# Patient Record
Sex: FEMALE | Race: WHITE | NOT HISPANIC OR LATINO | Employment: OTHER | URBAN - METROPOLITAN AREA
[De-identification: names, ages, dates, MRNs, and addresses within clinical notes are randomized per-mention and may not be internally consistent; named-entity substitution may affect disease eponyms.]

---

## 2017-01-17 DIAGNOSIS — E78.5 HYPERLIPIDEMIA LDL GOAL <100: Primary | ICD-10-CM

## 2017-01-17 DIAGNOSIS — E03.9 HYPOTHYROIDISM, UNSPECIFIED TYPE: ICD-10-CM

## 2017-01-18 RX ORDER — PRAVASTATIN SODIUM 10 MG
TABLET ORAL
Qty: 30 TABLET | Refills: 0 | Status: SHIPPED | OUTPATIENT
Start: 2017-01-18 | End: 2017-09-15

## 2017-01-18 RX ORDER — LEVOTHYROXINE SODIUM 75 UG/1
TABLET ORAL
Qty: 30 TABLET | Refills: 0 | Status: SHIPPED | OUTPATIENT
Start: 2017-01-18 | End: 2017-02-15

## 2017-01-18 NOTE — TELEPHONE ENCOUNTER
Synthroid     Last Written Prescription Date: 10/24/16  Last Quantity: 30, # refills: 1  Last Office Visit with [facility], [facility] or [facility] prescribing provider: 10/28/16        TSH   Date Value Ref Range Status   10/27/2016 0.90 0.40 - 4.00 mU/L Final       Pravastatin     Last Written Prescription Date: 12/5/16  Last Fill Quantity: 30, # refills: 0  Last Office Visit with [facility], [facility] or [facility] prescribing provider: 10/28/16       CHOL      186   10/27/2016  HDL       56   10/27/2016  LDL      109   10/27/2016  LDL      115   11/9/2011  TRIG      104   10/27/2016  CHOLHDLRATIO      3.6   5/26/2015

## 2017-01-18 NOTE — TELEPHONE ENCOUNTER
Rx was sent 1/18/2017 for 1 months and 0 refills. Patient should have medication through 2/18/2017    Shruthi Rivera RN

## 2017-02-07 ENCOUNTER — TRANSFERRED RECORDS (OUTPATIENT)
Dept: HEALTH INFORMATION MANAGEMENT | Facility: CLINIC | Age: 52
End: 2017-02-07

## 2017-02-08 NOTE — PROGRESS NOTES
"  SUBJECTIVE:                                                    Maria M Marcus is a 51 year old female who presents to clinic today for the following health issues:    HPI    Back Pain      Duration: 35 years - has been more intense for the last two weeks        Specific cause: \"herniated disk in neck while giving birth to son\"     Description:   Location of pain: middle of back and upper back on both side  Character of pain: dull ache with occasional shooting pain  Pain radiation:none  New numbness or weakness in legs, not attributed to pain:  no     Intensity: Currently 9/10    History:   Pain interferes with job: Not applicable  History of back problems: herniated disk - had surgery  Any previous MRI or X-rays: Yes- at [facility].  Date 2012 and prior  Sees a specialist for back pain:  No - previously did at [facility] \"but there was nothing they can do\"  Therapies tried without relief: activity, heat and stretch    Alleviating factors:   Improved by: chiropractor      Precipitating factors:  Worsened by: Lifting and Bending    Functional and Psychosocial Screen (Smooth STarT Back):      Total Score: 5           Sub-score: 3   Accompanying Signs & Symptoms:  Risk of Fracture:  None  Risk of Cauda Equina:  None  Risk of Infection:  None  Risk of Cancer:  None  Risk of Ankylosing Spondylitis:  Onset at age <35, male, AND morning back stiffness. no          Has been having right mid back pain for the past 2 weeks ever since she lost her balance while on vacation. She has been using Tylenol with minimal benefit as but peppermint oil has been working quite well. She does have a history of chronic neck pain/cervical stenosis with previous cervical spine surgery 10+ years ago but her current pain is different and feels like a muscle strain/exacerbation. She had not tried any recent muscle relaxants. No new weakness, radiation of the pain or upper extremity numbness/tingling. She does do multiple home neck/back stretching " exercises.     Problem list and histories reviewed & adjusted, as indicated.  Additional history: none    Problem list, Medication list, Allergies, and Medical/Social/Surgical histories reviewed in EPIC and updated as appropriate.    ROS:  GENERAL: Denies fever, fatigue, weakness, weight gain, or weight loss.  RESPIRATORY: Denies cough, hemoptysis, and shortness of breath.  MUSCULOSKELETAL: +Right sided upper back pain, neck pain.   NEUROLOGIC: Denies headache, fainting, dizziness, memory loss, numbness, tingling, or seizures.    OBJECTIVE:                                                    /82 mmHg  Pulse 101  Temp(Src) 97.8  F (36.6  C) (Temporal)  Resp 12  Ht   Wt 153 lb (69.4 kg)  SpO2 99%  Body mass index is 31.68 kg/(m^2).  GENERAL: healthy, alert and no distress  MS: Mild tenderness over the lower cervical/uppper thoracic spinous processes. Tenderness below the right scapula just posterior to the right axilla. Full cervical spine range of motion.  NEURO: Normal strength and tone, mentation intact and speech normal. Cranial nerves II-XII are grossly intact. DTRs are 1+/4 throughout and symmetric. Gait is stable.        ASSESSMENT/PLAN:                                                        ICD-10-CM    1. Upper back strain, initial encounter S29.012A cyclobenzaprine (FLEXERIL) 5 MG tablet   2. Chronic neck pain M54.2     G89.29        Symptoms are consistent with an upper back muscle strain.  I recommend she continue with home exercises along with the ice/heat, Tylenol and peppermint oil.   Will prescribe flexeril to help with her current pain/muscle tightness.  If she has any worsening pain or new weakness, she should be seen again.       Marcus Salgado PA-C  [facility]

## 2017-02-09 ENCOUNTER — OFFICE VISIT (OUTPATIENT)
Dept: FAMILY MEDICINE | Facility: OTHER | Age: 52
End: 2017-02-09
Payer: COMMERCIAL

## 2017-02-09 VITALS
RESPIRATION RATE: 12 BRPM | WEIGHT: 153 LBS | BODY MASS INDEX: 31.68 KG/M2 | OXYGEN SATURATION: 99 % | TEMPERATURE: 97.8 F | HEART RATE: 101 BPM | SYSTOLIC BLOOD PRESSURE: 138 MMHG | DIASTOLIC BLOOD PRESSURE: 82 MMHG

## 2017-02-09 DIAGNOSIS — M54.2 CHRONIC NECK PAIN: ICD-10-CM

## 2017-02-09 DIAGNOSIS — G89.29 CHRONIC NECK PAIN: ICD-10-CM

## 2017-02-09 DIAGNOSIS — S29.012A UPPER BACK STRAIN, INITIAL ENCOUNTER: Primary | ICD-10-CM

## 2017-02-09 PROCEDURE — 99213 OFFICE O/P EST LOW 20 MIN: CPT | Performed by: PHYSICIAN ASSISTANT

## 2017-02-09 RX ORDER — CYCLOBENZAPRINE HCL 5 MG
5-10 TABLET ORAL 3 TIMES DAILY PRN
Qty: 30 TABLET | Refills: 0 | Status: SHIPPED | OUTPATIENT
Start: 2017-02-09 | End: 2017-07-28

## 2017-02-09 ASSESSMENT — ANXIETY QUESTIONNAIRES
7. FEELING AFRAID AS IF SOMETHING AWFUL MIGHT HAPPEN: 0 = NOT AT ALL
GAD7 TOTAL SCORE: 0
GAD7 TOTAL SCORE: 0

## 2017-02-09 ASSESSMENT — PAIN SCALES - GENERAL: PAINLEVEL: EXTREME PAIN (9)

## 2017-02-09 ASSESSMENT — PATIENT HEALTH QUESTIONNAIRE - PHQ9: SUM OF ALL RESPONSES TO PHQ QUESTIONS 1-9: 2

## 2017-02-09 NOTE — PATIENT INSTRUCTIONS
You have an upper back muscle strain.  I recommend you continue with home exercises along with the ice/heat, Tylenol and peppermint oil.   Will prescribe flexeril to help relax your muscles. Take up to three times daily as needed.  If you have any worsening pain or new weakness, you should be seen again.

## 2017-02-09 NOTE — NURSING NOTE
"Chief Complaint   Patient presents with     Musculoskeletal Problem     back spasms     Panel Management     hep c, DM foot exam, phq9 and donaldo, urine drug screen       Initial /82 mmHg  Pulse 101  Temp(Src) 97.8  F (36.6  C) (Temporal)  Resp 12  Ht   Wt 153 lb (69.4 kg)  SpO2 99% Estimated body mass index is 31.68 kg/(m^2) as calculated from the following:    Height as of 9/12/16: 4' 10.25\" (1.48 m).    Weight as of this encounter: 153 lb (69.4 kg).  Medication Reconciliation: complete     Gayle Case, ALEJANDRO      "

## 2017-02-09 NOTE — MR AVS SNAPSHOT
After Visit Summary   2/9/2017    Maria M Marcus    MRN: [id number]           Patient Information     Date Of Birth          1965        Visit Information        Provider Department      2/9/2017 9:00 AM Marcus Salgado PA-C [facility]        Today's Diagnoses     Upper back strain, initial encounter    -  1     Chronic neck pain           Care Instructions    You have an upper back muscle strain.  I recommend you continue with home exercises along with the ice/heat, Tylenol and peppermint oil.   Will prescribe flexeril to help relax your muscles. Take up to three times daily as needed.  If you have any worsening pain or new weakness, you should be seen again.         Follow-ups after your visit        Who to contact     If you have questions or need follow up information about today's clinic visit or your schedule please contact [facility] directly at [phone].  Normal or non-critical lab and imaging results will be communicated to you by Donordonuthart, letter or phone within 4 business days after the clinic has received the results. If you do not hear from us within 7 days, please contact the clinic through Donordonuthart or phone. If you have a critical or abnormal lab result, we will notify you by phone as soon as possible.  Submit refill requests through BESOS or call your pharmacy and they will forward the refill request to us. Please allow 3 business days for your refill to be completed.          Additional Information About Your Visit        MyChart Information     [employer]ES[employer] gives you secure access to your electronic health record. If you see a primary care provider, you can also send messages to your care team and make appointments. If you have questions, please call your primary care clinic.  If you do not have a primary care provider, please call [phone] and they will assist you.        Care EveryWhere ID     This is your Care EveryWhere ID. This  could be used by other organizations to access your [place] medical records  [id number]        Your Vitals Were     Pulse Temperature Respirations Pulse Oximetry          101 97.8  F (36.6  C) (Temporal) 12 99%         Blood Pressure from Last 3 Encounters:   02/09/17 138/82   11/12/16 132/62   10/28/16 128/60    Weight from Last 3 Encounters:   02/09/17 153 lb (69.4 kg)   10/28/16 146 lb (66.225 kg)   09/12/16 143 lb 8 oz (65.091 kg)              Today, you had the following     No orders found for display         Today's Medication Changes          These changes are accurate as of: 2/9/17  9:25 AM.  If you have any questions, ask your nurse or doctor.               Start taking these medicines.        Dose/Directions    cyclobenzaprine 5 MG tablet   Commonly known as:  FLEXERIL   Used for:  Upper back strain, initial encounter   Started by:  Marcus Salgado PA-C        Dose:  5-10 mg   Take 1-2 tablets (5-10 mg) by mouth 3 times daily as needed for muscle spasms   Quantity:  30 tablet   Refills:  0            Where to get your medicines      These medications were sent to [facility] [address] [place] [address] [facility]  [address] [facility] [address]     Phone:  [phone]    - cyclobenzaprine 5 MG tablet             Primary Care Provider Office Phone # Fax #    Kirill Berman [phone]       [address] [place] [address]        Thank you!     Thank you for choosing [facility]  for your care. Our goal is always to provide you with excellent care. Hearing back from our patients is one way we can continue to improve our services. Please take a few minutes to complete the written survey that you may receive in the mail after your visit with us. Thank you!             Your Updated Medication List - Protect others around you: Learn how to safely use, store and throw away your medicines at www.disposemymeds.org.           This list is accurate as of: 2/9/17  9:25 AM.  Always use your most recent med list.                   Brand Name Dispense Instructions for use    azithromycin 250 MG tablet    ZITHROMAX    6 tablet    Two tablets first day, then one tablet daily for four days.       * blood glucose monitoring lancets     4 Box    Use to test blood sugars four times daily or as directed.       * blood glucose monitoring lancets     1 Box    Use to test blood sugar 5 times daily or as directed.       * blood glucose monitoring meter device kit     1 kit    Use to test blood sugars four times daily or as directed.       * blood glucose monitoring meter device kit     1 kit    Use to test blood sugars 5 times daily or as directed.       * blood glucose monitoring meter device kit     1 kit    Use to test blood sugars 5 times daily or as directed.       * blood glucose monitoring test strip    RELION PRIME TEST    1 Box    USE 1 STRIP TO TEST 4 TIMES DAILY       * blood glucose monitoring test strip    no brand specified    150 each    Use to test blood sugars 5 times daily or as directed. Type 1 Diabetes E10.9       * ACCU-CHEK CHERELLE test strip   Generic drug:  blood glucose monitoring     150 each    Use to test blood sugars 5 times daily or as directed.       cyclobenzaprine 5 MG tablet    FLEXERIL    30 tablet    Take 1-2 tablets (5-10 mg) by mouth 3 times daily as needed for muscle spasms       insulin aspart 100 UNIT/ML injection    NovoLOG FLEXPEN    1 Month    5 units before breakfast, 3 units before lunch, 2 units before dinner       * insulin pen needle 31G X 5 MM    B-D U/F    400 each    Use four times daily - short pen needles       * RELION MINI PEN NEEDLES 31G X 6 MM   Generic drug:  insulin pen needle     400 each    USE ONE  4 TIMES DAILY       * LANTUS SOLOSTAR 100 UNIT/ML injection   Generic drug:  insulin glargine     15 mL    INJECT 8 UNITS SUBCUTANEOUSLY AT BEDTIME       * LANTUS VIAL 100 UNIT/ML injection    Generic drug:  insulin glargine     3 Month    11 units in the AM.       * levothyroxine 88 MCG tablet    SYNTHROID/LEVOTHROID    30 tablet    Take 1 tablet (88 mcg) by mouth daily       * levothyroxine 75 MCG tablet    SYNTHROID/LEVOTHROID    30 tablet    TAKE ONE TABLET BY MOUTH ONCE DAILY       lisinopril 10 MG tablet    PRINIVIL/ZESTRIL    30 tablet    TAKE ONE TABLET BY MOUTH ONCE DAILY       methocarbamol 750 MG tablet    ROBAXIN    60 tablet    Take 1 tablet (750 mg) by mouth 4 times daily as needed for muscle spasms       omeprazole 20 MG CR capsule    priLOSEC    30 capsule    TAKE ONE CAPSULE BY MOUTH ONCE DAILY       pravastatin 10 MG tablet    PRAVACHOL    30 tablet    TAKE ONE-HALF TABLET BY MOUTH ONCE DAILY       vitamin D 2000 UNITS tablet     100 tablet    Take 2,000 Units by mouth daily.       * Notice:  This list has 14 medication(s) that are the same as other medications prescribed for you. Read the directions carefully, and ask your doctor or other care provider to review them with you.

## 2017-02-10 ASSESSMENT — PATIENT HEALTH QUESTIONNAIRE - PHQ9: SUM OF ALL RESPONSES TO PHQ QUESTIONS 1-9: 2

## 2017-02-10 ASSESSMENT — ANXIETY QUESTIONNAIRES: GAD7 TOTAL SCORE: 0

## 2017-02-13 ENCOUNTER — TELEPHONE (OUTPATIENT)
Dept: FAMILY MEDICINE | Facility: CLINIC | Age: 52
End: 2017-02-13

## 2017-02-13 DIAGNOSIS — S29.012A UPPER BACK STRAIN, INITIAL ENCOUNTER: Primary | ICD-10-CM

## 2017-02-13 RX ORDER — TIZANIDINE 2 MG/1
2 TABLET ORAL 3 TIMES DAILY PRN
Qty: 30 TABLET | Refills: 1 | Status: SHIPPED | OUTPATIENT
Start: 2017-02-13 | End: 2017-07-28

## 2017-02-13 NOTE — TELEPHONE ENCOUNTER
I spoke with patient over the phone and since starting the Flexeril, her glucose has been as high as 500. Will strop the Flexeril and try low dose tizanidine instead. Common side effects discussed. She will let me know if she has any concerns.    Marcus Salgado PA-C

## 2017-02-13 NOTE — TELEPHONE ENCOUNTER
Reason for call:  Medication  Reason for Call:  Medication or medication refill:    Do you use a [facility]?  Name of the pharmacy and phone number for the current request:  [facility] - [phone]    Name of the medication requested: something other then Flexeril, she saw Fer Salgado on 2/9/17    Other request: feels the flexeril is increasing her blood sugar.  She is running in the 200's    Can we leave a detailed message on this number? YES    Phone number patient can be reached at: Home number on file [phone] (home)    Best Time: any    Call taken on 2/13/2017 at 12:07 PM by Jeannette Luna

## 2017-02-15 DIAGNOSIS — E03.9 HYPOTHYROIDISM, UNSPECIFIED TYPE: ICD-10-CM

## 2017-02-15 NOTE — TELEPHONE ENCOUNTER
levothyroxine     Last Written Prescription Date: 1/18/17  Last Quantity: 30, # refills: 0  Last Office Visit with [facility] or [facility] prescribing provider: 10/28/16        TSH   Date Value Ref Range Status   10/27/2016 0.90 0.40 - 4.00 mU/L Final

## 2017-02-16 DIAGNOSIS — E10.9 TYPE 1 DIABETES MELLITUS WITHOUT COMPLICATION (H): ICD-10-CM

## 2017-02-16 NOTE — TELEPHONE ENCOUNTER
Contour blood glucose test strips requested on fax      Last Written Prescription Date: 9/6/16  Last Fill Quantity: 150,  # refills: 3   Last Office Visit with [facility], [facility] or [facility] prescribing provider: 2/9/17

## 2017-02-17 NOTE — TELEPHONE ENCOUNTER
Prescription approved per [facility] Refill Protocol.  Asim Holloway RN

## 2017-02-18 ENCOUNTER — MYC MEDICAL ADVICE (OUTPATIENT)
Dept: INTERNAL MEDICINE | Facility: CLINIC | Age: 52
End: 2017-02-18

## 2017-02-21 RX ORDER — LEVOTHYROXINE SODIUM 75 UG/1
TABLET ORAL
Qty: 30 TABLET | Refills: 5 | Status: SHIPPED | OUTPATIENT
Start: 2017-02-21 | End: 2017-08-07

## 2017-02-24 ENCOUNTER — MYC MEDICAL ADVICE (OUTPATIENT)
Dept: INTERNAL MEDICINE | Facility: CLINIC | Age: 52
End: 2017-02-24

## 2017-02-24 ENCOUNTER — OFFICE VISIT (OUTPATIENT)
Dept: URGENT CARE | Facility: RETAIL CLINIC | Age: 52
End: 2017-02-24
Payer: COMMERCIAL

## 2017-02-24 VITALS
OXYGEN SATURATION: 99 % | TEMPERATURE: 98.1 F | SYSTOLIC BLOOD PRESSURE: 135 MMHG | HEART RATE: 68 BPM | DIASTOLIC BLOOD PRESSURE: 63 MMHG

## 2017-02-24 DIAGNOSIS — J01.90 ACUTE SINUSITIS WITH SYMPTOMS > 10 DAYS: Primary | ICD-10-CM

## 2017-02-24 DIAGNOSIS — E10.9 TYPE 1 DIABETES MELLITUS WITHOUT COMPLICATION (H): ICD-10-CM

## 2017-02-24 PROCEDURE — 99213 OFFICE O/P EST LOW 20 MIN: CPT | Performed by: PHYSICIAN ASSISTANT

## 2017-02-24 NOTE — NURSING NOTE
"Chief Complaint   Patient presents with     Sinus Problem     going on 2 weeks; pressure in forehead, cheeks, ears     Headache     sinus head pressure     Cough     over 1 week; hurts in chest when coughing     Ent Problem     ear pressure     Generalized Body Aches       Initial /63 (BP Location: Left arm)  Pulse 68  Temp 98.1  F (36.7  C) (Oral)  SpO2 99% Estimated body mass index is 31.7 kg/(m^2) as calculated from the following:    Height as of 9/12/16: 4' 10.25\" (1.48 m).    Weight as of 2/9/17: 153 lb (69.4 kg).  Medication Reconciliation: complete  "

## 2017-02-24 NOTE — PATIENT INSTRUCTIONS
Augmentin (amoxicillin-clavulanate) 875mg twice daily for 10 days as directed.  Flonase 2 sprays in each nostril daily until symptoms resolve, then continue 1 spray in each nostril for at least 5 more days.  Take Tylenol as needed for pain.  May use netti pot with bottled or distilled water and saline packets to flush sinuses.  Sudafed behind the pharmacist counter for 3-5 days helps relieve congestion  Afrin (oxymetazoline) nasal spray twice daily for 3 days. Stop after 3 days.  Mucinex (guiafenesin) thins mucus and may help it to loosen more quickly  Saline drops or nasal sprays may loosen mucus.  Sit in the bathroom with the door closed and hot shower running to loosen mucus.  Contact primary care clinic if you do not have any relief from your symptoms after 10 days.  Present to emergency room for significantly increasing pain, persistent high fever >102F, swelling/redness around your eyes, changes in your vision or ability to move your eyes, altered mental status or a severe headache.

## 2017-02-24 NOTE — MR AVS SNAPSHOT
After Visit Summary   2/24/2017    Maria M Marcus    MRN: [id number]           Patient Information     Date Of Birth          1965        Visit Information        Provider Department      2/24/2017 3:20 PM Abbie Decker PA-C [facility]        Today's Diagnoses     Acute sinusitis with symptoms > 10 days    -  1      Care Instructions    Augmentin (amoxicillin-clavulanate) 875mg twice daily for 10 days as directed.  Flonase 2 sprays in each nostril daily until symptoms resolve, then continue 1 spray in each nostril for at least 5 more days.  Take Tylenol as needed for pain.  May use netti pot with bottled or distilled water and saline packets to flush sinuses.  Sudafed behind the pharmacist counter for 3-5 days helps relieve congestion  Afrin (oxymetazoline) nasal spray twice daily for 3 days. Stop after 3 days.  Mucinex (guiafenesin) thins mucus and may help it to loosen more quickly  Saline drops or nasal sprays may loosen mucus.  Sit in the bathroom with the door closed and hot shower running to loosen mucus.  Contact primary care clinic if you do not have any relief from your symptoms after 10 days.  Present to emergency room for significantly increasing pain, persistent high fever >102F, swelling/redness around your eyes, changes in your vision or ability to move your eyes, altered mental status or a severe headache.        Follow-ups after your visit        Who to contact     You can reach your care team any time of the day by calling [phone].  Notification of test results:  If you have an abnormal lab result, we will notify you by phone as soon as possible.         Additional Information About Your Visit        MyChart Information     [facility] gives you secure access to your electronic health record. If you see a primary care provider, you can also send messages to your care team and make appointments. If you have questions, please call your primary care  clinic.  If you do not have a primary care provider, please call [phone] and they will assist you.        Care EveryWhere ID     This is your Care EveryWhere ID. This could be used by other organizations to access your [place] medical records  [id number]        Your Vitals Were     Pulse Temperature Pulse Oximetry             68 98.1  F (36.7  C) (Oral) 99%          Blood Pressure from Last 3 Encounters:   02/24/17 135/63   02/09/17 138/82   11/12/16 132/62    Weight from Last 3 Encounters:   02/09/17 153 lb (69.4 kg)   10/28/16 146 lb (66.2 kg)   09/12/16 143 lb 8 oz (65.1 kg)              Today, you had the following     No orders found for display         Today's Medication Changes          These changes are accurate as of: 2/24/17  3:32 PM.  If you have any questions, ask your nurse or doctor.               Start taking these medicines.        Dose/Directions    amoxicillin-clavulanate 875-125 MG per tablet   Commonly known as:  AUGMENTIN   Used for:  Acute sinusitis with symptoms > 10 days        Dose:  1 tablet   Take 1 tablet by mouth 2 times daily for 10 days   Quantity:  20 tablet   Refills:  0         These medicines have changed or have updated prescriptions.        Dose/Directions    blood glucose monitoring lancets   This may have changed:  Another medication with the same name was removed. Continue taking this medication, and follow the directions you see here.   Used for:  Type 1 diabetes mellitus without complication (H)        Use to test blood sugar 5 times daily or as directed.   Quantity:  1 Box   Refills:  6       blood glucose monitoring meter device kit   This may have changed:  Another medication with the same name was removed. Continue taking this medication, and follow the directions you see here.   Used for:  Type 1 diabetes mellitus without complication (H)        Use to test blood sugars 5 times daily or as directed.   Quantity:  1 kit   Refills:  0       blood glucose monitoring  test strip   Commonly known as:  no brand specified   This may have changed:  Another medication with the same name was removed. Continue taking this medication, and follow the directions you see here.   Used for:  Type 1 diabetes mellitus without complication (H)        Use to test blood sugars 5 times daily or as directed. Type 1 Diabetes E10.9   Quantity:  150 each   Refills:  3            Where to get your medicines      These medications were sent to [facility] [address] [place], [place] - [address] [facility]  [address] [facility] [address]     Phone:  [phone]     amoxicillin-clavulanate 875-125 MG per tablet                Primary Care Provider Office Phone # Fax #    Kirill Berman [phone]       [place] [id number] [place] [address]        Thank you!     Thank you for choosing [facility]  for your care. Our goal is always to provide you with excellent care. Hearing back from our patients is one way we can continue to improve our services. Please take a few minutes to complete the written survey that you may receive in the mail after your visit with us. Thank you!             Your Updated Medication List - Protect others around you: Learn how to safely use, store and throw away your medicines at www.disposemymeds.org.          This list is accurate as of: 2/24/17  3:32 PM.  Always use your most recent med list.                   Brand Name Dispense Instructions for use    amoxicillin-clavulanate 875-125 MG per tablet    AUGMENTIN    20 tablet    Take 1 tablet by mouth 2 times daily for 10 days       blood glucose monitoring lancets     1 Box    Use to test blood sugar 5 times daily or as directed.       blood glucose monitoring meter device kit     1 kit    Use to test blood sugars 5 times daily or as directed.       blood glucose monitoring test strip    no brand specified    150 each    Use to test blood sugars 5 times daily or as  directed. Type 1 Diabetes E10.9       cyclobenzaprine 5 MG tablet    FLEXERIL    30 tablet    Take 1-2 tablets (5-10 mg) by mouth 3 times daily as needed for muscle spasms       IBUPROFEN PO          insulin aspart 100 UNIT/ML injection    NovoLOG FLEXPEN    1 Month    5 units before breakfast, 3 units before lunch, 2 units before dinner       * insulin pen needle 31G X 5 MM    B-D U/F    400 each    Use four times daily - short pen needles       * RELION MINI PEN NEEDLES 31G X 6 MM   Generic drug:  insulin pen needle     400 each    USE ONE  4 TIMES DAILY       * LANTUS SOLOSTAR 100 UNIT/ML injection   Generic drug:  insulin glargine     15 mL    INJECT 8 UNITS SUBCUTANEOUSLY AT BEDTIME       * LANTUS VIAL 100 UNIT/ML injection   Generic drug:  insulin glargine     3 Month    11 units in the AM.       * levothyroxine 88 MCG tablet    SYNTHROID/LEVOTHROID    30 tablet    Take 1 tablet (88 mcg) by mouth daily       * levothyroxine 75 MCG tablet    SYNTHROID/LEVOTHROID    30 tablet    TAKE ONE TABLET BY MOUTH ONCE DAILY       lisinopril 10 MG tablet    PRINIVIL/ZESTRIL    30 tablet    TAKE ONE TABLET BY MOUTH ONCE DAILY       omeprazole 20 MG CR capsule    priLOSEC    30 capsule    TAKE ONE CAPSULE BY MOUTH ONCE DAILY       pravastatin 10 MG tablet    PRAVACHOL    30 tablet    TAKE ONE-HALF TABLET BY MOUTH ONCE DAILY       tiZANidine 2 MG tablet    ZANAFLEX    30 tablet    Take 1 tablet (2 mg) by mouth 3 times daily as needed for muscle spasms       vitamin D 2000 UNITS tablet     100 tablet    Take 2,000 Units by mouth daily.       * Notice:  This list has 6 medication(s) that are the same as other medications prescribed for you. Read the directions carefully, and ask your doctor or other care provider to review them with you.

## 2017-02-24 NOTE — PROGRESS NOTES
Chief Complaint   Patient presents with     Sinus Problem     going on 2 weeks; pressure in forehead, cheeks, ears     Headache     sinus head pressure     Cough     over 1 week; hurts in chest when coughing     Ent Problem     ear pressure     Generalized Body Aches     SUBJECTIVE:  Maria M Marcus is a 51 year old female here with concerns about sinus infection.  She states onset of symptoms was 2 weeks ago.    Course of illness is worsening.   Severity moderate  She has had maxillary pressure as well as nasal congestion, cough , sore throat and body aches.  Predisposing factors include diabetic.   Recent treatment has included: OTC meds    Past Medical History   Diagnosis Date     Cervical radiculopathy      Diabetic eye exam (H) 12/14/11     DISC DIS NEC/NOS-LUMBAR 4/7/2007     Frozen shoulder syndrome 6/16/2011     Hyperlipidemia LDL goal <100 10/31/2010     Hypertension 1/3/2011     Type 1 diabetes, HbA1c goal < 7% (H) dx 1967     Unspecified hypothyroidism      Current Outpatient Prescriptions   Medication Sig Dispense Refill     IBUPROFEN PO        amoxicillin-clavulanate (AUGMENTIN) 875-125 MG per tablet Take 1 tablet by mouth 2 times daily for 10 days 20 tablet 0     levothyroxine (SYNTHROID/LEVOTHROID) 75 MCG tablet TAKE ONE TABLET BY MOUTH ONCE DAILY 30 tablet 5     tiZANidine (ZANAFLEX) 2 MG tablet Take 1 tablet (2 mg) by mouth 3 times daily as needed for muscle spasms 30 tablet 1     cyclobenzaprine (FLEXERIL) 5 MG tablet Take 1-2 tablets (5-10 mg) by mouth 3 times daily as needed for muscle spasms 30 tablet 0     pravastatin (PRAVACHOL) 10 MG tablet TAKE ONE-HALF TABLET BY MOUTH ONCE DAILY 30 tablet 0     insulin glargine (LANTUS VIAL) 100 UNIT/ML vial 11 units in the AM. 3 Month 1     LANTUS SOLOSTAR 100 UNIT/ML soln INJECT 8 UNITS SUBCUTANEOUSLY AT BEDTIME 15 mL 1     blood glucose monitoring (TANA CONTOUR MONITOR) meter device kit Use to test blood sugars 5 times daily or as directed. 1 kit 0      blood glucose monitoring (SOFTCLIX) lancets Use to test blood sugar 5 times daily or as directed. 1 Box 6     omeprazole (PRILOSEC) 20 MG capsule TAKE ONE CAPSULE BY MOUTH ONCE DAILY 30 capsule 10     lisinopril (PRINIVIL,ZESTRIL) 10 MG tablet TAKE ONE TABLET BY MOUTH ONCE DAILY 30 tablet 10     insulin aspart (NOVOLOG FLEXPEN) 100 UNIT/ML soln 5 units before breakfast, 3 units before lunch, 2 units before dinner 1 Month 11     blood glucose monitoring (NO BRAND SPECIFIED) test strip Use to test blood sugars 5 times daily or as directed. Type 1 Diabetes E10.9 150 each 3     RELION MINI PEN NEEDLES 31G X 6 MM USE ONE  4 TIMES DAILY 400 each 8     levothyroxine (SYNTHROID, LEVOTHROID) 88 MCG tablet Take 1 tablet (88 mcg) by mouth daily 30 tablet 11     insulin pen needle (B-D U/F) 31G X 5 MM Use four times daily - short pen needles 400 each 3     Cholecalciferol (VITAMIN D) 2000 UNITS tablet Take 2,000 Units by mouth daily. 100 tablet 3     [DISCONTINUED] blood glucose monitoring (ACCU-CHEK CHERELLE PLUS) meter device kit Use to test blood sugars 5 times daily or as directed. 1 kit 0     [DISCONTINUED] blood glucose monitoring (TANA CONTOUR MONITOR) meter device kit Use to test blood sugars four times daily or as directed. 1 kit 0     Social History   Substance Use Topics     Smoking status: Never Smoker     Smokeless tobacco: Never Used      Comment: no exposure     Alcohol use Yes      Comment: winex1-2/3x per yr.     Allergies   Allergen Reactions     No Known Drug Allergies      ROS:  Review of systems negative except as stated above.    OBJECTIVE:  /63 (BP Location: Left arm)  Pulse 68  Temp 98.1  F (36.7  C) (Oral)  SpO2 99%  GENERAL APPEARANCE: healthy, alert and no distress  EYES: PERRL, conjunctiva clear  HENT: Pain with palpation over frontal and maxillary sinuses. Ear canals normal TMs with mild serous effusions bilaterally. Nasal turbinates edematous and boggy with a blue hue bilaterally. Posterior  pharynx is not erythematous.  NECK: supple, nontender, no lymphadenopathy  RESP: lungs clear to auscultation - no rales, rhonchi or wheezes  CV: regular rates and rhythm, normal S1 S2, no murmur noted    ASSESSMENT:    ICD-10-CM    1. Acute sinusitis with symptoms > 10 days J01.90 amoxicillin-clavulanate (AUGMENTIN) 875-125 MG per tablet     PLAN:   Patient Instructions   Augmentin (amoxicillin-clavulanate) 875mg twice daily for 10 days as directed.  Flonase 2 sprays in each nostril daily until symptoms resolve, then continue 1 spray in each nostril for at least 5 more days.  Take Tylenol as needed for pain.  May use netti pot with bottled or distilled water and saline packets to flush sinuses.  Sudafed behind the pharmacist counter for 3-5 days helps relieve congestion  Afrin (oxymetazoline) nasal spray twice daily for 3 days. Stop after 3 days.  Mucinex (guiafenesin) thins mucus and may help it to loosen more quickly  Saline drops or nasal sprays may loosen mucus.  Sit in the bathroom with the door closed and hot shower running to loosen mucus.  Contact primary care clinic if you do not have any relief from your symptoms after 10 days.  Present to emergency room for significantly increasing pain, persistent high fever >102F, swelling/redness around your eyes, changes in your vision or ability to move your eyes, altered mental status or a severe headache.    Follow up with primary care provider with any problems, questions or concerns or if symptoms worsen or fail to improve. Patient agreed to plan and verbalized understanding.    Dolores Decker PA-C  [facility] [place]

## 2017-02-27 NOTE — TELEPHONE ENCOUNTER
Test strips         Last Written Prescription Date: 3/17/16  Last Fill Quantity: 150, # refills: 3  Last Office Visit with [facility] [place], [facility] or [facility] prescribing provider:  10/28/16        BP Readings from Last 3 Encounters:   02/24/17 135/63   02/09/17 138/82   11/12/16 132/62     Lab Results   Component Value Date    MICROL <5 04/11/2016     No results found for: MICROALBUMIN  Creatinine   Date Value Ref Range Status   10/27/2016 1.00 0.52 - 1.04 mg/dL Final   ]  GFR Estimate   Date Value Ref Range Status   10/27/2016 58 (L) >60 mL/min/1.7m2 Final     Comment:     Non  GFR Calc   04/11/2016 63 >60 mL/min/1.7m2 Final     Comment:     Non  GFR Calc   05/26/2015 81 >60 mL/min/1.7m2 Final     Comment:     Non  GFR Calc     GFR Estimate If Black   Date Value Ref Range Status   10/27/2016 71 >60 mL/min/1.7m2 Final     Comment:      GFR Calc   04/11/2016 76 >60 mL/min/1.7m2 Final     Comment:      GFR Calc   05/26/2015 >90   GFR Calc   >60 mL/min/1.7m2 Final     Lab Results   Component Value Date    CHOL 186 10/27/2016     Lab Results   Component Value Date    HDL 56 10/27/2016     Lab Results   Component Value Date     10/27/2016     11/09/2011     Lab Results   Component Value Date    TRIG 104 10/27/2016     Lab Results   Component Value Date    CHOLHDLRATIO 3.6 05/26/2015     Lab Results   Component Value Date    AST 14 10/27/2016     Lab Results   Component Value Date    ALT 18 10/27/2016     Lab Results   Component Value Date    A1C 6.9 10/27/2016    A1C 6.1 04/11/2016    A1C 6.0 10/09/2015    A1C 6.8 05/26/2015    A1C 6.1 03/17/2014     Potassium   Date Value Ref Range Status   10/27/2016 4.5 3.4 - 5.3 mmol/L Final

## 2017-02-27 NOTE — TELEPHONE ENCOUNTER
Crystal calls and states that the test strips that were called in were the wrong ones.  Pt states she needs Contour test strips.  Pt also states that she is going into [facility] today around 3 to pickup her husbands meds and she would like to have it ready when she gets there.  Pt states she does not want to have to make 2 trips our mistake.

## 2017-02-28 NOTE — TELEPHONE ENCOUNTER
Prescription approved per [facility] [place] Refill Protocol.    Christina Garcia RN

## 2017-03-01 ENCOUNTER — MYC MEDICAL ADVICE (OUTPATIENT)
Dept: INTERNAL MEDICINE | Facility: CLINIC | Age: 52
End: 2017-03-01

## 2017-03-01 ENCOUNTER — OFFICE VISIT (OUTPATIENT)
Dept: FAMILY MEDICINE | Facility: OTHER | Age: 52
End: 2017-03-01
Payer: COMMERCIAL

## 2017-03-01 VITALS
WEIGHT: 148.6 LBS | HEART RATE: 86 BPM | HEIGHT: 59 IN | TEMPERATURE: 99.4 F | BODY MASS INDEX: 29.96 KG/M2 | SYSTOLIC BLOOD PRESSURE: 128 MMHG | OXYGEN SATURATION: 100 % | DIASTOLIC BLOOD PRESSURE: 64 MMHG | RESPIRATION RATE: 16 BRPM

## 2017-03-01 DIAGNOSIS — E10.9 TYPE 1 DIABETES MELLITUS WITHOUT COMPLICATION (H): ICD-10-CM

## 2017-03-01 DIAGNOSIS — J20.9 ACUTE BRONCHITIS WITH SYMPTOMS > 10 DAYS: Primary | ICD-10-CM

## 2017-03-01 DIAGNOSIS — E10.9 TYPE 1 DIABETES MELLITUS WITHOUT COMPLICATION (H): Chronic | ICD-10-CM

## 2017-03-01 DIAGNOSIS — E66.3 OVERWEIGHT (BMI 25.0-29.9): ICD-10-CM

## 2017-03-01 PROCEDURE — 99213 OFFICE O/P EST LOW 20 MIN: CPT | Performed by: STUDENT IN AN ORGANIZED HEALTH CARE EDUCATION/TRAINING PROGRAM

## 2017-03-01 RX ORDER — INSULIN GLARGINE 100 [IU]/ML
11 INJECTION, SOLUTION SUBCUTANEOUS DAILY
Qty: 30 ML | Refills: 1 | Status: SHIPPED | OUTPATIENT
Start: 2017-03-01 | End: 2018-07-09

## 2017-03-01 RX ORDER — AZITHROMYCIN 250 MG/1
TABLET, FILM COATED ORAL
Qty: 6 TABLET | Refills: 0 | Status: SHIPPED | OUTPATIENT
Start: 2017-03-01 | End: 2017-09-01

## 2017-03-01 ASSESSMENT — PAIN SCALES - GENERAL: PAINLEVEL: EXTREME PAIN (8)

## 2017-03-01 NOTE — PROGRESS NOTES
"  SUBJECTIVE:                                                    Maria M Marcus is a 51 year old female who presents to clinic today for the following health issues:      Acute Illness   Acute illness concerns: Sinus and cough  Onset: 3 weeks    Fever: no    Chills/Sweats: YES- also menopausal. Unsure if it's from illness or menopause    Headache (location?): YES- forehead to back of head    Sinus Pressure:YES- post-nasal drainage, facial pain and teeth pain    Conjunctivitis:  no    Ear Pain: YES- both, comes and goes    Rhinorrhea: no    Congestion: YES    Sore Throat: YES- back of throat hurts when coughing     Cough: YES-productive of clear sputum, with shortness of breath, barking, worsening over time    Wheeze: YES    Decreased Appetite: YES    Nausea: no    Vomiting: no    Diarrhea:  no    Dysuria/Freq.: no    Fatigue/Achiness: YES    Sick/Strep Exposure: YES- \"Whole house\"     Therapies Tried and outcome: Amoxicillin - does not help    Pt states she is feeling worse than she did when on medication. Cough hurts her chest. Pt states she is concerned she has pneumonia.    Problem list and histories reviewed & adjusted, as indicated.  Additional history: as documented    Labs reviewed in EPIC    Reviewed and updated as needed this visit by clinical staff       Reviewed and updated as needed this visit by Provider         ROS:  Constitutional, HEENT, cardiovascular, pulmonary, gi and gu systems are negative, except as otherwise noted.    OBJECTIVE:                                                    /64 (BP Location: Right arm, Patient Position: Chair, Cuff Size: Adult Regular)  Pulse 86  Temp 99.4  F (37.4  C) (Temporal)  Resp 16  Ht 4' 10.66\" (1.49 m)  Wt 148 lb 9.6 oz (67.4 kg)  SpO2 100%  BMI 30.36 kg/m2  Body mass index is 30.36 kg/(m^2).  GENERAL: healthy, alert and no distress  EYES: Eyes grossly normal to inspection, PERRL and conjunctivae and sclerae normal  HENT: bilateral TMs dull, ear canals " normal, nose and mouth without ulcers or lesions  NECK: no adenopathy, no asymmetry, masses, or scars and thyroid normal to palpation  RESP: lungs clear to auscultation - no rales, rhonchi or wheezes  CV: regular rate and rhythm, normal S1 S2, no S3 or S4, no murmur, click or rub  MS: no gross musculoskeletal defects noted, no edema  SKIN: no suspicious lesions or rashes  PSYCH: mentation appears normal, affect normal/bright    Diagnostic Test Results:  none      ASSESSMENT/PLAN:                                                      1. Acute bronchitis with symptoms > 10 days  Symptoms worsening on Augmentin.  Switch to azithromycin for bacterial bronchitis.  RTC if symptoms persist or fail to improve.  - azithromycin (ZITHROMAX) 250 MG tablet; Two tablets first day, then one tablet daily for four days.  Dispense: 6 tablet; Refill: 0    2. Type 1 diabetes mellitus without complication (H)  Increased risk for progression of infection due to diabetes.  Treat aggressively.    3. Overweight (BMI 25.0-29.9)  Increased risk for progression of infection due to obesity.  Will treat more aggressively.        Patient Instructions   Azithromycin - 2 tablets today and then 1 tablet daily for 4 days.    Kathryn Kennedy, CNP  [phone]    1.  Stay hydrated and rest - Drink plenty of fluid, 8-10 glasses of water per day and get lots of rest.    2. Mucinex - you can try Mucinex (immediate release) 200-400 mg every 4 hours OR Mucinex (extended release) 600-1200 mg every 12 hours as needed for congestion.  Mucinex helps thin out your mucus so that it is easier to get it out of your body.    3.  Flonase - 1 spray in each nostril daily.  This is a steroid nasal spray that can help decrease inflammation in the nose and sinuses resulting in less nasal congestion.    4. Oral decongestants - you may take a nasal decongestant for up to 3 days as needed.  Sudafed (pseudoephedrine) can be used if you do not have high blood pressure.  The dosing  for Sudafed is 60 mg every 4-6 hours as needed for immediate release.  For extended release it is 120 mg every 12 hours.  If you have high blood pressure you can try Coricidin HBP.    5. Nasal decongestant - Afrin nasal spray 2-3 sprays in each nostril twice daily for up to 3 days can help improve nasal congestion.  Do not use longer than 3 days as congestion can worsen with prolonged use.    6. Humidifier - use a humidifier in the places you spend the most time.  This will help moisturize the air you breath in and help improve cough.    7. Headache, sore throat, fever - you can try ibuprofen (maximum of 2400 mg/24 hours) or Tylenol (maximum of 3,000 mg/24 hours).    - do not use Tylenol if you have liver disease  - do not use Ibuprofen if you have kidney disease or take a blood thinner.    8. Vicks VapoRub or Mentholatum - you may try rubbing this on your chest, under your nose and over the top of your nose for congestion.  Avoid contact with eyes, mouth and inside of nose.    9. Sore throat - gargle with warm salt water as needed.  Ibuprofen can help.  Chloraseptic throat lozenges or spray can help numb the throat.       If your symptoms worsen or do not improve after lasting 10 days, please return to the clinic.    Kathryn Kennedy, CNP  [phone]                Vianney Kennedy, APRN CNP  [facility]

## 2017-03-01 NOTE — PATIENT INSTRUCTIONS
Azithromycin - 2 tablets today and then 1 tablet daily for 4 days.    Kathryn Kennedy, CNP  [phone]    1.  Stay hydrated and rest - Drink plenty of fluid, 8-10 glasses of water per day and get lots of rest.    2. Mucinex - you can try Mucinex (immediate release) 200-400 mg every 4 hours OR Mucinex (extended release) 600-1200 mg every 12 hours as needed for congestion.  Mucinex helps thin out your mucus so that it is easier to get it out of your body.    3.  Flonase - 1 spray in each nostril daily.  This is a steroid nasal spray that can help decrease inflammation in the nose and sinuses resulting in less nasal congestion.    4. Oral decongestants - you may take a nasal decongestant for up to 3 days as needed.  Sudafed (pseudoephedrine) can be used if you do not have high blood pressure.  The dosing for Sudafed is 60 mg every 4-6 hours as needed for immediate release.  For extended release it is 120 mg every 12 hours.  If you have high blood pressure you can try Coricidin HBP.    5. Nasal decongestant - Afrin nasal spray 2-3 sprays in each nostril twice daily for up to 3 days can help improve nasal congestion.  Do not use longer than 3 days as congestion can worsen with prolonged use.    6. Humidifier - use a humidifier in the places you spend the most time.  This will help moisturize the air you breath in and help improve cough.    7. Headache, sore throat, fever - you can try ibuprofen (maximum of 2400 mg/24 hours) or Tylenol (maximum of 3,000 mg/24 hours).    - do not use Tylenol if you have liver disease  - do not use Ibuprofen if you have kidney disease or take a blood thinner.    8. Vicks VapoRub or Mentholatum - you may try rubbing this on your chest, under your nose and over the top of your nose for congestion.  Avoid contact with eyes, mouth and inside of nose.    9. Sore throat - gargle with warm salt water as needed.  Ibuprofen can help.  Chloraseptic throat lozenges or spray can help numb the throat.        If your symptoms worsen or do not improve after lasting 10 days, please return to the clinic.    Kathryn Kennedy, [facility]  [phone]

## 2017-03-01 NOTE — NURSING NOTE
"Chief Complaint   Patient presents with     Sinus Problem       Initial /64 (BP Location: Right arm, Patient Position: Chair, Cuff Size: Adult Regular)  Pulse 86  Temp 99.4  F (37.4  C) (Temporal)  Resp 16  Ht 4' 10.66\" (1.49 m)  Wt 148 lb 9.6 oz (67.4 kg)  SpO2 100%  BMI 30.36 kg/m2 Estimated body mass index is 30.36 kg/(m^2) as calculated from the following:    Height as of this encounter: 4' 10.66\" (1.49 m).    Weight as of this encounter: 148 lb 9.6 oz (67.4 kg).  Medication Reconciliation: complete   Luz Maria Pierce CMA      "

## 2017-03-01 NOTE — MR AVS SNAPSHOT
After Visit Summary   3/1/2017    Maria M Marcus    MRN: [id number]           Patient Information     Date Of Birth          1965        Visit Information        Provider Department      3/1/2017 10:40 AM Vianney Kennedy APRN CNP [facility]        Today's Diagnoses     Acute bronchitis with symptoms > 10 days    -  1      Care Instructions    Azithromycin - 2 tablets today and then 1 tablet daily for 4 days.    Kathryn Kennedy CNP  [phone]    1.  Stay hydrated and rest - Drink plenty of fluid, 8-10 glasses of water per day and get lots of rest.    2. Mucinex - you can try Mucinex (immediate release) 200-400 mg every 4 hours OR Mucinex (extended release) 600-1200 mg every 12 hours as needed for congestion.  Mucinex helps thin out your mucus so that it is easier to get it out of your body.    3.  Flonase - 1 spray in each nostril daily.  This is a steroid nasal spray that can help decrease inflammation in the nose and sinuses resulting in less nasal congestion.    4. Oral decongestants - you may take a nasal decongestant for up to 3 days as needed.  Sudafed (pseudoephedrine) can be used if you do not have high blood pressure.  The dosing for Sudafed is 60 mg every 4-6 hours as needed for immediate release.  For extended release it is 120 mg every 12 hours.  If you have high blood pressure you can try Coricidin HBP.    5. Nasal decongestant - Afrin nasal spray 2-3 sprays in each nostril twice daily for up to 3 days can help improve nasal congestion.  Do not use longer than 3 days as congestion can worsen with prolonged use.    6. Humidifier - use a humidifier in the places you spend the most time.  This will help moisturize the air you breath in and help improve cough.    7. Headache, sore throat, fever - you can try ibuprofen (maximum of 2400 mg/24 hours) or Tylenol (maximum of 3,000 mg/24 hours).    - do not use Tylenol if you have liver disease  - do not use  Ibuprofen if you have kidney disease or take a blood thinner.    8. Vicks VapoRub or Mentholatum - you may try rubbing this on your chest, under your nose and over the top of your nose for congestion.  Avoid contact with eyes, mouth and inside of nose.    9. Sore throat - gargle with warm salt water as needed.  Ibuprofen can help.  Chloraseptic throat lozenges or spray can help numb the throat.       If your symptoms worsen or do not improve after lasting 10 days, please return to the clinic.    Kathryn Kennedy CNP  [phone]                  Follow-ups after your visit        Who to contact     If you have questions or need follow up information about today's clinic visit or your schedule please contact [facility] directly at [phone].  Normal or non-critical lab and imaging results will be communicated to you by MyChart, letter or phone within 4 business days after the clinic has received the results. If you do not hear from us within 7 days, please contact the clinic through MyChart or phone. If you have a critical or abnormal lab result, we will notify you by phone as soon as possible.  Submit refill requests through Xtime or call your pharmacy and they will forward the refill request to us. Please allow 3 business days for your refill to be completed.          Additional Information About Your Visit        Xtime Information     Xtime gives you secure access to your electronic health record. If you see a primary care provider, you can also send messages to your care team and make appointments. If you have questions, please call your primary care clinic.  If you do not have a primary care provider, please call [phone] and they will assist you.        Care EveryWhere ID     This is your Care EveryWhere ID. This could be used by other organizations to access your [place] medical records  [id number]        Your Vitals Were     Pulse Temperature Respirations Height Pulse Oximetry BMI  "(Body Mass Index)    86 99.4  F (37.4  C) (Temporal) 16 4' 10.66\" (1.49 m) 100% 30.36 kg/m2       Blood Pressure from Last 3 Encounters:   03/01/17 128/64   02/24/17 135/63   02/09/17 138/82    Weight from Last 3 Encounters:   03/01/17 148 lb 9.6 oz (67.4 kg)   02/09/17 153 lb (69.4 kg)   10/28/16 146 lb (66.2 kg)              Today, you had the following     No orders found for display         Today's Medication Changes          These changes are accurate as of: 3/1/17 11:17 AM.  If you have any questions, ask your nurse or doctor.               Start taking these medicines.        Dose/Directions    azithromycin 250 MG tablet   Commonly known as:  ZITHROMAX   Used for:  Acute bronchitis with symptoms > 10 days   Started by:  Vianney Kennedy APRN CNP        Two tablets first day, then one tablet daily for four days.   Quantity:  6 tablet   Refills:  0            Where to get your medicines      These medications were sent to [facility] [address] [place] [id number] [address] [address]     Phone:  [phone]     azithromycin 250 MG tablet                Primary Care Provider Office Phone # Fax #    Kirill Berman -[phone]       [address] [place] [address]        Thank you!     Thank you for choosing [facility]  for your care. Our goal is always to provide you with excellent care. Hearing back from our patients is one way we can continue to improve our services. Please take a few minutes to complete the written survey that you may receive in the mail after your visit with us. Thank you!             Your Updated Medication List - Protect others around you: Learn how to safely use, store and throw away your medicines at www.disposemymeds.org.          This list is accurate as of: 3/1/17 11:17 AM.  Always use your most recent med list.                   Brand Name Dispense Instructions for use    " amoxicillin-clavulanate 875-125 MG per tablet    AUGMENTIN    20 tablet    Take 1 tablet by mouth 2 times daily for 10 days       azithromycin 250 MG tablet    ZITHROMAX    6 tablet    Two tablets first day, then one tablet daily for four days.       blood glucose monitoring lancets     1 Box    Use to test blood sugar 5 times daily or as directed.       blood glucose monitoring meter device kit     1 kit    Use to test blood sugars 5 times daily or as directed.       blood glucose monitoring test strip    no brand specified    150 each    Use to test blood sugars 5 times daily or as directed. Type 1 Diabetes E10.9       cyclobenzaprine 5 MG tablet    FLEXERIL    30 tablet    Take 1-2 tablets (5-10 mg) by mouth 3 times daily as needed for muscle spasms       IBUPROFEN PO          insulin aspart 100 UNIT/ML injection    NovoLOG FLEXPEN    1 Month    5 units before breakfast, 3 units before lunch, 2 units before dinner       * insulin pen needle 31G X 5 MM    B-D U/F    400 each    Use four times daily - short pen needles       * RELION MINI PEN NEEDLES 31G X 6 MM   Generic drug:  insulin pen needle     400 each    USE ONE  4 TIMES DAILY       * LANTUS SOLOSTAR 100 UNIT/ML injection   Generic drug:  insulin glargine     15 mL    INJECT 8 UNITS SUBCUTANEOUSLY AT BEDTIME       * LANTUS VIAL 100 UNIT/ML injection   Generic drug:  insulin glargine     3 Month    11 units in the AM.       * levothyroxine 88 MCG tablet    SYNTHROID/LEVOTHROID    30 tablet    Take 1 tablet (88 mcg) by mouth daily       * levothyroxine 75 MCG tablet    SYNTHROID/LEVOTHROID    30 tablet    TAKE ONE TABLET BY MOUTH ONCE DAILY       lisinopril 10 MG tablet    PRINIVIL/ZESTRIL    30 tablet    TAKE ONE TABLET BY MOUTH ONCE DAILY       omeprazole 20 MG CR capsule    priLOSEC    30 capsule    TAKE ONE CAPSULE BY MOUTH ONCE DAILY       pravastatin 10 MG tablet    PRAVACHOL    30 tablet    TAKE ONE-HALF TABLET BY MOUTH ONCE DAILY       tiZANidine 2 MG  tablet    ZANAFLEX    30 tablet    Take 1 tablet (2 mg) by mouth 3 times daily as needed for muscle spasms       vitamin D 2000 UNITS tablet     100 tablet    Take 2,000 Units by mouth daily.       * Notice:  This list has 6 medication(s) that are the same as other medications prescribed for you. Read the directions carefully, and ask your doctor or other care provider to review them with you.

## 2017-04-27 ENCOUNTER — TELEPHONE (OUTPATIENT)
Dept: TRANSPLANT | Facility: CLINIC | Age: 52
End: 2017-04-27

## 2017-04-27 NOTE — TELEPHONE ENCOUNTER
Per Dr Kumar's suggestion I placed a call to Maria M to see if she has thought any further on the idea of pancreas transplant or might want to come for a simple consult with him to discuss.  Maria M was very pleasant and seemed to appreciate the call but says her  is ill right now so she wants to focus on him and her diabetes is under good control.  She declines the offer to come in for short consult at this time.  I told her she can contact me at any point in the future if she wants to discuss.  This referral closed.

## 2017-07-28 ENCOUNTER — OFFICE VISIT (OUTPATIENT)
Dept: ENDOCRINOLOGY | Facility: CLINIC | Age: 52
End: 2017-07-28
Payer: COMMERCIAL

## 2017-07-28 VITALS
WEIGHT: 154 LBS | HEIGHT: 58 IN | SYSTOLIC BLOOD PRESSURE: 141 MMHG | BODY MASS INDEX: 32.32 KG/M2 | HEART RATE: 82 BPM | DIASTOLIC BLOOD PRESSURE: 59 MMHG

## 2017-07-28 DIAGNOSIS — E10.9 TYPE 1 DIABETES MELLITUS WITHOUT COMPLICATION (H): Primary | Chronic | ICD-10-CM

## 2017-07-28 LAB
ALBUMIN SERPL-MCNC: 4.2 G/DL (ref 3.4–5)
ALT SERPL W P-5'-P-CCNC: 19 U/L (ref 0–50)
ANION GAP SERPL CALCULATED.3IONS-SCNC: 7 MMOL/L (ref 3–14)
BUN SERPL-MCNC: 9 MG/DL (ref 7–30)
CALCIUM SERPL-MCNC: 9.4 MG/DL (ref 8.5–10.1)
CHLORIDE SERPL-SCNC: 106 MMOL/L (ref 94–109)
CK SERPL-CCNC: 89 U/L (ref 30–225)
CO2 SERPL-SCNC: 29 MMOL/L (ref 20–32)
CORTIS SERPL-MCNC: 7.3 UG/DL (ref 4–22)
CREAT SERPL-MCNC: 0.96 MG/DL (ref 0.52–1.04)
CREAT UR-MCNC: 20 MG/DL
GFR SERPL CREATININE-BSD FRML MDRD: 61 ML/MIN/1.7M2
GLUCOSE SERPL-MCNC: 58 MG/DL (ref 70–99)
HBA1C MFR BLD: 6.1 % (ref 0–5.7)
HGB BLD-MCNC: 12.4 G/DL (ref 11.7–15.7)
LDLC SERPL DIRECT ASSAY-MCNC: 122 MG/DL
MICROALBUMIN UR-MCNC: <5 MG/L
MICROALBUMIN/CREAT UR: NORMAL MG/G CR (ref 0–25)
PHOSPHATE SERPL-MCNC: 3.1 MG/DL (ref 2.5–4.5)
POTASSIUM SERPL-SCNC: 3.7 MMOL/L (ref 3.4–5.3)
SODIUM SERPL-SCNC: 142 MMOL/L (ref 133–144)
TSH SERPL DL<=0.005 MIU/L-ACNC: 1.22 MU/L (ref 0.4–4)

## 2017-07-28 PROCEDURE — 80069 RENAL FUNCTION PANEL: CPT | Performed by: INTERNAL MEDICINE

## 2017-07-28 PROCEDURE — 36415 COLL VENOUS BLD VENIPUNCTURE: CPT | Performed by: INTERNAL MEDICINE

## 2017-07-28 PROCEDURE — 83036 HEMOGLOBIN GLYCOSYLATED A1C: CPT | Performed by: INTERNAL MEDICINE

## 2017-07-28 PROCEDURE — 83721 ASSAY OF BLOOD LIPOPROTEIN: CPT | Performed by: INTERNAL MEDICINE

## 2017-07-28 PROCEDURE — 82550 ASSAY OF CK (CPK): CPT | Performed by: INTERNAL MEDICINE

## 2017-07-28 PROCEDURE — 85018 HEMOGLOBIN: CPT | Performed by: INTERNAL MEDICINE

## 2017-07-28 PROCEDURE — 84460 ALANINE AMINO (ALT) (SGPT): CPT | Performed by: INTERNAL MEDICINE

## 2017-07-28 PROCEDURE — 83519 RIA NONANTIBODY: CPT | Mod: 90 | Performed by: INTERNAL MEDICINE

## 2017-07-28 PROCEDURE — 82024 ASSAY OF ACTH: CPT | Performed by: INTERNAL MEDICINE

## 2017-07-28 PROCEDURE — 83516 IMMUNOASSAY NONANTIBODY: CPT | Performed by: INTERNAL MEDICINE

## 2017-07-28 PROCEDURE — 86376 MICROSOMAL ANTIBODY EACH: CPT | Performed by: INTERNAL MEDICINE

## 2017-07-28 PROCEDURE — 99000 SPECIMEN HANDLING OFFICE-LAB: CPT | Performed by: INTERNAL MEDICINE

## 2017-07-28 PROCEDURE — 84443 ASSAY THYROID STIM HORMONE: CPT | Performed by: INTERNAL MEDICINE

## 2017-07-28 PROCEDURE — 82533 TOTAL CORTISOL: CPT | Performed by: INTERNAL MEDICINE

## 2017-07-28 PROCEDURE — 82043 UR ALBUMIN QUANTITATIVE: CPT | Performed by: INTERNAL MEDICINE

## 2017-07-28 PROCEDURE — 99204 OFFICE O/P NEW MOD 45 MIN: CPT | Performed by: INTERNAL MEDICINE

## 2017-07-28 NOTE — MR AVS SNAPSHOT
After Visit Summary   7/28/2017    Maria M Marcus    MRN: [id number]           Patient Information     Date Of Birth          1965        Visit Information        Provider Department      7/28/2017 3:30 PM Sidney Jarquin MD [facility]        Today's Diagnoses     Type 1 diabetes mellitus without complication (H)    -  1      Care Instructions    Split Lantus 6 units twice a day from tomorrow. Tonight, give 3 units and check the blood sugars at 2-3 am in the morning     Reduce Novolog to 1 unit per 20 gm carb and 1 unit for every 60 mg/dL rise over 200.     Meet Jessica for pump start.       Sending blood sugars to your provider at [facility]:  We want to help you with your diabetes management, which often requires frequent adjustments to your therapy. For your convenience, we have several ways to send your blood sugars to your doctor for review.    - Send message directly to your doctor through My Chart.  Please ask the rooming staff if you would like to sign up for My Chart.  This is a fast and confidential way to send your information and communicate directly with your provider.   - Record readings and fax to [phone].  We have a template for you to use for your convenience.  - If you have a Medtronic pump, upload to [employer] and notify your provider of your username and password.   - Stop by the clinic with your meter for download.   - My Chart or call Jessica Demarco, Diabetes Educator at [phone]  - Call the clinic and speak to one of the endocrine nurses to relay information on the telephone.  Nandini Herman, or Sabine at [phone].   -    Please call the on-call Endocrinologist at the [facility] for after       hours/weekend needs at [phone] Option #4.    Please note that you do not need to FAST if you are just having an A1C drawn. Please remember to ALWAYS bring your glucose meter with to your appointment. This data is very important for  the management of your care.    Thank you!  Your [place] Diabetes Care Team                Follow-ups after your visit        Additional Services     DIABETES EDUCATOR REFERRAL       DIABETES SELF MANAGEMENT TRAINING (DSMT)      Your provider has referred you to Diabetes Education: [facility] [place] [phone]   [email or web address]    Type of training and number of hours: Previous Diagnosis: Follow-up DSMT - 2 hours.    Medicare covers: 10 hours of initial DSMT in 12 month period from the time of first visit, plus 2 hours of follow-up DSMT annually, and additional hours as requested for insulin training.    Diabetes Type: Type 1             Diabetes Co-Morbidities: none               A1C Goal:  <7.0       A1C is: Lab Results       Component                Value               Date                       A1C                      6.1                 07/28/2017              If an urgent visit is needed or A1C is above 12, Care Team to call the Diabetes Education Team at [phone] or send an In Basket message to the Diabetes Education Pool (P DIAB ED-PATIENT CARE).    Diabetes Education Topics: Insulin Pump Therapy: Pre-Pump Start Education and Continuous Glucose Monitor: Personal CGM    Special Educational Needs Requiring Individual DSMT: None       MEDICAL NUTRITION THERAPY (MNT) for Diabetes    Medical Nutrition Therapy with a Registered Dietitian can be provided in coordination with Diabetes Self-Management Training to assist in achieving optimal diabetes management.    MNT Type and Hours: Do not initiate MNT at this time.                       Medicare will cover: 3 hours initial MNT in 12 month period after first visit, plus 2 hours of follow-up MNT annually    Please be aware that coverage of these services is subject to the terms and  limitations of your health insurance plan.  Call member services at your health plan to determine Diabetes Self-Management Training benefits and ask which blood glucose monitor brands are covered by your plan.      Please bring the following with you to your appointment:    (1)  List of current medications   (2)  List of Blood Glucose Monitor brands that are covered by your insurance plan  (3)  Blood Glucose Monitor and log book  (4)   Food records for the 3 days prior to your visit    The Certified Diabetes Educator may make diabetes medication adjustments per the CDE Protocol and Collaborative Practice Agreement.                  Follow-up notes from your care team     Return in about 3 months (around 10/28/2017).      Your next 10 appointments already scheduled     Sep 13, 2017 11:00 AM CDT   New Visit with Mg Cde Provider   [facility])    [address] [place] [id number]   [phone]            Dec 08, 2017 11:15 AM CST   Return Visit with Sidney Jarquin MD, MG ENDO NURSE   [facility])    [address] [place] [id number]   [phone]              Future tests that were ordered for you today     Open Standing Orders        Priority Remaining Interval Expires Ordered    Hemoglobin A1c POCT Routine 3/4 Q 3 MO 7/28/2018 7/28/2017          Open Future Orders        Priority Expected Expires Ordered    21 Hydroxylase Antibody Routine  7/28/2018 7/28/2017    Tissue transglutaminase kyle IgA and IgG Routine  7/28/2018 7/28/2017    Albumin Random Urine Quantitative Routine  7/28/2018 7/28/2017    ALT Routine  7/28/2018 7/28/2017    CK total Routine  7/28/2018 7/28/2017    Hemoglobin Routine  7/28/2018 7/28/2017    LDL cholesterol direct Routine  7/28/2018 7/28/2017    Renal panel Routine  7/23/2018 7/28/2017    TSH with free T4 reflex Routine  7/23/2018 7/28/2017    Thyroid  "peroxidase antibody Routine  7/23/2018 7/28/2017    Cortisol Routine  7/28/2018 7/28/2017    Adrenal corticotropin Routine  7/28/2018 7/28/2017            Who to contact     If you have questions or need follow up information about today's clinic visit or your schedule please contact [facility] directly at [phone].  Normal or non-critical lab and imaging results will be communicated to you by M[employer]ations[employer], letter or phone within 4 business days after the clinic has received the results. If you do not hear from us within 7 days, please contact the clinic through M[employer]ations[employer] or phone. If you have a critical or abnormal lab result, we will notify you by phone as soon as possible.  Submit refill requests through Regenerate or call your pharmacy and they will forward the refill request to us. Please allow 3 business days for your refill to be completed.          Additional Information About Your Visit        Regenerate Information     Regenerate gives you secure access to your electronic health record. If you see a primary care provider, you can also send messages to your care team and make appointments. If you have questions, please call your primary care clinic.  If you do not have a primary care provider, please call [phone] and they will assist you.      Regenerate is an electronic gateway that provides easy, online access to your medical records. With Regenerate, you can request a clinic appointment, read your test results, renew a prescription or communicate with your care team.     To access your existing account, please contact your [facility] or call [phone] for assistance.        Care EveryWhere ID     This is your Care EveryWhere ID. This could be used by other organizations to access your [place] medical records  [id number]        Your Vitals Were     Pulse Height BMI (Body Mass Index)             82 1.483 m (4' 10.39\") 31.76 kg/m2          Blood Pressure from Last 3 " Encounters:   07/28/17 141/59   03/01/17 128/64   02/24/17 135/63    Weight from Last 3 Encounters:   07/28/17 69.9 kg (154 lb)   03/01/17 67.4 kg (148 lb 9.6 oz)   02/09/17 69.4 kg (153 lb)              We Performed the Following     DIABETES EDUCATOR REFERRAL     Hemoglobin A1c POCT        Primary Care Provider Office Phone # Fax #    Kirill Berman [phone]       [facility] [address] [place] [address]        Equal Access to Services     [facility]: Hadii aad ku hadasho Soomaali, waaxda luqadaha, qaybta kaalmada adeegyada, traci salgado haykaterina cruz . So [facility] [phone].    ATENCIÓN: Si habla español, tiene a ngo disposición servicios gratuitos de asistencia lingüística. [facility] [phone].    We comply with applicable federal civil rights laws and [place] laws. We do not discriminate on the basis of race, color, national origin, age, disability sex, sexual orientation or gender identity.            Thank you!     Thank you for choosing [facility]  for your care. Our goal is always to provide you with excellent care. Hearing back from our patients is one way we can continue to improve our services. Please take a few minutes to complete the written survey that you may receive in the mail after your visit with us. Thank you!             Your Updated Medication List - Protect others around you: Learn how to safely use, store and throw away your medicines at www.disposemymeds.org.          This list is accurate as of: 7/28/17  4:07 PM.  Always use your most recent med list.                   Brand Name Dispense Instructions for use Diagnosis    azithromycin 250 MG tablet    ZITHROMAX    6 tablet    Two tablets first day, then one tablet daily for four days.    Acute bronchitis with symptoms > 10 days       TANA CONTOUR test strip   Generic drug:  blood glucose monitoring     150 strip    USE TO TEST BLOOD SUGARS FIVE TIMES DAILY OR  AS DIRECTED.    Type 1 diabetes mellitus without complication (H)       blood glucose monitoring lancets     100 each    USE TO TEST BLOOD SUGAR 5 TIMES DAILY OR AS DIRECTED    Type 1 diabetes mellitus without complication (H)       blood glucose monitoring meter device kit     1 kit    Use to test blood sugars 5 times daily or as directed.    Type 1 diabetes mellitus without complication (H)       IBUPROFEN PO           * LANTUS SOLOSTAR 100 UNIT/ML injection   Generic drug:  insulin glargine     15 mL    INJECT 8 UNITS SUBCUTANEOUSLY AT BEDTIME    Type 1 diabetes mellitus without complication (H)       * insulin glargine 100 UNIT/ML injection     30 mL    Inject 11 Units Subcutaneous daily    Type 1 diabetes mellitus without complication (H)       levothyroxine 75 MCG tablet    SYNTHROID/LEVOTHROID    30 tablet    TAKE ONE TABLET BY MOUTH ONCE DAILY    Hypothyroidism, unspecified type       lisinopril 10 MG tablet    PRINIVIL/ZESTRIL    30 tablet    TAKE ONE TABLET BY MOUTH ONCE DAILY    Hypertension goal BP (blood pressure) < 140/90, Type 1 diabetes mellitus without complication (H)       NovoLOG FLEXPEN 100 UNIT/ML injection   Generic drug:  insulin aspart     15 mL    INJECT 5 UNITS SUBCUTANEOUSLY BEFORE BREAKFAST,3 UNITS BEFORE LUNCH AND 2 UNITS BEFORE DINNER    Type 1 diabetes, HbA1c goal < 7% (H)       omeprazole 20 MG CR capsule    priLOSEC    30 capsule    TAKE ONE CAPSULE BY MOUTH ONCE DAILY    Esophageal reflux       * pravastatin 10 MG tablet    PRAVACHOL    30 tablet    TAKE ONE-HALF TABLET BY MOUTH ONCE DAILY    Hyperlipidemia LDL goal <100       * pravastatin 10 MG tablet    PRAVACHOL    30 tablet    TAKE ONE-HALF TABLET BY MOUTH ONCE DAILY    Hyperlipidemia LDL goal <100       * RELION MINI PEN NEEDLES 31G X 6 MM   Generic drug:  insulin pen needle     400 each    USE ONE  4 TIMES DAILY    Type 1 diabetes, HbA1c goal < 7% (H)       * B-D U/F 31G X 5 MM   Generic drug:  insulin pen needle     400  each    USE ONE PEN NEEDLE 4 TIMES DAILY OR AS DIRECTED    Type 1 diabetes, HbA1c goal < 7% (H)       vitamin D 2000 UNITS tablet     100 tablet    Take 2,000 Units by mouth daily.    Vitamin D deficiency disease       * Notice:  This list has 6 medication(s) that are the same as other medications prescribed for you. Read the directions carefully, and ask your doctor or other care provider to review them with you.

## 2017-07-28 NOTE — PATIENT INSTRUCTIONS
Split Lantus 6 units twice a day from tomorrow. Tonight, give 3 units and check the blood sugars at 2-3 am in the morning     Reduce Novolog to 1 unit per 20 gm carb and 1 unit for every 60 mg/dL rise over 200.     Meet Jessica for pump start.       Sending blood sugars to your provider at [facility]:  We want to help you with your diabetes management, which often requires frequent adjustments to your therapy. For your convenience, we have several ways to send your blood sugars to your doctor for review.    - Send message directly to your doctor through My Chart.  Please ask the rooming staff if you would like to sign up for My Chart.  This is a fast and confidential way to send your information and communicate directly with your provider.   - Record readings and fax to [phone].  We have a template for you to use for your convenience.  - If you have a Medtronic pump, upload to [employer] and notify your provider of your username and password.   - Stop by the clinic with your meter for download.   - My Chart or call Jessica Demarco, Diabetes Educator at [phone]  - Call the clinic and speak to one of the endocrine nurses to relay information on the telephone.  Nandini Herman, or Sabine at [phone].   -    Please call the on-call Endocrinologist at the [facility] for after       hours/weekend needs at [phone] Option #4.    Please note that you do not need to FAST if you are just having an A1C drawn. Please remember to ALWAYS bring your glucose meter with to your appointment. This data is very important for the management of your care.    Thank you!  Your [facility] Diabetes Care Team

## 2017-07-28 NOTE — NURSING NOTE
"Maria M Marcus's goals for this visit include:   Chief Complaint   Patient presents with     Diabetes       She requests these members of her care team be copied on today's visit information: Kirill Berman      PCP: Kirill Berman    Referring Provider:  ESTABLISHED PATIENT  No address on file    Chief Complaint   Patient presents with     Diabetes       Initial /59  Pulse 82  Ht 1.483 m (4' 10.39\")  Wt 69.9 kg (154 lb)  BMI 31.76 kg/m2 Estimated body mass index is 31.76 kg/(m^2) as calculated from the following:    Height as of this encounter: 1.483 m (4' 10.39\").    Weight as of this encounter: 69.9 kg (154 lb).  Medication Reconciliation: complete    Do you need any medication refills at today's visit? No    Sabine Miller LPN      "

## 2017-07-28 NOTE — PROGRESS NOTES
Endocrinology Clinic Visit          July 28, 2017      Addendum for labs (8/15)   21 OH ab is back and normal    Dear Maria M,     Here is a review of your labs   There is no protein in the urine, and kidney function is normal, no indication of damage to the kidney by DM    We checked adrenal function because of the low Blood glucose - normal results for ACTH and cortisol, no antibody against adrenals.     No celiac disease antibodies (gluten sensitivity)    Thyroid function normal , the underactive thyroid disease is due to Autoimmunity and is called HAshimoto's thyroiditis as indicated by positive TPO.     Liver enzyme is normal.     No changes in plan as discussed during the visit.     Sidney Jarquin MD  [id number]  Endocrinology Service            Chief Complaint: Diabetes       Subjective:         HPI: Maria M Marcus is a 52 year old year old male who presents for initial consultation.   She has a past medical history that is significant for type 1 diabetes diagnosed at age 2, hypothyroidism diagnosed in her 20s and vitiligo. She takes pravastatin for hyperlipidemia and lisinopril for hypertension.   She is transferring her care and was followed up by primary care physician for her diabetes.    History of Diabetes  She was diagnosed with type 1 diabetes mellitus since he was [age] years old.  Over the years, she had developed diabetic retinopathy for which laser treatment was required.  She does not have neuropathy or nephropathy.  In the past several years, her A1c has been in good range between 6.1 and 7.5.  No macrovascular complications.    At present, she uses Lantus 12 units daily in addition to NovoLog.  She takes one unit per carb choice and one unit for every 50 mg/dL rise over 200 for correction.     With this regimen, her morning blood sugars are usually high >200. During the day, blood sugars are usually normal to low.  She however has significant hypoglycemia.  Her blood sugars are usually low  every day or every other day  She does not feel low blood sugars until they are in  40s    Review of blood sugars show that she checks four times a day, average blood sugar is 148 with fluctuation from 42-4 93  Usually the lows are followed by a high indicating either excessive bolus for food or excessive correction.  Both patterns are noted on blood sugar review.  Blood sugars are low after bolus without any correction and blood sugars are also low after correction only.  Usually fasting blood sugars are higher.    Maria M notes that her mother had breast cancer, a nephew and an uncle had T1DM but other AI diseases do not run in family. No history of thyroid cancer.         Insulin Sig    NOVOLOG FLEXPEN 100 UNIT/ML soln INJECT 5 UNITS SUBCUTANEOUSLY BEFORE BREAKFAST,3 UNITS BEFORE LUNCH AND 2 UNITS BEFORE DINNER    insulin glargine (BASAGLAR KWIKPEN) 100 UNIT/ML injection Inject 11 Units Subcutaneous daily     Patient not taking:  Reported on 7/28/2017    LANTUS SOLOSTAR 100 UNIT/ML soln INJECT 8 UNITS SUBCUTANEOUSLY AT BEDTIME            Prevention  Lipid  LDL Cholesterol Calculated   Date Value Ref Range Status   10/27/2016 109 (H) <100 mg/dL Final     Comment:     Above desirable:  100-129 mg/dl   Borderline High:  130-159 mg/dL   High:             160-189 mg/dL   Very high:       >189 mg/dl     04/11/2016 132 (H) <100 mg/dL Final     Comment:     Above desirable:  100-129 mg/dl   Borderline High:  130-159 mg/dL   High:             160-189 mg/dL   Very high:       >189 mg/dl         Medications     HMG CoA Reductase Inhibitors    pravastatin (PRAVACHOL) 10 MG tablet    pravastatin (PRAVACHOL) 10 MG tablet          Renal  Medication (Note: This includes the hypertensive combination subclass to make sure to show all ACEI/ARB's.)     ACE Inhibitors Sig    lisinopril (PRINIVIL/ZESTRIL) 10 MG tablet TAKE ONE TABLET BY MOUTH ONCE DAILY            Weight  Wt Readings from Last 4 Encounters:   07/28/17 69.9 kg (154 lb)    03/01/17 67.4 kg (148 lb 9.6 oz)   02/09/17 69.4 kg (153 lb)   10/28/16 66.2 kg (146 lb)     .    Meter Download Summary: As above       Diet:  she usually counts her carbs in carb choices       Exercise  no scheduled exercise     Weight trend  Wt Readings from Last 4 Encounters:   07/28/17 69.9 kg (154 lb)   03/01/17 67.4 kg (148 lb 9.6 oz)   02/09/17 69.4 kg (153 lb)   10/28/16 66.2 kg (146 lb)       A1c today is  6.1 but on blood sugar review, both high numbers low numbers are noted   Lab Results   Component Value Date    A1C 6.1 07/28/2017    A1C 6.9 10/27/2016    A1C 6.1 04/11/2016    A1C 6.0 10/09/2015    A1C 6.8 05/26/2015          Allergies   Allergen Reactions     No Known Drug Allergies        Current Outpatient Prescriptions   Medication Sig Dispense Refill     blood glucose monitoring (SOFTCLIX) lancets USE TO TEST BLOOD SUGAR 5 TIMES DAILY OR AS DIRECTED 100 each 5     TANA CONTOUR test strip USE TO TEST BLOOD SUGARS FIVE TIMES DAILY OR AS DIRECTED. 150 strip 2     NOVOLOG FLEXPEN 100 UNIT/ML soln INJECT 5 UNITS SUBCUTANEOUSLY BEFORE BREAKFAST,3 UNITS BEFORE LUNCH AND 2 UNITS BEFORE DINNER 15 mL 3     B-D U/F insulin pen needle USE ONE PEN NEEDLE 4 TIMES DAILY OR AS DIRECTED 400 each 5     omeprazole (PRILOSEC) 20 MG CR capsule TAKE ONE CAPSULE BY MOUTH ONCE DAILY 30 capsule 10     lisinopril (PRINIVIL/ZESTRIL) 10 MG tablet TAKE ONE TABLET BY MOUTH ONCE DAILY 30 tablet 10     azithromycin (ZITHROMAX) 250 MG tablet Two tablets first day, then one tablet daily for four days. 6 tablet 0     levothyroxine (SYNTHROID/LEVOTHROID) 75 MCG tablet TAKE ONE TABLET BY MOUTH ONCE DAILY 30 tablet 5     pravastatin (PRAVACHOL) 10 MG tablet TAKE ONE-HALF TABLET BY MOUTH ONCE DAILY 30 tablet 0     LANTUS SOLOSTAR 100 UNIT/ML soln INJECT 8 UNITS SUBCUTANEOUSLY AT BEDTIME 15 mL 1     blood glucose monitoring (TANA CONTOUR MONITOR) meter device kit Use to test blood sugars 5 times daily or as directed. 1 kit 0      RELION MINI PEN NEEDLES 31G X 6 MM USE ONE  4 TIMES DAILY 400 each 8     Cholecalciferol (VITAMIN D) 2000 UNITS tablet Take 2,000 Units by mouth daily. 100 tablet 3     pravastatin (PRAVACHOL) 10 MG tablet TAKE ONE-HALF TABLET BY MOUTH ONCE DAILY 30 tablet 10     insulin glargine (BASAGLAR KWIKPEN) 100 UNIT/ML injection Inject 11 Units Subcutaneous daily (Patient not taking: Reported on 2017) 30 mL 1     IBUPROFEN PO        [DISCONTINUED] levothyroxine (SYNTHROID, LEVOTHROID) 88 MCG tablet Take 1 tablet (88 mcg) by mouth daily (Patient not taking: Reported on 2017) 30 tablet 11       Review of Systems     Constitutional: Negative for fever and unexpected weight change. Appetite  is normal  Head: no headache   Eye: no vision change/ loss of peripheral vision.   ENT: No throat congestion.   Respiratory: no cough   Cardiovascular: no chest pain or tachycardia  Gastrointestinal: Negative for vomiting, abdominal pain and diarrhea.  Genitourinary: No bladder problems.  Musculoskeletal: Negative for myalgias. No weakness.  Neurological: Negative for seizures and headaches.  Psychiatric/Behavioral: Negative for behavioral problem and dysphoric mood.    Past Medical History:   Diagnosis Date     Cervical radiculopathy      Diabetic eye exam (H) 11     DISC DIS NEC/NOS-LUMBAR 2007     Frozen shoulder syndrome 2011     Hyperlipidemia LDL goal <100 10/31/2010     Hypertension 1/3/2011     Type 1 diabetes, HbA1c goal < 7% (H) dx 1967     Unspecified hypothyroidism        Past Surgical History:   Procedure Laterality Date     C  DELIVERY ONLY      C-Sections x2     C NONSPECIFIC PROCEDURE      Hysterectomy - total (cervix removed but ovaries remain)     C STOMACH SURGERY PROCEDURE UNLISTED       C TOTAL ABDOM HYSTERECTOMY       COLONOSCOPY N/A 2016    Procedure: COLONOSCOPY;  Surgeon: Efren Perry MD;  Location:  GI      SACROPLASTY       LAMINECT/DISCECTOMY, CERVICAL  2007     "C7-T1 hemilaminectomy, microdiscectomy, foraminotomy.     LASER YAG CAPSULOTOMY Right 10/23/2014    Procedure: LASER YAG CAPSULOTOMY;  Surgeon: Esteban Dorsey MD;  Location: [facility]     VITRECTOMY,STRIP EPIRETINAL MEMBRANE  06/24/09       Family History   Problem Relation Age of Onset     Hypertension Maternal Grandfather      EYE* Maternal Grandmother      Blood Disease Maternal Grandmother      Eye Disorder Maternal Grandmother      maccular degeneration     OSTEOPOROSIS Maternal Grandmother      Lipids Father      GASTROINTESTINAL DISEASE Father      ulcers     HEART DISEASE Father      Cardiovascular Father      heart attack     Hypertension Paternal Grandmother      Breast Cancer Mother      dx age 73 - terminal - mother had first mammo at this age     DIABETES Paternal Uncle      Type I       Social History     Social History     Marital status:      Spouse name: Ra     Number of children: 2     Years of education: 12     Occupational History     receiving [employer]     Social History Main Topics     Smoking status: Never Smoker     Smokeless tobacco: Never Used      Comment: no exposure     Alcohol use Yes      Comment: winex1-2/3x per yr.     Drug use: No     Sexual activity: Yes     Partners: Male     Birth control/ protection: Surgical, Female Surgical      Comment: hysterectomy     Other Topics Concern     Parent/Sibling W/ Cabg, Mi Or Angioplasty Before 65f 55m? No     Social History Narrative       Objective:   /59  Pulse 82  Ht 1.483 m (4' 10.39\")  Wt 69.9 kg (154 lb)  BMI 31.76 kg/m2  Constitutional: Appears well-developed and well-nourished. Active.   EYES: anicteric, normal extra-ocular movements, no lid lag or retraction   HEENT: Mouth/Throat: Mucous membrane is moist. Oropharynx is clear. No adenopathy. Normal thyroid   Cardiovascular: RRR, S1, S2 normal. No m/g/r   Pulmonary/Chest: CTAB. No wheezing or rales   Abdominal: +BS. Nontender to palpation. No organomegaly " present.  Neurological: Alert. Normal speech  Extremities: No clubbing, cyanosis or inflammation   Skin: Vitiligo  Feet: Diminished vibratory sense bilateral and normal monofilament test.   Lymphatic: no cervical lymphadenopathy.  Psychological: appropriate mood     In House Labs:   Lab Results   Component Value Date    A1C 6.1 07/28/2017    A1C 6.9 10/27/2016    A1C 6.1 04/11/2016    A1C 6.0 10/09/2015    A1C 6.8 05/26/2015       TSH   Date Value Ref Range Status   10/27/2016 0.90 0.40 - 4.00 mU/L Final   05/26/2015 0.06 (L) 0.40 - 4.00 mU/L Final   03/17/2014 0.78 0.4 - 5.0 mU/L Final   01/18/2013 0.65 0.4 - 5.0 mU/L Final   08/20/2012 0.60 0.4 - 5.0 mU/L Final     T4 Free   Date Value Ref Range Status   11/09/2011 1.69 0.70 - 1.85 ng/dL Final       Creatinine   Date Value Ref Range Status   10/27/2016 1.00 0.52 - 1.04 mg/dL Final   ]    Recent Labs   Lab Test  10/27/16   0748  04/11/16   1013  05/26/15   0738  03/17/14   0735   CHOL  186  210*  167  165   HDL  56  63  46*  58   LDL  109*  132*  84  90   TRIG  104  77  183*  82   CHOLHDLRATIO   --    --   3.6  3.0       No results found for: [id number], [id number], [id number]      Assessment/Treatment Plan:      Maria M Marcus is a 52 year old year old female with    1. Type 1 Diabetes with  retinopathy and mild neuropathy   2.  Left side numbness in fourth and fifth finger   3.  Hypothyroidism   4.  Vitiligo   5.  Possible scoliosis in a postmenopausal female.        Type 1 Diabetes with  retinopathy and mild neuropathy   Barriers to achieving glycemic goals  1. High BG in the morning.   This could be due to lack of correction at night but also due to waning effect of lantus before next dose.   Advised to split Lantus to 6 unit BID.     2. Overcorrection and increased carb coverage.   Assuming 12 units of Lantus is her true basal, we will reduce carb coverage to 1 unit per 20 gm (2 units for every 3 carb choice) and 1 unit per 60 over 200 for correction for  now.     3. Lack of pump  She is interested in obtaining pump after coming back from [place]  Will obtain a CDE eval for pump    4. Hypoglycemia unawareness  This is concerning symptom and will hinder good control.   We will plan for a personal CGM.     5. Low A1c is likely driven by significant low values.   May need reduction of Lantus dose as well.     Left side numbness in fourth and fifth finger   Likely unrelated to DM and will follow up with PCP.     Hypothyroidism on LT4 88 mcg daily  We will continue levothyroxine, check TSH levels    Vitiligo: She has multiple autoimmune disease and therefore we will screen for celiac disease, check ACTH 21 OH levels    Possible kyphosis:   Plan on DXA in future.     Return to clinic in 3-6 months.  FU with Jessica in near future for pump eval.     Sidney Jarquin MD  [phone]  Endocrinology Service    Patient Instructions   Split Lantus 6 units twice a day from tomorrow. Tonight, give 3 units and check the blood sugars at 2-3 am in the morning     Reduce Novolog to 1 unit per 20 gm carb and 1 unit for every 60 mg/dL rise over 200.     Meet Jessica for pump start.       Sending blood sugars to your provider at [facility]:  We want to help you with your diabetes management, which often requires frequent adjustments to your therapy. For your convenience, we have several ways to send your blood sugars to your doctor for review.    - Send message directly to your doctor through My Chart.  Please ask the rooming staff if you would like to sign up for My Chart.  This is a fast and confidential way to send your information and communicate directly with your provider.   - Record readings and fax to [phone].  We have a template for you to use for your convenience.  - If you have a Medtronic pump, upload to [employer] and notify your provider of your username and password.   - Stop by the clinic with your meter for download.   - My Chart or call Jessica Demarco, Diabetes Educator at  [phone]  - Call the clinic and speak to one of the endocrine nurses to relay information on the telephone.  Nandini Herman or Sabine at [phone].   -    Please call the on-call Endocrinologist at the [facility] for after       hours/weekend needs at [phone] Option #4.    Please note that you do not need to FAST if you are just having an A1C drawn. Please remember to ALWAYS bring your glucose meter with to your appointment. This data is very important for the management of your care.    Thank you!  Your [facility] Team              Test and/or medications prescribed today:  Orders Placed This Encounter   Procedures     Hemoglobin A1c POCT     Albumin Random Urine Quantitative     ALT     CK total     Hemoglobin     LDL cholesterol direct     Renal panel     TSH with free T4 reflex     Thyroid peroxidase antibody     Cortisol     Adrenal corticotropin     21 Hydroxylase Antibody     Tissue transglutaminase kyle IgA and IgG     DIABETES EDUCATOR REFERRAL

## 2017-07-30 LAB
TTG IGA SER-ACNC: NORMAL U/ML
TTG IGG SER-ACNC: NORMAL U/ML

## 2017-07-31 LAB
ACTH PLAS-MCNC: 12 PG/ML
THYROPEROXIDASE AB SERPL-ACNC: 174 IU/ML

## 2017-08-01 ENCOUNTER — TRANSFERRED RECORDS (OUTPATIENT)
Dept: HEALTH INFORMATION MANAGEMENT | Facility: CLINIC | Age: 52
End: 2017-08-01

## 2017-08-02 LAB — 21HYDROXYLASE AB SER-ACNC: 0.7

## 2017-08-07 ENCOUNTER — TELEPHONE (OUTPATIENT)
Dept: FAMILY MEDICINE | Facility: CLINIC | Age: 52
End: 2017-08-07

## 2017-08-08 NOTE — TELEPHONE ENCOUNTER
Emil from [employer] is calling requesting a Diagnosis for the PA for her Omeprazole.  Emil states there is not a number to contact them and they do not have faxing ability. (??)

## 2017-08-15 NOTE — TELEPHONE ENCOUNTER
Patient called and is wondering the status of this prior authorization. I informed her that the form was completed and faxed in. She is wondering how long this takes. She is requesting a call from someone today if possible. She states she is going out of town in two days and would just like to know how long this is going to take so she has an idea. She can be reached at [phone]. Please advise.     Thank you  Noam Quintanilla  Patient Representative

## 2017-08-15 NOTE — TELEPHONE ENCOUNTER
Called patient to let her know that the prior authorization was sent on 08/08/2017 and we haven't heard anything back yet.  Patient than began to get upset asking why she wasn't notified via [facility] that this was sent.  Staff explained to her that it was not protocol to call and let patients know step by step process.  She stated that's what she though [facility] was for.  Staff explained to her that it was for medical questions and appointments.  She than asked What she is suppose to do when she doesn't have her medications because her insurance doesn't cover it.  Staff asked her what medication she is trying to get authorized and she stated omeprazole.  Staff stated that she could get it over the counter.  She than stated that the pharmacy wouldn't let her pay for it over the counter.  Staff stated that she didn't need to go to the pharmacy and that she just could go to the [facility]acid isle and pick some up.  She stated that she would do that.  No further action is needed at this time.     Puja Welch, CMA

## 2017-08-16 ENCOUNTER — TELEPHONE (OUTPATIENT)
Dept: INTERNAL MEDICINE | Facility: CLINIC | Age: 52
End: 2017-08-16

## 2017-08-16 DIAGNOSIS — K21.9 GASTROESOPHAGEAL REFLUX DISEASE WITHOUT ESOPHAGITIS: Primary | ICD-10-CM

## 2017-08-16 RX ORDER — PANTOPRAZOLE SODIUM 40 MG/1
40 TABLET, DELAYED RELEASE ORAL DAILY
Qty: 90 TABLET | Refills: 1 | Status: SHIPPED | OUTPATIENT
Start: 2017-08-16 | End: 2017-10-02

## 2017-08-16 NOTE — TELEPHONE ENCOUNTER
Received message from pharmacy.  Omeprazole is not covered, pharmacy is suggesting ranitidine or pantoprazole.

## 2017-08-30 ENCOUNTER — TELEPHONE (OUTPATIENT)
Dept: INTERNAL MEDICINE | Facility: CLINIC | Age: 52
End: 2017-08-30

## 2017-08-30 DIAGNOSIS — K21.9 GASTROESOPHAGEAL REFLUX DISEASE WITHOUT ESOPHAGITIS: Primary | ICD-10-CM

## 2017-08-30 NOTE — TELEPHONE ENCOUNTER
Received a message from [email or web address] in [place]. Insurance only pays for 4 months per calendar year on Pantoprazole. Please change to a different medication. Richelle Cobb CMA ([facility])

## 2017-09-01 ENCOUNTER — OFFICE VISIT (OUTPATIENT)
Dept: FAMILY MEDICINE | Facility: OTHER | Age: 52
End: 2017-09-01
Payer: COMMERCIAL

## 2017-09-01 VITALS
HEART RATE: 78 BPM | WEIGHT: 154.4 LBS | BODY MASS INDEX: 31.84 KG/M2 | DIASTOLIC BLOOD PRESSURE: 62 MMHG | RESPIRATION RATE: 18 BRPM | TEMPERATURE: 98.7 F | OXYGEN SATURATION: 98 % | SYSTOLIC BLOOD PRESSURE: 122 MMHG

## 2017-09-01 DIAGNOSIS — J20.9 ACUTE BRONCHITIS, UNSPECIFIED ORGANISM: Primary | ICD-10-CM

## 2017-09-01 DIAGNOSIS — J01.00 ACUTE MAXILLARY SINUSITIS, RECURRENCE NOT SPECIFIED: ICD-10-CM

## 2017-09-01 PROCEDURE — 99213 OFFICE O/P EST LOW 20 MIN: CPT | Performed by: PHYSICIAN ASSISTANT

## 2017-09-01 RX ORDER — BENZONATATE 100 MG/1
100 CAPSULE ORAL 3 TIMES DAILY PRN
Qty: 42 CAPSULE | Refills: 0 | Status: SHIPPED | OUTPATIENT
Start: 2017-09-01 | End: 2017-09-15

## 2017-09-01 ASSESSMENT — PAIN SCALES - GENERAL: PAINLEVEL: MILD PAIN (3)

## 2017-09-01 NOTE — PROGRESS NOTES
SUBJECTIVE:                                                    Maria M Marcus is a 52 year old female who presents to clinic today for the following health issues:      HPI    Acute Illness   Acute illness concerns: URI  Onset: 08/25/17    Fever: no    Chills/Sweats: YES    Headache (location?): YES    Sinus Pressure:YES- comes and goes    Conjunctivitis:  no    Ear Pain: YES-     Rhinorrhea: YES    Congestion: YES    Sore Throat: no     Cough: YES-productive of clear sputum    Wheeze: YES    Decreased Appetite: YES    Nausea: YES    Vomiting: no    Diarrhea:  no    Dysuria/Freq.: no    Fatigue/Achiness: YES    Sick/Strep Exposure: no     Therapies Tried and outcome: Delsyum      She developed a sore throat on her car ride back from [place] 1 week ago. She then developed a productive cough that is very severe at times, causing her to vomit. She has had some chills but denies any fevers.     Problem list and histories reviewed & adjusted, as indicated.  Additional history: none    ROS:  GENERAL: Denies fever, fatigue, weakness, weight gain, or weight loss.  HEENT: Eyes-Denies pain, redness, loss of vision, double or blurred vision.     Ears/Nose- +Sinus congestion, postnasal drip, sore throat. Denies tinnitus, loss of hearing, epistaxis, decreased sense of smell. Denies loss of sense of taste, dry mouth.  CARDIOVASCULAR: Denies chest pain, shortness of breath, irregular heartbeats,  palpitations, or edema.   RESPIRATORY: +Productive cough. Denies hemoptysis and shortness of breath.    OBJECTIVE:     /62 (Cuff Size: Adult Regular)  Pulse 78  Temp 98.7  F (37.1  C) (Temporal)  Resp 18  Wt 154 lb 6.4 oz (70 kg)  SpO2 98%  BMI 31.84 kg/m2  Body mass index is 31.84 kg/(m^2).  GENERAL: healthy, alert and no distress  EYES: Eyes grossly normal to inspection, PERRL and conjunctivae and sclerae normal  HENT: ear canals and TM's normal, nasal mucosa and pharynx are mildly erythematous. Tenderness over  maxillary sinuses.  NECK: no adenopathy, no asymmetry, masses, or scars and thyroid normal to palpation  RESP: lungs clear to auscultation - no rales, rhonchi or wheezes  CV: regular rate and rhythm, normal S1 S2, no S3 or S4, no murmur, click or rub    ASSESSMENT/PLAN:       ICD-10-CM    1. Acute bronchitis, unspecified organism J20.9 amoxicillin-clavulanate (AUGMENTIN) 875-125 MG per tablet     benzonatate (TESSALON) 100 MG capsule   2. Acute maxillary sinusitis, recurrence not specified J01.00 amoxicillin-clavulanate (AUGMENTIN) 875-125 MG per tablet     benzonatate (TESSALON) 100 MG capsule       Will prescribe Augmentin to take twice daily for 10 days. Instructed to take a daily probiotic or Activia yogurt while on this.  Will prescribe Tessalon pearls to use three times daily as needed for cough.  Drink plenty of fluids.  Can use an over the counter Nettipot or sinus rinse to help with nasal congestion. Mucinex can also be helpful for chest congestion and nasal congestion.   I also recommend Flonase to help with nasal swelling/congestion.  Follow up if symptoms are not improving    Marcus Salgado PA-C  [facility]

## 2017-09-01 NOTE — NURSING NOTE
"Chief Complaint   Patient presents with     URI     Panel Management     Urine Drug Screen, Hep C screen, Flu, Eye exam       Initial /62 (Cuff Size: Adult Regular)  Pulse 78  Temp 98.7  F (37.1  C) (Temporal)  Resp 18  Wt 154 lb 6.4 oz (70 kg)  SpO2 98%  BMI 31.84 kg/m2 Estimated body mass index is 31.84 kg/(m^2) as calculated from the following:    Height as of 7/28/17: 4' 10.39\" (1.483 m).    Weight as of this encounter: 154 lb 6.4 oz (70 kg).  Medication Reconciliation: complete   Opal Manley CMA ([facility])    "

## 2017-09-01 NOTE — PATIENT INSTRUCTIONS
Will prescribe an antibiotic called Augmentin to take twice daily for 10 days. Take a daily probiotic or Activia yogurt while you are on this.  Will prescribe Tessalon pearls to use three times daily as needed for cough.  Drink plenty of fluids.  Can use an over the counter Nettipot or sinus rinse to help with nasal congestion. Mucinex can also be helpful for chest congestion and nasal congestion.   I also recommend Flonase to help with nasal swelling/congestion.  Follow up if symptoms are not improving

## 2017-09-01 NOTE — MR AVS SNAPSHOT
After Visit Summary   9/1/2017    Maria M Marcus    MRN: [id number]           Patient Information     Date Of Birth          1965        Visit Information        Provider Department      9/1/2017 8:00 AM Marcus Salgado PA-C [facility]        Today's Diagnoses     Acute bronchitis, unspecified organism    -  1    Acute maxillary sinusitis, recurrence not specified          Care Instructions    Will prescribe an antibiotic called Augmentin to take twice daily for 10 days. Take a daily probiotic or Activia yogurt while you are on this.  Will prescribe Tessalon pearls to use three times daily as needed for cough.  Drink plenty of fluids.  Can use an over the counter Nettipot or sinus rinse to help with nasal congestion. Mucinex can also be helpful for chest congestion and nasal congestion.   I also recommend Flonase to help with nasal swelling/congestion.  Follow up if symptoms are not improving            Follow-ups after your visit        Your next 10 appointments already scheduled     Sep 13, 2017 11:00 AM CDT   New Visit with Mg Cde Provider   [facility])    [address] [place] [id number] [phone]            Dec 08, 2017 11:15 AM CST   Return Visit with Sidney Jarquin MD, MG ENDO NURSE   [facility])    [address] [place] [id number] [phone]              Who to contact     If you have questions or need follow up information about today's clinic visit or your schedule please contact [facility] directly at [phone].  Normal or non-critical lab and imaging results will be communicated to you by MyChart, letter or phone within 4 business days after the clinic has received the results. If you do not hear from us within 7 days, please contact the clinic through MyChart or phone. If you have a critical or  abnormal lab result, we will notify you by phone as soon as possible.  Submit refill requests through [employer] or call your pharmacy and they will forward the refill request to us. Please allow 3 business days for your refill to be completed.          Additional Information About Your Visit        [employer] Information     [employer] gives you secure access to your electronic health record. If you see a primary care provider, you can also send messages to your care team and make appointments. If you have questions, please call your primary care clinic.  If you do not have a primary care provider, please call [phone] and they will assist you.        Care EveryWhere ID     This is your Care EveryWhere ID. This could be used by other organizations to access your [facility] medical records  [id number]        Your Vitals Were     Pulse Temperature Respirations Pulse Oximetry BMI (Body Mass Index)       78 98.7  F (37.1  C) (Temporal) 18 98% 31.84 kg/m2        Blood Pressure from Last 3 Encounters:   09/01/17 122/62   07/28/17 141/59   03/01/17 128/64    Weight from Last 3 Encounters:   09/01/17 154 lb 6.4 oz (70 kg)   07/28/17 154 lb (69.9 kg)   03/01/17 148 lb 9.6 oz (67.4 kg)              Today, you had the following     No orders found for display         Today's Medication Changes          These changes are accurate as of: 9/1/17  8:33 AM.  If you have any questions, ask your nurse or doctor.               Start taking these medicines.        Dose/Directions    amoxicillin-clavulanate 875-125 MG per tablet   Commonly known as:  AUGMENTIN   Used for:  Acute bronchitis, unspecified organism, Acute maxillary sinusitis, recurrence not specified   Started by:  Marcus Salgado PA-C        Dose:  1 tablet   Take 1 tablet by mouth 2 times daily   Quantity:  20 tablet   Refills:  0       benzonatate 100 MG capsule   Commonly known as:  TESSALON   Used for:  Acute bronchitis, unspecified organism, Acute maxillary  sinusitis, recurrence not specified   Started by:  Marcus Salgado PA-C        Dose:  100 mg   Take 1 capsule (100 mg) by mouth 3 times daily as needed   Quantity:  42 capsule   Refills:  0            Where to get your medicines      These medications were sent to [facility] [address] [place], [place] - [address] [facility]  [address] [facility] [address]     Phone:  [phone]     amoxicillin-clavulanate 875-125 MG per tablet    benzonatate 100 MG capsule                Primary Care Provider Office Phone # Fax #    Kirill Berman [phone]       [facility] [address] [place] [address]        Equal Access to Services     [facility] LUIS : Hadii miguel jennings hadasho Sobarb, waaxda luqadaha, qaybta kaalmada adeegyada, traci cruz . So [facility] [phone].    ATENCIÓN: Si habla español, tiene a ngo disposición servicios gratuitos de asistencia lingüística. [facility] [phone].    We comply with applicable federal civil rights laws and [place] laws. We do not discriminate on the basis of race, color, national origin, age, disability sex, sexual orientation or gender identity.            Thank you!     Thank you for choosing [facility]  for your care. Our goal is always to provide you with excellent care. Hearing back from our patients is one way we can continue to improve our services. Please take a few minutes to complete the written survey that you may receive in the mail after your visit with us. Thank you!             Your Updated Medication List - Protect others around you: Learn how to safely use, store and throw away your medicines at www.disposemymeds.org.          This list is accurate as of: 9/1/17  8:33 AM.  Always use your most recent med list.                   Brand Name Dispense Instructions for use Diagnosis    amoxicillin-clavulanate 875-125 MG per tablet    AUGMENTIN    20 tablet    Take 1 tablet by mouth 2  times daily    Acute bronchitis, unspecified organism, Acute maxillary sinusitis, recurrence not specified       TANA CONTOUR test strip   Generic drug:  blood glucose monitoring     150 strip    USE TO TEST BLOOD SUGARS FIVE TIMES DAILY OR AS DIRECTED.    Type 1 diabetes mellitus without complication (H)       benzonatate 100 MG capsule    TESSALON    42 capsule    Take 1 capsule (100 mg) by mouth 3 times daily as needed    Acute bronchitis, unspecified organism, Acute maxillary sinusitis, recurrence not specified       blood glucose monitoring lancets     100 each    USE TO TEST BLOOD SUGAR 5 TIMES DAILY OR AS DIRECTED    Type 1 diabetes mellitus without complication (H)       blood glucose monitoring meter device kit     1 kit    Use to test blood sugars 5 times daily or as directed.    Type 1 diabetes mellitus without complication (H)       IBUPROFEN PO           * LANTUS SOLOSTAR 100 UNIT/ML injection   Generic drug:  insulin glargine     15 mL    INJECT 8 UNITS SUBCUTANEOUSLY AT BEDTIME    Type 1 diabetes mellitus without complication (H)       * insulin glargine 100 UNIT/ML injection     30 mL    Inject 11 Units Subcutaneous daily    Type 1 diabetes mellitus without complication (H)       levothyroxine 75 MCG tablet    SYNTHROID/LEVOTHROID    30 tablet    TAKE ONE TABLET BY MOUTH ONCE DAILY    Hypothyroidism, unspecified type       lisinopril 10 MG tablet    PRINIVIL/ZESTRIL    30 tablet    TAKE ONE TABLET BY MOUTH ONCE DAILY    Hypertension goal BP (blood pressure) < 140/90, Type 1 diabetes mellitus without complication (H)       NovoLOG FLEXPEN 100 UNIT/ML injection   Generic drug:  insulin aspart     15 mL    INJECT 5 UNITS SUBCUTANEOUSLY BEFORE BREAKFAST,3 UNITS BEFORE LUNCH AND 2 UNITS BEFORE DINNER    Type 1 diabetes, HbA1c goal < 7% (H)       pantoprazole 40 MG EC tablet    PROTONIX    90 tablet    Take 1 tablet (40 mg) by mouth daily Take 30-60 minutes before a meal.    Gastroesophageal reflux  disease without esophagitis       * pravastatin 10 MG tablet    PRAVACHOL    30 tablet    TAKE ONE-HALF TABLET BY MOUTH ONCE DAILY    Hyperlipidemia LDL goal <100       * pravastatin 10 MG tablet    PRAVACHOL    30 tablet    TAKE ONE-HALF TABLET BY MOUTH ONCE DAILY    Hyperlipidemia LDL goal <100       * ranitidine 150 MG tablet    ZANTAC    180 tablet    Take 1 tablet (150 mg) by mouth 2 times daily    Gastroesophageal reflux disease without esophagitis       * ranitidine 150 MG capsule    ZANTAC    180 capsule    Take 1 capsule (150 mg) by mouth 2 times daily    Gastroesophageal reflux disease without esophagitis       * RELION MINI PEN NEEDLES 31G X 6 MM   Generic drug:  insulin pen needle     400 each    USE ONE  4 TIMES DAILY    Type 1 diabetes, HbA1c goal < 7% (H)       * B-D U/F 31G X 5 MM   Generic drug:  insulin pen needle     400 each    USE ONE PEN NEEDLE 4 TIMES DAILY OR AS DIRECTED    Type 1 diabetes, HbA1c goal < 7% (H)       vitamin D 2000 UNITS tablet     100 tablet    Take 2,000 Units by mouth daily.    Vitamin D deficiency disease       * Notice:  This list has 8 medication(s) that are the same as other medications prescribed for you. Read the directions carefully, and ask your doctor or other care provider to review them with you.

## 2017-09-03 ENCOUNTER — MYC MEDICAL ADVICE (OUTPATIENT)
Dept: FAMILY MEDICINE | Facility: OTHER | Age: 52
End: 2017-09-03

## 2017-09-07 ENCOUNTER — MYC MEDICAL ADVICE (OUTPATIENT)
Dept: FAMILY MEDICINE | Facility: OTHER | Age: 52
End: 2017-09-07

## 2017-09-08 ENCOUNTER — OFFICE VISIT (OUTPATIENT)
Dept: FAMILY MEDICINE | Facility: OTHER | Age: 52
End: 2017-09-08
Payer: COMMERCIAL

## 2017-09-08 VITALS
HEART RATE: 72 BPM | BODY MASS INDEX: 31.56 KG/M2 | DIASTOLIC BLOOD PRESSURE: 78 MMHG | WEIGHT: 153 LBS | RESPIRATION RATE: 16 BRPM | SYSTOLIC BLOOD PRESSURE: 120 MMHG | OXYGEN SATURATION: 98 % | TEMPERATURE: 97.6 F

## 2017-09-08 DIAGNOSIS — J20.9 ACUTE BRONCHITIS WITH SYMPTOMS > 10 DAYS: ICD-10-CM

## 2017-09-08 DIAGNOSIS — K21.9 GASTROESOPHAGEAL REFLUX DISEASE, ESOPHAGITIS PRESENCE NOT SPECIFIED: ICD-10-CM

## 2017-09-08 DIAGNOSIS — J01.90 ACUTE SINUSITIS WITH SYMPTOMS > 10 DAYS: Primary | ICD-10-CM

## 2017-09-08 PROCEDURE — 99213 OFFICE O/P EST LOW 20 MIN: CPT | Performed by: PHYSICIAN ASSISTANT

## 2017-09-08 RX ORDER — CODEINE PHOSPHATE AND GUAIFENESIN 10; 100 MG/5ML; MG/5ML
1-2 SOLUTION ORAL
Qty: 120 ML | Refills: 0 | Status: SHIPPED | OUTPATIENT
Start: 2017-09-08 | End: 2017-11-30

## 2017-09-08 RX ORDER — CODEINE PHOSPHATE AND GUAIFENESIN 10; 100 MG/5ML; MG/5ML
1-2 SOLUTION ORAL
Qty: 120 ML | Refills: 0 | Status: SHIPPED | OUTPATIENT
Start: 2017-09-08 | End: 2017-09-08

## 2017-09-08 NOTE — MR AVS SNAPSHOT
After Visit Summary   9/8/2017    Maria M Marcus    MRN: [id number]           Patient Information     Date Of Birth          1965        Visit Information        Provider Department      9/8/2017 11:00 AM Marcus Salgado PA-C [facility]        Today's Diagnoses     Acute sinusitis with symptoms > 10 days    -  1    Acute bronchitis with symptoms > 10 days        Gastroesophageal reflux disease, esophagitis presence not specified          Care Instructions    Finish up the last 2 days of antibiotics.  Will prescribe Robitussin AC to use nightly as needed for your cough.  Continue with plenty of fluids and Mucinex.    Continue with the Zantac for now but if acid reflux is not improving, speak with your primary care provider.     If symptoms are worsening, let me know.              Follow-ups after your visit        Your next 10 appointments already scheduled     Sep 18, 2017 11:00 AM CDT   New Visit with Mg Cde Provider   [facility])    [address] [place] [address] [phone]            Dec 08, 2017 11:15 AM CST   Return Visit with Sidney Jarquin MD, MG ENDO NURSE   [facility])    [address] [place] [address] [phone]              Who to contact     If you have questions or need follow up information about today's clinic visit or your schedule please contact [facility] directly at [phone].  Normal or non-critical lab and imaging results will be communicated to you by MyChart, letter or phone within 4 business days after the clinic has received the results. If you do not hear from us within 7 days, please contact the clinic through [employer] or phone. If you have a critical or abnormal lab result, we will notify you by phone as soon as possible.  Submit refill requests through [employer] or call your  pharmacy and they will forward the refill request to us. Please allow 3 business days for your refill to be completed.          Additional Information About Your Visit        [employer] Information     [employer] gives you secure access to your electronic health record. If you see a primary care provider, you can also send messages to your care team and make appointments. If you have questions, please call your primary care clinic.  If you do not have a primary care provider, please call [phone] and they will assist you.        Care EveryWhere ID     This is your Care EveryWhere ID. This could be used by other organizations to access your [facility] medical records  [id number]        Your Vitals Were     Pulse Temperature Respirations Pulse Oximetry BMI (Body Mass Index)       72 97.6  F (36.4  C) (Temporal) 16 98% 31.56 kg/m2        Blood Pressure from Last 3 Encounters:   09/08/17 120/78   09/01/17 122/62   07/28/17 141/59    Weight from Last 3 Encounters:   09/08/17 153 lb (69.4 kg)   09/01/17 154 lb 6.4 oz (70 kg)   07/28/17 154 lb (69.9 kg)              Today, you had the following     No orders found for display         Today's Medication Changes          These changes are accurate as of: 9/8/17 11:31 AM.  If you have any questions, ask your nurse or doctor.               Start taking these medicines.        Dose/Directions    guaiFENesin-codeine 100-10 MG/5ML Soln solution   Commonly known as:  ROBITUSSIN AC   Used for:  Acute bronchitis with symptoms > 10 days   Started by:  Marcus Salgado PA-C        Dose:  1-2 tsp.   Take 5-10 mLs by mouth nightly as needed   Quantity:  120 mL   Refills:  0            Where to get your medicines      Some of these will need a paper prescription and others can be bought over the counter.  Ask your nurse if you have questions.     Bring a paper prescription for each of these medications     guaiFENesin-codeine 100-10 MG/5ML Soln solution                Primary Care  Provider Office Phone # Fax #    Kirill Berman [phone]       [facility] [address] [place] [address]        Equal Access to Services     ONESIMO SMITH : Hadnicanor miguel jennings dextero Sonubiaali, waaxda luqadaha, qaybta kaalmada adeegyada, traci ritchie laMegankaterina lindsey. So [facility] [phone].    ATENCIÓN: Si habla español, tiene a ngo disposición servicios gratuitos de asistencia lingüística. Llame al [phone].    We comply with applicable federal civil rights laws and [place] laws. We do not discriminate on the basis of race, color, national origin, age, disability sex, sexual orientation or gender identity.            Thank you!     Thank you for choosing [facility]  for your care. Our goal is always to provide you with excellent care. Hearing back from our patients is one way we can continue to improve our services. Please take a few minutes to complete the written survey that you may receive in the mail after your visit with us. Thank you!             Your Updated Medication List - Protect others around you: Learn how to safely use, store and throw away your medicines at www.disposemymeds.org.          This list is accurate as of: 9/8/17 11:31 AM.  Always use your most recent med list.                   Brand Name Dispense Instructions for use Diagnosis    amoxicillin-clavulanate 875-125 MG per tablet    AUGMENTIN    20 tablet    Take 1 tablet by mouth 2 times daily    Acute bronchitis, unspecified organism, Acute maxillary sinusitis, recurrence not specified       TANA CONTOUR test strip   Generic drug:  blood glucose monitoring     150 strip    USE TO TEST BLOOD SUGARS FIVE TIMES DAILY OR AS DIRECTED.    Type 1 diabetes mellitus without complication (H)       benzonatate 100 MG capsule    TESSALON    42 capsule    Take 1 capsule (100 mg) by mouth 3 times daily as needed    Acute bronchitis, unspecified organism, Acute maxillary sinusitis,  recurrence not specified       blood glucose monitoring lancets     100 each    USE TO TEST BLOOD SUGAR 5 TIMES DAILY OR AS DIRECTED    Type 1 diabetes mellitus without complication (H)       blood glucose monitoring meter device kit     1 kit    Use to test blood sugars 5 times daily or as directed.    Type 1 diabetes mellitus without complication (H)       guaiFENesin-codeine 100-10 MG/5ML Soln solution    ROBITUSSIN AC    120 mL    Take 5-10 mLs by mouth nightly as needed    Acute bronchitis with symptoms > 10 days       IBUPROFEN PO           * LANTUS SOLOSTAR 100 UNIT/ML injection   Generic drug:  insulin glargine     15 mL    INJECT 8 UNITS SUBCUTANEOUSLY AT BEDTIME    Type 1 diabetes mellitus without complication (H)       * insulin glargine 100 UNIT/ML injection     30 mL    Inject 11 Units Subcutaneous daily    Type 1 diabetes mellitus without complication (H)       levothyroxine 75 MCG tablet    SYNTHROID/LEVOTHROID    30 tablet    TAKE ONE TABLET BY MOUTH ONCE DAILY    Hypothyroidism, unspecified type       lisinopril 10 MG tablet    PRINIVIL/ZESTRIL    30 tablet    TAKE ONE TABLET BY MOUTH ONCE DAILY    Hypertension goal BP (blood pressure) < 140/90, Type 1 diabetes mellitus without complication (H)       NovoLOG FLEXPEN 100 UNIT/ML injection   Generic drug:  insulin aspart     15 mL    INJECT 5 UNITS SUBCUTANEOUSLY BEFORE BREAKFAST,3 UNITS BEFORE LUNCH AND 2 UNITS BEFORE DINNER    Type 1 diabetes, HbA1c goal < 7% (H)       pantoprazole 40 MG EC tablet    PROTONIX    90 tablet    Take 1 tablet (40 mg) by mouth daily Take 30-60 minutes before a meal.    Gastroesophageal reflux disease without esophagitis       * pravastatin 10 MG tablet    PRAVACHOL    30 tablet    TAKE ONE-HALF TABLET BY MOUTH ONCE DAILY    Hyperlipidemia LDL goal <100       * pravastatin 10 MG tablet    PRAVACHOL    30 tablet    TAKE ONE-HALF TABLET BY MOUTH ONCE DAILY    Hyperlipidemia LDL goal <100       * ranitidine 150 MG tablet     ZANTAC    180 tablet    Take 1 tablet (150 mg) by mouth 2 times daily    Gastroesophageal reflux disease without esophagitis       * ranitidine 150 MG capsule    ZANTAC    180 capsule    Take 1 capsule (150 mg) by mouth 2 times daily    Gastroesophageal reflux disease without esophagitis       * RELION MINI PEN NEEDLES 31G X 6 MM   Generic drug:  insulin pen needle     400 each    USE ONE  4 TIMES DAILY    Type 1 diabetes, HbA1c goal < 7% (H)       * B-D U/F 31G X 5 MM   Generic drug:  insulin pen needle     400 each    USE ONE PEN NEEDLE 4 TIMES DAILY OR AS DIRECTED    Type 1 diabetes, HbA1c goal < 7% (H)       vitamin D 2000 UNITS tablet     100 tablet    Take 2,000 Units by mouth daily.    Vitamin D deficiency disease       * Notice:  This list has 8 medication(s) that are the same as other medications prescribed for you. Read the directions carefully, and ask your doctor or other care provider to review them with you.

## 2017-09-08 NOTE — PATIENT INSTRUCTIONS
Finish up the last 2 days of antibiotics.  Will prescribe Robitussin AC to use nightly as needed for your cough.  Continue with plenty of fluids and Mucinex.    Continue with the Zantac for now but if acid reflux is not improving, speak with your primary care provider.     If symptoms are worsening, let me know.

## 2017-09-08 NOTE — NURSING NOTE
"Chief Complaint   Patient presents with     Abdominal Pain     Diarrhea       Initial /78 (BP Location: Left arm, Patient Position: Chair, Cuff Size: Adult Regular)  Pulse 72  Temp 97.6  F (36.4  C) (Temporal)  Resp 16  Wt 153 lb (69.4 kg)  SpO2 98%  BMI 31.56 kg/m2 Estimated body mass index is 31.56 kg/(m^2) as calculated from the following:    Height as of 7/28/17: 4' 10.39\" (1.483 m).    Weight as of this encounter: 153 lb (69.4 kg).  Medication Reconciliation: complete     Gayle Case CMA      "

## 2017-09-08 NOTE — PROGRESS NOTES
SUBJECTIVE:                                                    Maria M Marcus is a 52 year old female who presents to clinic today for the following health issues:      HPI    ABDOMINAL   PAIN     Onset: Sunday    Description:   Character: Dull ache  Location: upper/center stomach  Radiation: None    Progression of Symptoms:  worsening    Accompanying Signs & Symptoms:  Fever/Chills?: YES- chills on and off, but have improved since starting Augmentin  Gas/Bloating: no   Nausea: no   Vomitting: no   Diarrhea?: YES - better today   Constipation:no   Dysuria or Hematuria: no    History:   Trauma: no   Previous similar pain: no    Previous tests done: none    Precipitating factors:   Does the pain change with:     Food: no      BM: YES- improves with bowel movement    Urination: no     Alleviating factors:  NA    Therapies Tried and outcome: started taking Zantac 08/30/2017    LMP:  not applicable       Patient was seen on 09/01 for acute bronchitis and sinusitis. She was put on Augmentin and is currently on day 8 of a 10 day prescription. She is still having a lot of coughing and postnasal drainage but is overall feeling better than where she was previously. Her cough is still keeping her awake at night. She is experiencing worsening heartburns symptoms since changing her reflux medication from omeprazole to Zantac as her insurance stopped covering omeprazole. She is having epigastric pain and some diarrhea since being on the Augmentin.     Problem list and histories reviewed & adjusted, as indicated.  Additional history: none    ROS:  GENERAL: Denies fever, fatigue, weakness, weight gain, or weight loss.  HEENT: Eyes-Denies pain, redness, loss of vision, double or blurred vision.     Ears/Nose- +Sinus congestion. Denies tinnitus, loss of hearing, epistaxis, decreased sense of smell. Denies loss of sense of taste, dry mouth, or sore throat.   CARDIOVASCULAR: Denies chest pain, shortness of breath, irregular  heartbeats,  palpitations, or edema.  RESPIRATORY: +Persistnet cough. Denies hemoptysis and shortness of breath.  GASTROINTESTINAL: +Epigastric pain, diarrhea. Denies nausea, vomiting, change in appetite, or constipation.    OBJECTIVE:     /78 (BP Location: Left arm, Patient Position: Chair, Cuff Size: Adult Regular)  Pulse 72  Temp 97.6  F (36.4  C) (Temporal)  Resp 16  Wt 153 lb (69.4 kg)  SpO2 98%  BMI 31.56 kg/m2  Body mass index is 31.56 kg/(m^2).  GENERAL: healthy, alert and no distress  EYES: Eyes grossly normal to inspection, PERRL and conjunctivae and sclerae normal  HENT: ear canals and TM's normal, nasal mucosa is edematous  NECK: no adenopathy, no asymmetry, masses, or scars and thyroid normal to palpation  RESP: lungs clear to auscultation - no rales, rhonchi or wheezes  CV: regular rate and rhythm, normal S1 S2, no S3 or S4, no murmur, click or rub  ABDOMEN: Soft, mild epigastric tenderness, normal bowel sounds.     ASSESSMENT/PLAN:       ICD-10-CM    1. Acute sinusitis with symptoms > 10 days J01.90    2. Acute bronchitis with symptoms > 10 days J20.9 guaiFENesin-codeine (ROBITUSSIN AC) 100-10 MG/5ML SOLN solution   3. Gastroesophageal reflux disease, esophagitis presence not specified K21.9        Finish up the last 2 days of antibiotics. I do not think any new antibiotic is necessary as symptoms are improving.   Will prescribe Robitussin AC to use nightly as needed for her persistent cough.  Continue with plenty of fluids and Mucinex.    Continue with the Zantac for now but if acid reflux is not improving, she will speak with her primary care provider about trying another PPI.      If symptoms are worsening, she will let me know.      Marcus Salgado PA-C  [facility]

## 2017-09-15 ENCOUNTER — MYC MEDICAL ADVICE (OUTPATIENT)
Dept: FAMILY MEDICINE | Facility: OTHER | Age: 52
End: 2017-09-15

## 2017-09-15 ENCOUNTER — OFFICE VISIT (OUTPATIENT)
Dept: FAMILY MEDICINE | Facility: CLINIC | Age: 52
End: 2017-09-15
Payer: COMMERCIAL

## 2017-09-15 VITALS
SYSTOLIC BLOOD PRESSURE: 134 MMHG | HEIGHT: 59 IN | DIASTOLIC BLOOD PRESSURE: 70 MMHG | OXYGEN SATURATION: 98 % | WEIGHT: 151 LBS | TEMPERATURE: 98 F | BODY MASS INDEX: 30.44 KG/M2 | HEART RATE: 70 BPM

## 2017-09-15 DIAGNOSIS — E10.9 TYPE 1 DIABETES MELLITUS WITHOUT COMPLICATION (H): Chronic | ICD-10-CM

## 2017-09-15 DIAGNOSIS — J01.90 ACUTE SINUSITIS TREATED WITH ANTIBIOTICS IN THE PAST 60 DAYS: Primary | ICD-10-CM

## 2017-09-15 PROCEDURE — 99214 OFFICE O/P EST MOD 30 MIN: CPT | Performed by: PHYSICIAN ASSISTANT

## 2017-09-15 RX ORDER — DOXYCYCLINE 100 MG/1
100 CAPSULE ORAL 2 TIMES DAILY
Qty: 20 CAPSULE | Refills: 0 | Status: SHIPPED | OUTPATIENT
Start: 2017-09-15 | End: 2017-11-30

## 2017-09-15 ASSESSMENT — PAIN SCALES - GENERAL: PAINLEVEL: EXTREME PAIN (9)

## 2017-09-15 NOTE — MR AVS SNAPSHOT
After Visit Summary   9/15/2017    Maria M Marcus    MRN: [id number]           Patient Information     Date Of Birth          1965        Visit Information        Provider Department      9/15/2017 2:40 PM Joslyn Mittal PA-C [facility]        Today's Diagnoses     Acute sinusitis treated with antibiotics in the past 60 days    -  1      Care Instructions    Start doxycycline 100mg twice daily for 10 days  Take with food.  Separate from calcium supplements and containing foods    Follow up if not improving - let us know                Follow-ups after your visit        Your next 10 appointments already scheduled     Sep 18, 2017 11:00 AM CDT   New Visit with Mg Cde Provider   [facility] ([facility])    [id number] [place] [address] [phone]            Dec 08, 2017 11:15 AM CST   Return Visit with Sidney Jarquin MD, MG ENDO NURSE   [facility])    [address] [place] [address] [phone]              Who to contact     If you have questions or need follow up information about today's clinic visit or your schedule please contact [facility] directly at [phone].  Normal or non-critical lab and imaging results will be communicated to you by Guidekickhart, letter or phone within 4 business days after the clinic has received the results. If you do not hear from us within 7 days, please contact the clinic through Guidekickhart or phone. If you have a critical or abnormal lab result, we will notify you by phone as soon as possible.  Submit refill requests through [employer] or call your pharmacy and they will forward the refill request to us. Please allow 3 business days for your refill to be completed.          Additional Information About Your Visit        [employer] Information     [employer] gives you secure access to your electronic health  "record. If you see a primary care provider, you can also send messages to your care team and make appointments. If you have questions, please call your primary care clinic.  If you do not have a primary care provider, please call [phone] and they will assist you.        Care EveryWhere ID     This is your Care EveryWhere ID. This could be used by other organizations to access your [facility] medical records  [id number]        Your Vitals Were     Pulse Temperature Height Pulse Oximetry BMI (Body Mass Index)       70 98  F (36.7  C) (Temporal) 4' 11\" (1.499 m) 98% 30.5 kg/m2        Blood Pressure from Last 3 Encounters:   09/15/17 134/70   09/08/17 120/78   09/01/17 122/62    Weight from Last 3 Encounters:   09/15/17 151 lb (68.5 kg)   09/08/17 153 lb (69.4 kg)   09/01/17 154 lb 6.4 oz (70 kg)              Today, you had the following     No orders found for display         Today's Medication Changes          These changes are accurate as of: 9/15/17  3:28 PM.  If you have any questions, ask your nurse or doctor.               Start taking these medicines.        Dose/Directions    doxycycline 100 MG capsule   Commonly known as:  VIBRAMYCIN   Used for:  Acute sinusitis treated with antibiotics in the past 60 days   Started by:  Joslyn Mittal PA-C        Dose:  100 mg   Take 1 capsule (100 mg) by mouth 2 times daily   Quantity:  20 capsule   Refills:  0            Where to get your medicines      These medications were sent to [facility] [address] [place] [address] [address] [address]     Phone:  [phone]     doxycycline 100 MG capsule                Primary Care Provider Office Phone # Fax #    Kirill Berman [phone]       [address] [place] [address]        Equal Access to Services     ONESIMO SMITH AH: Princess renteriao Soomaali, waaxda luqadaha, qaybta kaalmada elias, traci ritchie " la'padman césar. So [facility] [phone].    ATENCIÓN: Si habla bk, tiene a ngo disposición servicios gratuitos de asistencia lingüística. Guille al [phone].    We comply with applicable federal civil rights laws and [place] laws. We do not discriminate on the basis of race, color, national origin, age, disability sex, sexual orientation or gender identity.            Thank you!     Thank you for choosing [facility]  for your care. Our goal is always to provide you with excellent care. Hearing back from our patients is one way we can continue to improve our services. Please take a few minutes to complete the written survey that you may receive in the mail after your visit with us. Thank you!             Your Updated Medication List - Protect others around you: Learn how to safely use, store and throw away your medicines at www.disposemymeds.org.          This list is accurate as of: 9/15/17  3:28 PM.  Always use your most recent med list.                   Brand Name Dispense Instructions for use Diagnosis    TANA CONTOUR test strip   Generic drug:  blood glucose monitoring     150 strip    USE TO TEST BLOOD SUGARS FIVE TIMES DAILY OR AS DIRECTED.    Type 1 diabetes mellitus without complication (H)       blood glucose monitoring lancets     100 each    USE TO TEST BLOOD SUGAR 5 TIMES DAILY OR AS DIRECTED    Type 1 diabetes mellitus without complication (H)       blood glucose monitoring meter device kit     1 kit    Use to test blood sugars 5 times daily or as directed.    Type 1 diabetes mellitus without complication (H)       doxycycline 100 MG capsule    VIBRAMYCIN    20 capsule    Take 1 capsule (100 mg) by mouth 2 times daily    Acute sinusitis treated with antibiotics in the past 60 days       guaiFENesin-codeine 100-10 MG/5ML Soln solution    ROBITUSSIN AC    120 mL    Take 5-10 mLs by mouth nightly as needed    Acute bronchitis with symptoms > 10 days       IBUPROFEN PO           * LANTUS  SOLOSTAR 100 UNIT/ML injection   Generic drug:  insulin glargine     15 mL    INJECT 8 UNITS SUBCUTANEOUSLY AT BEDTIME    Type 1 diabetes mellitus without complication (H)       * insulin glargine 100 UNIT/ML injection     30 mL    Inject 11 Units Subcutaneous daily    Type 1 diabetes mellitus without complication (H)       levothyroxine 75 MCG tablet    SYNTHROID/LEVOTHROID    30 tablet    TAKE ONE TABLET BY MOUTH ONCE DAILY    Hypothyroidism, unspecified type       lisinopril 10 MG tablet    PRINIVIL/ZESTRIL    30 tablet    TAKE ONE TABLET BY MOUTH ONCE DAILY    Hypertension goal BP (blood pressure) < 140/90, Type 1 diabetes mellitus without complication (H)       NovoLOG FLEXPEN 100 UNIT/ML injection   Generic drug:  insulin aspart     15 mL    INJECT 5 UNITS SUBCUTANEOUSLY BEFORE BREAKFAST,3 UNITS BEFORE LUNCH AND 2 UNITS BEFORE DINNER    Type 1 diabetes, HbA1c goal < 7% (H)       pantoprazole 40 MG EC tablet    PROTONIX    90 tablet    Take 1 tablet (40 mg) by mouth daily Take 30-60 minutes before a meal.    Gastroesophageal reflux disease without esophagitis       pravastatin 10 MG tablet    PRAVACHOL    30 tablet    TAKE ONE-HALF TABLET BY MOUTH ONCE DAILY    Hyperlipidemia LDL goal <100       ranitidine 150 MG tablet    ZANTAC    180 tablet    Take 1 tablet (150 mg) by mouth 2 times daily    Gastroesophageal reflux disease without esophagitis       * RELION MINI PEN NEEDLES 31G X 6 MM   Generic drug:  insulin pen needle     400 each    USE ONE  4 TIMES DAILY    Type 1 diabetes, HbA1c goal < 7% (H)       * B-D U/F 31G X 5 MM   Generic drug:  insulin pen needle     400 each    USE ONE PEN NEEDLE 4 TIMES DAILY OR AS DIRECTED    Type 1 diabetes, HbA1c goal < 7% (H)       vitamin D 2000 UNITS tablet     100 tablet    Take 2,000 Units by mouth daily.    Vitamin D deficiency disease       * Notice:  This list has 4 medication(s) that are the same as other medications prescribed for you. Read the directions  carefully, and ask your doctor or other care provider to review them with you.

## 2017-09-15 NOTE — NURSING NOTE
"Chief Complaint   Patient presents with     Cough       Initial /70  Pulse 70  Temp 98  F (36.7  C) (Temporal)  Ht 4' 11\" (1.499 m)  Wt 151 lb (68.5 kg)  SpO2 98%  BMI 30.5 kg/m2 Estimated body mass index is 30.5 kg/(m^2) as calculated from the following:    Height as of this encounter: 4' 11\" (1.499 m).    Weight as of this encounter: 151 lb (68.5 kg).  Medication Reconciliation: complete       Bill Noyola MA    "

## 2017-09-15 NOTE — PROGRESS NOTES
"  SUBJECTIVE:   Maria M Marcus is a 52 year old female who presents to clinic today for the following health issues:      Acute Illness   Acute illness concerns: cough   Onset: 6 weeks     Fever: no, had it     Chills/Sweats: no    Headache (location?): YES-     Sinus Pressure:YES-  \"it hurt jaw up \" Patient states that she have sinus infection for years,     Conjunctivitis:  no    Ear Pain: no    Rhinorrhea: no     Congestion: YES    Sore Throat: no, had it      Cough: YES-productive of clear sputum    Wheeze: YES- at night     Decreased Appetite: no    Nausea: no    Vomiting: no    Diarrhea:  Yes- 2x day, better since being done with augmentin.     Dysuria/Freq.: no     Fatigue/Achiness: YES- fatigue     Sick/Strep Exposure: Yes- granddaughter ear infection onset yesterday       Therapies Tried and outcome: Amoxicillin  \"antibiotic completed about 5 days ago- it help with the chest pain and braking coughing \", delysm DM- no help, robitussin- it help some,  Tessalon , ibuprofen     Saw Fer Salgado PA-C 09/01/17- started augmentin for sinusitis and bronchitis.   Saw Fer Salgado PA-C 09/08/17- for diarrhea and abdominal pain. Cough was better.     Coughing is subsiding and getting better. But since last visit, pt is concerned that the sinus sx are worse and not better. Having facial pain across cheeks, into teeth that are causing headahces. These sx are the same or worse.   Is using flonase.  Nothing coming from nose, feels obstructed- \"so stuffed can't believe it\".   No ear pain.   No fevers.     Diabetes: \"good\". Seeing endocrinologist. . a1c is 6.1%  Type 1 diabetes. Not on pump presently-  Plans to meet with someone to discuss options for pumps.   Has had diabetes since age 2  Since being sick, sugars \"a little bouncy\", not as bad as had been.   Creatinine 1.00, GFR 58      Problem list and histories reviewed & adjusted, as indicated.  Additional history: as documented    Patient Active Problem " List   Diagnosis     Hypothyroidism     Other and unspecified disc disorder of lumbar region     Hyperlipidemia LDL goal <100     Cervical radiculopathy     Advanced directives, counseling/discussion     Frozen shoulder syndrome     Vitiligo     Chronic pain     Back pain     Hypertension goal BP (blood pressure) < 140/90     Type 1 diabetes mellitus without complication (H)     Overweight (BMI 25.0-29.9)     Retinopathy due to secondary diabetes (H)     Gastroesophageal reflux disease, esophagitis presence not specified     Past Surgical History:   Procedure Laterality Date     C  DELIVERY ONLY      C-Sections x2     C NONSPECIFIC PROCEDURE      Hysterectomy - total (cervix removed but ovaries remain)     C STOMACH SURGERY PROCEDURE UNLISTED       C TOTAL ABDOM HYSTERECTOMY       COLONOSCOPY N/A 2016    Procedure: COLONOSCOPY;  Surgeon: Efren Perry MD;  Location: [facility]     HC SACROPLASTY       LAMINECT/DISCECTOMY, CERVICAL  2007    C7-T1 hemilaminectomy, microdiscectomy, foraminotomy.     LASER YAG CAPSULOTOMY Right 10/23/2014    Procedure: LASER YAG CAPSULOTOMY;  Surgeon: Esteban Dorsey MD;  Location: [facility]     VITRECTOMY,STRIP EPIRETINAL MEMBRANE  09       Social History   Substance Use Topics     Smoking status: Never Smoker     Smokeless tobacco: Never Used      Comment: no exposure     Alcohol use Yes      Comment: winex1-2/3x per yr.     Family History   Problem Relation Age of Onset     Hypertension Maternal Grandfather      EYE* Maternal Grandmother      Blood Disease Maternal Grandmother      Eye Disorder Maternal Grandmother      maccular degeneration     OSTEOPOROSIS Maternal Grandmother      Lipids Father      GASTROINTESTINAL DISEASE Father      ulcers     HEART DISEASE Father      Cardiovascular Father      heart attack     Hypertension Paternal Grandmother      Breast Cancer Mother      dx age 73 - terminal - mother had first mammo at this age     DIABETES  Paternal Uncle      Type I         Current Outpatient Prescriptions   Medication Sig Dispense Refill     guaiFENesin-codeine (ROBITUSSIN AC) 100-10 MG/5ML SOLN solution Take 5-10 mLs by mouth nightly as needed 120 mL 0     pantoprazole (PROTONIX) 40 MG EC tablet Take 1 tablet (40 mg) by mouth daily Take 30-60 minutes before a meal. 90 tablet 1     levothyroxine (SYNTHROID/LEVOTHROID) 75 MCG tablet TAKE ONE TABLET BY MOUTH ONCE DAILY 30 tablet 5     blood glucose monitoring (SOFTCLIX) lancets USE TO TEST BLOOD SUGAR 5 TIMES DAILY OR AS DIRECTED 100 each 5     TANA CONTOUR test strip USE TO TEST BLOOD SUGARS FIVE TIMES DAILY OR AS DIRECTED. 150 strip 2     NOVOLOG FLEXPEN 100 UNIT/ML soln INJECT 5 UNITS SUBCUTANEOUSLY BEFORE BREAKFAST,3 UNITS BEFORE LUNCH AND 2 UNITS BEFORE DINNER 15 mL 3     pravastatin (PRAVACHOL) 10 MG tablet TAKE ONE-HALF TABLET BY MOUTH ONCE DAILY 30 tablet 10     B-D U/F insulin pen needle USE ONE PEN NEEDLE 4 TIMES DAILY OR AS DIRECTED 400 each 5     lisinopril (PRINIVIL/ZESTRIL) 10 MG tablet TAKE ONE TABLET BY MOUTH ONCE DAILY 30 tablet 10     IBUPROFEN PO        LANTUS SOLOSTAR 100 UNIT/ML soln INJECT 8 UNITS SUBCUTANEOUSLY AT BEDTIME 15 mL 1     blood glucose monitoring (TANA CONTOUR MONITOR) meter device kit Use to test blood sugars 5 times daily or as directed. 1 kit 0     RELION MINI PEN NEEDLES 31G X 6 MM USE ONE  4 TIMES DAILY 400 each 8     Cholecalciferol (VITAMIN D) 2000 UNITS tablet Take 2,000 Units by mouth daily. 100 tablet 3     ranitidine (ZANTAC) 150 MG tablet Take 1 tablet (150 mg) by mouth 2 times daily (Patient not taking: Reported on 9/15/2017) 180 tablet 1     insulin glargine (BASAGLAR KWIKPEN) 100 UNIT/ML injection Inject 11 Units Subcutaneous daily (Patient not taking: Reported on 9/15/2017) 30 mL 1     [DISCONTINUED] pravastatin (PRAVACHOL) 10 MG tablet TAKE ONE-HALF TABLET BY MOUTH ONCE DAILY 30 tablet 0     Allergies   Allergen Reactions     No Known Drug Allergies  "     BP Readings from Last 3 Encounters:   09/15/17 134/70   09/08/17 120/78   09/01/17 122/62    Wt Readings from Last 3 Encounters:   09/15/17 151 lb (68.5 kg)   09/08/17 153 lb (69.4 kg)   09/01/17 154 lb 6.4 oz (70 kg)                  Labs reviewed in EPIC        Reviewed and updated as needed this visit by clinical staff     Reviewed and updated as needed this visit by Provider         ROS:  As above  Constitutional, HEENT, cardiovascular, pulmonary, gi and gu systems are negative, except as otherwise noted.      OBJECTIVE:   /70  Pulse 70  Temp 98  F (36.7  C) (Temporal)  Ht 4' 11\" (1.499 m)  Wt 151 lb (68.5 kg)  SpO2 98%  BMI 30.5 kg/m2  Body mass index is 30.5 kg/(m^2).  GENERAL: healthy, alert and no distress  EYES: Eyes grossly normal to inspection, PERRL and conjunctivae and sclerae normal  HENT: Normal cephalic/atraumatic. Maxillary sinuses TTP> frontal. Ear canals clear and TM's normal, no effusion. Nose mucosa +erythematous with markedly swollen turbinates- touching septum. Lips normal, no lesions. Buccal muscosa moist. Soft palate no lesions or ulcers. Tonsils normal, no significant erythema, no exudates. Uvula midline. Posterior oropharynx mild erythema, no drainage.   NECK: no adenopathy and no asymmetry, masses, or scars  RESP: lungs clear to auscultation - no rales, rhonchi or wheezes  CV: regular rate and rhythm, normal S1 S2, no S3 or S4, no murmur, click or rub, no LE edema  SKIN: no suspicious lesions or rashes    Diagnostic Test Results:  none     ASSESSMENT/PLAN:       1. Acute sinusitis treated with antibiotics in the past 60 days  Improved bronchitis sx, worsening sinus pain/pressure/headaches  Start doxycycline.   Still having 2 looser stools per day since augmentin. If worsens with new abx please contact clinic  Probiotic discussed  Discussed possible side effects, how to take. F/u if not improved after this round of abx  - doxycycline (VIBRAMYCIN) 100 MG capsule; Take 1 " capsule (100 mg) by mouth 2 times daily  Dispense: 20 capsule; Refill: 0    2. Type 1 diabetes mellitus without complication (H)  a1c at goal, stable. No hypoglycemia sx.     Follow Up: For worsening symptoms (ie new fevers, worsening pain, etc), non-improvement as expected/discussed, questions regarding your medications or treatment plan. Discussed parameters for follow up and included in After Visit Summary given to patient.      Joslyn Mittal PA-C  [facility]

## 2017-09-15 NOTE — PATIENT INSTRUCTIONS
Start doxycycline 100mg twice daily for 10 days  Take with food.  Separate from calcium supplements and containing foods    Follow up if not improving - let us know

## 2017-09-18 ENCOUNTER — OFFICE VISIT (OUTPATIENT)
Dept: NURSING | Facility: CLINIC | Age: 52
End: 2017-09-18
Attending: INTERNAL MEDICINE
Payer: COMMERCIAL

## 2017-09-18 DIAGNOSIS — E11.9 DIABETES MELLITUS WITHOUT COMPLICATION (H): Primary | ICD-10-CM

## 2017-09-18 PROCEDURE — G0108 DIAB MANAGE TRN  PER INDIV: HCPCS

## 2017-09-18 NOTE — MR AVS SNAPSHOT
After Visit Summary   9/18/2017    Maria M Marcus    MRN: [id number]           Patient Information     Date Of Birth          1965        Visit Information        Provider Department      9/18/2017 11:00 AM Provider, Mg [facility]        Today's Diagnoses     Diabetes mellitus without complication (H)    -  1       Follow-ups after your visit        Your next 10 appointments already scheduled     Dec 08, 2017 11:15 AM CST   Return Visit with Sidney Jarquin MD,  [facility] NURSE   [facility]    [address] [place] [address] [phone]              Who to contact     If you have questions or need follow up information about today's clinic visit or your schedule please contact [facility] directly at [phone].  Normal or non-critical lab and imaging results will be communicated to you by [employer], letter or phone within 4 business days after the clinic has received the results. If you do not hear from us within 7 days, please contact the clinic through [employer] or phone. If you have a critical or abnormal lab result, we will notify you by phone as soon as possible.  Submit refill requests through [employer] or call your pharmacy and they will forward the refill request to us. Please allow 3 business days for your refill to be completed.          Additional Information About Your Visit        MyChart Information     [employer] gives you secure access to your electronic health record. If you see a primary care provider, you can also send messages to your care team and make appointments. If you have questions, please call your primary care clinic.  If you do not have a primary care provider, please call [phone] and they will assist you.      [employer] is an electronic gateway that provides easy, online access to your medical records. With [employer], you can request a clinic  appointment, read your test results, renew a prescription or communicate with your care team.     To access your existing account, please contact your [facility] or call [phone] for assistance.        Care EveryWhere ID     This is your Care EveryWhere ID. This could be used by other organizations to access your [facility] medical records  [id number]         Blood Pressure from Last 3 Encounters:   09/15/17 134/70   09/08/17 120/78   09/01/17 122/62    Weight from Last 3 Encounters:   09/15/17 68.5 kg (151 lb)   09/08/17 69.4 kg (153 lb)   09/01/17 70 kg (154 lb 6.4 oz)              We Performed the Following     DIABETES EDUCATION - Individual  []        Primary Care Provider Office Phone # Fax #    Kirill Berman [phone]       [facility] [address] [place] [address]        Equal Access to Services     ONESIMO SMITH : Hadii aad ku hadasho Soomaali, waaxda luqadaha, qaybta kaalmada adeegyada, waxay francescoin haypadman manpreet cruz . So [facility] [phone].    ATENCIÓN: Si habla español, tiene a ngo disposición servicios gratuitos de asistencia lingüística. Llame al [phone].    We comply with applicable federal civil rights laws and [place] laws. We do not discriminate on the basis of race, color, national origin, age, disability sex, sexual orientation or gender identity.            Thank you!     Thank you for choosing [facility]  for your care. Our goal is always to provide you with excellent care. Hearing back from our patients is one way we can continue to improve our services. Please take a few minutes to complete the written survey that you may receive in the mail after your visit with us. Thank you!             Your Updated Medication List - Protect others around you: Learn how to safely use, store and throw away your medicines at www.disposemymeds.org.          This list is accurate as of: 9/18/17  11:59 PM.  Always use your most recent med list.                   Brand Name Dispense Instructions for use Diagnosis    TANA CONTOUR test strip   Generic drug:  blood glucose monitoring     150 strip    USE TO TEST BLOOD SUGARS FIVE TIMES DAILY OR AS DIRECTED.    Type 1 diabetes mellitus without complication (H)       blood glucose monitoring lancets     100 each    USE TO TEST BLOOD SUGAR 5 TIMES DAILY OR AS DIRECTED    Type 1 diabetes mellitus without complication (H)       blood glucose monitoring meter device kit     1 kit    Use to test blood sugars 5 times daily or as directed.    Type 1 diabetes mellitus without complication (H)       doxycycline 100 MG capsule    VIBRAMYCIN    20 capsule    Take 1 capsule (100 mg) by mouth 2 times daily    Acute sinusitis treated with antibiotics in the past 60 days       guaiFENesin-codeine 100-10 MG/5ML Soln solution    ROBITUSSIN AC    120 mL    Take 5-10 mLs by mouth nightly as needed    Acute bronchitis with symptoms > 10 days       IBUPROFEN PO           * LANTUS SOLOSTAR 100 UNIT/ML injection   Generic drug:  insulin glargine     15 mL    INJECT 8 UNITS SUBCUTANEOUSLY AT BEDTIME    Type 1 diabetes mellitus without complication (H)       * insulin glargine 100 UNIT/ML injection     30 mL    Inject 11 Units Subcutaneous daily    Type 1 diabetes mellitus without complication (H)       levothyroxine 75 MCG tablet    SYNTHROID/LEVOTHROID    30 tablet    TAKE ONE TABLET BY MOUTH ONCE DAILY    Hypothyroidism, unspecified type       lisinopril 10 MG tablet    PRINIVIL/ZESTRIL    30 tablet    TAKE ONE TABLET BY MOUTH ONCE DAILY    Hypertension goal BP (blood pressure) < 140/90, Type 1 diabetes mellitus without complication (H)       NovoLOG FLEXPEN 100 UNIT/ML injection   Generic drug:  insulin aspart     15 mL    INJECT 5 UNITS SUBCUTANEOUSLY BEFORE BREAKFAST,3 UNITS BEFORE LUNCH AND 2 UNITS BEFORE DINNER    Type 1 diabetes, HbA1c goal < 7% (H)       pantoprazole 40 MG EC  tablet    PROTONIX    90 tablet    Take 1 tablet (40 mg) by mouth daily Take 30-60 minutes before a meal.    Gastroesophageal reflux disease without esophagitis       pravastatin 10 MG tablet    PRAVACHOL    30 tablet    TAKE ONE-HALF TABLET BY MOUTH ONCE DAILY    Hyperlipidemia LDL goal <100       ranitidine 150 MG tablet    ZANTAC    180 tablet    Take 1 tablet (150 mg) by mouth 2 times daily    Gastroesophageal reflux disease without esophagitis       * RELION MINI PEN NEEDLES 31G X 6 MM   Generic drug:  insulin pen needle     400 each    USE ONE  4 TIMES DAILY    Type 1 diabetes, HbA1c goal < 7% (H)       * B-D U/F 31G X 5 MM   Generic drug:  insulin pen needle     400 each    USE ONE PEN NEEDLE 4 TIMES DAILY OR AS DIRECTED    Type 1 diabetes, HbA1c goal < 7% (H)       vitamin D 2000 UNITS tablet     100 tablet    Take 2,000 Units by mouth daily.    Vitamin D deficiency disease       * Notice:  This list has 4 medication(s) that are the same as other medications prescribed for you. Read the directions carefully, and ask your doctor or other care provider to review them with you.

## 2017-09-21 NOTE — PROGRESS NOTES
"  Diabetes Self Management Training: Individual Review Visit    Maria M Marcus presents today for education and evaluation of glucose control related to Type 1 diabetes.    She is accompanied by self    Patient's diabetes management related comments/concerns: interested in exploreing insulin pump and cgm options.     Patient's emotional response to diabetes: expresses readiness to learn    Patient would like this visit to be focused around the following diabetes-related behaviors and goals: insulin pump and cgm options.     ASSESSMENT:  Type 1 DM, diagnosed at the age of [age].     Current Diabetes Management per Patient:  Taking diabetes medications?   yes:     Diabetes Medication(s)     Insulin Sig    NOVOLOG FLEXPEN 100 UNIT/ML soln INJECT 5 UNITS SUBCUTANEOUSLY BEFORE BREAKFAST,3 UNITS BEFORE LUNCH AND 2 UNITS BEFORE DINNER    insulin glargine (BASAGLAR KWIKPEN) 100 UNIT/ML injection Inject 11 Units Subcutaneous daily     Patient not taking:  Reported on 9/15/2017    LANTUS SOLOSTAR 100 UNIT/ML soln INJECT 8 UNITS SUBCUTANEOUSLY AT BEDTIME           *Abbreviated insulin dose documentation key: Insulin trade name ([id number]) - i.e. Humalog 5-5-5-0 (Humalog 5 units at breakfast, 5 units at lunch, and 5 units at dinner).     Patient glucose self monitoring as follows: four times daily.     BG values are: In goal  Patient's most recent   Lab Results   Component Value Date    A1C 6.1 07/28/2017    is meeting goal of <7.0    Vitals:  There were no vitals taken for this visit.  Estimated body mass index is 30.5 kg/(m^2) as calculated from the following:    Height as of 9/15/17: 1.499 m (4' 11\").    Weight as of 9/15/17: 68.5 kg (151 lb).   Last 3 BP:   BP Readings from Last 3 Encounters:   09/15/17 134/70   09/08/17 120/78   09/01/17 122/62       History   Smoking Status     Never Smoker   Smokeless Tobacco     Never Used     Comment: no exposure       Labs:  Lab Results   Component Value Date "    A1C 6.1 07/28/2017     Lab Results   Component Value Date    GLC 58 07/28/2017     Lab Results   Component Value Date     07/28/2017     10/27/2016     HDL Cholesterol   Date Value Ref Range Status   10/27/2016 56 >49 mg/dL Final   ]  GFR Estimate   Date Value Ref Range Status   07/28/2017 61 >60 mL/min/1.7m2 Final     Comment:     Non  GFR Calc     GFR Estimate If Black   Date Value Ref Range Status   07/28/2017 74 >60 mL/min/1.7m2 Final     Comment:      GFR Calc     Lab Results   Component Value Date    CR 0.96 07/28/2017     No results found for: MICROALBUMIN    Socio/Economic Considerations:    Support system: spouse/significant other    Health Beliefs and Attitudes:   Patient Activation Measure Survey Score:  LAVON Score (Last Two) 1/3/2011   LAVON Raw Score 50   Activation Score 86.3   LAVON Level 4       Stage of Change: PREPARATION (Decided to change - considering how)      Diabetes knowledge and skills assessment:     Patient is knowledgeable in diabetes management concepts related to: Healthy Eating, Being Active, Monitoring, Taking Medication, Problem Solving, Reducing Risks and Healthy Coping    Patient needs further education on the following diabetes management concepts: insulin pump and cgm options    Barriers to Learning Assessment: No Barriers identified    Based on learning assessment above, most appropriate setting for further diabetes education would be: Individual setting.    INTERVENTION:   Education provided today on:  Insulin pump and cgm options. Discussed basics of how insulin pumps and cgm therapy work. Discussed options of Medtronic, Tandem/T/Slim and OmniPod insulin pumps. Discussed Medtronic and Dexcom sensors.     Opportunities for ongoing education and support in diabetes-self management were discussed.    Pt verbalized understanding of concepts discussed and recommendations provided today.       Education Materials Provided:  Tandem/Tslim,  Medtronic, OmniPod pump brochures. Dexcom, Medtronic, cgm brochures.     PLAN:  Patient and her  plan to continue their discussion about pump and gcm theapy. Advised to call all companies and request a benefits investigation. Once a decision is made pt knows to give me a call to discuss how to move forward.     Time Spent: 75  minutes  Encounter Type: Individual    Jessica Demarco RN, BSN, CDE   [facility]

## 2017-09-28 ENCOUNTER — TELEPHONE (OUTPATIENT)
Dept: ENDOCRINOLOGY | Facility: CLINIC | Age: 52
End: 2017-09-28

## 2017-09-28 NOTE — TELEPHONE ENCOUNTER
Requested cmn, chart notes and labs faxed back to [employer].    Jessica Demarco RN, BSN, CDE   [facility]

## 2017-09-28 NOTE — TELEPHONE ENCOUNTER
Returned pt's call. Advised forms have been received from [employer] and were filled out and faxed back to [employer] this morning. Pt states I sould be receiving forms from [employer] as well, hopefully by the end af the week and pt wanted me to be aware of this. Pt advised if forms not received by veronica afternoon, I  will call [employer] to check on froms. Pt verbalized understanding.     Jessica Demarco RN, BSN, CDE   [facility]

## 2017-09-28 NOTE — TELEPHONE ENCOUNTER
[facility] Call Center    Phone Message    Name of Caller: Maria M    Phone Number: Home number on file [phone] (home) or Cell number on file:    Telephone Information:   Mobile [phone]       Best time to return call: Any    May a detailed message be left on voicemail: yes    Relation to patient: Self    Reason for Call: Other: Delia is calling questions regarding the process on ordering sensor and pump. She would like follow up with Jessica Demarco. Please advise.     Action Taken: Message routed to:  Adult Clinics: Endocrinology p [phone]

## 2017-10-02 ENCOUNTER — MYC MEDICAL ADVICE (OUTPATIENT)
Dept: FAMILY MEDICINE | Facility: OTHER | Age: 52
End: 2017-10-02

## 2017-10-02 DIAGNOSIS — K21.9 GASTROESOPHAGEAL REFLUX DISEASE, ESOPHAGITIS PRESENCE NOT SPECIFIED: ICD-10-CM

## 2017-10-02 NOTE — TELEPHONE ENCOUNTER
Unsure of which dose you would like for Omeprazole, so did not pend. Please review/advise on message below.  Gayle Case, CMA

## 2017-10-06 ENCOUNTER — MEDICAL CORRESPONDENCE (OUTPATIENT)
Dept: HEALTH INFORMATION MANAGEMENT | Facility: CLINIC | Age: 52
End: 2017-10-06

## 2017-10-06 ENCOUNTER — TELEPHONE (OUTPATIENT)
Dept: ENDOCRINOLOGY | Facility: CLINIC | Age: 52
End: 2017-10-06

## 2017-10-06 NOTE — TELEPHONE ENCOUNTER
KATHI and requested documentation faxed to [employer] at [phone].    Jessica Demarco, RN, BSN, CDE   [facility]

## 2017-10-13 ENCOUNTER — MEDICAL CORRESPONDENCE (OUTPATIENT)
Dept: HEALTH INFORMATION MANAGEMENT | Facility: CLINIC | Age: 52
End: 2017-10-13

## 2017-10-13 ENCOUNTER — TELEPHONE (OUTPATIENT)
Dept: ENDOCRINOLOGY | Facility: CLINIC | Age: 52
End: 2017-10-13

## 2017-10-13 NOTE — TELEPHONE ENCOUNTER
Cmn and chart notes faxed to [facility] for insulin pump to: [phone].    Jessica Demarco, RN, BSN, CDE   [facility]

## 2017-10-31 ENCOUNTER — TELEPHONE (OUTPATIENT)
Dept: ENDOCRINOLOGY | Facility: CLINIC | Age: 52
End: 2017-10-31

## 2017-10-31 NOTE — TELEPHONE ENCOUNTER
[facility] Call Center    Phone Message    Name of Caller: Self    Phone Number: Home number on file [phone] (home)    Best time to return call: Anytime    May a detailed message be left on voicemail: yes    Relation to patient: Self    Reason for Call: Other: Patient called and stated she tried to schedule with the provider a week ago but no one returned the patients call. Needs to speak with the provider as soon as possible to talk about some instruction on her new machine.      Action Taken: Message routed to:  Adult Clinics: Endocrinology p [phone]

## 2017-10-31 NOTE — TELEPHONE ENCOUNTER
Spoke with pt. Requests an appt with me to start Dexcom system. Appt made for Nov 15th at 11am.    Jessica Demarco RN, BSN, CDE   [facility]

## 2017-11-15 ENCOUNTER — OFFICE VISIT (OUTPATIENT)
Dept: NURSING | Facility: CLINIC | Age: 52
End: 2017-11-15
Payer: COMMERCIAL

## 2017-11-15 DIAGNOSIS — E11.9 DIABETES MELLITUS WITHOUT COMPLICATION (H): Primary | ICD-10-CM

## 2017-11-15 DIAGNOSIS — E10.9 DIABETES MELLITUS TYPE 1 (H): Primary | ICD-10-CM

## 2017-11-15 PROCEDURE — G0108 DIAB MANAGE TRN  PER INDIV: HCPCS

## 2017-11-15 NOTE — MR AVS SNAPSHOT
After Visit Summary   11/15/2017    Maria M Marcus    MRN: [id number]           Patient Information     Date Of Birth          1965        Visit Information        Provider Department      11/15/2017 11:00 AM Provider, [facility]        Today's Diagnoses     Diabetes mellitus without complication (H)    -  1       Follow-ups after your visit        Your next 10 appointments already scheduled     Dec 08, 2017 11:15 AM CST   Return Visit with Sidney Jarquin MD,  ENDO NURSE   [facility])    [address] [place] [id number] [phone]            Jan 22, 2018 11:00 AM CST   Return Visit with Mg Pardo Provider   [facility])    [address] [place] [id number] [phone]              Who to contact     If you have questions or need follow up information about today's clinic visit or your schedule please contact [facility] directly at [phone].  Normal or non-critical lab and imaging results will be communicated to you by Shopseenhart, letter or phone within 4 business days after the clinic has received the results. If you do not hear from us within 7 days, please contact the clinic through Shopseenhart or phone. If you have a critical or abnormal lab result, we will notify you by phone as soon as possible.  Submit refill requests through Spotie or call your pharmacy and they will forward the refill request to us. Please allow 3 business days for your refill to be completed.          Additional Information About Your Visit        Shopseenhart Information     Spotie gives you secure access to your electronic health record. If you see a primary care provider, you can also send messages to your care team and make appointments. If you have questions, please call your primary care clinic.  If you do not have a primary care  provider, please call [phone] and they will assist you.      CompassMD is an electronic gateway that provides easy, online access to your medical records. With CompassMD, you can request a clinic appointment, read your test results, renew a prescription or communicate with your care team.     To access your existing account, please contact your [facility] or call [phone] for assistance.        Care EveryWhere ID     This is your Care EveryWhere ID. This could be used by other organizations to access your [facility] medical records  [id number]         Blood Pressure from Last 3 Encounters:   09/15/17 134/70   09/08/17 120/78   09/01/17 122/62    Weight from Last 3 Encounters:   09/15/17 68.5 kg (151 lb)   09/08/17 69.4 kg (153 lb)   09/01/17 70 kg (154 lb 6.4 oz)              We Performed the Following     DIABETES EDUCATION - Individual  []          Today's Medication Changes          These changes are accurate as of: 11/15/17 11:59 PM.  If you have any questions, ask your nurse or doctor.               These medicines have changed or have updated prescriptions.        Dose/Directions    * TANA CONTOUR test strip   This may have changed:  Another medication with the same name was added. Make sure you understand how and when to take each.   Used for:  Type 1 diabetes mellitus without complication (H)   Generic drug:  blood glucose monitoring   Changed by:  Kirill Berman MD        USE TO TEST BLOOD SUGARS FIVE TIMES DAILY OR AS DIRECTED.   Quantity:  150 strip   Refills:  2       * blood glucose monitoring test strip   Commonly known as:  no brand specified   This may have changed:  You were already taking a medication with the same name, and this prescription was added. Make sure you understand how and when to take each.   Used for:  Diabetes mellitus type 1 (H)   Changed by:  Jessica Demarco        Needs One Touch Verio strip. Use to test blood sugar 6 times daily or as  directed.   Quantity:  200 strip   Refills:  11       * blood glucose monitoring lancets   This may have changed:  Another medication with the same name was added. Make sure you understand how and when to take each.   Used for:  Type 1 diabetes mellitus without complication (H)   Changed by:  Kirill Berman MD        USE TO TEST BLOOD SUGAR 5 TIMES DAILY OR AS DIRECTED   Quantity:  100 each   Refills:  5       * blood glucose monitoring lancets   This may have changed:  You were already taking a medication with the same name, and this prescription was added. Make sure you understand how and when to take each.   Used for:  Diabetes mellitus type 1 (H)   Changed by:  Jessica Demarco        Use to test blood sugars 6 times daily or as directed.   Quantity:  200 each   Refills:  11       * Notice:  This list has 4 medication(s) that are the same as other medications prescribed for you. Read the directions carefully, and ask your doctor or other care provider to review them with you.         Where to get your medicines      These medications were sent to [facility] [address] [place] [address] [facility]  [address] [facility] [address]     Phone:  [phone]     blood glucose monitoring lancets    blood glucose monitoring test strip                Primary Care Provider Office Phone # Fax #    Kirill Berman [phone]       [address] [place] [address]        Equal Access to Services     [facility] AH: Hadii miguel jennings hadasho Soomaali, waaxda luqadaha, qaybta kaalmada adeegyada, waxay damian hayaan manpreet lindsey. So [facility] [phone].    ATENCIÓN: Si habla español, tiene a ngo disposición servicios gratuitos de asistencia lingüística. Llame al [phone].    We comply with applicable federal civil rights laws and [place] laws. We do not discriminate on the basis of race, color, national origin, age, disability, sex, sexual orientation, or gender  identity.            Thank you!     Thank you for choosing [facility]  for your care. Our goal is always to provide you with excellent care. Hearing back from our patients is one way we can continue to improve our services. Please take a few minutes to complete the written survey that you may receive in the mail after your visit with us. Thank you!             Your Updated Medication List - Protect others around you: Learn how to safely use, store and throw away your medicines at www.disposemymeds.org.          This list is accurate as of: 11/15/17 11:59 PM.  Always use your most recent med list.                   Brand Name Dispense Instructions for use Diagnosis    * TANA CONTOUR test strip   Generic drug:  blood glucose monitoring     150 strip    USE TO TEST BLOOD SUGARS FIVE TIMES DAILY OR AS DIRECTED.    Type 1 diabetes mellitus without complication (H)       * blood glucose monitoring test strip    no brand specified    200 strip    Needs One Touch Verio strip. Use to test blood sugar 6 times daily or as directed.    Diabetes mellitus type 1 (H)       * blood glucose monitoring lancets     100 each    USE TO TEST BLOOD SUGAR 5 TIMES DAILY OR AS DIRECTED    Type 1 diabetes mellitus without complication (H)       * blood glucose monitoring lancets     200 each    Use to test blood sugars 6 times daily or as directed.    Diabetes mellitus type 1 (H)       blood glucose monitoring meter device kit     1 kit    Use to test blood sugars 5 times daily or as directed.    Type 1 diabetes mellitus without complication (H)       doxycycline 100 MG capsule    VIBRAMYCIN    20 capsule    Take 1 capsule (100 mg) by mouth 2 times daily    Acute sinusitis treated with antibiotics in the past 60 days       guaiFENesin-codeine 100-10 MG/5ML Soln solution    ROBITUSSIN AC    120 mL    Take 5-10 mLs by mouth nightly as needed    Acute bronchitis with symptoms > 10 days       IBUPROFEN PO           * LANTUS  SOLOSTAR 100 UNIT/ML injection   Generic drug:  insulin glargine     15 mL    INJECT 8 UNITS SUBCUTANEOUSLY AT BEDTIME    Type 1 diabetes mellitus without complication (H)       * BASAGLAR 100 UNIT/ML injection     30 mL    Inject 11 Units Subcutaneous daily    Type 1 diabetes mellitus without complication (H)       levothyroxine 75 MCG tablet    SYNTHROID/LEVOTHROID    30 tablet    TAKE ONE TABLET BY MOUTH ONCE DAILY    Hypothyroidism, unspecified type       lisinopril 10 MG tablet    PRINIVIL/ZESTRIL    30 tablet    TAKE ONE TABLET BY MOUTH ONCE DAILY    Hypertension goal BP (blood pressure) < 140/90, Type 1 diabetes mellitus without complication (H)       NovoLOG FLEXPEN 100 UNIT/ML injection   Generic drug:  insulin aspart     15 mL    INJECT 5 UNITS SUBCUTANEOUSLY BEFORE BREAKFAST,3 UNITS BEFORE LUNCH AND 2 UNITS BEFORE DINNER    Type 1 diabetes, HbA1c goal < 7% (H)       omeprazole 20 MG CR capsule    priLOSEC    30 capsule    TAKE ONE CAPSULE BY MOUTH ONCE DAILY    Gastroesophageal reflux disease, esophagitis presence not specified       pravastatin 10 MG tablet    PRAVACHOL    30 tablet    TAKE ONE-HALF TABLET BY MOUTH ONCE DAILY    Hyperlipidemia LDL goal <100       ranitidine 150 MG tablet    ZANTAC    180 tablet    Take 1 tablet (150 mg) by mouth 2 times daily    Gastroesophageal reflux disease without esophagitis       * RELION MINI PEN NEEDLES 31G X 6 MM   Generic drug:  insulin pen needle     400 each    USE ONE  4 TIMES DAILY    Type 1 diabetes, HbA1c goal < 7% (H)       * B-D U/F 31G X 5 MM   Generic drug:  insulin pen needle     400 each    USE ONE PEN NEEDLE 4 TIMES DAILY OR AS DIRECTED    Type 1 diabetes, HbA1c goal < 7% (H)       vitamin D 2000 UNITS tablet     100 tablet    Take 2,000 Units by mouth daily.    Vitamin D deficiency disease       * Notice:  This list has 8 medication(s) that are the same as other medications prescribed for you. Read the directions carefully, and ask your doctor or  other care provider to review them with you.

## 2017-11-16 ENCOUNTER — TELEPHONE (OUTPATIENT)
Dept: ENDOCRINOLOGY | Facility: CLINIC | Age: 52
End: 2017-11-16

## 2017-11-16 NOTE — PROGRESS NOTES
"  Diabetes Self Management Training: Follow-up Visit    Maria M Marcus presents today for initiation of continuous glucose monitoring - Dexcom - related to Type 1 diabetes.    She is accompanied by spouse    Patient's diabetes management related comments/concerns: none    Patient would like this visit to be focused around the following diabetes-related behaviors and goals: n/a    ASSESSMENT:  Patient Problem List reviewed for relevant medical history and current medical status.    Current Diabetes Management per Patient:  Taking diabetes medications? Yes:  See below    Diabetes Medication(s)     Insulin Sig    NOVOLOG FLEXPEN 100 UNIT/ML soln INJECT 5 UNITS SUBCUTANEOUSLY BEFORE BREAKFAST,3 UNITS BEFORE LUNCH AND 2 UNITS BEFORE DINNER    insulin glargine (BASAGLAR KWIKPEN) 100 UNIT/ML injection Inject 11 Units Subcutaneous daily     Patient not taking:  Reported on 9/15/2017    LANTUS SOLOSTAR 100 UNIT/ML soln INJECT 8 UNITS SUBCUTANEOUSLY AT BEDTIME        Patient glucose self monitoring as follows: 4-6 times daily.   BG meter: Contour Next     BG values are: In goal  Patient's most recent   Lab Results   Component Value Date    A1C 6.1 07/28/2017    is meeting goal of <7.0    Vitals:  There were no vitals taken for this visit.  Estimated body mass index is 30.5 kg/(m^2) as calculated from the following:    Height as of 9/15/17: 1.499 m (4' 11\").    Weight as of 9/15/17: 68.5 kg (151 lb).   Last 3 BP:   BP Readings from Last 3 Encounters:   09/15/17 134/70   09/08/17 120/78   09/01/17 122/62       History   Smoking Status     Never Smoker   Smokeless Tobacco     Never Used     Comment: no exposure       Labs:  Lab Results   Component Value Date    A1C 6.1 07/28/2017     Lab Results   Component Value Date    GLC 58 07/28/2017     Lab Results   Component Value Date     07/28/2017     10/27/2016     HDL Cholesterol   Date Value Ref Range Status   10/27/2016 56 >49 mg/dL Final   ]  GFR Estimate "   Date Value Ref Range Status   07/28/2017 61 >60 mL/min/1.7m2 Final     Comment:     Non  GFR Calc     GFR Estimate If Black   Date Value Ref Range Status   07/28/2017 74 >60 mL/min/1.7m2 Final     Comment:      GFR Calc     Lab Results   Component Value Date    CR 0.96 07/28/2017     No results found for: MICROALBUMIN    Health Beliefs and Attitudes:   Patient Activation Measure Survey Score:  LAVON Score (Last Two) 1/3/2011   LAVON Raw Score 50   Activation Score 86.3   LAVON Level 4     Stage of Change: ACTION (Actively working towards change)    Diabetes knowledge and skills assessment:     Patient is knowledgeable in diabetes management concepts related to: Healthy Eating, Being Active, Monitoring, Taking Medication, Problem Solving, Reducing Risks and Healthy Coping    Patient needs further education on the following diabetes management concepts: learning how to use a glucose sensor system.     Barriers to Learning Assessment: No Barriers identified    Based on learning assessment above, most appropriate setting for further diabetes education would be: Individual setting.    INTERVENTION:    Education provided today on:  Instructed patient and her spouse on how to use the Dexcom continuous glucose monitoring system.  programmed. Pt self-injected sensor into right side of abdomen. Insertion went well. Pt instructed on how to calibrate sensor. Advised to do 2 finger sticks back to back and enter results into revieiver. Do next calibration within 12 hours but not before 6 hours. Sensor must be calibrated twice a day to ensure accurate readings. Pt instructed on the sensor and transmitter being waterproof. No need to remove for activities of daily living. Pt knows to change sensor every 7 days.     Opportunities for ongoing education and support in diabetes-self management were discussed.    Pt verbalized understanding of concepts discussed and recommendations provided today.        Education Materials Provided:  No new materials provided today    PLAN:  Pt will return to see cde in 4-6 weeks following Tandem/TSlim insulin pump start. That appt is scheduled for Dec 4th. Training will be done by [employer] clinical rep.     Call cde if any concerns arise.      Time Spent: 60 minutes  Encounter Type: Individual    Jessica Demarco RN, BSN, CDE   [facility]

## 2017-11-16 NOTE — TELEPHONE ENCOUNTER
Spoke with pt. Requests date of pump training be changed from Dec 6th to Dec 4th. The [place] room was reserved for Dec 4th, 10am-1pm. Pt so advised.     Jessica Demarco, RN, BSN, CDE   [facility]

## 2017-11-17 ENCOUNTER — TELEPHONE (OUTPATIENT)
Dept: ENDOCRINOLOGY | Facility: CLINIC | Age: 52
End: 2017-11-17

## 2017-11-17 ENCOUNTER — MEDICAL CORRESPONDENCE (OUTPATIENT)
Dept: HEALTH INFORMATION MANAGEMENT | Facility: CLINIC | Age: 52
End: 2017-11-17

## 2017-11-17 DIAGNOSIS — E10.9 TYPE 1 DIABETES MELLITUS WITHOUT COMPLICATION (H): ICD-10-CM

## 2017-11-17 NOTE — TELEPHONE ENCOUNTER
Spoke with pt. States One Touch Verio strips not covered by insurance. Requests I send in Rx for Contour test strips. Will also need Rx for Novolog changed from pens to vials as pt is about to start insulin pump therapy. Prescriptions sent to preferred pharmacy. Pt aware.     Jessica Demarco, RN, BSN, CDE   [facility]

## 2017-11-27 NOTE — PROGRESS NOTES
SUBJECTIVE:                                                    Maria M Marcus is a 52 year old female who presents to clinic today for the following health issues:    History of Present Illness     Diabetes:     Frequency of checking blood sugars::  Other    Diabetic concerns::  Blood sugar frequently over 200 and Low blood sugar, several less than 70 in the past few weeks    Hypoglycemia symptoms::  None    Paraesthesia present::  No    Eye Exam in the last year::  Yes    8-1-2017    Diet:  Diabetic and Carbohydrate counting  Frequency of exercise:  4-5 days/week  Duration of exercise:  15-30 minutes  Taking medications regularly:  Yes  Medication side effects:  None  Additional concerns today:  YES    She started her Dexcom glucose monitor a few weeks ago and will be starting an insulin pump next week along with an endocrine follow up. She has been running higher at times during the day but is having a lot of lows at night over the past few weeks so she has cut back on her evening dose of Basaglar a few units. She does not have any symptoms when she is low but her sensor notifies her when she is less than 65.     Patient would like to discuss Prilosec medication. Insurance only covers 6 months of medication so she cannot get further refills until January. She states this is the only medication that works well for her acid reflux.     Patient is also wondering what kind of medication she can take for her chronic neck and right shoulder/arm pain. She was instructed to avoid Tylenol due to her glucose sensor and pump and cannot take ibuprofen either. She states that Biofreeze, ice and heat have been helpful in the past.     Answers for HPI/ROS submitted by the patient on 11/30/2017   PHQ-2 Score: 0    Problem list and histories reviewed & adjusted, as indicated.  Additional history: none    ROS:  GENERAL: Denies fever, fatigue, weakness, weight gain, or weight loss.  CARDIOVASCULAR: Denies chest pain,  shortness of breath, irregular heartbeats,  palpitations, or edema.  RESPIRATORY: Denies cough, hemoptysis, and shortness of breath.  MUSCULOSKELETAL: +Neck pain, right arm/shoulder pain.   NEUROLOGIC: Denies headache, fainting, dizziness, memory loss, numbness, tingling, or seizures.    OBJECTIVE:     /70 (BP Location: Right arm, Patient Position: Chair, Cuff Size: Adult Regular)  Pulse 78  Temp 98.8  F (37.1  C) (Temporal)  Resp 18  Wt 154 lb (69.9 kg)  SpO2 98%  BMI 31.1 kg/m2  Body mass index is 31.1 kg/(m^2).  GENERAL: healthy, alert and no distress  NECK: no adenopathy, no asymmetry, masses, or scars and thyroid normal to palpation  RESP: lungs clear to auscultation - no rales, rhonchi or wheezes  CV: regular rate and rhythm, normal S1 S2, no S3 or S4, no murmur, click or rub, no peripheral edema  MS: no gross musculoskeletal defects noted. Tenderness over the lower cervical spinous processes and bilateral trapezius.   NEURO: Normal strength and tone, mentation intact and speech normal. Cranial nerves II-XII are grossly intact. DTRs are 2+/4 throughout and symmetric. Gait is stable.     ASSESSMENT/PLAN:       ICD-10-CM    1. Type 1 diabetes mellitus without complication (H) E10.9    2. Cervical radiculopathy M54.12 lidocaine (XYLOCAINE) 2 % topical gel   3. Cervicalgia M54.2 lidocaine (XYLOCAINE) 2 % topical gel   4. Hypertension goal BP (blood pressure) < 140/90 I10    5. Gastroesophageal reflux disease, esophagitis presence not specified K21.9    6. Hyperlipidemia LDL goal <100 E78.5        1. She follows closely with endocrinology and has an appointment next Friday. She also has an appointment next week to start her insulin pump. She will continue with Basaglar and Novolog as directed.    2-3. Chronic neck and shoulder pain but cannot take NSAIDs or Tylenol. I will prescribe lidocaine gel to use twice daily as needed. She will also use Biofreeze, ice, heat and stretching as needed. If symptoms  are not improving, I discussed trying gabapentin.    4. Stable, continue current medications. Will refill when needed.    5. I discussed possibly long-term side effects from omeprazole but she would like to continue with this medication as it is the only thing that improved her acid reflux symptoms. Will refill in January when needed.     6. Will refill statin when needed.    Follow up annually.     Marcus Salgado PA-C  [facility]

## 2017-11-30 ENCOUNTER — MYC MEDICAL ADVICE (OUTPATIENT)
Dept: FAMILY MEDICINE | Facility: OTHER | Age: 52
End: 2017-11-30

## 2017-11-30 ENCOUNTER — OFFICE VISIT (OUTPATIENT)
Dept: FAMILY MEDICINE | Facility: OTHER | Age: 52
End: 2017-11-30
Payer: COMMERCIAL

## 2017-11-30 VITALS
RESPIRATION RATE: 18 BRPM | TEMPERATURE: 98.8 F | SYSTOLIC BLOOD PRESSURE: 132 MMHG | BODY MASS INDEX: 31.1 KG/M2 | WEIGHT: 154 LBS | OXYGEN SATURATION: 98 % | HEART RATE: 78 BPM | DIASTOLIC BLOOD PRESSURE: 70 MMHG

## 2017-11-30 DIAGNOSIS — I10 HYPERTENSION GOAL BP (BLOOD PRESSURE) < 140/90: ICD-10-CM

## 2017-11-30 DIAGNOSIS — M54.2 CERVICALGIA: ICD-10-CM

## 2017-11-30 DIAGNOSIS — E78.5 HYPERLIPIDEMIA LDL GOAL <100: ICD-10-CM

## 2017-11-30 DIAGNOSIS — E10.9 TYPE 1 DIABETES MELLITUS WITHOUT COMPLICATION (H): Primary | Chronic | ICD-10-CM

## 2017-11-30 DIAGNOSIS — K21.9 GASTROESOPHAGEAL REFLUX DISEASE, ESOPHAGITIS PRESENCE NOT SPECIFIED: ICD-10-CM

## 2017-11-30 DIAGNOSIS — M54.12 CERVICAL RADICULOPATHY: ICD-10-CM

## 2017-11-30 PROCEDURE — 99214 OFFICE O/P EST MOD 30 MIN: CPT | Performed by: PHYSICIAN ASSISTANT

## 2017-11-30 ASSESSMENT — PAIN SCALES - GENERAL: PAINLEVEL: SEVERE PAIN (6)

## 2017-11-30 NOTE — NURSING NOTE
"Chief Complaint   Patient presents with     Forms     Blood Sugar Problem     Recheck Medication     Panel Management     flu, hep c, cmp, ey exam, csa, ua       Initial /70 (BP Location: Right arm, Patient Position: Chair, Cuff Size: Adult Regular)  Pulse 78  Temp 98.8  F (37.1  C) (Temporal)  Resp 18  Wt 154 lb (69.9 kg)  SpO2 98%  BMI 31.1 kg/m2 Estimated body mass index is 31.1 kg/(m^2) as calculated from the following:    Height as of 9/15/17: 4' 11\" (1.499 m).    Weight as of this encounter: 154 lb (69.9 kg).  Medication Reconciliation: complete     Gayle Case CMA      "

## 2017-11-30 NOTE — PATIENT INSTRUCTIONS
Will try Lidocaine gel to use twice daily as needed for neck pain.  You can also use Biofreeze along with ice, heat, and daily stretching.  If this is not working well, we can try an oral medication called gabapentin.    Follow up with endocrinology as scheduled.    Will refill omeprazole when needed.    Follow up annually. Will refill medications as needed.

## 2017-11-30 NOTE — MR AVS SNAPSHOT
After Visit Summary   11/30/2017    Maria M Marcus    MRN: [id number]           Patient Information     Date Of Birth          1965        Visit Information        Provider Department      11/30/2017 1:30 PM Marcus Salgado PA-C [facility]        Today's Diagnoses     Type 1 diabetes mellitus without complication (H)    -  1    Cervical radiculopathy        Cervicalgia        Hypertension goal BP (blood pressure) < 140/90        Gastroesophageal reflux disease, esophagitis presence not specified          Care Instructions    Will try Lidocaine gel to use twice daily as needed for neck pain.  You can also use Biofreeze along with ice, heat, and daily stretching.  If this is not working well, we can try an oral medication called gabapentin.    Follow up with endocrinology as scheduled.    Will refill omeprazole when needed.    Follow up annually. Will refill medications as needed.            Follow-ups after your visit        Your next 10 appointments already scheduled     Dec 08, 2017 11:15 AM CST   Return Visit with Sidney Jarquin MD, MG ENDO NURSE   [facility])    [address] [place] [id number] [phone]            Jan 22, 2018 11:00 AM CST   Return Visit with  Cde Provider   [facility])    [address] [place] [id number] [phone]              Who to contact     If you have questions or need follow up information about today's clinic visit or your schedule please contact [facility] directly at [phone].  Normal or non-critical lab and imaging results will be communicated to you by MyChart, letter or phone within 4 business days after the clinic has received the results. If you do not hear from us within 7 days, please contact the clinic through MyChart or phone. If you have a critical or abnormal  lab result, we will notify you by phone as soon as possible.  Submit refill requests through [employer] or call your pharmacy and they will forward the refill request to us. Please allow 3 business days for your refill to be completed.          Additional Information About Your Visit        [employer] Information     [employer] gives you secure access to your electronic health record. If you see a primary care provider, you can also send messages to your care team and make appointments. If you have questions, please call your primary care clinic.  If you do not have a primary care provider, please call [phone] and they will assist you.        Care EveryWhere ID     This is your Care EveryWhere ID. This could be used by other organizations to access your [facility] medical records  [id number]        Your Vitals Were     Pulse Temperature Respirations Pulse Oximetry BMI (Body Mass Index)       78 98.8  F (37.1  C) (Temporal) 18 98% 31.1 kg/m2        Blood Pressure from Last 3 Encounters:   11/30/17 132/70   09/15/17 134/70   09/08/17 120/78    Weight from Last 3 Encounters:   11/30/17 154 lb (69.9 kg)   09/15/17 151 lb (68.5 kg)   09/08/17 153 lb (69.4 kg)              Today, you had the following     No orders found for display         Today's Medication Changes          These changes are accurate as of: 11/30/17  2:08 PM.  If you have any questions, ask your nurse or doctor.               Start taking these medicines.        Dose/Directions    lidocaine 2 % topical gel   Commonly known as:  XYLOCAINE   Used for:  Cervical radiculopathy, Cervicalgia   Started by:  Marcus Salgado PA-C        Apply topically 2 times daily as needed for moderate pain   Quantity:  30 mL   Refills:  2         These medicines have changed or have updated prescriptions.        Dose/Directions    BASAGLAR 100 UNIT/ML injection   This may have changed:  Another medication with the same name was removed. Continue taking this medication, and  follow the directions you see here.   Used for:  Type 1 diabetes mellitus without complication (H)   Changed by:  Kirill Berman MD        Dose:  11 Units   Inject 11 Units Subcutaneous daily   Quantity:  30 mL   Refills:  1         Stop taking these medicines if you haven't already. Please contact your care team if you have questions.     guaiFENesin-codeine 100-10 MG/5ML Soln solution   Commonly known as:  ROBITUSSIN AC   Stopped by:  Marcus Salgado PA-C           ranitidine 150 MG tablet   Commonly known as:  ZANTAC   Stopped by:  Marcus Salgado PA-C                Where to get your medicines      These medications were sent to [facility] [facility] [address] [facility]  [address] [facility] [address]     Phone:  [phone]     lidocaine 2 % topical gel                Primary Care Provider Office Phone # Fax #    Kirill Berman [phone]       [address] [place] [address]        Equal Access to Services     [facility] AH: Hadii aad ku hadasho Soomaali, waaxda luqadaha, qaybta kaalmada adeegyada, waxay idiin hayaan manpreet kharaha cruz . So [facility] [phone].    ATENCIÓN: Si habla español, tiene a ngo disposición servicios gratuitos de asistencia lingüística. [facility] [phone].    We comply with applicable federal civil rights laws and [place] laws. We do not discriminate on the basis of race, color, national origin, age, disability, sex, sexual orientation, or gender identity.            Thank you!     Thank you for choosing [facility]  for your care. Our goal is always to provide you with excellent care. Hearing back from our patients is one way we can continue to improve our services. Please take a few minutes to complete the written survey that you may receive in the mail after your visit with us. Thank you!             Your Updated Medication List - Protect others around you: Learn how to safely  use, store and throw away your medicines at www.disposemymeds.org.          This list is accurate as of: 11/30/17  2:08 PM.  Always use your most recent med list.                   Brand Name Dispense Instructions for use Diagnosis    BASAGLAR 100 UNIT/ML injection     30 mL    Inject 11 Units Subcutaneous daily    Type 1 diabetes mellitus without complication (H)       * blood glucose monitoring lancets     100 each    USE TO TEST BLOOD SUGAR 5 TIMES DAILY OR AS DIRECTED    Type 1 diabetes mellitus without complication (H)       * blood glucose monitoring lancets     200 each    Use to test blood sugars 6 times daily or as directed.    Diabetes mellitus type 1 (H)       blood glucose monitoring meter device kit     1 kit    Use to test blood sugars 5 times daily or as directed.    Type 1 diabetes mellitus without complication (H)       * blood glucose monitoring test strip    no brand specified    200 strip    Needs One Touch Verio strip. Use to test blood sugar 6 times daily or as directed.    Diabetes mellitus type 1 (H)       * blood glucose monitoring test strip    TANA CONTOUR    200 strip    Use to test blood sugar 6 times daily or as directed.    Type 1 diabetes mellitus without complication (H)       IBUPROFEN PO           levothyroxine 75 MCG tablet    SYNTHROID/LEVOTHROID    30 tablet    TAKE ONE TABLET BY MOUTH ONCE DAILY    Hypothyroidism, unspecified type       lidocaine 2 % topical gel    XYLOCAINE    30 mL    Apply topically 2 times daily as needed for moderate pain    Cervical radiculopathy, Cervicalgia       lisinopril 10 MG tablet    PRINIVIL/ZESTRIL    30 tablet    TAKE ONE TABLET BY MOUTH ONCE DAILY    Hypertension goal BP (blood pressure) < 140/90, Type 1 diabetes mellitus without complication (H)       * NovoLOG FLEXPEN 100 UNIT/ML injection   Generic drug:  insulin aspart     15 mL    INJECT 5 UNITS SUBCUTANEOUSLY BEFORE BREAKFAST,3 UNITS BEFORE LUNCH AND 2 UNITS BEFORE DINNER    Type 1  diabetes, HbA1c goal < 7% (H)       * insulin aspart 100 UNITS/ML injection    NovoLOG    20 mL    Take as directed via insulin pump. Total daily dose 40 units.    Type 1 diabetes mellitus without complication (H)       omeprazole 20 MG CR capsule    priLOSEC    30 capsule    TAKE ONE CAPSULE BY MOUTH ONCE DAILY    Gastroesophageal reflux disease, esophagitis presence not specified       pravastatin 10 MG tablet    PRAVACHOL    30 tablet    TAKE ONE-HALF TABLET BY MOUTH ONCE DAILY    Hyperlipidemia LDL goal <100       * RELION MINI PEN NEEDLES 31G X 6 MM   Generic drug:  insulin pen needle     400 each    USE ONE  4 TIMES DAILY    Type 1 diabetes, HbA1c goal < 7% (H)       * B-D U/F 31G X 5 MM   Generic drug:  insulin pen needle     400 each    USE ONE PEN NEEDLE 4 TIMES DAILY OR AS DIRECTED    Type 1 diabetes, HbA1c goal < 7% (H)       vitamin D 2000 UNITS tablet     100 tablet    Take 2,000 Units by mouth daily.    Vitamin D deficiency disease       * Notice:  This list has 8 medication(s) that are the same as other medications prescribed for you. Read the directions carefully, and ask your doctor or other care provider to review them with you.

## 2017-12-05 ENCOUNTER — TELEPHONE (OUTPATIENT)
Dept: ENDOCRINOLOGY | Facility: CLINIC | Age: 52
End: 2017-12-05

## 2017-12-05 NOTE — TELEPHONE ENCOUNTER
[facility] Call Center    Phone Message    Name of Caller: Maria M     Phone Number: Home number on file [phone] (home)    Best time to return call: Any     May a detailed message be left on voicemail: yes    Relation to patient: Self    Reason for Call: Patient calling to speak with Jessica-has a new insulin pump. Has questions concerning low blood sugar and new pump. Requesting a call to discuss. Thank you  Action Taken: Message routed to:  Adult Clinics: Endocrinology p [phone]

## 2017-12-05 NOTE — TELEPHONE ENCOUNTER
Spoke with pt who was started on the tandem pump on Mon. Pt states she is having low blood sugars between 12am-3am. Pt advised to change basal rate currently set at 0.4u/hr from 12am-12am to 12am-3am: 0.3u/hr and 3am-12am: 0.4u/hr. Pt made this change while on the phone and confirmed settings were changed without diifficulty.     Jessica Demarco, RN, BSN, CDE   [facility]

## 2017-12-08 ENCOUNTER — OFFICE VISIT (OUTPATIENT)
Dept: ENDOCRINOLOGY | Facility: CLINIC | Age: 52
End: 2017-12-08
Payer: COMMERCIAL

## 2017-12-08 VITALS
WEIGHT: 151.38 LBS | SYSTOLIC BLOOD PRESSURE: 138 MMHG | DIASTOLIC BLOOD PRESSURE: 80 MMHG | BODY MASS INDEX: 31.77 KG/M2 | HEIGHT: 58 IN | HEART RATE: 85 BPM

## 2017-12-08 DIAGNOSIS — E06.3 HYPOTHYROIDISM DUE TO HASHIMOTO'S THYROIDITIS: Primary | ICD-10-CM

## 2017-12-08 DIAGNOSIS — E16.2 HYPOGLYCEMIA: ICD-10-CM

## 2017-12-08 DIAGNOSIS — E10.9 TYPE 1 DIABETES MELLITUS WITHOUT COMPLICATION (H): Chronic | ICD-10-CM

## 2017-12-08 LAB — HBA1C MFR BLD: 6.6 % (ref 0–5.7)

## 2017-12-08 PROCEDURE — 95251 CONT GLUC MNTR ANALYSIS I&R: CPT | Performed by: INTERNAL MEDICINE

## 2017-12-08 PROCEDURE — 99214 OFFICE O/P EST MOD 30 MIN: CPT | Performed by: INTERNAL MEDICINE

## 2017-12-08 PROCEDURE — 36415 COLL VENOUS BLD VENIPUNCTURE: CPT | Performed by: INTERNAL MEDICINE

## 2017-12-08 PROCEDURE — 83036 HEMOGLOBIN GLYCOSYLATED A1C: CPT | Performed by: INTERNAL MEDICINE

## 2017-12-08 NOTE — MR AVS SNAPSHOT
After Visit Summary   12/8/2017    Maria M Marcus    MRN: [id number]           Patient Information     Date Of Birth          1965        Visit Information        Provider Department      12/8/2017 11:15 AM Sidney Jarquin MD; MG ENDO NURSE [facility]        Today's Diagnoses     Type 1 diabetes mellitus without complication (H)          Care Instructions    Increase carb ratio to 15.     Continue current basal. Low alert 85 mg/dl, high alert 280.       Sending blood sugars to your provider at [place]:  We want to help you with your diabetes management, which often requires frequent adjustments to your therapy. For your convenience, we have several ways to send your blood sugars to your doctor for review.    - Send message directly to your doctor through My Chart.  Please ask the rooming staff if you would like to sign up for My Chart.  This is a fast and confidential way to send your information and communicate directly with your provider.   - Record readings and fax to [phone].  We have a template for you to use for your convenience.  - Stop by the clinic with your meter for download if advised by provider.   - My Chart or call Jessica Demarco, Diabetes Educator at [phone]  - Call the clinic and speak to one of the endocrine nurses to relay information on the telephone.  Nandini Herman, or Sabine at [phone].   -    Please call the on-call Endocrinologist at the [facility] for after       hours/weekend needs at [phone] Option #4.    Please note that you do not need to FAST if you are just having an A1C drawn. Please remember to ALWAYS bring your glucose meter with to your appointment. This data is very important for the management of your care.    Thank you!  Your [facility] Care Team                Follow-ups after your visit        Follow-up notes from your care team     Return in about 4 months (around 4/8/2018).      Your  next 10 appointments already scheduled     Jan 22, 2018 11:00 AM CST   [facility] with [facility] Cde Provider   [facility]    [address] [facility] [id number] [phone]            Apr 12, 2018  8:30 AM CDT   LAB with NL LAB [facility] ([facility])    [address] [facility] [id number] [phone]           Please do not eat 10-12 hours before your appointment if you are coming in fasting for labs on lipids, cholesterol, or glucose (sugar). This does not apply to pregnant women. Water, hot tea and black coffee (with nothing added) are okay. Do not drink other fluids, diet soda or chew gum.            Apr 20, 2018  1:00 PM CDT   Return Visit with Sidney Jarquin MD   [facility]    [address] [place] [id number] [phone]              Future tests that were ordered for you today     Open Future Orders        Priority Expected Expires Ordered    T4 free Routine 3/8/2018 12/8/2018 12/8/2017    TSH Routine 3/8/2018 12/8/2018 12/8/2017    Hemoglobin A1c Routine 3/8/2018 12/8/2018 12/8/2017    ALT Routine 3/8/2018 12/8/2018 12/8/2017    Albumin Random Urine Quantitative with Creat Ratio Routine 3/8/2018 12/8/2018 12/8/2017    CK total Routine 3/8/2018 12/8/2018 12/8/2017    Lipid panel reflex to direct LDL Fasting Routine 3/8/2018 12/8/2018 12/8/2017    Hematocrit Routine 3/8/2018 12/8/2018 12/8/2017    Renal panel Routine 3/8/2018 12/8/2018 12/8/2017            Who to contact     If you have questions or need follow up information about today's clinic visit or your schedule please contact [facility] directly at [phone].  Normal or non-critical lab and imaging results will be communicated to you by MyChart, letter or phone within 4 business days after the clinic has received the results. If you do not hear from  "us within 7 days, please contact the clinic through IMNEXT or phone. If you have a critical or abnormal lab result, we will notify you by phone as soon as possible.  Submit refill requests through IMNEXT or call your pharmacy and they will forward the refill request to us. Please allow 3 business days for your refill to be completed.          Additional Information About Your Visit        [employer] Information     IMNEXT gives you secure access to your electronic health record. If you see a primary care provider, you can also send messages to your care team and make appointments. If you have questions, please call your primary care clinic.  If you do not have a primary care provider, please call [phone] and they will assist you.      IMNEXT is an electronic gateway that provides easy, online access to your medical records. With IMNEXT, you can request a clinic appointment, read your test results, renew a prescription or communicate with your care team.     To access your existing account, please contact your [facility] or call [phone] for assistance.        Care EveryWhere ID     This is your Care EveryWhere ID. This could be used by other organizations to access your [facility] medical records  [id number]        Your Vitals Were     Pulse Height BMI (Body Mass Index)             85 1.47 m (4' 9.87\") 31.78 kg/m2          Blood Pressure from Last 3 Encounters:   12/08/17 138/80   11/30/17 132/70   09/15/17 134/70    Weight from Last 3 Encounters:   12/08/17 68.7 kg (151 lb 6 oz)   11/30/17 69.9 kg (154 lb)   09/15/17 68.5 kg (151 lb)              We Performed the Following     Hemoglobin A1c POCT          Today's Medication Changes          These changes are accurate as of: 12/8/17 11:56 AM.  If you have any questions, ask your nurse or doctor.               These medicines have changed or have updated prescriptions.        Dose/Directions    blood glucose monitoring test " strip   Commonly known as:  TANA CONTOUR   This may have changed:  Another medication with the same name was removed. Continue taking this medication, and follow the directions you see here.   Used for:  Type 1 diabetes mellitus without complication (H)   Changed by:  Jessica Demarco        Use to test blood sugar 6 times daily or as directed.   Quantity:  200 strip   Refills:  3         Stop taking these medicines if you haven't already. Please contact your care team if you have questions.     blood glucose monitoring lancets   Stopped by:  Sidney Jarquin MD                    Primary Care Provider Office Phone # Fax #    Marcus Salgado PA-C [phone]       [address] [address]        Equal Access to Services     [facility]: Hadii miguel renteriao Sobarb, waaxda luqadaha, qaybta kaalmada adecharoyada, traci cruz . [facility] [phone].    ATENCIÓN: Si habla español, tiene a ngo disposición servicios gratuitos de asistencia lingüística. [facility] [phone].    We comply with applicable federal civil rights laws and [place] laws. We do not discriminate on the basis of race, color, national origin, age, disability, sex, sexual orientation, or gender identity.            Thank you!     Thank you for choosing [facility]  for your care. Our goal is always to provide you with excellent care. Hearing back from our patients is one way we can continue to improve our services. Please take a few minutes to complete the written survey that you may receive in the mail after your visit with us. Thank you!             Your Updated Medication List - Protect others around you: Learn how to safely use, store and throw away your medicines at www.disposemymeds.org.          This list is accurate as of: 12/8/17 11:56 AM.  Always use your most recent med list.                   Brand Name Dispense Instructions for use Diagnosis     BASAGLAR 100 UNIT/ML injection     30 mL    Inject 11 Units Subcutaneous daily    Type 1 diabetes mellitus without complication (H)       blood glucose monitoring meter device kit     1 kit    Use to test blood sugars 5 times daily or as directed.    Type 1 diabetes mellitus without complication (H)       blood glucose monitoring test strip    TANA CONTOUR    200 strip    Use to test blood sugar 6 times daily or as directed.    Type 1 diabetes mellitus without complication (H)       IBUPROFEN PO           levothyroxine 75 MCG tablet    SYNTHROID/LEVOTHROID    30 tablet    TAKE ONE TABLET BY MOUTH ONCE DAILY    Hypothyroidism, unspecified type       lidocaine 2 % topical gel    XYLOCAINE    30 mL    Apply topically 2 times daily as needed for moderate pain    Cervical radiculopathy, Cervicalgia       lisinopril 10 MG tablet    PRINIVIL/ZESTRIL    30 tablet    TAKE ONE TABLET BY MOUTH ONCE DAILY    Hypertension goal BP (blood pressure) < 140/90, Type 1 diabetes mellitus without complication (H)       * NovoLOG FLEXPEN 100 UNIT/ML injection   Generic drug:  insulin aspart     15 mL    INJECT 5 UNITS SUBCUTANEOUSLY BEFORE BREAKFAST,3 UNITS BEFORE LUNCH AND 2 UNITS BEFORE DINNER    Type 1 diabetes, HbA1c goal < 7% (H)       * insulin aspart 100 UNITS/ML injection    NovoLOG    20 mL    Take as directed via insulin pump. Total daily dose 40 units.    Type 1 diabetes mellitus without complication (H)       omeprazole 20 MG CR capsule    priLOSEC    30 capsule    TAKE ONE CAPSULE BY MOUTH ONCE DAILY    Gastroesophageal reflux disease, esophagitis presence not specified       pravastatin 10 MG tablet    PRAVACHOL    30 tablet    TAKE ONE-HALF TABLET BY MOUTH ONCE DAILY    Hyperlipidemia LDL goal <100       * RELION MINI PEN NEEDLES 31G X 6 MM   Generic drug:  insulin pen needle     400 each    USE ONE  4 TIMES DAILY    Type 1 diabetes, HbA1c goal < 7% (H)       * B-D U/F 31G X 5 MM   Generic drug:  insulin pen needle      400 each    USE ONE PEN NEEDLE 4 TIMES DAILY OR AS DIRECTED    Type 1 diabetes, HbA1c goal < 7% (H)       vitamin D 2000 UNITS tablet     100 tablet    Take 2,000 Units by mouth daily.    Vitamin D deficiency disease       * Notice:  This list has 4 medication(s) that are the same as other medications prescribed for you. Read the directions carefully, and ask your doctor or other care provider to review them with you.

## 2017-12-08 NOTE — LETTER
12/8/2017      RE: Maria M Marcus  [address] [facility] [address]     Dear Colleague,    Thank you for referring your patient, Maria M aMrcus, to the [facility]. Please see a copy of my visit note below.    Endocrinology Clinic Visit    December 8, 2017     Chief Complaint: Diabetes       Subjective:         HPI:   Maria M is a 52 year old female who presents for follow up.   She has H/O type 1 diabetes diagnosed at age 2, hypothyroidism diagnosed in her 20s and vitiligo. She takes pravastatin for hyperlipidemia and lisinopril for hypertension.   She is transferring her care and was followed up by primary care physician for her diabetes in the past. Her main initial concern was recurrent hypoglycemia. INGRAM showed negative 21 hydroxylase Ab, positive TPO and negative TTG.     History of Diabetes  She was diagnosed with type 1 diabetes mellitus since he was [age] years old.  Over the years, she had developed diabetic retinopathy for which laser treatment was required.  She does not have neuropathy or nephropathy.  In the past several years, her A1c has been in good range between 6.1 and 7.5.  No macrovascular complications.    She was transitioned from basal bolus regimen to insulin pump that she has started about 3-4 days ago. She obtained a Dexcom CGM that has reduced the frequency of hypoglycemia to a great extent.     Low BG is now improved with sensor.  The major pattern was that was detected was hyperglycemia followed by hypoglycemia.  Usually she has blood sugars over 250 and uses correction and afterwards she develops hypoglycemia.    Pump settings  Basal 0.4  Bolus 1 per 20 g carb  Correction 1 for 60  Target 140    Maria M notes that her mother had breast cancer, a nephew and an uncle had T1DM but other AI diseases do not run in family. No history of thyroid cancer.     Meds  Aspart for T slim pump.     Prevention  Lipid  LDL Cholesterol Calculated   Date Value Ref Range  Status   10/27/2016 109 (H) <100 mg/dL Final     Comment:     Above desirable:  100-129 mg/dl   Borderline High:  130-159 mg/dL   High:             160-189 mg/dL   Very high:       >189 mg/dl     04/11/2016 132 (H) <100 mg/dL Final     Comment:     Above desirable:  100-129 mg/dl   Borderline High:  130-159 mg/dL   High:             160-189 mg/dL   Very high:       >189 mg/dl         Medications     HMG CoA Reductase Inhibitors    pravastatin (PRAVACHOL) 10 MG tablet    pravastatin (PRAVACHOL) 10 MG tablet          Renal  Medication (Note: This includes the hypertensive combination subclass to make sure to show all ACEI/ARB's.)     ACE Inhibitors Sig    lisinopril (PRINIVIL/ZESTRIL) 10 MG tablet TAKE ONE TABLET BY MOUTH ONCE DAILY            Weight  Wt Readings from Last 4 Encounters:   07/28/17 69.9 kg (154 lb)   03/01/17 67.4 kg (148 lb 9.6 oz)   02/09/17 69.4 kg (153 lb)   10/28/16 66.2 kg (146 lb)     .    Meter Download Summary:   Only few days recorded for calibration.   Diet:  she usually counts her carbs in carb choices   Exercise  no scheduled exercise     Weight trend  Wt Readings from Last 4 Encounters:   07/28/17 69.9 kg (154 lb)   03/01/17 67.4 kg (148 lb 9.6 oz)   02/09/17 69.4 kg (153 lb)   10/28/16 66.2 kg (146 lb)       A1c today is  6.6  Lab Results   Component Value Date    A1C 6.1 07/28/2017    A1C 6.9 10/27/2016    A1C 6.1 04/11/2016    A1C 6.0 10/09/2015    A1C 6.8 05/26/2015          Allergies   Allergen Reactions     No Known Drug Allergies      Current Outpatient Prescriptions   Medication     lidocaine (XYLOCAINE) 2 % topical gel     blood glucose monitoring (TANA CONTOUR) test strip     insulin aspart (NOVOLOG) 100 UNITS/ML injection     omeprazole (PRILOSEC) 20 MG CR capsule     levothyroxine (SYNTHROID/LEVOTHROID) 75 MCG tablet     pravastatin (PRAVACHOL) 10 MG tablet     lisinopril (PRINIVIL/ZESTRIL) 10 MG tablet     insulin glargine (BASAGLAR KWIKPEN) 100 UNIT/ML injection      IBUPROFEN PO     blood glucose monitoring ([employer] CONTOUR MONITOR) meter device kit     RELION MINI PEN NEEDLES 31G X 6 MM     Cholecalciferol (VITAMIN D) 2000 UNITS tablet     NOVOLOG FLEXPEN 100 UNIT/ML soln     B-D U/F insulin pen needle     No current facility-administered medications for this visit.        Review of Systems     Constitutional: Negative for fever and unexpected weight change. Appetite  is normal  Head: no headache   Eye: no vision change/ loss of peripheral vision.   ENT: No throat congestion.   Respiratory: no cough   Cardiovascular: no chest pain or tachycardia  Gastrointestinal: Negative for vomiting, abdominal pain and diarrhea.  Genitourinary: No bladder problems.  Musculoskeletal: Negative for myalgias. No weakness.  Neurological: Negative for seizures and headaches.  Psychiatric/Behavioral: Negative for behavioral problem and dysphoric mood.    PMH as above.   PSH    Past Surgical History:   Procedure Laterality Date     C  DELIVERY ONLY      C-Sections x2     C NONSPECIFIC PROCEDURE      Hysterectomy - total (cervix removed but ovaries remain)     C STOMACH SURGERY PROCEDURE UNLISTED       C TOTAL ABDOM HYSTERECTOMY       COLONOSCOPY N/A 2016    Procedure: COLONOSCOPY;  Surgeon: Efren Perry MD;  Location: [facility]     HC SACROPLASTY       LAMINECT/DISCECTOMY, CERVICAL  2007    C7-T1 hemilaminectomy, microdiscectomy, foraminotomy.     LASER YAG CAPSULOTOMY Right 10/23/2014    Procedure: LASER YAG CAPSULOTOMY;  Surgeon: Esteban Dorsey MD;  Location:  OR     VITRECTOMY,STRIP EPIRETINAL MEMBRANE  09       Family History   Problem Relation Age of Onset     Hypertension Maternal Grandfather      EYE* Maternal Grandmother      Blood Disease Maternal Grandmother      Eye Disorder Maternal Grandmother      maccular degeneration     OSTEOPOROSIS Maternal Grandmother      Lipids Father      GASTROINTESTINAL DISEASE Father      ulcers     HEART DISEASE Father   "    Cardiovascular Father      heart attack     Hypertension Paternal Grandmother      Breast Cancer Mother      dx age 73 - terminal - mother had first mammo at this age     DIABETES Paternal Uncle      Type I       Social History     Social History     Marital status:      Spouse name: Ra     Number of children: 2     Years of education: 12     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     Drug use: No     Sexual activity: Yes     Partners: Male     Birth control/ protection: Surgical, Female Surgical      Comment: hysterectomy     Other Topics Concern     Parent/Sibling W/ Cabg, Mi Or Angioplasty Before 65f 55m? No     Social History Narrative       Objective:   /80  Pulse 85  Ht 1.47 m (4' 9.87\")  Wt 68.7 kg (151 lb 6 oz)  BMI 31.78 kg/m2   Constitutional: Appears well-developed and well-nourished. Active.   EYES: anicteric, normal extra-ocular movements, no lid lag or retraction   HEENT: Mouth/Throat: Mucous membrane is moist. Oropharynx is clear. No adenopathy. Normal thyroid   Cardiovascular: RRR, S1, S2 normal. No m/g/r   Pulmonary/Chest: CTAB. No wheezing or rales   Abdominal: +BS. Nontender to palpation. No organomegaly present.  Neurological: Alert. Normal speech  Extremities: No clubbing, cyanosis or inflammation   Skin: Vitiligo  Feet: Diminished vibratory sense bilateral and normal monofilament test.   Lymphatic: no cervical lymphadenopathy.  Psychological: appropriate mood     In House Labs:   Lab Results   Component Value Date    A1C 6.1 07/28/2017    A1C 6.9 10/27/2016    A1C 6.1 04/11/2016    A1C 6.0 10/09/2015    A1C 6.8 05/26/2015       TSH   Date Value Ref Range Status   10/27/2016 0.90 0.40 - 4.00 mU/L Final   05/26/2015 0.06 (L) 0.40 - 4.00 mU/L Final   03/17/2014 0.78 0.4 - 5.0 mU/L Final "   01/18/2013 0.65 0.4 - 5.0 mU/L Final   08/20/2012 0.60 0.4 - 5.0 mU/L Final     T4 Free   Date Value Ref Range Status   11/09/2011 1.69 0.70 - 1.85 ng/dL Final       Creatinine   Date Value Ref Range Status   10/27/2016 1.00 0.52 - 1.04 mg/dL Final   ]    Recent Labs   Lab Test  10/27/16   0748  04/11/16   1013  05/26/15   0738  03/17/14   0735   CHOL  186  210*  167  165   HDL  56  63  46*  58   LDL  109*  132*  84  90   TRIG  104  77  183*  82   CHOLHDLRATIO   --    --   3.6  3.0       Work up  (8/17)   21 OH ab normal - this was done due to frequent lows.   No celiac disease antibodies (gluten sensitivity)  positive TPO.       Continuous glucose monitoring physician interpretation  Sensory use 13 out of 14 days     low alert 65  High alert 280  Fall rate 3  [address] 3  Out of range 20 minutes    Average blood sugar 145, standard deviation 55  64% within range, 29% above range and 6% below range  Low risk of hypoglycemia    Major patterns  High blood sugars following breakfast followed by low blood sugars with correction  Early morning hypoglycemia 4/ 14 days      Assessment/Treatment Plan:      Maria M Marcus is a 52 year old year old female with    1. Type 1 Diabetes with  retinopathy and mild neuropathy   2.  Left side numbness in fourth and fifth finger   3.  Hypothyroidism   4.  Vitiligo   5.  Possible scoliosis in a postmenopausal female.      Hypoglycemic episode   She was flushed, BG 58, no adrenergic symptoms  Gave Apple juice and crackers. BG stablized before leaving clinic.     Type 1 Diabetes with  retinopathy and mild neuropathy   Barriers to achieving glycemic goals  1. Overcorrection: She usually boluses before meal and if BG is higher, then she uses additional bolus for correction. This leads to lows.     2. Lack of pump: Started on T-slim pump.     5. Hypoglycemia unawareness  We have obtained personal CGM.   Increase low alert to 85 to avoid lows.     4. Low A1c is likely driven by  significant low values.   BG monitoring has improved lows.     Hypothyroidism on LT4 88 mcg daily  We will continue levothyroxine, check TSH levels    Vitiligo: She has multiple autoimmune disease negative celiac disease, ACTH 21 OH levels    Possible kyphosis:   Plan on DXA in future.     Return to clinic in 3-6 months.  FU with Jessica in near future for pump eval.     Sidney Jarquin MD  [id number]  Endocrinology Service      Again, thank you for allowing me to participate in the care of your patient.      Sincerely,    Sidney Jarquin MD

## 2017-12-08 NOTE — NURSING NOTE
"Maria M Marcus's goals for this visit include: Follow up Diabetes  She requests these members of her care team be copied on today's visit information: YES    PCP: Marcus Salgado    Referring Provider:  No referring provider defined for this encounter.    Chief Complaint   Patient presents with     Diabetes       Initial Ht 1.47 m (4' 9.87\")  Wt 68.7 kg (151 lb 6 oz)  BMI 31.78 kg/m2 Estimated body mass index is 31.78 kg/(m^2) as calculated from the following:    Height as of this encounter: 1.47 m (4' 9.87\").    Weight as of this encounter: 68.7 kg (151 lb 6 oz).  Medication Reconciliation: complete    Do you need any medication refills at today's visit? NO    "

## 2017-12-08 NOTE — PROGRESS NOTES
Endocrinology Clinic Visit    December 8, 2017     Chief Complaint: Diabetes       Subjective:         HPI:   Maria M is a 52 year old female who presents for follow up.   She has H/O type 1 diabetes diagnosed at age 2, hypothyroidism diagnosed in her 20s and vitiligo. She takes pravastatin for hyperlipidemia and lisinopril for hypertension.   She is transferring her care and was followed up by primary care physician for her diabetes in the past. Her main initial concern was recurrent hypoglycemia. INGRAM showed negative 21 hydroxylase Ab, positive TPO and negative TTG.     History of Diabetes  She was diagnosed with type 1 diabetes mellitus since he was [age] years old.  Over the years, she had developed diabetic retinopathy for which laser treatment was required.  She does not have neuropathy or nephropathy.  In the past several years, her A1c has been in good range between 6.1 and 7.5.  No macrovascular complications.    She was transitioned from basal bolus regimen to insulin pump that she has started about 3-4 days ago. She obtained a Dexcom CGM that has reduced the frequency of hypoglycemia to a great extent.     Low BG is now improved with sensor.  The major pattern was that was detected was hyperglycemia followed by hypoglycemia.  Usually she has blood sugars over 250 and uses correction and afterwards she develops hypoglycemia.    Pump settings  Basal 0.4  Bolus 1 per 20 g carb  Correction 1 for 60  Target 140    Maria M notes that her mother had breast cancer, a nephew and an uncle had T1DM but other AI diseases do not run in family. No history of thyroid cancer.     Meds  Aspart for T slim pump.     Prevention  Lipid  LDL Cholesterol Calculated   Date Value Ref Range Status   10/27/2016 109 (H) <100 mg/dL Final     Comment:     Above desirable:  100-129 mg/dl   Borderline High:  130-159 mg/dL   High:             160-189 mg/dL   Very high:       >189 mg/dl     04/11/2016 132 (H) <100 mg/dL Final     Comment:      Above desirable:  100-129 mg/dl   Borderline High:  130-159 mg/dL   High:             160-189 mg/dL   Very high:       >189 mg/dl         Medications     HMG CoA Reductase Inhibitors    pravastatin (PRAVACHOL) 10 MG tablet    pravastatin (PRAVACHOL) 10 MG tablet          Renal  Medication (Note: This includes the hypertensive combination subclass to make sure to show all ACEI/ARB's.)     ACE Inhibitors Sig    lisinopril (PRINIVIL/ZESTRIL) 10 MG tablet TAKE ONE TABLET BY MOUTH ONCE DAILY            Weight  Wt Readings from Last 4 Encounters:   07/28/17 69.9 kg (154 lb)   03/01/17 67.4 kg (148 lb 9.6 oz)   02/09/17 69.4 kg (153 lb)   10/28/16 66.2 kg (146 lb)     .    Meter Download Summary:   Only few days recorded for calibration.   Diet:  she usually counts her carbs in carb choices   Exercise  no scheduled exercise     Weight trend  Wt Readings from Last 4 Encounters:   07/28/17 69.9 kg (154 lb)   03/01/17 67.4 kg (148 lb 9.6 oz)   02/09/17 69.4 kg (153 lb)   10/28/16 66.2 kg (146 lb)       A1c today is  6.6  Lab Results   Component Value Date    A1C 6.1 07/28/2017    A1C 6.9 10/27/2016    A1C 6.1 04/11/2016    A1C 6.0 10/09/2015    A1C 6.8 05/26/2015          Allergies   Allergen Reactions     No Known Drug Allergies      Current Outpatient Prescriptions   Medication     lidocaine (XYLOCAINE) 2 % topical gel     blood glucose monitoring (TANA CONTOUR) test strip     insulin aspart (NOVOLOG) 100 UNITS/ML injection     omeprazole (PRILOSEC) 20 MG CR capsule     levothyroxine (SYNTHROID/LEVOTHROID) 75 MCG tablet     pravastatin (PRAVACHOL) 10 MG tablet     lisinopril (PRINIVIL/ZESTRIL) 10 MG tablet     insulin glargine (BASAGLAR KWIKPEN) 100 UNIT/ML injection     IBUPROFEN PO     blood glucose monitoring (TANA CONTOUR MONITOR) meter device kit     RELION MINI PEN NEEDLES 31G X 6 MM     Cholecalciferol (VITAMIN D) 2000 UNITS tablet     NOVOLOG FLEXPEN 100 UNIT/ML soln     B-D U/F insulin pen needle     No current  facility-administered medications for this visit.        Review of Systems     Constitutional: Negative for fever and unexpected weight change. Appetite  is normal  Head: no headache   Eye: no vision change/ loss of peripheral vision.   ENT: No throat congestion.   Respiratory: no cough   Cardiovascular: no chest pain or tachycardia  Gastrointestinal: Negative for vomiting, abdominal pain and diarrhea.  Genitourinary: No bladder problems.  Musculoskeletal: Negative for myalgias. No weakness.  Neurological: Negative for seizures and headaches.  Psychiatric/Behavioral: Negative for behavioral problem and dysphoric mood.    PMH as above.   PSH    Past Surgical History:   Procedure Laterality Date     C  DELIVERY ONLY      C-Sections x2     C NONSPECIFIC PROCEDURE      Hysterectomy - total (cervix removed but ovaries remain)     C STOMACH SURGERY PROCEDURE UNLISTED       C TOTAL ABDOM HYSTERECTOMY       COLONOSCOPY N/A 2016    Procedure: COLONOSCOPY;  Surgeon: Efren Perry MD;  Location: [facility]     HC SACROPLASTY       LAMINECT/DISCECTOMY, CERVICAL  2007    C7-T1 hemilaminectomy, microdiscectomy, foraminotomy.     LASER YAG CAPSULOTOMY Right 10/23/2014    Procedure: LASER YAG CAPSULOTOMY;  Surgeon: Esteban Dorsey MD;  Location: [facility]     VITRECTOMY,STRIP EPIRETINAL MEMBRANE  09       Family History   Problem Relation Age of Onset     Hypertension Maternal Grandfather      EYE* Maternal Grandmother      Blood Disease Maternal Grandmother      Eye Disorder Maternal Grandmother      maccular degeneration     OSTEOPOROSIS Maternal Grandmother      Lipids Father      GASTROINTESTINAL DISEASE Father      ulcers     HEART DISEASE Father      Cardiovascular Father      heart attack     Hypertension Paternal Grandmother      Breast Cancer Mother      dx age 73 - terminal - mother had first mammo at this age     DIABETES Paternal Uncle      Type I       Social History     Social History      "Marital status:      Spouse name: Ra     Number of children: 2     Years of education: 12     Occupational History     receiving [employer]     Social History Main Topics     Smoking status: Never Smoker     Smokeless tobacco: Never Used      Comment: no exposure     Alcohol use Yes      Comment: winex1-2/3x per yr.     Drug use: No     Sexual activity: Yes     Partners: Male     Birth control/ protection: Surgical, Female Surgical      Comment: hysterectomy     Other Topics Concern     Parent/Sibling W/ Cabg, Mi Or Angioplasty Before 65f 55m? No     Social History Narrative       Objective:   /80  Pulse 85  Ht 1.47 m (4' 9.87\")  Wt 68.7 kg (151 lb 6 oz)  BMI 31.78 kg/m2   Constitutional: Appears well-developed and well-nourished. Active.   EYES: anicteric, normal extra-ocular movements, no lid lag or retraction   HEENT: Mouth/Throat: Mucous membrane is moist. Oropharynx is clear. No adenopathy. Normal thyroid   Cardiovascular: RRR, S1, S2 normal. No m/g/r   Pulmonary/Chest: CTAB. No wheezing or rales   Abdominal: +BS. Nontender to palpation. No organomegaly present.  Neurological: Alert. Normal speech  Extremities: No clubbing, cyanosis or inflammation   Skin: Vitiligo  Feet: Diminished vibratory sense bilateral and normal monofilament test.   Lymphatic: no cervical lymphadenopathy.  Psychological: appropriate mood     In House Labs:   Lab Results   Component Value Date    A1C 6.1 07/28/2017    A1C 6.9 10/27/2016    A1C 6.1 04/11/2016    A1C 6.0 10/09/2015    A1C 6.8 05/26/2015       TSH   Date Value Ref Range Status   10/27/2016 0.90 0.40 - 4.00 mU/L Final   05/26/2015 0.06 (L) 0.40 - 4.00 mU/L Final   03/17/2014 0.78 0.4 - 5.0 mU/L Final   01/18/2013 0.65 0.4 - 5.0 mU/L Final   08/20/2012 0.60 0.4 - 5.0 mU/L Final     T4 Free   Date Value Ref Range Status   11/09/2011 1.69 0.70 - 1.85 ng/dL Final       Creatinine   Date Value Ref Range Status   10/27/2016 1.00 0.52 - 1.04 mg/dL Final "   ]    Recent Labs   Lab Test  10/27/16   0748  04/11/16   1013  05/26/15   0738  03/17/14   0735   CHOL  186  210*  167  165   HDL  56  63  46*  58   LDL  109*  132*  84  90   TRIG  104  77  183*  82   CHOLHDLRATIO   --    --   3.6  3.0       Work up  (8/17)   21 OH ab normal - this was done due to frequent lows.   No celiac disease antibodies (gluten sensitivity)  positive TPO.       Continuous glucose monitoring physician interpretation  Sensory use 13 out of 14 days     low alert 65  High alert 280  Fall rate 3  [address] 3  Out of range 20 minutes    Average blood sugar 145, standard deviation 55  64% within range, 29% above range and 6% below range  Low risk of hypoglycemia    Major patterns  High blood sugars following breakfast followed by low blood sugars with correction  Early morning hypoglycemia 4/ 14 days      Assessment/Treatment Plan:      Maria M Marcus is a 52 year old year old female with    1. Type 1 Diabetes with  retinopathy and mild neuropathy   2.  Left side numbness in fourth and fifth finger   3.  Hypothyroidism   4.  Vitiligo   5.  Possible scoliosis in a postmenopausal female.      Hypoglycemic episode   She was flushed, BG 58, no adrenergic symptoms  Gave Apple juice and crackers. BG stablized before leaving clinic.     Type 1 Diabetes with  retinopathy and mild neuropathy   Barriers to achieving glycemic goals  1. Overcorrection: She usually boluses before meal and if BG is higher, then she uses additional bolus for correction. This leads to lows.     2. Lack of pump: Started on T-slim pump.     5. Hypoglycemia unawareness  We have obtained personal CGM.   Increase low alert to 85 to avoid lows.     4. Low A1c is likely driven by significant low values.   BG monitoring has improved lows.     Hypothyroidism on LT4 88 mcg daily  We will continue levothyroxine, check TSH levels    Vitiligo: She has multiple autoimmune disease negative celiac disease, ACTH 21 OH levels    Possible  kyphosis:   Plan on DXA in future.     Return to clinic in 3-6 months.  FU with Jessica in near future for pump eval.     Sidney Jarquin MD  [id number]  Endocrinology Service

## 2017-12-21 ENCOUNTER — TRANSFERRED RECORDS (OUTPATIENT)
Dept: HEALTH INFORMATION MANAGEMENT | Facility: CLINIC | Age: 52
End: 2017-12-21

## 2017-12-28 ENCOUNTER — MYC MEDICAL ADVICE (OUTPATIENT)
Dept: FAMILY MEDICINE | Facility: OTHER | Age: 52
End: 2017-12-28

## 2017-12-28 DIAGNOSIS — G89.4 CHRONIC PAIN SYNDROME: Primary | ICD-10-CM

## 2017-12-28 RX ORDER — GABAPENTIN 300 MG/1
CAPSULE ORAL
Qty: 90 CAPSULE | Refills: 0 | Status: SHIPPED | OUTPATIENT
Start: 2017-12-28 | End: 2018-02-01

## 2017-12-28 NOTE — TELEPHONE ENCOUNTER
Med pending, but no pharmacy listed. Sent to last used pharm with [facility] in [place].   Please schedule 1 month f/u to review how it is working with TEODORA.  Aury Lee MD

## 2018-01-06 ENCOUNTER — NURSE TRIAGE (OUTPATIENT)
Dept: NURSING | Facility: CLINIC | Age: 53
End: 2018-01-06

## 2018-01-06 ENCOUNTER — MYC REFILL (OUTPATIENT)
Dept: FAMILY MEDICINE | Facility: OTHER | Age: 53
End: 2018-01-06

## 2018-01-06 DIAGNOSIS — K21.9 GASTROESOPHAGEAL REFLUX DISEASE, ESOPHAGITIS PRESENCE NOT SPECIFIED: ICD-10-CM

## 2018-01-06 NOTE — TELEPHONE ENCOUNTER
Type 1 diabetic. She has cough and cold symptoms. Has had an insulin pump for 3-4 weeks and blood sugar is 330. Can't get it to come down. She is a little nauseated with the higher blood sugar. 3:05 pm I paged the on call endocrinologist for Dr Jarquin via Smart web, who she sees at [place]. Dr Chavarrai is on call and advised to call her back at [facility] cell, [phone].     Mayela Weeks RN  [facility] RN  Lung Nodule and ED Lab Result F/u RN      Reason for Disposition    [1] Blood glucose > 300 mg/dl (16.5 mmol/l) AND [2] two or more times in a row    Additional Information    Negative: Unconscious or difficult to awaken    Negative: Acting confused (e.g., disoriented, slurred speech)    Negative: Very weak (e.g., can't stand)    Negative: Sounds like a life-threatening emergency to the triager    Negative: [1] Vomiting AND [2] signs of dehydration (e.g., very dry mouth, lightheaded, etc.)    Negative: [1] Blood glucose > 240 mg/dl (13 mmol/l) AND [2] rapid breathing    Negative: Blood glucose > 500 mg/dl (27.5 mmol/l)    Negative: [1] Blood glucose > 240 mg/dl (13 mmol/l) AND [2] urine ketones moderate-large (or more than 1+)    Negative: [1] Blood glucose > 240 mg/dl (13 mmol/l) AND [2] blood ketones > 1.5 mmol/l    Negative: [1] Blood glucose > 240 mg/dl (13 mmol/l) AND [2] vomiting AND [3] unable to check for ketones (in blood or urine)    Negative: [1] New onset Diabetes suspected (e.g., frequent urination, weak, weight loss) AND [2] vomiting or rapid breathing    Negative: Vomiting lasts > 4 hours    Negative: Patient sounds very sick or weak to the triager    Negative: Fever > 100.5 F (38.1 C)    Negative: Blood glucose > 400 mg/dl (22 mmol/l)    Protocols used: DIABETES - HIGH BLOOD SUGAR-ADULT-

## 2018-01-08 NOTE — TELEPHONE ENCOUNTER
"Requested Prescriptions   Pending Prescriptions Disp Refills     omeprazole (PRILOSEC) 20 MG CR capsule 30 capsule 2     Sig: TAKE ONE CAPSULE BY MOUTH ONCE DAILY    PPI Protocol Passed    1/8/2018  4:24 PM       Passed - Not on Clopidogrel (unless Pantoprazole ordered)       Passed - No diagnosis of osteoporosis on record       Passed - Recent or future visit with authorizing provider's specialty    Patient had office visit in the last year or has a visit in the next 30 days with authorizing provider.  See \"Patient Info\" tab in inbasket, or \"Choose Columns\" in Meds & Orders section of the refill encounter.     11/30/2017         Passed - Patient is age 18 or older       Passed - No active pregnacy on record       Passed - No positive pregnancy test in past 12 months        Prescription approved per [facility] Refill Protocol.  Asim Holloway, RN, BSN        "

## 2018-01-08 NOTE — TELEPHONE ENCOUNTER
Message from [employer]:  Original authorizing provider: RAUL Leigh ELISHA Marcus would like a refill of the following medications:  omeprazole (PRILOSEC) 20 MG CR capsule [Marcus Salgado PA-C]    Preferred pharmacy: [facility] [address] [place] [address] [facility]    Comment:  Now that's it's 2018 can we try to refill this now.

## 2018-01-09 ENCOUNTER — TELEPHONE (OUTPATIENT)
Dept: FAMILY MEDICINE | Facility: OTHER | Age: 53
End: 2018-01-09

## 2018-01-09 NOTE — TELEPHONE ENCOUNTER
Prior Authorization Retail Medication Request  Medication/Dose: omeprazole 20 mg Insurance will only cover 120 caps in 365 . Try for a PA  Diagnosis and ICD code:   New/Renewal/Insurance Change PA:   Previously Tried and Failed Therapies:     Insurance ID (if provided):   Insurance Phone (if provided):     Any additional info from fax request:     If you received a fax notification from an outside Pharmacy:  Pharmacy Name:  Pharmacy #:  Pharmacy Fax:

## 2018-01-09 NOTE — TELEPHONE ENCOUNTER
Central Prior Authorization Team   Phone: [phone]    PA Initiation    Medication: omeprazole 20 mg  Insurance Company: [employer] [place] - Phone [phone] Fax [phone]  Pharmacy Filling the Rx[facility] [address] [facility] [place] [facility] [address] [facility]  Filling Pharmacy Phone: [phone]  Filling Pharmacy Fax:    Start Date: 1/9/2018

## 2018-01-09 NOTE — LETTER
[address]  [facility] [address]  Phone: [phone]    January 12, 2018        Maria M Marcus  [address] [facility] [address]          To whom it may concern:    RE: Maria M Marcus    I am the primary care provider for Maria M Marcus. She has chronic, severe gastroesophageal reflux that has been well controlled with daily omeprazole since 2014. This is the only medication that adequately controls her symptoms as she has previously failed multiple other medications including lansoprazole, pantoprazole, ranitidine, and famotidine. She understands the potential for long-term side effects from this medication but in her situation, it is my professional medical opinion that the benefits outweigh the possible risks so she should continue taking this daily. Thank you.     Please contact me for questions or concerns.      Sincerely,        Marcus Salgado PA-C

## 2018-01-10 ENCOUNTER — TELEPHONE (OUTPATIENT)
Dept: ENDOCRINOLOGY | Facility: CLINIC | Age: 53
End: 2018-01-10

## 2018-01-10 ENCOUNTER — MYC MEDICAL ADVICE (OUTPATIENT)
Dept: FAMILY MEDICINE | Facility: OTHER | Age: 53
End: 2018-01-10

## 2018-01-10 NOTE — TELEPHONE ENCOUNTER
Pt calling to review bg readings and pump settings. Pt frustrated due to inconsistent bg readings and concerns about making appropriate pump setting changes. Pt states even though she has inconsistent readings, more often then not she runs lower in the middle of the night. Pt made recent pump setting changes:     Basal:   3am-10pm: 0.5  10pm-3am: 0.3    Bolus:   3am-10pm: 1:13  10pm-3am: 1:16    Active Ins Time: 3 hrs (all day)  Correction: 60 (all day)  Target: 150 (all day)    Suggested pt change correction and target to have same time frames as basal and bolus settings.    Correction: 60 all day - to:  3am-10pm: 60  10pm-3am: 75    Target: 150 all day - to:   3am-10pm: 150  10pm-3am: 160    Call next week if issues continue. Pt verbalized understanding.

## 2018-01-10 NOTE — TELEPHONE ENCOUNTER
PRIOR AUTHORIZATION DENIED    Medication: omeprazole 20 mg - denied     Denial Date: 1/9/2018    Denial Rational:  Max insurance will cover is 120 days.      Appeal Information:

## 2018-01-12 NOTE — TELEPHONE ENCOUNTER
Medication Appeal Initiation    We have initiated an appeal for the requested medication:  Medication: omeprazole 20 mg - denied; appeal initiated   Appeal Start Date:  1/12/2018  Insurance Company: [employer] [place] - Phone [phone] Fax [phone]  Comments:  Appeal initiated with letter of medical necessity included and faxed to [facility] [phone]

## 2018-01-15 NOTE — TELEPHONE ENCOUNTER
Called [employer] Appeals Department at [phone] to check that they received the faxed appeal and find out their turn around time. Per representative they did receive the appeal and it is still in review, but he stated there is no turn around time. Will check back later this week.

## 2018-01-17 ENCOUNTER — TELEPHONE (OUTPATIENT)
Dept: ENDOCRINOLOGY | Facility: CLINIC | Age: 53
End: 2018-01-17

## 2018-01-17 NOTE — TELEPHONE ENCOUNTER
[facility] Call Center    Phone Message    Name of Caller: Maria M    Phone Number: Home number on file [phone] (home)    Best time to return call: Any    May a detailed message be left on voicemail: yes    Relation to patient: Self    Reason for Call: Other: Patient is having an insulin pump issue that is non emergant but has a question. She would like a call to discuss. Please advise.      Action Taken: Message routed to:  Adult Clinics: Endocrinology p [phone]

## 2018-01-17 NOTE — TELEPHONE ENCOUNTER
Pt states she is beginning to have pain at infusion site. Has a been alternating between abdomen and hip. Neither site is comfortable. Pt asked if discomfort/pain developes around day 2 of wearing the infusion set. Pt confirmed this is the case. Pt advised some people need to change the infusion set every 2 days instead of 3 simply because they have sensitive skin. Pt encouraged to change current infusion set after 48 hours and inject the next one into the fatty part of her upper arm. Please call me to let me know if this helps. Pt verbalized understanding.     Jessica Demarco, RN, BSN, CDE   [facility]

## 2018-01-18 NOTE — TELEPHONE ENCOUNTER
Called [facility] Appeals Department at [phone] to check status of the appeal. Per representative this case is still under reivew, he stated that their standard turn around time is 30 days and the case ID# [id number].

## 2018-01-19 ENCOUNTER — NURSE TRIAGE (OUTPATIENT)
Dept: NURSING | Facility: CLINIC | Age: 53
End: 2018-01-19

## 2018-01-20 NOTE — TELEPHONE ENCOUNTER
Caller is having issues with new insulin pump insertion sites getting very red and painful, trying her arm tonight, but if bad, taking out and going back to normal insulin dosing until she has her follow up with Jessica her Diabetes coordinator on Monday. She just wanted to make us aware and triage nurse note that would be the most appropriate course and if she had any concerns tonight or tomorrow with the upper arm insertion site she can call us back as well.     Sophia Acosta RN, BSN  [facility]

## 2018-01-22 ENCOUNTER — MYC MEDICAL ADVICE (OUTPATIENT)
Dept: ENDOCRINOLOGY | Facility: CLINIC | Age: 53
End: 2018-01-22

## 2018-01-22 ENCOUNTER — OFFICE VISIT (OUTPATIENT)
Dept: NURSING | Facility: CLINIC | Age: 53
End: 2018-01-22
Payer: COMMERCIAL

## 2018-01-22 DIAGNOSIS — E11.9 DIABETES MELLITUS WITHOUT COMPLICATION (H): Primary | ICD-10-CM

## 2018-01-22 PROCEDURE — G0108 DIAB MANAGE TRN  PER INDIV: HCPCS

## 2018-01-22 NOTE — MR AVS SNAPSHOT
After Visit Summary   1/22/2018    Maria M Marcus    MRN: [id number]           Patient Information     Date Of Birth          1965        Visit Information        Provider Department      1/22/2018 11:00 AM Provider, Mg Pardo [facility]        Today's Diagnoses     Diabetes mellitus without complication (H)    -  1       Follow-ups after your visit        Your next 10 appointments already scheduled     Feb 01, 2018  9:00 AM CST   MyChart Long with Marcus Salgado PA-C   [facility]    [address] [address]   [phone]            Apr 12, 2018  8:30 AM CDT   LAB with NL LAB C   [facility]    [address] [facility] [address]   [phone]           Please do not eat 10-12 hours before your appointment if you are coming in fasting for labs on lipids, cholesterol, or glucose (sugar). This does not apply to pregnant women. Water, hot tea and black coffee (with nothing added) are okay. Do not drink other fluids, diet soda or chew gum.            Apr 20, 2018  1:00 PM CDT   Return Visit with Sidney Jarquin MD   [facility] ([facility]    [address] [place] [address] [phone]              Who to contact     If you have questions or need follow up information about today's clinic visit or your schedule please contact [facility] directly at [phone].  Normal or non-critical lab and imaging results will be communicated to you by MyChart, letter or phone within 4 business days after the clinic has received the results. If you do not hear from us within 7 days, please contact the clinic through MyChart or phone. If you have a critical or abnormal lab result, we will notify you by phone as soon as possible.  Submit refill requests through [employer] or call your  pharmacy and they will forward the refill request to us. Please allow 3 business days for your refill to be completed.          Additional Information About Your Visit        [employer] Information     [employer] gives you secure access to your electronic health record. If you see a primary care provider, you can also send messages to your care team and make appointments. If you have questions, please call your primary care clinic.  If you do not have a primary care provider, please call [phone] and they will assist you.      [employer] is an electronic gateway that provides easy, online access to your medical records. With [employer], you can request a clinic appointment, read your test results, renew a prescription or communicate with your care team.     To access your existing account, please contact your [facility] or call [phone] for assistance.        Care EveryWhere ID     This is your Care EveryWhere ID. This could be used by other organizations to access your [place] medical records  [id number]         Blood Pressure from Last 3 Encounters:   12/08/17 138/80   11/30/17 132/70   09/15/17 134/70    Weight from Last 3 Encounters:   12/08/17 68.7 kg (151 lb 6 oz)   11/30/17 69.9 kg (154 lb)   09/15/17 68.5 kg (151 lb)              We Performed the Following     DIABETES EDUCATION - Individual  []        Primary Care Provider Office Phone # Fax #    Marcus Salgado PA-C [phone]       [address] [place] [address]        Equal Access to Services     ONESIMO SMITH : Hadii aad ku hadasho Sonubiaali, waaxda luqadaha, qaybta kaalmada adeegyada, traci cruz . So [facility] [phone].    ATENCIÓN: Si habla español, tiene a ngo disposición servicios gratuitos de asistencia lingüística. Llame al [phone].    We comply with applicable federal civil rights laws and [place] laws. We do not discriminate on the basis of race,  color, national origin, age, disability, sex, sexual orientation, or gender identity.            Thank you!     Thank you for choosing [facility]  for your care. Our goal is always to provide you with excellent care. Hearing back from our patients is one way we can continue to improve our services. Please take a few minutes to complete the written survey that you may receive in the mail after your visit with us. Thank you!             Your Updated Medication List - Protect others around you: Learn how to safely use, store and throw away your medicines at www.disposemymeds.org.          This list is accurate as of 1/22/18 11:59 PM.  Always use your most recent med list.                   Brand Name Dispense Instructions for use Diagnosis    BASAGLAR 100 UNIT/ML injection     30 mL    Inject 11 Units Subcutaneous daily    Type 1 diabetes mellitus without complication (H)       blood glucose monitoring meter device kit     1 kit    Use to test blood sugars 5 times daily or as directed.    Type 1 diabetes mellitus without complication (H)       blood glucose monitoring test strip    TANA CONTOUR    200 strip    Use to test blood sugar 6 times daily or as directed.    Type 1 diabetes mellitus without complication (H)       gabapentin 300 MG capsule    NEURONTIN    90 capsule    Take 1 tablet (300 mg) every night for 1-3 days, then 1 tablet twice daily for 1-3 days, then 1 tablet three times daily    Chronic pain syndrome       IBUPROFEN PO           levothyroxine 75 MCG tablet    SYNTHROID/LEVOTHROID    30 tablet    TAKE ONE TABLET BY MOUTH ONCE DAILY    Hypothyroidism, unspecified type       lidocaine 2 % topical gel    XYLOCAINE    30 mL    Apply topically 2 times daily as needed for moderate pain    Cervical radiculopathy, Cervicalgia       lisinopril 10 MG tablet    PRINIVIL/ZESTRIL    30 tablet    TAKE ONE TABLET BY MOUTH ONCE DAILY    Hypertension goal BP (blood pressure) < 140/90, Type 1  diabetes mellitus without complication (H)       * NovoLOG FLEXPEN 100 UNIT/ML injection   Generic drug:  insulin aspart     15 mL    INJECT 5 UNITS SUBCUTANEOUSLY BEFORE BREAKFAST,3 UNITS BEFORE LUNCH AND 2 UNITS BEFORE DINNER    Type 1 diabetes, HbA1c goal < 7% (H)       * insulin aspart 100 UNITS/ML injection    NovoLOG    20 mL    Take as directed via insulin pump. Total daily dose 40 units.    Type 1 diabetes mellitus without complication (H)       omeprazole 20 MG CR capsule    priLOSEC    30 capsule    TAKE ONE CAPSULE BY MOUTH ONCE DAILY    Gastroesophageal reflux disease, esophagitis presence not specified       pravastatin 10 MG tablet    PRAVACHOL    30 tablet    TAKE ONE-HALF TABLET BY MOUTH ONCE DAILY    Hyperlipidemia LDL goal <100       * RELION MINI PEN NEEDLES 31G X 6 MM   Generic drug:  insulin pen needle     400 each    USE ONE  4 TIMES DAILY    Type 1 diabetes, HbA1c goal < 7% (H)       * B-D U/F 31G X 5 MM   Generic drug:  insulin pen needle     400 each    USE ONE PEN NEEDLE 4 TIMES DAILY OR AS DIRECTED    Type 1 diabetes, HbA1c goal < 7% (H)       vitamin D 2000 UNITS tablet     100 tablet    Take 2,000 Units by mouth daily.    Vitamin D deficiency disease       * Notice:  This list has 4 medication(s) that are the same as other medications prescribed for you. Read the directions carefully, and ask your doctor or other care provider to review them with you.

## 2018-01-23 NOTE — TELEPHONE ENCOUNTER
Discussed with patient.   Lantus 6 units, skip the evening dose  Check at 2 am  Novolog 1:20 correction 1:180/50  ? Having allergic reaction at pump sites.     Sidney Jarquin MD  [id number]  Endocrinology Service

## 2018-01-24 NOTE — PROGRESS NOTES
"  SUBJECTIVE:   Maria M Marcus is a 52 year old female who presents to clinic today for the following health issues:    History of Present Illness     Diet:  Regular (no restrictions)  Frequency of exercise:  4-5 days/week  Duration of exercise:  15-30 minutes  Taking medications regularly:  Yes  Medication side effects:  None  Additional concerns today:  YES (Patient has been having elevated blood pressures recently - only noticed them since back pain intensified. Has gotten 170/64 - usually around 130/50's )    Medication Followup of Gabapentin    Taking Medication as prescribed: yes    Side Effects:  None    Medication Helping Symptoms: Yes- it does help a little bit, but not well enough. \"Before I had this flare up, it used to help a lot but now it's working less.\" Used to be on Percocet in 2013, and would be \"given like 20 pills, but would only use them as needed and would make them last like 60-90 days.\"     She is having a current flare of her neck and right shoulder pain due to all the stress over the past few months with her new insulin pump and the allergic reaction she initially had to it. She has had neck pain ever since she gave birth to her child in 1984 and this worsened significantly in 2007 when she was putting on a sweater and sneezed, caused a pop and severe pain in her neck and was found to have a herniated disk so underwent a cervical laminectomy and discectomy. She has had residual neck pain since then along with permanent numbness in the fingers of her left hand. She also has bilateral shoulder pain, worse on the right side but usually symptoms are well managed with continued chiropractic care. The gabapentin has been helping somewhat but she has not been taking it every day. She has occasional shooting pain down her right arm.     Patient would also like to compare our glucose monitor here with the one she uses at home. She states her glucose has been higher recently, but thinks it might " "be an error with her machine. She said sometimes her values are \"hundereds off testing seconds apart\" when comparing her Dexcom monitor and her home glucose monitor. She was started on an insulin pump last month but was having pain and sores over the pump infusion areas no matter where she moved it. It was found that she is allergic to Novolog so she was switched to Humalog which she is tolerating well. Her glucose has been slightly higher recently, 150-200 but she states her endocrinologist would rather have her a little higher than too low.    Answers for HPI/ROS submitted by the patient on 1/30/2018   If you checked off any problems, how difficult have these problems made it for you to do your work, take care of things at home, or get along with other people?: Not difficult at all  PHQ9 TOTAL SCORE: 0    Problem list and histories reviewed & adjusted, as indicated.  Additional history: none    ROS:  GENERAL: Denies fever, fatigue, weakness, weight gain, or weight loss.  CARDIOVASCULAR: Denies chest pain, shortness of breath, irregular heartbeats,  palpitations, or edema.  RESPIRATORY: Denies cough, hemoptysis, and shortness of breath.  MUSCULOSKELETAL: +Neck pain, right shoulder pain.   NEUROLOGIC: +Numbness/tingling left hand/fingers. Denies headache, fainting, dizziness, memory loss, or seizures.     OBJECTIVE:     /84 (BP Location: Right arm, Patient Position: Chair, Cuff Size: Adult Regular)  Pulse 89  Temp 96.7  F (35.9  C) (Temporal)  Resp 18  Wt 156 lb (70.8 kg)  SpO2 99%  BMI 32.75 kg/m2  Body mass index is 32.75 kg/(m^2).  GENERAL: healthy, alert and no distress  RESP: lungs clear to auscultation - no rales, rhonchi or wheezes  CV: regular rate and rhythm, normal S1 S2, no S3 or S4, no murmur, click or rub, no peripheral edema and peripheral pulses strong  MS: no gross musculoskeletal defects noted. Mild tenderness over the lower cervical spinous processes and right cervical paraspinal " musculature. Mild anterior shoulder tenderness bilaterally.   NEURO: Normal strength and tone, mentation intact and speech normal. Cranial nerves II-XII are grossly intact. DTRs are 2+/4 throughout and symmetric. Gait is stable.     Results for orders placed or performed in visit on 02/01/18   Glucose whole blood   Result Value Ref Range    Glucose Whole Blood 223 (H) 70 - 99 mg/dL         ASSESSMENT/PLAN:       ICD-10-CM    1. Chronic pain syndrome G89.4 gabapentin (NEURONTIN) 300 MG capsule   2. Type 1 diabetes mellitus without complication (H) E10.9 Glucose whole blood     CANCELED: Glucose whole blood       1. I recommend she continue with gabapentin but advised her to take it 3 times EVERY day not just as needed as it is meant to be a daily medication. She will continue with chiropractic care as well and if symptoms are still not improving, we can consider further increasing the dose or trying something else like amitriptyline. I would like to avoid narcotics due to their addictive nature and she understands this. We could consider an updated cervical spine MRI in the future as well if symptoms are not improving.    2. Glucose ordered today so she can compare to her Dexcom and home monitor to make sure they are calibrated correctly. She continues to follow closely with diabetic ed and endocrinology.    Greater than 50% of 25 minute visit spent on counseling and plan of care regarding patient's chronic pain diabetes/glucose readings.     Marcus Salgado PA-C  [facility]

## 2018-01-26 ENCOUNTER — OFFICE VISIT (OUTPATIENT)
Dept: NURSING | Facility: CLINIC | Age: 53
End: 2018-01-26
Payer: COMMERCIAL

## 2018-01-26 DIAGNOSIS — E11.9 DIABETES MELLITUS WITHOUT COMPLICATION (H): ICD-10-CM

## 2018-01-26 DIAGNOSIS — E10.9 DIABETES MELLITUS TYPE 1 (H): Primary | ICD-10-CM

## 2018-01-26 PROCEDURE — G0108 DIAB MANAGE TRN  PER INDIV: HCPCS

## 2018-01-26 NOTE — MR AVS SNAPSHOT
After Visit Summary   1/26/2018    Maria M Marcus    MRN: [id number]           Patient Information     Date Of Birth          1965        Visit Information        Provider Department      1/26/2018 10:00 AM Provider, Mg Pardo [facility]        Today's Diagnoses     Diabetes mellitus type 1 (H)    -  1       Follow-ups after your visit        Your next 10 appointments already scheduled     Apr 12, 2018  8:30 AM CDT   LAB with NL LAB [facility] ([facility])    [address] [facility] [id number] [phone]           Please do not eat 10-12 hours before your appointment if you are coming in fasting for labs on lipids, cholesterol, or glucose (sugar). This does not apply to pregnant women. Water, hot tea and black coffee (with nothing added) are okay. Do not drink other fluids, diet soda or chew gum.            Apr 20, 2018  1:00 PM CDT   Return Visit with Sidney Jarquin MD   [facility] ([facility])    [address] [place] [address] [phone]              Who to contact     If you have questions or need follow up information about today's clinic visit or your schedule please contact [facility] directly at [phone].  Normal or non-critical lab and imaging results will be communicated to you by MyChart, letter or phone within 4 business days after the clinic has received the results. If you do not hear from us within 7 days, please contact the clinic through MyChart or phone. If you have a critical or abnormal lab result, we will notify you by phone as soon as possible.  Submit refill requests through [employer] or call your pharmacy and they will forward the refill request to us. Please allow 3 business days for your refill to be completed.          Additional Information About Your Visit        [employer] Information     [employer] gives you secure  access to your electronic health record. If you see a primary care provider, you can also send messages to your care team and make appointments. If you have questions, please call your primary care clinic.  If you do not have a primary care provider, please call [phone] and they will assist you.      [employer] is an electronic gateway that provides easy, online access to your medical records. With [employer], you can request a clinic appointment, read your test results, renew a prescription or communicate with your care team.     To access your existing account, please contact your [facility] or call [phone] for assistance.        Care EveryWhere ID     This is your Care EveryWhere ID. This could be used by other organizations to access your [place] medical records  [id number]         Blood Pressure from Last 3 Encounters:   02/01/18 136/84   12/08/17 138/80   11/30/17 132/70    Weight from Last 3 Encounters:   02/01/18 70.8 kg (156 lb)   12/08/17 68.7 kg (151 lb 6 oz)   11/30/17 69.9 kg (154 lb)              Today, you had the following     No orders found for display       Primary Care Provider Office Phone # Fax #    Marcus Salgado PA-C [phone]       [address] [place] [address]        Equal Access to Services     ONESIMO SMITH : Hadii aad ku hadasho Soomaali, waaxda luqadaha, qaybta kaalmada adeegyada, traci salgado haykaterina cruz . So [facility] [phone].    ATENCIÓN: Si habla español, tiene a ngo disposición servicios gratuitos de asistencia lingüística. Llame al [phone].    We comply with applicable federal civil rights laws and [place] laws. We do not discriminate on the basis of race, color, national origin, age, disability, sex, sexual orientation, or gender identity.            Thank you!     Thank you for choosing [facility]  for your care. Our goal is always to provide you with excellent care.  Hearing back from our patients is one way we can continue to improve our services. Please take a few minutes to complete the written survey that you may receive in the mail after your visit with us. Thank you!             Your Updated Medication List - Protect others around you: Learn how to safely use, store and throw away your medicines at www.disposemymeds.org.          This list is accurate as of 1/26/18 11:59 PM.  Always use your most recent med list.                   Brand Name Dispense Instructions for use Diagnosis    BASAGLAR 100 UNIT/ML injection     30 mL    Inject 11 Units Subcutaneous daily    Type 1 diabetes mellitus without complication (H)       blood glucose monitoring meter device kit     1 kit    Use to test blood sugars 5 times daily or as directed.    Type 1 diabetes mellitus without complication (H)       blood glucose monitoring test strip    TANA CONTOUR    200 strip    Use to test blood sugar 6 times daily or as directed.    Type 1 diabetes mellitus without complication (H)       IBUPROFEN PO           levothyroxine 75 MCG tablet    SYNTHROID/LEVOTHROID    30 tablet    TAKE ONE TABLET BY MOUTH ONCE DAILY    Hypothyroidism, unspecified type       lidocaine 2 % topical gel    XYLOCAINE    30 mL    Apply topically 2 times daily as needed for moderate pain    Cervical radiculopathy, Cervicalgia       lisinopril 10 MG tablet    PRINIVIL/ZESTRIL    30 tablet    TAKE ONE TABLET BY MOUTH ONCE DAILY    Hypertension goal BP (blood pressure) < 140/90, Type 1 diabetes mellitus without complication (H)       * NovoLOG FLEXPEN 100 UNIT/ML injection   Generic drug:  insulin aspart     15 mL    INJECT 5 UNITS SUBCUTANEOUSLY BEFORE BREAKFAST,3 UNITS BEFORE LUNCH AND 2 UNITS BEFORE DINNER    Type 1 diabetes, HbA1c goal < 7% (H)       * insulin aspart 100 UNITS/ML injection    NovoLOG    20 mL    Take as directed via insulin pump. Total daily dose 40 units.    Type 1 diabetes mellitus without complication  (H)       omeprazole 20 MG CR capsule    priLOSEC    30 capsule    TAKE ONE CAPSULE BY MOUTH ONCE DAILY    Gastroesophageal reflux disease, esophagitis presence not specified       pravastatin 10 MG tablet    PRAVACHOL    30 tablet    TAKE ONE-HALF TABLET BY MOUTH ONCE DAILY    Hyperlipidemia LDL goal <100       * RELION MINI PEN NEEDLES 31G X 6 MM   Generic drug:  insulin pen needle     400 each    USE ONE  4 TIMES DAILY    Type 1 diabetes, HbA1c goal < 7% (H)       * B-D U/F 31G X 5 MM   Generic drug:  insulin pen needle     400 each    USE ONE PEN NEEDLE 4 TIMES DAILY OR AS DIRECTED    Type 1 diabetes, HbA1c goal < 7% (H)       vitamin D 2000 UNITS tablet     100 tablet    Take 2,000 Units by mouth daily.    Vitamin D deficiency disease       * Notice:  This list has 4 medication(s) that are the same as other medications prescribed for you. Read the directions carefully, and ask your doctor or other care provider to review them with you.

## 2018-01-29 ENCOUNTER — TELEPHONE (OUTPATIENT)
Dept: ENDOCRINOLOGY | Facility: CLINIC | Age: 53
End: 2018-01-29

## 2018-01-29 ENCOUNTER — MYC MEDICAL ADVICE (OUTPATIENT)
Dept: ENDOCRINOLOGY | Facility: CLINIC | Age: 53
End: 2018-01-29

## 2018-01-29 DIAGNOSIS — E10.9 DIABETES MELLITUS TYPE 1 (H): ICD-10-CM

## 2018-01-29 NOTE — TELEPHONE ENCOUNTER
Hello,    Humalog requires a PA, please contact [phone], id # [id number] [facility].    Let us know when approved.        Thank You,  Opal Cerna  Pharmacy Technician  [facility]  [phone]

## 2018-01-29 NOTE — TELEPHONE ENCOUNTER
Notification of approval received from [facility]. Effective 1/26/18-1/26/19.    Pharmacy and patient notified.    Nandini Jenkins LPN  Adult Endocrinology  [facility]

## 2018-01-29 NOTE — TELEPHONE ENCOUNTER
PA initiated and completed urgently through [employer] due to significant reaction of swelling and itching while on Novolog.     Will await coverage determination.     Nandini Jenkins LPN  Adult Endocrinology  [facility]

## 2018-01-29 NOTE — TELEPHONE ENCOUNTER
Spoke with pt. States she is not having a reaction to Humlalog insulin. Only sees a red dot at injeciton site but denies itching, edema or redness. Advised PA went thru on Humalog insulin. Pt requests Rx be sent to [facility] in [place].  Pt would like to reconnect to insulin pump. Plans to do so tonight. Plans to use same infusion set as she has been using instead of set with steel needle.   Recommended pt call clinic if any concerns arise once she reconnects to insulin pump. Pt verbalized understanding.     Jessica Demarco, RN, BSN, CDE   [facility]

## 2018-01-30 ENCOUNTER — MYC MEDICAL ADVICE (OUTPATIENT)
Dept: FAMILY MEDICINE | Facility: OTHER | Age: 53
End: 2018-01-30

## 2018-01-30 ASSESSMENT — PATIENT HEALTH QUESTIONNAIRE - PHQ9
SUM OF ALL RESPONSES TO PHQ QUESTIONS 1-9: 0
10. IF YOU CHECKED OFF ANY PROBLEMS, HOW DIFFICULT HAVE THESE PROBLEMS MADE IT FOR YOU TO DO YOUR WORK, TAKE CARE OF THINGS AT HOME, OR GET ALONG WITH OTHER PEOPLE: NOT DIFFICULT AT ALL
SUM OF ALL RESPONSES TO PHQ QUESTIONS 1-9: 0

## 2018-01-30 NOTE — PROGRESS NOTES
"  Diabetes Self Management Training: Follow-up Visit    Maria M Marcus presents today for evaluation of infusion set site. Experiencing redness, itching and edema.     She is accompanied by spouse, Ra.    Patient's diabetes management related comments/concerns: see above.    Patient would like this visit to be focused around the following diabetes-related behaviors and goals: n/a    ASSESSMENT:  Patient presents to clinic today for evaluation of infusion set site. Over the past 3-4 weeks pt has developed redness, edema and itching. Issue has gotten progressively worse over time.     Current Diabetes Management per Patient:  Taking diabetes medications? Yes: see below. Pt wears a T Slim X2 pump and Dexcom sensor.   Diabetes Medication(s)     Insulin Sig    insulin lispro (HUMALOG) 100 UNIT/ML injection PA approved 01/29/18. Take as directed via insulin pump. Total daily dose approx. 55 units.    insulin aspart (NOVOLOG) 100 UNITS/ML injection Take as directed via insulin pump. Total daily dose 40 units.    NOVOLOG FLEXPEN 100 UNIT/ML soln INJECT 5 UNITS SUBCUTANEOUSLY BEFORE BREAKFAST,3 UNITS BEFORE LUNCH AND 2 UNITS BEFORE DINNER     Patient not taking:  Reported on 12/8/2017    insulin glargine (BASAGLAR KWIKPEN) 100 UNIT/ML injection Inject 11 Units Subcutaneous daily        BG values are: In goal  Patient's most recent   Lab Results   Component Value Date    A1C 6.6 12/08/2017    is meeting goal of <7.0    Vitals:  There were no vitals taken for this visit.  Estimated body mass index is 31.78 kg/(m^2) as calculated from the following:    Height as of 12/8/17: 1.47 m (4' 9.87\").    Weight as of 12/8/17: 68.7 kg (151 lb 6 oz).   Last 3 BP:   BP Readings from Last 3 Encounters:   12/08/17 138/80   11/30/17 132/70   09/15/17 134/70       History   Smoking Status     Never Smoker   Smokeless Tobacco     Never Used     Comment: no exposure       Labs:  Lab Results   Component Value Date    A1C 6.6 12/08/2017 "     Lab Results   Component Value Date    GLC 58 07/28/2017     Lab Results   Component Value Date     07/28/2017     10/27/2016     HDL Cholesterol   Date Value Ref Range Status   10/27/2016 56 >49 mg/dL Final   ]  GFR Estimate   Date Value Ref Range Status   07/28/2017 61 >60 mL/min/1.7m2 Final     Comment:     Non  GFR Calc     GFR Estimate If Black   Date Value Ref Range Status   07/28/2017 74 >60 mL/min/1.7m2 Final     Comment:      GFR Calc     Lab Results   Component Value Date    CR 0.96 07/28/2017     No results found for: MICROALBUMIN    Health Beliefs and Attitudes:   Patient Activation Measure Survey Score:  LAVON Score (Last Two) 1/3/2011   LAVON Raw Score 50   Activation Score 86.3   LAVON Level 4     Diabetes knowledge and skills assessment:     Patient is knowledgeable in diabetes management concepts related to: Healthy Eating, Being Active, Monitoring, Taking Medication, Problem Solving, Reducing Risks and Healthy Coping    Patient needs further education on the following diabetes management concepts: none    Barriers to Learning Assessment: No Barriers identified    Based on learning assessment above, most appropriate setting for further diabetes education would be: Individual setting.    INTERVENTION:    Education provided today on:  Insulin pump infusion set options.     Opportunities for ongoing education and support in diabetes-self management were discussed.    Pt verbalized understanding of concepts discussed and recommendations provided today.       Education Materials Provided:  No new materials provided today    PLAN:  Patient given a Sure T infusion set. This set has a steel needle inserted into skin instead of a plastic catheter. Will determine if patient has developed an allergy to catheter material. Pt to call cde if continues to have above symptoms or if notices improvement.      FOLLOW-UP:  To be determined.     Time Spent: 30  minutes  Encounter Type: [place] Marcus comes into clinic today at the request of Dr Jarquin, Ordering Provider for Pt Teaching on infusion set options.     This service provided today was under the supervising provider of the day Dr Villarreal, who was available if needed.    Jessica Demarco, RN, BSN, CDE   [facility]

## 2018-01-31 ASSESSMENT — PATIENT HEALTH QUESTIONNAIRE - PHQ9: SUM OF ALL RESPONSES TO PHQ QUESTIONS 1-9: 0

## 2018-01-31 NOTE — TELEPHONE ENCOUNTER
Called [employer] Appeals Department at [phone] to check status of the appeal. Per representative case [id number] is still open and a decision hasn't been made yet.

## 2018-02-01 ENCOUNTER — OFFICE VISIT (OUTPATIENT)
Dept: FAMILY MEDICINE | Facility: OTHER | Age: 53
End: 2018-02-01
Payer: COMMERCIAL

## 2018-02-01 VITALS
SYSTOLIC BLOOD PRESSURE: 136 MMHG | OXYGEN SATURATION: 99 % | RESPIRATION RATE: 18 BRPM | HEART RATE: 89 BPM | DIASTOLIC BLOOD PRESSURE: 84 MMHG | WEIGHT: 156 LBS | BODY MASS INDEX: 32.75 KG/M2 | TEMPERATURE: 96.7 F

## 2018-02-01 DIAGNOSIS — E10.9 TYPE 1 DIABETES MELLITUS WITHOUT COMPLICATION (H): Chronic | ICD-10-CM

## 2018-02-01 DIAGNOSIS — G89.4 CHRONIC PAIN SYNDROME: Primary | ICD-10-CM

## 2018-02-01 LAB — GLUCOSE BLD-MCNC: 223 MG/DL (ref 70–99)

## 2018-02-01 PROCEDURE — 99214 OFFICE O/P EST MOD 30 MIN: CPT | Performed by: PHYSICIAN ASSISTANT

## 2018-02-01 PROCEDURE — 82947 ASSAY GLUCOSE BLOOD QUANT: CPT | Performed by: PHYSICIAN ASSISTANT

## 2018-02-01 PROCEDURE — 36416 COLLJ CAPILLARY BLOOD SPEC: CPT | Performed by: PHYSICIAN ASSISTANT

## 2018-02-01 RX ORDER — GABAPENTIN 300 MG/1
CAPSULE ORAL
Qty: 90 CAPSULE | Refills: 5 | Status: SHIPPED | OUTPATIENT
Start: 2018-02-01 | End: 2018-02-09 | Stop reason: SINTOL

## 2018-02-01 NOTE — PROGRESS NOTES
"  Diabetes Self Management Training: Follow-up Visit    Maria M Marcus presents today for evaluation of rash at infusion site/injection site.    She is accompanied by self    Patient's diabetes management related comments/concerns: see above    Patient would like this visit to be focused around the following diabetes-related behaviors and goals: see above    ASSESSMENT:  Patient presents to clinic today due to rash at infusion site/injection site. Rash observered myself and Dr Jarquin, the pt's endo. Pt's skin is edemetous, red, and warm to touch at current and previous infusion/injection sites. Rash measures 1/2\" to 2\" in diameter depending on where most recent injections were given or infusion sets were placed. Of note, the pt was recently advised to stop insulin pump use and return to injection therapy. Pt was seen by me earlier in the week to evaluate rash. A steel needle infusion set ws inserted into the pt's abdomen to evaluate if the pt may developed an allergy to the catheter material. Pt did not have a negative response to the steel-tipped needle but continued to develop a rash with injections therapy. Ultimately it was decided the patient has more than likely developed an allergy to novolog insulin.        History   Smoking Status     Never Smoker   Smokeless Tobacco     Never Used     Comment: no exposure     Labs:  Lab Results   Component Value Date    A1C 6.6 12/08/2017     Lab Results   Component Value Date    GLC 58 07/28/2017     Lab Results   Component Value Date     07/28/2017     10/27/2016     HDL Cholesterol   Date Value Ref Range Status   10/27/2016 56 >49 mg/dL Final   ]  GFR Estimate   Date Value Ref Range Status   07/28/2017 61 >60 mL/min/1.7m2 Final     Comment:     Non  GFR Calc     GFR Estimate If Black   Date Value Ref Range Status   07/28/2017 74 >60 mL/min/1.7m2 Final     Comment:      GFR Calc     Lab Results   Component Value Date "    CR 0.96 07/28/2017     No results found for: MICROALBUMIN    Health Beliefs and Attitudes:   Patient Activation Measure Survey Score:  LAVON Score (Last Two) 1/3/2011   LAVON Raw Score 50   Activation Score 86.3   LAVON Level 4     INTERVENTION:    Education provided today on:  A prescription for humalog was sent to the [facility]. A prior authorization was initiated since humalog in not on formulary. Pt took a correction dose of humlalog and remained in clinic for 45 minutes to see if she had a reaction. Reaction was negative.     PLAN:  Pt advised to call clinic if reaction devolopes.     Ongoing plan for education and support: as needed.    Time Spent:45 minutes  Encounter Type: Individual    Maria M Marcus comes into clinic today at the request of Dr Jarqiun, Ordering Provider for Pt Teaching .    This service provided today was under the supervising provider of the day Dr Jarquin, who was available if needed.    Jessica Demraco, RN, BSN, CDE   [facility]

## 2018-02-01 NOTE — NURSING NOTE
"Chief Complaint   Patient presents with     RECHECK     Panel Management     hep c, cmp, phq9       Initial /84 (BP Location: Right arm, Patient Position: Chair, Cuff Size: Adult Regular)  Pulse 89  Temp 96.7  F (35.9  C) (Temporal)  Resp 18  Wt 156 lb (70.8 kg)  SpO2 99%  BMI 32.75 kg/m2 Estimated body mass index is 32.75 kg/(m^2) as calculated from the following:    Height as of 12/8/17: 4' 9.87\" (1.47 m).    Weight as of this encounter: 156 lb (70.8 kg).  Medication Reconciliation: complete     Gayle Case CMA      "

## 2018-02-01 NOTE — NURSING NOTE
Patient glucose monitor imported via [employer]. Printed copy of this report and given to ÁNGEL Case CMA

## 2018-02-01 NOTE — PATIENT INSTRUCTIONS
Will check glucose today.    I recommend taking the gabapentin 3 times daily as directed.  If pain is still not improving over the next month, let me know.

## 2018-02-01 NOTE — MR AVS SNAPSHOT
After Visit Summary   2/1/2018    Maria M Marcus    MRN: [id number]           Patient Information     Date Of Birth          1965        Visit Information        Provider Department      2/1/2018 9:00 AM Marcus Salgado PA-C [facility]        Today's Diagnoses     Chronic pain syndrome    -  1    Type 1 diabetes mellitus without complication (H)          Care Instructions    Will check glucose today.    I recommend taking the gabapentin 3 times daily as directed.  If pain is still not improving over the next month, let me know.              Follow-ups after your visit        Your next 10 appointments already scheduled     Apr 12, 2018  8:30 AM CDT   [facility]    [address] [facility] [address] [phone]           Please do not eat 10-12 hours before your appointment if you are coming in fasting for labs on lipids, cholesterol, or glucose (sugar). This does not apply to pregnant women. Water, hot tea and black coffee (with nothing added) are okay. Do not drink other fluids, diet soda or chew gum.            Apr 20, 2018  1:00 PM CDT   Return Visit with Sidney Jarquin MD   [facility]    [address] [place] [address] [phone]              Who to contact     If you have questions or need follow up information about today's clinic visit or your schedule please contact [facility] directly at [phone].  Normal or non-critical lab and imaging results will be communicated to you by [employer], letter or phone within 4 business days after the clinic has received the results. If you do not hear from us within 7 days, please contact the clinic through [employer] or phone. If you have a critical or abnormal lab result, we will notify you by phone as soon as possible.  Submit refill requests through [employer]  or call your pharmacy and they will forward the refill request to us. Please allow 3 business days for your refill to be completed.          Additional Information About Your Visit        MyChart Information     [employer] gives you secure access to your electronic health record. If you see a primary care provider, you can also send messages to your care team and make appointments. If you have questions, please call your primary care clinic.  If you do not have a primary care provider, please call [phone] and they will assist you.        Care EveryWhere ID     This is your Care EveryWhere ID. This could be used by other organizations to access your [facility] medical records  [id number]        Your Vitals Were     Pulse Temperature Respirations Pulse Oximetry BMI (Body Mass Index)       89 96.7  F (35.9  C) (Temporal) 18 99% 32.75 kg/m2        Blood Pressure from Last 3 Encounters:   02/01/18 136/84   12/08/17 138/80   11/30/17 132/70    Weight from Last 3 Encounters:   02/01/18 156 lb (70.8 kg)   12/08/17 151 lb 6 oz (68.7 kg)   11/30/17 154 lb (69.9 kg)              Today, you had the following     No orders found for display         Where to get your medicines      These medications were sent to [facility] [address] [place] [id number] [address] [address]     Phone:  [phone]     gabapentin 300 MG capsule          Primary Care Provider Office Phone # Fax #    Marcus Salgado PA-C [phone]       [address] [place] [address]        Equal Access to Services     [facility]: Hadii aad ku hadasho Soomaali, waaxda luqadaha, qaybta kaalmada elias, traci cruz . So [facility] [phone].    ATENCIÓN: Si habla español, tiene a ngo disposición servicios gratuitos de asistencia lingüística. Llame al [phone].    We comply with applicable federal civil rights laws and [place] laws. We do not discriminate on the  basis of race, color, national origin, age, disability, sex, sexual orientation, or gender identity.            Thank you!     Thank you for choosing [facility]  for your care. Our goal is always to provide you with excellent care. Hearing back from our patients is one way we can continue to improve our services. Please take a few minutes to complete the written survey that you may receive in the mail after your visit with us. Thank you!             Your Updated Medication List - Protect others around you: Learn how to safely use, store and throw away your medicines at www.disposemymeds.org.          This list is accurate as of 2/1/18  9:42 AM.  Always use your most recent med list.                   Brand Name Dispense Instructions for use Diagnosis    BASAGLAR 100 UNIT/ML injection     30 mL    Inject 11 Units Subcutaneous daily    Type 1 diabetes mellitus without complication (H)       blood glucose monitoring meter device kit     1 kit    Use to test blood sugars 5 times daily or as directed.    Type 1 diabetes mellitus without complication (H)       blood glucose monitoring test strip    TANA CONTOUR    200 strip    Use to test blood sugar 6 times daily or as directed.    Type 1 diabetes mellitus without complication (H)       gabapentin 300 MG capsule    NEURONTIN    90 capsule    Take 1 tablet (300 mg) every night for 1-3 days, then 1 tablet twice daily for 1-3 days, then 1 tablet three times daily    Chronic pain syndrome       IBUPROFEN PO           insulin lispro 100 UNIT/ML injection    humaLOG    20 mL    PA approved 01/29/18. Take as directed via insulin pump. Total daily dose approx. 55 units.    Diabetes mellitus type 1 (H)       insulin syringes (disposable) U-100 0.3 ML Misc     100 each    Keep on hand in case of pump failure. Use 4-5 syringes daily.    Diabetes mellitus type 1 (H)       levothyroxine 75 MCG tablet    SYNTHROID/LEVOTHROID    30 tablet    TAKE ONE TABLET BY MOUTH  ONCE DAILY    Hypothyroidism, unspecified type       lidocaine 2 % topical gel    XYLOCAINE    30 mL    Apply topically 2 times daily as needed for moderate pain    Cervical radiculopathy, Cervicalgia       lisinopril 10 MG tablet    PRINIVIL/ZESTRIL    30 tablet    TAKE ONE TABLET BY MOUTH ONCE DAILY    Hypertension goal BP (blood pressure) < 140/90, Type 1 diabetes mellitus without complication (H)       * NovoLOG FLEXPEN 100 UNIT/ML injection   Generic drug:  insulin aspart     15 mL    INJECT 5 UNITS SUBCUTANEOUSLY BEFORE BREAKFAST,3 UNITS BEFORE LUNCH AND 2 UNITS BEFORE DINNER    Type 1 diabetes, HbA1c goal < 7% (H)       * insulin aspart 100 UNITS/ML injection    NovoLOG    20 mL    Take as directed via insulin pump. Total daily dose 40 units.    Type 1 diabetes mellitus without complication (H)       omeprazole 20 MG CR capsule    priLOSEC    30 capsule    TAKE ONE CAPSULE BY MOUTH ONCE DAILY    Gastroesophageal reflux disease, esophagitis presence not specified       pravastatin 10 MG tablet    PRAVACHOL    30 tablet    TAKE ONE-HALF TABLET BY MOUTH ONCE DAILY    Hyperlipidemia LDL goal <100       * RELION MINI PEN NEEDLES 31G X 6 MM   Generic drug:  insulin pen needle     400 each    USE ONE  4 TIMES DAILY    Type 1 diabetes, HbA1c goal < 7% (H)       * B-D U/F 31G X 5 MM   Generic drug:  insulin pen needle     400 each    USE ONE PEN NEEDLE 4 TIMES DAILY OR AS DIRECTED    Type 1 diabetes, HbA1c goal < 7% (H)       vitamin D 2000 UNITS tablet     100 tablet    Take 2,000 Units by mouth daily.    Vitamin D deficiency disease       * Notice:  This list has 4 medication(s) that are the same as other medications prescribed for you. Read the directions carefully, and ask your doctor or other care provider to review them with you.

## 2018-02-02 ENCOUNTER — MYC MEDICAL ADVICE (OUTPATIENT)
Dept: INTERNAL MEDICINE | Facility: CLINIC | Age: 53
End: 2018-02-02

## 2018-02-02 ENCOUNTER — MYC MEDICAL ADVICE (OUTPATIENT)
Dept: FAMILY MEDICINE | Facility: OTHER | Age: 53
End: 2018-02-02

## 2018-02-02 DIAGNOSIS — E03.9 HYPOTHYROIDISM, UNSPECIFIED TYPE: ICD-10-CM

## 2018-02-02 RX ORDER — LEVOTHYROXINE SODIUM 75 UG/1
75 TABLET ORAL DAILY
Qty: 30 TABLET | Refills: 5 | Status: SHIPPED | OUTPATIENT
Start: 2018-02-02 | End: 2018-07-26

## 2018-02-08 ENCOUNTER — MYC MEDICAL ADVICE (OUTPATIENT)
Dept: ENDOCRINOLOGY | Facility: CLINIC | Age: 53
End: 2018-02-08

## 2018-02-08 ENCOUNTER — MYC MEDICAL ADVICE (OUTPATIENT)
Dept: FAMILY MEDICINE | Facility: OTHER | Age: 53
End: 2018-02-08

## 2018-02-08 DIAGNOSIS — T78.40XA ALLERGIC STATE, INITIAL ENCOUNTER: Primary | ICD-10-CM

## 2018-02-09 NOTE — TELEPHONE ENCOUNTER
I called and spoke with patient and she states that she is no currently using the insulin pump but she has started to have insulin injection site reactions again. She states the only difference has been the gabapentin so she stopped taking this a few days ago and the injection site reactions have been improving. I do think this is an intolerance to gabapentin so will discontinue this even though it was helping with her pain. She states she would like to get her insulin issues under control and restart the pump before considering any further medications for her pain. We can consider medications like tricyclics in the future if pain worsens.    Marcus Salgado PA-C

## 2018-02-12 NOTE — TELEPHONE ENCOUNTER
MEDICATION APPEAL APPROVED    Medication: omeprazole 20 mg - denied; appeal APPROVED  Authorization Effective Date: 1/26/2018  Authorization Expiration Date: 1/26/2019  Approved Dose/Quantity: 30/30 days  Reference #: [id number]   Insurance Company: [employer] [place] - Phone [phone] Fax [phone]  Expected CoPay: $1.00     CoPay Card Available:      Foundation Assistance Needed:    Which Pharmacy is filling the prescription (Not needed for infusion/clinic administered): [facility] [address] [place] [id number] [facility]

## 2018-02-13 NOTE — TELEPHONE ENCOUNTER
Plan:     Discussed with patient on 2/10. plan for referral to allergist soon.     Sidney Jarquin MD  [id number]  Endocrinology Service

## 2018-02-15 ENCOUNTER — MYC MEDICAL ADVICE (OUTPATIENT)
Dept: FAMILY MEDICINE | Facility: OTHER | Age: 53
End: 2018-02-15

## 2018-02-15 DIAGNOSIS — G89.4 CHRONIC PAIN SYNDROME: ICD-10-CM

## 2018-02-15 RX ORDER — GABAPENTIN 300 MG/1
CAPSULE ORAL
Qty: 90 CAPSULE | Refills: 5 | Status: SHIPPED | OUTPATIENT
Start: 2018-02-15 | End: 2018-07-09

## 2018-02-20 ENCOUNTER — MYC MEDICAL ADVICE (OUTPATIENT)
Dept: ENDOCRINOLOGY | Facility: CLINIC | Age: 53
End: 2018-02-20

## 2018-02-20 ENCOUNTER — MYC MEDICAL ADVICE (OUTPATIENT)
Dept: INTERNAL MEDICINE | Facility: CLINIC | Age: 53
End: 2018-02-20

## 2018-02-20 DIAGNOSIS — E10.9 DIABETES MELLITUS TYPE 1 (H): Primary | ICD-10-CM

## 2018-02-20 DIAGNOSIS — E10.9 TYPE 1 DIABETES MELLITUS WITHOUT COMPLICATION (H): ICD-10-CM

## 2018-02-21 RX ORDER — INSULIN GLARGINE 100 [IU]/ML
INJECTION, SOLUTION SUBCUTANEOUS
Refills: 1 | OUTPATIENT
Start: 2018-02-21

## 2018-02-22 ENCOUNTER — MYC MEDICAL ADVICE (OUTPATIENT)
Dept: ENDOCRINOLOGY | Facility: CLINIC | Age: 53
End: 2018-02-22

## 2018-02-22 DIAGNOSIS — E10.9 TYPE 1 DIABETES MELLITUS WITHOUT COMPLICATION (H): Primary | ICD-10-CM

## 2018-02-22 NOTE — TELEPHONE ENCOUNTER
Spoke with pt. States she has been off the pump since Feb 4th. Needs Rx sent to pharmacy for Lantus.     Jessica Demarco, RN, BSN, CDE   [facility]

## 2018-02-26 ENCOUNTER — TELEPHONE (OUTPATIENT)
Dept: ENDOCRINOLOGY | Facility: CLINIC | Age: 53
End: 2018-02-26

## 2018-02-28 NOTE — TELEPHONE ENCOUNTER
Central Prior Authorization Team   Phone: [phone]      PA Initiation    Medication: insulin glulisine (APIDRA SOLOSTAR) 100 UNIT/ML soln  Insurance Company: [employer] [place] - Phone [phone] Fax [phone]  Pharmacy Filling the Rx: [facility] [address] [place] [id number] [facility]  Filling Pharmacy Phone: [phone]  Filling Pharmacy Fax: [phone]  Start Date: 2/28/2018

## 2018-02-28 NOTE — TELEPHONE ENCOUNTER
Prior Authorization Approval    Authorization Effective Date: 2/23/2018  Authorization Expiration Date: 2/23/2019  Medication: insulin glulisine (APIDRA SOLOSTAR) 100 UNIT/ML soln-PA approved  Approved Dose/Quantity:    Reference #:    Insurance Company: [employer] [place] - Phone [phone] Fax [phone]  Expected CoPay:       CoPay Card Available:      Foundation Assistance Needed:    Which Pharmacy is filling the prescription (Not needed for infusion/clinic administered): [facility] [address] [place] [id number] [facility]  Pharmacy Notified: Yes  Patient Notified: Yes

## 2018-03-01 ENCOUNTER — TELEPHONE (OUTPATIENT)
Dept: ENDOCRINOLOGY | Facility: CLINIC | Age: 53
End: 2018-03-01

## 2018-03-01 NOTE — TELEPHONE ENCOUNTER
Pt calling to advise clinic Apridra Rx was approved. Plans to start using it veronica (fri). States if all goes well (has no allergic reaction), will re-start pump use again on Monday. Pt advised I thought this was a good plan. Please call clinic to let us know how things go. Pt verbalized understanding.    Jessica Demarco, RN, BSN, CDE   [facility]

## 2018-03-01 NOTE — TELEPHONE ENCOUNTER
[facility] Call Center    Phone Message    May a detailed message be left on voicemail: yes    Reason for Call: Other: Patient is calling to follow up with Jessica Demarco about new med, please call today     Action Taken: Message routed to:  Adult Clinics: Endocrinology p [phone]

## 2018-03-10 ENCOUNTER — MYC MEDICAL ADVICE (OUTPATIENT)
Dept: ENDOCRINOLOGY | Facility: CLINIC | Age: 53
End: 2018-03-10

## 2018-03-10 DIAGNOSIS — E10.9 DIABETES MELLITUS TYPE 1 (H): Primary | ICD-10-CM

## 2018-03-12 ENCOUNTER — TELEPHONE (OUTPATIENT)
Dept: ENDOCRINOLOGY | Facility: CLINIC | Age: 53
End: 2018-03-12

## 2018-03-12 NOTE — TELEPHONE ENCOUNTER
Pt left a vm on my office line. Returned call. Had to leave a vm. Pt advised I was returning her call.    Jessica Demarco RN, BSN, CDE   [facility]

## 2018-03-12 NOTE — TELEPHONE ENCOUNTER
Pt is requesting a call back, she states it's about her blood sugars. Please give pt a call back at [phone]

## 2018-03-12 NOTE — TELEPHONE ENCOUNTER
Completed by diabetes educator, Jessica Demarco.    Batsheva Gonzalez, RN  Endocrine Care Coordinator  [facility]

## 2018-03-21 ENCOUNTER — MYC MEDICAL ADVICE (OUTPATIENT)
Dept: FAMILY MEDICINE | Facility: OTHER | Age: 53
End: 2018-03-21

## 2018-03-21 DIAGNOSIS — E10.9 TYPE 1 DIABETES MELLITUS WITHOUT COMPLICATION (H): ICD-10-CM

## 2018-03-21 DIAGNOSIS — I10 HYPERTENSION GOAL BP (BLOOD PRESSURE) < 140/90: ICD-10-CM

## 2018-03-21 RX ORDER — LISINOPRIL 10 MG/1
10 TABLET ORAL DAILY
Qty: 90 TABLET | Refills: 2 | Status: SHIPPED | OUTPATIENT
Start: 2018-03-21 | End: 2018-12-21

## 2018-03-21 NOTE — TELEPHONE ENCOUNTER
lisinopril (PRINIVIL/ZESTRIL) 10 MG tablet  BP Readings from Last 3 Encounters:   02/01/18 136/84   12/08/17 138/80   11/30/17 132/70     Prescription approved per [facility] Refill Protocol.  Maci Barajas RN, BSN

## 2018-03-26 ENCOUNTER — TELEPHONE (OUTPATIENT)
Dept: ENDOCRINOLOGY | Facility: CLINIC | Age: 53
End: 2018-03-26

## 2018-03-27 ENCOUNTER — MYC MEDICAL ADVICE (OUTPATIENT)
Dept: ENDOCRINOLOGY | Facility: CLINIC | Age: 53
End: 2018-03-27

## 2018-03-27 DIAGNOSIS — E10.9 DIABETES MELLITUS TYPE 1 (H): ICD-10-CM

## 2018-03-27 NOTE — TELEPHONE ENCOUNTER
Coverage Quantity Exception Form submitted to [employer] via their web site. Copy of submitted from was printed and will be scanned into pt's chart.     Jessica Demarco RN, BSN, CDE   [facility]

## 2018-03-30 NOTE — TELEPHONE ENCOUNTER
Spoke with pt. States she continues to have low bg reading mostly, post dinner and in the middle of the night. Discussed changing senstivity/correction. Pt agrees.    Current settings:  12a-7p: 60  7p-12a: 65    Change to :  12a-7a: 85  7a-7p: 60  7p-12a:75     weight bearing

## 2018-03-30 NOTE — TELEPHONE ENCOUNTER
3.30.18  Patient requesting call back from Jessica regarding blood sugars.  Please call [phone]

## 2018-04-13 DIAGNOSIS — E10.9 TYPE 1 DIABETES MELLITUS WITHOUT COMPLICATION (H): Chronic | ICD-10-CM

## 2018-04-13 LAB
ALBUMIN SERPL-MCNC: 4 G/DL (ref 3.4–5)
ALT SERPL W P-5'-P-CCNC: 20 U/L (ref 0–50)
ANION GAP SERPL CALCULATED.3IONS-SCNC: 7 MMOL/L (ref 3–14)
BUN SERPL-MCNC: 11 MG/DL (ref 7–30)
CALCIUM SERPL-MCNC: 9.1 MG/DL (ref 8.5–10.1)
CHLORIDE SERPL-SCNC: 104 MMOL/L (ref 94–109)
CHOLEST SERPL-MCNC: 193 MG/DL
CK SERPL-CCNC: 92 U/L (ref 30–225)
CO2 SERPL-SCNC: 29 MMOL/L (ref 20–32)
CREAT SERPL-MCNC: 0.77 MG/DL (ref 0.52–1.04)
CREAT UR-MCNC: 70 MG/DL
GFR SERPL CREATININE-BSD FRML MDRD: 79 ML/MIN/1.7M2
GLUCOSE SERPL-MCNC: 192 MG/DL (ref 70–99)
HBA1C MFR BLD: 6.7 % (ref 0–6.4)
HCT VFR BLD AUTO: 37.6 % (ref 35–47)
HDLC SERPL-MCNC: 57 MG/DL
LDLC SERPL CALC-MCNC: 118 MG/DL
MICROALBUMIN UR-MCNC: <5 MG/L
MICROALBUMIN/CREAT UR: NORMAL MG/G CR (ref 0–25)
NONHDLC SERPL-MCNC: 136 MG/DL
PHOSPHATE SERPL-MCNC: 3.1 MG/DL (ref 2.5–4.5)
POTASSIUM SERPL-SCNC: 4 MMOL/L (ref 3.4–5.3)
SODIUM SERPL-SCNC: 140 MMOL/L (ref 133–144)
T4 FREE SERPL-MCNC: 1.05 NG/DL (ref 0.76–1.46)
TRIGL SERPL-MCNC: 90 MG/DL
TSH SERPL DL<=0.005 MIU/L-ACNC: 4.17 MU/L (ref 0.4–4)

## 2018-04-13 PROCEDURE — 84439 ASSAY OF FREE THYROXINE: CPT | Performed by: INTERNAL MEDICINE

## 2018-04-13 PROCEDURE — 83036 HEMOGLOBIN GLYCOSYLATED A1C: CPT | Performed by: INTERNAL MEDICINE

## 2018-04-13 PROCEDURE — 82043 UR ALBUMIN QUANTITATIVE: CPT | Performed by: INTERNAL MEDICINE

## 2018-04-13 PROCEDURE — 85014 HEMATOCRIT: CPT | Performed by: INTERNAL MEDICINE

## 2018-04-13 PROCEDURE — 82550 ASSAY OF CK (CPK): CPT | Performed by: INTERNAL MEDICINE

## 2018-04-13 PROCEDURE — 84443 ASSAY THYROID STIM HORMONE: CPT | Performed by: INTERNAL MEDICINE

## 2018-04-13 PROCEDURE — 80061 LIPID PANEL: CPT | Performed by: INTERNAL MEDICINE

## 2018-04-13 PROCEDURE — 84460 ALANINE AMINO (ALT) (SGPT): CPT | Performed by: INTERNAL MEDICINE

## 2018-04-13 PROCEDURE — 36415 COLL VENOUS BLD VENIPUNCTURE: CPT | Performed by: INTERNAL MEDICINE

## 2018-04-13 PROCEDURE — 80069 RENAL FUNCTION PANEL: CPT | Performed by: INTERNAL MEDICINE

## 2018-04-16 ENCOUNTER — OFFICE VISIT (OUTPATIENT)
Dept: ENDOCRINOLOGY | Facility: CLINIC | Age: 53
End: 2018-04-16

## 2018-04-16 VITALS
DIASTOLIC BLOOD PRESSURE: 80 MMHG | WEIGHT: 163.5 LBS | HEIGHT: 59 IN | BODY MASS INDEX: 32.96 KG/M2 | SYSTOLIC BLOOD PRESSURE: 160 MMHG | HEART RATE: 71 BPM

## 2018-04-16 DIAGNOSIS — E16.2 HYPOGLYCEMIA: ICD-10-CM

## 2018-04-16 DIAGNOSIS — E10.9 TYPE 1 DIABETES MELLITUS WITHOUT COMPLICATION (H): Primary | ICD-10-CM

## 2018-04-16 DIAGNOSIS — E06.3 HYPOTHYROIDISM DUE TO HASHIMOTO'S THYROIDITIS: ICD-10-CM

## 2018-04-16 ASSESSMENT — PAIN SCALES - GENERAL: PAINLEVEL: NO PAIN (0)

## 2018-04-16 NOTE — LETTER
4/16/2018       RE: Maria M Marcus  [address] [facility] [id number]     Dear Colleague,    Thank you for referring your patient, Maria M Marcus, to the [facility] ENDOCRINOLOGY at [facility]. Please see a copy of my visit note below.    Endocrinology Clinic Visit       Chief Complaint: Diabetes       Subjective:         HPI:   Maria M is a 52 year old female who presents for follow up.   She has H/O type 1 diabetes diagnosed at age 2, hypothyroidism diagnosed in her 20s and vitiligo. She takes pravastatin for hyperlipidemia and lisinopril for hypertension.   She is transferring her care and was followed up by primary care physician for her diabetes in the past. Her main initial concern was recurrent hypoglycemia. INGRAM showed negative 21 hydroxylase Ab, positive TPO and negative TTG.     History of Diabetes  She was diagnosed with type 1 diabetes mellitus since he was [age] years old.  Over the years, she had developed diabetic retinopathy for which laser treatment was required.  She does not have neuropathy or nephropathy.  In the past several years, her A1c has been in good range between 6.1 and 7.5.  No macrovascular complications.    She was transitioned from basal bolus regimen to insulin pump that she has started about 3-4 days ago. She obtained a Dexcom CGM that has reduced the frequency of hypoglycemia to a great extent.     Low BG is now improved with sensor.  The major pattern was that was detected was hyperglycemia followed by hypoglycemia.  Usually she has blood sugars over 250 and uses correction and afterwards she develops hypoglycemia.    Interval history:   Developed significant rash around injection site from Humalog, Novolog.   This resolved with Apidra.   No new issues at this time.   Regular insulin - patient has used this in distant past.     Pump settings  Basal   0000 0.40  0300 0.575.  0500 0.60  1200 0.60  2000 0.225    Bolus / Correction  Factor  0000 20 -- 85  0300 20 -- 75  0500 17 -- 60  1200 20 -- 65  2000 30 -- 85    Correction 1 for 60  Target 140    Family History  Maria M notes that her mother had breast cancer, a nephew and an uncle had T1DM but other AI diseases do not run in family. No history of thyroid cancer.     Meds  Aspart for T slim pump.     Prevention  Lipid  LDL Cholesterol Calculated   Date Value Ref Range Status   10/27/2016 109 (H) <100 mg/dL Final     Comment:     Above desirable:  100-129 mg/dl   Borderline High:  130-159 mg/dL   High:             160-189 mg/dL   Very high:       >189 mg/dl     04/11/2016 132 (H) <100 mg/dL Final     Comment:     Above desirable:  100-129 mg/dl   Borderline High:  130-159 mg/dL   High:             160-189 mg/dL   Very high:       >189 mg/dl         Medications     HMG CoA Reductase Inhibitors    pravastatin (PRAVACHOL) 10 MG tablet    pravastatin (PRAVACHOL) 10 MG tablet          Renal  Medication (Note: This includes the hypertensive combination subclass to make sure to show all ACEI/ARB's.)     ACE Inhibitors Sig    lisinopril (PRINIVIL/ZESTRIL) 10 MG tablet TAKE ONE TABLET BY MOUTH ONCE DAILY            Weight  Wt Readings from Last 4 Encounters:   07/28/17 69.9 kg (154 lb)   03/01/17 67.4 kg (148 lb 9.6 oz)   02/09/17 69.4 kg (153 lb)   10/28/16 66.2 kg (146 lb)     .    Meter Download Summary:   Only few days recorded for calibration.   Diet:  she usually counts her carbs in carb choices   Exercise  no scheduled exercise     Weight trend  Wt Readings from Last 4 Encounters:   07/28/17 69.9 kg (154 lb)   03/01/17 67.4 kg (148 lb 9.6 oz)   02/09/17 69.4 kg (153 lb)   10/28/16 66.2 kg (146 lb)       A1c today is  6.6  Lab Results   Component Value Date    A1C 6.1 07/28/2017    A1C 6.9 10/27/2016    A1C 6.1 04/11/2016    A1C 6.0 10/09/2015    A1C 6.8 05/26/2015          Allergies   Allergen Reactions     No Known Drug Allergies      Current Outpatient Prescriptions   Medication     insulin  glulisine (APIDRA) 100 UNIT/ML injection     lisinopril (PRINIVIL/ZESTRIL) 10 MG tablet     insulin glulisine (APIDRA SOLOSTAR) 100 UNIT/ML soln     insulin glargine (LANTUS SOLOSTAR) 100 UNIT/ML injection     gabapentin (NEURONTIN) 300 MG capsule     levothyroxine (SYNTHROID/LEVOTHROID) 75 MCG tablet     insulin syringes, disposable, U-100 0.3 ML MISC     omeprazole (PRILOSEC) 20 MG CR capsule     lidocaine (XYLOCAINE) 2 % topical gel     blood glucose monitoring (TANA CONTOUR) test strip     NOVOLOG FLEXPEN 100 UNIT/ML soln     pravastatin (PRAVACHOL) 10 MG tablet     B-D U/F insulin pen needle     insulin glargine (BASAGLAR KWIKPEN) 100 UNIT/ML injection     IBUPROFEN PO     blood glucose monitoring (TANA CONTOUR MONITOR) meter device kit     RELION MINI PEN NEEDLES 31G X 6 MM     Cholecalciferol (VITAMIN D) 2000 UNITS tablet     insulin lispro (HUMALOG) 100 UNIT/ML injection     insulin aspart (NOVOLOG) 100 UNITS/ML injection     No current facility-administered medications for this visit.        Review of Systems     Constitutional: Negative for fever and unexpected weight change. Appetite  is normal  Head: no headache   Eye: no vision change/ loss of peripheral vision.   ENT: No throat congestion.   Respiratory: no cough   Cardiovascular: no chest pain or tachycardia  Gastrointestinal: Negative for vomiting, abdominal pain and diarrhea.  Genitourinary: No bladder problems.  Musculoskeletal: Negative for myalgias. No weakness.  Neurological: Negative for seizures and headaches.  Psychiatric/Behavioral: Negative for behavioral problem and dysphoric mood.    PMH as above.   PSH    Past Surgical History:   Procedure Laterality Date     C  DELIVERY ONLY      C-Sections x2     C NONSPECIFIC PROCEDURE      Hysterectomy - total (cervix removed but ovaries remain)     C STOMACH SURGERY PROCEDURE UNLISTED       C TOTAL ABDOM HYSTERECTOMY       COLONOSCOPY N/A 2016    Procedure: COLONOSCOPY;  Surgeon:  "Efren Perry MD;  Location: [facility]     HC SACROPLASTY       LAMINECT/DISCECTOMY, CERVICAL  06/19/2007    C7-T1 hemilaminectomy, microdiscectomy, foraminotomy.     LASER YAG CAPSULOTOMY Right 10/23/2014    Procedure: LASER YAG CAPSULOTOMY;  Surgeon: Esteban Dorsey MD;  Location: [facility]     VITRECTOMY,STRIP EPIRETINAL MEMBRANE  06/24/09       Family History   Problem Relation Age of Onset     Hypertension Maternal Grandfather      EYE* Maternal Grandmother      Blood Disease Maternal Grandmother      Eye Disorder Maternal Grandmother      maccular degeneration     OSTEOPOROSIS Maternal Grandmother      Lipids Father      GASTROINTESTINAL DISEASE Father      ulcers     HEART DISEASE Father      Cardiovascular Father      heart attack     Hypertension Paternal Grandmother      Breast Cancer Mother      dx age 73 - terminal - mother had first mammo at this age     DIABETES Paternal Uncle      Type I       Social History     Social History     Marital status:      Spouse name: Ra     Number of children: 2     Years of education: 12     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     Drug use: No     Sexual activity: Yes     Partners: Male     Birth control/ protection: Surgical, Female Surgical      Comment: hysterectomy     Other Topics Concern     Parent/Sibling W/ Cabg, Mi Or Angioplasty Before 65f 55m? No     Social History Narrative       Objective:   /80  Pulse 71  Ht 1.499 m (4' 11\")  Wt 74.2 kg (163 lb 8 oz)  BMI 33.02 kg/m2   Constitutional: Appears well-developed and well-nourished. Active.   EYES: anicteric, normal extra-ocular movements, no lid lag or retraction   HEENT: Mouth/Throat: Mucous membrane is moist. Oropharynx is clear. No adenopathy. Normal thyroid   Cardiovascular: RRR, S1, S2 normal. No " m/g/r   Pulmonary/Chest: CTAB. No wheezing or rales   Abdominal: +BS. Nontender to palpation. No organomegaly present.  Neurological: Alert. Normal speech  Extremities: No clubbing, cyanosis or inflammation   Skin: Vitiligo  Feet: Diminished vibratory sense bilateral and normal monofilament test.   Lymphatic: no cervical lymphadenopathy.  Psychological: appropriate mood     In House Labs:   Lab Results   Component Value Date    A1C 6.1 07/28/2017    A1C 6.9 10/27/2016    A1C 6.1 04/11/2016    A1C 6.0 10/09/2015    A1C 6.8 05/26/2015       TSH   Date Value Ref Range Status   10/27/2016 0.90 0.40 - 4.00 mU/L Final   05/26/2015 0.06 (L) 0.40 - 4.00 mU/L Final   03/17/2014 0.78 0.4 - 5.0 mU/L Final   01/18/2013 0.65 0.4 - 5.0 mU/L Final   08/20/2012 0.60 0.4 - 5.0 mU/L Final     T4 Free   Date Value Ref Range Status   11/09/2011 1.69 0.70 - 1.85 ng/dL Final       Creatinine   Date Value Ref Range Status   10/27/2016 1.00 0.52 - 1.04 mg/dL Final   ]    Recent Labs   Lab Test  10/27/16   0748  04/11/16   1013  05/26/15   0738  03/17/14   0735   CHOL  186  210*  167  165   HDL  56  63  46*  58   LDL  109*  132*  84  90   TRIG  104  77  183*  82   CHOLHDLRATIO   --    --   3.6  3.0       Work up  (8/17)   21 OH ab normal - this was done due to frequent lows.   No celiac disease antibodies (gluten sensitivity)  positive TPO.       Continuous glucose monitoring physician interpretation  Sensory use 30/30 days     low alert 85  High alert 280  Fall rate 3  [address] 3  Out of range 20 minutes    Average blood sugar 142, standard deviation 49  70% within range, 26% above range and 3% below range  Low risk of hypoglycemia, 1%  Calibration 1.6    Major patterns  Early AM hypoglycemia.     Labs:   Normal electrolytes and renal function.   A1c was 6.7  No albuminuria.   LDL was 118  Free T4 1.05  TSH 4.17   Hct 37.6    Assessment/Treatment Plan:      Maria M Marcus is a 52 year old year old female with    1. Type 1 Diabetes  with  retinopathy and mild neuropathy   2.  Left side numbness in fourth and fifth finger   3.  Hypothyroidism   4.  Vitiligo   5.  Possible scoliosis in a postmenopausal female.      Type 1 Diabetes with  retinopathy and mild neuropathy   Barriers to achieving glycemic goals  1. Overcorrection: She usually boluses before meal and if BG is higher, then she uses additional bolus for correction. This leads to lows.     2. Frequent basal adjustments: Main issue is activity level. Discussed using Temp basal setting.     5. Hypoglycemia unawareness and frequent hypoglycemia.  Noon basal to 0.55, Carb ratio at 30 at 5 pm to reduce HS lows.   Continue Dexcom.    low alert to 85 to avoid lows.   Discussed about overcorrection / temp basal.     4. Low A1c is likely driven by significant low values.   BG monitoring has improved lows.     Hypothyroidism on LT4 88 mcg daily  We will continue levothyroxine, check TSH levels on FU  Mild TSH fluctuation could be due to med interference     Vitiligo: She has multiple autoimmune disease negative celiac disease, ACTH 21 OH levels    Possible kyphosis:   Plan on DXA in future.     Return to clinic in 3-6 months..     Sidney Jarquin MD  [id number]  Endocrinology Service

## 2018-04-16 NOTE — NURSING NOTE
Chief Complaint   Patient presents with     RECHECK     F/U DIABETES      Maria Teresa Palomares CMA

## 2018-04-16 NOTE — PROGRESS NOTES
Endocrinology Clinic Visit       Chief Complaint: Diabetes       Subjective:         HPI:   Maria M is a 52 year old female who presents for follow up.   She has H/O type 1 diabetes diagnosed at age 2, hypothyroidism diagnosed in her 20s and vitiligo. She takes pravastatin for hyperlipidemia and lisinopril for hypertension.   She is transferring her care and was followed up by primary care physician for her diabetes in the past. Her main initial concern was recurrent hypoglycemia. INGRAM showed negative 21 hydroxylase Ab, positive TPO and negative TTG.     History of Diabetes  She was diagnosed with type 1 diabetes mellitus since he was [age] years old.  Over the years, she had developed diabetic retinopathy for which laser treatment was required.  She does not have neuropathy or nephropathy.  In the past several years, her A1c has been in good range between 6.1 and 7.5.  No macrovascular complications.    She was transitioned from basal bolus regimen to insulin pump that she has started about 3-4 days ago. She obtained a Dexcom CGM that has reduced the frequency of hypoglycemia to a great extent.     Low BG is now improved with sensor.  The major pattern was that was detected was hyperglycemia followed by hypoglycemia.  Usually she has blood sugars over 250 and uses correction and afterwards she develops hypoglycemia.    Interval history:   Developed significant rash around injection site from Humalog, Novolog.   This resolved with Apidra.   No new issues at this time.   Regular insulin - patient has used this in distant past.     Pump settings  Basal   0000 0.40  0300 0.575.  0500 0.60  1200 0.60  2000 0.225    Bolus / Correction Factor  0000 20 -- 85  0300 20 -- 75  0500 17 -- 60  1200 20 -- 65  2000 30 -- 85    Correction 1 for 60  Target 140    Family History  Maria M notes that her mother had breast cancer, a nephew and an uncle had T1DM but other AI diseases do not run in family. No history of thyroid cancer.      Meds  Aspart for T slim pump.     Prevention  Lipid  LDL Cholesterol Calculated   Date Value Ref Range Status   10/27/2016 109 (H) <100 mg/dL Final     Comment:     Above desirable:  100-129 mg/dl   Borderline High:  130-159 mg/dL   High:             160-189 mg/dL   Very high:       >189 mg/dl     04/11/2016 132 (H) <100 mg/dL Final     Comment:     Above desirable:  100-129 mg/dl   Borderline High:  130-159 mg/dL   High:             160-189 mg/dL   Very high:       >189 mg/dl         Medications     HMG CoA Reductase Inhibitors    pravastatin (PRAVACHOL) 10 MG tablet    pravastatin (PRAVACHOL) 10 MG tablet          Renal  Medication (Note: This includes the hypertensive combination subclass to make sure to show all ACEI/ARB's.)     ACE Inhibitors Sig    lisinopril (PRINIVIL/ZESTRIL) 10 MG tablet TAKE ONE TABLET BY MOUTH ONCE DAILY            Weight  Wt Readings from Last 4 Encounters:   07/28/17 69.9 kg (154 lb)   03/01/17 67.4 kg (148 lb 9.6 oz)   02/09/17 69.4 kg (153 lb)   10/28/16 66.2 kg (146 lb)     .    Meter Download Summary:   Only few days recorded for calibration.   Diet:  she usually counts her carbs in carb choices   Exercise  no scheduled exercise     Weight trend  Wt Readings from Last 4 Encounters:   07/28/17 69.9 kg (154 lb)   03/01/17 67.4 kg (148 lb 9.6 oz)   02/09/17 69.4 kg (153 lb)   10/28/16 66.2 kg (146 lb)       A1c today is  6.6  Lab Results   Component Value Date    A1C 6.1 07/28/2017    A1C 6.9 10/27/2016    A1C 6.1 04/11/2016    A1C 6.0 10/09/2015    A1C 6.8 05/26/2015          Allergies   Allergen Reactions     No Known Drug Allergies      Current Outpatient Prescriptions   Medication     insulin glulisine (APIDRA) 100 UNIT/ML injection     lisinopril (PRINIVIL/ZESTRIL) 10 MG tablet     insulin glulisine (APIDRA SOLOSTAR) 100 UNIT/ML soln     insulin glargine (LANTUS SOLOSTAR) 100 UNIT/ML injection     gabapentin (NEURONTIN) 300 MG capsule     levothyroxine (SYNTHROID/LEVOTHROID)  75 MCG tablet     insulin syringes, disposable, U-100 0.3 ML MISC     omeprazole (PRILOSEC) 20 MG CR capsule     lidocaine (XYLOCAINE) 2 % topical gel     blood glucose monitoring (TANA CONTOUR) test strip     NOVOLOG FLEXPEN 100 UNIT/ML soln     pravastatin (PRAVACHOL) 10 MG tablet     B-D U/F insulin pen needle     insulin glargine (BASAGLAR KWIKPEN) 100 UNIT/ML injection     IBUPROFEN PO     blood glucose monitoring (TANA CONTOUR MONITOR) meter device kit     RELION MINI PEN NEEDLES 31G X 6 MM     Cholecalciferol (VITAMIN D) 2000 UNITS tablet     insulin lispro (HUMALOG) 100 UNIT/ML injection     insulin aspart (NOVOLOG) 100 UNITS/ML injection     No current facility-administered medications for this visit.        Review of Systems     Constitutional: Negative for fever and unexpected weight change. Appetite  is normal  Head: no headache   Eye: no vision change/ loss of peripheral vision.   ENT: No throat congestion.   Respiratory: no cough   Cardiovascular: no chest pain or tachycardia  Gastrointestinal: Negative for vomiting, abdominal pain and diarrhea.  Genitourinary: No bladder problems.  Musculoskeletal: Negative for myalgias. No weakness.  Neurological: Negative for seizures and headaches.  Psychiatric/Behavioral: Negative for behavioral problem and dysphoric mood.    PMH as above.   PSH    Past Surgical History:   Procedure Laterality Date     C  DELIVERY ONLY      C-Sections x2     C NONSPECIFIC PROCEDURE      Hysterectomy - total (cervix removed but ovaries remain)     C STOMACH SURGERY PROCEDURE UNLISTED       C TOTAL ABDOM HYSTERECTOMY       COLONOSCOPY N/A 2016    Procedure: COLONOSCOPY;  Surgeon: Efren Perry MD;  Location:  GI      SACROPLASTY       LAMINECT/DISCECTOMY, CERVICAL  2007    C7-T1 hemilaminectomy, microdiscectomy, foraminotomy.     LASER YAG CAPSULOTOMY Right 10/23/2014    Procedure: LASER YAG CAPSULOTOMY;  Surgeon: Esteban Dorsey MD;  Location:  "[facility]     VITRECTOMY,STRIP EPIRETINAL MEMBRANE  06/24/09       Family History   Problem Relation Age of Onset     Hypertension Maternal Grandfather      EYE* Maternal Grandmother      Blood Disease Maternal Grandmother      Eye Disorder Maternal Grandmother      maccular degeneration     OSTEOPOROSIS Maternal Grandmother      Lipids Father      GASTROINTESTINAL DISEASE Father      ulcers     HEART DISEASE Father      Cardiovascular Father      heart attack     Hypertension Paternal Grandmother      Breast Cancer Mother      dx age 73 - terminal - mother had first mammo at this age     DIABETES Paternal Uncle      Type I       Social History     Social History     Marital status:      Spouse name: Ra     Number of children: 2     Years of education: 12     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     Drug use: No     Sexual activity: Yes     Partners: Male     Birth control/ protection: Surgical, Female Surgical      Comment: hysterectomy     Other Topics Concern     Parent/Sibling W/ Cabg, Mi Or Angioplasty Before 65f 55m? No     Social History Narrative       Objective:   /80  Pulse 71  Ht 1.499 m (4' 11\")  Wt 74.2 kg (163 lb 8 oz)  BMI 33.02 kg/m2   Constitutional: Appears well-developed and well-nourished. Active.   EYES: anicteric, normal extra-ocular movements, no lid lag or retraction   HEENT: Mouth/Throat: Mucous membrane is moist. Oropharynx is clear. No adenopathy. Normal thyroid   Cardiovascular: RRR, S1, S2 normal. No m/g/r   Pulmonary/Chest: CTAB. No wheezing or rales   Abdominal: +BS. Nontender to palpation. No organomegaly present.  Neurological: Alert. Normal speech  Extremities: No clubbing, cyanosis or inflammation   Skin: Vitiligo  Feet: Diminished vibratory sense bilateral and normal monofilament " test.   Lymphatic: no cervical lymphadenopathy.  Psychological: appropriate mood     In House Labs:   Lab Results   Component Value Date    A1C 6.1 07/28/2017    A1C 6.9 10/27/2016    A1C 6.1 04/11/2016    A1C 6.0 10/09/2015    A1C 6.8 05/26/2015       TSH   Date Value Ref Range Status   10/27/2016 0.90 0.40 - 4.00 mU/L Final   05/26/2015 0.06 (L) 0.40 - 4.00 mU/L Final   03/17/2014 0.78 0.4 - 5.0 mU/L Final   01/18/2013 0.65 0.4 - 5.0 mU/L Final   08/20/2012 0.60 0.4 - 5.0 mU/L Final     T4 Free   Date Value Ref Range Status   11/09/2011 1.69 0.70 - 1.85 ng/dL Final       Creatinine   Date Value Ref Range Status   10/27/2016 1.00 0.52 - 1.04 mg/dL Final   ]    Recent Labs   Lab Test  10/27/16   0748  04/11/16   1013  05/26/15   0738  03/17/14   0735   CHOL  186  210*  167  165   HDL  56  63  46*  58   LDL  109*  132*  84  90   TRIG  104  77  183*  82   CHOLHDLRATIO   --    --   3.6  3.0       Work up  (8/17)   21 OH ab normal - this was done due to frequent lows.   No celiac disease antibodies (gluten sensitivity)  positive TPO.       Continuous glucose monitoring physician interpretation  Sensory use 30/30 days     low alert 85  High alert 280  Fall rate 3  [address] 3  Out of range 20 minutes    Average blood sugar 142, standard deviation 49  70% within range, 26% above range and 3% below range  Low risk of hypoglycemia, 1%  Calibration 1.6    Major patterns  Early AM hypoglycemia.     Labs:   Normal electrolytes and renal function.   A1c was 6.7  No albuminuria.   LDL was 118  Free T4 1.05  TSH 4.17   Hct 37.6    Assessment/Treatment Plan:      Maria M Marcus is a 52 year old year old female with    1. Type 1 Diabetes with  retinopathy and mild neuropathy   2.  Left side numbness in fourth and fifth finger   3.  Hypothyroidism   4.  Vitiligo   5.  Possible scoliosis in a postmenopausal female.      Type 1 Diabetes with  retinopathy and mild neuropathy   Barriers to achieving glycemic goals  1.  Overcorrection: She usually boluses before meal and if BG is higher, then she uses additional bolus for correction. This leads to lows.     2. Frequent basal adjustments: Main issue is activity level. Discussed using Temp basal setting.     5. Hypoglycemia unawareness and frequent hypoglycemia.  Noon basal to 0.55, Carb ratio at 30 at 5 pm to reduce HS lows.   Continue Dexcom.    low alert to 85 to avoid lows.   Discussed about overcorrection / temp basal.     4. Low A1c is likely driven by significant low values.   BG monitoring has improved lows.     Hypothyroidism on LT4 88 mcg daily  We will continue levothyroxine, check TSH levels on FU  Mild TSH fluctuation could be due to med interference     Vitiligo: She has multiple autoimmune disease negative celiac disease, ACTH 21 OH levels    Possible kyphosis:   Plan on DXA in future.     Return to clinic in 3-6 months..     Sidney Jarquin MD  [id number]  Endocrinology Service

## 2018-04-16 NOTE — PATIENT INSTRUCTIONS
Fu in 6 months with labs.     If there are issues please call us back.     Reduce noon basal to 0.55  Carb ratio to 30 at 5 pm    CDE appt in 3 months.

## 2018-04-16 NOTE — MR AVS SNAPSHOT
After Visit Summary   4/16/2018    Maria M Marcus    MRN: [id number]           Patient Information     Date Of Birth          1965        Visit Information        Provider Department      4/16/2018 2:30 PM Sidney Jarquin MD [facility]        Today's Diagnoses     Type 1 diabetes mellitus without complication (H)    -  1      Care Instructions    Fu in 6 months with labs.     If there are issues please call us back.     Reduce noon basal to 0.55  Carb ratio to 30 at 5 pm    CDE appt in 3 months.           Follow-ups after your visit        Additional Services     DIABETES EDUCATOR REFERRAL       DIABETES SELF MANAGEMENT TRAINING (DSMT)      Your provider has referred you to Diabetes Education: [facility] [[email or web address]]: Diabetes Education - [facility] [phone] [email or web address]    A  will call you to make your appointment. If it has been more than 3 business days since your referral was placed, please call the above phone number to schedule.    Type of training and number of hours: Previous Diagnosis: Follow-up DSMT - 2 hours.    Diabetes Type: Type 1   Medicare covers: 10 hours of initial DSMT in 12 month period from the time of first visit, plus 2 hours of follow-up DSMT annually, and additional hours as requested for insulin training.         Diabetes Co-Morbidities: dyslipidemia               A1C Goal:  <7.0       A1C is: Lab Results       Component                Value               Date                       A1C                      6.7                 04/13/2018              MEDICAL NUTRITION THERAPY (MNT) for Diabetes    Medical Nutrition Therapy with a Registered Dietitian can be provided in coordination with Diabetes Self-Management Training to assist in achieving optimal diabetes management.    MNT Type and Hours: Do not initiate MNT at this time.                        Medicare will cover: 3 hours initial MNT in 12 month period after first visit, plus 2 hours of follow-up MNT annually        Diabetes Education Topics: Insulin Pump Therapy: Current Pump User    Special Educational Needs Requiring Individual DSMT: None      Please be aware that coverage of these services is subject to the terms and limitations of your health insurance plan.  Call member services at your health plan to determine Diabetes Self-Management Training (Codes  and ) and Medical Nutrition Therapy (Codes 54635 and 18617) benefits and ask which blood glucose monitor brands are covered by your plan.  Please bring the following with you to your appointment:    (1)  List of current medications   (2)  List of Blood Glucose Monitor brands that are covered by your insurance plan  (3)  Blood Glucose Monitor and log book  (4)   Food records for the 3 days prior to your visit    The Certified Diabetes Educator may make diabetes medication adjustments per the CDE Protocol and Collaborative Practice Agreement.                  Follow-up notes from your care team     Return in about 6 months (around 10/16/2018).      Who to contact     Please call your clinic at [phone] to:    Ask questions about your health    Make or cancel appointments    Discuss your medicines    Learn about your test results    Speak to your doctor            Additional Information About Your Visit        [employer] Information     [employer] gives you secure access to your electronic health record. If you see a primary care provider, you can also send messages to your care team and make appointments. If you have questions, please call your primary care clinic.  If you do not have a primary care provider, please call [phone] and they will assist you.      [employer] is an electronic gateway that provides easy, online access to your medical records. With [employer], you can request a clinic appointment, read your test results,  "renew a prescription or communicate with your care team.     To access your existing account, please contact your [facility] or call [phone] for assistance.        Care EveryWhere ID     This is your Care EveryWhere ID. This could be used by other organizations to access your [place] medical records  [id number]        Your Vitals Were     Pulse Height BMI (Body Mass Index)             71 1.499 m (4' 11\") 33.02 kg/m2          Blood Pressure from Last 3 Encounters:   04/16/18 160/80   02/01/18 136/84   12/08/17 138/80    Weight from Last 3 Encounters:   04/16/18 74.2 kg (163 lb 8 oz)   02/01/18 70.8 kg (156 lb)   12/08/17 68.7 kg (151 lb 6 oz)              We Performed the Following     DIABETES EDUCATOR REFERRAL        Primary Care Provider Office Phone # Fax #    Marcus Salgado PA-C [phone]       [address] [place], [place] [id number]        Equal Access to Services     [facility] : Hadii miguel ku hadasho Soomaali, waaxda luqadaha, qaybta kaalmada adeegyada, traci cruz . So [facility] [phone].    ATENCIÓN: Si habla español, tiene a ngo disposición servicios gratuitos de asistencia lingüística. Llame al [phone].    We comply with applicable federal civil rights laws and [place] laws. We do not discriminate on the basis of race, color, national origin, age, disability, sex, sexual orientation, or gender identity.            Thank you!     Thank you for choosing [facility]CR[facility]  for your care. Our goal is always to provide you with excellent care. Hearing back from our patients is one way we can continue to improve our services. Please take a few minutes to complete the written survey that you may receive in the mail after your visit with us. Thank you!             Your Updated Medication List - Protect others around you: Learn how to safely use, store and throw away your medicines at www.disposemymeds.org.    "       This list is accurate as of 4/16/18 11:59 PM.  Always use your most recent med list.                   Brand Name Dispense Instructions for use Diagnosis    * BASAGLAR 100 UNIT/ML injection     30 mL    Inject 11 Units Subcutaneous daily    Type 1 diabetes mellitus without complication (H)       * insulin glargine 100 UNIT/ML injection    LANTUS SOLOSTAR    15 mL    Take 6 units in the morning and 6 units in the evening.    Diabetes mellitus type 1 (H)       blood glucose monitoring meter device kit     1 kit    Use to test blood sugars 5 times daily or as directed.    Type 1 diabetes mellitus without complication (H)       blood glucose monitoring test strip    TANA CONTOUR    200 strip    Use to test blood sugar 6 times daily or as directed.    Type 1 diabetes mellitus without complication (H)       gabapentin 300 MG capsule    NEURONTIN    90 capsule    Take 1 tablet (300 mg) every night for 1 week, then 1 tablet twice daily for 1 week, then 1 tablet three times daily thereafter    Chronic pain syndrome       IBUPROFEN PO           * insulin glulisine 100 UNIT/ML soln    APIDRA SOLOSTAR    15 mL    1 unit per 20 gm carb with correction. approx 30 units daily    Type 1 diabetes mellitus without complication (H)       * insulin glulisine 100 UNIT/ML injection    APIDRA    20 mL    Take as directed via insulin pump. Total daily dose approx 55 units daily.    Diabetes mellitus type 1 (H)       insulin lispro 100 UNIT/ML injection    humaLOG    20 mL    [place] approved 01/29/18. Take as directed via insulin pump. Total daily dose approx. 55 units.    Diabetes mellitus type 1 (H)       insulin syringes (disposable) U-100 0.3 ML Misc     100 each    Keep on hand in case of pump failure. Use 4-5 syringes daily.    Diabetes mellitus type 1 (H)       levothyroxine 75 MCG tablet    SYNTHROID/LEVOTHROID    30 tablet    Take 1 tablet (75 mcg) by mouth daily    Hypothyroidism, unspecified type       lidocaine 2 % topical  gel    XYLOCAINE    30 mL    Apply topically 2 times daily as needed for moderate pain    Cervical radiculopathy, Cervicalgia       lisinopril 10 MG tablet    PRINIVIL/ZESTRIL    90 tablet    Take 1 tablet (10 mg) by mouth daily    Hypertension goal BP (blood pressure) < 140/90, Type 1 diabetes mellitus without complication (H)       * NovoLOG FLEXPEN 100 UNIT/ML injection   Generic drug:  insulin aspart     15 mL    INJECT 5 UNITS SUBCUTANEOUSLY BEFORE BREAKFAST,3 UNITS BEFORE LUNCH AND 2 UNITS BEFORE DINNER    Type 1 diabetes, HbA1c goal < 7% (H)       * insulin aspart 100 UNITS/ML injection    NovoLOG    20 mL    Take as directed via insulin pump. Total daily dose 40 units.    Type 1 diabetes mellitus without complication (H)       omeprazole 20 MG CR capsule    priLOSEC    30 capsule    TAKE ONE CAPSULE BY MOUTH ONCE DAILY    Gastroesophageal reflux disease, esophagitis presence not specified       pravastatin 10 MG tablet    PRAVACHOL    30 tablet    TAKE ONE-HALF TABLET BY MOUTH ONCE DAILY    Hyperlipidemia LDL goal <100       * RELION MINI PEN NEEDLES 31G X 6 MM   Generic drug:  insulin pen needle     400 each    USE ONE  4 TIMES DAILY    Type 1 diabetes, HbA1c goal < 7% (H)       * B-D U/F 31G X 5 MM   Generic drug:  insulin pen needle     400 each    USE ONE PEN NEEDLE 4 TIMES DAILY OR AS DIRECTED    Type 1 diabetes, HbA1c goal < 7% (H)       vitamin D 2000 units tablet     100 tablet    Take 2,000 Units by mouth daily.    Vitamin D deficiency disease       * Notice:  This list has 8 medication(s) that are the same as other medications prescribed for you. Read the directions carefully, and ask your doctor or other care provider to review them with you.

## 2018-04-24 ENCOUNTER — MYC MEDICAL ADVICE (OUTPATIENT)
Dept: FAMILY MEDICINE | Facility: OTHER | Age: 53
End: 2018-04-24

## 2018-04-24 ENCOUNTER — MEDICAL CORRESPONDENCE (OUTPATIENT)
Dept: HEALTH INFORMATION MANAGEMENT | Facility: CLINIC | Age: 53
End: 2018-04-24

## 2018-04-24 ENCOUNTER — OFFICE VISIT (OUTPATIENT)
Dept: NURSING | Facility: CLINIC | Age: 53
End: 2018-04-24
Payer: COMMERCIAL

## 2018-04-24 DIAGNOSIS — E78.5 HYPERLIPIDEMIA LDL GOAL <100: ICD-10-CM

## 2018-04-24 DIAGNOSIS — E11.9 DIABETES MELLITUS WITHOUT COMPLICATION (H): Primary | ICD-10-CM

## 2018-04-24 DIAGNOSIS — E10.9 DIABETES MELLITUS TYPE 1 (H): Primary | ICD-10-CM

## 2018-04-24 PROCEDURE — 99211 OFF/OP EST MAY X REQ PHY/QHP: CPT

## 2018-04-24 PROCEDURE — G0108 DIAB MANAGE TRN  PER INDIV: HCPCS

## 2018-04-24 RX ORDER — PRAVASTATIN SODIUM 10 MG
10 TABLET ORAL DAILY
Qty: 90 TABLET | Refills: 3 | Status: SHIPPED | OUTPATIENT
Start: 2018-04-24 | End: 2019-04-26

## 2018-04-24 NOTE — MR AVS SNAPSHOT
After Visit Summary   4/24/2018    Maria M Marcus    MRN: [id number]           Patient Information     Date Of Birth          1965        Visit Information        Provider Department      4/24/2018 9:00 AM Provider, [facility]        Today's Diagnoses     Diabetes mellitus without complication (H)    -  1       Follow-ups after your visit        Your next 10 appointments already scheduled     Oct 12, 2018 11:30 AM CDT   Return Visit with Sidney Jarquin MD   [facility] ([facility]    [address] [place] [id number] [phone]              Who to contact     If you have questions or need follow up information about today's clinic visit or your schedule please contact [facility] directly at [phone].  Normal or non-critical lab and imaging results will be communicated to you by [employer], letter or phone within 4 business days after the clinic has received the results. If you do not hear from us within 7 days, please contact the clinic through [employer] or phone. If you have a critical or abnormal lab result, we will notify you by phone as soon as possible.  Submit refill requests through [employer] or call your pharmacy and they will forward the refill request to us. Please allow 3 business days for your refill to be completed.          Additional Information About Your Visit        MyChart Information     [employer] gives you secure access to your electronic health record. If you see a primary care provider, you can also send messages to your care team and make appointments. If you have questions, please call your primary care clinic.  If you do not have a primary care provider, please call [phone] and they will assist you.      [employer] is an electronic gateway that provides easy, online access to your medical records. With [employer], you can request a clinic appointment, read your  test results, renew a prescription or communicate with your care team.     To access your existing account, please contact your [facility] or call [phone] for assistance.        Care EveryWhere ID     This is your Care EveryWhere ID. This could be used by other organizations to access your [place] medical records  [id number]         Blood Pressure from Last 3 Encounters:   04/16/18 160/80   02/01/18 136/84   12/08/17 138/80    Weight from Last 3 Encounters:   04/16/18 74.2 kg (163 lb 8 oz)   02/01/18 70.8 kg (156 lb)   12/08/17 68.7 kg (151 lb 6 oz)              We Performed the Following     DIABETES EDUCATION - Individual  []          Today's Medication Changes          These changes are accurate as of 4/24/18 11:59 PM.  If you have any questions, ask your nurse or doctor.               Start taking these medicines.        Dose/Directions    insulin regular 100 UNIT/ML injection   Commonly known as:  NovoLIN R VIAL   Used for:  Diabetes mellitus type 1 (H)   Started by:  Jessica Demarco        Use as directed via insulin pump. Total daily dose approx 55 units.   Quantity:  10 mL   Refills:  1         These medicines have changed or have updated prescriptions.        Dose/Directions    pravastatin 10 MG tablet   Commonly known as:  PRAVACHOL   This may have changed:  See the new instructions.   Used for:  Hyperlipidemia LDL goal <100   Changed by:  Marcus Salgado PA-C        Dose:  10 mg   Take 1 tablet (10 mg) by mouth daily   Quantity:  90 tablet   Refills:  3            Where to get your medicines      These medications were sent to [facility] [address] [place] [address] [facility]  [address] [facility] [address]     Phone:  [phone]     insulin regular 100 UNIT/ML injection    pravastatin 10 MG tablet                Primary Care Provider Office Phone # Fax #    Marcus Salgado PA-C [phone]       [address] [place] [address] [address]  [place]  [facility] [address]        Equal Access to Services     [facility] : Hadii aad michaela tana Perryali, waaxda luqadaha, qaybta kaalmada elias, traci lindsey. So [facility] [phone].    ATENCIÓN: Si habla español, tiene a ngo disposición servicios gratuitos de asistencia lingüística. Llame al [phone].    We comply with applicable federal civil rights laws and [place] laws. We do not discriminate on the basis of race, color, national origin, age, disability, sex, sexual orientation, or gender identity.            Thank you!     Thank you for choosing [facility]  for your care. Our goal is always to provide you with excellent care. Hearing back from our patients is one way we can continue to improve our services. Please take a few minutes to complete the written survey that you may receive in the mail after your visit with us. Thank you!             Your Updated Medication List - Protect others around you: Learn how to safely use, store and throw away your medicines at www.disposemymeds.org.          This list is accurate as of 4/24/18 11:59 PM.  Always use your most recent med list.                   Brand Name Dispense Instructions for use Diagnosis    * BASAGLAR 100 UNIT/ML injection     30 mL    Inject 11 Units Subcutaneous daily    Type 1 diabetes mellitus without complication (H)       * insulin glargine 100 UNIT/ML injection    LANTUS SOLOSTAR    15 mL    Take 6 units in the morning and 6 units in the evening.    Diabetes mellitus type 1 (H)       blood glucose monitoring meter device kit     1 kit    Use to test blood sugars 5 times daily or as directed.    Type 1 diabetes mellitus without complication (H)       blood glucose monitoring test strip    TANA CONTOUR    200 strip    Use to test blood sugar 6 times daily or as directed.    Type 1 diabetes mellitus without complication (H)       gabapentin 300 MG capsule    NEURONTIN    90 capsule    Take 1 tablet (300  mg) every night for 1 week, then 1 tablet twice daily for 1 week, then 1 tablet three times daily thereafter    Chronic pain syndrome       IBUPROFEN PO           * insulin glulisine 100 UNIT/ML soln    APIDRA SOLOSTAR    15 mL    1 unit per 20 gm carb with correction. approx 30 units daily    Type 1 diabetes mellitus without complication (H)       * insulin glulisine 100 UNIT/ML injection    APIDRA    20 mL    Take as directed via insulin pump. Total daily dose approx 55 units daily.    Diabetes mellitus type 1 (H)       insulin lispro 100 UNIT/ML injection    humaLOG    20 mL    PA approved 01/29/18. Take as directed via insulin pump. Total daily dose approx. 55 units.    Diabetes mellitus type 1 (H)       insulin regular 100 UNIT/ML injection    NovoLIN R VIAL    10 mL    Use as directed via insulin pump. Total daily dose approx 55 units.    Diabetes mellitus type 1 (H)       insulin syringes (disposable) U-100 0.3 ML Misc     100 each    Keep on hand in case of pump failure. Use 4-5 syringes daily.    Diabetes mellitus type 1 (H)       levothyroxine 75 MCG tablet    SYNTHROID/LEVOTHROID    30 tablet    Take 1 tablet (75 mcg) by mouth daily    Hypothyroidism, unspecified type       lidocaine 2 % topical gel    XYLOCAINE    30 mL    Apply topically 2 times daily as needed for moderate pain    Cervical radiculopathy, Cervicalgia       lisinopril 10 MG tablet    PRINIVIL/ZESTRIL    90 tablet    Take 1 tablet (10 mg) by mouth daily    Hypertension goal BP (blood pressure) < 140/90, Type 1 diabetes mellitus without complication (H)       * NovoLOG FLEXPEN 100 UNIT/ML injection   Generic drug:  insulin aspart     15 mL    INJECT 5 UNITS SUBCUTANEOUSLY BEFORE BREAKFAST,3 UNITS BEFORE LUNCH AND 2 UNITS BEFORE DINNER    Type 1 diabetes, HbA1c goal < 7% (H)       * insulin aspart 100 UNITS/ML injection    NovoLOG    20 mL    Take as directed via insulin pump. Total daily dose 40 units.    Type 1 diabetes mellitus without  complication (H)       omeprazole 20 MG CR capsule    priLOSEC    30 capsule    TAKE ONE CAPSULE BY MOUTH ONCE DAILY    Gastroesophageal reflux disease, esophagitis presence not specified       pravastatin 10 MG tablet    PRAVACHOL    90 tablet    Take 1 tablet (10 mg) by mouth daily    Hyperlipidemia LDL goal <100       * RELION MINI PEN NEEDLES 31G X 6 MM   Generic drug:  insulin pen needle     400 each    USE ONE  4 TIMES DAILY    Type 1 diabetes, HbA1c goal < 7% (H)       * B-D U/F 31G X 5 MM   Generic drug:  insulin pen needle     400 each    USE ONE PEN NEEDLE 4 TIMES DAILY OR AS DIRECTED    Type 1 diabetes, HbA1c goal < 7% (H)       vitamin D 2000 units tablet     100 tablet    Take 2,000 Units by mouth daily.    Vitamin D deficiency disease       * Notice:  This list has 8 medication(s) that are the same as other medications prescribed for you. Read the directions carefully, and ask your doctor or other care provider to review them with you.

## 2018-04-24 NOTE — TELEPHONE ENCOUNTER
Looks like she just saw her endocrinologist this month and that is when labs were done so not sure if you would need her to come in and see you to do any adjusting.  Please advise.

## 2018-05-10 NOTE — PROGRESS NOTES
"  Diabetes Self Management Training: Individual Review Visit    Maria M Marcus presents today for evaluation of glucose control related to Type 1 diabetes.    She is accompanied by self    Patient's diabetes management related comments/concerns: hypoglycemia    Patient's emotional response to diabetes: expresses readiness to learn    Patient would like this visit to be focused around the following diabetes-related behaviors and goals: Taking Medication    ASSESSMENT:  Patient presents to clinic for evaluation and review of bg levels and to discuss the possibility of using \"R\" insulin in pump. Pt has developed an allergy to Novolog (aspart) and Humalog (lispro) insulin. Pt was switched to Apridra and has been able to tolerate this insulin without issue. However, Apidra insulin is known to clog infusion set tubing and site must be changed every 2 days to avoid clogging. Pt wishes to try \"R\" insulin to see if she can tolerate it and therefore can resume changing infusion site every 3 days.      Current Diabetes Management per Patient:  Taking diabetes medications? Yes: Apidra insulin via T-Slim pump. Wears Dexcom sensor.     Diabetes Medication(s)     Insulin Sig    insulin aspart (NOVOLOG) 100 UNITS/ML injection Take as directed via insulin pump. Total daily dose 40 units.     Patient not taking:  Reported on 4/16/2018    insulin glargine (BASAGLAR KWIKPEN) 100 UNIT/ML injection Inject 11 Units Subcutaneous daily    insulin glargine (LANTUS SOLOSTAR) 100 UNIT/ML injection Take 6 units in the morning and 6 units in the evening.    insulin glulisine (APIDRA SOLOSTAR) 100 UNIT/ML soln 1 unit per 20 gm carb with correction. approx 30 units daily    insulin glulisine (APIDRA) 100 UNIT/ML injection Take as directed via insulin pump. Total daily dose approx 55 units daily.    insulin lispro (HUMALOG) 100 UNIT/ML injection PA approved 01/29/18. Take as directed via insulin pump. Total daily dose approx. 55 units.     " "Patient not taking:  Reported on 4/16/2018    insulin regular (NOVOLIN R VIAL) 100 UNIT/ML injection Use as directed via insulin pump. Total daily dose approx 55 units.    NOVOLOG FLEXPEN 100 UNIT/ML soln INJECT 5 UNITS SUBCUTANEOUSLY BEFORE BREAKFAST,3 UNITS BEFORE LUNCH AND 2 UNITS BEFORE DINNER        Patient glucose self monitoring as follows: average of 8x daily   BG results: per meter download:  Ave bg for 2 week period: 160  35% readings above target, 59% within target, 6% below target. Of note: above target bg levels are mostly due to insulin pump tubing getting clogged, however, there was an increased frquency of hyperglycemia noted between 3am-5am not due to clogged tube.       BG values are: In goal  Patient's most recent   Lab Results   Component Value Date    A1C 6.7 04/13/2018    is meeting goal of <7.0    Vitals:  There were no vitals taken for this visit.  Estimated body mass index is 33.02 kg/(m^2) as calculated from the following:    Height as of 4/16/18: 1.499 m (4' 11\").    Weight as of 4/16/18: 74.2 kg (163 lb 8 oz).   Last 3 BP:   BP Readings from Last 3 Encounters:   04/16/18 160/80   02/01/18 136/84   12/08/17 138/80       History   Smoking Status     Never Smoker   Smokeless Tobacco     Never Used     Comment: no exposure       Labs:  Lab Results   Component Value Date    A1C 6.7 04/13/2018     Lab Results   Component Value Date     04/13/2018     Lab Results   Component Value Date     04/13/2018     HDL Cholesterol   Date Value Ref Range Status   04/13/2018 57 >49 mg/dL Final   ]  GFR Estimate   Date Value Ref Range Status   04/13/2018 79 >60 mL/min/1.7m2 Final     Comment:     Non  GFR Calc     GFR Estimate If Black   Date Value Ref Range Status   04/13/2018 >90 >60 mL/min/1.7m2 Final     Comment:      GFR Calc     Lab Results   Component Value Date    CR 0.77 04/13/2018     No results found for: MICROALBUMIN    Socio/Economic " Considerations:    Support system: spouse/significant other    Health Beliefs and Attitudes:   Patient Activation Measure Survey Score:  LAVON Score (Last Two) 1/3/2011   LAVON Raw Score 50   Activation Score 86.3   LAVON Level 4     Stage of Change: ACTION (Actively working towards change)    Diabetes knowledge and skills assessment:     Patient is knowledgeable in diabetes management concepts related to: Healthy Eating, Being Active, Monitoring, Taking Medication, Problem Solving, Reducing Risks and Healthy Coping    Patient needs further education on the following diabetes management concepts: None. Pt is very pro-active with her diabetes self-mgmt care.     Barriers to Learning Assessment: No Barriers identified    Based on learning assessment above, most appropriate setting for further diabetes education would be: Individual setting.    INTERVENTION:   Education provided today on:  AADE Self-Care Behaviors:  Taking Medication: Pump setting changes made. Rx for Humulin R sent to pharmacy.    Opportunities for ongoing education and support in diabetes-self management were discussed.    Pt verbalized understanding of concepts discussed and recommendations provided today.       Education Materials Provided:  No new materials provided today    PLAN:  Taking Medication:  - Change Active Ins Time from 2 to 3 hrs. Change 3am-5am basal rate from 0.500 to 0.550  - Rx sent to pharmacy Humulin R. Once R is put in new reservoir, change Active Ins Time from 3 to 5 hrs.     FOLLOW-UP:  Keep f/u appt with Endo in Oct.     Ongoing plan for education and support: as needed.     Time Spent:60 minutes  Encounter Type: Individual    Maria M Marcus comes into clinic today at the request of DR Jarquin, Ordering Provider for Pt Teaching and review of bg levels.    This service provided today was under the supervising provider of the day Dr Vela.    Jessica Demarco, RN, BSN, CDE   [facility]

## 2018-05-11 ENCOUNTER — MEDICAL CORRESPONDENCE (OUTPATIENT)
Dept: HEALTH INFORMATION MANAGEMENT | Facility: CLINIC | Age: 53
End: 2018-05-11

## 2018-05-17 ENCOUNTER — TRANSFERRED RECORDS (OUTPATIENT)
Dept: HEALTH INFORMATION MANAGEMENT | Facility: CLINIC | Age: 53
End: 2018-05-17

## 2018-05-20 ENCOUNTER — MYC MEDICAL ADVICE (OUTPATIENT)
Dept: FAMILY MEDICINE | Facility: OTHER | Age: 53
End: 2018-05-20

## 2018-05-21 ENCOUNTER — OFFICE VISIT (OUTPATIENT)
Dept: URGENT CARE | Facility: RETAIL CLINIC | Age: 53
End: 2018-05-21
Payer: COMMERCIAL

## 2018-05-21 VITALS
DIASTOLIC BLOOD PRESSURE: 79 MMHG | TEMPERATURE: 97.6 F | SYSTOLIC BLOOD PRESSURE: 133 MMHG | HEART RATE: 74 BPM | OXYGEN SATURATION: 97 %

## 2018-05-21 DIAGNOSIS — J01.90 ACUTE SINUSITIS WITH SYMPTOMS > 10 DAYS: Primary | ICD-10-CM

## 2018-05-21 PROCEDURE — 99213 OFFICE O/P EST LOW 20 MIN: CPT | Performed by: PHYSICIAN ASSISTANT

## 2018-05-21 NOTE — MR AVS SNAPSHOT
After Visit Summary   5/21/2018    Maria M Marcus    MRN: [id number]           Patient Information     Date Of Birth          1965        Visit Information        Provider Department      5/21/2018 11:10 AM Meghana Guan PA-C [facility]        Today's Diagnoses     Acute sinusitis with symptoms > 10 days    -  1      Care Instructions    Take antibiotic as directed  Apply warm facial compresses/packs for 5-10 minutes three times daily.  Drink plenty of fluids- 6 to 10 glasses of liquids each day. Rest.  Saline drops or nasal sprays as needed.   May use netti pot as needed. Do not use tap water. May use filtered or distilled water.  Steam treatments or humidifier.  Mucinex (guaifenesin) to thin out secretions.  Follow up at your primary care clinic for increasing pain, high fever, vision changes, worsening symptoms, or no relief from symptoms after 7-10 days.  Please follow up with primary care provider if not improving, worsening or new symptoms or for any adverse reactions to medications.               Follow-ups after your visit        Your next 10 appointments already scheduled     Oct 12, 2018 11:30 AM CDT   [facility] Visit with Sidney [facility]quin MD   [facility]    [address] [place] [id number] [phone]              Who to contact     You can reach your care team any time of the day by calling [phone].  Notification of test results:  If you have an abnormal lab result, we will notify you by phone as soon as possible.         Additional Information About Your Visit        MyChart Information     [facility] gives you secure access to your electronic health record. If you see a primary care provider, you can also send messages to your care team and make appointments. If you have questions, please call your primary care clinic.  If you do not have a primary care provider, please  call [phone] and they will assist you.        Care EveryWhere ID     This is your Care EveryWhere ID. This could be used by other organizations to access your [facility] medical records  [id number]        Your Vitals Were     Pulse Temperature Pulse Oximetry             74 97.6  F (36.4  C) (Tympanic) 97%          Blood Pressure from Last 3 Encounters:   05/21/18 133/79   04/16/18 160/80   02/01/18 136/84    Weight from Last 3 Encounters:   04/16/18 163 lb 8 oz (74.2 kg)   02/01/18 156 lb (70.8 kg)   12/08/17 151 lb 6 oz (68.7 kg)              Today, you had the following     No orders found for display         Today's Medication Changes          These changes are accurate as of 5/21/18 11:31 AM.  If you have any questions, ask your nurse or doctor.               Start taking these medicines.        Dose/Directions    amoxicillin-clavulanate 875-125 MG per tablet   Commonly known as:  AUGMENTIN   Used for:  Acute sinusitis with symptoms > 10 days        Dose:  1 tablet   Take 1 tablet by mouth 2 times daily for 10 days   Quantity:  20 tablet   Refills:  0            Where to get your medicines      These medications were sent to [facility] [address] [place] [id number] [address] [address]     Phone:  [phone]     amoxicillin-clavulanate 875-125 MG per tablet                Primary Care Provider Office Phone # Fax #    Marcus Salgado PA-C [phone]       [address] [place] [address]  [facility] [address]        Equal Access to Services     [facility]: Hadii miguel renteriao Sobarb, waaxda luqadaha, qaybta kaalmareji murrayLackey Memorial Hospitalin hayaan adeeg kharash la'aan . So [facility] [phone].    ATENCIÓN: Si habla español, tiene a ngo disposición servicios gratuitos de asistencia lingüística. Guille al [phone].    We comply with applicable federal civil rights laws and [place] laws. We do not discriminate on the basis of race, color, national origin, age,  disability, sex, sexual orientation, or gender identity.            Thank you!     Thank you for choosing [facility]  for your care. Our goal is always to provide you with excellent care. Hearing back from our patients is one way we can continue to improve our services. Please take a few minutes to complete the written survey that you may receive in the mail after your visit with us. Thank you!             Your Updated Medication List - Protect others around you: Learn how to safely use, store and throw away your medicines at www.disposemymeds.org.          This list is accurate as of 5/21/18 11:31 AM.  Always use your most recent med list.                   Brand Name Dispense Instructions for use Diagnosis    amoxicillin-clavulanate 875-125 MG per tablet    AUGMENTIN    20 tablet    Take 1 tablet by mouth 2 times daily for 10 days    Acute sinusitis with symptoms > 10 days       BASAGLAR 100 UNIT/ML injection     30 mL    Inject 11 Units Subcutaneous daily    Type 1 diabetes mellitus without complication (H)       blood glucose monitoring meter device kit     1 kit    Use to test blood sugars 5 times daily or as directed.    Type 1 diabetes mellitus without complication (H)       blood glucose monitoring test strip    TANA CONTOUR    200 strip    Use to test blood sugar 6 times daily or as directed.    Type 1 diabetes mellitus without complication (H)       gabapentin 300 MG capsule    NEURONTIN    90 capsule    Take 1 tablet (300 mg) every night for 1 week, then 1 tablet twice daily for 1 week, then 1 tablet three times daily thereafter    Chronic pain syndrome       * insulin glulisine 100 UNIT/ML soln    APIDRA SOLOSTAR    15 mL    1 unit per 20 gm carb with correction. approx 30 units daily    Type 1 diabetes mellitus without complication (H)       * insulin glulisine 100 UNIT/ML injection    APIDRA    20 mL    Take as directed via insulin pump. Total daily dose approx 55 units daily.     Diabetes mellitus type 1 (H)       insulin syringes (disposable) U-100 0.3 ML Misc     100 each    Keep on hand in case of pump failure. Use 4-5 syringes daily.    Diabetes mellitus type 1 (H)       levothyroxine 75 MCG tablet    SYNTHROID/LEVOTHROID    30 tablet    Take 1 tablet (75 mcg) by mouth daily    Hypothyroidism, unspecified type       lidocaine 2 % topical gel    XYLOCAINE    30 mL    Apply topically 2 times daily as needed for moderate pain    Cervical radiculopathy, Cervicalgia       lisinopril 10 MG tablet    PRINIVIL/ZESTRIL    90 tablet    Take 1 tablet (10 mg) by mouth daily    Hypertension goal BP (blood pressure) < 140/90, Type 1 diabetes mellitus without complication (H)       omeprazole 20 MG CR capsule    priLOSEC    30 capsule    TAKE ONE CAPSULE BY MOUTH ONCE DAILY    Gastroesophageal reflux disease, esophagitis presence not specified       pravastatin 10 MG tablet    PRAVACHOL    90 tablet    Take 1 tablet (10 mg) by mouth daily    Hyperlipidemia LDL goal <100       * RELION MINI PEN NEEDLES 31G X 6 MM   Generic drug:  insulin pen needle     400 each    USE ONE  4 TIMES DAILY    Type 1 diabetes, HbA1c goal < 7% (H)       * B-D U/F 31G X 5 MM   Generic drug:  insulin pen needle     400 each    USE ONE PEN NEEDLE 4 TIMES DAILY OR AS DIRECTED    Type 1 diabetes, HbA1c goal < 7% (H)       vitamin D 2000 units tablet     100 tablet    Take 2,000 Units by mouth daily.    Vitamin D deficiency disease       * Notice:  This list has 4 medication(s) that are the same as other medications prescribed for you. Read the directions carefully, and ask your doctor or other care provider to review them with you.

## 2018-05-21 NOTE — PATIENT INSTRUCTIONS
Take antibiotic as directed  Apply warm facial compresses/packs for 5-10 minutes three times daily.  Drink plenty of fluids- 6 to 10 glasses of liquids each day. Rest.  Saline drops or nasal sprays as needed.   May use netti pot as needed. Do not use tap water. May use filtered or distilled water.  Steam treatments or humidifier.  Mucinex (guaifenesin) to thin out secretions.  Follow up at your primary care clinic for increasing pain, high fever, vision changes, worsening symptoms, or no relief from symptoms after 7-10 days.  Please follow up with primary care provider if not improving, worsening or new symptoms or for any adverse reactions to medications.

## 2018-05-21 NOTE — PROGRESS NOTES
Chief Complaint   Patient presents with     Sinus Problem     sinus pain and pressure x 2 weeks, no fevers     Cough     x 1-2 days, right ear pain and right side of throat hurts x 1-2 days       SUBJECTIVE:  Maria M Marcus is a 53 year old female here with concerns about sinus infection.  She states onset of symptoms were 2 weeks ago.  She has had maxillary pressure. Course of illness is worsening. Severity moderate  Current and Associated symptoms: nasal congestion, rhinorrhea, R ear pain, facial pain/pressure, tooth pain and headache, cough x 2 days  Predisposing factors include HX of recurrent sinusitis and recent illness, type 1 DM. Recent treatment has included:steam treatment, fluids, rest    Past Medical History:   Diagnosis Date     Cervical radiculopathy      Diabetic eye exam (H) 12/14/11     DISC DIS NEC/NOS-LUMBAR 4/7/2007     Frozen shoulder syndrome 6/16/2011     Hyperlipidemia LDL goal <100 10/31/2010     Hypertension 1/3/2011     Type 1 diabetes, HbA1c goal < 7% (H) dx 1967     Unspecified hypothyroidism      Current Outpatient Prescriptions   Medication Sig Dispense Refill     B-D U/F insulin pen needle USE ONE PEN NEEDLE 4 TIMES DAILY OR AS DIRECTED 400 each 5     blood glucose monitoring (TAAN CONTOUR MONITOR) meter device kit Use to test blood sugars 5 times daily or as directed. 1 kit 0     blood glucose monitoring (TANA CONTOUR) test strip Use to test blood sugar 6 times daily or as directed. 200 strip 3     Cholecalciferol (VITAMIN D) 2000 UNITS tablet Take 2,000 Units by mouth daily. 100 tablet 3     insulin glulisine (APIDRA SOLOSTAR) 100 UNIT/ML soln 1 unit per 20 gm carb with correction. approx 30 units daily 15 mL 3     insulin glulisine (APIDRA) 100 UNIT/ML injection Take as directed via insulin pump. Total daily dose approx 55 units daily. 20 mL 11     insulin syringes, disposable, U-100 0.3 ML MISC Keep on hand in case of pump failure. Use 4-5 syringes daily. 100 each 1      levothyroxine (SYNTHROID/LEVOTHROID) 75 MCG tablet Take 1 tablet (75 mcg) by mouth daily 30 tablet 5     lisinopril (PRINIVIL/ZESTRIL) 10 MG tablet Take 1 tablet (10 mg) by mouth daily 90 tablet 2     omeprazole (PRILOSEC) 20 MG CR capsule TAKE ONE CAPSULE BY MOUTH ONCE DAILY 30 capsule 2     pravastatin (PRAVACHOL) 10 MG tablet Take 1 tablet (10 mg) by mouth daily 90 tablet 3     RELION MINI PEN NEEDLES 31G X 6 MM USE ONE  4 TIMES DAILY 400 each 8     gabapentin (NEURONTIN) 300 MG capsule Take 1 tablet (300 mg) every night for 1 week, then 1 tablet twice daily for 1 week, then 1 tablet three times daily thereafter (Patient not taking: Reported on 5/21/2018) 90 capsule 5     insulin glargine (BASAGLAR KWIKPEN) 100 UNIT/ML injection Inject 11 Units Subcutaneous daily (Patient not taking: Reported on 5/21/2018) 30 mL 1     lidocaine (XYLOCAINE) 2 % topical gel Apply topically 2 times daily as needed for moderate pain (Patient not taking: Reported on 5/21/2018) 30 mL 2     [DISCONTINUED] insulin glargine (LANTUS SOLOSTAR) 100 UNIT/ML injection Take 6 units in the morning and 6 units in the evening. (Patient not taking: Reported on 5/21/2018) 15 mL 3        Allergies   Allergen Reactions     Novolog [Insulin Aspart] Swelling     Itching, rash, contact dermatitis     Humalog [Insulin Lispro] Rash     Contact dermatitis        History   Smoking Status     Never Smoker   Smokeless Tobacco     Never Used     Comment: no exposure       ROS:  CONSTITUTIONAL:NEGATIVE for fever, chills  ENT/MOUTH: POSITIVE for ear pain right, nasal congestion, rhinorrhea-purulent, sinus pressure, sore throat and tooth pain   RESP:POSITIVE for cough-non productive and NEGATIVE for wheezing    OBJECTIVE:  /79 (BP Location: Left arm)  Pulse 74  Temp 97.6  F (36.4  C) (Tympanic)  SpO2 97%  Exam:GENERAL APPEARANCE: healthy, alert and no distress  EYES:  conjunctiva clear  HENT: R TM dull, serous effusion, L TM gray without effusion, nasal  turbinates erythematous, swollen, rhinorrhea white, oral mucous membranes moist, no erythema noted, maxillary sinus tenderness. Post nasal drainage noted in the pharynx.  NECK: supple, nontender, no lymphadenopathy  RESP: lungs clear to auscultation - no rales, rhonchi or wheezes  CV: regular rates and rhythm, normal S1 S2, no murmur noted  SKIN: no suspicious lesions or rashes    ASSESSMENT:  (J01.90) Acute sinusitis with symptoms > 10 days  (primary encounter diagnosis)    PLAN:  Plan: amoxicillin-clavulanate (AUGMENTIN) 875-125 MG per tablet  Take antibiotic as directed  Apply warm facial compresses/packs for 5-10 minutes three times daily.  Drink plenty of fluids- 6 to 10 glasses of liquids each day. Rest.  Saline drops or nasal sprays as needed.   May use netti pot as needed. Do not use tap water. May use filtered or distilled water.  Steam treatments or humidifier.  Mucinex (guaifenesin) to thin out secretions.  Follow up at your primary care clinic for increasing pain, high fever, vision changes, worsening symptoms, or no relief from symptoms after 7-10 days.  Please follow up with primary care provider if not improving, worsening or new symptoms or for any adverse reactions to medications.     Meghana Guan PA-C  [facility]

## 2018-05-21 NOTE — TELEPHONE ENCOUNTER
Left message, please triage HA, sore throat, and right ear pain. Also responded via [employer].    Sophia Celeste, RN, BSN

## 2018-05-21 NOTE — TELEPHONE ENCOUNTER
Spoke with patient.  States that [facility] seen [facility] this [facility].  No further questions at this time.  Asim Holloway, RN, BSN

## 2018-05-23 ENCOUNTER — MYC MEDICAL ADVICE (OUTPATIENT)
Dept: INTERNAL MEDICINE | Facility: CLINIC | Age: 53
End: 2018-05-23

## 2018-05-23 ENCOUNTER — MYC MEDICAL ADVICE (OUTPATIENT)
Dept: FAMILY MEDICINE | Facility: OTHER | Age: 53
End: 2018-05-23

## 2018-05-23 DIAGNOSIS — E10.9 TYPE 1 DIABETES MELLITUS WITHOUT COMPLICATION (H): Primary | ICD-10-CM

## 2018-05-23 RX ORDER — LANCETS
1 EACH MISCELLANEOUS 3 TIMES DAILY
Qty: 100 EACH | Refills: 5 | Status: SHIPPED | OUTPATIENT
Start: 2018-05-23 | End: 2018-10-12

## 2018-05-23 RX ORDER — LANCETS
EACH MISCELLANEOUS
Refills: 5 | COMMUNITY
Start: 2018-02-26 | End: 2018-05-23

## 2018-05-23 NOTE — TELEPHONE ENCOUNTER
Routing refill request to provider for review/approval because:  Medication is reported/historical    Carmen Merchant RN

## 2018-05-23 NOTE — TELEPHONE ENCOUNTER
Image attached for Softclix Lancets.    Was not on med list - but imported from outside records.  Gayle Case, CMA

## 2018-06-04 ENCOUNTER — OFFICE VISIT (OUTPATIENT)
Dept: FAMILY MEDICINE | Facility: OTHER | Age: 53
End: 2018-06-04
Payer: COMMERCIAL

## 2018-06-04 VITALS
WEIGHT: 159 LBS | DIASTOLIC BLOOD PRESSURE: 76 MMHG | RESPIRATION RATE: 16 BRPM | BODY MASS INDEX: 32.11 KG/M2 | SYSTOLIC BLOOD PRESSURE: 128 MMHG | TEMPERATURE: 98.4 F | HEART RATE: 81 BPM | OXYGEN SATURATION: 98 %

## 2018-06-04 DIAGNOSIS — J01.90 ACUTE SINUSITIS WITH SYMPTOMS > 10 DAYS: Primary | ICD-10-CM

## 2018-06-04 PROCEDURE — 99213 OFFICE O/P EST LOW 20 MIN: CPT | Performed by: PHYSICIAN ASSISTANT

## 2018-06-04 RX ORDER — DOXYCYCLINE 100 MG/1
100 CAPSULE ORAL 2 TIMES DAILY
Qty: 20 CAPSULE | Refills: 0 | Status: SHIPPED | OUTPATIENT
Start: 2018-06-04 | End: 2018-07-09

## 2018-06-04 NOTE — PROGRESS NOTES
"  SUBJECTIVE:   Maria M Marcus is a 53 year old female who presents to clinic today for the following health issues:    HPI     ED/UC Followup:    Facility:  [facility]  Date of visit: 05/21/2018  Reason for visit: sinus infection  Current Status: Patient states that she \"was a little better for a couple of days, and now sinus pressure is actually worse. Biting hurts.\"       She received a 10 day course of Augmentin with improvement of her cough but her sinus pain and pressure have not improved. She also has pain into her teeth. She denies any fevers or chills.     [facility] Note: \"Maria M Marcus is a 53 year old female here with concerns about sinus infection.  She states onset of symptoms were 2 weeks ago.  She has had maxillary pressure. Course of illness is worsening. Severity moderate  Current and Associated symptoms: nasal congestion, rhinorrhea, R ear pain, facial pain/pressure, tooth pain and headache, cough x 2 days  Predisposing factors include HX of recurrent sinusitis and recent illness, type 1 DM. Recent treatment has included:steam treatment, fluids, rest\"    Problem list and historis reviewed & adjusted, as indicated.  Additional history: none    ROS:  GENERAL: Denies fever, fatigue, weakness, weight gain, or weight loss.  HEENT: Eyes-Denies pain, redness, loss of vision, double or blurred vision.     Ears/Nose- +Sinus pain and congestion. Denies tinnitus, loss of hearing, epistaxis, decreased sense of smell. Denies loss of sense of taste, dry mouth, or sore throat.   CARDIOVASCULAR: Denies chest pain, shortness of breath, irregular heartbeats,  palpitations, or edema.  RESPIRATORY: Denies cough, hemoptysis, and shortness of breath.    OBJECTIVE:     /76 (BP Location: Right arm, Patient Position: Chair, Cuff Size: Adult Regular)  Pulse 81  Temp 98.4  F (36.9  C) (Temporal)  Resp 16  Wt 159 lb (72.1 kg)  SpO2 98%  BMI 32.11 kg/m2  Body mass index is 32.11 " kg/(m^2).  GENERAL: healthy, alert and no distress  EYES: Eyes grossly normal to inspection, PERRL and conjunctivae and sclerae normal  HENT: ear canals and TM's normal. Nasal mucosa is erythematous. Pharynx is clear. Bilateral frontal and maxillary sinus tenderness.   NECK: no adenopathy, no asymmetry, masses, or scars and thyroid normal to palpation  RESP: lungs clear to auscultation - no rales, rhonchi or wheezes  CV: regular rate and rhythm, normal S1 S2, no S3 or S4, no murmur, click or rub    ASSESSMENT/PLAN:       ICD-10-CM    1. Acute sinusitis with symptoms > 10 days J01.90 doxycycline (VIBRAMYCIN) 100 MG capsule       Will prescribe doxycyline to take twice daily for 10 days since sinusitis symptoms are still present.   I recommend a probiotic while on this.  Drink plenty of fluids.  Can use an over the counter Nettipot or sinus rinse to help with nasal congestion. Mucinex can also be helpful.   I also recommend Flonase to help with nasal swelling.  Follow up if symptoms are not improving.      Marcus Salgado PA-C  [facility] [place]

## 2018-06-04 NOTE — PATIENT INSTRUCTIONS
Will prescribe an antibiotic called doxycycline to take twice daily for 10 days. Take a daily probiotic or Activia yogurt while you are on this.  Drink plenty of fluids.  Can use an over the counter Nettipot or sinus rinse to help with nasal congestion. Mucinex can also be helpful.   Follow up if symptoms are not improving.

## 2018-06-04 NOTE — MR AVS SNAPSHOT
After Visit Summary   6/4/2018    Maria M Marcus    MRN: [id number]           Patient Information     Date Of Birth          1965        Visit Information        Provider Department      6/4/2018 10:15 AM Marcus Salgado PA-C [facility]        Today's Diagnoses     Acute sinusitis with symptoms > 10 days    -  1      Care Instructions    Will prescribe an antibiotic called doxycycline to take twice daily for 10 days. Take a daily probiotic or Activia yogurt while you are on this.  Drink plenty of fluids.  Can use an over the counter Nettipot or sinus rinse to help with nasal congestion. Mucinex can also be helpful.   Follow up if symptoms are not improving.            Follow-ups after your visit        Your next 10 appointments already scheduled     Oct 12, 2018 11:30 AM CDT   Return Visit with Sidney Jarquin MD   [facility] ([facility]    [address] [place] [address] [phone]              Who to contact     If you have questions or need follow up information about today's clinic visit or your schedule please contact [facility] directly at [phone].  Normal or non-critical lab and imaging results will be communicated to you by MyChart, letter or phone within 4 business days after the clinic has received the results. If you do not hear from us within 7 days, please contact the clinic through [email or web address] or phone. If you have a critical or abnormal lab result, we will notify you by phone as soon as possible.  Submit refill requests through Snaptiva or call your pharmacy and they will forward the refill request to us. Please allow 3 business days for your refill to be completed.          Additional Information About Your Visit        MyChart Information     Snaptiva gives you secure access to your electronic health record. If you see a primary care provider, you can also send messages  to your care team and make appointments. If you have questions, please call your primary care clinic.  If you do not have a primary care provider, please call [phone] and they will assist you.        Care EveryWhere ID     This is your Care EveryWhere ID. This could be used by other organizations to access your [facility] medical records  [id number]        Your Vitals Were     Pulse Temperature Respirations Pulse Oximetry BMI (Body Mass Index)       81 98.4  F (36.9  C) (Temporal) 16 98% 32.11 kg/m2        Blood Pressure from Last 3 Encounters:   06/04/18 128/76   05/21/18 133/79   04/16/18 160/80    Weight from Last 3 Encounters:   06/04/18 159 lb (72.1 kg)   04/16/18 163 lb 8 oz (74.2 kg)   02/01/18 156 lb (70.8 kg)              Today, you had the following     No orders found for display         Today's Medication Changes          These changes are accurate as of 6/4/18 10:42 AM.  If you have any questions, ask your nurse or doctor.               Start taking these medicines.        Dose/Directions    doxycycline 100 MG capsule   Commonly known as:  VIBRAMYCIN   Used for:  Acute sinusitis with symptoms > 10 days   Started by:  Marcus Salgado PA-C        Dose:  100 mg   Take 1 capsule (100 mg) by mouth 2 times daily   Quantity:  20 capsule   Refills:  0            Where to get your medicines      These medications were sent to [facility] [address] [place] [address] [address] [address]     Phone:  [phone]     doxycycline 100 MG capsule                Primary Care Provider Office Phone # Fax #    Marcus Salgado PA-C [phone]       [address] [place] [address]        Equal Access to Services     VIPUL SMITH AH: Princess Zuleta, alexandre marcos, nemo kaalmada elias, traci lindsey. So [facility] [phone].    ATENCIÓN: Si habla español, tiene a ngo disposición servicios gratuitos de  asistencia lingüística. Guille al [phone].    We comply with applicable federal civil rights laws and [place] laws. We do not discriminate on the basis of race, color, national origin, age, disability, sex, sexual orientation, or gender identity.            Thank you!     Thank you for choosing [facility]  for your care. Our goal is always to provide you with excellent care. Hearing back from our patients is one way we can continue to improve our services. Please take a few minutes to complete the written survey that you may receive in the mail after your visit with us. Thank you!             Your Updated Medication List - Protect others around you: Learn how to safely use, store and throw away your medicines at www.disposemymeds.org.          This list is accurate as of 6/4/18 10:42 AM.  Always use your most recent med list.                   Brand Name Dispense Instructions for use Diagnosis    BASAGLAR 100 UNIT/ML injection     30 mL    Inject 11 Units Subcutaneous daily    Type 1 diabetes mellitus without complication (H)       * blood glucose monitoring lancets     100 each    USE ONE  TO CHECK GLUCOSE FIVE TIMES DAILY OR  AS  DIRECTED    Type 1 diabetes mellitus without complication (H)       * blood glucose monitoring lancets     100 each    1 each 3 times daily    Type 1 diabetes mellitus without complication (H)       blood glucose monitoring meter device kit     1 kit    Use to test blood sugars 5 times daily or as directed.    Type 1 diabetes mellitus without complication (H)       blood glucose monitoring test strip    TANA CONTOUR    200 strip    Use to test blood sugar 6 times daily or as directed.    Type 1 diabetes mellitus without complication (H)       doxycycline 100 MG capsule    VIBRAMYCIN    20 capsule    Take 1 capsule (100 mg) by mouth 2 times daily    Acute sinusitis with symptoms > 10 days       gabapentin 300 MG capsule    NEURONTIN    90 capsule    Take 1 tablet  (300 mg) every night for 1 week, then 1 tablet twice daily for 1 week, then 1 tablet three times daily thereafter    Chronic pain syndrome       * insulin glulisine 100 UNIT/ML soln    APIDRA SOLOSTAR    15 mL    1 unit per 20 gm carb with correction. approx 30 units daily    Type 1 diabetes mellitus without complication (H)       * insulin glulisine 100 UNIT/ML injection    APIDRA    20 mL    Take as directed via insulin pump. Total daily dose approx 55 units daily.    Diabetes mellitus type 1 (H)       insulin syringes (disposable) U-100 0.3 ML Misc     100 each    Keep on hand in case of pump failure. Use 4-5 syringes daily.    Diabetes mellitus type 1 (H)       levothyroxine 75 MCG tablet    SYNTHROID/LEVOTHROID    30 tablet    Take 1 tablet (75 mcg) by mouth daily    Hypothyroidism, unspecified type       lidocaine 2 % topical gel    XYLOCAINE    30 mL    Apply topically 2 times daily as needed for moderate pain    Cervical radiculopathy, Cervicalgia       lisinopril 10 MG tablet    PRINIVIL/ZESTRIL    90 tablet    Take 1 tablet (10 mg) by mouth daily    Hypertension goal BP (blood pressure) < 140/90, Type 1 diabetes mellitus without complication (H)       omeprazole 20 MG CR capsule    priLOSEC    30 capsule    TAKE ONE CAPSULE BY MOUTH ONCE DAILY    Gastroesophageal reflux disease, esophagitis presence not specified       pravastatin 10 MG tablet    PRAVACHOL    90 tablet    Take 1 tablet (10 mg) by mouth daily    Hyperlipidemia LDL goal <100       * RELION MINI PEN NEEDLES 31G X 6 MM   Generic drug:  insulin pen needle     400 each    USE ONE  4 TIMES DAILY    Type 1 diabetes, HbA1c goal < 7% (H)       * B-D U/F 31G X 5 MM   Generic drug:  insulin pen needle     400 each    USE ONE PEN NEEDLE 4 TIMES DAILY OR AS DIRECTED    Type 1 diabetes, HbA1c goal < 7% (H)       vitamin D 2000 units tablet     100 tablet    Take 2,000 Units by mouth daily.    Vitamin D deficiency disease       * Notice:  This list has 6  medication(s) that are the same as other medications prescribed for you. Read the directions carefully, and ask your doctor or other care provider to review them with you.

## 2018-06-27 ENCOUNTER — TELEPHONE (OUTPATIENT)
Dept: FAMILY MEDICINE | Facility: OTHER | Age: 53
End: 2018-06-27

## 2018-07-09 ENCOUNTER — OFFICE VISIT (OUTPATIENT)
Dept: FAMILY MEDICINE | Facility: CLINIC | Age: 53
End: 2018-07-09
Payer: COMMERCIAL

## 2018-07-09 ENCOUNTER — MYC MEDICAL ADVICE (OUTPATIENT)
Dept: ENDOCRINOLOGY | Facility: CLINIC | Age: 53
End: 2018-07-09

## 2018-07-09 VITALS
RESPIRATION RATE: 18 BRPM | SYSTOLIC BLOOD PRESSURE: 124 MMHG | OXYGEN SATURATION: 95 % | DIASTOLIC BLOOD PRESSURE: 72 MMHG | WEIGHT: 159.2 LBS | BODY MASS INDEX: 32.15 KG/M2 | HEART RATE: 86 BPM | TEMPERATURE: 98.5 F

## 2018-07-09 DIAGNOSIS — E10.9 TYPE 1 DIABETES MELLITUS WITHOUT COMPLICATION (H): Chronic | ICD-10-CM

## 2018-07-09 DIAGNOSIS — Z11.4 SCREENING FOR HUMAN IMMUNODEFICIENCY VIRUS: ICD-10-CM

## 2018-07-09 DIAGNOSIS — E13.319 RETINOPATHY DUE TO SECONDARY DIABETES (H): ICD-10-CM

## 2018-07-09 DIAGNOSIS — E10.9 TYPE 1 DIABETES MELLITUS WITHOUT COMPLICATION (H): ICD-10-CM

## 2018-07-09 DIAGNOSIS — J20.9 ACUTE BRONCHITIS, UNSPECIFIED ORGANISM: Primary | ICD-10-CM

## 2018-07-09 DIAGNOSIS — Z11.59 NEED FOR HEPATITIS C SCREENING TEST: ICD-10-CM

## 2018-07-09 LAB
ALBUMIN SERPL-MCNC: 3.9 G/DL (ref 3.4–5)
ALP SERPL-CCNC: 75 U/L (ref 40–150)
ALT SERPL W P-5'-P-CCNC: 18 U/L (ref 0–50)
ANION GAP SERPL CALCULATED.3IONS-SCNC: 5 MMOL/L (ref 3–14)
AST SERPL W P-5'-P-CCNC: 17 U/L (ref 0–45)
BILIRUB SERPL-MCNC: 0.5 MG/DL (ref 0.2–1.3)
BUN SERPL-MCNC: 9 MG/DL (ref 7–30)
CALCIUM SERPL-MCNC: 8.9 MG/DL (ref 8.5–10.1)
CHLORIDE SERPL-SCNC: 106 MMOL/L (ref 94–109)
CO2 SERPL-SCNC: 29 MMOL/L (ref 20–32)
CREAT SERPL-MCNC: 0.81 MG/DL (ref 0.52–1.04)
GFR SERPL CREATININE-BSD FRML MDRD: 74 ML/MIN/1.7M2
GLUCOSE SERPL-MCNC: 92 MG/DL (ref 70–99)
HBA1C MFR BLD: 6.6 % (ref 0–5.6)
POTASSIUM SERPL-SCNC: 3.9 MMOL/L (ref 3.4–5.3)
PROT SERPL-MCNC: 7.3 G/DL (ref 6.8–8.8)
SODIUM SERPL-SCNC: 140 MMOL/L (ref 133–144)

## 2018-07-09 PROCEDURE — G0472 HEP C SCREEN HIGH RISK/OTHER: HCPCS | Performed by: NURSE PRACTITIONER

## 2018-07-09 PROCEDURE — 99214 OFFICE O/P EST MOD 30 MIN: CPT | Performed by: NURSE PRACTITIONER

## 2018-07-09 PROCEDURE — 36415 COLL VENOUS BLD VENIPUNCTURE: CPT | Performed by: NURSE PRACTITIONER

## 2018-07-09 PROCEDURE — 80053 COMPREHEN METABOLIC PANEL: CPT | Performed by: NURSE PRACTITIONER

## 2018-07-09 PROCEDURE — 83036 HEMOGLOBIN GLYCOSYLATED A1C: CPT | Performed by: NURSE PRACTITIONER

## 2018-07-09 PROCEDURE — 87389 HIV-1 AG W/HIV-1&-2 AB AG IA: CPT | Performed by: NURSE PRACTITIONER

## 2018-07-09 RX ORDER — ALBUTEROL SULFATE 90 UG/1
2 AEROSOL, METERED RESPIRATORY (INHALATION) EVERY 6 HOURS PRN
Qty: 1 INHALER | Refills: 0 | Status: SHIPPED | OUTPATIENT
Start: 2018-07-09 | End: 2018-10-31

## 2018-07-09 RX ORDER — INSULIN GLARGINE 100 [IU]/ML
6 INJECTION, SOLUTION SUBCUTANEOUS 2 TIMES DAILY
Qty: 30 ML | Refills: 1 | Status: SHIPPED | OUTPATIENT
Start: 2018-07-09 | End: 2019-10-22

## 2018-07-09 ASSESSMENT — ANXIETY QUESTIONNAIRES
5. BEING SO RESTLESS THAT IT IS HARD TO SIT STILL: SEVERAL DAYS
3. WORRYING TOO MUCH ABOUT DIFFERENT THINGS: NOT AT ALL
IF YOU CHECKED OFF ANY PROBLEMS ON THIS QUESTIONNAIRE, HOW DIFFICULT HAVE THESE PROBLEMS MADE IT FOR YOU TO DO YOUR WORK, TAKE CARE OF THINGS AT HOME, OR GET ALONG WITH OTHER PEOPLE: NOT DIFFICULT AT ALL
7. FEELING AFRAID AS IF SOMETHING AWFUL MIGHT HAPPEN: MORE THAN HALF THE DAYS
GAD7 TOTAL SCORE: 11
1. FEELING NERVOUS, ANXIOUS, OR ON EDGE: NEARLY EVERY DAY
2. NOT BEING ABLE TO STOP OR CONTROL WORRYING: SEVERAL DAYS
6. BECOMING EASILY ANNOYED OR IRRITABLE: MORE THAN HALF THE DAYS

## 2018-07-09 ASSESSMENT — PATIENT HEALTH QUESTIONNAIRE - PHQ9: 5. POOR APPETITE OR OVEREATING: MORE THAN HALF THE DAYS

## 2018-07-09 ASSESSMENT — PAIN SCALES - GENERAL: PAINLEVEL: NO PAIN (0)

## 2018-07-09 NOTE — MR AVS SNAPSHOT
After Visit Summary   7/9/2018    Maria M Marcus    MRN: [id number]           Patient Information     Date Of Birth          1965        Visit Information        Provider Department      7/9/2018 1:40 PM Karin Bradshaw APRN CNP [facility]        Today's Diagnoses     Acute bronchitis, unspecified organism    -  1    Retinopathy due to secondary diabetes (H)        Type 1 diabetes mellitus without complication (H)        Screening for human immunodeficiency virus        Need for hepatitis C screening test           Follow-ups after your visit        Your next 10 appointments already scheduled     Oct 12, 2018 11:30 AM CDT   Return Visit with Sidney Jarquin MD   [facility] ([facility])    [id number] [place] [address] [phone]              Who to contact     If you have questions or need follow up information about today's clinic visit or your schedule please contact [facility] directly at [phone].  Normal or non-critical lab and imaging results will be communicated to you by Dang Lehart, letter or phone within 4 business days after the clinic has received the results. If you do not hear from us within 7 days, please contact the clinic through [employer] System[employer] or phone. If you have a critical or abnormal lab result, we will notify you by phone as soon as possible.  Submit refill requests through Lumate or call your pharmacy and they will forward the refill request to us. Please allow 3 business days for your refill to be completed.          Additional Information About Your Visit        MyChart Information     Lumate gives you secure access to your electronic health record. If you see a primary care provider, you can also send messages to your care team and make appointments. If you have questions, please call your primary care clinic.  If you do not have a primary care provider, please call  [phone] and they will assist you.        Care EveryWhere ID     This is your Care EveryWhere ID. This could be used by other organizations to access your [facility] medical records  [id number]        Your Vitals Were     Pulse Temperature Respirations Pulse Oximetry BMI (Body Mass Index)       86 98.5  F (36.9  C) (Temporal) 18 95% 32.15 kg/m2        Blood Pressure from Last 3 Encounters:   07/09/18 124/72   06/04/18 128/76   05/21/18 133/79    Weight from Last 3 Encounters:   07/09/18 159 lb 3.2 oz (72.2 kg)   06/04/18 159 lb (72.1 kg)   04/16/18 163 lb 8 oz (74.2 kg)              We Performed the Following     Comprehensive metabolic panel     Hemoglobin A1c     Hepatitis C antibody     HIV Antigen Antibody Combo          Today's Medication Changes          These changes are accurate as of 7/9/18 11:59 PM.  If you have any questions, ask your nurse or doctor.               Start taking these medicines.        Dose/Directions    albuterol 108 (90 Base) MCG/ACT Inhaler   Commonly known as:  PROAIR HFA/PROVENTIL HFA/VENTOLIN HFA   Used for:  Acute bronchitis, unspecified organism   Started by:  Karin Bradshaw APRN CNP        Dose:  2 puff   Inhale 2 puffs into the lungs every 6 hours as needed for shortness of breath / dyspnea or wheezing   Quantity:  1 Inhaler   Refills:  0         These medicines have changed or have updated prescriptions.        Dose/Directions    BASAGLAR 100 UNIT/ML injection   This may have changed:    - how much to take  - when to take this   Used for:  Type 1 diabetes mellitus without complication (H)   Changed by:  Sidney Jarquin MD        Dose:  6 Units   Inject 6 Units Subcutaneous 2 times daily   Quantity:  30 mL   Refills:  1         Stop taking these medicines if you haven't already. Please contact your care team if you have questions.     doxycycline 100 MG capsule   Commonly known as:  VIBRAMYCIN   Stopped by:  Karin Bradshaw APRN CNP                Where to get  your medicines      These medications were sent to [facility] [address] [place], [place] - [address] [facility]  [address] [facility] [address]     Phone:  [phone]     albuterol 108 (90 Base) MCG/ACT Inhaler    BASAGLAR 100 UNIT/ML injection                Primary Care Provider Office Phone # Fax #    Marcus Alberto Salgado PA-C [phone]       [address] [place] [address]  [facility] [address]        Equal Access to Services     ONESIMO SMITH : Hadii aad ku hadasho Soomaali, waaxda luqadaha, qaybta kaalmada adeegyada, waxay idiin hayaan manpreet cruz . So [facility] [phone].    ATENCIÓN: Si habla español, tiene a ngo disposición servicios gratuitos de asistencia lingüística. [facility] [phone].    We comply with applicable federal civil rights laws and [place] laws. We do not discriminate on the basis of race, color, national origin, age, disability, sex, sexual orientation, or gender identity.            Thank you!     Thank you for choosing [facility]  for your care. Our goal is always to provide you with excellent care. Hearing back from our patients is one way we can continue to improve our services. Please take a few minutes to complete the written survey that you may receive in the mail after your visit with us. Thank you!             Your Updated Medication List - Protect others around you: Learn how to safely use, store and throw away your medicines at www.disposemymeds.org.          This list is accurate as of 7/9/18 11:59 PM.  Always use your most recent med list.                   Brand Name Dispense Instructions for use Diagnosis    albuterol 108 (90 Base) MCG/ACT Inhaler    PROAIR HFA/PROVENTIL HFA/VENTOLIN HFA    1 Inhaler    Inhale 2 puffs into the lungs every 6 hours as needed for shortness of breath / dyspnea or wheezing    Acute bronchitis, unspecified organism       BASAGLAR 100 UNIT/ML injection     30 mL    Inject 6 Units Subcutaneous 2 times daily    Type  1 diabetes mellitus without complication (H)       * blood glucose monitoring lancets     100 each    USE ONE  TO CHECK GLUCOSE FIVE TIMES DAILY OR  AS  DIRECTED    Type 1 diabetes mellitus without complication (H)       * blood glucose monitoring lancets     100 each    1 each 3 times daily    Type 1 diabetes mellitus without complication (H)       blood glucose monitoring meter device kit     1 kit    Use to test blood sugars 5 times daily or as directed.    Type 1 diabetes mellitus without complication (H)       blood glucose monitoring test strip    TANA CONTOUR    200 strip    Use to test blood sugar 6 times daily or as directed.    Type 1 diabetes mellitus without complication (H)       * insulin glulisine 100 UNIT/ML soln    APIDRA SOLOSTAR    15 mL    1 unit per 20 gm carb with correction. approx 30 units daily    Type 1 diabetes mellitus without complication (H)       * insulin glulisine 100 UNIT/ML injection    APIDRA    20 mL    Take as directed via insulin pump. Total daily dose approx 55 units daily.    Diabetes mellitus type 1 (H)       insulin syringes (disposable) U-100 0.3 ML Misc     100 each    Keep on hand in case of pump failure. Use 4-5 syringes daily.    Diabetes mellitus type 1 (H)       levothyroxine 75 MCG tablet    SYNTHROID/LEVOTHROID    30 tablet    Take 1 tablet (75 mcg) by mouth daily    Hypothyroidism, unspecified type       lidocaine 2 % topical gel    XYLOCAINE    30 mL    Apply topically 2 times daily as needed for moderate pain    Cervical radiculopathy, Cervicalgia       lisinopril 10 MG tablet    PRINIVIL/ZESTRIL    90 tablet    Take 1 tablet (10 mg) by mouth daily    Hypertension goal BP (blood pressure) < 140/90, Type 1 diabetes mellitus without complication (H)       omeprazole 20 MG CR capsule    priLOSEC    30 capsule    TAKE ONE CAPSULE BY MOUTH ONCE DAILY    Gastroesophageal reflux disease, esophagitis presence not specified       pravastatin 10 MG tablet    PRAVACHOL     90 tablet    Take 1 tablet (10 mg) by mouth daily    Hyperlipidemia LDL goal <100       * RELION MINI PEN NEEDLES 31G X 6 MM   Generic drug:  insulin pen needle     400 each    USE ONE  4 TIMES DAILY    Type 1 diabetes, HbA1c goal < 7% (H)       * B-D U/F 31G X 5 MM   Generic drug:  insulin pen needle     400 each    USE ONE PEN NEEDLE 4 TIMES DAILY OR AS DIRECTED    Type 1 diabetes, HbA1c goal < 7% (H)       vitamin D 2000 units tablet     100 tablet    Take 2,000 Units by mouth daily.    Vitamin D deficiency disease       * Notice:  This list has 6 medication(s) that are the same as other medications prescribed for you. Read the directions carefully, and ask your doctor or other care provider to review them with you.

## 2018-07-09 NOTE — PROGRESS NOTES
SUBJECTIVE:   Maria M Marcus is a 53 year old female who presents to clinic today for the following health issues:      HPI  Acute Illness   Acute illness concerns: cough   Onset: 6 days     Fever: no     Chills/Sweats: YES, both     Headache (location?): YES, above and below eyes     Sinus Pressure:YES    Conjunctivitis:  no    Ear Pain: YES: right , pain when coughing too hard     Rhinorrhea: no     Congestion: YES    Sore Throat: YES     Cough: YES, Sputum dark green,  barking cough     Wheeze: no , shortness of breath     Decreased Appetite: YES    Nausea: YES    Vomiting: no     Diarrhea:  no     Dysuria/Freq.: no     Fatigue/Achiness: YES, fatigue     Sick/Strep Exposure: no      Therapies Tried and outcome: Patient seen Fer Salgado on 6/4/2018 and was prescribed  Doxycyline. Symtoms  reoccurred around 4th of July.     Rx worked for a little bit, but is still having sinus pressure that hasn't completely resolved. She then developed a bronchitic cough afterward.    Diabetes Follow-up      Patient is checking blood sugars  range from 118 to 125    Diabetic concerns: None     Symptoms of hypoglycemia (low blood sugar): none     Paresthesias (numbness or burning in feet) or sores: No     Date of last diabetic eye exam: Patient is up to date. Retina is dettaching on both eyes.  [facility] , April 2018.     Diabetes Management Resources    Hyperlipidemia Follow-Up      Rate your low fat/cholesterol diet?: good    Taking statin?  Yes, no muscle aches from statin    Other lipid medications/supplements?:  none    Hypertension Follow-up      Outpatient blood pressures are not being checked.    Low Salt Diet: no added salt    BP Readings from Last 2 Encounters:   07/09/18 124/72   06/04/18 128/76     Hemoglobin A1C (%)   Date Value   07/09/2018 6.6 (H)   04/13/2018 6.7 (H)     LDL Cholesterol Calculated (mg/dL)   Date Value   04/13/2018 118 (H)   10/27/2016 109 (H)     LDL Cholesterol Direct (mg/dL)   Date  Value   2017 122 (H)     Hypothyroidism Follow-up      Since last visit, patient describes the following symptoms: Weight stable, no hair loss, no skin changes, no constipation, no loose stools    Problem list and histories reviewed & adjusted, as indicated.  Additional history: as documented    Patient Active Problem List   Diagnosis     Hypothyroidism     Other and unspecified disc disorder of lumbar region     Hyperlipidemia LDL goal <100     Cervical radiculopathy     Advanced directives, counseling/discussion     Frozen shoulder syndrome     Vitiligo     Chronic pain     Back pain     Hypertension goal BP (blood pressure) < 140/90     Type 1 diabetes mellitus without complication (H)     Overweight (BMI 25.0-29.9)     Retinopathy due to secondary diabetes (H)     Gastroesophageal reflux disease, esophagitis presence not specified     Past Surgical History:   Procedure Laterality Date     C  DELIVERY ONLY      C-Sections x2     C NONSPECIFIC PROCEDURE      Hysterectomy - total (cervix removed but ovaries remain)     C STOMACH SURGERY PROCEDURE UNLISTED       C TOTAL ABDOM HYSTERECTOMY       COLONOSCOPY N/A 2016    Procedure: COLONOSCOPY;  Surgeon: Efren Perry MD;  Location: [facility]     HC SACROPLASTY       LAMINECT/DISCECTOMY, CERVICAL  2007    C7-T1 hemilaminectomy, microdiscectomy, foraminotomy.     LASER YAG CAPSULOTOMY Right 10/23/2014    Procedure: LASER YAG CAPSULOTOMY;  Surgeon: Esteban Dorsey MD;  Location: [facility]     VITRECTOMY,STRIP EPIRETINAL MEMBRANE  09       Social History   Substance Use Topics     Smoking status: Never Smoker     Smokeless tobacco: Never Used      Comment: no exposure     Alcohol use Yes      Comment: winex1-2/3x per yr.     Family History   Problem Relation Age of Onset     Hypertension Maternal Grandfather      EYE* Maternal Grandmother      Blood Disease Maternal Grandmother      Eye Disorder Maternal Grandmother      maccular  degeneration     Osteoperosis Maternal Grandmother      Lipids Father      GASTROINTESTINAL DISEASE Father      ulcers     HEART DISEASE Father      Cardiovascular Father      heart attack     Hypertension Paternal Grandmother      Breast Cancer Mother      dx age 73 - terminal - mother had first mammo at this age     Diabetes Paternal Uncle      Type I         Current Outpatient Prescriptions   Medication Sig Dispense Refill     albuterol (PROAIR HFA/PROVENTIL HFA/VENTOLIN HFA) 108 (90 Base) MCG/ACT Inhaler Inhale 2 puffs into the lungs every 6 hours as needed for shortness of breath / dyspnea or wheezing 1 Inhaler 0     B-D U/F insulin pen needle USE ONE PEN NEEDLE 4 TIMES DAILY OR AS DIRECTED 400 each 5     BASAGLAR 100 UNIT/ML injection Inject 6 Units Subcutaneous 2 times daily 30 mL 1     blood glucose monitoring (TANA CONTOUR MONITOR) meter device kit Use to test blood sugars 5 times daily or as directed. 1 kit 0     blood glucose monitoring (TANA CONTOUR) test strip Use to test blood sugar 6 times daily or as directed. 200 strip 3     blood glucose monitoring (SOFTCLIX) lancets USE ONE  TO CHECK GLUCOSE FIVE TIMES DAILY OR  AS  DIRECTED 100 each 5     blood glucose monitoring (SOFTCLIX) lancets 1 each 3 times daily 100 each 5     Cholecalciferol (VITAMIN D) 2000 UNITS tablet Take 2,000 Units by mouth daily. 100 tablet 3     insulin glulisine (APIDRA SOLOSTAR) 100 UNIT/ML soln 1 unit per 20 gm carb with correction. approx 30 units daily 15 mL 3     insulin glulisine (APIDRA) 100 UNIT/ML injection Take as directed via insulin pump. Total daily dose approx 55 units daily. 20 mL 11     insulin syringes, disposable, U-100 0.3 ML MISC Keep on hand in case of pump failure. Use 4-5 syringes daily. 100 each 1     levothyroxine (SYNTHROID/LEVOTHROID) 75 MCG tablet Take 1 tablet (75 mcg) by mouth daily 30 tablet 5     lidocaine (XYLOCAINE) 2 % topical gel Apply topically 2 times daily as needed for moderate pain 30 mL  2     lisinopril (PRINIVIL/ZESTRIL) 10 MG tablet Take 1 tablet (10 mg) by mouth daily 90 tablet 2     omeprazole (PRILOSEC) 20 MG CR capsule TAKE ONE CAPSULE BY MOUTH ONCE DAILY 30 capsule 2     pravastatin (PRAVACHOL) 10 MG tablet Take 1 tablet (10 mg) by mouth daily 90 tablet 3     RELION MINI PEN NEEDLES 31G X 6 MM USE ONE  4 TIMES DAILY 400 each 8     Allergies   Allergen Reactions     Novolog [Insulin Aspart] Swelling     Itching, rash, contact dermatitis     Humalog [Insulin Lispro] Rash     Contact dermatitis     Recent Labs   Lab Test  07/09/18   1416  04/13/18   0750 12/08/17 07/28/17   1611   10/27/16   0748  04/11/16   1013   A1C  6.6*  6.7*  6.6   --    < >  6.9*  6.1*   LDL   --   118*   --   122*   --   109*  132*   HDL   --   57   --    --    --   56  63   TRIG   --   90   --    --    --   104  77   ALT  18  20   --   19   --   18  19   CR  0.81  0.77   --   0.96   --   1.00  0.94   GFRESTIMATED  74  79   --   61   --   58*  63   GFRESTBLACK  90  >90   --   74   --   71  76   POTASSIUM  3.9  4.0   --   3.7   --   4.5  4.8   TSH   --   4.17*   --   1.22   --   0.90   --     < > = values in this interval not displayed.      BP Readings from Last 3 Encounters:   07/09/18 124/72   06/04/18 128/76   05/21/18 133/79    Wt Readings from Last 3 Encounters:   07/09/18 159 lb 3.2 oz (72.2 kg)   06/04/18 159 lb (72.1 kg)   04/16/18 163 lb 8 oz (74.2 kg)            ROS:  Constitutional, HEENT, cardiovascular, pulmonary, GI, , musculoskeletal, neuro, skin, endocrine and psych systems are negative, except as otherwise noted.    This document serves as a record of the services and decisions personally performed and made by Karin Bradshaw CNP. It was created on her behalf by Miroslava Angel, a trained medical scribe. The creation of this document is based the provider's statements to the medical scribe.    Miroslava Angel July 9, 2018 2:02 PM    OBJECTIVE:   /72  Pulse 86  Temp 98.5  F (36.9  C) (Temporal)  Resp 18   Wt 159 lb 3.2 oz (72.2 kg)  SpO2 95%  BMI 32.15 kg/m2  Body mass index is 32.15 kg/(m^2).  GENERAL APPEARANCE: healthy, alert and no distress  EYES: Eyes grossly normal to inspection and conjunctivae and sclerae normal  HENT: ear canals and TM's normal, nose and mouth without ulcers or lesions and nasal mucosa edematous without rhinorrhea. Mild erythema of the oropharynx. No nasal congestion.  NECK: no adenopathy, no asymmetry, masses, or scars and thyroid normal to palpation  RESP: lungs clear to auscultation - no rales, rhonchi or wheezes. Spastic cough noted.   CV: regular rates and rhythm, normal S1 S2, no S3 or S4 and no murmur, click or rub      Diagnostic Test Results:  none     ASSESSMENT/PLAN:       ICD-10-CM    1. Acute bronchitis, unspecified organism J20.9 albuterol (PROAIR HFA/PROVENTIL HFA/VENTOLIN HFA) 108 (90 Base) MCG/ACT Inhaler   2. Retinopathy due to secondary diabetes (H) E13.319    3. Type 1 diabetes mellitus without complication (H) E10.9 Hemoglobin A1c     Comprehensive metabolic panel   4. Screening for human immunodeficiency virus Z11.4 HIV Antigen Antibody Combo   5. Need for hepatitis C screening test Z11.59 Hepatitis C antibody   Discussed soothing throat measures, home cares, inhaler use. Pt is aware of inhaler use.  Updating a1c labs today. She has great control of her DM but it is causing her emotional stress with her insulin allergy and pump interactions with her current insulin and clogging of the tubing.   I am wanting to avoid steroids in her, discussed home cares for throat tissue soothing and bronchospasm of airway with forceful coughing that we want to try to avoid.   Discussed monitoring glucose closely due to interaction with current insulin.  Avoid OTC cold preps due to diabetes and HTN.     Follow-up in 3 mo for diabetes check.      The information in this document, created by the medical scribe for me, accurately reflects the services I personally performed and the  decisions made by me. I have reviewed and approved this document for accuracy prior to leaving the patient care area.    [facility]

## 2018-07-09 NOTE — TELEPHONE ENCOUNTER
Contacted patient to review patient's Plynked message. Patient reports that she is going to go off pump today. She only has enough insulin to last her until her 10am site change. Patient confirms that she is taking 1 unit of Apidra per carb choice. Patient reports basal rates as follows:      Pump settings  Basal   0000 0.3  0200 0.2  0700 0.60  1400 0.5  1700 0.55  2000 0.2      Patient states that she is almost always on 50% temp basal because she is always running low, especially through the night. Patient requesting a basaglar dose from Dr. Britton LIU because she wants to get off pump. She also notes that she has a terrible cold and knows that her blood sugars will be off for a few days.    Will send to Dr. Jarquin to review ASAP.      Batsheva Gonzalez, RN  Endocrine Care Coordinator  [facility]

## 2018-07-09 NOTE — TELEPHONE ENCOUNTER
"Prescription sent to pharmacy by Dr. Jarquin's fellow, Dr. Bansal, for Basaglar 6 units BID.    Notified patient of Basaglar recommendations. Patient verbalizes understanding and confirms that she has the basaglar and she took the 6 units at 9:30am and 3 units of Apidra for coverage because her blood sugar was 275. Patient states that she is now down to 246 1 hour later. She states that she is starting to feel better but notes that she is still a bit nauseated and feels awful from her respiratory illness that has been going on a week. While talking with patient on the phone she experienced harsh \"barking\" coughs. Patient also has laryngitis. Patient denies fever but states that she overall is not feeling well from respiratory illness. After discussing with patient further she is going to go in for evaluation by her primary care provider for her respiratory symptoms.  Advised patient that Jessica Demarco is back in the office on Wednesday and writer will have Jessica check in with patient.   Otherwise patient will contact clinic sooner. Patient verbalizes understanding and agrees to plan.       Batsheva Gonzalez RN  Endocrine Care Coordinator  [facility]    "

## 2018-07-10 LAB
HCV AB SERPL QL IA: NONREACTIVE
HIV 1+2 AB+HIV1 P24 AG SERPL QL IA: NONREACTIVE

## 2018-07-10 ASSESSMENT — ANXIETY QUESTIONNAIRES: GAD7 TOTAL SCORE: 11

## 2018-07-12 ENCOUNTER — TELEPHONE (OUTPATIENT)
Dept: ENDOCRINOLOGY | Facility: CLINIC | Age: 53
End: 2018-07-12

## 2018-07-12 NOTE — TELEPHONE ENCOUNTER
Spoke with pt. Still sick with bronchitis and laryngits. Coughing while talking on phone. States continues to take 6 units Basaglar bid and using a 1:15 to 1:20 meal ratio depending on pre-meal bg level. States bg levels are under reasonably good control Does not feel need to change insulin doses. Pt encourged to call linic if concerns arise and/or feels insulin dose change necessary.    Jessica Demarco, RN, BSN, CDE   [facility]

## 2018-07-26 ENCOUNTER — MYC REFILL (OUTPATIENT)
Dept: FAMILY MEDICINE | Facility: OTHER | Age: 53
End: 2018-07-26

## 2018-07-26 ENCOUNTER — MYC REFILL (OUTPATIENT)
Dept: INTERNAL MEDICINE | Facility: CLINIC | Age: 53
End: 2018-07-26

## 2018-07-26 ENCOUNTER — MYC REFILL (OUTPATIENT)
Dept: ENDOCRINOLOGY | Facility: CLINIC | Age: 53
End: 2018-07-26

## 2018-07-26 DIAGNOSIS — E10.9 TYPE 1 DIABETES MELLITUS WITHOUT COMPLICATION (H): ICD-10-CM

## 2018-07-26 DIAGNOSIS — E03.9 HYPOTHYROIDISM, UNSPECIFIED TYPE: ICD-10-CM

## 2018-07-27 RX ORDER — LEVOTHYROXINE SODIUM 75 UG/1
75 TABLET ORAL DAILY
Qty: 30 TABLET | Refills: 0 | Status: SHIPPED | OUTPATIENT
Start: 2018-07-27 | End: 2018-08-27

## 2018-07-27 NOTE — TELEPHONE ENCOUNTER
Message from [facility]:  Original authorizing provider: Kirill Berman MD    Maria M Marcus would like a refill of the following medications:  B-D U/F insulin pen needle [Kirill Berman MD]    Preferred pharmacy: [facility] [address] [place] [id number] [facility]    Comment:      Medication renewals requested in this message routed to other providers:  blood glucose monitoring (TANA CONTOUR) test strip [Sidney Jarquin MD]  levothyroxine (SYNTHROID/LEVOTHROID) 75 MCG tablet [EASTON LeighC]

## 2018-07-27 NOTE — TELEPHONE ENCOUNTER
Message from [facility]:  Original authorizing provider: Marcus Salgado PA-C    Maria M Marcus would like a refill of the following medications:  levothyroxine (SYNTHROID/LEVOTHROID) 75 MCG tablet [Marcus Salgado PA-C]    Preferred pharmacy: [facility] [address] [place] [id number] [facility]    Comment:      Medication renewals requested in this message routed to other providers:  blood glucose monitoring (TANA CONTOUR) test strip [Sidney Jarquin MD]  B-D U/F insulin pen needle [Kirill Berman MD]

## 2018-07-27 NOTE — TELEPHONE ENCOUNTER
Message from [facility]:  Tami Salgado RN Fri Jul 27, 2018 7:42 AM        ----- Message -----   From: Maria M Marcus   Sent: 7/26/2018 7:36 PM   To: Med Specialties Endo Triage-  Subject: Medication Renewal Request     Original authorizing provider: MD Maria M Moser would like a refill of the following medications:  blood glucose monitoring (TANA CONTOUR) test strip [Sidney Jarquin MD]    Preferred pharmacy: [facility] [address] [place] [id number] [facility]    Comment:      Medication renewals requested in this message routed to other providers:  levothyroxine (SYNTHROID/LEVOTHROID) 75 MCG tablet [Marcus Salgado PA-C]  B-D U/F insulin pen needle [Kirill Berman MD]

## 2018-07-27 NOTE — TELEPHONE ENCOUNTER
Synthroid  Routing refill request to provider for review/approval because:  Labs out of range:  TSH    Next 5 appointments (look out 90 days)     Oct 12, 2018 11:30 AM CDT   [facility] with Sidney [facility]quin MD   [facility]    [address] [place] [id number]   [phone]                Joslyn Martinez RN, BSN

## 2018-07-27 NOTE — TELEPHONE ENCOUNTER
"Requested Prescriptions   Pending Prescriptions Disp Refills     insulin pen needle (B-D U/F) 31G X 5  each 5    Last Written Prescription Date:  03/18/2016  Last Fill Quantity: 400,  # refills: 3   Last office visit: 10/28/2016 with prescribing provider:  10/28/2016   Future Office Visit:   Next 5 appointments (look out 90 days)     Oct 12, 2018 11:30 AM CDT   [facility] Visit with Sidney [facility]quin MD   [facility]    [address] [place] [id number] [phone]                  Sig: Use  pen needles daily or as directed.    Diabetic Supplies Protocol Failed    7/27/2018  8:55 AM       Failed - Recent (6 mo) or future (30 days) visit within the authorizing provider's specialty    Patient had office visit in the last 6 months or has a visit in the next 30 days with authorizing provider.  See \"Patient Info\" tab in inbasket, or \"Choose Columns\" in Meds & Orders section of the refill encounter.           Passed - Patient is 18 years of age or older          "

## 2018-07-30 ENCOUNTER — MYC MEDICAL ADVICE (OUTPATIENT)
Dept: ENDOCRINOLOGY | Facility: CLINIC | Age: 53
End: 2018-07-30

## 2018-07-30 DIAGNOSIS — E10.9 DIABETES MELLITUS TYPE 1 (H): ICD-10-CM

## 2018-08-21 ENCOUNTER — MYC MEDICAL ADVICE (OUTPATIENT)
Dept: FAMILY MEDICINE | Facility: OTHER | Age: 53
End: 2018-08-21

## 2018-08-21 DIAGNOSIS — E03.9 HYPOTHYROIDISM, UNSPECIFIED TYPE: Primary | ICD-10-CM

## 2018-08-24 DIAGNOSIS — E03.9 HYPOTHYROIDISM, UNSPECIFIED TYPE: ICD-10-CM

## 2018-08-24 LAB — TSH SERPL DL<=0.005 MIU/L-ACNC: 1.21 MU/L (ref 0.4–4)

## 2018-08-24 PROCEDURE — 36415 COLL VENOUS BLD VENIPUNCTURE: CPT | Performed by: PHYSICIAN ASSISTANT

## 2018-08-24 PROCEDURE — 84443 ASSAY THYROID STIM HORMONE: CPT | Performed by: PHYSICIAN ASSISTANT

## 2018-08-27 DIAGNOSIS — K21.9 GASTROESOPHAGEAL REFLUX DISEASE, ESOPHAGITIS PRESENCE NOT SPECIFIED: ICD-10-CM

## 2018-08-27 DIAGNOSIS — E03.9 HYPOTHYROIDISM, UNSPECIFIED TYPE: ICD-10-CM

## 2018-08-28 ENCOUNTER — MYC REFILL (OUTPATIENT)
Dept: FAMILY MEDICINE | Facility: OTHER | Age: 53
End: 2018-08-28

## 2018-08-28 DIAGNOSIS — E03.9 HYPOTHYROIDISM, UNSPECIFIED TYPE: ICD-10-CM

## 2018-08-28 DIAGNOSIS — K21.9 GASTROESOPHAGEAL REFLUX DISEASE, ESOPHAGITIS PRESENCE NOT SPECIFIED: ICD-10-CM

## 2018-08-28 RX ORDER — LEVOTHYROXINE SODIUM 75 UG/1
TABLET ORAL
Qty: 90 TABLET | Refills: 1 | Status: SHIPPED | OUTPATIENT
Start: 2018-08-28 | End: 2019-02-20

## 2018-08-28 NOTE — TELEPHONE ENCOUNTER
Message from [facility]:  Original authorizing provider: RAUL Leigh would like a refill of the following medications:  omeprazole (PRILOSEC) 20 MG CR capsule [Marcus Salgado PA-C]  levothyroxine (SYNTHROID/LEVOTHROID) 75 MCG tablet [Marcus Salgado PA-C]    Preferred pharmacy: [address] [id number] [facility]    Comment:  I need refills at [facility] for these two meds

## 2018-08-28 NOTE — TELEPHONE ENCOUNTER
Omeprazole    Prescription approved per [facility] Refill Protocol.      Levothyroxine    Prescription approved per [facility] Refill Protocol.    Sophia Celeste, RN, BSN

## 2018-08-29 RX ORDER — LEVOTHYROXINE SODIUM 75 UG/1
75 TABLET ORAL DAILY
Qty: 30 TABLET | Refills: 0 | OUTPATIENT
Start: 2018-08-29

## 2018-09-27 ENCOUNTER — MYC REFILL (OUTPATIENT)
Dept: ENDOCRINOLOGY | Facility: CLINIC | Age: 53
End: 2018-09-27

## 2018-09-27 DIAGNOSIS — E10.9 DIABETES MELLITUS TYPE 1 (H): ICD-10-CM

## 2018-09-27 NOTE — PROGRESS NOTES
SUBJECTIVE:   Maria M Marcus is a 53 year old female who presents to clinic today for the following health issues:      HPI     Joint/Muskuloskeletal Pain    Onset: 35 years     Description:   Location: hand and back  Character: Sharp and Stabbing    Intensity: moderate    Progression of Symptoms: worse    Accompanying Signs & Symptoms:  Other symptoms: numbness, tingling and weakness of left hand    History:   Previous similar pain: YES      Precipitating factors:   Trauma or overuse: No    Alleviating factors:  Improved by: nothing    Therapies Tried and outcome: Gabapentin       Acute Illness   Acute illness concerns: sinus pressure  Onset: 3-4 weeks    Fever: no    Chills/Sweats: no    Headache (location?): no    Sinus Pressure:YES- tender    Conjunctivitis:  no    Ear Pain: no    Rhinorrhea: no    Congestion: no    Sore Throat: no     Cough: no    Wheeze: no    Decreased Appetite: no    Nausea: no    Vomiting: no    Diarrhea:  no    Dysuria/Freq.: no    Fatigue/Achiness: no    Sick/Strep Exposure: no     Therapies Tried and outcome: Augmentin x2    Maria M presents to the clinic for sinus pressure for 3-4 weeks. She reports pain in both cheeks, forehead, also radiating into the teeth. She reports mild post-nasal drip. She denies cough, sore throat, runny nose, ear pain, congestion. Also denies fevers and chills. She has difficulty using nasal lavages. She has not tried any medications orally or nasal sprays for this problem. She states she cannot take Tylenol or sudafed. She denies history of seasonal allergies. She reports her symptoms are mild compared to sinus infections in the past. She was treated for sinusitis with doxycycline on 06/04/2018 and Augmentin on 05/21/18 with improvement but then symptoms returned.     She also reports worsening pain on the left arm. She has chronic neck pain with a previous cervical laminectomy and discectomy in 2007 which caused chroinc shooting pain into the arms,  "with the left arm being worse than right. She states that holding an object in the left hand causes shooting pain. She denies numbness and tingling in the arms and hands. She was previously on gabapentin with better control of her pain but this was causing a \"brain fog\" so she stopped.      Problem list and histories reviewed & adjusted, as indicated.    ROS:  CONSTITUTIONAL: NEGATIVE for fever, chills, change in weight  ENT/MOUTH: NEGATIVE for ear, throat pain, +for pain in the forehead, cheeks, and teeth  RESP: NEGATIVE for significant cough or SOB  CV: NEGATIVE for chest pain, palpitations or peripheral edema  MUSCULOSKELETAL: + for pain in the left arm and neck  NEURO: NEGATIVE for weakness, dizziness or paresthesias    OBJECTIVE:     /64 (BP Location: Right arm, Patient Position: Chair, Cuff Size: Adult Large)  Pulse 72  Temp 98.5  F (36.9  C) (Temporal)  Resp 16  Wt 160 lb 9.6 oz (72.8 kg)  SpO2 100%  BMI 32.44 kg/m2  Body mass index is 32.44 kg/(m^2).     GENERAL: healthy, alert and no distress  EYES: Eyes grossly normal to inspection, PERRL and conjunctivae and sclerae normal  HENT: mild tenderness with percussion of the maxillary and frontal sinuses bilaterally, ear canals and TM's normal, nose and mouth without ulcers or lesions. No nasal discharge.   NECK: no adenopathy, no asymmetry, masses, or scars  RESP: lungs clear to auscultation - no rales, rhonchi or wheezes  CV: regular rate and rhythm, normal S1 S2, no S3 or S4, no murmur, click or rub, no peripheral edema   MS: Patient reports pressure with Tinel's test on left wrist without an paresthesias, no pain with percussion over the medial elbow but there is some tingling into the pinky finger, no tenderness with resisted extension of the wrist, strength is 5/5 bilaterally, mild tenderness with palpation of the spinous processes of the cervical and thoracic spine  NEURO: Normal strength and tone, mentation intact and speech normal. Cranial " nerves II-XII are grossly intact. DTRs are 2+/4 throughout and symmetric. Gait is stable.     Diagnostic Test Results:  CT Sinus w/o contrast: Pending    ASSESSMENT/PLAN:         ICD-10-CM    1. Acute recurrent maxillary sinusitis J01.01 CT Sinus w/o Contrast   2. Sinus pressure J34.89 CT Sinus w/o Contrast   3. Chronic pain syndrome G89.4 amitriptyline (ELAVIL) 25 MG tablet   4. Cervical radiculopathy M54.12 amitriptyline (ELAVIL) 25 MG tablet   5. Need for prophylactic vaccination and inoculation against influenza Z23 FLU VACCINE, (RIV4) RECOMBINANT HA  , IM (FluBlok, egg free) [66934]- >18 YRS ([facility] recommended  50-64 YRS)     Vaccine Administration, Initial [23667]         1-2. Will order a sinus CT to further evaluate continued sinus pressure. She has had recurrent episodes of sinusitis in which pressure has not completely resolved so CT will help evaluate if this is a true sinus infection. Depending on results, if infection is present will prescribe antibiotics. Recommend using Flonase daily for the next few weeks to help with symptom relief. She was encouraged to call the clinic in the meantime if symptoms do not improve or worsen.     3-4. Will prescribe amitriptyline to try for the chronic cervical radiculopathy and pain. She was instructed to take 1/2 tablet nightly for one week and then 1 tablet nightly thereafter. We discussed side effects such as sedation so she was recommended to take the medication at night to avoid this. She will let me know within the next 4-6 weeksif the medication is working and if not, she should call the clinic or follow-up for further evaluation. If not beneficial, will consider ordering an updated MRI and/or EMG.     5. Influenza vaccine was administered today in clinic.     Otherwise, follow-up for routine visit in clinic in 6 months    Patient was seen in conjunction with Denise Salgado PA-C  [facility]

## 2018-09-27 NOTE — TELEPHONE ENCOUNTER
Message from [facility]:  Enedina Ruby, RN Thu Sep 27, 2018 9:00 AM        ----- Message -----   From: Maria M Marcus   Sent: 9/27/2018 8:19 AM   To: Med Specialties Endo Triage-  Subject: Medication Renewal Request     Original authorizing provider: MD Maria M Moser would like a refill of the following medications:  insulin syringes, disposable, U-100 0.3 ML MISC [Sidney Jarquin MD]    Preferred pharmacy: [facility] [address] [place] [id number] [facility]    Comment:  My pharmacy sent for an updated prescription on Monday and now I have 2 days left. Can you please make this an ASAP now.

## 2018-09-28 ENCOUNTER — TELEPHONE (OUTPATIENT)
Dept: FAMILY MEDICINE | Facility: OTHER | Age: 53
End: 2018-09-28

## 2018-09-28 ENCOUNTER — OFFICE VISIT (OUTPATIENT)
Dept: FAMILY MEDICINE | Facility: OTHER | Age: 53
End: 2018-09-28
Payer: COMMERCIAL

## 2018-09-28 VITALS
OXYGEN SATURATION: 100 % | RESPIRATION RATE: 16 BRPM | TEMPERATURE: 98.5 F | BODY MASS INDEX: 32.44 KG/M2 | HEART RATE: 72 BPM | SYSTOLIC BLOOD PRESSURE: 140 MMHG | DIASTOLIC BLOOD PRESSURE: 64 MMHG | WEIGHT: 160.6 LBS

## 2018-09-28 DIAGNOSIS — M54.12 CERVICAL RADICULOPATHY: ICD-10-CM

## 2018-09-28 DIAGNOSIS — Z23 NEED FOR PROPHYLACTIC VACCINATION AND INOCULATION AGAINST INFLUENZA: ICD-10-CM

## 2018-09-28 DIAGNOSIS — G89.4 CHRONIC PAIN SYNDROME: ICD-10-CM

## 2018-09-28 DIAGNOSIS — J34.89 SINUS PRESSURE: ICD-10-CM

## 2018-09-28 DIAGNOSIS — J01.01 ACUTE RECURRENT MAXILLARY SINUSITIS: Primary | ICD-10-CM

## 2018-09-28 PROCEDURE — 90682 RIV4 VACC RECOMBINANT DNA IM: CPT | Performed by: PHYSICIAN ASSISTANT

## 2018-09-28 PROCEDURE — 99214 OFFICE O/P EST MOD 30 MIN: CPT | Mod: 25 | Performed by: PHYSICIAN ASSISTANT

## 2018-09-28 PROCEDURE — 90471 IMMUNIZATION ADMIN: CPT | Performed by: PHYSICIAN ASSISTANT

## 2018-09-28 NOTE — TELEPHONE ENCOUNTER
Elevated BP not rechecked in clinic so please have her come back sometime this next week for recheck. Thanks.    Marcus Salgado PA-C

## 2018-09-28 NOTE — PROGRESS NOTES
Injectable Influenza Immunization Documentation    1.  Is the person to be vaccinated sick today?   No    2. Does the person to be vaccinated have an allergy to a component   of the vaccine?   No  Egg Allergy Algorithm Link    3. Has the person to be vaccinated ever had a serious reaction   to influenza vaccine in the past?   No    4. Has the person to be vaccinated ever had Guillain-Barré syndrome?   No    Form completed by Britt Acharya CMA     Prior to injection verified patient identity using patient's name and date of birth.  Due to injection administration, patient instructed to remain in clinic for 15 minutes  afterwards, and to report any adverse reaction to me immediately.

## 2018-09-28 NOTE — PATIENT INSTRUCTIONS
Will order a sinus CT to further evaluate your continued sinus pressure.  I recommend trying daily Flonase for the next few weeks.   If the CT shows an infection, will treat with antibiotics.    Will try amitriptyline 1/2 tablet nightly for 1 week then 1 tablet nightly thereafter to help with your chronic pain.  If not improving or worsening, let me know.  Send me a message in 4-6 weeks to let me know how things are going.     Follow up in 6 months

## 2018-09-28 NOTE — MR AVS SNAPSHOT
After Visit Summary   9/28/2018    Maria M Marcus    MRN: [id number]           Patient Information     Date Of Birth          1965        Visit Information        Provider Department      9/28/2018 1:00 PM Marcus Salgado PA-C [facility]        Today's Diagnoses     Acute recurrent maxillary sinusitis    -  1    Sinus pressure        Chronic pain syndrome        Cervical radiculopathy        Need for prophylactic vaccination and inoculation against influenza          Care Instructions    Will order a sinus CT to further evaluate your continued sinus pressure.  I recommend trying daily Flonase for the next few weeks.   If the CT shows an infection, will treat with antibiotics.    Will try amitriptyline 1/2 tablet nightly for 1 week then 1 tablet nightly thereafter to help with your chronic pain.  If not improving or worsening, let me know.  Send me a message in 4-6 weeks to let me know how things are going.     Follow up in 6 months              Follow-ups after your visit        Follow-up notes from your care team     Return in about 6 months (around 3/28/2019) for Routine Visit.      Your next 10 appointments already scheduled     Oct 12, 2018 10:15 AM CDT   Screening Mammogram with MGMA1, MG MA TECH   [facility])    [id number] [place] [id number] [phone]           Do NOT use body powder, lotions, perfume or deodorant the day of the exam.  If your last mammogram was not done at [place], please bring your mammogram films. We will need the name of your provider to send a copy of your report.  A mammogram may be covered on an annual or biannual basis, please check with your insurance company.            Oct 12, 2018 11:30 AM CDT   Return Visit with Sidney Jarquin MD   [facility])    [address] [place] [id number]    [phone]            Oct 12, 2018 12:30 PM CDT   CT SINUS W/O CONTRAST with MGCT2   [facility]    [address] [place] [id number] [phone]           How do I prepare for my exam? (Food and drink instructions) No Food and Drink Restrictions.  How do I prepare for my exam? (Other instructions) You do not need to do anything special to prepare for this exam. For a sinus scan: Use your nose spray (nasal decongestant spray) as directed.  What should I wear: Please wear loose clothing, such as a sweat suit or jogging clothes. Avoid snaps, zippers and other metal. We may ask you to undress and put on a hospital gown.  How long does the exam take: Most scans take less than 20 minutes.  What should I bring: Please bring any scans or X-rays taken at other hospitals, if similar tests were done. Also bring a list of your medicines, including vitamins, minerals and over-the-counter drugs. It is safest to leave personal items at home.  Do I need a : No  is needed.  What do I need to tell my doctor? Be sure to tell your doctor: * If you have any allergies. * If there s any chance you are pregnant. * If you are breastfeeding.  What should I do after the exam: No restrictions, You may resume normal activities.  What is this test: A CT (computed tomography) scan is a series of pictures that allows us to look inside your body. The scanner creates images of the body in cross sections, much like slices of bread. This helps us see any problems more clearly.  Who should I call with questions: If you have any questions, please call the Imaging Department where you will have your exam. Directions, parking instructions, and other information is available on our website, [email or web address].              Future tests that were ordered for you today     Open Future Orders        Priority Expected Expires Ordered    CT Sinus w/o Contrast Routine  9/28/2019  9/28/2018            Who to contact     If you have questions or need follow up information about today's clinic visit or your schedule please contact [facility] directly at [phone].  Normal or non-critical lab and imaging results will be communicated to you by MyChart, letter or phone within 4 business days after the clinic has received the results. If you do not hear from us within 7 days, please contact the clinic through MyChart or phone. If you have a critical or abnormal lab result, we will notify you by phone as soon as possible.  Submit refill requests through [employer] or call your pharmacy and they will forward the refill request to us. Please allow 3 business days for your refill to be completed.          Additional Information About Your Visit        [email or web address] Information     [employer] gives you secure access to your electronic health record. If you see a primary care provider, you can also send messages to your care team and make appointments. If you have questions, please call your primary care clinic.  If you do not have a primary care provider, please call [phone] and they will assist you.        Care EveryWhere ID     This is your Care EveryWhere ID. This could be used by other organizations to access your [facility] medical records  [id number]        Your Vitals Were     Pulse Temperature Respirations Pulse Oximetry BMI (Body Mass Index)       72 98.5  F (36.9  C) (Temporal) 16 100% 32.44 kg/m2        Blood Pressure from Last 3 Encounters:   09/28/18 140/64   07/09/18 124/72   06/04/18 128/76    Weight from Last 3 Encounters:   09/28/18 160 lb 9.6 oz (72.8 kg)   07/09/18 159 lb 3.2 oz (72.2 kg)   06/04/18 159 lb (72.1 kg)              We Performed the Following     FLU VACCINE, (RIV4) RECOMBINANT HA  , IM (FluBlok, egg free) [03364]- >18 YRS (FMG recommended  50-64 YRS)     Vaccine Administration, Initial [05651]          Today's Medication Changes          These changes are  accurate as of 9/28/18  1:44 PM.  If you have any questions, ask your nurse or doctor.               Start taking these medicines.        Dose/Directions    amitriptyline 25 MG tablet   Commonly known as:  ELAVIL   Used for:  Chronic pain syndrome, Cervical radiculopathy   Started by:  Marcus Salgado PA-C        Take 1/2 tablet nightly for the first week and then can increase to a full tab after 1 week   Quantity:  30 tablet   Refills:  1            Where to get your medicines      These medications were sent to [facility] [address] [facility] [address] [facility]  [address] [facility] [address]     Phone:  [phone]     amitriptyline 25 MG tablet                Primary Care Provider Office Phone # Fax #    Marcus Salgado PA-C [phone]       [address] [place] [address] [address]  [facility] [address]        Equal Access to Services     : Hadii miguel jennings hadasho Soomaali, waaxda luqadaha, qaybta kaalmada adeegyada, waxay damian haykaterina cruz . So [facility] [phone].    ATENCIÓN: Si habla español, tiene a ngo disposición servicios gratuitos de asistencia lingüística. Guille al [phone].    We comply with applicable federal civil rights laws and [place] laws. We do not discriminate on the basis of race, color, national origin, age, disability, sex, sexual orientation, or gender identity.            Thank you!     Thank you for choosing [facility]  for your care. Our goal is always to provide you with excellent care. Hearing back from our patients is one way we can continue to improve our services. Please take a few minutes to complete the written survey that you may receive in the mail after your visit with us. Thank you!             Your Updated Medication List - Protect others around you: Learn how to safely use, store and throw away your medicines at www.disposemymeds.org.          This list is accurate as of 9/28/18  1:44 PM.  Always use your  most recent med list.                   Brand Name Dispense Instructions for use Diagnosis    albuterol 108 (90 Base) MCG/ACT inhaler    PROAIR HFA/PROVENTIL HFA/VENTOLIN HFA    1 Inhaler    Inhale 2 puffs into the lungs every 6 hours as needed for shortness of breath / dyspnea or wheezing    Acute bronchitis, unspecified organism       amitriptyline 25 MG tablet    ELAVIL    30 tablet    Take 1/2 tablet nightly for the first week and then can increase to a full tab after 1 week    Chronic pain syndrome, Cervical radiculopathy       BASAGLAR 100 UNIT/ML injection     30 mL    Inject 6 Units Subcutaneous 2 times daily    Type 1 diabetes mellitus without complication (H)       * blood glucose monitoring lancets     100 each    USE ONE  TO CHECK GLUCOSE FIVE TIMES DAILY OR  AS  DIRECTED    Type 1 diabetes mellitus without complication (H)       * blood glucose monitoring lancets     100 each    1 each 3 times daily    Type 1 diabetes mellitus without complication (H)       blood glucose monitoring meter device kit     1 kit    Use to test blood sugars 5 times daily or as directed.    Type 1 diabetes mellitus without complication (H)       blood glucose monitoring test strip    TANA CONTOUR    550 strip    Use to test blood sugar 6 times daily or as directed.    Type 1 diabetes mellitus without complication (H)       * insulin glulisine 100 UNIT/ML soln    APIDRA SOLOSTAR    15 mL    1 unit per 20 gm carb with correction. approx 30 units daily    Type 1 diabetes mellitus without complication (H)       * insulin glulisine 100 UNIT/ML injection    APIDRA    20 mL    Take as directed via insulin pump. Total daily dose approx 55 units daily.    Diabetes mellitus type 1 (H)       insulin syringes (disposable) U-100 0.3 ML Misc     100 each    Keep on hand in case of pump failure. Use 4-5 syringes daily.    Diabetes mellitus type 1 (H)       levothyroxine 75 MCG tablet    SYNTHROID/LEVOTHROID    90 tablet    TAKE 1 TABLET BY  MOUTH ONCE DAILY    Hypothyroidism, unspecified type       lidocaine 2 % topical gel    XYLOCAINE    30 mL    Apply topically 2 times daily as needed for moderate pain    Cervical radiculopathy, Cervicalgia       lisinopril 10 MG tablet    PRINIVIL/ZESTRIL    90 tablet    Take 1 tablet (10 mg) by mouth daily    Hypertension goal BP (blood pressure) < 140/90, Type 1 diabetes mellitus without complication (H)       omeprazole 20 MG CR capsule    priLOSEC    30 capsule    TAKE ONE CAPSULE BY MOUTH ONCE DAILY    Gastroesophageal reflux disease, esophagitis presence not specified       pravastatin 10 MG tablet    PRAVACHOL    90 tablet    Take 1 tablet (10 mg) by mouth daily    Hyperlipidemia LDL goal <100       * RELION MINI PEN NEEDLES 31G X 6 MM   Generic drug:  insulin pen needle     400 each    USE ONE  4 TIMES DAILY    Type 1 diabetes, HbA1c goal < 7% (H)       * insulin pen needle 31G X 5 MM    B-D U/F    100 each    Use 3 pen needles daily or as directed.    Type 1 diabetes mellitus without complication (H)       vitamin D 2000 units tablet     100 tablet    Take 2,000 Units by mouth daily.    Vitamin D deficiency disease       * Notice:  This list has 6 medication(s) that are the same as other medications prescribed for you. Read the directions carefully, and ask your doctor or other care provider to review them with you.

## 2018-10-03 ENCOUNTER — ALLIED HEALTH/NURSE VISIT (OUTPATIENT)
Dept: FAMILY MEDICINE | Facility: OTHER | Age: 53
End: 2018-10-03
Payer: COMMERCIAL

## 2018-10-03 VITALS — SYSTOLIC BLOOD PRESSURE: 134 MMHG | DIASTOLIC BLOOD PRESSURE: 60 MMHG | HEART RATE: 78 BPM

## 2018-10-03 DIAGNOSIS — I10 HYPERTENSION GOAL BP (BLOOD PRESSURE) < 140/90: Primary | ICD-10-CM

## 2018-10-03 PROCEDURE — 99207 ZZC NO CHARGE NURSE ONLY: CPT

## 2018-10-03 NOTE — MR AVS SNAPSHOT
After Visit Summary   10/3/2018    Maria M Marcus    MRN: [id number]           Patient Information     Date Of Birth          1965        Visit Information        Provider Department      10/3/2018 10:00 AM NL FLOAT TEAM B, [facility]        Today's Diagnoses     Hypertension goal BP (blood pressure) < 140/90    -  1       Follow-ups after your visit        Your next 10 appointments already scheduled     Oct 12, 2018 10:15 AM CDT   Screening Mammogram with MGMA1, MG MA TECH   [facility])    [id number] [place] [id number] [phone]           Do NOT use body powder, lotions, perfume or deodorant the day of the exam.  If your last mammogram was not done at [facility], please bring your mammogram films. We will need the name of your provider to send a copy of your report.  A mammogram may be covered on an annual or biannual basis, please check with your insurance company.            Oct 12, 2018 11:30 AM CDT   Return Visit with Sidney Jarquin MD   [facility])    [address] [place] [id number] [phone]            Oct 12, 2018 12:30 PM CDT   CT SINUS W/O CONTRAST with MGCT2   [facility])    [address] [place] [id number] [phone]           How do I prepare for my exam? (Food and drink instructions) No Food and Drink Restrictions.  How do I prepare for my exam? (Other instructions) You do not need to do anything special to prepare for this exam. For a sinus scan: Use your nose spray (nasal decongestant spray) as directed.  What should I wear: Please wear loose clothing, such as a sweat suit or jogging clothes. Avoid snaps, zippers and other metal. We may ask you to undress and put on a hospital gown.  How long does the exam take: Most scans take less than 20  minutes.  What should I bring: Please bring any scans or X-rays taken at other hospitals, if similar tests were done. Also bring a list of your medicines, including vitamins, minerals and over-the-counter drugs. It is safest to leave personal items at home.  Do I need a : No  is needed.  What do I need to tell my doctor? Be sure to tell your doctor: * If you have any allergies. * If there s any chance you are pregnant. * If you are breastfeeding.  What should I do after the exam: No restrictions, You may resume normal activities.  What is this test: A CT (computed tomography) scan is a series of pictures that allows us to look inside your body. The scanner creates images of the body in cross sections, much like slices of bread. This helps us see any problems more clearly.  Who should I call with questions: If you have any questions, please call the Imaging Department where you will have your exam. Directions, parking instructions, and other information is available on our website, [email or web address].              Who to contact     If you have questions or need follow up information about today's clinic visit or your schedule please contact [facility] [place] directly at [phone].  Normal or non-critical lab and imaging results will be communicated to you by MyChart, letter or phone within 4 business days after the clinic has received the results. If you do not hear from us within 7 days, please contact the clinic through MyChart or phone. If you have a critical or abnormal lab result, we will notify you by phone as soon as possible.  Submit refill requests through [email or web address] or call your pharmacy and they will forward the refill request to us. Please allow 3 business days for your refill to be completed.          Additional Information About Your Visit        [email or web address] Information     [email or web address] gives you secure access to your electronic health record. If you see a primary care provider, you can  also send messages to your care team and make appointments. If you have questions, please call your primary care clinic.  If you do not have a primary care provider, please call [phone] and they will assist you.        Care EveryWhere ID     This is your Care EveryWhere ID. This could be used by other organizations to access your [facility] medical records  [id number]        Your Vitals Were     Pulse                   78            Blood Pressure from Last 3 Encounters:   10/03/18 134/60   09/28/18 140/64   07/09/18 124/72    Weight from Last 3 Encounters:   09/28/18 160 lb 9.6 oz (72.8 kg)   07/09/18 159 lb 3.2 oz (72.2 kg)   06/04/18 159 lb (72.1 kg)              Today, you had the following     No orders found for display       Primary Care Provider Office Phone # Fax #    Marcus Salgado PA-C [phone]       [address] [facility] [address]  [facility] [address]        Equal Access to Services     VIPUL SMITH : Hadii aad ku hadasho Soomaali, waaxda luqadaha, qaybta kaalmada adeegyada, waxay francescoin haykaterina cruz . So [facility] [phone].    ATENCIÓN: Si habla español, tiene a ngo disposición servicios gratuitos de asistencia lingüística. [facility] [phone].    We comply with applicable federal civil rights laws and [place] laws. We do not discriminate on the basis of race, color, national origin, age, disability, sex, sexual orientation, or gender identity.            Thank you!     Thank you for choosing [facility]  for your care. Our goal is always to provide you with excellent care. Hearing back from our patients is one way we can continue to improve our services. Please take a few minutes to complete the written survey that you may receive in the mail after your visit with us. Thank you!             Your Updated Medication List - Protect others around you: Learn how to safely use, store and throw away your medicines at www.disposemymeds.org.          This list  is accurate as of 10/3/18 10:13 AM.  Always use your most recent med list.                   Brand Name Dispense Instructions for use Diagnosis    albuterol 108 (90 Base) MCG/ACT inhaler    PROAIR HFA/PROVENTIL HFA/VENTOLIN HFA    1 Inhaler    Inhale 2 puffs into the lungs every 6 hours as needed for shortness of breath / dyspnea or wheezing    Acute bronchitis, unspecified organism       amitriptyline 25 MG tablet    ELAVIL    30 tablet    Take 1/2 tablet nightly for the first week and then can increase to a full tab after 1 week    Chronic pain syndrome, Cervical radiculopathy       BASAGLAR 100 UNIT/ML injection     30 mL    Inject 6 Units Subcutaneous 2 times daily    Type 1 diabetes mellitus without complication (H)       * blood glucose monitoring lancets     100 each    USE ONE  TO CHECK GLUCOSE FIVE TIMES DAILY OR  AS  DIRECTED    Type 1 diabetes mellitus without complication (H)       * blood glucose monitoring lancets     100 each    1 each 3 times daily    Type 1 diabetes mellitus without complication (H)       blood glucose monitoring meter device kit     1 kit    Use to test blood sugars 5 times daily or as directed.    Type 1 diabetes mellitus without complication (H)       blood glucose monitoring test strip    TANA CONTOUR    550 strip    Use to test blood sugar 6 times daily or as directed.    Type 1 diabetes mellitus without complication (H)       * insulin glulisine 100 UNIT/ML soln    APIDRA SOLOSTAR    15 mL    1 unit per 20 gm carb with correction. approx 30 units daily    Type 1 diabetes mellitus without complication (H)       * insulin glulisine 100 UNIT/ML injection    APIDRA    20 mL    Take as directed via insulin pump. Total daily dose approx 55 units daily.    Diabetes mellitus type 1 (H)       insulin syringes (disposable) U-100 0.3 ML Misc     100 each    Keep on hand in case of pump failure. Use 4-5 syringes daily.    Diabetes mellitus type 1 (H)       levothyroxine 75 MCG tablet     SYNTHROID/LEVOTHROID    90 tablet    TAKE 1 TABLET BY MOUTH ONCE DAILY    Hypothyroidism, unspecified type       lidocaine 2 % topical gel    XYLOCAINE    30 mL    Apply topically 2 times daily as needed for moderate pain    Cervical radiculopathy, Cervicalgia       lisinopril 10 MG tablet    PRINIVIL/ZESTRIL    90 tablet    Take 1 tablet (10 mg) by mouth daily    Hypertension goal BP (blood pressure) < 140/90, Type 1 diabetes mellitus without complication (H)       omeprazole 20 MG CR capsule    priLOSEC    30 capsule    TAKE ONE CAPSULE BY MOUTH ONCE DAILY    Gastroesophageal reflux disease, esophagitis presence not specified       pravastatin 10 MG tablet    PRAVACHOL    90 tablet    Take 1 tablet (10 mg) by mouth daily    Hyperlipidemia LDL goal <100       * RELION MINI PEN NEEDLES 31G X 6 MM   Generic drug:  insulin pen needle     400 each    USE ONE  4 TIMES DAILY    Type 1 diabetes, HbA1c goal < 7% (H)       * insulin pen needle 31G X 5 MM    B-D U/F    100 each    Use 3 pen needles daily or as directed.    Type 1 diabetes mellitus without complication (H)       vitamin D 2000 units tablet     100 tablet    Take 2,000 Units by mouth daily.    Vitamin D deficiency disease       * Notice:  This list has 6 medication(s) that are the same as other medications prescribed for you. Read the directions carefully, and ask your doctor or other care provider to review them with you.

## 2018-10-03 NOTE — NURSING NOTE
Maria M Marcus is a 53 year old patient who comes in today for a Blood Pressure check.  Initial BP:  /60 (BP Location: Right arm, Patient Position: Chair, Cuff Size: Adult Regular)  Pulse 78     78  Disposition: follow-up as previously indicated by provider      Aury Loera CMA  October 3, 2018

## 2018-10-04 ENCOUNTER — TRANSFERRED RECORDS (OUTPATIENT)
Dept: HEALTH INFORMATION MANAGEMENT | Facility: CLINIC | Age: 53
End: 2018-10-04

## 2018-10-12 ENCOUNTER — MYC MEDICAL ADVICE (OUTPATIENT)
Dept: FAMILY MEDICINE | Facility: OTHER | Age: 53
End: 2018-10-12

## 2018-10-12 ENCOUNTER — RADIANT APPOINTMENT (OUTPATIENT)
Dept: CT IMAGING | Facility: CLINIC | Age: 53
End: 2018-10-12
Attending: PHYSICIAN ASSISTANT
Payer: COMMERCIAL

## 2018-10-12 ENCOUNTER — OFFICE VISIT (OUTPATIENT)
Dept: ENDOCRINOLOGY | Facility: CLINIC | Age: 53
End: 2018-10-12
Payer: COMMERCIAL

## 2018-10-12 VITALS
DIASTOLIC BLOOD PRESSURE: 80 MMHG | OXYGEN SATURATION: 96 % | BODY MASS INDEX: 33.04 KG/M2 | HEIGHT: 58 IN | WEIGHT: 157.41 LBS | SYSTOLIC BLOOD PRESSURE: 129 MMHG | HEART RATE: 95 BPM

## 2018-10-12 DIAGNOSIS — Z12.31 VISIT FOR SCREENING MAMMOGRAM: ICD-10-CM

## 2018-10-12 DIAGNOSIS — J34.89 SINUS PRESSURE: ICD-10-CM

## 2018-10-12 DIAGNOSIS — E10.9 TYPE 1 DIABETES MELLITUS WITHOUT COMPLICATION (H): Primary | Chronic | ICD-10-CM

## 2018-10-12 DIAGNOSIS — J32.0 CHRONIC MAXILLARY SINUSITIS: Primary | ICD-10-CM

## 2018-10-12 DIAGNOSIS — J01.01 ACUTE RECURRENT MAXILLARY SINUSITIS: ICD-10-CM

## 2018-10-12 LAB — HBA1C MFR BLD: 6.3 % (ref 0–5.6)

## 2018-10-12 PROCEDURE — 70486 CT MAXILLOFACIAL W/O DYE: CPT | Performed by: RADIOLOGY

## 2018-10-12 PROCEDURE — 36415 COLL VENOUS BLD VENIPUNCTURE: CPT | Performed by: INTERNAL MEDICINE

## 2018-10-12 PROCEDURE — 99214 OFFICE O/P EST MOD 30 MIN: CPT | Performed by: INTERNAL MEDICINE

## 2018-10-12 PROCEDURE — 83036 HEMOGLOBIN GLYCOSYLATED A1C: CPT | Performed by: INTERNAL MEDICINE

## 2018-10-12 NOTE — PATIENT INSTRUCTIONS
Reduce Basaglar to 4 units two times a day.     Continue Apidra with meals and corrections as before.

## 2018-10-12 NOTE — MR AVS SNAPSHOT
After Visit Summary   10/12/2018    Maria M Marcus    MRN: [id number]           Patient Information     Date Of Birth          1965        Visit Information        Provider Department      10/12/2018 11:30 AM Sidney Jarquin MD [facility]        Today's Diagnoses     Type 1 diabetes mellitus without complication (H)    -  1      Care Instructions    Reduce Basaglar to 4 units two times a day.     Continue Apidra with meals and corrections as before.               Follow-ups after your visit        Follow-up notes from your care team     Return in about 6 months (around 4/12/2019).      Your next 10 appointments already scheduled     Oct 12, 2018 12:30 PM CDT   CT SINUS W/O CONTRAST with MGCT2   [facility] ([facility])    [address] [place] [id number] [phone]           How do I prepare for my exam? (Food and drink instructions) No Food and Drink Restrictions.  How do I prepare for my exam? (Other instructions) You do not need to do anything special to prepare for this exam. For a sinus scan: Use your nose spray (nasal decongestant spray) as directed.  What should I wear: Please wear loose clothing, such as a sweat suit or jogging clothes. Avoid snaps, zippers and other metal. We may ask you to undress and put on a hospital gown.  How long does the exam take: Most scans take less than 20 minutes.  What should I bring: Please bring any scans or X-rays taken at other hospitals, if similar tests were done. Also bring a list of your medicines, including vitamins, minerals and over-the-counter drugs. It is safest to leave personal items at home.  Do I need a : No  is needed.  What do I need to tell my doctor? Be sure to tell your doctor: * If you have any allergies. * If there s any chance you are pregnant. * If you are breastfeeding.  What should I do after the exam: No restrictions, You may  resume normal activities.  What is this test: A CT (computed tomography) scan is a series of pictures that allows us to look inside your body. The scanner creates images of the body in cross sections, much like slices of bread. This helps us see any problems more clearly.  Who should I call with questions: If you have any questions, please call the Imaging Department where you will have your exam. Directions, parking instructions, and other information is available on our website, [email or web address].            Apr 12, 2019 12:45 PM CDT   Return Visit with Sidney Jarquin MD, MG ENDO NURSE   [facility] ([facility])    [address] [place] [id number] [phone]              Future tests that were ordered for you today     Open Standing Orders        Priority Remaining Interval Expires Ordered    Hemoglobin A1c POCT Routine 3/4 Q 3 MO 10/12/2019 10/12/2018          Open Future Orders        Priority Expected Expires Ordered    Albumin Random Urine Quantitative with Creat Ratio Routine 4/10/2019 10/12/2019 10/12/2018    Hemoglobin A1c Routine 4/10/2019 10/12/2019 10/12/2018    Hematocrit Routine 4/10/2019 10/12/2019 10/12/2018    TSH with free T4 reflex Routine 4/10/2019 10/12/2019 10/12/2018    Basic metabolic panel Routine 4/10/2019 10/12/2019 10/12/2018    ALT Routine 4/10/2019 10/12/2019 10/12/2018    CK total Routine 4/10/2019 10/12/2019 10/12/2018            Who to contact     If you have questions or need follow up information about today's clinic visit or your schedule please contact [facility] directly at [phone].  Normal or non-critical lab and imaging results will be communicated to you by MyChart, letter or phone within 4 business days after the clinic has received the results. If you do not hear from us within 7 days, please contact the clinic through MyChart or phone. If you have a critical or abnormal lab result,  "we will notify you by phone as soon as possible.  Submit refill requests through [employer] or call your pharmacy and they will forward the refill request to us. Please allow 3 business days for your refill to be completed.          Additional Information About Your Visit        [employer] Information     [employer] gives you secure access to your electronic health record. If you see a primary care provider, you can also send messages to your care team and make appointments. If you have questions, please call your primary care clinic.  If you do not have a primary care provider, please call [phone] and they will assist you.      [employer] is an electronic gateway that provides easy, online access to your medical records. With [employer], you can request a clinic appointment, read your test results, renew a prescription or communicate with your care team.     To access your existing account, please contact your [facility] or call [phone] for assistance.        Care EveryWhere ID     This is your Care EveryWhere ID. This could be used by other organizations to access your [place] medical records  [id number]        Your Vitals Were     Pulse Height Pulse Oximetry BMI (Body Mass Index)          95 1.481 m (4' 10.31\") 96% 32.55 kg/m2         Blood Pressure from Last 3 Encounters:   10/12/18 129/80   10/03/18 134/60   09/28/18 140/64    Weight from Last 3 Encounters:   10/12/18 71.4 kg (157 lb 6.5 oz)   09/28/18 72.8 kg (160 lb 9.6 oz)   07/09/18 72.2 kg (159 lb 3.2 oz)              We Performed the Following     Hemoglobin A1c POCT          Today's Medication Changes          These changes are accurate as of 10/12/18 12:10 PM.  If you have any questions, ask your nurse or doctor.               These medicines have changed or have updated prescriptions.        Dose/Directions    BASAGLAR 100 UNIT/ML injection   This may have changed:  how much to take   Used for:  Type 1 diabetes mellitus " without complication (H)        Dose:  6 Units   Inject 6 Units Subcutaneous 2 times daily   Quantity:  30 mL   Refills:  1                Primary Care Provider Office Phone # Fax #    Marcus Salgado PA-C [phone]       [address] [place] [address] [address]  [facility] [id number]        Equal Access to Services     ONESIMO SMITH : Hadii aad ku hadasho Soomaali, waaxda luqadaha, qaybta kaalmada adeegyada, waxok salgado marahn manpreet mcgregorallysonha cruz . So [facility] [phone].    ATENCIÓN: Si habla español, tiene a ngo disposición servicios gratuitos de asistencia lingüística. [facility] [phone].    We comply with applicable federal civil rights laws and [place] laws. We do not discriminate on the basis of race, color, national origin, age, disability, sex, sexual orientation, or gender identity.            Thank you!     Thank you for choosing [facility]  for your care. Our goal is always to provide you with excellent care. Hearing back from our patients is one way we can continue to improve our services. Please take a few minutes to complete the written survey that you may receive in the mail after your visit with us. Thank you!             Your Updated Medication List - Protect others around you: Learn how to safely use, store and throw away your medicines at www.disposemymeds.org.          This list is accurate as of 10/12/18 12:10 PM.  Always use your most recent med list.                   Brand Name Dispense Instructions for use Diagnosis    albuterol 108 (90 Base) MCG/ACT inhaler    PROAIR HFA/PROVENTIL HFA/VENTOLIN HFA    1 Inhaler    Inhale 2 puffs into the lungs every 6 hours as needed for shortness of breath / dyspnea or wheezing    Acute bronchitis, unspecified organism       amitriptyline 25 MG tablet    ELAVIL    30 tablet    Take 1/2 tablet nightly for the first week and then can increase to a full tab after 1 week    Chronic pain syndrome, Cervical radiculopathy       BASAGLAR 100  UNIT/ML injection     30 mL    Inject 6 Units Subcutaneous 2 times daily    Type 1 diabetes mellitus without complication (H)       blood glucose monitoring lancets     100 each    USE ONE  TO CHECK GLUCOSE FIVE TIMES DAILY OR  AS  DIRECTED    Type 1 diabetes mellitus without complication (H)       blood glucose monitoring meter device kit     1 kit    Use to test blood sugars 5 times daily or as directed.    Type 1 diabetes mellitus without complication (H)       blood glucose monitoring test strip    TANA CONTOUR    550 strip    Use to test blood sugar 6 times daily or as directed.    Type 1 diabetes mellitus without complication (H)       * insulin glulisine 100 UNIT/ML soln    APIDRA SOLOSTAR    15 mL    1 unit per 20 gm carb with correction. approx 30 units daily    Type 1 diabetes mellitus without complication (H)       * insulin glulisine 100 UNIT/ML injection    APIDRA    20 mL    Take as directed via insulin pump. Total daily dose approx 55 units daily.    Diabetes mellitus type 1 (H)       insulin pen needle 31G X 5 MM    B-D U/F    100 each    Use 3 pen needles daily or as directed.    Type 1 diabetes mellitus without complication (H)       insulin syringes (disposable) U-100 0.3 ML Misc     100 each    Keep on hand in case of pump failure. Use 4-5 syringes daily.    Diabetes mellitus type 1 (H)       levothyroxine 75 MCG tablet    SYNTHROID/LEVOTHROID    90 tablet    TAKE 1 TABLET BY MOUTH ONCE DAILY    Hypothyroidism, unspecified type       lisinopril 10 MG tablet    PRINIVIL/ZESTRIL    90 tablet    Take 1 tablet (10 mg) by mouth daily    Hypertension goal BP (blood pressure) < 140/90, Type 1 diabetes mellitus without complication (H)       omeprazole 20 MG CR capsule    priLOSEC    30 capsule    TAKE ONE CAPSULE BY MOUTH ONCE DAILY    Gastroesophageal reflux disease, esophagitis presence not specified       pravastatin 10 MG tablet    PRAVACHOL    90 tablet    Take 1 tablet (10 mg) by mouth daily     Hyperlipidemia LDL goal <100       vitamin D 2000 units tablet     100 tablet    Take 2,000 Units by mouth daily.    Vitamin D deficiency disease       * Notice:  This list has 2 medication(s) that are the same as other medications prescribed for you. Read the directions carefully, and ask your doctor or other care provider to review them with you.

## 2018-10-12 NOTE — LETTER
10/12/2018         RE: Maria M Marcus  [address]  [place] [facility] [place] [facility] [address]        Dear Colleague,    Thank you for referring your patient, Maria M Marcus, to the [facility]. Please see a copy of my visit note below.    Endocrinology Clinic Visit       Chief Complaint: Diabetes     Interval history:   No severe lows.   AM lows recognized by Dexcom, eats at 3 am to avoid lows, leading to am highs.   No palpitation SOB  Hot flashes are new. During this time BG gets higher.   No fevers  Wt loss 4 lbs  No change in BM  No depression.   Patient staying by herself while  is on hunting trip for the first time. Confident with dexcom.     HPI:   Maria M is a 52 year old female who presents for follow up.   She has H/O type 1 diabetes diagnosed at age 2, hypothyroidism diagnosed in her 20s and vitiligo. She takes pravastatin for hyperlipidemia and lisinopril for hypertension.  Her main initial concern was recurrent hypoglycemia. INGRAM showed negative 21 hydroxylase Ab, positive TPO and negative TTG.     History of Diabetes  She was diagnosed with type 1 diabetes mellitus since he was [age] years old.  Over the years, she had developed diabetic retinopathy for which laser treatment was required.  She does not have neuropathy or nephropathy.  In the past several years, her A1c has been in good range between 6.1 and 7.5.  No macrovascular complications.    She was transitioned from basal bolus regimen to insulin pump that she has started about 3-4 days ago. She obtained a Dexcom CGM that has reduced the frequency of hypoglycemia to a great extent.     Low BG is now improved with sensor.  The major pattern was that was detected was hyperglycemia followed by hypoglycemia.  Usually she has blood sugars over 250 and uses correction and afterwards she develops hypoglycemia.    Interval history:   Developed significant rash around injection site from Humalog, Novolog.   This resolved with Apidra.   No  new issues at this time.   Regular insulin - patient has used this in distant past.     Family History  Maria M notes that her mother had breast cancer, a nephew and an uncle had T1DM but other AI diseases do not run in family. No history of thyroid cancer.     Meds  Aspart for T slim pump.     Prevention  Lipid  LDL Cholesterol Calculated   Date Value Ref Range Status   10/27/2016 109 (H) <100 mg/dL Final     Comment:     Above desirable:  100-129 mg/dl   Borderline High:  130-159 mg/dL   High:             160-189 mg/dL   Very high:       >189 mg/dl     04/11/2016 132 (H) <100 mg/dL Final     Comment:     Above desirable:  100-129 mg/dl   Borderline High:  130-159 mg/dL   High:             160-189 mg/dL   Very high:       >189 mg/dl         Medications     HMG CoA Reductase Inhibitors    pravastatin (PRAVACHOL) 10 MG tablet    pravastatin (PRAVACHOL) 10 MG tablet          Renal  Medication (Note: This includes the hypertensive combination subclass to make sure to show all ACEI/ARB's.)     ACE Inhibitors Sig    lisinopril (PRINIVIL/ZESTRIL) 10 MG tablet TAKE ONE TABLET BY MOUTH ONCE DAILY            Weight  Wt Readings from Last 4 Encounters:   07/28/17 69.9 kg (154 lb)   03/01/17 67.4 kg (148 lb 9.6 oz)   02/09/17 69.4 kg (153 lb)   10/28/16 66.2 kg (146 lb)     .    Meter Download Summary:   Only few days recorded for calibration.   Diet:  she usually counts her carbs in carb choices   Exercise  no scheduled exercise     Weight trend  Wt Readings from Last 4 Encounters:   07/28/17 69.9 kg (154 lb)   03/01/17 67.4 kg (148 lb 9.6 oz)   02/09/17 69.4 kg (153 lb)   10/28/16 66.2 kg (146 lb)       A1c today is  6.6  Lab Results   Component Value Date    A1C 6.1 07/28/2017    A1C 6.9 10/27/2016    A1C 6.1 04/11/2016    A1C 6.0 10/09/2015    A1C 6.8 05/26/2015          Allergies   Allergen Reactions     No Known Drug Allergies      Current Outpatient Prescriptions   Medication     albuterol (PROAIR HFA/PROVENTIL  HFA/VENTOLIN HFA) 108 (90 Base) MCG/ACT Inhaler     amitriptyline (ELAVIL) 25 MG tablet     BASAGLAR 100 UNIT/ML injection     blood glucose monitoring (TANA CONTOUR MONITOR) meter device kit     blood glucose monitoring (TANA CONTOUR) test strip     blood glucose monitoring (SOFTCLIX) lancets     Cholecalciferol (VITAMIN D) 2000 UNITS tablet     insulin glulisine (APIDRA SOLOSTAR) 100 UNIT/ML soln     insulin pen needle (B-D U/F) 31G X 5 MM     insulin syringes, disposable, U-100 0.3 ML MISC     levothyroxine (SYNTHROID/LEVOTHROID) 75 MCG tablet     lisinopril (PRINIVIL/ZESTRIL) 10 MG tablet     omeprazole (PRILOSEC) 20 MG CR capsule     pravastatin (PRAVACHOL) 10 MG tablet     insulin glulisine (APIDRA) 100 UNIT/ML injection     [DISCONTINUED] blood glucose monitoring (SOFTCLIX) lancets     No current facility-administered medications for this visit.        Review of Systems     Constitutional: Negative for fever and unexpected weight change. Appetite  is normal  Head: no headache   Eye: no vision change/ loss of peripheral vision.   ENT: No throat congestion.   Respiratory: no cough   Cardiovascular: no chest pain or tachycardia  Gastrointestinal: Negative for vomiting, abdominal pain and diarrhea.  Genitourinary: No bladder problems.  Musculoskeletal: Negative for myalgias. No weakness.  Neurological: Negative for seizures and headaches.  Psychiatric/Behavioral: Negative for behavioral problem and dysphoric mood.    PMH as above.   PSH    Past Surgical History:   Procedure Laterality Date     C  DELIVERY ONLY      C-Sections x2     C NONSPECIFIC PROCEDURE      Hysterectomy - total (cervix removed but ovaries remain)     C STOMACH SURGERY PROCEDURE UNLISTED       C TOTAL ABDOM HYSTERECTOMY       COLONOSCOPY N/A 2016    Procedure: COLONOSCOPY;  Surgeon: Efren Perry MD;  Location:  GI     HC SACROPLASTY       LAMINECT/DISCECTOMY, CERVICAL  2007    C7-T1 hemilaminectomy,  "microdiscectomy, foraminotomy.     LASER YAG CAPSULOTOMY Right 10/23/2014    Procedure: LASER YAG CAPSULOTOMY;  Surgeon: Esteban Dorsey MD;  Location: [facility] OR     VITRECTOMY,STRIP EPIRETINAL MEMBRANE  06/24/09       Family History   Problem Relation Age of Onset     Hypertension Maternal Grandfather      EYE* Maternal Grandmother      Blood Disease Maternal Grandmother      Eye Disorder Maternal Grandmother      maccular degeneration     OSTEOPOROSIS Maternal Grandmother      Lipids Father      GASTROINTESTINAL DISEASE Father      ulcers     HEART DISEASE Father      Cardiovascular Father      heart attack     Hypertension Paternal Grandmother      Breast Cancer Mother      dx age 73 - terminal - mother had first mammo at this age     DIABETES Paternal Uncle      Type I       Social History     Social History     Marital status:      Spouse name: Ra     Number of children: 2     Years of education: 12     Occupational History     receiving [employer]     Social History Main Topics     Smoking status: Never Smoker     Smokeless tobacco: Never Used      Comment: no exposure     Alcohol use Yes      Comment: winex1-2/3x per yr.     Drug use: No     Sexual activity: Yes     Partners: Male     Birth control/ protection: Surgical, Female Surgical      Comment: hysterectomy     Other Topics Concern     Parent/Sibling W/ Cabg, Mi Or Angioplasty Before 65f 55m? No     Social History Narrative       Objective:   /80  Pulse 95  Ht 1.481 m (4' 10.31\")  Wt 71.4 kg (157 lb 6.5 oz)  SpO2 96%  BMI 32.55 kg/m2   Constitutional: obese.   EYES: anicteric, normal extra-ocular movements, no lid lag or retraction   HEENT: Mouth/Throat: Mucous membrane is moist. Oropharynx is clear. No adenopathy. Normal thyroid   Cardiovascular: RRR, S1, S2 normal. No m/g/r   Pulmonary/Chest: CTAB. No wheezing or rales   Abdominal: +BS. Nontender to palpation. No organomegaly present.  Neurological: Alert. Normal " speech  Extremities: No clubbing, cyanosis or inflammation   Skin: Vitiligo  Feet: Diminished vibratory sense bilateral and normal monofilament test. Due 10/2019  Lymphatic: no cervical lymphadenopathy.  Psychological: appropriate mood     In House Labs:   Lab Results   Component Value Date    A1C 6.1 07/28/2017    A1C 6.9 10/27/2016    A1C 6.1 04/11/2016    A1C 6.0 10/09/2015    A1C 6.8 05/26/2015       TSH   Date Value Ref Range Status   10/27/2016 0.90 0.40 - 4.00 mU/L Final   05/26/2015 0.06 (L) 0.40 - 4.00 mU/L Final   03/17/2014 0.78 0.4 - 5.0 mU/L Final   01/18/2013 0.65 0.4 - 5.0 mU/L Final   08/20/2012 0.60 0.4 - 5.0 mU/L Final     T4 Free   Date Value Ref Range Status   11/09/2011 1.69 0.70 - 1.85 ng/dL Final       Creatinine   Date Value Ref Range Status   10/27/2016 1.00 0.52 - 1.04 mg/dL Final   ]    Recent Labs   Lab Test  10/27/16   0748  04/11/16   1013  05/26/15   0738  03/17/14   0735   CHOL  186  210*  167  165   HDL  56  63  46*  58   LDL  109*  132*  84  90   TRIG  104  77  183*  82   CHOLHDLRATIO   --    --   3.6  3.0       Work up  (8/17)   21 OH ab normal - this was done due to frequent lows.   No celiac disease antibodies (gluten sensitivity)  positive TPO.       Continuous glucose monitoring physician interpretation  Sensory use 30/30 days     low alert 85  High alert 280  Fall rate 3  [address] 3  Out of range 20 minutes    Average blood sugar 137, standard deviation 46  72% within range, 24% above range and 1% below range  Minimal risk of hypoglycemia, 1%  Calibration 2.1    Major patterns  Early AM hypoglycemia several hours after Apidra.   Post correction hypoglycemia senait if BG is high.     Labs:   A1c was 6.3      Assessment/Treatment Plan:      Maria M Marcus is a 52 year old year old female with    1. Type 1 Diabetes with  retinopathy and mild neuropathy and hypoglycemia unawareness on CGM  2.  Left side numbness in fourth and fifth finger   3.  Hypothyroidism   4.  Vitiligo    5.  Possible scoliosis in a postmenopausal female.      Type 1 Diabetes with  retinopathy and mild neuropathy   Barriers to achieving glycemic goals  1. Overcorrection: lows are less frequent with current regimen of 1 per 20     5. Hypoglycemia unawareness and frequent hypoglycemia.  Continue Dexcom.    Reduce basal to avoid AM lows to 4 units BID.   low alert to 85 to avoid lows.     4. Low A1c is likely driven by significant low values.   Hypoglycemia has improved compared to last visit.     Hypothyroidism on LT4 88 mcg daily  We will continue levothyroxine, check TSH levels on FU  Mild TSH fluctuation could be due to med interference     Vitiligo: She has multiple autoimmune disease negative celiac disease, ACTH 21 OH levels    Possible kyphosis:   Plan on DXA in future.     Hot flashes. Observe for now. BG higher during these episodes.     Return to clinic in 6 months.      Sidney Jarquin MD  [id number]  Endocrinology Service        Again, thank you for allowing me to participate in the care of your patient.        Sincerely,        Sidney Jarquin MD

## 2018-10-12 NOTE — PROGRESS NOTES
Endocrinology Clinic Visit       Chief Complaint: Diabetes     Interval history:   No severe lows.   AM lows recognized by Dexcom, eats at 3 am to avoid lows, leading to am highs.   No palpitation SOB  Hot flashes are new. During this time BG gets higher.   No fevers  Wt loss 4 lbs  No change in BM  No depression.   Patient staying by herself while  is on hunting trip for the first time. Confident with dexcom.     HPI:   Maria M is a 52 year old female who presents for follow up.   She has H/O type 1 diabetes diagnosed at age 2, hypothyroidism diagnosed in her 20s and vitiligo. She takes pravastatin for hyperlipidemia and lisinopril for hypertension.  Her main initial concern was recurrent hypoglycemia. INGRAM showed negative 21 hydroxylase Ab, positive TPO and negative TTG.     History of Diabetes  She was diagnosed with type 1 diabetes mellitus since he was [age] years old.  Over the years, she had developed diabetic retinopathy for which laser treatment was required.  She does not have neuropathy or nephropathy.  In the past several years, her A1c has been in good range between 6.1 and 7.5.  No macrovascular complications.    She was transitioned from basal bolus regimen to insulin pump that she has started about 3-4 days ago. She obtained a Dexcom CGM that has reduced the frequency of hypoglycemia to a great extent.     Low BG is now improved with sensor.  The major pattern was that was detected was hyperglycemia followed by hypoglycemia.  Usually she has blood sugars over 250 and uses correction and afterwards she develops hypoglycemia.    Interval history:   Developed significant rash around injection site from Humalog, Novolog.   This resolved with Apidra.   No new issues at this time.   Regular insulin - patient has used this in distant past.     Family History  Maria M notes that her mother had breast cancer, a nephew and an uncle had T1DM but other AI diseases do not run in family. No history of thyroid  cancer.     Meds  Aspart for T slim pump.     Prevention  Lipid  LDL Cholesterol Calculated   Date Value Ref Range Status   10/27/2016 109 (H) <100 mg/dL Final     Comment:     Above desirable:  100-129 mg/dl   Borderline High:  130-159 mg/dL   High:             160-189 mg/dL   Very high:       >189 mg/dl     04/11/2016 132 (H) <100 mg/dL Final     Comment:     Above desirable:  100-129 mg/dl   Borderline High:  130-159 mg/dL   High:             160-189 mg/dL   Very high:       >189 mg/dl         Medications     HMG CoA Reductase Inhibitors    pravastatin (PRAVACHOL) 10 MG tablet    pravastatin (PRAVACHOL) 10 MG tablet          Renal  Medication (Note: This includes the hypertensive combination subclass to make sure to show all ACEI/ARB's.)     ACE Inhibitors Sig    lisinopril (PRINIVIL/ZESTRIL) 10 MG tablet TAKE ONE TABLET BY MOUTH ONCE DAILY            Weight  Wt Readings from Last 4 Encounters:   07/28/17 69.9 kg (154 lb)   03/01/17 67.4 kg (148 lb 9.6 oz)   02/09/17 69.4 kg (153 lb)   10/28/16 66.2 kg (146 lb)     .    Meter Download Summary:   Only few days recorded for calibration.   Diet:  she usually counts her carbs in carb choices   Exercise  no scheduled exercise     Weight trend  Wt Readings from Last 4 Encounters:   07/28/17 69.9 kg (154 lb)   03/01/17 67.4 kg (148 lb 9.6 oz)   02/09/17 69.4 kg (153 lb)   10/28/16 66.2 kg (146 lb)       A1c today is  6.6  Lab Results   Component Value Date    A1C 6.1 07/28/2017    A1C 6.9 10/27/2016    A1C 6.1 04/11/2016    A1C 6.0 10/09/2015    A1C 6.8 05/26/2015          Allergies   Allergen Reactions     No Known Drug Allergies      Current Outpatient Prescriptions   Medication     albuterol (PROAIR HFA/PROVENTIL HFA/VENTOLIN HFA) 108 (90 Base) MCG/ACT Inhaler     amitriptyline (ELAVIL) 25 MG tablet     BASAGLAR 100 UNIT/ML injection     blood glucose monitoring (Repairogen CONTOUR MONITOR) meter device kit     blood glucose monitoring (TANA CONTOUR) test strip     blood  glucose monitoring (SOFTCLIX) lancets     Cholecalciferol (VITAMIN D) 2000 UNITS tablet     insulin glulisine (APIDRA SOLOSTAR) 100 UNIT/ML soln     insulin pen needle (B-D U/F) 31G X 5 MM     insulin syringes, disposable, U-100 0.3 ML MISC     levothyroxine (SYNTHROID/LEVOTHROID) 75 MCG tablet     lisinopril (PRINIVIL/ZESTRIL) 10 MG tablet     omeprazole (PRILOSEC) 20 MG CR capsule     pravastatin (PRAVACHOL) 10 MG tablet     insulin glulisine (APIDRA) 100 UNIT/ML injection     [DISCONTINUED] blood glucose monitoring (SOFTCLIX) lancets     No current facility-administered medications for this visit.        Review of Systems     Constitutional: Negative for fever and unexpected weight change. Appetite  is normal  Head: no headache   Eye: no vision change/ loss of peripheral vision.   ENT: No throat congestion.   Respiratory: no cough   Cardiovascular: no chest pain or tachycardia  Gastrointestinal: Negative for vomiting, abdominal pain and diarrhea.  Genitourinary: No bladder problems.  Musculoskeletal: Negative for myalgias. No weakness.  Neurological: Negative for seizures and headaches.  Psychiatric/Behavioral: Negative for behavioral problem and dysphoric mood.    PMH as above.   PSH    Past Surgical History:   Procedure Laterality Date     C  DELIVERY ONLY      C-Sections x2     C NONSPECIFIC PROCEDURE      Hysterectomy - total (cervix removed but ovaries remain)     C STOMACH SURGERY PROCEDURE UNLISTED       C TOTAL ABDOM HYSTERECTOMY       COLONOSCOPY N/A 2016    Procedure: COLONOSCOPY;  Surgeon: Efren Perry MD;  Location:  GI     HC SACROPLASTY       LAMINECT/DISCECTOMY, CERVICAL  2007    C7-T1 hemilaminectomy, microdiscectomy, foraminotomy.     LASER YAG CAPSULOTOMY Right 10/23/2014    Procedure: LASER YAG CAPSULOTOMY;  Surgeon: Esteban Dorsey MD;  Location:  OR     VITRECTOMY,STRIP EPIRETINAL MEMBRANE  09       Family History   Problem Relation Age of Onset      "Hypertension Maternal Grandfather      EYE* Maternal Grandmother      Blood Disease Maternal Grandmother      Eye Disorder Maternal Grandmother      maccular degeneration     OSTEOPOROSIS Maternal Grandmother      Lipids Father      GASTROINTESTINAL DISEASE Father      ulcers     HEART DISEASE Father      Cardiovascular Father      heart attack     Hypertension Paternal Grandmother      Breast Cancer Mother      dx age 73 - terminal - mother had first mammo at this age     DIABETES Paternal Uncle      Type I       Social History     Social History     Marital status:      Spouse name: Ra     Number of children: 2     Years of education: 12     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     Drug use: No     Sexual activity: Yes     Partners: Male     Birth control/ protection: Surgical, Female Surgical      Comment: hysterectomy     Other Topics Concern     Parent/Sibling W/ Cabg, Mi Or Angioplasty Before 65f 55m? No     Social History Narrative       Objective:   /80  Pulse 95  Ht 1.481 m (4' 10.31\")  Wt 71.4 kg (157 lb 6.5 oz)  SpO2 96%  BMI 32.55 kg/m2   Constitutional: obese.   EYES: anicteric, normal extra-ocular movements, no lid lag or retraction   HEENT: Mouth/Throat: Mucous membrane is moist. Oropharynx is clear. No adenopathy. Normal thyroid   Cardiovascular: RRR, S1, S2 normal. No m/g/r   Pulmonary/Chest: CTAB. No wheezing or rales   Abdominal: +BS. Nontender to palpation. No organomegaly present.  Neurological: Alert. Normal speech  Extremities: No clubbing, cyanosis or inflammation   Skin: Vitiligo  Feet: Diminished vibratory sense bilateral and normal monofilament test. Due 10/2019  Lymphatic: no cervical lymphadenopathy.  Psychological: appropriate mood     In House Labs:   Lab Results   Component Value " Date    A1C 6.1 07/28/2017    A1C 6.9 10/27/2016    A1C 6.1 04/11/2016    A1C 6.0 10/09/2015    A1C 6.8 05/26/2015       TSH   Date Value Ref Range Status   10/27/2016 0.90 0.40 - 4.00 mU/L Final   05/26/2015 0.06 (L) 0.40 - 4.00 mU/L Final   03/17/2014 0.78 0.4 - 5.0 mU/L Final   01/18/2013 0.65 0.4 - 5.0 mU/L Final   08/20/2012 0.60 0.4 - 5.0 mU/L Final     T4 Free   Date Value Ref Range Status   11/09/2011 1.69 0.70 - 1.85 ng/dL Final       Creatinine   Date Value Ref Range Status   10/27/2016 1.00 0.52 - 1.04 mg/dL Final   ]    Recent Labs   Lab Test  10/27/16   0748  04/11/16   1013  05/26/15   0738  03/17/14   0735   CHOL  186  210*  167  165   HDL  56  63  46*  58   LDL  109*  132*  84  90   TRIG  104  77  183*  82   CHOLHDLRATIO   --    --   3.6  3.0       Work up  (8/17)   21 OH ab normal - this was done due to frequent lows.   No celiac disease antibodies (gluten sensitivity)  positive TPO.       Continuous glucose monitoring physician interpretation  Sensory use 30/30 days     low alert 85  High alert 280  Fall rate 3  [address] 3  Out of range 20 minutes    Average blood sugar 137, standard deviation 46  72% within range, 24% above range and 1% below range  Minimal risk of hypoglycemia, 1%  Calibration 2.1    Major patterns  Early AM hypoglycemia several hours after Apidra.   Post correction hypoglycemia senait if BG is high.     Labs:   A1c was 6.3      Assessment/Treatment Plan:      Maria M Marcus is a 52 year old year old female with    1. Type 1 Diabetes with  retinopathy and mild neuropathy and hypoglycemia unawareness on CGM  2.  Left side numbness in fourth and fifth finger   3.  Hypothyroidism   4.  Vitiligo   5.  Possible scoliosis in a postmenopausal female.      Type 1 Diabetes with  retinopathy and mild neuropathy   Barriers to achieving glycemic goals  1. Overcorrection: lows are less frequent with current regimen of 1 per 20     5. Hypoglycemia unawareness and frequent  hypoglycemia.  Continue Dexcom.    Reduce basal to avoid AM lows to 4 units BID.   low alert to 85 to avoid lows.     4. Low A1c is likely driven by significant low values.   Hypoglycemia has improved compared to last visit.     Hypothyroidism on LT4 88 mcg daily  We will continue levothyroxine, check TSH levels on FU  Mild TSH fluctuation could be due to med interference     Vitiligo: She has multiple autoimmune disease negative celiac disease, ACTH 21 OH levels    Possible kyphosis:   Plan on DXA in future.     Hot flashes. Observe for now. BG higher during these episodes.     Return to clinic in 6 months.      Sidney Jarquin MD  [id number]  Endocrinology Service

## 2018-10-12 NOTE — NURSING NOTE
"Maria M Marcus's goals for this visit include: Follow up Diabetes  She requests these members of her care team be copied on today's visit information: YES    PCP: Marcus Salgado    Referring Provider:  No referring provider defined for this encounter.    Ht 1.481 m (4' 10.31\")  Wt 71.4 kg (157 lb 6.5 oz)  BMI 32.55 kg/m2    Do you need any medication refills at today's visit? NO    "

## 2018-10-15 NOTE — TELEPHONE ENCOUNTER
"Routing to  to place ENT referral. Per CT Sinus results, \"Crystal,     There is some thickening of the tissue in your sinuses which can cause chronic sinus issues but no evidence of current sinus infection. If you would like to see an ear, nose and throat specialist, let me know. Thanks!     Marcus Salgado PA-C\"  Per Mychart response patient would like to move forward with ENT. Maci Barajas RN, BSN     "

## 2018-10-15 NOTE — TELEPHONE ENCOUNTER
Contacted patient, offered to help schedule but patient was on her way out the door so she was given the phone # [phone] to call and schedule at her convenience.

## 2018-10-31 ENCOUNTER — TELEPHONE (OUTPATIENT)
Dept: OTOLARYNGOLOGY | Facility: OTHER | Age: 53
End: 2018-10-31

## 2018-10-31 ENCOUNTER — OFFICE VISIT (OUTPATIENT)
Dept: OTOLARYNGOLOGY | Facility: OTHER | Age: 53
End: 2018-10-31
Payer: COMMERCIAL

## 2018-10-31 VITALS
HEART RATE: 91 BPM | BODY MASS INDEX: 33.17 KG/M2 | WEIGHT: 158 LBS | OXYGEN SATURATION: 98 % | HEIGHT: 58 IN | DIASTOLIC BLOOD PRESSURE: 68 MMHG | SYSTOLIC BLOOD PRESSURE: 138 MMHG

## 2018-10-31 DIAGNOSIS — J01.01 ACUTE RECURRENT MAXILLARY SINUSITIS: ICD-10-CM

## 2018-10-31 DIAGNOSIS — J34.89 SINUS PRESSURE: Primary | ICD-10-CM

## 2018-10-31 PROCEDURE — 99243 OFF/OP CNSLTJ NEW/EST LOW 30: CPT | Mod: 25 | Performed by: OTOLARYNGOLOGY

## 2018-10-31 PROCEDURE — 31231 NASAL ENDOSCOPY DX: CPT | Performed by: OTOLARYNGOLOGY

## 2018-10-31 NOTE — LETTER
10/31/2018         RE: Maria M Marcus  [address]  [facility] [address]        Dear Colleague,    Thank you for referring your patient, Maria M Marcus, to the [facility]. Please see a copy of my visit note below.    ENT Consultation    Maria M Marcus who is a 53 year old female seen in consultation at the request of Marcus Salgado .      History of Present Illness - Maria M Marcus is a 53 year old female sinus pressure since June 2018. Patient reports that she developed maxillary sinus pressure in early June 2018. Since then she has been treated with antibiotics and nasal steroids. The antibiotics relieve the symptoms while on them, but about 10 days after being off them her symptoms return. Nasal sprays do help some, but not significantly. Patient is a Type I diabetic. Patient tried Gabapentin without any significant relief.       CT SINUS W/O CONTRAST 10/12/2018 12:15 PM     Provided History: recurrent sinusitis over past 3 months; Acute  recurrent maxillary sinusitis; Sinus pressure  ICD-10: Acute recurrent maxillary sinusitis; Sinus pressure      Comparison: None available      Technique:  Using thin collimation multidetector helical acquisition  technique, axial, coronal, and sagittal thin section CT images were  reconstructed through the paranasal sinuses. Images were reviewed in  bone and soft tissue windows.     Findings:   Maxillary sinuses: Minimal polypoid mucosal thickening in the anterior  right maxillary sinus. Otherwise, the sinuses are clear.  Sphenoid sinus: clear.  Frontal sinus: Hypoplastic, but otherwise clear.  Ethmoid air cells: clear.     The ostiomeatal units appear patent bilaterally. The bony walls of the  paranasal sinuses are intact.     Normal retromaxillary and pterygopalatine fat.     The adenoid tonsils in the nasopharynx are unremarkable. Mild  calcifications of the bilateral carotid siphons.         Impression: Minimal polypoid mucosal  thickening in the  anterior-inferior right maxillary sinus and hypoplastic frontal  sinuses. Otherwise, the paranasal sinuses are clear.     I have personally reviewed the examination and initial interpretation  and I agree with the findings.     EM SMITH MD      Body mass index is 32.67 kg/(m^2).    Weight management plan:    Referred to PCP to discuss a weight management plan.    BP Readings from Last 1 Encounters:   10/31/18 138/68     BP noted to be well controlled today in office.     Maria M IS NOT a smoker/uses chewing tobacco.        Past Medical History -   Past Medical History:   Diagnosis Date     Cervical radiculopathy      Diabetic eye exam (H) 12/14/11     DISC DIS NEC/NOS-LUMBAR 4/7/2007     Frozen shoulder syndrome 6/16/2011     Hyperlipidemia LDL goal <100 10/31/2010     Hypertension 1/3/2011     Type 1 diabetes, HbA1c goal < 7% (H) dx 1967     Unspecified hypothyroidism        Current Medications -   Current Outpatient Prescriptions:      amitriptyline (ELAVIL) 25 MG tablet, Take 1/2 tablet nightly for the first week and then can increase to a full tab after 1 week, Disp: 30 tablet, Rfl: 1     BASAGLAR 100 UNIT/ML injection, Inject 6 Units Subcutaneous 2 times daily (Patient taking differently: Inject 5 Units Subcutaneous 2 times daily ), Disp: 30 mL, Rfl: 1     blood glucose monitoring (TANA CONTOUR MONITOR) meter device kit, Use to test blood sugars 5 times daily or as directed., Disp: 1 kit, Rfl: 0     blood glucose monitoring (TANA CONTOUR) test strip, Use to test blood sugar 6 times daily or as directed., Disp: 550 strip, Rfl: 3     blood glucose monitoring (SOFTCLIX) lancets, USE ONE  TO CHECK GLUCOSE FIVE TIMES DAILY OR  AS  DIRECTED, Disp: 100 each, Rfl: 5     Cholecalciferol (VITAMIN D) 2000 UNITS tablet, Take 2,000 Units by mouth daily., Disp: 100 tablet, Rfl: 3     insulin glulisine (APIDRA SOLOSTAR) 100 UNIT/ML soln, 1 unit per 20 gm carb with correction. approx 30 units daily,  Disp: 15 mL, Rfl: 3     insulin pen needle (B-D U/F) 31G X 5 MM, Use 3 pen needles daily or as directed., Disp: 100 each, Rfl: 5     insulin syringes, disposable, U-100 0.3 ML MISC, Keep on hand in case of pump failure. Use 4-5 syringes daily., Disp: 100 each, Rfl: 3     levothyroxine (SYNTHROID/LEVOTHROID) 75 MCG tablet, TAKE 1 TABLET BY MOUTH ONCE DAILY, Disp: 90 tablet, Rfl: 1     lisinopril (PRINIVIL/ZESTRIL) 10 MG tablet, Take 1 tablet (10 mg) by mouth daily, Disp: 90 tablet, Rfl: 2     omeprazole (PRILOSEC) 20 MG CR capsule, TAKE ONE CAPSULE BY MOUTH ONCE DAILY, Disp: 30 capsule, Rfl: 2     pravastatin (PRAVACHOL) 10 MG tablet, Take 1 tablet (10 mg) by mouth daily, Disp: 90 tablet, Rfl: 3     insulin glulisine (APIDRA) 100 UNIT/ML injection, Take as directed via insulin pump. Total daily dose approx 55 units daily. 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     Drug use: No     Sexual activity: Yes     Partners: Male     Birth control/ protection: Surgical, Female Surgical      Comment: hysterectomy     Other Topics Concern     Parent/Sibling W/ Cabg, Mi Or Angioplasty Before 65f 55m? No     Social History Narrative       Family History -   Family History   Problem Relation Age of Onset     Hypertension Maternal Grandfather      EYE* Maternal Grandmother      Blood Disease Maternal Grandmother      Eye Disorder Maternal  "Grandmother      maccular degeneration     Osteoporosis Maternal Grandmother      Lipids Father      GASTROINTESTINAL DISEASE Father      ulcers     HEART DISEASE Father      Cardiovascular Father      heart attack     Hypertension Paternal Grandmother      Breast Cancer Mother      dx age 73 - terminal - mother had first mammo at this age     Diabetes Paternal Uncle      Type I       Review of Systems - As per HPI and PMHx, otherwise review of system review of the head and neck negative.    Physical Exam  /68  Pulse 91  Ht 1.481 m (4' 10.31\")  Wt 71.7 kg (158 lb)  SpO2 98%  BMI 32.67 kg/m2  BMI: Body mass index is 32.67 kg/(m^2).    General - The patient is well nourished and well developed, and appears to have good nutritional status.  Alert and oriented to person and place, answers questions and cooperates with examination appropriately.    SKIN - No suspicious lesions or rashes.  Respiration - No respiratory distress.  Head and Face - Normocephalic and atraumatic, with no gross asymmetry noted of the contour of the facial features.  The facial nerve is intact, with strong symmetric movements.    Voice and Breathing - The patient was breathing comfortably without the use of accessory muscles. The patients voice was clear and strong, and had appropriate pitch and quality.    Ears - Bilateral pinna and EACs with normal appearing overlying skin. Tympanic membrane intact with good mobility on pneumatic otoscopy bilaterally. Bony landmarks of the ossicular chain are normal. The tympanic membranes are normal in appearance. No retraction, perforation, or masses.  No fluid or purulence was seen in the external canal or the middle ear.     Eyes - Extraocular movements intact.  Sclera were not icteric or injected, conjunctiva were pink and moist.    Mouth - Examination of the oral cavity showed pink, healthy oral mucosa. No lesions or ulcerations noted.  The tongue was mobile and midline, and the dentition were " in good condition.      Throat - The walls of the oropharynx were smooth, pink, moist, symmetric, and had no lesions or ulcerations.  The tonsillar pillars and soft palate were symmetric.  The uvula was midline on elevation.    Neck - Normal midline excursion of the laryngotracheal complex during swallowing.  Full range of motion on passive movement.  Palpation of the occipital, submental, submandibular, internal jugular chain, and supraclavicular nodes did not demonstrate any abnormal lymph nodes or masses.  The carotid pulse was palpable bilaterally.  Palpation of the thyroid was soft and smooth, with no nodules or goiter appreciated.  The trachea was mobile and midline.    Nose - External contour is symmetric, no gross deflection or scars.  Nasal mucosa is pink and moist with no abnormal mucus.  The septum was midline and non-obstructive, turbinates of normal size and position.  No polyps, masses, or purulence noted on examination.    Neuro - Nonfocal neuro exam is normal, CN 2 through 12 intact, normal gait and muscle tone.      Performed in clinic today:  To further evaluate the nasal cavity, I performed rigid nasal endoscopy.  I first sprayed the nasal cavity bilaterally with a mix of lidocaine and neosynephrine.  I then began on the left side using a 2.7mm, 30 degree rigid nasal endoscope.  The septum was straight and the nasal airway was open.  No abnormal secretions, purulence, or polyps were noted. The left middle turbinate and middle meatus were clearly visualized and normal in appearance.  Looking up, the olfactory cleft was unobstructed.  Going further back, the sphenoethmoid recess was normal in appearance, with healthy appearing mucosa on the face of the sphenoid.  The nasopharynx was unremarkable, and the eustachian tube opening on this side was unobstructed. Mild edema of lateral wall of nose.     I then turned my attention to the right side.  Once again, the septum was straight, and the airway was  open.  No abnormal secretions, purulence, polyps were noted.  The right middle turbinate and middle meatus were clearly visualized and normal in appearance.  Looking up, the olfactory cleft was unobstructed.  Going further back the right sphenoethmoid recess was normal in appearance, and eustachian tube opening was unobstructed. Mild edema of lateral wall of nose.    [place] - [id number] Castillo      A/P Salvador Marcus is a 53 year old female with chronic sinus pressure around maxillary sinus areas bilaterally.      This document serves as a record of the services and decisions personally performed and made by Dr. Woo Silva MD. It was created on his behalf by Sima King, a trained medical scribe. The creation of this document is based the provider's statements to the medical scribe.  Sima King 10/31/2018    Provider:   The information in this document, created by the medical scribe for me, accurately reflects the services I personally performed and the decisions made by me. I have reviewed and approved this document for accuracy prior to leaving the patient care area.  Dr. Woo Silva MD 10/31/2018    Woo Silva MD.      Again, thank you for allowing me to participate in the care of your patient.        Sincerely,        Woo Silva MD, MD

## 2018-10-31 NOTE — MR AVS SNAPSHOT
After Visit Summary   10/31/2018    Maria M Marcus    MRN: [id number]           Patient Information     Date Of Birth          1965        Visit Information        Provider Department      10/31/2018 1:15 PM Woo Silva MD [facility]        Today's Diagnoses     Sinus pressure    -  1    Acute recurrent maxillary sinusitis           Follow-ups after your visit        Your next 10 appointments already scheduled     Nov 26, 2018 10:00 AM CST   Pre-Op physical with Marcus Salgado PA-C   [facility]    [address]  [facility] [address]   [phone]            Dec 19, 2018  9:15 AM CST   Return Visit with Woo Silva MD   [facility]    [address]  [address]  [place] [facility] [facility] [place] [address]   [phone]            Apr 12, 2019 12:45 PM CDT   Return Visit with Sidney Jarquin MD, MG ENDO NURSE   [facility]    [address] [place] [id number] [phone]              Who to contact     If you have questions or need follow up information about today's clinic visit or your schedule please contact [facility] directly at [phone].  Normal or non-critical lab and imaging results will be communicated to you by MyChart, letter or phone within 4 business days after the clinic has received the results. If you do not hear from us within 7 days, please contact the clinic through MyChart or phone. If you have a critical or abnormal lab result, we will notify you by phone as soon as possible.  Submit refill requests through [facility] or call your pharmacy and they will forward the refill request to us. Please allow 3 business days for your refill to be completed.          Additional Information About Your Visit        [facility] Information     [facility] gives you secure access to your  "electronic health record. If you see a primary care provider, you can also send messages to your care team and make appointments. If you have questions, please call your primary care clinic.  If you do not have a primary care provider, please call [phone] and they will assist you.        Care EveryWhere ID     This is your Care EveryWhere ID. This could be used by other organizations to access your [place] medical records  [id number]        Your Vitals Were     Pulse Height Pulse Oximetry BMI (Body Mass Index)          91 1.481 m (4' 10.31\") 98% 32.67 kg/m2         Blood Pressure from Last 3 Encounters:   10/31/18 138/68   10/12/18 129/80   10/03/18 134/60    Weight from Last 3 Encounters:   10/31/18 71.7 kg (158 lb)   10/12/18 71.4 kg (157 lb 6.5 oz)   09/28/18 72.8 kg (160 lb 9.6 oz)              We Performed the Following     Nasal Endoscopy, Diag     Radha-Operative Worksheet          Today's Medication Changes          These changes are accurate as of 10/31/18  2:34 PM.  If you have any questions, ask your nurse or doctor.               These medicines have changed or have updated prescriptions.        Dose/Directions    BASAGLAR 100 UNIT/ML injection   This may have changed:  how much to take   Used for:  Type 1 diabetes mellitus without complication (H)        Dose:  6 Units   Inject 6 Units Subcutaneous 2 times daily   Quantity:  30 mL   Refills:  1                Primary Care Provider Office Phone # Fax #    Marcus Salgado PA-C [phone]       [address] [place] [id number]        Equal Access to Services     [facility] AH: Hadii miguel fajardo Sobarb, waaxda luqadaha, qaybta kaalmatraci murray. So [facility] [phone].    ATENCIÓN: Si habla español, tiene a ngo disposición servicios gratuitos de asistencia lingüística. Llame al [phone].    We comply with applicable federal civil rights laws and [place] laws. We do not " discriminate on the basis of race, color, national origin, age, disability, sex, sexual orientation, or gender identity.            Thank you!     Thank you for choosing [facility]  for your care. Our goal is always to provide you with excellent care. Hearing back from our patients is one way we can continue to improve our services. Please take a few minutes to complete the written survey that you may receive in the mail after your visit with us. Thank you!             Your Updated Medication List - Protect others around you: Learn how to safely use, store and throw away your medicines at www.disposemymeds.org.          This list is accurate as of 10/31/18  2:34 PM.  Always use your most recent med list.                   Brand Name Dispense Instructions for use Diagnosis    amitriptyline 25 MG tablet    ELAVIL    30 tablet    Take 1/2 tablet nightly for the first week and then can increase to a full tab after 1 week    Chronic pain syndrome, Cervical radiculopathy       BASAGLAR 100 UNIT/ML injection     30 mL    Inject 6 Units Subcutaneous 2 times daily    Type 1 diabetes mellitus without complication (H)       blood glucose monitoring lancets     100 each    USE ONE  TO CHECK GLUCOSE FIVE TIMES DAILY OR  AS  DIRECTED    Type 1 diabetes mellitus without complication (H)       blood glucose monitoring meter device kit     1 kit    Use to test blood sugars 5 times daily or as directed.    Type 1 diabetes mellitus without complication (H)       blood glucose monitoring test strip    TANA CONTOUR    550 strip    Use to test blood sugar 6 times daily or as directed.    Type 1 diabetes mellitus without complication (H)       * insulin glulisine 100 UNIT/ML soln    APIDRA SOLOSTAR    15 mL    1 unit per 20 gm carb with correction. approx 30 units daily    Type 1 diabetes mellitus without complication (H)       * insulin glulisine 100 UNIT/ML injection    APIDRA    20 mL    Take as directed via insulin  pump. Total daily dose approx 55 units daily.    Diabetes mellitus type 1 (H)       insulin pen needle 31G X 5 MM    B-D U/F    100 each    Use 3 pen needles daily or as directed.    Type 1 diabetes mellitus without complication (H)       insulin syringes (disposable) U-100 0.3 ML Misc     100 each    Keep on hand in case of pump failure. Use 4-5 syringes daily.    Diabetes mellitus type 1 (H)       levothyroxine 75 MCG tablet    SYNTHROID/LEVOTHROID    90 tablet    TAKE 1 TABLET BY MOUTH ONCE DAILY    Hypothyroidism, unspecified type       lisinopril 10 MG tablet    PRINIVIL/ZESTRIL    90 tablet    Take 1 tablet (10 mg) by mouth daily    Hypertension goal BP (blood pressure) < 140/90, Type 1 diabetes mellitus without complication (H)       omeprazole 20 MG CR capsule    priLOSEC    30 capsule    TAKE ONE CAPSULE BY MOUTH ONCE DAILY    Gastroesophageal reflux disease, esophagitis presence not specified       pravastatin 10 MG tablet    PRAVACHOL    90 tablet    Take 1 tablet (10 mg) by mouth daily    Hyperlipidemia LDL goal <100       vitamin D 2000 units tablet     100 tablet    Take 2,000 Units by mouth daily.    Vitamin D deficiency disease       * Notice:  This list has 2 medication(s) that are the same as other medications prescribed for you. Read the directions carefully, and ask your doctor or other care provider to review them with you.

## 2018-10-31 NOTE — PROGRESS NOTES
ENT Consultation    Maria M Marcus who is a 53 year old female seen in consultation at the request of Marcus Salgado .      History of Present Illness - Maria M Marcus is a 53 year old female sinus pressure since June 2018. Patient reports that she developed maxillary sinus pressure in early June 2018. Since then she has been treated with antibiotics and nasal steroids. The antibiotics relieve the symptoms while on them, but about 10 days after being off them her symptoms return. Nasal sprays do help some, but not significantly. Patient is a Type I diabetic. Patient tried Gabapentin without any significant relief.       CT SINUS W/O CONTRAST 10/12/2018 12:15 PM     Provided History: recurrent sinusitis over past 3 months; Acute  recurrent maxillary sinusitis; Sinus pressure  ICD-10: Acute recurrent maxillary sinusitis; Sinus pressure      Comparison: None available      Technique:  Using thin collimation multidetector helical acquisition  technique, axial, coronal, and sagittal thin section CT images were  reconstructed through the paranasal sinuses. Images were reviewed in  bone and soft tissue windows.     Findings:   Maxillary sinuses: Minimal polypoid mucosal thickening in the anterior  right maxillary sinus. Otherwise, the sinuses are clear.  Sphenoid sinus: clear.  Frontal sinus: Hypoplastic, but otherwise clear.  Ethmoid air cells: clear.     The ostiomeatal units appear patent bilaterally. The bony walls of the  paranasal sinuses are intact.     Normal retromaxillary and pterygopalatine fat.     The adenoid tonsils in the nasopharynx are unremarkable. Mild  calcifications of the bilateral carotid siphons.         Impression: Minimal polypoid mucosal thickening in the  anterior-inferior right maxillary sinus and hypoplastic frontal  sinuses. Otherwise, the paranasal sinuses are clear.     I have personally reviewed the examination and initial interpretation  and I agree with the findings.     EM  MD SARAH      Body mass index is 32.67 kg/(m^2).    Weight management plan:    Referred to PCP to discuss a weight management plan.    BP Readings from Last 1 Encounters:   10/31/18 138/68     BP noted to be well controlled today in office.     Maria M IS NOT a smoker/uses chewing tobacco.        Past Medical History -   Past Medical History:   Diagnosis Date     Cervical radiculopathy      Diabetic eye exam (H) 12/14/11     DISC DIS NEC/NOS-LUMBAR 4/7/2007     Frozen shoulder syndrome 6/16/2011     Hyperlipidemia LDL goal <100 10/31/2010     Hypertension 1/3/2011     Type 1 diabetes, HbA1c goal < 7% (H) dx 1967     Unspecified hypothyroidism        Current Medications -   Current Outpatient Prescriptions:      amitriptyline (ELAVIL) 25 MG tablet, Take 1/2 tablet nightly for the first week and then can increase to a full tab after 1 week, Disp: 30 tablet, Rfl: 1     BASAGLAR 100 UNIT/ML injection, Inject 6 Units Subcutaneous 2 times daily (Patient taking differently: Inject 5 Units Subcutaneous 2 times daily ), Disp: 30 mL, Rfl: 1     blood glucose monitoring (TANA CONTOUR MONITOR) meter device kit, Use to test blood sugars 5 times daily or as directed., Disp: 1 kit, Rfl: 0     blood glucose monitoring (TANA CONTOUR) test strip, Use to test blood sugar 6 times daily or as directed., Disp: 550 strip, Rfl: 3     blood glucose monitoring (SOFTCLIX) lancets, USE ONE  TO CHECK GLUCOSE FIVE TIMES DAILY OR  AS  DIRECTED, Disp: 100 each, Rfl: 5     Cholecalciferol (VITAMIN D) 2000 UNITS tablet, Take 2,000 Units by mouth daily., Disp: 100 tablet, Rfl: 3     insulin glulisine (APIDRA SOLOSTAR) 100 UNIT/ML soln, 1 unit per 20 gm carb with correction. approx 30 units daily, Disp: 15 mL, Rfl: 3     insulin pen needle (B-D U/F) 31G X 5 MM, Use 3 pen needles daily or as directed., Disp: 100 each, Rfl: 5     insulin syringes, disposable, U-100 0.3 ML MISC, Keep on hand in case of pump failure. Use 4-5 syringes daily., Disp: 100  each, Rfl: 3     levothyroxine (SYNTHROID/LEVOTHROID) 75 MCG tablet, TAKE 1 TABLET BY MOUTH ONCE DAILY, Disp: 90 tablet, Rfl: 1     lisinopril (PRINIVIL/ZESTRIL) 10 MG tablet, Take 1 tablet (10 mg) by mouth daily, Disp: 90 tablet, Rfl: 2     omeprazole (PRILOSEC) 20 MG CR capsule, TAKE ONE CAPSULE BY MOUTH ONCE DAILY, Disp: 30 capsule, Rfl: 2     pravastatin (PRAVACHOL) 10 MG tablet, Take 1 tablet (10 mg) by mouth daily, Disp: 90 tablet, Rfl: 3     insulin glulisine (APIDRA) 100 UNIT/ML injection, Take as directed via insulin pump. Total daily dose approx 55 units daily. (Patient not taking: Reported on 10/12/2018), Disp: 20 mL, Rfl: 11    Allergies -   Allergies   Allergen Reactions     Novolog [Insulin Aspart] Swelling     Itching, rash, contact dermatitis     Humalog [Insulin Lispro] Rash     Contact dermatitis       Social History -   Social History     Social History     Marital status:      Spouse name: Ra     Number of children: 2     Years of education: 12     Occupational History     receiving [employer]     Social History Main Topics     Smoking status: Never Smoker     Smokeless tobacco: Never Used      Comment: no exposure     Alcohol use Yes      Comment: winex1-2/3x per yr.     Drug use: No     Sexual activity: Yes     Partners: Male     Birth control/ protection: Surgical, Female Surgical      Comment: hysterectomy     Other Topics Concern     Parent/Sibling W/ Cabg, Mi Or Angioplasty Before 65f 55m? No     Social History Narrative       Family History -   Family History   Problem Relation Age of Onset     Hypertension Maternal Grandfather      EYE* Maternal Grandmother      Blood Disease Maternal Grandmother      Eye Disorder Maternal Grandmother      maccular degeneration     Osteoporosis Maternal Grandmother      Lipids Father      GASTROINTESTINAL DISEASE Father      ulcers     HEART DISEASE Father      Cardiovascular Father      heart attack     Hypertension Paternal Grandmother       "Breast Cancer Mother      dx age 73 - terminal - mother had first mammo at this age     Diabetes Paternal Uncle      Type I       Review of Systems - As per HPI and PMHx, otherwise review of system review of the head and neck negative.    Physical Exam  /68  Pulse 91  Ht 1.481 m (4' 10.31\")  Wt 71.7 kg (158 lb)  SpO2 98%  BMI 32.67 kg/m2  BMI: Body mass index is 32.67 kg/(m^2).    General - The patient is well nourished and well developed, and appears to have good nutritional status.  Alert and oriented to person and place, answers questions and cooperates with examination appropriately.    SKIN - No suspicious lesions or rashes.  Respiration - No respiratory distress.  Head and Face - Normocephalic and atraumatic, with no gross asymmetry noted of the contour of the facial features.  The facial nerve is intact, with strong symmetric movements.    Voice and Breathing - The patient was breathing comfortably without the use of accessory muscles. The patients voice was clear and strong, and had appropriate pitch and quality.    Ears - Bilateral pinna and EACs with normal appearing overlying skin. Tympanic membrane intact with good mobility on pneumatic otoscopy bilaterally. Bony landmarks of the ossicular chain are normal. The tympanic membranes are normal in appearance. No retraction, perforation, or masses.  No fluid or purulence was seen in the external canal or the middle ear.     Eyes - Extraocular movements intact.  Sclera were not icteric or injected, conjunctiva were pink and moist.    Mouth - Examination of the oral cavity showed pink, healthy oral mucosa. No lesions or ulcerations noted.  The tongue was mobile and midline, and the dentition were in good condition.      Throat - The walls of the oropharynx were smooth, pink, moist, symmetric, and had no lesions or ulcerations.  The tonsillar pillars and soft palate were symmetric.  The uvula was midline on elevation.    Neck - Normal midline " excursion of the laryngotracheal complex during swallowing.  Full range of motion on passive movement.  Palpation of the occipital, submental, submandibular, internal jugular chain, and supraclavicular nodes did not demonstrate any abnormal lymph nodes or masses.  The carotid pulse was palpable bilaterally.  Palpation of the thyroid was soft and smooth, with no nodules or goiter appreciated.  The trachea was mobile and midline.    Nose - External contour is symmetric, no gross deflection or scars.  Nasal mucosa is pink and moist with no abnormal mucus.  The septum was midline and non-obstructive, turbinates of normal size and position.  No polyps, masses, or purulence noted on examination.    Neuro - Nonfocal neuro exam is normal, CN 2 through 12 intact, normal gait and muscle tone.      Performed in clinic today:  To further evaluate the nasal cavity, I performed rigid nasal endoscopy.  I first sprayed the nasal cavity bilaterally with a mix of lidocaine and neosynephrine.  I then began on the left side using a 2.7mm, 30 degree rigid nasal endoscope.  The septum was straight and the nasal airway was open.  No abnormal secretions, purulence, or polyps were noted. The left middle turbinate and middle meatus were clearly visualized and normal in appearance.  Looking up, the olfactory cleft was unobstructed.  Going further back, the sphenoethmoid recess was normal in appearance, with healthy appearing mucosa on the face of the sphenoid.  The nasopharynx was unremarkable, and the eustachian tube opening on this side was unobstructed. Mild edema of lateral wall of nose.     I then turned my attention to the right side.  Once again, the septum was straight, and the airway was open.  No abnormal secretions, purulence, polyps were noted.  The right middle turbinate and middle meatus were clearly visualized and normal in appearance.  Looking up, the olfactory cleft was unobstructed.  Going further back the right  sphenoethmoid recess was normal in appearance, and eustachian tube opening was unobstructed. Mild edema of lateral wall of nose.    [place] - [id number]      A/P Salvador Marcus is a 53 year old female with chronic sinus pressure around maxillary sinus areas bilaterally. Considering failure to improve with maximal medical therapy for over 3 months duration of the symptoms, we discuss possible conservative surgical option of maxillary FESS bilaterally. We discuss risks of functional endoscopic sinus surgery including infection, scarring, double vision, other vision complications, CSF rhinorrhea, loss of sense of smell. With this knowledge the patient wishes to seriously consider this surgical procedure.    This document serves as a record of the services and decisions personally performed and made by Dr. Woo Silva MD. It was created on his behalf by Sima King, a trained medical scribe. The creation of this document is based the provider's statements to the medical scribe.  Sima King 10/31/2018    Provider:   The information in this document, created by the medical scribe for me, accurately reflects the services I personally performed and the decisions made by me. I have reviewed and approved this document for accuracy prior to leaving the patient care area.  Dr. Woo Silva MD 10/31/2018    Woo Silva MD.

## 2018-10-31 NOTE — TELEPHONE ENCOUNTER
Type of surgery: bilateral functional endoscopic maxillary antrostomies  Location of surgery: [facility] OR  Date and time of surgery: 12/4  Surgeon: Shira  Pre-Op Appt Date: 11/26  Post-Op Appt Date: 12/19   Packet sent out: Yes  Pre-cert/Authorization completed:  Not Applicable  Date: NA

## 2018-11-20 NOTE — PATIENT INSTRUCTIONS
Before Your Surgery      Call your surgeon if there is any change in your health. This includes signs of a cold or flu (such as a sore throat, runny nose, cough, rash or fever).    Do not smoke, drink alcohol or take over the counter medicine (unless your surgeon or primary care doctor tells you to) for the 24 hours before and after surgery.    If you take prescribed drugs: Follow your doctor s orders about which medicines to take and which to stop until after surgery. Take 3 units of Basaglar the night before surgery but hold all of your insulin the day of surgery until after surgery. Hold lisinopril the day of surgery.     Eating and drinking prior to surgery: follow the instructions from your surgeon    Take a shower or bath the night before surgery. Use the soap your surgeon gave you to gently clean your skin. If you do not have soap from your surgeon, use your regular soap. Do not shave or scrub the surgery site.  Wear clean pajamas and have clean sheets on your bed.

## 2018-11-20 NOTE — PROGRESS NOTES
[address]  [facility] [address]  [phone]  Dept: [phone]    PRE-OP EVALUATION:  Today's date: 2018    Maria M Marcus (: 1965) presents for pre-operative evaluation assessment as requested by Dr. Silva.  She requires evaluation and anesthesia risk assessment prior to undergoing surgery/procedure for treatment of chronic sinus congestion.    Proposed Surgery/ Procedure: Bilateral functional endoscopic maxillary antrostomies  Date of Surgery/ Procedure: 2018  Time of Surgery/ Procedure: 1:10 PM  [facility]  Fax number for surgical facility: available electronically   Primary Physician: Marcus Salgado  Type of Anesthesia Anticipated: General    Patient has a Health Care Directive or Living Will:  YES     1. NO - Do you have a history of heart attack, stroke, stent, bypass or surgery on an artery in the head, neck, heart or legs?  2. NO - Do you ever have any pain or discomfort in your chest?  3. NO - Do you have a history of  Heart Failure?  4. NO - Are you troubled by shortness of breath when: walking on the level, up a slight hill or at night?  5. NO - Do you currently have a cold, bronchitis or other respiratory infection?  6. NO - Do you have a cough, shortness of breath or wheezing?  7. NO - Do you sometimes get pains in the calves of your legs when you walk?  8. NO - Do you or anyone in your family have previous history of blood clots?  9. NO - Do you or does anyone in your family have a serious bleeding problem such as prolonged bleeding following surgeries or cuts?  10. NO - Have you ever had problems with anemia or been told to take iron pills?  11. NO - Have you had any abnormal blood loss such as black, tarry or bloody stools, or abnormal vaginal bleeding?  12. NO - Have you ever had a blood transfusion?  13. NO - Have you or any of your relatives ever had problems with  anesthesia?  14. NO - Do you have sleep apnea, excessive snoring or daytime drowsiness?  15. NO - Do you have any prosthetic heart valves?  16. NO - Do you have prosthetic joints?  17. NO - Is there any chance that you may be pregnant?      HPI:     HPI related to upcoming procedure: She has chronic sinus congestion with recent CT findings of sinus mucosal thickening so she will be undergoing the above procedure with Dr. Silva on 12/4/18.     DIABETES - Patient has a longstanding history of DiabetesType Type I . Patient is being treated with insulin injections and denies significant side effects. Control has been good. Complicating factors include but are not limited to: hypertension and hyperlipidemia.                                                                                                                              HYPERLIPIDEMIA - Patient has a long history of significant Hyperlipidemia requiring medication for treatment with recent good control. Patient reports no problems or side effects with the medication.                                                                                                                                                         HYPERTENSION - Patient has longstanding history of HTN , currently denies any symptoms referable to elevated blood pressure. Specifically denies chest pain, palpitations, dyspnea, orthopnea, PND or peripheral edema. Blood pressure readings have been in normal range. Current medication regimen is as listed below. Patient denies any side effects of medication.                                                                                                                                                                                            HYPOTHYROIDISM - Patient has a longstanding history of chronic Hypothyroidism. Patient has been doing well, noting no tremor, insomnia, hair loss or changes in skin texture. Continues to take medications as  directed, without adverse reactions or side effects. Last TSH   Lab Results   Component Value Date    TSH 1.21 08/24/2018   .                                                                                                                                                                                                                           MEDICAL HISTORY:     Patient Active Problem List    Diagnosis Date Noted     Gastroesophageal reflux disease, esophagitis presence not specified 09/08/2017     Priority: Medium     Retinopathy due to secondary diabetes (H) 10/29/2016     Priority: Medium     Type 1 diabetes mellitus without complication (H) 11/01/2015     Priority: Medium     Overweight (BMI 25.0-29.9) 11/01/2015     Priority: Medium     Hypertension goal BP (blood pressure) < 140/90 09/29/2014     Priority: Medium     Back pain 03/23/2013     Priority: Medium     Chronic pain 11/09/2011     Priority: Medium     Related to frozen shoulder per ortho will be two years of intermittent pain, agreed to #45 percocet for 90 day supply, only get narcotics from me.         Vitiligo 07/15/2011     Priority: Medium     Frozen shoulder syndrome 06/16/2011     Priority: Medium     Advanced directives, counseling/discussion 06/13/2011     Priority: Medium     Advance Directive Problem List Overview:   Name Relationship Phone    Primary Health Care Agent            Alternative Health Care Agent        9/14/11  Patient presents for [employer] Informational meeting. Patient given [employer] folder. Patient will complete Advance Care Directive and bring to clinic...Mary Lee RN              Cervical radiculopathy      Priority: Medium     Hyperlipidemia LDL goal <100 10/31/2010     Priority: Medium     [place] 10-year CHD Risk Score: 20% (Diabetic)   Values used to calculate score:     Age: 46 years -- Points: 3     Total Cholesterol: 174 mg/dL -- Points: 3     HDL Cholesterol: 50 mg/dL -- Points: 0     Systolic  BP (treated): 128 mmHg -- Points: 3    The patient is not a smoker. -- Points: 0     The patient has a diagnosis of diabetes. -- Points: 20% Risk    The patient does not have a family history of CHD. -- Points:0     [place]      LDL goal  >= 20%  <100         Other and unspecified disc disorder of lumbar region 2007     Priority: Medium     Hypothyroidism 10/05/2004     Priority: Medium     Problem list name updated by automated process. Provider to review        Past Medical History:   Diagnosis Date     Cervical radiculopathy      Diabetic eye exam (H) 11     DISC DIS NEC/NOS-LUMBAR 2007     Frozen shoulder syndrome 2011     Hyperlipidemia LDL goal <100 10/31/2010     Hypertension 1/3/2011     Type 1 diabetes, HbA1c goal < 7% (H) dx 1967     Unspecified hypothyroidism      Past Surgical History:   Procedure Laterality Date     C  DELIVERY ONLY      C-Sections x2     C NONSPECIFIC PROCEDURE      Hysterectomy - total (cervix removed but ovaries remain)     C STOMACH SURGERY PROCEDURE UNLISTED       C TOTAL ABDOM HYSTERECTOMY       COLONOSCOPY N/A 2016    Procedure: COLONOSCOPY;  Surgeon: Efren Perry MD;  Location:  GI     HC SACROPLASTY       LAMINECT/DISCECTOMY, CERVICAL  2007    C7-T1 hemilaminectomy, microdiscectomy, foraminotomy.     LASER YAG CAPSULOTOMY Right 10/23/2014    Procedure: LASER YAG CAPSULOTOMY;  Surgeon: Esteban Dorsey MD;  Location:  OR     VITRECTOMY,STRIP EPIRETINAL MEMBRANE  09     Current Outpatient Prescriptions   Medication Sig Dispense Refill     amitriptyline (ELAVIL) 25 MG tablet Take 1 tablet (25 mg) by mouth At Bedtime 90 tablet 1     BASAGLAR 100 UNIT/ML injection Inject 6 Units Subcutaneous 2 times daily (Patient taking differently: Inject 5 Units Subcutaneous 2 times daily ) 30 mL 1     blood glucose monitoring (TANA CONTOUR MONITOR) meter device kit Use to test blood sugars 5 times daily or as directed. 1 kit 0      blood glucose monitoring (TANA CONTOUR) test strip Use to test blood sugar 6 times daily or as directed. 550 strip 3     blood glucose monitoring (SOFTCLIX) lancets USE ONE  TO CHECK GLUCOSE FIVE TIMES DAILY OR  AS  DIRECTED 100 each 5     Cholecalciferol (VITAMIN D) 2000 UNITS tablet Take 2,000 Units by mouth daily. 100 tablet 3     insulin glulisine (APIDRA SOLOSTAR) 100 UNIT/ML soln 1 unit per 20 gm carb with correction. approx 30 units daily 15 mL 3     insulin pen needle (B-D U/F) 31G X 5 MM Use 3 pen needles daily or as directed. 100 each 5     insulin syringes, disposable, U-100 0.3 ML MISC Keep on hand in case of pump failure. Use 4-5 syringes daily. 100 each 3     levothyroxine (SYNTHROID/LEVOTHROID) 75 MCG tablet TAKE 1 TABLET BY MOUTH ONCE DAILY 90 tablet 1     lisinopril (PRINIVIL/ZESTRIL) 10 MG tablet Take 1 tablet (10 mg) by mouth daily 90 tablet 2     omeprazole (PRILOSEC) 20 MG CR capsule TAKE ONE CAPSULE BY MOUTH ONCE DAILY 30 capsule 2     pravastatin (PRAVACHOL) 10 MG tablet Take 1 tablet (10 mg) by mouth daily 90 tablet 3     insulin glulisine (APIDRA) 100 UNIT/ML injection Take as directed via insulin pump. Total daily dose approx 55 units daily. (Patient not taking: Reported on 10/12/2018) 20 mL 11     [DISCONTINUED] amitriptyline (ELAVIL) 25 MG tablet Take 1/2 tablet nightly for the first week and then can increase to a full tab after 1 week 30 tablet 1     OTC products: no use of herbal medications or other supplements    Allergies   Allergen Reactions     Novolog [Insulin Aspart] Swelling     Itching, rash, contact dermatitis     Humalog [Insulin Lispro] Rash     Contact dermatitis      Latex Allergy: NO    Social History   Substance Use Topics     Smoking status: Never Smoker     Smokeless tobacco: Never Used      Comment: no exposure     Alcohol use Yes      Comment: winex1-2/3x per yr.     History   Drug Use No       REVIEW OF SYSTEMS:   CONSTITUTIONAL: NEGATIVE for fever, chills,  change in weight  INTEGUMENTARY/SKIN: NEGATIVE for worrisome rashes, moles or lesions  EYES: NEGATIVE for vision changes or irritation  ENT/MOUTH: +Chronic sinus congestion. NEGATIVE for ear, mouth and throat problems  RESP: NEGATIVE for significant cough or SOB  CV: NEGATIVE for chest pain, palpitations or peripheral edema  GI: NEGATIVE for nausea, abdominal pain, heartburn, or change in bowel habits  : NEGATIVE for frequency, dysuria, or hematuria  MUSCULOSKELETAL: NEGATIVE for significant arthralgias or myalgia  NEURO: NEGATIVE for weakness, dizziness or paresthesias  ENDOCRINE: NEGATIVE for temperature intolerance, skin/hair changes  HEME: NEGATIVE for bleeding problems  PSYCHIATRIC: NEGATIVE for changes in mood or affect    EXAM:   /80  Pulse 92  Temp 97.4  F (36.3  C) (Temporal)  Resp 16  Wt 154 lb (69.9 kg)  SpO2 99%  BMI 31.84 kg/m2    GENERAL APPEARANCE: healthy, alert and no distress     EYES: EOMI, PERRL     HENT: ear canals and TM's normal and nose and mouth without ulcers or lesions     NECK: no adenopathy, no asymmetry, masses, or scars and thyroid normal to palpation     RESP: lungs clear to auscultation - no rales, rhonchi or wheezes     CV: regular rate and rhythm, normal S1 S2, no S3 or S4 and no murmur, click or rub     ABDOMEN:  soft, nontender, no HSM or masses and bowel sounds normal     MS: extremities normal- no gross deformities noted, no evidence of inflammation in joints, FROM in all extremities.     SKIN: no suspicious lesions or rashes     NEURO: Normal strength and tone, mentation intact and speech normal. Cranial nerves II-XII are grossly intact. DTRs are 2+/4 throughout and symmetric. Gait is stable.      PSYCH: mentation appears normal. and affect normal/bright     LYMPHATICS: No cervical adenopathy    DIAGNOSTICS:   EKG: appears normal, NSR, normal axis, normal intervals, short IN interval (116), no acute ST/T changes c/w ischemia, no LVH by voltage criteria    Recent  Labs   Lab Test 10/12/18  07/09/18   1416  04/13/18   0750   07/28/17   1611   HGB   --    --    --    --   12.4   NA   --   140  140   --   142   POTASSIUM   --   3.9  4.0   --   3.7   CR   --   0.81  0.77   --   0.96   A1C  6.3*  6.6*  6.7*   < >   --     < > = values in this interval not displayed.      IMPRESSION:   Reason for surgery/procedure: chronic sinus congestion  Diagnosis/reason for consult: pre-operative clearance    The proposed surgical procedure is considered INTERMEDIATE risk.    REVISED CARDIAC RISK INDEX  The patient has the following serious cardiovascular risks for perioperative complications such as (MI, PE, VFib and 3  AV Block):  Diabetes Mellitus (on Insulin)  INTERPRETATION: 1 risks: Class II (low risk - 0.9% complication rate)    The patient has the following additional risks for perioperative complications:  No identified additional risks      ICD-10-CM    1. Preop general physical exam Z01.818 EKG 12-lead complete w/read - Clinics   2. Chronic congestion of paranasal sinus J32.9    3. Type 1 diabetes mellitus without complication (H) E10.9 Basic metabolic panel  (Ca, Cl, CO2, Creat, Gluc, K, Na, BUN)   4. Hypertension goal BP (blood pressure) < 140/90 I10 Basic metabolic panel  (Ca, Cl, CO2, Creat, Gluc, K, Na, BUN)   5. Hyperlipidemia LDL goal <100 E78.5    6. Hypothyroidism, unspecified type E03.9    7. Chronic pain syndrome G89.4 amitriptyline (ELAVIL) 25 MG tablet   8. Cervical radiculopathy M54.12 amitriptyline (ELAVIL) 25 MG tablet       Cleared for surgery.    RECOMMENDATIONS:       --Patient is to take all scheduled medications on the day of surgery EXCEPT for modifications listed below.    Diabetes Medication Use  -----Take 80% of long acting insulin (e.g. Lantus, NPH) while NPO (fasting)  -----Hold short acting insulin (e.g. Novolog, Humalog) while NPO (fasting)      ACE Inhibitor or Angiotensin Receptor Blocker (ARB) Use  Ace inhibitor or Angiotensin Receptor Blocker (ARB)  and should HOLD this medication for the 24 hours prior to surgery.      APPROVAL GIVEN to proceed with proposed procedure, without further diagnostic evaluation       Signed Electronically by: Marcus Salgado PA-C    Copy of this evaluation report is provided to requesting physician.    [facility] Preop Guidelines    Revised Cardiac Risk Index

## 2018-11-26 ENCOUNTER — OFFICE VISIT (OUTPATIENT)
Dept: FAMILY MEDICINE | Facility: OTHER | Age: 53
End: 2018-11-26
Payer: COMMERCIAL

## 2018-11-26 VITALS
WEIGHT: 154 LBS | SYSTOLIC BLOOD PRESSURE: 124 MMHG | OXYGEN SATURATION: 99 % | DIASTOLIC BLOOD PRESSURE: 80 MMHG | BODY MASS INDEX: 31.84 KG/M2 | HEART RATE: 92 BPM | TEMPERATURE: 97.4 F | RESPIRATION RATE: 16 BRPM

## 2018-11-26 DIAGNOSIS — M54.12 CERVICAL RADICULOPATHY: ICD-10-CM

## 2018-11-26 DIAGNOSIS — J32.9 CHRONIC CONGESTION OF PARANASAL SINUS: ICD-10-CM

## 2018-11-26 DIAGNOSIS — I10 HYPERTENSION GOAL BP (BLOOD PRESSURE) < 140/90: ICD-10-CM

## 2018-11-26 DIAGNOSIS — E78.5 HYPERLIPIDEMIA LDL GOAL <100: ICD-10-CM

## 2018-11-26 DIAGNOSIS — Z01.818 PREOP GENERAL PHYSICAL EXAM: Primary | ICD-10-CM

## 2018-11-26 DIAGNOSIS — E10.9 TYPE 1 DIABETES MELLITUS WITHOUT COMPLICATION (H): Chronic | ICD-10-CM

## 2018-11-26 DIAGNOSIS — G89.4 CHRONIC PAIN SYNDROME: ICD-10-CM

## 2018-11-26 DIAGNOSIS — E03.9 HYPOTHYROIDISM, UNSPECIFIED TYPE: ICD-10-CM

## 2018-11-26 LAB
ANION GAP SERPL CALCULATED.3IONS-SCNC: 7 MMOL/L (ref 3–14)
BUN SERPL-MCNC: 13 MG/DL (ref 7–30)
CALCIUM SERPL-MCNC: 8.6 MG/DL (ref 8.5–10.1)
CHLORIDE SERPL-SCNC: 103 MMOL/L (ref 94–109)
CO2 SERPL-SCNC: 30 MMOL/L (ref 20–32)
CREAT SERPL-MCNC: 0.97 MG/DL (ref 0.52–1.04)
GFR SERPL CREATININE-BSD FRML MDRD: 60 ML/MIN/1.7M2
GLUCOSE SERPL-MCNC: 102 MG/DL (ref 70–99)
POTASSIUM SERPL-SCNC: 4 MMOL/L (ref 3.4–5.3)
SODIUM SERPL-SCNC: 140 MMOL/L (ref 133–144)

## 2018-11-26 PROCEDURE — 80048 BASIC METABOLIC PNL TOTAL CA: CPT | Performed by: PHYSICIAN ASSISTANT

## 2018-11-26 PROCEDURE — 93000 ELECTROCARDIOGRAM COMPLETE: CPT | Performed by: PHYSICIAN ASSISTANT

## 2018-11-26 PROCEDURE — 36415 COLL VENOUS BLD VENIPUNCTURE: CPT | Performed by: PHYSICIAN ASSISTANT

## 2018-11-26 PROCEDURE — 99215 OFFICE O/P EST HI 40 MIN: CPT | Performed by: PHYSICIAN ASSISTANT

## 2018-11-26 NOTE — MR AVS SNAPSHOT
After Visit Summary   11/26/2018    Maria M Marcus    MRN: [id number]           Patient Information     Date Of Birth          1965        Visit Information        Provider Department      11/26/2018 10:00 AM Marcus Salgado PA-C [facility]        Today's Diagnoses     Preop general physical exam    -  1    Type 1 diabetes mellitus without complication (H)        Hypertension goal BP (blood pressure) < 140/90        Hyperlipidemia LDL goal <100        Hypothyroidism, unspecified type        Chronic pain syndrome        Cervical radiculopathy          Care Instructions      Before Your Surgery      Call your surgeon if there is any change in your health. This includes signs of a cold or flu (such as a sore throat, runny nose, cough, rash or fever).    Do not smoke, drink alcohol or take over the counter medicine (unless your surgeon or primary care doctor tells you to) for the 24 hours before and after surgery.    If you take prescribed drugs: Follow your doctor s orders about which medicines to take and which to stop until after surgery. Take 3 units of Basaglar the night before surgery but hold all of your insulin the day of surgery until after surgery. Hold lisinopril the day of surgery.     Eating and drinking prior to surgery: follow the instructions from your surgeon    Take a shower or bath the night before surgery. Use the soap your surgeon gave you to gently clean your skin. If you do not have soap from your surgeon, use your regular soap. Do not shave or scrub the surgery site.  Wear clean pajamas and have clean sheets on your bed.           Follow-ups after your visit        Your next 10 appointments already scheduled     Dec 04, 2018   Procedure with Woo Silva MD   [facility] ([facility])    [address] [place] [address]   [phone]           From [address]: Exit at [address] on south side of  [place]. Turn right on [address]. Turn left at stoplight on [address]. [facility] will be in view two blocks ahead            Dec 12, 2018  9:45 AM CST   Return Visit with Woo Silva MD   [facility]    [address]  [facility] [address] [phone]            Apr 12, 2019 12:45 PM CDT   Return Visit with Sidney Jarquin MD, MG ENDO NURSE   [facility] ([facility])    [address] [address] [place] [address] [phone]              Who to contact     If you have questions or need follow up information about today's clinic visit or your schedule please contact [facility] directly at [phone].  Normal or non-critical lab and imaging results will be communicated to you by MyChart, letter or phone within 4 business days after the clinic has received the results. If you do not hear from us within 7 days, please contact the clinic through [email or web address] or phone. If you have a critical or abnormal lab result, we will notify you by phone as soon as possible.  Submit refill requests through [employer] or call your pharmacy and they will forward the refill request to us. Please allow 3 business days for your refill to be completed.          Additional Information About Your Visit        MyChart Information     [employer] gives you secure access to your electronic health record. If you see a primary care provider, you can also send messages to your care team and make appointments. If you have questions, please call your primary care clinic.  If you do not have a primary care provider, please call [phone] and they will assist you.        Care EveryWhere ID     This is your Care EveryWhere ID. This could be used by other organizations to access your [facility] medical records  [id number]        Your Vitals Were     Pulse Temperature Respirations Pulse Oximetry BMI  (Body Mass Index)       92 97.4  F (36.3  C) (Temporal) 16 99% 31.84 kg/m2        Blood Pressure from Last 3 Encounters:   11/26/18 124/80   10/31/18 138/68   10/12/18 129/80    Weight from Last 3 Encounters:   11/26/18 154 lb (69.9 kg)   10/31/18 158 lb (71.7 kg)   10/12/18 157 lb 6.5 oz (71.4 kg)              We Performed the Following     EKG 12-lead complete w/read - Clinics          Today's Medication Changes          These changes are accurate as of 11/26/18 10:23 AM.  If you have any questions, ask your nurse or doctor.               These medicines have changed or have updated prescriptions.        Dose/Directions    amitriptyline 25 MG tablet   Commonly known as:  ELAVIL   This may have changed:    - how much to take  - how to take this  - when to take this  - additional instructions   Used for:  Chronic pain syndrome, Cervical radiculopathy   Changed by:  Marcus Salgado PA-C        Dose:  25 mg   Take 1 tablet (25 mg) by mouth At Bedtime   Quantity:  90 tablet   Refills:  1       insulin glargine 100 UNIT/ML pen   This may have changed:  how much to take   Used for:  Type 1 diabetes mellitus without complication (H)        Dose:  6 Units   Inject 6 Units Subcutaneous 2 times daily   Quantity:  30 mL   Refills:  1            Where to get your medicines      These medications were sent to [facility] [address] [place] [address] [address] [address]     Phone:  [phone]     amitriptyline 25 MG tablet                Primary Care Provider Office Phone # Fax #    Marcus Salgado PA-C [phone]       [address] [place] [address]        Equal Access to Services     VIPUL SMITH AH: Princess Zuleta, alexandre marcos, nemo griffin, traci lindsey. So [facility] [phone].    ATENCIÓN: Si habla español, tiene a ngo disposición servicios gratuitos de asistencia lingüística. Llame al  [phone].    We comply with applicable federal civil rights laws and [place] laws. We do not discriminate on the basis of race, color, national origin, age, disability, sex, sexual orientation, or gender identity.            Thank you!     Thank you for choosing [facility]  for your care. Our goal is always to provide you with excellent care. Hearing back from our patients is one way we can continue to improve our services. Please take a few minutes to complete the written survey that you may receive in the mail after your visit with us. Thank you!             Your Updated Medication List - Protect others around you: Learn how to safely use, store and throw away your medicines at www.disposemymeds.org.          This list is accurate as of 11/26/18 10:23 AM.  Always use your most recent med list.                   Brand Name Dispense Instructions for use Diagnosis    amitriptyline 25 MG tablet    ELAVIL    90 tablet    Take 1 tablet (25 mg) by mouth At Bedtime    Chronic pain syndrome, Cervical radiculopathy       blood glucose monitoring lancets     100 each    USE ONE  TO CHECK GLUCOSE FIVE TIMES DAILY OR  AS  DIRECTED    Type 1 diabetes mellitus without complication (H)       blood glucose monitoring meter device kit     1 kit    Use to test blood sugars 5 times daily or as directed.    Type 1 diabetes mellitus without complication (H)       blood glucose monitoring test strip    TANA CONTOUR    550 strip    Use to test blood sugar 6 times daily or as directed.    Type 1 diabetes mellitus without complication (H)       insulin glargine 100 UNIT/ML pen     30 mL    Inject 6 Units Subcutaneous 2 times daily    Type 1 diabetes mellitus without complication (H)       * insulin glulisine 100 UNIT/ML soln    APIDRA SOLOSTAR    15 mL    1 unit per 20 gm carb with correction. approx 30 units daily    Type 1 diabetes mellitus without complication (H)       * insulin glulisine 100 UNIT/ML injection     APIDRA    20 mL    Take as directed via insulin pump. Total daily dose approx 55 units daily.    Diabetes mellitus type 1 (H)       insulin pen needle 31G X 5 MM miscellaneous    B-D U/F    100 each    Use 3 pen needles daily or as directed.    Type 1 diabetes mellitus without complication (H)       insulin syringes (disposable) U-100 0.3 ML Misc     100 each    Keep on hand in case of pump failure. Use 4-5 syringes daily.    Diabetes mellitus type 1 (H)       levothyroxine 75 MCG tablet    SYNTHROID/LEVOTHROID    90 tablet    TAKE 1 TABLET BY MOUTH ONCE DAILY    Hypothyroidism, unspecified type       lisinopril 10 MG tablet    PRINIVIL/ZESTRIL    90 tablet    Take 1 tablet (10 mg) by mouth daily    Hypertension goal BP (blood pressure) < 140/90, Type 1 diabetes mellitus without complication (H)       omeprazole 20 MG CR capsule    priLOSEC    30 capsule    TAKE ONE CAPSULE BY MOUTH ONCE DAILY    Gastroesophageal reflux disease, esophagitis presence not specified       pravastatin 10 MG tablet    PRAVACHOL    90 tablet    Take 1 tablet (10 mg) by mouth daily    Hyperlipidemia LDL goal <100       vitamin D3 2000 units tablet    CHOLECALCIFEROL    100 tablet    Take 2,000 Units by mouth daily.    Vitamin D deficiency disease       * Notice:  This list has 2 medication(s) that are the same as other medications prescribed for you. Read the directions carefully, and ask your doctor or other care provider to review them with you.

## 2018-11-26 NOTE — NURSING NOTE
"Chief Complaint   Patient presents with     Pre-Op Exam     Panel Management     urine drug screen       Initial /80  Pulse 92  Temp 97.4  F (36.3  C) (Temporal)  Resp 16  Wt 154 lb (69.9 kg)  SpO2 99%  BMI 31.84 kg/m2 Estimated body mass index is 31.84 kg/(m^2) as calculated from the following:    Height as of 10/31/18: 4' 10.31\" (1.481 m).    Weight as of this encounter: 154 lb (69.9 kg).  Medication Reconciliation: complete    Gayle Case, ALEJANDRO      "

## 2018-12-03 NOTE — H&P (VIEW-ONLY)
[address]  [facility] [address]  [phone]  Dept: [phone]    PRE-OP EVALUATION:  Today's date: 2018    Maria M Marcus (: 1965) presents for pre-operative evaluation assessment as requested by Dr. Silva.  She requires evaluation and anesthesia risk assessment prior to undergoing surgery/procedure for treatment of chronic sinus congestion.    Proposed Surgery/ Procedure: Bilateral functional endoscopic maxillary antrostomies  Date of Surgery/ Procedure: 2018  Time of Surgery/ Procedure: 1:10 PM  Hospital/Surgical Facility: [facility]  Fax number for surgical facility: available electronically   Primary Physician: Marcus Salgado  Type of Anesthesia Anticipated: General    Patient has a Health Care Directive or Living Will:  YES     1. NO - Do you have a history of heart attack, stroke, stent, bypass or surgery on an artery in the head, neck, heart or legs?  2. NO - Do you ever have any pain or discomfort in your chest?  3. NO - Do you have a history of  Heart Failure?  4. NO - Are you troubled by shortness of breath when: walking on the level, up a slight hill or at night?  5. NO - Do you currently have a cold, bronchitis or other respiratory infection?  6. NO - Do you have a cough, shortness of breath or wheezing?  7. NO - Do you sometimes get pains in the calves of your legs when you walk?  8. NO - Do you or anyone in your family have previous history of blood clots?  9. NO - Do you or does anyone in your family have a serious bleeding problem such as prolonged bleeding following surgeries or cuts?  10. NO - Have you ever had problems with anemia or been told to take iron pills?  11. NO - Have you had any abnormal blood loss such as black, tarry or bloody stools, or abnormal vaginal bleeding?  12. NO - Have you ever had a blood transfusion?  13. NO - Have you or any of your relatives ever had problems with  anesthesia?  14. NO - Do you have sleep apnea, excessive snoring or daytime drowsiness?  15. NO - Do you have any prosthetic heart valves?  16. NO - Do you have prosthetic joints?  17. NO - Is there any chance that you may be pregnant?      HPI:     HPI related to upcoming procedure: She has chronic sinus congestion with recent CT findings of sinus mucosal thickening so she will be undergoing the above procedure with Dr. Silva on 12/4/18.     DIABETES - Patient has a longstanding history of DiabetesType Type I . Patient is being treated with insulin injections and denies significant side effects. Control has been good. Complicating factors include but are not limited to: hypertension and hyperlipidemia.                                                                                                                              HYPERLIPIDEMIA - Patient has a long history of significant Hyperlipidemia requiring medication for treatment with recent good control. Patient reports no problems or side effects with the medication.                                                                                                                                                         HYPERTENSION - Patient has longstanding history of HTN , currently denies any symptoms referable to elevated blood pressure. Specifically denies chest pain, palpitations, dyspnea, orthopnea, PND or peripheral edema. Blood pressure readings have been in normal range. Current medication regimen is as listed below. Patient denies any side effects of medication.                                                                                                                                                                                            HYPOTHYROIDISM - Patient has a longstanding history of chronic Hypothyroidism. Patient has been doing well, noting no tremor, insomnia, hair loss or changes in skin texture. Continues to take medications as  directed, without adverse reactions or side effects. Last TSH   Lab Results   Component Value Date    TSH 1.21 08/24/2018   .                                                                                                                                                                                                                           MEDICAL HISTORY:     Patient Active Problem List    Diagnosis Date Noted     Gastroesophageal reflux disease, esophagitis presence not specified 09/08/2017     Priority: Medium     Retinopathy due to secondary diabetes (H) 10/29/2016     Priority: Medium     Type 1 diabetes mellitus without complication (H) 11/01/2015     Priority: Medium     Overweight (BMI 25.0-29.9) 11/01/2015     Priority: Medium     Hypertension goal BP (blood pressure) < 140/90 09/29/2014     Priority: Medium     Back pain 03/23/2013     Priority: Medium     Chronic pain 11/09/2011     Priority: Medium     Related to frozen shoulder per ortho will be two years of intermittent pain, agreed to #45 percocet for 90 day supply, only get narcotics from me.         Vitiligo 07/15/2011     Priority: Medium     Frozen shoulder syndrome 06/16/2011     Priority: Medium     Advanced directives, counseling/discussion 06/13/2011     Priority: Medium     Advance Directive Problem List Overview:   Name Relationship Phone    Primary Health Care Agent            Alternative Health Care Agent        9/14/11  Patient presents for [employer] Informational meeting. Patient given [employer] folder. Patient will complete Advance Care Directive and bring to clinic...Mary Lee RN              Cervical radiculopathy      Priority: Medium     Hyperlipidemia LDL goal <100 10/31/2010     Priority: Medium     [place] 10-year CHD Risk Score: 20% (Diabetic)   Values used to calculate score:     Age: 46 years -- Points: 3     Total Cholesterol: 174 mg/dL -- Points: 3     HDL Cholesterol: 50 mg/dL -- Points: 0     Systolic  BP (treated): 128 mmHg -- Points: 3    The patient is not a smoker. -- Points: 0     The patient has a diagnosis of diabetes. -- Points: 20% Risk    The patient does not have a family history of CHD. -- Points:0     [place]      LDL goal  >= 20%  <100         Other and unspecified disc disorder of lumbar region 2007     Priority: Medium     Hypothyroidism 10/05/2004     Priority: Medium     Problem list name updated by automated process. Provider to review        Past Medical History:   Diagnosis Date     Cervical radiculopathy      Diabetic eye exam (H) 11     DISC DIS NEC/NOS-LUMBAR 2007     Frozen shoulder syndrome 2011     Hyperlipidemia LDL goal <100 10/31/2010     Hypertension 1/3/2011     Type 1 diabetes, HbA1c goal < 7% (H) dx 1967     Unspecified hypothyroidism      Past Surgical History:   Procedure Laterality Date     C  DELIVERY ONLY      C-Sections x2     C NONSPECIFIC PROCEDURE      Hysterectomy - total (cervix removed but ovaries remain)     C STOMACH SURGERY PROCEDURE UNLISTED       C TOTAL ABDOM HYSTERECTOMY       COLONOSCOPY N/A 2016    Procedure: COLONOSCOPY;  Surgeon: Efren Perry MD;  Location:  GI     HC SACROPLASTY       LAMINECT/DISCECTOMY, CERVICAL  2007    C7-T1 hemilaminectomy, microdiscectomy, foraminotomy.     LASER YAG CAPSULOTOMY Right 10/23/2014    Procedure: LASER YAG CAPSULOTOMY;  Surgeon: Esteban Dorsye MD;  Location:  OR     VITRECTOMY,STRIP EPIRETINAL MEMBRANE  09     Current Outpatient Prescriptions   Medication Sig Dispense Refill     amitriptyline (ELAVIL) 25 MG tablet Take 1 tablet (25 mg) by mouth At Bedtime 90 tablet 1     BASAGLAR 100 UNIT/ML injection Inject 6 Units Subcutaneous 2 times daily (Patient taking differently: Inject 5 Units Subcutaneous 2 times daily ) 30 mL 1     blood glucose monitoring (TANA CONTOUR MONITOR) meter device kit Use to test blood sugars 5 times daily or as directed. 1 kit 0      blood glucose monitoring (TANA CONTOUR) test strip Use to test blood sugar 6 times daily or as directed. 550 strip 3     blood glucose monitoring (SOFTCLIX) lancets USE ONE  TO CHECK GLUCOSE FIVE TIMES DAILY OR  AS  DIRECTED 100 each 5     Cholecalciferol (VITAMIN D) 2000 UNITS tablet Take 2,000 Units by mouth daily. 100 tablet 3     insulin glulisine (APIDRA SOLOSTAR) 100 UNIT/ML soln 1 unit per 20 gm carb with correction. approx 30 units daily 15 mL 3     insulin pen needle (B-D U/F) 31G X 5 MM Use 3 pen needles daily or as directed. 100 each 5     insulin syringes, disposable, U-100 0.3 ML MISC Keep on hand in case of pump failure. Use 4-5 syringes daily. 100 each 3     levothyroxine (SYNTHROID/LEVOTHROID) 75 MCG tablet TAKE 1 TABLET BY MOUTH ONCE DAILY 90 tablet 1     lisinopril (PRINIVIL/ZESTRIL) 10 MG tablet Take 1 tablet (10 mg) by mouth daily 90 tablet 2     omeprazole (PRILOSEC) 20 MG CR capsule TAKE ONE CAPSULE BY MOUTH ONCE DAILY 30 capsule 2     pravastatin (PRAVACHOL) 10 MG tablet Take 1 tablet (10 mg) by mouth daily 90 tablet 3     insulin glulisine (APIDRA) 100 UNIT/ML injection Take as directed via insulin pump. Total daily dose approx 55 units daily. (Patient not taking: Reported on 10/12/2018) 20 mL 11     [DISCONTINUED] amitriptyline (ELAVIL) 25 MG tablet Take 1/2 tablet nightly for the first week and then can increase to a full tab after 1 week 30 tablet 1     OTC products: no use of herbal medications or other supplements    Allergies   Allergen Reactions     Novolog [Insulin Aspart] Swelling     Itching, rash, contact dermatitis     Humalog [Insulin Lispro] Rash     Contact dermatitis      Latex Allergy: NO    Social History   Substance Use Topics     Smoking status: Never Smoker     Smokeless tobacco: Never Used      Comment: no exposure     Alcohol use Yes      Comment: winex1-2/3x per yr.     History   Drug Use No       REVIEW OF SYSTEMS:   CONSTITUTIONAL: NEGATIVE for fever, chills,  change in weight  INTEGUMENTARY/SKIN: NEGATIVE for worrisome rashes, moles or lesions  EYES: NEGATIVE for vision changes or irritation  ENT/MOUTH: +Chronic sinus congestion. NEGATIVE for ear, mouth and throat problems  RESP: NEGATIVE for significant cough or SOB  CV: NEGATIVE for chest pain, palpitations or peripheral edema  GI: NEGATIVE for nausea, abdominal pain, heartburn, or change in bowel habits  : NEGATIVE for frequency, dysuria, or hematuria  MUSCULOSKELETAL: NEGATIVE for significant arthralgias or myalgia  NEURO: NEGATIVE for weakness, dizziness or paresthesias  ENDOCRINE: NEGATIVE for temperature intolerance, skin/hair changes  HEME: NEGATIVE for bleeding problems  PSYCHIATRIC: NEGATIVE for changes in mood or affect    EXAM:   /80  Pulse 92  Temp 97.4  F (36.3  C) (Temporal)  Resp 16  Wt 154 lb (69.9 kg)  SpO2 99%  BMI 31.84 kg/m2    GENERAL APPEARANCE: healthy, alert and no distress     EYES: EOMI, PERRL     HENT: ear canals and TM's normal and nose and mouth without ulcers or lesions     NECK: no adenopathy, no asymmetry, masses, or scars and thyroid normal to palpation     RESP: lungs clear to auscultation - no rales, rhonchi or wheezes     CV: regular rate and rhythm, normal S1 S2, no S3 or S4 and no murmur, click or rub     ABDOMEN:  soft, nontender, no HSM or masses and bowel sounds normal     MS: extremities normal- no gross deformities noted, no evidence of inflammation in joints, FROM in all extremities.     SKIN: no suspicious lesions or rashes     NEURO: Normal strength and tone, mentation intact and speech normal. Cranial nerves II-XII are grossly intact. DTRs are 2+/4 throughout and symmetric. Gait is stable.      PSYCH: mentation appears normal. and affect normal/bright     LYMPHATICS: No cervical adenopathy    DIAGNOSTICS:   EKG: appears normal, NSR, normal axis, normal intervals, short IA interval (116), no acute ST/T changes c/w ischemia, no LVH by voltage criteria    Recent  Labs   Lab Test 10/12/18  07/09/18   1416  04/13/18   0750   07/28/17   1611   HGB   --    --    --    --   12.4   NA   --   140  140   --   142   POTASSIUM   --   3.9  4.0   --   3.7   CR   --   0.81  0.77   --   0.96   A1C  6.3*  6.6*  6.7*   < >   --     < > = values in this interval not displayed.      IMPRESSION:   Reason for surgery/procedure: chronic sinus congestion  Diagnosis/reason for consult: pre-operative clearance    The proposed surgical procedure is considered INTERMEDIATE risk.    REVISED CARDIAC RISK INDEX  The patient has the following serious cardiovascular risks for perioperative complications such as (MI, PE, VFib and 3  AV Block):  Diabetes Mellitus (on Insulin)  INTERPRETATION: 1 risks: Class II (low risk - 0.9% complication rate)    The patient has the following additional risks for perioperative complications:  No identified additional risks      ICD-10-CM    1. Preop general physical exam Z01.818 EKG 12-lead complete w/read - Clinics   2. Chronic congestion of paranasal sinus J32.9    3. Type 1 diabetes mellitus without complication (H) E10.9 Basic metabolic panel  (Ca, Cl, CO2, Creat, Gluc, K, Na, BUN)   4. Hypertension goal BP (blood pressure) < 140/90 I10 Basic metabolic panel  (Ca, Cl, CO2, Creat, Gluc, K, Na, BUN)   5. Hyperlipidemia LDL goal <100 E78.5    6. Hypothyroidism, unspecified type E03.9    7. Chronic pain syndrome G89.4 amitriptyline (ELAVIL) 25 MG tablet   8. Cervical radiculopathy M54.12 amitriptyline (ELAVIL) 25 MG tablet       Cleared for surgery.    RECOMMENDATIONS:       --Patient is to take all scheduled medications on the day of surgery EXCEPT for modifications listed below.    Diabetes Medication Use  -----Take 80% of long acting insulin (e.g. Lantus, NPH) while NPO (fasting)  -----Hold short acting insulin (e.g. Novolog, Humalog) while NPO (fasting)      ACE Inhibitor or Angiotensin Receptor Blocker (ARB) Use  Ace inhibitor or Angiotensin Receptor Blocker (ARB)  and should HOLD this medication for the 24 hours prior to surgery.      APPROVAL GIVEN to proceed with proposed procedure, without further diagnostic evaluation       Signed Electronically by: Marcus Salgado PA-C    Copy of this evaluation report is provided to requesting physician.    [place] Preop Guidelines    Revised Cardiac Risk Index

## 2018-12-04 ENCOUNTER — ANESTHESIA EVENT (OUTPATIENT)
Dept: SURGERY | Facility: CLINIC | Age: 53
End: 2018-12-04
Payer: COMMERCIAL

## 2018-12-04 ENCOUNTER — ANESTHESIA (OUTPATIENT)
Dept: SURGERY | Facility: CLINIC | Age: 53
End: 2018-12-04
Payer: COMMERCIAL

## 2018-12-04 ENCOUNTER — HOSPITAL ENCOUNTER (OUTPATIENT)
Facility: CLINIC | Age: 53
Discharge: HOME | End: 2018-12-04
Attending: OTOLARYNGOLOGY | Admitting: OTOLARYNGOLOGY
Payer: COMMERCIAL

## 2018-12-04 VITALS
RESPIRATION RATE: 20 BRPM | DIASTOLIC BLOOD PRESSURE: 71 MMHG | TEMPERATURE: 98 F | OXYGEN SATURATION: 98 % | SYSTOLIC BLOOD PRESSURE: 157 MMHG

## 2018-12-04 DIAGNOSIS — G89.18 POST-OP PAIN: Primary | ICD-10-CM

## 2018-12-04 DIAGNOSIS — Z98.890 POST-OPERATIVE NAUSEA AND VOMITING: ICD-10-CM

## 2018-12-04 DIAGNOSIS — R11.2 POST-OPERATIVE NAUSEA AND VOMITING: ICD-10-CM

## 2018-12-04 PROCEDURE — 31267 ENDOSCOPY MAXILLARY SINUS: CPT | Mod: 50 | Performed by: OTOLARYNGOLOGY

## 2018-12-04 PROCEDURE — 25000125 ZZHC RX 250: Performed by: OTOLARYNGOLOGY

## 2018-12-04 PROCEDURE — 37000008 ZZH ANESTHESIA TECHNICAL FEE, 1ST 30 MIN: Performed by: OTOLARYNGOLOGY

## 2018-12-04 PROCEDURE — 40000306 ZZH STATISTIC PRE PROC ASSESS II: Performed by: OTOLARYNGOLOGY

## 2018-12-04 PROCEDURE — 71000027 ZZH RECOVERY PHASE 2 EACH 15 MINS: Performed by: OTOLARYNGOLOGY

## 2018-12-04 PROCEDURE — 25000128 H RX IP 250 OP 636: Performed by: NURSE ANESTHETIST, CERTIFIED REGISTERED

## 2018-12-04 PROCEDURE — 36000058 ZZH SURGERY LEVEL 3 EA 15 ADDTL MIN: Performed by: OTOLARYNGOLOGY

## 2018-12-04 PROCEDURE — 37000009 ZZH ANESTHESIA TECHNICAL FEE, EACH ADDTL 15 MIN: Performed by: OTOLARYNGOLOGY

## 2018-12-04 PROCEDURE — 25000128 H RX IP 250 OP 636: Performed by: OTOLARYNGOLOGY

## 2018-12-04 PROCEDURE — 25000125 ZZHC RX 250: Performed by: NURSE ANESTHETIST, CERTIFIED REGISTERED

## 2018-12-04 PROCEDURE — 25000132 ZZH RX MED GY IP 250 OP 250 PS 637: Performed by: OTOLARYNGOLOGY

## 2018-12-04 PROCEDURE — 71000014 ZZH RECOVERY PHASE 1 LEVEL 2 FIRST HR: Performed by: OTOLARYNGOLOGY

## 2018-12-04 PROCEDURE — 25000566 ZZH SEVOFLURANE, EA 15 MIN: Performed by: OTOLARYNGOLOGY

## 2018-12-04 PROCEDURE — 36000056 ZZH SURGERY LEVEL 3 1ST 30 MIN: Performed by: OTOLARYNGOLOGY

## 2018-12-04 PROCEDURE — 27210794 ZZH OR GENERAL SUPPLY STERILE: Performed by: OTOLARYNGOLOGY

## 2018-12-04 RX ORDER — LIDOCAINE HYDROCHLORIDE 20 MG/ML
INJECTION, SOLUTION INFILTRATION; PERINEURAL PRN
Status: DISCONTINUED | OUTPATIENT
Start: 2018-12-04 | End: 2018-12-04

## 2018-12-04 RX ORDER — DIMENHYDRINATE 50 MG/ML
INJECTION, SOLUTION INTRAMUSCULAR; INTRAVENOUS PRN
Status: DISCONTINUED | OUTPATIENT
Start: 2018-12-04 | End: 2018-12-04

## 2018-12-04 RX ORDER — ONDANSETRON 4 MG/1
4 TABLET, ORALLY DISINTEGRATING ORAL EVERY 30 MIN PRN
Status: DISCONTINUED | OUTPATIENT
Start: 2018-12-04 | End: 2018-12-04 | Stop reason: HOSPADM

## 2018-12-04 RX ORDER — KETOROLAC TROMETHAMINE 10 MG/1
10 TABLET, FILM COATED ORAL EVERY 6 HOURS PRN
Status: DISCONTINUED | OUTPATIENT
Start: 2018-12-04 | End: 2018-12-04

## 2018-12-04 RX ORDER — LIDOCAINE 40 MG/G
CREAM TOPICAL
Status: DISCONTINUED | OUTPATIENT
Start: 2018-12-04 | End: 2018-12-04 | Stop reason: HOSPADM

## 2018-12-04 RX ORDER — IBUPROFEN 600 MG/1
600 TABLET, FILM COATED ORAL EVERY 6 HOURS PRN
Status: DISCONTINUED | OUTPATIENT
Start: 2018-12-04 | End: 2018-12-04 | Stop reason: HOSPADM

## 2018-12-04 RX ORDER — ALBUTEROL SULFATE 0.83 MG/ML
2.5 SOLUTION RESPIRATORY (INHALATION) EVERY 4 HOURS PRN
Status: DISCONTINUED | OUTPATIENT
Start: 2018-12-04 | End: 2018-12-04 | Stop reason: HOSPADM

## 2018-12-04 RX ORDER — SODIUM CHLORIDE, SODIUM LACTATE, POTASSIUM CHLORIDE, CALCIUM CHLORIDE 600; 310; 30; 20 MG/100ML; MG/100ML; MG/100ML; MG/100ML
INJECTION, SOLUTION INTRAVENOUS CONTINUOUS
Status: DISCONTINUED | OUTPATIENT
Start: 2018-12-04 | End: 2018-12-04 | Stop reason: HOSPADM

## 2018-12-04 RX ORDER — KETOROLAC TROMETHAMINE 10 MG/1
10 TABLET, FILM COATED ORAL EVERY 6 HOURS PRN
Status: DISCONTINUED | OUTPATIENT
Start: 2018-12-04 | End: 2018-12-04 | Stop reason: CLARIF

## 2018-12-04 RX ORDER — DEXAMETHASONE SODIUM PHOSPHATE 10 MG/ML
10 INJECTION INTRAMUSCULAR; INTRAVENOUS ONCE
Status: COMPLETED | OUTPATIENT
Start: 2018-12-04 | End: 2018-12-04

## 2018-12-04 RX ORDER — DEXAMETHASONE SODIUM PHOSPHATE 10 MG/ML
10 INJECTION, SOLUTION INTRAMUSCULAR; INTRAVENOUS ONCE
Status: DISCONTINUED | OUTPATIENT
Start: 2018-12-04 | End: 2018-12-04 | Stop reason: CLARIF

## 2018-12-04 RX ORDER — ONDANSETRON 2 MG/ML
INJECTION INTRAMUSCULAR; INTRAVENOUS PRN
Status: DISCONTINUED | OUTPATIENT
Start: 2018-12-04 | End: 2018-12-04

## 2018-12-04 RX ORDER — HYDROCODONE BITARTRATE AND ACETAMINOPHEN 5; 325 MG/1; MG/1
1-2 TABLET ORAL EVERY 4 HOURS PRN
Qty: 25 TABLET | Refills: 0 | Status: SHIPPED | OUTPATIENT
Start: 2018-12-04 | End: 2019-01-23

## 2018-12-04 RX ORDER — METOPROLOL TARTRATE 1 MG/ML
1-2 INJECTION, SOLUTION INTRAVENOUS EVERY 5 MIN PRN
Status: DISCONTINUED | OUTPATIENT
Start: 2018-12-04 | End: 2018-12-04 | Stop reason: HOSPADM

## 2018-12-04 RX ORDER — MEPERIDINE HYDROCHLORIDE 50 MG/ML
12.5 INJECTION INTRAMUSCULAR; INTRAVENOUS; SUBCUTANEOUS
Status: DISCONTINUED | OUTPATIENT
Start: 2018-12-04 | End: 2018-12-04 | Stop reason: HOSPADM

## 2018-12-04 RX ORDER — LIDOCAINE HYDROCHLORIDE AND EPINEPHRINE 10; 10 MG/ML; UG/ML
INJECTION, SOLUTION INFILTRATION; PERINEURAL PRN
Status: DISCONTINUED | OUTPATIENT
Start: 2018-12-04 | End: 2018-12-04 | Stop reason: HOSPADM

## 2018-12-04 RX ORDER — KETOROLAC TROMETHAMINE 10 MG/1
10 TABLET, FILM COATED ORAL EVERY 6 HOURS PRN
Qty: 20 TABLET | Refills: 0 | Status: SHIPPED | OUTPATIENT
Start: 2018-12-04 | End: 2018-12-12

## 2018-12-04 RX ORDER — ONDANSETRON 2 MG/ML
4 INJECTION INTRAMUSCULAR; INTRAVENOUS EVERY 30 MIN PRN
Status: DISCONTINUED | OUTPATIENT
Start: 2018-12-04 | End: 2018-12-04 | Stop reason: HOSPADM

## 2018-12-04 RX ORDER — OXYMETAZOLINE HYDROCHLORIDE 0.05 G/100ML
SPRAY NASAL PRN
Status: DISCONTINUED | OUTPATIENT
Start: 2018-12-04 | End: 2018-12-04 | Stop reason: HOSPADM

## 2018-12-04 RX ORDER — ONDANSETRON 4 MG/1
4-8 TABLET, ORALLY DISINTEGRATING ORAL EVERY 8 HOURS PRN
Qty: 12 TABLET | Refills: 0 | Status: SHIPPED | OUTPATIENT
Start: 2018-12-04 | End: 2019-04-12

## 2018-12-04 RX ORDER — FENTANYL CITRATE 50 UG/ML
INJECTION, SOLUTION INTRAMUSCULAR; INTRAVENOUS PRN
Status: DISCONTINUED | OUTPATIENT
Start: 2018-12-04 | End: 2018-12-04

## 2018-12-04 RX ORDER — PROPOFOL 10 MG/ML
INJECTION, EMULSION INTRAVENOUS PRN
Status: DISCONTINUED | OUTPATIENT
Start: 2018-12-04 | End: 2018-12-04

## 2018-12-04 RX ORDER — OXYMETAZOLINE HYDROCHLORIDE 0.05 G/100ML
2 SPRAY NASAL 2 TIMES DAILY
Status: DISCONTINUED | OUTPATIENT
Start: 2018-12-04 | End: 2018-12-04 | Stop reason: HOSPADM

## 2018-12-04 RX ORDER — FENTANYL CITRATE 50 UG/ML
25-50 INJECTION, SOLUTION INTRAMUSCULAR; INTRAVENOUS
Status: DISCONTINUED | OUTPATIENT
Start: 2018-12-04 | End: 2018-12-04 | Stop reason: HOSPADM

## 2018-12-04 RX ORDER — NALOXONE HYDROCHLORIDE 0.4 MG/ML
.1-.4 INJECTION, SOLUTION INTRAMUSCULAR; INTRAVENOUS; SUBCUTANEOUS
Status: DISCONTINUED | OUTPATIENT
Start: 2018-12-04 | End: 2018-12-04 | Stop reason: HOSPADM

## 2018-12-04 RX ADMIN — PROPOFOL 50 MG: 10 INJECTION, EMULSION INTRAVENOUS at 14:42

## 2018-12-04 RX ADMIN — DEXAMETHASONE SODIUM PHOSPHATE 5 MG: 10 INJECTION INTRAMUSCULAR; INTRAVENOUS at 14:43

## 2018-12-04 RX ADMIN — DIMENHYDRINATE 25 MG: 50 INJECTION, SOLUTION INTRAMUSCULAR; INTRAVENOUS at 14:43

## 2018-12-04 RX ADMIN — HYDROMORPHONE HYDROCHLORIDE 0.5 MG: 1 INJECTION, SOLUTION INTRAMUSCULAR; INTRAVENOUS; SUBCUTANEOUS at 14:43

## 2018-12-04 RX ADMIN — SODIUM CHLORIDE, POTASSIUM CHLORIDE, SODIUM LACTATE AND CALCIUM CHLORIDE: 600; 310; 30; 20 INJECTION, SOLUTION INTRAVENOUS at 12:44

## 2018-12-04 RX ADMIN — OXYMETAZOLINE HYDROCHLORIDE 2 SPRAY: 5 SPRAY NASAL at 12:31

## 2018-12-04 RX ADMIN — PROPOFOL 150 MG: 10 INJECTION, EMULSION INTRAVENOUS at 14:29

## 2018-12-04 RX ADMIN — FENTANYL CITRATE 100 MCG: 50 INJECTION, SOLUTION INTRAMUSCULAR; INTRAVENOUS at 14:23

## 2018-12-04 RX ADMIN — ONDANSETRON 4 MG: 2 INJECTION INTRAMUSCULAR; INTRAVENOUS at 14:43

## 2018-12-04 RX ADMIN — IBUPROFEN 600 MG: 600 TABLET ORAL at 17:06

## 2018-12-04 RX ADMIN — Medication 80 MG: at 14:29

## 2018-12-04 RX ADMIN — ROCURONIUM BROMIDE 10 MG: 10 INJECTION INTRAVENOUS at 14:29

## 2018-12-04 RX ADMIN — LIDOCAINE HYDROCHLORIDE 60 MG: 20 INJECTION, SOLUTION INFILTRATION; PERINEURAL at 14:29

## 2018-12-04 RX ADMIN — LIDOCAINE HYDROCHLORIDE 5 ML: 10 INJECTION, SOLUTION EPIDURAL; INFILTRATION; INTRACAUDAL at 12:44

## 2018-12-04 RX ADMIN — ROCURONIUM BROMIDE 30 MG: 10 INJECTION INTRAVENOUS at 14:43

## 2018-12-04 RX ADMIN — SODIUM CHLORIDE, POTASSIUM CHLORIDE, SODIUM LACTATE AND CALCIUM CHLORIDE: 600; 310; 30; 20 INJECTION, SOLUTION INTRAVENOUS at 14:20

## 2018-12-04 RX ADMIN — SUGAMMADEX 200 MG: 100 INJECTION, SOLUTION INTRAVENOUS at 15:12

## 2018-12-04 RX ADMIN — FENTANYL CITRATE 25 MCG: 50 INJECTION INTRAMUSCULAR; INTRAVENOUS at 15:50

## 2018-12-04 RX ADMIN — MIDAZOLAM 2 MG: 1 INJECTION INTRAMUSCULAR; INTRAVENOUS at 14:23

## 2018-12-04 NOTE — ANESTHESIA CARE TRANSFER NOTE
Patient: Maria M Marcus    Procedure(s):  Bilateral functional endoscopic maxillary antrostomies    Diagnosis: Recurrent maxillary sinusitis  Diagnosis Additional Information: No value filed.    Anesthesia Type:   General, ETT     Note:  Airway :Face Mask  Patient transferred to:PACU  Handoff Report: Identifed the Patient, Identified the Reponsible Provider, Reviewed the pertinent medical history, Discussed the surgical course, Reviewed Intra-OP anesthesia mangement and issues during anesthesia, Set expectations for post-procedure period and Allowed opportunity for questions and acknowledgement of understanding      Vitals: (Last set prior to Anesthesia Care Transfer)    CRNA VITALS  12/4/2018 1458 - 12/4/2018 1538      12/4/2018             Pulse: 88    SpO2: 100 %    Resp Rate (observed): 13                Electronically Signed By: YU Wayne CRNA  December 4, 2018  3:38 PM

## 2018-12-04 NOTE — IP AVS SNAPSHOT
[facility]    [address]     [place] [address]    Phone:  [phone]                                       After Visit Summary   12/4/2018    Maria M Marcus    MRN: [id number]           After Visit Summary Signature Page     I have received my discharge instructions, and my questions have been answered. I have discussed any challenges I see with this plan with the nurse or doctor.    ..........................................................................................................................................  Patient/Patient Representative Signature      ..........................................................................................................................................  Patient Representative Print Name and Relationship to Patient    ..................................................               ................................................  Date                                   Time    ..........................................................................................................................................  Reviewed by Signature/Title    ...................................................              ..............................................  Date                                               Time          22EPIC Rev 08/18

## 2018-12-04 NOTE — OP NOTE
OTOLARYNGOLOGY OPERATIVE NOTE    SURGEON: Woo Silva MD     ASSISTANT: none     PREOPERATIVE DIAGNOSES:   1.  Chronic and recurrent maxillary sinusitis bilateral  POSTOPERATIVE DIAGNOSES:   Same    PROCEDURE:   1.  Bilateral functional endoscopic maxillary antrostomies    INDICATIONS: As above.     SURGICAL FINDINGS:   Minimal thickening of the mucosa in the maxillary antra narrow ostia of the maxillary sinuses    BRIEF HISTORY: Patient has a history of recurrent maxillary sinusitis in spite of aggressive medical therapy.. The patient understands the risks and benefits of surgery, limitations, complications including but not limited to bleeding, infection,  recurrence of symptoms, need for additional procedures, need for repeat procedures, chronic epistaxis, risks related to anesthesia amongst others. Wishes surgery and now fully agrees to it.     DESCRIPTION OF PROCEDURE: The patient is taken to the operating room, placed under general endotracheal anesthetic, appropriately positioned, prepped and draped.  I used 0 degrees sinus scope to examine each nasal vestibule.  Under the guidance of the scope I injected body and with the middle turbinate first on the right than the left side as well as the lateral wall of the nose with 1% lidocaine 1-100,000 epinephrine.  I started my procedure in the right side.  I medialized the middle turbinate with a [place] elevator.  Identified the uncinate process.  With the [place] ball seeker probe I involves the uncinate anteriorly.  With the backbiter remove the inferior aspect of the uncinate process.  I exposed the natural ostium of the maxillary sinus is quite narrow.  I removed some the anterior fontanelle with the backbiter and posterior fontanelle with Gume-Cut instruments.  Mucosa within the maxillary antrum was examined and appears to be slightly thickened.  I placed Afrin-soaked pledget in the middle meatus.  On the left side also medialized the turbinate.  Again a  avulses the uncinate anteriorly with a [place] probe.  With the backbiter I remove the inferior aspect of the uncinate brace.  I exposed the natural ostium and maxillary sinus removing mostly anterior fontanelle with the backbiter and then posterior fontanelle some of it is removed with Gume-Cut instruments.  Mucosa within the antrum was slightly thickened.  I then placed Gabriel powder in each middle meatus.  The patient tolerated the procedure well with about 10 mL blood loss. Extubated in the OR, taken to recovery in stable condition.  No specimens have been sent.  SIRI SHER MD

## 2018-12-04 NOTE — ANESTHESIA POSTPROCEDURE EVALUATION
Patient: Maria M Marcus    Procedure(s):  Bilateral functional endoscopic maxillary antrostomies    Diagnosis:Recurrent maxillary sinusitis  Diagnosis Additional Information: No value filed.    Anesthesia Type:  General, ETT    Note:  Anesthesia Post Evaluation    Patient location during evaluation: Phase 2  Patient participation: Able to fully participate in evaluation  Level of consciousness: awake  Pain management: adequate  Airway patency: patent  Cardiovascular status: acceptable and hemodynamically stable  Respiratory status: acceptable, room air and nonlabored ventilation  Hydration status: stable  PONV: none     Anesthetic complications: None    Comments: Patient was happy with the anesthesia care received and no anesthesia related complications were noted.  I will follow up with the patient again if it is needed.        Last vitals:  Vitals:    12/04/18 1555 12/04/18 1600 12/04/18 1605   BP: 151/82 (!) 147/92 (!) 134/95   Resp: 15 13 10   Temp:      SpO2: 100% 100% 100%         Electronically Signed By: YU Wayne CRNA  December 4, 2018  4:12 PM

## 2018-12-04 NOTE — ANESTHESIA PREPROCEDURE EVALUATION
Anesthesia Evaluation     .             ROS/MED HX    ENT/Pulmonary:     (+)SIMONE risk factors snores loudly, hypertension, , . .    Neurologic:  - neg neurologic ROS     Cardiovascular:     (+) Dyslipidemia, hypertension----. : . . . :. .       METS/Exercise Tolerance:     Hematologic:  - neg hematologic  ROS       Musculoskeletal:  - neg musculoskeletal ROS       GI/Hepatic:     (+) GERD Asymptomatic on medication,       Renal/Genitourinary:  - ROS Renal section negative       Endo:     (+) type I DM, thyroid problem hypothyroidism, .      Psychiatric:  - neg psychiatric ROS       Infectious Disease:  - neg infectious disease ROS       Malignancy:      - no malignancy   Other:    (+) H/O Chronic Pain,                   Physical Exam  Normal systems: cardiovascular, pulmonary and dental    Airway   Mallampati: II  TM distance: >3 FB  Neck ROM: full    Dental     Cardiovascular   Rhythm and rate: regular and normal      Pulmonary    breath sounds clear to auscultation                    Anesthesia Plan      History & Physical Review  History and physical reviewed and following examination; no interval change.    ASA Status:  2 .    NPO Status:  > 6 hours    Plan for General and ETT with Propofol induction. Maintenance will be Balanced.    PONV prophylaxis:  Ondansetron (or other 5HT-3) and Dexamethasone or Solumedrol       Postoperative Care  Postoperative pain management:  IV analgesics.      Consents  Anesthetic plan, risks, benefits and alternatives discussed with:  Patient.  Use of blood products discussed: No .   .                          .

## 2018-12-04 NOTE — DISCHARGE INSTRUCTIONS
Home care Instructions after endoscopic sinus surgery          Dr. Silva    1. DO NOT blow your nose until you see your doctor for your first post-op visit. You may have a full feeling due to swelling.  2. Avoid sneezing. If sneezing cannot be avoided, sneeze with your mouth open.  4. Keep your head elevated about 30 degrees for the first week after surgery. This will help reduce swelling and pain.  5. Arrange to have extra humidity in your home. This will help prevent a sore throat and promote comfort. Keep humidity at 30% if possible.   6. No lifting above 30 pounds for the first week after surgery. Then, you may increase the amount gradually. After 14 days, restrictions are lifted.   7. Pain medications can cause constipation. It is recommended to take a stool softener or fiber laxative while on prescription pain medications. Do not strain to have a bowel movement.   8. Do not bend over or hang your head down for the first 2-3 weeks.   9. Unless instructed otherwise, the day following your surgery, begin saline irrigations to both sides of your nose with warm salt water 3 times a day. Use a new baby bulb syringe which can be purchased at a pharmacy.  When irrigating, sit in a chair in front of a sink.  You may make your own solution or purchase an over-the-counter saline solution such as Ayr brand or Neilmed solution.  Making your own saline solution: Purchase 1 gallon of distilled water.    Add 5 1/2 tsp salt.    Shake to dissolve salt.    Fill clean cup with mixture, heat in microwave or place under hot tap water to warm mixture to lukewarm.    Fill syringe and irrigate each nostril, using 1-2 full syringes.    Direct the saline solution toward your forehead and let the solution run out of your nostrils with your head leaning forward.    Irrigations are especially important during the first month after surgery, but may be continued longer.    It is normal to feel very tired during the first 1-2 weeks  after surgery. It is important to rest and drink lots of fluids.  Patients who have had endoscopic sinus surgery should take a minimum of 1 week off from work.    For the first 2 months after sinus surgery, clinic visits are usually scheduled every 2-3 weeks for endoscopic sinus cleaning. This is done to ensure proper healing.     If bleeding occurs:  1. Sit upright, leaning slightly forward with your head tilted downward.  2. Pinch your nostrils together tightly for 15 minutes.  3. Then release your  and check for bleeding.   4. If bleeding continues, repeat steps 1 & 2.  5. If bleeding still continues, go to the nearest emergency room. Continue to squeeze nose tightly and keep your head tilted downward.  6. You may use Afrin nasal spray, an over-the-counter medication. Spray into the nostril that is bleeding. Do this every 2 hours for 24 hours.     Call your doctor if:  1. The pain increases rather than decreases after the first 4 days.   2. You have a temperature over 101 degrees that will not go down with the use of Tylenol.  3. You have had uncontrolled bleeding.     If you have any questions or problems, please call us at [phone]. You can reach a doctor at this number 24 hours a day or call [facility] Nurse Advice line at [phone].   [facility] Same-Day Surgery   Adult Discharge Orders & Instructions     For 24 hours after surgery    1. Get plenty of rest.  A responsible adult must stay with you for at least 24 hours after you leave the hospital.   2. Do not drive or use heavy equipment.  If you have weakness or tingling, don't drive or use heavy equipment until this feeling goes away.  3. Do not drink alcohol.  4. Avoid strenuous or risky activities.  Ask for help when climbing stairs.   5. You may feel lightheaded.  If so, sit for a few minutes before standing.  Have someone help you get up.   6. You may have a slight fever. Call the doctor if your fever is over  100 F (37.7 C) (taken under the tongue) or lasts longer than 24 hours.  7. You may have a dry mouth, a sore throat, muscle aches or trouble sleeping.  These should go away after 24 hours.  8. Do not make important or legal decisions.  Based on the surgery/procedure that you had today, we do not expect that you will have any problems.  However, we want you to know what to do if you have pain, nausea, bleeding,or infection:  To control pain:  Take medicines your physician has prescribed or or over-the counter medicine he or she advises.  Ice packs and periods of rest are often helpful.  For surgery on an arm or leg, raise it on a pillow to ease swelling.  If your pain is not managed with the above methods, contact your physician.  To control nausea:  Take anti-nausea medicine approved by your physician.  Drink clear liquids such as apple juice, ginger ale, broth or 7-Up. Be sure to drink enough fluids.  Move to a regular diet as you feel able.  Rest may also help.  Bleeding:  You may see a little blood on your dressing, about the size of a quarter in the first 24 hours.  If you see this, there is no reason to be alarmed.  However, if this continues to increase in size, apply pressure if able, and notify your physician.  Infection: Please contact your physician if you have any of the following signs:  redness, swelling, heat, increasing pain or foul-smelling drainage at your surgery site, fever or chills.    Call your doctor for any of the followin.  It has been over 8 to 10 hours since surgery and you are still not able to urinate (pass water).    2.  Headache for over 24 hours.       [facility] Nurse advice line: [phone] (24 hour available no.)

## 2018-12-04 NOTE — IP AVS SNAPSHOT
MRN:[id number]                      After Visit Summary   12/4/2018    Maria M Marcus    MRN: [id number]           Thank you!     Thank you for choosing [facility] for your care. Our goal is always to provide you with excellent care. Hearing back from our patients is one way we can continue to improve our services. Please take a few minutes to complete the written survey that you may receive in the mail after you visit with us. Thank you!        Patient Information     Date Of Birth          1965        About your hospital stay     You were admitted on:  December 4, 2018 You last received care in the:  [facility] Phase II    You were discharged on:  December 4, 2018       Who to Call     For medical emergencies, please call 911.  For non-urgent questions about your medical care, please call your primary care provider or clinic, [phone]  For questions related to your surgery, please call your surgery clinic        Attending Provider     Provider Specialty    Woo Silva MD Otolaryngology       Primary Care Provider Office Phone # Fax #    Marcus Salgado PA-C [phone]      After Care Instructions     Check with Provider when to start anticoagulants           Diet Instructions       Resume pre procedure diet            Discharge Instructions       Patient to follow up with surgeon in 13 days            Discharge Instructions - Lifting restrictions       Lifting Restrictions 30 pounds until seen at Post-op follow up appointment            No Alcohol       For 24 hours following procedure            No Aspirin, Ibuprofen or Naproxen products       for 7 - 10 days following surgery            No blowing nose           No driving or operating machinery       until the day after procedure            Notify Physician        If bleeding or dressing becomes loose or dislodged            Wound care       Keep dressing clean and dry and intact                   Your next 10 appointments already scheduled     Dec 12, 2018  9:45 AM CST   [facility] Visit with Woo Silva MD   [facility]    [address]  [address]  [facility] [id number] [phone]            Apr 12, 2019 12:45 PM CDT   Return Visit with iSdney Jarquin MD, MG ENDO NURSE   [facility] ([facility])    [address] [place] [id number] [phone]              Further instructions from your care team          Home care Instructions after endoscopic sinus surgery          Dr. Silva    1. DO NOT blow your nose until you see your doctor for your first post-op visit. You may have a full feeling due to swelling.  2. Avoid sneezing. If sneezing cannot be avoided, sneeze with your mouth open.  4. Keep your head elevated about 30 degrees for the first week after surgery. This will help reduce swelling and pain.  5. Arrange to have extra humidity in your home. This will help prevent a sore throat and promote comfort. Keep humidity at 30% if possible.   6. No lifting above 30 pounds for the first week after surgery. Then, you may increase the amount gradually. After 14 days, restrictions are lifted.   7. Pain medications can cause constipation. It is recommended to take a stool softener or fiber laxative while on prescription pain medications. Do not strain to have a bowel movement.   8. Do not bend over or hang your head down for the first 2-3 weeks.   9. Unless instructed otherwise, the day following your surgery, begin saline irrigations to both sides of your nose with warm salt water 3 times a day. Use a new baby bulb syringe which can be purchased at a pharmacy.  When irrigating, sit in a chair in front of a sink.  You may make your own solution or purchase an over-the-counter saline solution such as Ayr brand or Neilmed solution.  Making your own saline solution: Purchase 1 gallon of  distilled water.    Add 5 1/2 tsp salt.    Shake to dissolve salt.    Fill clean cup with mixture, heat in microwave or place under hot tap water to warm mixture to lukewarm.    Fill syringe and irrigate each nostril, using 1-2 full syringes.    Direct the saline solution toward your forehead and let the solution run out of your nostrils with your head leaning forward.    Irrigations are especially important during the first month after surgery, but may be continued longer.    It is normal to feel very tired during the first 1-2 weeks after surgery. It is important to rest and drink lots of fluids.  Patients who have had endoscopic sinus surgery should take a minimum of 1 week off from work.    For the first 2 months after sinus surgery, clinic visits are usually scheduled every 2-3 weeks for endoscopic sinus cleaning. This is done to ensure proper healing.     If bleeding occurs:  1. Sit upright, leaning slightly forward with your head tilted downward.  2. Pinch your nostrils together tightly for 15 minutes.  3. Then release your  and check for bleeding.   4. If bleeding continues, repeat steps 1 & 2.  5. If bleeding still continues, go to the nearest emergency room. Continue to squeeze nose tightly and keep your head tilted downward.  6. You may use Afrin nasal spray, an over-the-counter medication. Spray into the nostril that is bleeding. Do this every 2 hours for 24 hours.     Call your doctor if:  1. The pain increases rather than decreases after the first 4 days.   2. You have a temperature over 101 degrees that will not go down with the use of Tylenol.  3. You have had uncontrolled bleeding.     If you have any questions or problems, please call us at [phone]. You can reach a doctor at this number 24 hours a day or call [facility] Nurse Advice line at [phone].   [facility] Same-Day Surgery   Adult Discharge Orders & Instructions     For 24 hours after  surgery    1. Get plenty of rest.  A responsible adult must stay with you for at least 24 hours after you leave the hospital.   2. Do not drive or use heavy equipment.  If you have weakness or tingling, don't drive or use heavy equipment until this feeling goes away.  3. Do not drink alcohol.  4. Avoid strenuous or risky activities.  Ask for help when climbing stairs.   5. You may feel lightheaded.  If so, sit for a few minutes before standing.  Have someone help you get up.   6. You may have a slight fever. Call the doctor if your fever is over 100 F (37.7 C) (taken under the tongue) or lasts longer than 24 hours.  7. You may have a dry mouth, a sore throat, muscle aches or trouble sleeping.  These should go away after 24 hours.  8. Do not make important or legal decisions.  Based on the surgery/procedure that you had today, we do not expect that you will have any problems.  However, we want you to know what to do if you have pain, nausea, bleeding,or infection:  To control pain:  Take medicines your physician has prescribed or or over-the counter medicine he or she advises.  Ice packs and periods of rest are often helpful.  For surgery on an arm or leg, raise it on a pillow to ease swelling.  If your pain is not managed with the above methods, contact your physician.  To control nausea:  Take anti-nausea medicine approved by your physician.  Drink clear liquids such as apple juice, ginger ale, broth or 7-Up. Be sure to drink enough fluids.  Move to a regular diet as you feel able.  Rest may also help.  Bleeding:  You may see a little blood on your dressing, about the size of a quarter in the first 24 hours.  If you see this, there is no reason to be alarmed.  However, if this continues to increase in size, apply pressure if able, and notify your physician.  Infection: Please contact your physician if you have any of the following signs:  redness, swelling, heat, increasing pain or foul-smelling drainage at your  surgery site, fever or chills.    Call your doctor for any of the followin.  It has been over 8 to 10 hours since surgery and you are still not able to urinate (pass water).    2.  Headache for over 24 hours.       [facility] Nurse advice line: [phone] (24 hour available no.)      Additional Information     If you use hormonal birth control (such as the pill, patch, ring or implants): You'll need a second form of birth control for 7 days (condoms, a diaphragm or contraceptive foam). While in the hospital, you received a medicine called Bridion. Your normal birth control will not work as well for a week after taking this medicine.          Pending Results     No orders found from 2018 to 2018.            Admission Information     Date & Time Provider Department Dept. Phone    2018 Woo Silva MD [facility] Phase [phone]      Your Vitals Were     Blood Pressure Temperature Respirations Pulse Oximetry          134/95 98  F (36.7  C) (Oral) 10 100%        MyChart Information     [facility] gives you secure access to your electronic health record. If you see a primary care provider, you can also send messages to your care team and make appointments. If you have questions, please call your primary care clinic.  If you do not have a primary care provider, please call [phone] and they will assist you.        Care EveryWhere ID     This is your Care EveryWhere ID. This could be used by other organizations to access your [place] medical records  [id number]        Equal Access to Services     ONESIMO SMITH : Hadii miguel renteriao Sobarb, waaxda luqadaha, qaybta kaalmada traci griffin . So [facility] [phone].    ATENCIÓN: Si habla español, tiene a ngo disposición servicios gratuitos de asistencia lingüística. Llame al [phone].    We comply with applicable federal civil rights laws and [place] laws. We do not discriminate on  the basis of race, color, national origin, age, disability, sex, sexual orientation, or gender identity.               Review of your medicines      START taking        Dose / Directions    HYDROcodone-acetaminophen 5-325 MG tablet   Commonly known as:  NORCO   Used for:  Post-op pain        Dose:  1-2 tablet   Take 1-2 tablets by mouth every 4 hours as needed for moderate to severe pain   Quantity:  25 tablet   Refills:  0       ketorolac 10 MG tablet   Commonly known as:  TORADOL   Used for:  Post-op pain        Dose:  10 mg   Take 1 tablet (10 mg) by mouth every 6 hours as needed for moderate pain   Quantity:  20 tablet   Refills:  0       ondansetron 4 MG ODT tab   Commonly known as:  ZOFRAN ODT   Used for:  Post-operative nausea and vomiting        Dose:  4-8 mg   Take 1-2 tablets (4-8 mg) by mouth every 8 hours as needed for nausea   Quantity:  12 tablet   Refills:  0         CONTINUE these medicines which may have CHANGED, or have new prescriptions. If we are uncertain of the size of tablets/capsules you have at home, strength may be listed as something that might have changed.        Dose / Directions    insulin glargine 100 UNIT/ML pen   This may have changed:  how much to take   Used for:  Type 1 diabetes mellitus without complication (H)        Dose:  6 Units   Inject 6 Units Subcutaneous 2 times daily   Quantity:  30 mL   Refills:  1         CONTINUE these medicines which have NOT CHANGED        Dose / Directions    amitriptyline 25 MG tablet   Commonly known as:  ELAVIL   Used for:  Chronic pain syndrome, Cervical radiculopathy        Dose:  25 mg   Take 1 tablet (25 mg) by mouth At Bedtime   Quantity:  90 tablet   Refills:  1       blood glucose monitoring lancets   Used for:  Type 1 diabetes mellitus without complication (H)        USE ONE  TO CHECK GLUCOSE FIVE TIMES DAILY OR  AS  DIRECTED   Quantity:  100 each   Refills:  5       blood glucose monitoring meter device kit   Used for:  Type 1 diabetes  mellitus without complication (H)        Use to test blood sugars 5 times daily or as directed.   Quantity:  1 kit   Refills:  0       blood glucose monitoring test strip   Commonly known as:  TANA CONTOUR   Used for:  Type 1 diabetes mellitus without complication (H)        Use to test blood sugar 6 times daily or as directed.   Quantity:  550 strip   Refills:  3       insulin glulisine 100 UNIT/ML soln   Commonly known as:  APIDRA SOLOSTAR   Used for:  Type 1 diabetes mellitus without complication (H)        1 unit per 20 gm carb with correction. approx 30 units daily   Quantity:  15 mL   Refills:  3       insulin pen needle 31G X 5 MM miscellaneous   Commonly known as:  B-D U/F   Used for:  Type 1 diabetes mellitus without complication (H)        Use 3 pen needles daily or as directed.   Quantity:  100 each   Refills:  5       insulin syringes (disposable) U-100 0.3 ML Misc   Used for:  Diabetes mellitus type 1 (H)        Keep on hand in case of pump failure. Use 4-5 syringes daily.   Quantity:  100 each   Refills:  3       levothyroxine 75 MCG tablet   Commonly known as:  SYNTHROID/LEVOTHROID   Used for:  Hypothyroidism, unspecified type        TAKE 1 TABLET BY MOUTH ONCE DAILY   Quantity:  90 tablet   Refills:  1       lisinopril 10 MG tablet   Commonly known as:  PRINIVIL/ZESTRIL   Used for:  Hypertension goal BP (blood pressure) < 140/90, Type 1 diabetes mellitus without complication (H)        Dose:  10 mg   Take 1 tablet (10 mg) by mouth daily   Quantity:  90 tablet   Refills:  2       omeprazole 20 MG DR capsule   Commonly known as:  priLOSEC   Used for:  Gastroesophageal reflux disease, esophagitis presence not specified        TAKE ONE CAPSULE BY MOUTH ONCE DAILY   Quantity:  30 capsule   Refills:  2       pravastatin 10 MG tablet   Commonly known as:  PRAVACHOL   Used for:  Hyperlipidemia LDL goal <100        Dose:  10 mg   Take 1 tablet (10 mg) by mouth daily   Quantity:  90 tablet   Refills:  3        vitamin D3 2000 units tablet   Commonly known as:  CHOLECALCIFEROL   Used for:  Vitamin D deficiency disease        Dose:  2000 Units   Take 2,000 Units by mouth daily.   Quantity:  100 tablet   Refills:  3            Where to get your medicines      Some of these will need a paper prescription and others can be bought over the counter. Ask your nurse if you have questions.     Bring a paper prescription for each of these medications     HYDROcodone-acetaminophen 5-325 MG tablet    ketorolac 10 MG tablet    ondansetron 4 MG ODT tab                Protect others around you: Learn how to safely use, store and throw away your medicines at www.disposemymeds.org.        Information about OPIOIDS     PRESCRIPTION OPIOIDS: WHAT YOU NEED TO KNOW   We gave you an opioid (narcotic) pain medicine. It is important to manage your pain, but opioids are not always the best choice. You should first try all the other options your care team gave you. Take this medicine for as short a time (and as few doses) as possible.    Some activities can increase your pain, such as bandage changes or therapy sessions. It may help to take your pain medicine 30 to 60 minutes before these activities. Reduce your stress by getting enough sleep, working on hobbies you enjoy and practicing relaxation or meditation. Talk to your care team about ways to manage your pain beyond prescription opioids.    These medicines have risks:    DO NOT drive when on new or higher doses of pain medicine. These medicines can affect your alertness and reaction times, and you could be arrested for driving under the influence (DUI). If you need to use opioids long-term, talk to your care team about driving.    DO NOT operate heavy machinery    DO NOT do any other dangerous activities while taking these medicines.    DO NOT drink any alcohol while taking these medicines.     If the opioid prescribed includes acetaminophen, DO NOT take with any other medicines that contain  acetaminophen. Read all labels carefully. Look for the word  acetaminophen  or  Tylenol.  Ask your pharmacist if you have questions or are unsure.    You can get addicted to pain medicines, especially if you have a history of addiction (chemical, alcohol or substance dependence). Talk to your care team about ways to reduce this risk.    All opioids tend to cause constipation. Drink plenty of water and eat foods that have a lot of fiber, such as fruits, vegetables, prune juice, apple juice and high-fiber cereal. Take a laxative (Miralax, milk of magnesia, Colace, Senna) if you don t move your bowels at least every other day. Other side effects include upset stomach, sleepiness, dizziness, throwing up, tolerance (needing more of the medicine to have the same effect), physical dependence and slowed breathing.    Store your pills in a secure place, locked if possible. We will not replace any lost or stolen medicine. If you don t finish your medicine, please throw away (dispose) as directed by your pharmacist. The [place] Pollution Control Agency has more information about safe disposal: https://www.pca.[facility].[place].us/living-green/managing-unwanted-medications             Medication List: This is a list of all your medications and when to take them. Check marks below indicate your daily home schedule. Keep this list as a reference.      Medications           Morning Afternoon Evening Bedtime As Needed    amitriptyline 25 MG tablet   Commonly known as:  ELAVIL   Take 1 tablet (25 mg) by mouth At Bedtime                                blood glucose monitoring lancets   USE ONE  TO CHECK GLUCOSE FIVE TIMES DAILY OR  AS  DIRECTED                                blood glucose monitoring meter device kit   Use to test blood sugars 5 times daily or as directed.                                blood glucose monitoring test strip   Commonly known as:  TANA CONTOUR   Use to test blood sugar 6 times daily or as directed.                                 HYDROcodone-acetaminophen 5-325 MG tablet   Commonly known as:  NORCO   Take 1-2 tablets by mouth every 4 hours as needed for moderate to severe pain                                insulin glargine 100 UNIT/ML pen   Inject 6 Units Subcutaneous 2 times daily                                insulin glulisine 100 UNIT/ML soln   Commonly known as:  APIDRA SOLOSTAR   1 unit per 20 gm carb with correction. approx 30 units daily                                insulin pen needle 31G X 5 MM miscellaneous   Commonly known as:  B-D U/F   Use 3 pen needles daily or as directed.                                insulin syringes (disposable) U-100 0.3 ML Misc   Keep on hand in case of pump failure. Use 4-5 syringes daily.                                ketorolac 10 MG tablet   Commonly known as:  TORADOL   Take 1 tablet (10 mg) by mouth every 6 hours as needed for moderate pain                                levothyroxine 75 MCG tablet   Commonly known as:  SYNTHROID/LEVOTHROID   TAKE 1 TABLET BY MOUTH ONCE DAILY                                lisinopril 10 MG tablet   Commonly known as:  PRINIVIL/ZESTRIL   Take 1 tablet (10 mg) by mouth daily                                omeprazole 20 MG DR capsule   Commonly known as:  priLOSEC   TAKE ONE CAPSULE BY MOUTH ONCE DAILY                                ondansetron 4 MG ODT tab   Commonly known as:  ZOFRAN ODT   Take 1-2 tablets (4-8 mg) by mouth every 8 hours as needed for nausea                                pravastatin 10 MG tablet   Commonly known as:  PRAVACHOL   Take 1 tablet (10 mg) by mouth daily                                vitamin D3 2000 units tablet   Commonly known as:  CHOLECALCIFEROL   Take 2,000 Units by mouth daily.

## 2018-12-05 NOTE — OR NURSING
[facility] Same Day Surgery  Discharge Call Back  Maria M Marcus  1965  MRN: [id number]  Home: [phone] (home)   PCP: Marcus Salgado    We are calling to see how you're doing since your surgery/procedure with us?   Comments: I'm doing okay, just having high blood sugar issues.  Clinical Questions  1. Have you had time to look at your discharge instructions? Do you have any questions in regards to the instructions?   Comment: none  2. Do you feel your pain is being controlled with the regimen the surgeon sent you home on? (ie: prescription medications, over the counter pain medications, ice packs)   Comments: yes  3. Have you noticed any drainage on your dressing? Do you know what to do if you have bleeding as a result of your procedure?   Comments: last night when I moved around, but I feel really good right now  4. Have you had any nausea/vomiting? Do you know how to treat this?   Comment: none  5. Have you had any signs/symptoms of infection? (ie: fever, swelling, heat, drainage or redness) Do you know what to do if you have?   Comment: no  6. Do you have a follow up appointment made with your surgeon? Do you have a number to contact them at if you need it?   Comment: yes, I put the number right into my phone, but I don't think I'll need it.   Recognition  Is there anyone from your care team that you would like to recognize?   Comments: it all went well  Improvement  Our goal is to be the best. Do you have any suggestions for things that we could improve on?   Comments: no  You may be randomly selected to fill out a [facility] survey. We would appreciate you taking the time to fill this out. It is important to us if you would answer all of the questions on the survey.

## 2018-12-12 ENCOUNTER — OFFICE VISIT (OUTPATIENT)
Dept: OTOLARYNGOLOGY | Facility: OTHER | Age: 53
End: 2018-12-12
Payer: COMMERCIAL

## 2018-12-12 VITALS
DIASTOLIC BLOOD PRESSURE: 78 MMHG | HEART RATE: 88 BPM | WEIGHT: 155 LBS | OXYGEN SATURATION: 97 % | SYSTOLIC BLOOD PRESSURE: 162 MMHG | BODY MASS INDEX: 32.05 KG/M2

## 2018-12-12 DIAGNOSIS — Z98.890 S/P FESS (FUNCTIONAL ENDOSCOPIC SINUS SURGERY): Primary | ICD-10-CM

## 2018-12-12 PROCEDURE — 31237 NSL/SINS NDSC SURG BX POLYPC: CPT | Mod: 50 | Performed by: OTOLARYNGOLOGY

## 2018-12-12 PROCEDURE — 99024 POSTOP FOLLOW-UP VISIT: CPT | Mod: 25 | Performed by: OTOLARYNGOLOGY

## 2018-12-12 NOTE — PROGRESS NOTES
HPI - Maria M Marcus is a 53 year old female who is status post endoscopic maxillary antrostomies surgery on 12/04/2018.  They report the expected amount of congestion, facial pressure, and mild bloody drainage.  No changes in vision, no fevers or chills.  Nasal saline rinses and oral antibiotics have been tolerated.Patient does note that she has an URI at this time, but has been doing well otherwise.      Physical Exam:  Vitals - /78   Pulse 88   Wt 70.3 kg (155 lb)   SpO2 97%   BMI 32.05 kg/m      General - The patient is well nourished and well developed, and appears to have good nutritional status.  Alert and oriented to person and place, answers questions and cooperates with examination appropriately.     Head and Face - Normocephalic and atraumatic, with no gross asymmetry noted of the contour of the facial features.  The facial nerve is intact, with strong symmetric movements.    Eyes - Extraocular movements intact, and the pupils were reactive to light.  Sclera were not icteric or injected, conjunctiva were pink and moist.    Mouth - Examination of the oral cavity shows pink, healthy, moist mucosa.  No lesions or ulceration noted.  The dentition are in good repair.  The tongue is mobile and midline.    Nose - Nasal exam performed with a headlight and nasal speculum.  I suctioned out a small amount of residual nasopore and dark mucous.  The middle meatus was visible and open bilaterally, no purulence or signs of early synechiae formation.      Preformed in Clinic  To further evaluate the nasal cavity, I performed rigid nasal endoscopy.  I first sprayed the nasal cavity bilaterally with a mix of lidocaine and neosynephrine.  I then began on the left side using a 2.7mm, 30 degree rigid nasal endoscope.  The septum was straight and the nasal airway was obstructed with debris that was suctioned out.  No abnormal secretions, purulence, or polyps were noted. The left middle turbinate and middle  meatus were clearly visualized and normal in appearance.  Looking up, the olfactory cleft was unobstructed.  Going further back, the sphenoethmoid recess was normal in appearance, with healthy appearing mucosa on the face of the sphenoid.  The nasopharynx was unremarkable, and the eustachian tube opening on this side was unobstructed.    I then turned my attention to the right side.  Once again, the septum was straight, and the airway was obstructed with debris that was suctioned out.  No abnormal secretions, purulence, polyps were noted.  The right middle turbinate and middle meatus were clearly visualized and normal in appearance.  Looking up, the olfactory cleft was unobstructed.  Going further back the right sphenoethmoid recess was normal in appearance, and eustachian tube opening was unobstructed.   [facility] [id number] [facility]      A/P Salvador Marcus is a 53 year old female is doing well from sinus surgery.  There are no signs of complications or infection.  Patient instructed to continue saline rinses. She should return in 4 weeks for another follow up.      This document serves as a record of the services and decisions personally performed and made by Dr. Woo Silva MD. It was created on his behalf by Sima King, a trained medical scribe. The creation of this document is based the provider's statements to the medical scribe.  Sima King 12/12/2018    Provider:   The information in this document, created by the medical scribe for me, accurately reflects the services I personally performed and the decisions made by me. I have reviewed and approved this document for accuracy prior to leaving the patient care area.  Dr. Woo Silva MD 12/12/2018    Woo Silva MD.

## 2018-12-12 NOTE — LETTER
12/12/2018         RE: Maria M Marcus  [address] [place] [address]        Dear Colleague,    Thank you for referring your patient, Maria M Marcus, to the [facility]. Please see a copy of my visit note below.    HPI - Maria M Marcus is a 53 year old female who is status post endoscopic maxillary antrostomies surgery on 12/04/2018.  They report the expected amount of congestion, facial pressure, and mild bloody drainage.  No changes in vision, no fevers or chills.  Nasal saline rinses and oral antibiotics have been tolerated.Patient does note that she has an URI at this time, but has been doing well otherwise.      Physical Exam:  Vitals - /78   Pulse 88   Wt 70.3 kg (155 lb)   SpO2 97%   BMI 32.05 kg/m       General - The patient is well nourished and well developed, and appears to have good nutritional status.  Alert and oriented to person and place, answers questions and cooperates with examination appropriately.     Head and Face - Normocephalic and atraumatic, with no gross asymmetry noted of the contour of the facial features.  The facial nerve is intact, with strong symmetric movements.    Eyes - Extraocular movements intact, and the pupils were reactive to light.  Sclera were not icteric or injected, conjunctiva were pink and moist.    Mouth - Examination of the oral cavity shows pink, healthy, moist mucosa.  No lesions or ulceration noted.  The dentition are in good repair.  The tongue is mobile and midline.    Nose - Nasal exam performed with a headlight and nasal speculum.  I suctioned out a small amount of residual nasopore and dark mucous.  The middle meatus was visible and open bilaterally, no purulence or signs of early synechiae formation.      Preformed in Clinic  To further evaluate the nasal cavity, I performed rigid nasal endoscopy.  I first sprayed the nasal cavity bilaterally with a mix of lidocaine and neosynephrine.  I then began on the left  side using a 2.7mm, 30 degree rigid nasal endoscope.  The septum was straight and the nasal airway was obstructed with debris that was suctioned out.  No abnormal secretions, purulence, or polyps were noted. The left middle turbinate and middle meatus were clearly visualized and normal in appearance.  Looking up, the olfactory cleft was unobstructed.  Going further back, the sphenoethmoid recess was normal in appearance, with healthy appearing mucosa on the face of the sphenoid.  The nasopharynx was unremarkable, and the eustachian tube opening on this side was unobstructed.    I then turned my attention to the right side.  Once again, the septum was straight, and the airway was obstructed with debris that was suctioned out.  No abnormal secretions, purulence, polyps were noted.  The right middle turbinate and middle meatus were clearly visualized and normal in appearance.  Looking up, the olfactory cleft was unobstructed.  Going further back the right sphenoethmoid recess was normal in appearance, and eustachian tube opening was unobstructed.   Blue/White - [id number] [facility]      A/P - Maria M Marcus is a 53 year old female is doing well from sinus surgery.  There are no signs of complications or infection.  Patient instructed to continue saline rinses. She should return in 4 weeks for another follow up.      This document serves as a record of the services and decisions personally performed and made by Dr. Woo Silva MD. It was created on his behalf by Sima King, a trained medical scribe. The creation of this document is based the provider's statements to the medical scribe.  Sima King 12/12/2018    Provider:   The information in this document, created by the medical scribe for me, accurately reflects the services I personally performed and the decisions made by me. I have reviewed and approved this document for accuracy prior to leaving the patient care area.  Dr. Woo Silva MD  12/12/2018    Woo Silva MD.          Again, thank you for allowing me to participate in the care of your patient.        Sincerely,        Woo Silva MD, MD

## 2018-12-14 ENCOUNTER — TELEPHONE (OUTPATIENT)
Dept: OTOLARYNGOLOGY | Facility: OTHER | Age: 53
End: 2018-12-14

## 2018-12-14 NOTE — TELEPHONE ENCOUNTER
Maria M Marcus is a 53 year old female who calls with elevated blood sugars.    NURSING ASSESSMENT:  Description:  I spoke with the pt who states she had sinus surgery last week, last 24 hours her blood sugar higher then 250 for 20 of the 24 hours. Started Toradol at home but was also taking it during surgery. For every 50 over 150, she takes 1 unit of insulin. The highest it has gotten is 370, this made her feel nauseous and not well. Feels lightheaded. BS is starting to come down 182 now, took insulin 20 minutes ago  Onset/duration:  yesterday  Precip. factors:  Surgery, new medications, possible infection?    Allergies:   Allergies   Allergen Reactions     Novolog [Insulin Aspart] Swelling     Itching, rash, contact dermatitis     Humalog [Insulin Lispro] Rash     Contact dermatitis     RECOMMENDED DISPOSITION:  To ED/UC for evaluation  Will comply with recommendation: Yes  If further questions/concerns or if symptoms do not improve, worsen or new symptoms develop, call your PCP or [facility] as soon as possible.      Guideline used:  Telephone Triage Protocols for Nurses, Fifth Edition, Yojana Celeste RN

## 2018-12-14 NOTE — TELEPHONE ENCOUNTER
Reason for call:  Patient reporting a symptom    Symptom or request: high blood sugar     Duration (how long have symptoms been present): 9pm last night     Have you been treated for this before? No    Additional comments: pt  States not sure if he blood sugar is being effected by the mediation she started last night ketorolac (TORADOL) 10 MG tablet. Pt states took first dose at 6pm and at 9pm her blood sugar was up to 250 and has been at 250. Right now blood sugar is at 187. Pt states does have a cold and possible ear infection so could be that too?     Phone Number patient can be reached at:  Cell number on file:    Telephone Information:   Mobile [phone]       Best Time:  ANY    Can we leave a detailed message on this number:  YES    Call taken on 12/14/2018 at 11:22 AM by Gisel Romero

## 2018-12-21 DIAGNOSIS — E10.9 TYPE 1 DIABETES MELLITUS WITHOUT COMPLICATION (H): ICD-10-CM

## 2018-12-21 DIAGNOSIS — I10 HYPERTENSION GOAL BP (BLOOD PRESSURE) < 140/90: ICD-10-CM

## 2018-12-26 RX ORDER — LISINOPRIL 10 MG/1
TABLET ORAL
Qty: 90 TABLET | Refills: 3 | Status: SHIPPED | OUTPATIENT
Start: 2018-12-26 | End: 2019-12-31

## 2018-12-26 NOTE — TELEPHONE ENCOUNTER
Lisinopril:  Prescription approved per [facility] Refill Protocol.    Joslyn Martinez, RN, BSN

## 2018-12-27 ENCOUNTER — MYC MEDICAL ADVICE (OUTPATIENT)
Dept: ENDOCRINOLOGY | Facility: CLINIC | Age: 53
End: 2018-12-27

## 2018-12-27 DIAGNOSIS — E10.9 TYPE 1 DIABETES MELLITUS WITHOUT COMPLICATION (H): ICD-10-CM

## 2019-01-08 ENCOUNTER — MYC MEDICAL ADVICE (OUTPATIENT)
Dept: ENDOCRINOLOGY | Facility: CLINIC | Age: 54
End: 2019-01-08

## 2019-01-08 NOTE — TELEPHONE ENCOUNTER
Spoke with pt. States Dexcom G5  has . Would like to know if she can transition to the Dexcom G6 system. Pt advised she can do this. Suggested she call Dex[employer] to let them know the G5  has  and she'd like to upgrade to the G6 system. Pt verbalized understanding.    Jessica Demarco RN, BSN, CDE   [facility]

## 2019-01-08 NOTE — TELEPHONE ENCOUNTER
Writer phoned patient to review.  is not working and out of warranty.Patient would like to upgrade to G6.    Advised patient to contact Dexcom to initiate upgrade.    Patient agreeable with plan.    Nandini Jenkins LPN  Clinic Coordinator   [facility]  [facility]

## 2019-01-23 ENCOUNTER — OFFICE VISIT (OUTPATIENT)
Dept: OTOLARYNGOLOGY | Facility: OTHER | Age: 54
End: 2019-01-23
Payer: COMMERCIAL

## 2019-01-23 VITALS
BODY MASS INDEX: 32.05 KG/M2 | DIASTOLIC BLOOD PRESSURE: 86 MMHG | OXYGEN SATURATION: 97 % | SYSTOLIC BLOOD PRESSURE: 147 MMHG | HEART RATE: 78 BPM | WEIGHT: 155 LBS

## 2019-01-23 DIAGNOSIS — J32.0 CHRONIC MAXILLARY SINUSITIS: Primary | ICD-10-CM

## 2019-01-23 PROCEDURE — 31231 NASAL ENDOSCOPY DX: CPT | Performed by: OTOLARYNGOLOGY

## 2019-01-23 PROCEDURE — 99024 POSTOP FOLLOW-UP VISIT: CPT | Mod: 25 | Performed by: OTOLARYNGOLOGY

## 2019-01-23 NOTE — LETTER
1/23/2019         RE: Maria M Mracus  [address] [place] [address]        Dear Colleague,    Thank you for referring your patient, Maria M Marcus, to the [facility]. Please see a copy of my visit note below.    History of Present Illness - Maria M Marcus is a 53 year old female presenting in clinic today for a recheck on Patient presents with:  RECHECK: status post endoscopic maxillary antrostomies surgery on 12/04/2018    The patient overall is feeling remarkably better after sinus surgery.  Much less pressure breathing better even with upper respiratory infection starting that she is experiencing she does not experience much pressure in the maxillary areas.    Present Symptoms include: nasal obstructions and they are   unpredictable .  Maria M denies post nasal drainage and facial pain/pressure.      Body mass index is 32.05 kg/m .    Weight management plan: Patient was referred to their PCP to discuss a diet and exercise plan.    BP Readings from Last 1 Encounters:   12/12/18 162/78       BP noted to be well controlled today in office.     Maria M IS NOT a smoker/uses chewing tobacco.        Past Medical History -   Past Medical History:   Diagnosis Date     Cervical radiculopathy      Diabetic eye exam (H) 12/14/11     DISC DIS NEC/NOS-LUMBAR 4/7/2007     Frozen shoulder syndrome 6/16/2011     Hyperlipidemia LDL goal <100 10/31/2010     Hypertension 1/3/2011     Type 1 diabetes, HbA1c goal < 7% (H) dx 1967     Unspecified hypothyroidism        Current Medications -   Current Outpatient Medications:      amitriptyline (ELAVIL) 25 MG tablet, Take 1 tablet (25 mg) by mouth At Bedtime, Disp: 90 tablet, Rfl: 1     BASAGLAR 100 UNIT/ML injection, Inject 6 Units Subcutaneous 2 times daily (Patient taking differently: Inject 5 Units Subcutaneous 2 times daily ), Disp: 30 mL, Rfl: 1     blood glucose monitoring (TANA CONTOUR MONITOR) meter device kit, Use to test blood sugars 5  times daily or as directed., Disp: 1 kit, Rfl: 0     blood glucose monitoring (TANA CONTOUR) test strip, Use to test blood sugar 6 times daily or as directed., Disp: 550 strip, Rfl: 3     blood glucose monitoring (SOFTCLIX) lancets, USE ONE  TO CHECK GLUCOSE FIVE TIMES DAILY OR  AS  DIRECTED, Disp: 100 each, Rfl: 5     Cholecalciferol (VITAMIN D) 2000 UNITS tablet, Take 2,000 Units by mouth daily., Disp: 100 tablet, Rfl: 3     HYDROcodone-acetaminophen (NORCO) 5-325 MG tablet, Take 1-2 tablets by mouth every 4 hours as needed for moderate to severe pain, Disp: 25 tablet, Rfl: 0     insulin glulisine (APIDRA SOLOSTAR) 100 UNIT/ML soln, 1 unit per 20 gm carb with correction. approx 30 units daily, Disp: 15 mL, Rfl: 3     insulin pen needle (B-D U/F) 31G X 5 MM miscellaneous, Use 5 pen needles daily or as directed., Disp: 450 each, Rfl: 1     insulin syringes, disposable, U-100 0.3 ML MISC, Keep on hand in case of pump failure. Use 4-5 syringes daily., Disp: 100 each, Rfl: 3     ketorolac (TORADOL) 10 MG tablet, Take 1 tablet (10 mg) by mouth every 6 hours as needed for moderate pain, Disp: 20 tablet, Rfl: 0     levothyroxine (SYNTHROID/LEVOTHROID) 75 MCG tablet, TAKE 1 TABLET BY MOUTH ONCE DAILY, Disp: 90 tablet, Rfl: 1     lisinopril (PRINIVIL/ZESTRIL) 10 MG tablet, TAKE 1 TABLET BY MOUTH ONCE DAILY, Disp: 90 tablet, Rfl: 3     omeprazole (PRILOSEC) 20 MG CR capsule, TAKE ONE CAPSULE BY MOUTH ONCE DAILY, Disp: 30 capsule, Rfl: 2     ondansetron (ZOFRAN ODT) 4 MG ODT tab, Take 1-2 tablets (4-8 mg) by mouth every 8 hours as needed for nausea, Disp: 12 tablet, Rfl: 0     pravastatin (PRAVACHOL) 10 MG tablet, Take 1 tablet (10 mg) by mouth daily, Disp: 90 tablet, Rfl: 3    Allergies -   Allergies   Allergen Reactions     Novolog [Insulin Aspart] Swelling     Itching, rash, contact dermatitis     Humalog [Insulin Lispro] Rash     Contact dermatitis       Social History -   Social History     Socioeconomic History      Marital status:      Spouse name: Ra     Number of children: 2     Years of education: 12     Highest education level: Not on file   Social Needs     Financial resource strain: Not on file     Food insecurity - worry: Not on file     Food insecurity - inability: Not on file     Transportation needs - medical: Not on file     Transportation needs - non-medical: Not on file   Occupational History     Occupation: receiving     Employer: [employer]   Tobacco Use     Smoking status: Never Smoker     Smokeless tobacco: Never Used     Tobacco comment: no exposure   Substance and Sexual Activity     Alcohol use: Yes     Comment: winex1-2/3x per yr.     Drug use: No     Sexual activity: Yes     Partners: Male     Birth control/protection: Surgical, Female Surgical     Comment: hysterectomy   Other Topics Concern     Parent/sibling w/ CABG, MI or angioplasty before 65F 55M? No   Social History Narrative     Not on file       Family History -   Family History   Problem Relation Age of Onset     Hypertension Maternal Grandfather      EYE* Maternal Grandmother      Blood Disease Maternal Grandmother      Eye Disorder Maternal Grandmother         maccular degeneration     Osteoporosis Maternal Grandmother      Lipids Father      Gastrointestinal Disease Father         ulcers     Heart Disease Father      Cardiovascular Father         heart attack     Hypertension Paternal Grandmother      Breast Cancer Mother         dx age 73 - terminal - mother had first mammo at this age     Diabetes Paternal Uncle         Type I       Review of Systems - As per HPI and PMHx, otherwise review of system review of the head and neck negative. Otherwise 10+ review of system is negative    Physical Exam  There were no vitals taken for this visit.  BMI: There is no height or weight on file to calculate BMI.    General - The patient is well nourished and well developed, and appears to have good nutritional status.  Alert and oriented to person  and place, answers questions and cooperates with examination appropriately.    SKIN - No suspicious lesions or rashes.  Respiration - No respiratory distress.  Head and Face - Normocephalic and atraumatic, with no gross asymmetry noted of the contour of the facial features.  The facial nerve is intact, with strong symmetric movements.    Voice and Breathing - The patient was breathing comfortably without the use of accessory muscles. The patients voice was clear and strong, and had appropriate pitch and quality.    Eyes - Extraocular movements intact.  Sclera were not icteric or injected, conjunctiva were pink and moist.    Mouth - Examination of the oral cavity showed pink, healthy oral mucosa. No lesions or ulcerations noted.  The tongue was mobile and midline, and the dentition were in good condition.      Throat - The walls of the oropharynx were smooth, pink, moist, symmetric, and had no lesions or ulcerations.  The tonsillar pillars and soft palate were symmetric. Tonsils are symmetric. The uvula was midline on elevation.    Neck - Normal midline excursion of the laryngotracheal complex during swallowing.  Full range of motion on passive movement.  Palpation of the occipital, submental, submandibular, internal jugular chain, and supraclavicular nodes did not demonstrate any abnormal lymph nodes or masses.  The carotid pulse was palpable bilaterally.  Palpation of the thyroid was soft and smooth, with no nodules or goiter appreciated.  The trachea was mobile and midline.    Nose - External contour is symmetric, no gross deflection or scars.  Nasal mucosa is pink and moist with no abnormal mucus.  The septum was midline and non-obstructive, turbinates of normal size and position.  No polyps, masses, or purulence noted on examination.    Neuro - Nonfocal neuro exam is normal, CN 2 through 12 intact, normal gait and muscle tone.      Performed in clinic today:  To further evaluate the nasal cavity, I performed  rigid nasal endoscopy.  I first sprayed the nasal cavity bilaterally with a mix of lidocaine and neosynephrine.  I then began on the left side using a 2.7mm, 30 degree rigid nasal endoscope.  The septum was straight and the nasal airway was open.  No abnormal secretions, purulence, or polyps were noted. The left middle turbinate and middle meatus were clearly visualized and normal in appearance.  Looking up, the olfactory cleft was unobstructed.  Going further back, the sphenoethmoid recess was normal in appearance, with healthy appearing mucosa on the face of the sphenoid.  The nasopharynx was unremarkable, and the eustachian tube opening on this side was unobstructed.  Maxillary sinus ostium is open antrum examined the mucosa is clear.    I then turned my attention to the right side.  Once again, the septum was straight, and the airway was open.  No abnormal secretions, purulence, polyps were noted.  The right middle turbinate and middle meatus were clearly visualized and normal in appearance.  Looking up, the olfactory cleft was unobstructed.  Going further back the right sphenoethmoid recess was normal in appearance, and eustachian tube opening was unobstructed.Maxillary sinus ostium is open antrum examined the mucosa is clear.     Purple - [id number] [facility]      A/P - Maria M Marcus is a 53 year old female Patient presents with:  RECHECK: status post endoscopic maxillary antrostomies surgery on 12/04/2018    Patient is doing remarkably well after sinus surgery will see us back as needed.          Woo Silva MD        Again, thank you for allowing me to participate in the care of your patient.        Sincerely,        Woo Silva MD, MD

## 2019-01-23 NOTE — PROGRESS NOTES
History of Present Illness - Maria M Marcus is a 53 year old female presenting in clinic today for a recheck on Patient presents with:  RECHECK: status post endoscopic maxillary antrostomies surgery on 12/04/2018    The patient overall is feeling remarkably better after sinus surgery.  Much less pressure breathing better even with upper respiratory infection starting that she is experiencing she does not experience much pressure in the maxillary areas.    Present Symptoms include: nasal obstructions and they are   unpredictable .  Maria M denies post nasal drainage and facial pain/pressure.      Body mass index is 32.05 kg/m .    Weight management plan: Patient was referred to their PCP to discuss a diet and exercise plan.    BP Readings from Last 1 Encounters:   12/12/18 162/78       BP noted to be well controlled today in office.     Maria M IS NOT a smoker/uses chewing tobacco.        Past Medical History -   Past Medical History:   Diagnosis Date     Cervical radiculopathy      Diabetic eye exam (H) 12/14/11     DISC DIS NEC/NOS-LUMBAR 4/7/2007     Frozen shoulder syndrome 6/16/2011     Hyperlipidemia LDL goal <100 10/31/2010     Hypertension 1/3/2011     Type 1 diabetes, HbA1c goal < 7% (H) dx 1967     Unspecified hypothyroidism        Current Medications -   Current Outpatient Medications:      amitriptyline (ELAVIL) 25 MG tablet, Take 1 tablet (25 mg) by mouth At Bedtime, Disp: 90 tablet, Rfl: 1     BASAGLAR 100 UNIT/ML injection, Inject 6 Units Subcutaneous 2 times daily (Patient taking differently: Inject 5 Units Subcutaneous 2 times daily ), Disp: 30 mL, Rfl: 1     blood glucose monitoring (TANA CONTOUR MONITOR) meter device kit, Use to test blood sugars 5 times daily or as directed., Disp: 1 kit, Rfl: 0     blood glucose monitoring (TANA CONTOUR) test strip, Use to test blood sugar 6 times daily or as directed., Disp: 550 strip, Rfl: 3     blood glucose monitoring (SOFTCLIX) lancets, USE ONE  TO  CHECK GLUCOSE FIVE TIMES DAILY OR  AS  DIRECTED, Disp: 100 each, Rfl: 5     Cholecalciferol (VITAMIN D) 2000 UNITS tablet, Take 2,000 Units by mouth daily., Disp: 100 tablet, Rfl: 3     HYDROcodone-acetaminophen (NORCO) 5-325 MG tablet, Take 1-2 tablets by mouth every 4 hours as needed for moderate to severe pain, Disp: 25 tablet, Rfl: 0     insulin glulisine (APIDRA SOLOSTAR) 100 UNIT/ML soln, 1 unit per 20 gm carb with correction. approx 30 units daily, Disp: 15 mL, Rfl: 3     insulin pen needle (B-D U/F) 31G X 5 MM miscellaneous, Use 5 pen needles daily or as directed., Disp: 450 each, Rfl: 1     insulin syringes, disposable, U-100 0.3 ML MISC, Keep on hand in case of pump failure. Use 4-5 syringes daily., Disp: 100 each, Rfl: 3     ketorolac (TORADOL) 10 MG tablet, Take 1 tablet (10 mg) by mouth every 6 hours as needed for moderate pain, Disp: 20 tablet, Rfl: 0     levothyroxine (SYNTHROID/LEVOTHROID) 75 MCG tablet, TAKE 1 TABLET BY MOUTH ONCE DAILY, Disp: 90 tablet, Rfl: 1     lisinopril (PRINIVIL/ZESTRIL) 10 MG tablet, TAKE 1 TABLET BY MOUTH ONCE DAILY, Disp: 90 tablet, Rfl: 3     omeprazole (PRILOSEC) 20 MG CR capsule, TAKE ONE CAPSULE BY MOUTH ONCE DAILY, Disp: 30 capsule, Rfl: 2     ondansetron (ZOFRAN ODT) 4 MG ODT tab, Take 1-2 tablets (4-8 mg) by mouth every 8 hours as needed for nausea, Disp: 12 tablet, Rfl: 0     pravastatin (PRAVACHOL) 10 MG tablet, Take 1 tablet (10 mg) by mouth daily, Disp: 90 tablet, Rfl: 3    Allergies -   Allergies   Allergen Reactions     Novolog [Insulin Aspart] Swelling     Itching, rash, contact dermatitis     Humalog [Insulin Lispro] Rash     Contact dermatitis       Social History -   Social History     Socioeconomic History     Marital status:      Spouse name: Ra     Number of children: 2     Years of education: 12     Highest education level: Not on file   Social Needs     Financial resource strain: Not on file     Food insecurity - worry: Not on file     Food  insecurity - inability: Not on file     Transportation needs - medical: Not on file     Transportation needs - non-medical: Not on file   Occupational History     Occupation: receiving     Employer: [employer]   Tobacco Use     Smoking status: Never Smoker     Smokeless tobacco: Never Used     Tobacco comment: no exposure   Substance and Sexual Activity     Alcohol use: Yes     Comment: winex1-2/3x per yr.     Drug use: No     Sexual activity: Yes     Partners: Male     Birth control/protection: Surgical, Female Surgical     Comment: hysterectomy   Other Topics Concern     Parent/sibling w/ CABG, MI or angioplasty before 65F 55M? No   Social History Narrative     Not on file       Family History -   Family History   Problem Relation Age of Onset     Hypertension Maternal Grandfather      EYE* Maternal Grandmother      Blood Disease Maternal Grandmother      Eye Disorder Maternal Grandmother         maccular degeneration     Osteoporosis Maternal Grandmother      Lipids Father      Gastrointestinal Disease Father         ulcers     Heart Disease Father      Cardiovascular Father         heart attack     Hypertension Paternal Grandmother      Breast Cancer Mother         dx age 73 - terminal - mother had first mammo at this age     Diabetes Paternal Uncle         Type I       Review of Systems - As per HPI and PMHx, otherwise review of system review of the head and neck negative. Otherwise 10+ review of system is negative    Physical Exam  There were no vitals taken for this visit.  BMI: There is no height or weight on file to calculate BMI.    General - The patient is well nourished and well developed, and appears to have good nutritional status.  Alert and oriented to person and place, answers questions and cooperates with examination appropriately.    SKIN - No suspicious lesions or rashes.  Respiration - No respiratory distress.  Head and Face - Normocephalic and atraumatic, with no gross asymmetry noted of the  contour of the facial features.  The facial nerve is intact, with strong symmetric movements.    Voice and Breathing - The patient was breathing comfortably without the use of accessory muscles. The patients voice was clear and strong, and had appropriate pitch and quality.    Eyes - Extraocular movements intact.  Sclera were not icteric or injected, conjunctiva were pink and moist.    Mouth - Examination of the oral cavity showed pink, healthy oral mucosa. No lesions or ulcerations noted.  The tongue was mobile and midline, and the dentition were in good condition.      Throat - The walls of the oropharynx were smooth, pink, moist, symmetric, and had no lesions or ulcerations.  The tonsillar pillars and soft palate were symmetric. Tonsils are symmetric. The uvula was midline on elevation.    Neck - Normal midline excursion of the laryngotracheal complex during swallowing.  Full range of motion on passive movement.  Palpation of the occipital, submental, submandibular, internal jugular chain, and supraclavicular nodes did not demonstrate any abnormal lymph nodes or masses.  The carotid pulse was palpable bilaterally.  Palpation of the thyroid was soft and smooth, with no nodules or goiter appreciated.  The trachea was mobile and midline.    Nose - External contour is symmetric, no gross deflection or scars.  Nasal mucosa is pink and moist with no abnormal mucus.  The septum was midline and non-obstructive, turbinates of normal size and position.  No polyps, masses, or purulence noted on examination.    Neuro - Nonfocal neuro exam is normal, CN 2 through 12 intact, normal gait and muscle tone.      Performed in clinic today:  To further evaluate the nasal cavity, I performed rigid nasal endoscopy.  I first sprayed the nasal cavity bilaterally with a mix of lidocaine and neosynephrine.  I then began on the left side using a 2.7mm, 30 degree rigid nasal endoscope.  The septum was straight and the nasal airway was open.   No abnormal secretions, purulence, or polyps were noted. The left middle turbinate and middle meatus were clearly visualized and normal in appearance.  Looking up, the olfactory cleft was unobstructed.  Going further back, the sphenoethmoid recess was normal in appearance, with healthy appearing mucosa on the face of the sphenoid.  The nasopharynx was unremarkable, and the eustachian tube opening on this side was unobstructed.  Maxillary sinus ostium is open antrum examined the mucosa is clear.    I then turned my attention to the right side.  Once again, the septum was straight, and the airway was open.  No abnormal secretions, purulence, polyps were noted.  The right middle turbinate and middle meatus were clearly visualized and normal in appearance.  Looking up, the olfactory cleft was unobstructed.  Going further back the right sphenoethmoid recess was normal in appearance, and eustachian tube opening was unobstructed.Maxillary sinus ostium is open antrum examined the mucosa is clear.     Purple - [id number] [facility]      A/P - Maria M JUSTYNA Marcus is a 53 year old female Patient presents with:  RECHECK: status post endoscopic maxillary antrostomies surgery on 12/04/2018    Patient is doing remarkably well after sinus surgery will see us back as needed.          Woo Silva MD

## 2019-02-13 ENCOUNTER — TELEPHONE (OUTPATIENT)
Dept: ENDOCRINOLOGY | Facility: CLINIC | Age: 54
End: 2019-02-13

## 2019-02-20 DIAGNOSIS — E03.9 HYPOTHYROIDISM, UNSPECIFIED TYPE: ICD-10-CM

## 2019-02-20 DIAGNOSIS — K21.9 GASTROESOPHAGEAL REFLUX DISEASE, ESOPHAGITIS PRESENCE NOT SPECIFIED: ICD-10-CM

## 2019-02-20 RX ORDER — LEVOTHYROXINE SODIUM 75 UG/1
TABLET ORAL
Qty: 90 TABLET | Refills: 1 | Status: SHIPPED | OUTPATIENT
Start: 2019-02-20 | End: 2019-08-26

## 2019-02-20 NOTE — TELEPHONE ENCOUNTER
"Requested Prescriptions   Pending Prescriptions Disp Refills     omeprazole (PRILOSEC) 20 MG DR capsule [Pharmacy Med Name: OMEPRAZOLE 20MG CAP] 90 capsule 1     Sig: TAKE 1 CAPSULE BY MOUTH ONCE DAILY    PPI Protocol Passed - 2/20/2019 11:30 AM       Passed - Not on Clopidogrel (unless Pantoprazole ordered)       Passed - No diagnosis of osteoporosis on record       Passed - Recent (12 mo) or future (30 days) visit within the authorizing provider's specialty    Patient had office visit in the last 12 months or has a visit in the next 30 days with authorizing provider or within the authorizing provider's specialty.  See \"Patient Info\" tab in inbasket, or \"Choose Columns\" in Meds & Orders section of the refill encounter.         Passed - Medication is active on med list       Passed - Patient is age 18 or older       Passed - No active pregnacy on record       Passed - No positive pregnancy test in past 12 months        levothyroxine (SYNTHROID/LEVOTHROID) 75 MCG tablet [Pharmacy Med Name: LEVOTHYROXIN 75MCG  TAB] 90 tablet 1     Sig: TAKE 1 TABLET BY MOUTH ONCE DAILY    Thyroid Protocol Passed - 2/20/2019 11:30 AM       Passed - Patient is 12 years or older       Passed - Recent (12 mo) or future (30 days) visit within the authorizing provider's specialty    Patient had office visit in the last 12 months or has a visit in the next 30 days with authorizing provider or within the authorizing provider's specialty.  See \"Patient Info\" tab in inbasket, or \"Choose Columns\" in Meds & Orders section of the refill encounter.         Passed - Medication is active on med list       Passed - Normal TSH on file in past 12 months    Recent Labs   Lab Test 08/24/18  0745   TSH 1.21          Passed - No active pregnancy on record    If patient is pregnant or has had a positive pregnancy test, please check TSH       Passed - No positive pregnancy test in past 12 months    If patient is pregnant or has had a positive pregnancy test, " please check TSH        Last seen 11/26/2018    Prescription approved per [facility] Refill Protocol.

## 2019-02-21 ENCOUNTER — TELEPHONE (OUTPATIENT)
Dept: FAMILY MEDICINE | Facility: OTHER | Age: 54
End: 2019-02-21

## 2019-02-21 NOTE — TELEPHONE ENCOUNTER
Prior Authorization Retail Medication Request    Medication/Dose: omeprazole (PRILOSEC) 20 MG DR capsule  ICD code (if different than what is on RX):      Previously Tried and Failed:    Rationale:      Insurance Name:    Insurance ID:          Pharmacy Information (if different than what is on RX)  Name:  [facility]  Phone:

## 2019-02-27 ENCOUNTER — MYC MEDICAL ADVICE (OUTPATIENT)
Dept: FAMILY MEDICINE | Facility: OTHER | Age: 54
End: 2019-02-27

## 2019-02-28 NOTE — TELEPHONE ENCOUNTER
Prior Authorization Approval    Authorization Effective Date: 2/27/2019  Authorization Expiration Date: 2/27/2020  Medication: omeprazole (PRILOSEC) 20 MG DR capsule- APPROVED  Approved Dose/Quantity: 90 PER 90 DAYS  Reference #: [id number]   Insurance Company: [facility] - Phone [phone] Fax [phone]  Expected CoPay:       CoPay Card Available:      Foundation Assistance Needed:    Which Pharmacy is filling the prescription (Not needed for infusion/clinic administered): [facility] [address] [place] [id number] [facility]  Pharmacy Notified: Yes  Patient Notified: Yes

## 2019-02-28 NOTE — TELEPHONE ENCOUNTER
PA Initiation    Medication: omeprazole (PRILOSEC) 20 MG  capsule- INITIATED  Insurance Company: [employer] [place] - Phone [phone] Fax [phone]  Pharmacy Filling the Rx: [facility] [address] [place] [id number] [facility]  Filling Pharmacy Phone: [phone]  Filling Pharmacy Fax:    Start Date: 2/28/2019

## 2019-03-15 ENCOUNTER — TELEPHONE (OUTPATIENT)
Dept: ENDOCRINOLOGY | Facility: CLINIC | Age: 54
End: 2019-03-15

## 2019-03-15 NOTE — TELEPHONE ENCOUNTER
Returned pt's call per her request (see [employer] message). Pt states her biiggest concern is lack of bg consistency in the middle of the night. Bg can range form . Pt is known to have good bg control and is pro-active with her diabetes self-mgmt care.  She is wondering if these fluctuations may be due to menopause.      Current insulin doses - has been off insulin pump for approx one year -  Basaglar: 5units at 7am, 4 units at 10pm.   Apidra: Br: 1:20, L :1:20, D: 1:22.     Pt is wondering if Basaglar doses may be overlapping therefore causing issues in the middle of the night. Pt asking if she can change Basaglar dosing times to 9am and 9pm. Pt advised this is a good place to start. If this does not help, pt asked to call Cde in one week. May need to look at one time a day dosing for Basaglar or looking into seeing if Tresiba might be covered.     Jessica Demarco, RN, BSN, CDE   [facility]

## 2019-03-17 ENCOUNTER — HOSPITAL ENCOUNTER (EMERGENCY)
Facility: CLINIC | Age: 54
Discharge: HOME | End: 2019-03-17
Attending: FAMILY MEDICINE | Admitting: FAMILY MEDICINE
Payer: COMMERCIAL

## 2019-03-17 ENCOUNTER — NURSE TRIAGE (OUTPATIENT)
Dept: NURSING | Facility: CLINIC | Age: 54
End: 2019-03-17

## 2019-03-17 VITALS
HEART RATE: 72 BPM | DIASTOLIC BLOOD PRESSURE: 88 MMHG | BODY MASS INDEX: 33.29 KG/M2 | TEMPERATURE: 97.9 F | RESPIRATION RATE: 16 BRPM | OXYGEN SATURATION: 99 % | SYSTOLIC BLOOD PRESSURE: 133 MMHG | WEIGHT: 161 LBS

## 2019-03-17 DIAGNOSIS — R73.9 HYPERGLYCEMIA: ICD-10-CM

## 2019-03-17 LAB
ALBUMIN SERPL-MCNC: 3.7 G/DL (ref 3.4–5)
ALBUMIN UR-MCNC: NEGATIVE MG/DL
ALP SERPL-CCNC: 109 U/L (ref 40–150)
ALT SERPL W P-5'-P-CCNC: 25 U/L (ref 0–50)
ANION GAP SERPL CALCULATED.3IONS-SCNC: 12 MMOL/L (ref 3–14)
APPEARANCE UR: CLEAR
AST SERPL W P-5'-P-CCNC: 18 U/L (ref 0–45)
BASOPHILS # BLD AUTO: 0.1 10E9/L (ref 0–0.2)
BASOPHILS NFR BLD AUTO: 0.7 %
BILIRUB SERPL-MCNC: 0.6 MG/DL (ref 0.2–1.3)
BILIRUB UR QL STRIP: NEGATIVE
BUN SERPL-MCNC: 14 MG/DL (ref 7–30)
CALCIUM SERPL-MCNC: 8.5 MG/DL (ref 8.5–10.1)
CHLORIDE SERPL-SCNC: 100 MMOL/L (ref 94–109)
CO2 SERPL-SCNC: 24 MMOL/L (ref 20–32)
COLOR UR AUTO: COLORLESS
CREAT SERPL-MCNC: 0.82 MG/DL (ref 0.52–1.04)
DIFFERENTIAL METHOD BLD: NORMAL
EOSINOPHIL NFR BLD AUTO: 2.9 %
ERYTHROCYTE [DISTWIDTH] IN BLOOD BY AUTOMATED COUNT: 13 % (ref 10–15)
GFR SERPL CREATININE-BSD FRML MDRD: 81 ML/MIN/{1.73_M2}
GLUCOSE SERPL-MCNC: 336 MG/DL (ref 70–99)
GLUCOSE UR STRIP-MCNC: >499 MG/DL
HCT VFR BLD AUTO: 37.2 % (ref 35–47)
HGB BLD-MCNC: 12.1 G/DL (ref 11.7–15.7)
HGB UR QL STRIP: ABNORMAL
IMM GRANULOCYTES # BLD: 0 10E9/L (ref 0–0.4)
IMM GRANULOCYTES NFR BLD: 0.3 %
KETONES UR STRIP-MCNC: 5 MG/DL
LEUKOCYTE ESTERASE UR QL STRIP: ABNORMAL
LYMPHOCYTES # BLD AUTO: 2 10E9/L (ref 0.8–5.3)
LYMPHOCYTES NFR BLD AUTO: 25.7 %
MCH RBC QN AUTO: 29.3 PG (ref 26.5–33)
MCHC RBC AUTO-ENTMCNC: 32.5 G/DL (ref 31.5–36.5)
MCV RBC AUTO: 90 FL (ref 78–100)
MONOCYTES # BLD AUTO: 0.5 10E9/L (ref 0–1.3)
MONOCYTES NFR BLD AUTO: 6.9 %
NEUTROPHILS # BLD AUTO: 4.9 10E9/L (ref 1.6–8.3)
NEUTROPHILS NFR BLD AUTO: 63.5 %
NITRATE UR QL: NEGATIVE
NRBC # BLD AUTO: 0 10*3/UL
NRBC BLD AUTO-RTO: 0 /100
PH UR STRIP: 6 PH (ref 5–7)
PLATELET # BLD AUTO: 207 10E9/L (ref 150–450)
POTASSIUM SERPL-SCNC: 3.9 MMOL/L (ref 3.4–5.3)
PROT SERPL-MCNC: 6.8 G/DL (ref 6.8–8.8)
RBC # BLD AUTO: 4.13 10E12/L (ref 3.8–5.2)
RBC #/AREA URNS AUTO: 1 /HPF (ref 0–2)
SODIUM SERPL-SCNC: 136 MMOL/L (ref 133–144)
SOURCE: ABNORMAL
SP GR UR STRIP: 1 (ref 1–1.03)
TROPONIN I SERPL-MCNC: <0.015 UG/L (ref 0–0.04)
UROBILINOGEN UR STRIP-MCNC: 0 MG/DL (ref 0–2)
WBC # BLD AUTO: 7.7 10E9/L (ref 4–11)
WBC #/AREA URNS AUTO: 5 /HPF (ref 0–5)

## 2019-03-17 PROCEDURE — 84484 ASSAY OF TROPONIN QUANT: CPT | Performed by: FAMILY MEDICINE

## 2019-03-17 PROCEDURE — 93005 ELECTROCARDIOGRAM TRACING: CPT | Performed by: FAMILY MEDICINE

## 2019-03-17 PROCEDURE — 99284 EMERGENCY DEPT VISIT MOD MDM: CPT | Mod: 25 | Performed by: FAMILY MEDICINE

## 2019-03-17 PROCEDURE — 93010 ELECTROCARDIOGRAM REPORT: CPT | Mod: Z6 | Performed by: FAMILY MEDICINE

## 2019-03-17 PROCEDURE — 99285 EMERGENCY DEPT VISIT HI MDM: CPT | Mod: 25 | Performed by: FAMILY MEDICINE

## 2019-03-17 PROCEDURE — 85025 COMPLETE CBC W/AUTO DIFF WBC: CPT | Performed by: FAMILY MEDICINE

## 2019-03-17 PROCEDURE — 81001 URINALYSIS AUTO W/SCOPE: CPT | Performed by: FAMILY MEDICINE

## 2019-03-17 PROCEDURE — 80053 COMPREHEN METABOLIC PANEL: CPT | Performed by: FAMILY MEDICINE

## 2019-03-17 NOTE — DISCHARGE INSTRUCTIONS
Your blood sugar is high but there does not appear to be an obvious underlying cause.  Continue to administer sliding scale insulin.  Contact your diabetes coordinator on Monday.  Monitor for new or worsening symptoms and return to the ED at any time if this occurs.

## 2019-03-17 NOTE — ED AVS SNAPSHOT
[facility]  [address] [place] [address]  Phone:  [phone]  Fax:  [phone]                                    Maria M Marcus   MRN: [id number]    Department:  [facility] Emergency Department   Date of Visit:  3/17/2019           After Visit Summary Signature Page    I have received my discharge instructions, and my questions have been answered. I have discussed any challenges I see with this plan with the nurse or doctor.    ..........................................................................................................................................  Patient/Patient Representative Signature      ..........................................................................................................................................  Patient Representative Print Name and Relationship to Patient    ..................................................               ................................................  Date                                   Time    ..........................................................................................................................................  Reviewed by Signature/Title    ...................................................              ..............................................  Date                                               Time          22EPIC Rev 08/18

## 2019-03-17 NOTE — ED TRIAGE NOTES
Pt presents with blood sugars of over 350. Pt states she is never really over 120. Pt notes some nausea and just not feeling right.

## 2019-03-17 NOTE — TELEPHONE ENCOUNTER
Caller states she is an type 1 diabetic and blood glucose levels have been running high. Caller states her blood glucose level is 361 now and she feels nauseated. No other symptoms of hypo/hyper glycemia noted. Caller states she took an extra dose of insulin and blood glucose levels remain high. Caller thinks her insulin may be faulty and wants triager to advised her on taking another dose from another batch of insulin. Triage guidelines recommend to go to ED. Caller verbalized and understands directives.    Reason for Disposition    [1] Blood glucose > 240 mg/dl (13 mmol/l) AND [2] urine ketones moderate-large (or more than 1+)    Additional Information    Negative: Unconscious or difficult to awaken    Negative: Acting confused (e.g., disoriented, slurred speech)    Negative: Very weak (e.g., can't stand)    Negative: Sounds like a life-threatening emergency to the triager    Negative: [1] Vomiting AND [2] signs of dehydration (e.g., very dry mouth, lightheaded, etc.)    Negative: [1] Blood glucose > 240 mg/dl (13 mmol/l) AND [2] rapid breathing    Negative: Blood glucose > 500 mg/dl (27.5 mmol/l)    Protocols used: DIABETES - HIGH BLOOD SUGAR-ADULT-AH

## 2019-03-17 NOTE — ED PROVIDER NOTES
[facility] ED Provider Note   Patient: Maria M Marcus  MRN #:  [id number]  Date of Visit: March 17, 2019    CC:     Chief Complaint   Patient presents with     Blood Sugar Problem     Blood sugar 369 pt normally runs 120. Nauseated.      HPI:  Maria M Marcus is a 53 year old female who presented to the emergency department with high blood sugar this evening up to 370.  Patient's blood sugars usually run between 90 and 130.  She will sometimes have low blood sugars below 70.  She has a glucose monitor that continually monitors her blood sugar and will alarm if it drops below 85.  Over the last 2 weeks there has been some adjustments to her insulin and she has been working with her coordinator to get her blood sugar under control.  Over the last 2 weeks her blood sugars have been running in the mid 200s, but it has never been up to 300.  When this level reached 370 this evening, she wanted to come in.  She has had a recent mild respiratory illness but that has been improving.  She denies any chest pain, fever, chills, urinary symptoms.  She feels otherwise well except for mild nausea.    Problem List:  Patient Active Problem List    Diagnosis Date Noted     Gastroesophageal reflux disease, esophagitis presence not specified 09/08/2017     Priority: Medium     Retinopathy due to secondary diabetes (H) 10/29/2016     Priority: Medium     Type 1 diabetes mellitus without complication (H) 11/01/2015     Priority: Medium     Overweight (BMI 25.0-29.9) 11/01/2015     Priority: Medium     Hypertension goal BP (blood pressure) < 140/90 09/29/2014     Priority: Medium     Back pain 03/23/2013     Priority: Medium     Chronic pain 11/09/2011     Priority: Medium     Related to frozen shoulder per ortho will be two years of intermittent pain, agreed to #45 percocet for 90 day supply, only get narcotics from me.         Vitiligo 07/15/2011     Priority:  Medium     Frozen shoulder syndrome 06/16/2011     Priority: Medium     Advanced directives, counseling/discussion 06/13/2011     Priority: Medium     Advance Directive Problem List Overview:   Name Relationship Phone    Primary Health Care Agent            Alternative Health Care Agent        9/14/11  Patient presents for [employer] Informational meeting. Patient given [employer] folder. Patient will complete Advance Care Directive and bring to clinic...Mary Lee RN              Cervical radiculopathy      Priority: Medium     Hyperlipidemia LDL goal <100 10/31/2010     Priority: Medium     [place] 10-year CHD Risk Score: 20% (Diabetic)   Values used to calculate score:     Age: 46 years -- Points: 3     Total Cholesterol: 174 mg/dL -- Points: 3     HDL Cholesterol: 50 mg/dL -- Points: 0     Systolic BP (treated): 128 mmHg -- Points: 3    The patient is not a smoker. -- Points: 0     The patient has a diagnosis of diabetes. -- Points: 20% Risk    The patient does not have a family history of CHD. -- Points:0     [place]      LDL goal  >= 20%  <100         Other and unspecified disc disorder of lumbar region 04/07/2007     Priority: Medium     Hypothyroidism 10/05/2004     Priority: Medium     Problem list name updated by automated process. Provider to review         Past Medical History:   Diagnosis Date     Cervical radiculopathy      Diabetic eye exam (H) 12/14/11     DISC DIS NEC/NOS-LUMBAR 4/7/2007     Frozen shoulder syndrome 6/16/2011     Hyperlipidemia LDL goal <100 10/31/2010     Hypertension 1/3/2011     Type 1 diabetes, HbA1c goal < 7% (H) dx 1967     Unspecified hypothyroidism        MEDS:     BASAGLAR 100 UNIT/ML injection   insulin glulisine (APIDRA SOLOSTAR) 100 UNIT/ML soln   amitriptyline (ELAVIL) 25 MG tablet   blood glucose monitoring (TANA CONTOUR MONITOR) meter device kit   blood glucose monitoring (TANA CONTOUR) test strip   blood glucose monitoring (SOFTCLIX) lancets    Cholecalciferol (VITAMIN D) 2000 UNITS tablet   insulin pen needle (B-D U/F) 31G X 5 MM miscellaneous   insulin syringes, disposable, U-100 0.3 ML MISC   ketorolac (TORADOL) 10 MG tablet   levothyroxine (SYNTHROID/LEVOTHROID) 75 MCG tablet   lisinopril (PRINIVIL/ZESTRIL) 10 MG tablet   omeprazole (PRILOSEC) 20 MG CR capsule   omeprazole (PRILOSEC) 20 MG DR capsule   ondansetron (ZOFRAN ODT) 4 MG ODT tab   pravastatin (PRAVACHOL) 10 MG tablet       ALLERGIES:    Allergies   Allergen Reactions     Novolog [Insulin Aspart] Swelling     Itching, rash, contact dermatitis     Humalog [Insulin Lispro] Rash     Contact dermatitis       Past Surgical History:   Procedure Laterality Date     C  DELIVERY ONLY      C-Sections x2     C NONSPECIFIC PROCEDURE      Hysterectomy - total (cervix removed but ovaries remain)     C STOMACH SURGERY PROCEDURE UNLISTED       C TOTAL ABDOM HYSTERECTOMY       COLONOSCOPY N/A 2016    Procedure: COLONOSCOPY;  Surgeon: Efren Perry MD;  Location:  GI     ENDOSCOPIC SINUS SURGERY Bilateral 2018    Procedure: Bilateral functional endoscopic maxillary antrostomies;  Surgeon: Woo Silva MD;  Location: [facility] OR     HC SACROPLASTY       LAMINECT/DISCECTOMY, CERVICAL  2007    C7-T1 hemilaminectomy, microdiscectomy, foraminotomy.     LASER YAG CAPSULOTOMY Right 10/23/2014    Procedure: LASER YAG CAPSULOTOMY;  Surgeon: Esteban Dorsey MD;  Location: [facility] OR     VITRECTOMY,STRIP EPIRETINAL MEMBRANE  09       Social History     Tobacco Use     Smoking status: Never Smoker     Smokeless tobacco: Never Used     Tobacco comment: no exposure   Substance Use Topics     Alcohol use: Yes     Comment: winex1-2/3x per yr.     Drug use: No         Review of Systems   Except as noted in HPI, all other systems were reviewed and are negative    Physical Exam     Vitals were reviewed  Patient Vitals for the past 8 hrs:   BP Temp Pulse Resp SpO2 Weight   19 0450  133/58 -- 78 -- -- --   03/17/19 0301 183/70 97.9  F (36.6  C) 90 16 99 % 73 kg (161 lb)     GENERAL APPEARANCE: Alert and oriented, no apparent distress  FACE: normal facies  EYES: Pupils are equal  HENT: normal external exam  NECK: no adenopathy or asymmetry  RESP: normal respiratory effort; clear breath sounds bilaterally  CV: regular rate and rhythm; no significant murmurs, gallops or rubs  ABD: soft, no tenderness; no rebound or guarding; bowel sounds are normal  MS: no gross deformities noted; normal muscle tone.  EXT: No calf tenderness or pitting edema  SKIN: no worrisome rash  NEURO: no facial droop; no focal deficits, speech is normal        Available Lab/Imaging Results     Results for orders placed or performed during the hospital encounter of 03/17/19 (from the past 24 hour(s))   Routine UA with microscopic   Result Value Ref Range    Color Urine Colorless     Appearance Urine Clear     Glucose Urine >499 (A) NEG^Negative mg/dL    Bilirubin Urine Negative NEG^Negative    Ketones Urine 5 (A) NEG^Negative mg/dL    Specific Gravity Urine 1.001 (L) 1.003 - 1.035    Blood Urine Moderate (A) NEG^Negative    pH Urine 6.0 5.0 - 7.0 pH    Protein Albumin Urine Negative NEG^Negative mg/dL    Urobilinogen mg/dL 0.0 0.0 - 2.0 mg/dL    Nitrite Urine Negative NEG^Negative    Leukocyte Esterase Urine Trace (A) NEG^Negative    Source Midstream Urine     WBC Urine 5 0 - 5 /HPF    RBC Urine 1 0 - 2 /HPF   CBC with platelets differential   Result Value Ref Range    WBC 7.7 4.0 - 11.0 10e9/L    RBC Count 4.13 3.8 - 5.2 10e12/L    Hemoglobin 12.1 11.7 - 15.7 g/dL    Hematocrit 37.2 35.0 - 47.0 %    MCV 90 78 - 100 fl    MCH 29.3 26.5 - 33.0 pg    MCHC 32.5 31.5 - 36.5 g/dL    RDW 13.0 10.0 - 15.0 %    Platelet Count 207 150 - 450 10e9/L    Diff Method Automated Method     % Neutrophils 63.5 %    % Lymphocytes 25.7 %    % Monocytes 6.9 %    % Eosinophils 2.9 %    % Basophils 0.7 %    % Immature Granulocytes 0.3 %     Nucleated RBCs 0 0 /100    Absolute Neutrophil 4.9 1.6 - 8.3 10e9/L    Absolute Lymphocytes 2.0 0.8 - 5.3 10e9/L    Absolute Monocytes 0.5 0.0 - 1.3 10e9/L    Absolute Basophils 0.1 0.0 - 0.2 10e9/L    Abs Immature Granulocytes 0.0 0 - 0.4 10e9/L    Absolute Nucleated RBC 0.0    Comprehensive metabolic panel   Result Value Ref Range    Sodium 136 133 - 144 mmol/L    Potassium 3.9 3.4 - 5.3 mmol/L    Chloride 100 94 - 109 mmol/L    Carbon Dioxide 24 20 - 32 mmol/L    Anion Gap 12 3 - 14 mmol/L    Glucose 336 (H) 70 - 99 mg/dL    Urea Nitrogen 14 7 - 30 mg/dL    Creatinine 0.82 0.52 - 1.04 mg/dL    GFR Estimate 81 >60 mL/min/[1.73_m2]    GFR Estimate If Black >90 >60 mL/min/[1.73_m2]    Calcium 8.5 8.5 - 10.1 mg/dL    Bilirubin Total 0.6 0.2 - 1.3 mg/dL    Albumin 3.7 3.4 - 5.0 g/dL    Protein Total 6.8 6.8 - 8.8 g/dL    Alkaline Phosphatase 109 40 - 150 U/L    ALT 25 0 - 50 U/L    AST 18 0 - 45 U/L   Troponin I   Result Value Ref Range    Troponin I ES <0.015 0.000 - 0.045 ug/L       EKG reviewed by me: Normal sinus rhythm with heart rate of 75.  Normal VA, QT and QRS intervals.  Normal axis.  Slightly short VA interval.  No acute ST-T wave changes.         Impression     Final diagnoses:   Hyperglycemia         ED Course & Medical Decision Making   Maria M Marcus is a 53 year old female who presented to the emergency department with acute hyperglycemia tonight with blood sugar readings of 370.  She never had levels this high before.  It had been running in the 250 range over the past 1-2 weeks.  She has been working with the coordinator to have her insulin readjusted.  She developed an allergy to Humulin and NovoLog, and is on another form of insulin.  She had one malfunctioning insulin pen that was causing irregular readings of her blood sugar.  Patient denies any symptoms except for some nausea and slight headache.  She has had a recent respiratory illness.  The patient's workup today reveals a normal CBC,  and troponin level.  Comprehensive metabolic panel is normal except for a glucose of 336.  Urinalysis reveals glucosuria but no signs of infection.  Patient received 4 units of insulin and her blood sugar is trending down towards 300 now.  There does not appear to be an obvious underlying etiology for her hyperglycemia.  I would like her to continue sliding scale insulin.  She is not on an insulin pump due to skin dermatitis and sensitivity to the pump tape.  Patient is stable for discharge home.  Follow-up with her diabetes coordinator on Monday.  Return to the ED at any time if new or worsening symptoms develop.             Written after-visit summary and instructions were given at the time of discharge.    Follow up Plan:   Your diabetes coordinator    Call in 2 days        Discharge Medications: No new meds       This note was dictated using electronic voice recognition software and although reviewed, may contain grammatical and spelling errors.        Caro Velasco MD  03/17/19 [id number]

## 2019-03-18 ENCOUNTER — TELEPHONE (OUTPATIENT)
Dept: ENDOCRINOLOGY | Facility: CLINIC | Age: 54
End: 2019-03-18

## 2019-03-18 DIAGNOSIS — E10.9 TYPE 1 DIABETES MELLITUS WITHOUT COMPLICATION (H): Primary | Chronic | ICD-10-CM

## 2019-03-19 NOTE — TELEPHONE ENCOUNTER
Central Prior Authorization Team   Phone: [phone]    ADDENDUM: PA request is for Apidra VIALS. Pt has had two Apridra pens fail.   Jessica Demarco RN, BSN, CDE  03/19/19 - 5:19pm.    PA Initiation     Medication: insulin glulisine (APIDRA SOLOSTAR) 100 UNIT/ML soln-PA initiated  Insurance Company: [employer] [facility] - Phone [phone] Fax [phone]  Pharmacy Filling the Rx: [facility] [address] [place] [id number] [facility]  Filling Pharmacy Phone: [phone]  Filling Pharmacy Fax:    Start Date: 3/19/2019

## 2019-03-20 NOTE — TELEPHONE ENCOUNTER
Prior Authorization Approval    Authorization Effective Date: 3/16/2019  Authorization Expiration Date: 3/16/2020  Medication: Apidra insulin VIALS - Patient has had 2 Apridra insulin pens fail, each from different lots and differnt boxes. Insulin has not come out of needle when attempting to inject insulin dose resulting hyperlgycemia.   Approved Dose/Quantity:   Reference #: [id number]   Insurance Company: [employer] - Phone [phone] Fax [phone]  Expected CoPay:       CoPay Card Available:      Foundation Assistance Needed:    Which Pharmacy is filling the prescription (Not needed for infusion/clinic administered): [facility] [address] [facility] [id number] [facility]  Pharmacy Notified: Yes  Patient Notified: No-Pharmacy will contact

## 2019-03-25 ENCOUNTER — MYC MEDICAL ADVICE (OUTPATIENT)
Dept: ENDOCRINOLOGY | Facility: CLINIC | Age: 54
End: 2019-03-25

## 2019-03-25 DIAGNOSIS — E10.9 TYPE 1 DIABETES MELLITUS WITHOUT COMPLICATION (H): Primary | Chronic | ICD-10-CM

## 2019-03-26 ENCOUNTER — MYC MEDICAL ADVICE (OUTPATIENT)
Dept: ENDOCRINOLOGY | Facility: CLINIC | Age: 54
End: 2019-03-26

## 2019-03-26 RX ORDER — CALCIUM CARB/VITAMIN D3/VIT K1 500-100-40
TABLET,CHEWABLE ORAL
Qty: 50 EACH | Refills: 3 | Status: SHIPPED | OUTPATIENT
Start: 2019-03-26 | End: 2019-06-25

## 2019-03-26 RX ORDER — INSULIN GLARGINE 100 [IU]/ML
INJECTION, SOLUTION SUBCUTANEOUS
Qty: 10 ML | Refills: 3 | Status: SHIPPED | OUTPATIENT
Start: 2019-03-26 | End: 2019-07-24

## 2019-03-26 NOTE — TELEPHONE ENCOUNTER
PA approval received from [employer], Case #[id number] cverage for Lantus begins 3/26/19 through 3/26/20, up to 45ml per month.    Marjan Almazan, [facility]  Adult Endocrinology  [facility]

## 2019-03-26 NOTE — TELEPHONE ENCOUNTER
PA completed through Covermym[employer].    KEY HVAXVA    Will await determination.    Nandini Jenkins LPN  Diabetes Clinic Coordinator   Adult Endocrinology and Pediatric Specialty Clinics  [facility]

## 2019-04-11 NOTE — PROGRESS NOTES
SUBJECTIVE:   Maria M Marcus is a 53 year old female who presents to clinic today for the following health issues:    Answers for HPI/ROS submitted by the patient on 4/12/2019   If you checked off any problems, how difficult have these problems made it for you to do your work, take care of things at home, or get along with other people?: Not difficult at all  PHQ9 TOTAL SCORE: 0  WILLIE 7 TOTAL SCORE: 0    HPI     Vaginal Symptoms  Onset: 3 days     Description:   Vaginal Discharge: clear   Itching (Pruritis): YES  Burning sensation:  YES  Odor: no     Accompanying Signs & Symptoms:  Pain with Urination: minimal  Abdominal Pain: no   Fever: no     History:   Sexually active: YES  New Partner: no   Possibility of Pregnancy:  No    Precipitating factors:   Recent Antibiotic Use: no     Therapies Tried and outcome: none    She has had yeast infections before and this feels similar. She has had vaginal itching and clear discharge for the past few days. She denies urinary frequency, dysuria, or hematuria. No recent antibiotics.     Additional history: none    Reviewed and updated as needed this visit by clinical staff      Reviewed and updated as needed this visit by Provider       ROS:  GENERAL: Denies fever, fatigue, weakness, weight gain, or weight loss.  GASTROINTESTINAL: Denies nausea, vomiting, change in appetite, abdominal pain, diarrhea, or constipation.  GENITOURINARY: +Vaginal discharge/itching. Denies increased frequency, urgency, dysuria, hematuria, or incontinence.       OBJECTIVE:     /78   Pulse 106   Temp 98.3  F (36.8  C) (Temporal)   Resp 16   Wt 70.6 kg (155 lb 9.6 oz)   SpO2 97%   BMI 32.18 kg/m    Body mass index is 32.18 kg/m .  GENERAL: healthy, alert and no distress  RESP: lungs clear to auscultation - no rales, rhonchi or wheezes  CV: regular rate and rhythm, normal S1 S2, no S3 or S4, no murmur, click or rub  ABDOMEN: soft, nontender, no hepatosplenomegaly, no masses and bowel  sounds normal. No CVA tenderness.    Results for orders placed or performed in visit on 04/12/19   UA reflex to Microscopic and Culture   Result Value Ref Range    Color Urine Yellow     Appearance Urine Clear     Glucose Urine Negative NEG^Negative mg/dL    Bilirubin Urine Negative NEG^Negative    Ketones Urine Negative NEG^Negative mg/dL    Specific Gravity Urine 1.010 1.003 - 1.035    Blood Urine Trace (A) NEG^Negative    pH Urine 5.0 5.0 - 7.0 pH    Protein Albumin Urine Negative NEG^Negative mg/dL    Urobilinogen Urine 0.2 0.2 - 1.0 EU/dL    Nitrite Urine Negative NEG^Negative    Leukocyte Esterase Urine Moderate (A) NEG^Negative    Source Unspecified Urine    Urine Microscopic   Result Value Ref Range    WBC Urine 25-50 (A) OTO5^0 - 5 /HPF    RBC Urine O - 2 OTO2^O - 2 /HPF    Squamous Epithelial /LPF Urine Few FEW^Few /LPF    Bacteria Urine Few (A) NEG^Negative /HPF   Wet prep   Result Value Ref Range    Specimen Description Vagina     Wet Prep No Trichomonas seen     Wet Prep No clue cells seen     Wet Prep Yeast seen  WBC'S seen   (A)        ASSESSMENT/PLAN:       ICD-10-CM    1. Vaginal yeast infection B37.3 UA reflex to Microscopic and Culture     Wet prep     Urine Microscopic     fluconazole (DIFLUCAN) 150 MG tablet   2. Nonspecific finding on examination of urine R82.90 Urine Culture Aerobic Bacterial       Yeast seen on self collected wet prep. UA with moderate leuks and few bacteria so will await culture as she does not have UTI symptoms. Will treat vaginal yeast infection with Diflucan for 1 dose but can repeat again in 3 days if symptoms persist.    She has follow up with endocrinology today for her diabetes.       Marcus Salgado PA-C  [facility]

## 2019-04-12 ENCOUNTER — OFFICE VISIT (OUTPATIENT)
Dept: FAMILY MEDICINE | Facility: OTHER | Age: 54
End: 2019-04-12
Payer: COMMERCIAL

## 2019-04-12 ENCOUNTER — OFFICE VISIT (OUTPATIENT)
Dept: ENDOCRINOLOGY | Facility: CLINIC | Age: 54
End: 2019-04-12
Payer: COMMERCIAL

## 2019-04-12 VITALS
WEIGHT: 155.6 LBS | TEMPERATURE: 98.3 F | BODY MASS INDEX: 32.18 KG/M2 | OXYGEN SATURATION: 97 % | HEART RATE: 106 BPM | DIASTOLIC BLOOD PRESSURE: 78 MMHG | SYSTOLIC BLOOD PRESSURE: 128 MMHG | RESPIRATION RATE: 16 BRPM

## 2019-04-12 VITALS
OXYGEN SATURATION: 97 % | HEART RATE: 100 BPM | WEIGHT: 159.5 LBS | HEIGHT: 58 IN | DIASTOLIC BLOOD PRESSURE: 78 MMHG | SYSTOLIC BLOOD PRESSURE: 170 MMHG | BODY MASS INDEX: 33.48 KG/M2

## 2019-04-12 DIAGNOSIS — R82.90 NONSPECIFIC FINDING ON EXAMINATION OF URINE: ICD-10-CM

## 2019-04-12 DIAGNOSIS — E78.5 HYPERLIPIDEMIA LDL GOAL <100: ICD-10-CM

## 2019-04-12 DIAGNOSIS — B37.31 VAGINAL YEAST INFECTION: Primary | ICD-10-CM

## 2019-04-12 DIAGNOSIS — E06.3 HYPOTHYROIDISM DUE TO HASHIMOTO'S THYROIDITIS: ICD-10-CM

## 2019-04-12 DIAGNOSIS — E10.9 TYPE 1 DIABETES MELLITUS WITHOUT COMPLICATION (H): Chronic | ICD-10-CM

## 2019-04-12 LAB
ALBUMIN UR-MCNC: NEGATIVE MG/DL
APPEARANCE UR: CLEAR
BACTERIA #/AREA URNS HPF: ABNORMAL /HPF
BILIRUB UR QL STRIP: NEGATIVE
COLOR UR AUTO: YELLOW
GLUCOSE UR STRIP-MCNC: NEGATIVE MG/DL
HBA1C MFR BLD: 6.2 % (ref 0–5.6)
HGB UR QL STRIP: ABNORMAL
KETONES UR STRIP-MCNC: NEGATIVE MG/DL
LEUKOCYTE ESTERASE UR QL STRIP: ABNORMAL
NITRATE UR QL: NEGATIVE
NON-SQ EPI CELLS #/AREA URNS LPF: ABNORMAL /LPF
PH UR STRIP: 5 PH (ref 5–7)
RBC #/AREA URNS AUTO: ABNORMAL /HPF
SOURCE: ABNORMAL
SP GR UR STRIP: 1.01 (ref 1–1.03)
SPECIMEN SOURCE: ABNORMAL
UROBILINOGEN UR STRIP-ACNC: 0.2 EU/DL (ref 0.2–1)
WBC #/AREA URNS AUTO: ABNORMAL /HPF
WET PREP SPEC: ABNORMAL

## 2019-04-12 PROCEDURE — 87086 URINE CULTURE/COLONY COUNT: CPT | Performed by: PHYSICIAN ASSISTANT

## 2019-04-12 PROCEDURE — 87210 SMEAR WET MOUNT SALINE/INK: CPT | Performed by: PHYSICIAN ASSISTANT

## 2019-04-12 PROCEDURE — 99213 OFFICE O/P EST LOW 20 MIN: CPT | Performed by: PHYSICIAN ASSISTANT

## 2019-04-12 PROCEDURE — 81001 URINALYSIS AUTO W/SCOPE: CPT | Performed by: PHYSICIAN ASSISTANT

## 2019-04-12 PROCEDURE — 36415 COLL VENOUS BLD VENIPUNCTURE: CPT | Performed by: INTERNAL MEDICINE

## 2019-04-12 PROCEDURE — 83036 HEMOGLOBIN GLYCOSYLATED A1C: CPT | Performed by: INTERNAL MEDICINE

## 2019-04-12 PROCEDURE — 99214 OFFICE O/P EST MOD 30 MIN: CPT | Performed by: INTERNAL MEDICINE

## 2019-04-12 PROCEDURE — 87088 URINE BACTERIA CULTURE: CPT | Performed by: PHYSICIAN ASSISTANT

## 2019-04-12 RX ORDER — FLUCONAZOLE 150 MG/1
150 TABLET ORAL ONCE
Qty: 2 TABLET | Refills: 0 | Status: SHIPPED | OUTPATIENT
Start: 2019-04-12 | End: 2019-04-15

## 2019-04-12 ASSESSMENT — ANXIETY QUESTIONNAIRES
2. NOT BEING ABLE TO STOP OR CONTROL WORRYING: NOT AT ALL
6. BECOMING EASILY ANNOYED OR IRRITABLE: NOT AT ALL
1. FEELING NERVOUS, ANXIOUS, OR ON EDGE: NOT AT ALL
7. FEELING AFRAID AS IF SOMETHING AWFUL MIGHT HAPPEN: NOT AT ALL
7. FEELING AFRAID AS IF SOMETHING AWFUL MIGHT HAPPEN: NOT AT ALL
GAD7 TOTAL SCORE: 0
4. TROUBLE RELAXING: NOT AT ALL
GAD7 TOTAL SCORE: 0
GAD7 TOTAL SCORE: 0
5. BEING SO RESTLESS THAT IT IS HARD TO SIT STILL: NOT AT ALL
3. WORRYING TOO MUCH ABOUT DIFFERENT THINGS: NOT AT ALL

## 2019-04-12 ASSESSMENT — MIFFLIN-ST. JEOR: SCORE: 1222.49

## 2019-04-12 ASSESSMENT — PATIENT HEALTH QUESTIONNAIRE - PHQ9
SUM OF ALL RESPONSES TO PHQ QUESTIONS 1-9: 0
SUM OF ALL RESPONSES TO PHQ QUESTIONS 1-9: 0
10. IF YOU CHECKED OFF ANY PROBLEMS, HOW DIFFICULT HAVE THESE PROBLEMS MADE IT FOR YOU TO DO YOUR WORK, TAKE CARE OF THINGS AT HOME, OR GET ALONG WITH OTHER PEOPLE: NOT DIFFICULT AT ALL

## 2019-04-12 NOTE — PROGRESS NOTES
Endocrinology Clinic Visit       Chief Complaint: Diabetes     Interval history:   No severe lows.   AM lows recognized by Dexcom, eats at 3 am to avoid lows.   No palpitation SOB  Hot flashes are new. During this time BG gets higher.   No fevers  No change in BM  No depression.     HPI:   Maria M is a 52 year old female who presents for follow up.   She has H/O type 1 diabetes diagnosed at age 2, hypothyroidism diagnosed in her 20s and vitiligo. She takes pravastatin for hyperlipidemia and lisinopril for hypertension.  Her main initial concern was recurrent hypoglycemia. INGRAM showed negative 21 hydroxylase Ab, positive TPO and negative TTG.     History of Diabetes  She was diagnosed with type 1 diabetes mellitus since he was [age] years old.  Over the years, she had developed diabetic retinopathy for which laser treatment was required.  She does not have neuropathy or nephropathy.  In the past several years, her A1c has been in good range between 6.1 and 7.5.  No macrovascular complications.    She was transitioned from basal bolus regimen to insulin pump that she has started about 3-4 days ago. She obtained a Dexcom CGM that has reduced the frequency of hypoglycemia to a great extent.     Low BG is now improved with sensor.  The major pattern was that was detected was hyperglycemia followed by hypoglycemia.  Usually she has blood sugars over 250 and uses correction and afterwards she develops hypoglycemia.    Developed significant rash around injection site from Humalog, Novolog.   This resolved with Apidra.   No new issues at this time.   Regular insulin - patient has used this in distant past.     Family History  Maria M notes that her mother had breast cancer, a nephew and an uncle had T1DM but other AI diseases do not run in family. No history of thyroid cancer.     Meds  Aspart for T slim pump.     Prevention  Lipid  LDL Cholesterol Calculated   Date Value Ref Range Status   10/27/2016 109 (H) <100 mg/dL Final      Comment:     Above desirable:  100-129 mg/dl   Borderline High:  130-159 mg/dL   High:             160-189 mg/dL   Very high:       >189 mg/dl     04/11/2016 132 (H) <100 mg/dL Final     Comment:     Above desirable:  100-129 mg/dl   Borderline High:  130-159 mg/dL   High:             160-189 mg/dL   Very high:       >189 mg/dl         Medications     HMG CoA Reductase Inhibitors    pravastatin (PRAVACHOL) 10 MG tablet    pravastatin (PRAVACHOL) 10 MG tablet          Renal  Medication (Note: This includes the hypertensive combination subclass to make sure to show all ACEI/ARB's.)     ACE Inhibitors Sig    lisinopril (PRINIVIL/ZESTRIL) 10 MG tablet TAKE ONE TABLET BY MOUTH ONCE DAILY            Weight  Wt Readings from Last 4 Encounters:   07/28/17 69.9 kg (154 lb)   03/01/17 67.4 kg (148 lb 9.6 oz)   02/09/17 69.4 kg (153 lb)   10/28/16 66.2 kg (146 lb)     .    Meter Download Summary:   Only few days recorded for calibration.   Diet:  she usually counts her carbs in carb choices   Exercise  no scheduled exercise     Weight trend  Wt Readings from Last 4 Encounters:   07/28/17 69.9 kg (154 lb)   03/01/17 67.4 kg (148 lb 9.6 oz)   02/09/17 69.4 kg (153 lb)   10/28/16 66.2 kg (146 lb)       A1c today is  6.6  Lab Results   Component Value Date    A1C 6.1 07/28/2017    A1C 6.9 10/27/2016    A1C 6.1 04/11/2016    A1C 6.0 10/09/2015    A1C 6.8 05/26/2015          Allergies   Allergen Reactions     No Known Drug Allergies      Current Outpatient Medications   Medication     amitriptyline (ELAVIL) 25 MG tablet     BASAGLAR 100 UNIT/ML injection     blood glucose monitoring (TANA CONTOUR MONITOR) meter device kit     blood glucose monitoring (TANA CONTOUR) test strip     blood glucose monitoring (SOFTCLIX) lancets     Cholecalciferol (VITAMIN D) 2000 UNITS tablet     fluconazole (DIFLUCAN) 150 MG tablet     insulin glulisine (APIDRA) 100 UNIT/ML injection     insulin pen needle (B-D U/F) 31G X 5 MM miscellaneous      "insulin syringe 31G X 16\" 0.3 ML MISC     insulin syringes, disposable, U-100 0.3 ML MISC     levothyroxine (SYNTHROID/LEVOTHROID) 75 MCG tablet     lisinopril (PRINIVIL/ZESTRIL) 10 MG tablet     omeprazole (PRILOSEC) 20 MG CR capsule     pravastatin (PRAVACHOL) 10 MG tablet     insulin glargine (LANTUS VIAL) 100 UNIT/ML vial     ketorolac (TORADOL) 10 MG tablet     omeprazole (PRILOSEC) 20 MG DR capsule     No current facility-administered medications for this visit.        Review of Systems     Constitutional: wt gain  Head: no headache   Eye: no vision change/ loss of peripheral vision.   ENT: No throat congestion.   Respiratory: no cough   Cardiovascular: no chest pain or tachycardia  Gastrointestinal: Negative for vomiting, abdominal pain and diarrhea.  Genitourinary: No bladder problems.  Musculoskeletal: Negative for myalgias. No weakness.  Neurological: Negative for seizures and headaches.  Psychiatric/Behavioral: Negative for behavioral problem and dysphoric mood.    PMH as above.   PSH    Past Surgical History:   Procedure Laterality Date     C  DELIVERY ONLY      C-Sections x2     C NONSPECIFIC PROCEDURE      Hysterectomy - total (cervix removed but ovaries remain)     C STOMACH SURGERY PROCEDURE UNLISTED       C TOTAL ABDOM HYSTERECTOMY       COLONOSCOPY N/A 2016    Procedure: COLONOSCOPY;  Surgeon: Efren Perry MD;  Location:  GI     HC SACROPLASTY       LAMINECT/DISCECTOMY, CERVICAL  2007    C7-T1 hemilaminectomy, microdiscectomy, foraminotomy.     LASER YAG CAPSULOTOMY Right 10/23/2014    Procedure: LASER YAG CAPSULOTOMY;  Surgeon: Esteban Dorsey MD;  Location:  OR     VITRECTOMY,STRIP EPIRETINAL MEMBRANE  09       Family History   Problem Relation Age of Onset     Hypertension Maternal Grandfather      EYE* Maternal Grandmother      Blood Disease Maternal Grandmother      Eye Disorder Maternal Grandmother      maccular degeneration     OSTEOPOROSIS " "Maternal Grandmother      Lipids Father      GASTROINTESTINAL DISEASE Father      ulcers     HEART DISEASE Father      Cardiovascular Father      heart attack     Hypertension Paternal Grandmother      Breast Cancer Mother      dx age 73 - terminal - mother had first mammo at this age     DIABETES Paternal Uncle      Type I       Social History     Social History     Marital status:      Spouse name: Ra     Number of children: 2     Years of education: 12     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     Drug use: No     Sexual activity: Yes     Partners: Male     Birth control/ protection: Surgical, Female Surgical      Comment: hysterectomy     Other Topics Concern     Parent/Sibling W/ Cabg, Mi Or Angioplasty Before 65f 55m? No     Social History Narrative       Objective:   /78 (BP Location: Left arm, Patient Position: Chair, Cuff Size: Adult Regular)   Pulse 100   Ht 1.48 m (4' 10.27\")   Wt 72.3 kg (159 lb 8 oz)   SpO2 97%   BMI 33.03 kg/m     Constitutional: obese.   EYES: anicteric, normal extra-ocular movements, no lid lag or retraction   HEENT: Mouth/Throat: Mucous membrane is moist. Oropharynx is clear. No adenopathy. Normal thyroid   Cardiovascular: RRR, S1, S2 normal. No m/g/r   Pulmonary/Chest: CTAB. No wheezing or rales   Abdominal: +BS. Nontender to palpation. No organomegaly present.  Neurological: Alert. Normal speech  Extremities: No clubbing, cyanosis or inflammation   Skin: Vitiligo  Feet: Diminished vibratory sense bilateral and normal monofilament test. Due 4/2020  Lymphatic: no cervical lymphadenopathy.  Psychological: appropriate mood     In House Labs:   Lab Results   Component Value Date    A1C 6.1 07/28/2017    A1C 6.9 10/27/2016    A1C 6.1 04/11/2016    A1C 6.0 10/09/2015    A1C 6.8 " 05/26/2015       TSH   Date Value Ref Range Status   10/27/2016 0.90 0.40 - 4.00 mU/L Final   05/26/2015 0.06 (L) 0.40 - 4.00 mU/L Final   03/17/2014 0.78 0.4 - 5.0 mU/L Final   01/18/2013 0.65 0.4 - 5.0 mU/L Final   08/20/2012 0.60 0.4 - 5.0 mU/L Final     T4 Free   Date Value Ref Range Status   11/09/2011 1.69 0.70 - 1.85 ng/dL Final       Creatinine   Date Value Ref Range Status   10/27/2016 1.00 0.52 - 1.04 mg/dL Final   ]    Recent Labs   Lab Test  10/27/16   0748  04/11/16   1013  05/26/15   0738  03/17/14   0735   CHOL  186  210*  167  165   HDL  56  63  46*  58   LDL  109*  132*  84  90   TRIG  104  77  183*  82   CHOLHDLRATIO   --    --   3.6  3.0       Work up  (8/17)   21 OH ab normal - this was done due to frequent lows.   No celiac disease antibodies (gluten sensitivity)  positive TPO.       Continuous glucose monitoring physician interpretation  Sensory use 30/30 days     low alert 85  High alert 280    Average blood sugar 141, standard deviation 49  72% within range, 25% above range and 3% below range  Minimal risk of hypoglycemia, 1%  Calibration 0    Major patterns  Early AM hypoglycemia after snacking at HS and using Apidra.   Post correction hypoglycemia at night, with rise early in am -- could be dagmar phenomena but also eats to correct lows which could be some of these rise in bg.      Labs:   A1c was 6.2      Assessment/Treatment Plan:      Maria M Marcus is a 52 year old year old female with    1. Type 1 Diabetes with  retinopathy and mild neuropathy and hypoglycemia unawareness on CGM  2.  Left side numbness in fourth and fifth finger   3.  Hypothyroidism   4.  Vitiligo   5.  Possible scoliosis in a postmenopausal female.      Type 1 Diabetes with  retinopathy and mild neuropathy   Barriers to achieving glycemic goals  Overcorrection: lows are less frequent with current regimen of 1 per 20     Hypoglycemia unawareness and frequent hypoglycemia.  Continue Dexcom.    Basal to avoid AM lows  to 4 units BID.   low alert to 85 to avoid lows.     Low A1c is likely driven by significant low values.   Hypoglycemia has improved compared to last visit.     Karin phenomena and early AM lows.   Less correction at hs to avoid overnight lows.     Hypothyroidism on LT4      Vitiligo: She has multiple autoimmune disease negative celiac disease, ACTH 21 OH levels    Possible kyphosis:   Plan on DXA in future.     Hot flashes. Observe for now. BG higher during these episodes.     Return to clinic in 6 months.      Sidney Jarquin MD  [id number]  Endocrinology Service

## 2019-04-12 NOTE — NURSING NOTE
"Maria M Marcus's goals for this visit include:   Chief Complaint   Patient presents with     Diabetes       She requests these members of her care team be copied on today's visit information: Yes    PCP: Marcus Salgado    Referring Provider:  No referring provider defined for this encounter.    /78 (BP Location: Left arm, Patient Position: Chair, Cuff Size: Adult Regular)   Pulse 100   Ht 1.48 m (4' 10.27\")   Wt 72.3 kg (159 lb 8 oz)   SpO2 97%   BMI 33.03 kg/m      Do you need any medication refills at today's visit? No    "

## 2019-04-12 NOTE — Clinical Note
Please call patient and let her know that some labs are due. She can do them at any time this month.

## 2019-04-12 NOTE — PATIENT INSTRUCTIONS
You have a yeast infection.  Will prescribe Diflucan to take once and can repeat in 3 days.  If your urine culture grows any bacteria, I will let you know.

## 2019-04-12 NOTE — LETTER
4/12/2019         RE: Maria M Marcus  [address] [place] [address]        Dear Colleague,    Thank you for referring your patient, Maria M Marcus, to the [facility]. Please see a copy of my visit note below.    Endocrinology Clinic Visit       Chief Complaint: Diabetes     Interval history:   No severe lows.   AM lows recognized by Dexcom, eats at 3 am to avoid lows.   No palpitation SOB  Hot flashes are new. During this time BG gets higher.   No fevers  No change in BM  No depression.     HPI:   Maria M is a 52 year old female who presents for follow up.   She has H/O type 1 diabetes diagnosed at age 2, hypothyroidism diagnosed in her 20s and vitiligo. She takes pravastatin for hyperlipidemia and lisinopril for hypertension.  Her main initial concern was recurrent hypoglycemia. INGRAM showed negative 21 hydroxylase Ab, positive TPO and negative TTG.     History of Diabetes  She was diagnosed with type 1 diabetes mellitus since he was [age] years old.  Over the years, she had developed diabetic retinopathy for which laser treatment was required.  She does not have neuropathy or nephropathy.  In the past several years, her A1c has been in good range between 6.1 and 7.5.  No macrovascular complications.    She was transitioned from basal bolus regimen to insulin pump that she has started about 3-4 days ago. She obtained a Dexcom CGM that has reduced the frequency of hypoglycemia to a great extent.     Low BG is now improved with sensor.  The major pattern was that was detected was hyperglycemia followed by hypoglycemia.  Usually she has blood sugars over 250 and uses correction and afterwards she develops hypoglycemia.    Developed significant rash around injection site from Humalog, Novolog.   This resolved with Apidra.   No new issues at this time.   Regular insulin - patient has used this in distant past.     Family History  Maria M notes that her mother had breast cancer, a nephew  and an uncle had T1DM but other AI diseases do not run in family. No history of thyroid cancer.     Meds  Aspart for T slim pump.     Prevention  Lipid  LDL Cholesterol Calculated   Date Value Ref Range Status   10/27/2016 109 (H) <100 mg/dL Final     Comment:     Above desirable:  100-129 mg/dl   Borderline High:  130-159 mg/dL   High:             160-189 mg/dL   Very high:       >189 mg/dl     04/11/2016 132 (H) <100 mg/dL Final     Comment:     Above desirable:  100-129 mg/dl   Borderline High:  130-159 mg/dL   High:             160-189 mg/dL   Very high:       >189 mg/dl         Medications     HMG CoA Reductase Inhibitors    pravastatin (PRAVACHOL) 10 MG tablet    pravastatin (PRAVACHOL) 10 MG tablet          Renal  Medication (Note: This includes the hypertensive combination subclass to make sure to show all ACEI/ARB's.)     ACE Inhibitors Sig    lisinopril (PRINIVIL/ZESTRIL) 10 MG tablet TAKE ONE TABLET BY MOUTH ONCE DAILY            Weight  Wt Readings from Last 4 Encounters:   07/28/17 69.9 kg (154 lb)   03/01/17 67.4 kg (148 lb 9.6 oz)   02/09/17 69.4 kg (153 lb)   10/28/16 66.2 kg (146 lb)     .    Meter Download Summary:   Only few days recorded for calibration.   Diet:  she usually counts her carbs in carb choices   Exercise  no scheduled exercise     Weight trend  Wt Readings from Last 4 Encounters:   07/28/17 69.9 kg (154 lb)   03/01/17 67.4 kg (148 lb 9.6 oz)   02/09/17 69.4 kg (153 lb)   10/28/16 66.2 kg (146 lb)       A1c today is  6.6  Lab Results   Component Value Date    A1C 6.1 07/28/2017    A1C 6.9 10/27/2016    A1C 6.1 04/11/2016    A1C 6.0 10/09/2015    A1C 6.8 05/26/2015          Allergies   Allergen Reactions     No Known Drug Allergies      Current Outpatient Medications   Medication     amitriptyline (ELAVIL) 25 MG tablet     BASAGLAR 100 UNIT/ML injection     blood glucose monitoring (AdVolume CONTOUR MONITOR) meter device kit     blood glucose monitoring (TANA CONTOUR) test strip      "blood glucose monitoring (SOFTCLIX) lancets     Cholecalciferol (VITAMIN D) 2000 UNITS tablet     fluconazole (DIFLUCAN) 150 MG tablet     insulin glulisine (APIDRA) 100 UNIT/ML injection     insulin pen needle (B-D U/F) 31G X 5 MM miscellaneous     insulin syringe 31G X 5/16\" 0.3 ML MISC     insulin syringes, disposable, U-100 0.3 ML MISC     levothyroxine (SYNTHROID/LEVOTHROID) 75 MCG tablet     lisinopril (PRINIVIL/ZESTRIL) 10 MG tablet     omeprazole (PRILOSEC) 20 MG CR capsule     pravastatin (PRAVACHOL) 10 MG tablet     insulin glargine (LANTUS VIAL) 100 UNIT/ML vial     ketorolac (TORADOL) 10 MG tablet     omeprazole (PRILOSEC) 20 MG DR capsule     No current facility-administered medications for this visit.        Review of Systems     Constitutional: wt gain  Head: no headache   Eye: no vision change/ loss of peripheral vision.   ENT: No throat congestion.   Respiratory: no cough   Cardiovascular: no chest pain or tachycardia  Gastrointestinal: Negative for vomiting, abdominal pain and diarrhea.  Genitourinary: No bladder problems.  Musculoskeletal: Negative for myalgias. No weakness.  Neurological: Negative for seizures and headaches.  Psychiatric/Behavioral: Negative for behavioral problem and dysphoric mood.    PMH as above.   PSH    Past Surgical History:   Procedure Laterality Date     C  DELIVERY ONLY      C-Sections x2     C NONSPECIFIC PROCEDURE      Hysterectomy - total (cervix removed but ovaries remain)     C STOMACH SURGERY PROCEDURE UNLISTED       C TOTAL ABDOM HYSTERECTOMY       COLONOSCOPY N/A 2016    Procedure: COLONOSCOPY;  Surgeon: Efren Perry MD;  Location:  GI     HC SACROPLASTY       LAMINECT/DISCECTOMY, CERVICAL  2007    C7-T1 hemilaminectomy, microdiscectomy, foraminotomy.     LASER YAG CAPSULOTOMY Right 10/23/2014    Procedure: LASER YAG CAPSULOTOMY;  Surgeon: Esteban Dorsey MD;  Location:  OR     VITRECTOMY,STRIP EPIRETINAL MEMBRANE  09 " "      Family History   Problem Relation Age of Onset     Hypertension Maternal Grandfather      EYE* Maternal Grandmother      Blood Disease Maternal Grandmother      Eye Disorder Maternal Grandmother      maccular degeneration     OSTEOPOROSIS Maternal Grandmother      Lipids Father      GASTROINTESTINAL DISEASE Father      ulcers     HEART DISEASE Father      Cardiovascular Father      heart attack     Hypertension Paternal Grandmother      Breast Cancer Mother      dx age 73 - terminal - mother had first mammo at this age     DIABETES Paternal Uncle      Type I       Social History     Social History     Marital status:      Spouse name: Ra     Number of children: 2     Years of education: 12     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     Drug use: No     Sexual activity: Yes     Partners: Male     Birth control/ protection: Surgical, Female Surgical      Comment: hysterectomy     Other Topics Concern     Parent/Sibling W/ Cabg, Mi Or Angioplasty Before 65f 55m? No     Social History Narrative       Objective:   /78 (BP Location: Left arm, Patient Position: Chair, Cuff Size: Adult Regular)   Pulse 100   Ht 1.48 m (4' 10.27\")   Wt 72.3 kg (159 lb 8 oz)   SpO2 97%   BMI 33.03 kg/m      Constitutional: obese.   EYES: anicteric, normal extra-ocular movements, no lid lag or retraction   HEENT: Mouth/Throat: Mucous membrane is moist. Oropharynx is clear. No adenopathy. Normal thyroid   Cardiovascular: RRR, S1, S2 normal. No m/g/r   Pulmonary/Chest: CTAB. No wheezing or rales   Abdominal: +BS. Nontender to palpation. No organomegaly present.  Neurological: Alert. Normal speech  Extremities: No clubbing, cyanosis or inflammation   Skin: Vitiligo  Feet: Diminished vibratory sense bilateral and normal monofilament test. Due " 4/2020  Lymphatic: no cervical lymphadenopathy.  Psychological: appropriate mood     In House Labs:   Lab Results   Component Value Date    A1C 6.1 07/28/2017    A1C 6.9 10/27/2016    A1C 6.1 04/11/2016    A1C 6.0 10/09/2015    A1C 6.8 05/26/2015       TSH   Date Value Ref Range Status   10/27/2016 0.90 0.40 - 4.00 mU/L Final   05/26/2015 0.06 (L) 0.40 - 4.00 mU/L Final   03/17/2014 0.78 0.4 - 5.0 mU/L Final   01/18/2013 0.65 0.4 - 5.0 mU/L Final   08/20/2012 0.60 0.4 - 5.0 mU/L Final     T4 Free   Date Value Ref Range Status   11/09/2011 1.69 0.70 - 1.85 ng/dL Final       Creatinine   Date Value Ref Range Status   10/27/2016 1.00 0.52 - 1.04 mg/dL Final   ]    Recent Labs   Lab Test  10/27/16   0748  04/11/16   1013  05/26/15   0738  03/17/14   0735   CHOL  186  210*  167  165   HDL  56  63  46*  58   LDL  109*  132*  84  90   TRIG  104  77  183*  82   CHOLHDLRATIO   --    --   3.6  3.0       Work up  (8/17)   21 OH ab normal - this was done due to frequent lows.   No celiac disease antibodies (gluten sensitivity)  positive TPO.       Continuous glucose monitoring physician interpretation  Sensory use 30/30 days     low alert 85  High alert 280    Average blood sugar 141, standard deviation 49  72% within range, 25% above range and 3% below range  Minimal risk of hypoglycemia, 1%  Calibration 0    Major patterns  Early AM hypoglycemia after snacking at HS and using Apidra.   Post correction hypoglycemia at night, with rise early in am -- could be dagmar phenomena but also eats to correct lows which could be some of these rise in bg.      Labs:   A1c was 6.2      Assessment/Treatment Plan:      Maria M Marcus is a 52 year old year old female with    1. Type 1 Diabetes with  retinopathy and mild neuropathy and hypoglycemia unawareness on CGM  2.  Left side numbness in fourth and fifth finger   3.  Hypothyroidism   4.  Vitiligo   5.  Possible scoliosis in a postmenopausal female.      Type 1 Diabetes with   retinopathy and mild neuropathy   Barriers to achieving glycemic goals  Overcorrection: lows are less frequent with current regimen of 1 per 20     Hypoglycemia unawareness and frequent hypoglycemia.  Continue Dexcom.    Basal to avoid AM lows to 4 units BID.   low alert to 85 to avoid lows.     Low A1c is likely driven by significant low values.   Hypoglycemia has improved compared to last visit.     Karin phenomena and early AM lows.   Less correction at hs to avoid overnight lows.     Hypothyroidism on LT4      Vitiligo: She has multiple autoimmune disease negative celiac disease, ACTH 21 OH levels    Possible kyphosis:   Plan on DXA in future.     Hot flashes. Observe for now. BG higher during these episodes.     Return to clinic in 6 months.      Sidney Jarquin MD  [id number]  Endocrinology Service        Again, thank you for allowing me to participate in the care of your patient.        Sincerely,        Sidney Jarquin MD

## 2019-04-12 NOTE — ASSESSMENT & PLAN NOTE
LT4 88 mcg daily  We will continue levothyroxine, check TSH levels on FU  Mild TSH fluctuation over time.

## 2019-04-12 NOTE — NURSING NOTE
DPS form completed and faxed to [phone]. Copy given to patient.    Marjan Almazan CMA  Adult Endocrinology  [facility]

## 2019-04-13 LAB
BACTERIA SPEC CULT: ABNORMAL
Lab: ABNORMAL
SPECIMEN SOURCE: ABNORMAL

## 2019-04-13 ASSESSMENT — ANXIETY QUESTIONNAIRES: GAD7 TOTAL SCORE: 0

## 2019-04-13 ASSESSMENT — PATIENT HEALTH QUESTIONNAIRE - PHQ9: SUM OF ALL RESPONSES TO PHQ QUESTIONS 1-9: 0

## 2019-04-15 ENCOUNTER — MYC MEDICAL ADVICE (OUTPATIENT)
Dept: FAMILY MEDICINE | Facility: OTHER | Age: 54
End: 2019-04-15

## 2019-04-15 ENCOUNTER — TELEPHONE (OUTPATIENT)
Dept: FAMILY MEDICINE | Facility: OTHER | Age: 54
End: 2019-04-15

## 2019-04-15 DIAGNOSIS — A49.1 INFECTION DUE TO STREPTOCOCCUS PYOGENES: Primary | ICD-10-CM

## 2019-04-15 DIAGNOSIS — B37.31 VAGINAL YEAST INFECTION: ICD-10-CM

## 2019-04-15 DIAGNOSIS — N30.00 ACUTE CYSTITIS WITHOUT HEMATURIA: ICD-10-CM

## 2019-04-15 RX ORDER — FLUCONAZOLE 150 MG/1
150 TABLET ORAL ONCE
Qty: 2 TABLET | Refills: 0 | Status: SHIPPED | OUTPATIENT
Start: 2019-04-15 | End: 2019-06-23

## 2019-04-15 RX ORDER — AMOXICILLIN 875 MG
875 TABLET ORAL 2 TIMES DAILY
Qty: 20 TABLET | Refills: 0 | Status: SHIPPED | OUTPATIENT
Start: 2019-04-15 | End: 2019-06-23

## 2019-04-15 NOTE — TELEPHONE ENCOUNTER
Please call and notify patient that her urine actually grew out a bacteria called strep pyogenes. Will treat with a 10 day course of amoxicillin and I recommend she take a probiotic while on this. Will also refill Diflucan to take if yeast infection returns which is more common after antibiotics. Thanks.    Marcus Salgado PA-C

## 2019-04-15 NOTE — TELEPHONE ENCOUNTER
Called and spoke with patient regarding lab results.   Patient verbalized understanding.  Jennifer Prince CMA ([facility])

## 2019-04-16 ENCOUNTER — MYC MEDICAL ADVICE (OUTPATIENT)
Dept: ENDOCRINOLOGY | Facility: CLINIC | Age: 54
End: 2019-04-16

## 2019-04-23 NOTE — TELEPHONE ENCOUNTER
Chart notes and Dexcom reports faxed to [employer] [phone].    Nandini Jenkins LPN  Diabetes Clinic Coordinator   Adult Endocrinology and Pediatric Specialty Clinics  [facility]

## 2019-04-25 DIAGNOSIS — E10.9 TYPE 1 DIABETES MELLITUS WITHOUT COMPLICATION (H): Chronic | ICD-10-CM

## 2019-04-25 DIAGNOSIS — E06.3 HYPOTHYROIDISM DUE TO HASHIMOTO'S THYROIDITIS: ICD-10-CM

## 2019-04-25 LAB
ALBUMIN SERPL-MCNC: 3.7 G/DL (ref 3.4–5)
ALT SERPL W P-5'-P-CCNC: 18 U/L (ref 0–50)
ANION GAP SERPL CALCULATED.3IONS-SCNC: 8 MMOL/L (ref 3–14)
BUN SERPL-MCNC: 8 MG/DL (ref 7–30)
CALCIUM SERPL-MCNC: 8.4 MG/DL (ref 8.5–10.1)
CHLORIDE SERPL-SCNC: 102 MMOL/L (ref 94–109)
CHOLEST SERPL-MCNC: 181 MG/DL
CK SERPL-CCNC: 61 U/L (ref 30–225)
CO2 SERPL-SCNC: 27 MMOL/L (ref 20–32)
CREAT SERPL-MCNC: 0.93 MG/DL (ref 0.52–1.04)
CREAT UR-MCNC: 41 MG/DL
GFR SERPL CREATININE-BSD FRML MDRD: 70 ML/MIN/{1.73_M2}
GLUCOSE SERPL-MCNC: 290 MG/DL (ref 70–99)
HBA1C MFR BLD: 6.3 % (ref 0–5.6)
HCT VFR BLD AUTO: 39 % (ref 35–47)
HDLC SERPL-MCNC: 47 MG/DL
LDLC SERPL CALC-MCNC: 105 MG/DL
MICROALBUMIN UR-MCNC: <5 MG/L
MICROALBUMIN/CREAT UR: NORMAL MG/G CR (ref 0–25)
NONHDLC SERPL-MCNC: 134 MG/DL
PHOSPHATE SERPL-MCNC: 3.3 MG/DL (ref 2.5–4.5)
POTASSIUM SERPL-SCNC: 4.7 MMOL/L (ref 3.4–5.3)
SODIUM SERPL-SCNC: 137 MMOL/L (ref 133–144)
TRIGL SERPL-MCNC: 143 MG/DL
TSH SERPL DL<=0.005 MIU/L-ACNC: 3.36 MU/L (ref 0.4–4)

## 2019-04-25 PROCEDURE — 80069 RENAL FUNCTION PANEL: CPT | Performed by: INTERNAL MEDICINE

## 2019-04-25 PROCEDURE — 84443 ASSAY THYROID STIM HORMONE: CPT | Performed by: INTERNAL MEDICINE

## 2019-04-25 PROCEDURE — 36415 COLL VENOUS BLD VENIPUNCTURE: CPT | Performed by: FAMILY MEDICINE

## 2019-04-25 PROCEDURE — 80061 LIPID PANEL: CPT | Performed by: INTERNAL MEDICINE

## 2019-04-25 PROCEDURE — 84460 ALANINE AMINO (ALT) (SGPT): CPT | Performed by: INTERNAL MEDICINE

## 2019-04-25 PROCEDURE — 82043 UR ALBUMIN QUANTITATIVE: CPT | Performed by: INTERNAL MEDICINE

## 2019-04-25 PROCEDURE — 82550 ASSAY OF CK (CPK): CPT | Performed by: INTERNAL MEDICINE

## 2019-04-25 PROCEDURE — 83036 HEMOGLOBIN GLYCOSYLATED A1C: CPT | Performed by: INTERNAL MEDICINE

## 2019-04-25 PROCEDURE — 85014 HEMATOCRIT: CPT | Performed by: INTERNAL MEDICINE

## 2019-04-26 DIAGNOSIS — E10.9 TYPE 1 DIABETES MELLITUS WITHOUT COMPLICATION (H): ICD-10-CM

## 2019-04-26 DIAGNOSIS — E78.5 HYPERLIPIDEMIA LDL GOAL <100: ICD-10-CM

## 2019-04-26 RX ORDER — PRAVASTATIN SODIUM 10 MG
TABLET ORAL
Qty: 90 TABLET | Refills: 3 | Status: SHIPPED | OUTPATIENT
Start: 2019-04-26 | End: 2020-05-12

## 2019-04-26 RX ORDER — LANCETS
EACH MISCELLANEOUS
Qty: 100 EACH | Refills: 5 | Status: SHIPPED | OUTPATIENT
Start: 2019-04-26 | End: 2020-07-21

## 2019-04-26 NOTE — TELEPHONE ENCOUNTER
Prescription approved per [facility] Refill Protocol.  Asim Holloway, RN, BSN

## 2019-04-26 NOTE — TELEPHONE ENCOUNTER
"Requested Prescriptions   Pending Prescriptions Disp Refills     pravastatin (PRAVACHOL) 10 MG tablet [Pharmacy Med Name: PRAVASTATIN 10MG    TAB] 30 tablet 11     Sig: TAKE 1 TABLET BY MOUTH ONCE DAILY       Statins Protocol Passed - 4/26/2019  3:09 PM        Passed - LDL on file in past 12 months     Recent Labs   Lab Test 04/25/19  0748   *             Passed - No abnormal creatine kinase in past 12 months     Recent Labs   Lab Test 04/25/19  0748   CKT 61                Passed - Recent (12 mo) or future (30 days) visit within the authorizing provider's specialty     Patient had office visit in the last 12 months or has a visit in the next 30 days with authorizing provider or within the authorizing provider's specialty.  See \"Patient Info\" tab in inbasket, or \"Choose Columns\" in Meds & Orders section of the refill encounter.              Passed - Medication is active on med list        Passed - Patient is age 18 or older        Passed - No active pregnancy on record        Passed - No positive pregnancy test in past 12 months        Prescription approved per [facility] Refill Protocol.  Asim Holloway, RN, BSN        "

## 2019-04-26 NOTE — RESULT ENCOUNTER NOTE
Dear Maria M,     Here are your test results. Normal kidney number with no proteins in the urine.   Normal electrolytes and calcium   Hematocrit was normal A1c 6.3 indicating excellent control.   Normal liver enzymes.     Best regards,   Sidney Jarquin MD  [id number]  Endocrinology [facility]

## 2019-05-09 ENCOUNTER — TRANSFERRED RECORDS (OUTPATIENT)
Dept: HEALTH INFORMATION MANAGEMENT | Facility: CLINIC | Age: 54
End: 2019-05-09

## 2019-05-20 DIAGNOSIS — M54.12 CERVICAL RADICULOPATHY: ICD-10-CM

## 2019-05-20 DIAGNOSIS — G89.4 CHRONIC PAIN SYNDROME: ICD-10-CM

## 2019-05-21 NOTE — TELEPHONE ENCOUNTER
Amitriptyline    BP Readings from Last 3 Encounters:   04/12/19 170/78   04/12/19 128/78   03/17/19 133/88     Routing refill request to provider for review/approval because:  Labs out of range:  B/P    Sophia Celeste, RN, BSN

## 2019-06-23 ENCOUNTER — OFFICE VISIT (OUTPATIENT)
Dept: URGENT CARE | Facility: RETAIL CLINIC | Age: 54
End: 2019-06-23
Payer: COMMERCIAL

## 2019-06-23 VITALS
TEMPERATURE: 97.9 F | OXYGEN SATURATION: 99 % | HEART RATE: 84 BPM | SYSTOLIC BLOOD PRESSURE: 137 MMHG | DIASTOLIC BLOOD PRESSURE: 78 MMHG

## 2019-06-23 DIAGNOSIS — J06.9 VIRAL URI WITH COUGH: Primary | ICD-10-CM

## 2019-06-23 PROCEDURE — 99213 OFFICE O/P EST LOW 20 MIN: CPT | Performed by: PHYSICIAN ASSISTANT

## 2019-06-23 RX ORDER — FLUCONAZOLE 150 MG/1
TABLET ORAL
Refills: 0 | COMMUNITY
Start: 2019-04-15 | End: 2019-06-23

## 2019-06-23 RX ORDER — HYDROCODONE BITARTRATE AND ACETAMINOPHEN 5; 325 MG/1; MG/1
TABLET ORAL
Refills: 0 | COMMUNITY
Start: 2018-12-04 | End: 2019-06-23

## 2019-06-23 RX ORDER — ONDANSETRON 4 MG/1
TABLET, ORALLY DISINTEGRATING ORAL
Refills: 0 | COMMUNITY
Start: 2018-12-04 | End: 2019-06-23

## 2019-06-23 RX ORDER — INSULIN GLULISINE 100 [IU]/ML
INJECTION, SOLUTION SUBCUTANEOUS
Refills: 4 | COMMUNITY
Start: 2019-02-20 | End: 2019-06-23

## 2019-06-23 RX ORDER — BENZONATATE 100 MG/1
CAPSULE ORAL
Qty: 30 CAPSULE | Refills: 0 | Status: SHIPPED | OUTPATIENT
Start: 2019-06-23 | End: 2019-09-13

## 2019-06-23 NOTE — PATIENT INSTRUCTIONS
Tessalon Perles- Take 1-2 capsules by mouth 3 times daily as needed for cough.  Rest! Your body needs more rest to heal.  Drink plenty of fluids (warm fluids like tea or soup are soothing and reduce cough)  Sit in the bathroom with a hot shower running and breathe in the steam.  Honey may soothe your sore throat and help manage your cough- may take straight or in warm water with lemon juice.  Avoid smoke (cigarettes, bonfires, fireplace, wood burning stoves).  Mucinex or Robitussin (guiafenesin) thin mucus and may help it to loosen more quickly  Good handwashing is the best way to prevent spread of germs  Present to emergency room if you develop trouble breathing, swallowing or cough-up blood.  Follow up with your primary care provider if symptoms worsen or fail to improve as expected.

## 2019-06-23 NOTE — PROGRESS NOTES
"Chief Complaint   Patient presents with     Cough     3 days; productive; discomfort in chest with cough     Nasal Congestion     SUBJECTIVE:  Maria M Marcus is a 54 year old female who presents to the clinic today with a chief complaint of cough  for 3 days.  Her cough is described as productive and discomfort in her chest when she coughs.  The patient's symptoms are moderate and stable.  Associated symptoms include nasal congestion. Denies chills, sweats.  The patient's symptoms are exacerbated by no particular triggers.  Patient has been using tea and peppermint oil to improve symptoms.  Predisposing factors include: Type 1 Diabetic. 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 and now he's on amoxicillin.\"    Past Medical History:   Diagnosis Date     Cervical radiculopathy      Diabetic eye exam (H) 12/14/11     DISC DIS NEC/NOS-LUMBAR 4/7/2007     Frozen shoulder syndrome 6/16/2011     Hyperlipidemia LDL goal <100 10/31/2010     Hypertension 1/3/2011     Type 1 diabetes, HbA1c goal < 7% (H) dx 1967     Unspecified hypothyroidism      Current Outpatient Medications   Medication Sig Dispense Refill     amitriptyline (ELAVIL) 25 MG tablet TAKE 1 TABLET BY MOUTH AT BEDTIME 90 tablet 1     benzonatate (TESSALON) 100 MG capsule Take 1-2 capsules by mouth up to 3 times daily as needed for cough. 30 capsule 0     insulin glargine (LANTUS VIAL) 100 UNIT/ML vial Inject 5 units in AM and 4 units in PM 10 mL 3     insulin glulisine (APIDRA) 100 UNIT/ML injection Inject 1 unit per 20 gram carb with meals. Total daily dose 30 units 30 mL 3     levothyroxine (SYNTHROID/LEVOTHROID) 75 MCG tablet TAKE 1 TABLET BY MOUTH ONCE DAILY 90 tablet 1     lisinopril (PRINIVIL/ZESTRIL) 10 MG tablet TAKE 1 TABLET BY MOUTH ONCE DAILY 90 tablet 3     omeprazole (PRILOSEC) 20 MG CR capsule TAKE ONE CAPSULE BY MOUTH ONCE DAILY 30 capsule 2     pravastatin (PRAVACHOL) 10 MG tablet TAKE 1 TABLET BY MOUTH ONCE DAILY 90 tablet 3 " "    BASAGLAR 100 UNIT/ML injection Inject 6 Units Subcutaneous 2 times daily (Patient not taking: Reported on 6/23/2019) 30 mL 1     blood glucose monitoring (TANA CONTOUR MONITOR) meter device kit Use to test blood sugars 5 times daily or as directed. 1 kit 0     blood glucose monitoring (TANA CONTOUR) test strip Use to test blood sugar 6 times daily or as directed. 550 strip 3     blood glucose monitoring (SOFTCLIX) lancets USE 1  TO CHECK GLUCOSE THREE TIMES DAILY 100 each 5     insulin pen needle (B-D U/F) 31G X 5 MM miscellaneous Use 5 pen needles daily or as directed. 450 each 1     insulin syringe 31G X 5/16\" 0.3 ML MISC Use twice daily 50 each 3     Social History     Tobacco Use     Smoking status: Never Smoker     Smokeless tobacco: Never Used     Tobacco comment: no exposure   Substance Use Topics     Alcohol use: Yes     Comment: winex1-2/3x per yr.     Allergies   Allergen Reactions     Novolog [Insulin Aspart] Swelling     Itching, rash, contact dermatitis     Humalog [Insulin Lispro] Rash     Contact dermatitis     REVIEW OF SYSTEMS  General: POSITIVE for fatigue. NEGATIVE for fever, chills, headaches.  ENT: POSITIVE for nasal congestion. NEGATIVE for ear pain, sore throat, sinus pressure.  Resp: POSITIVE for cough. NEGATIVE for wheezing and SOB.    OBJECTIVE:  /78 (BP Location: Left arm)   Pulse 84   Temp 97.9  F (36.6  C) (Oral)   SpO2 99%   GENERAL APPEARANCE: healthy, alert and in no distress  HEENT: PERRL, conjunctiva clear. Bilateral ear canals and TM's normal. Nose without erythematous or edematous turbinates. Posterior pharynx nonerythematous and without tonsillar hypertrophy or exudate.  NECK: supple, nontender, no lymphadenopathy  RESP: lungs clear to auscultation - no rales, rhonchi or wheezes. Breathing is comfortable, not labored and without use of accessory muscles.  CV: regular rates and rhythm, normal S1 S2, no murmur noted    ASSESSMENT:    ICD-10-CM    1. Viral URI with " cough J06.9 benzonatate (TESSALON) 100 MG capsule    B97.89      PLAN:   Patient Instructions   Tessalon Perles- Take 1-2 capsules by mouth 3 times daily as needed for cough.  Rest! Your body needs more rest to heal.  Drink plenty of fluids (warm fluids like tea or soup are soothing and reduce cough)  Sit in the bathroom with a hot shower running and breathe in the steam.  Honey may soothe your sore throat and help manage your cough- may take straight or in warm water with lemon juice.  Avoid smoke (cigarettes, bonfires, fireplace, wood burning stoves).  Mucinex or Robitussin (guiafenesin) thin mucus and may help it to loosen more quickly  Good handwashing is the best way to prevent spread of germs  Present to emergency room if you develop trouble breathing, swallowing or cough-up blood.  Follow up with your primary care provider if symptoms worsen or fail to improve as expected.    Follow up with primary care provider with any problems, questions or concerns or if symptoms worsen or fail to improve. Patient agreed to plan and verbalized understanding.    Dolores Decker PA-C  [facility]

## 2019-06-25 NOTE — TELEPHONE ENCOUNTER
Notification received from [facility] that insulin syringe 5/16 has been discontinued.    Writer contacted [facility] to review. They believe request was sent in error. Change of RX faxed back to pharmacy per pharmacist request.    Nandini Jenkins LPN  Diabetes Clinic Coordinator   Adult Endocrinology and Pediatric Specialty Clinics  [facility]

## 2019-07-03 ENCOUNTER — OFFICE VISIT (OUTPATIENT)
Dept: FAMILY MEDICINE | Facility: OTHER | Age: 54
End: 2019-07-03
Payer: COMMERCIAL

## 2019-07-03 VITALS
TEMPERATURE: 97.5 F | OXYGEN SATURATION: 95 % | WEIGHT: 162 LBS | HEART RATE: 114 BPM | BODY MASS INDEX: 33.55 KG/M2 | SYSTOLIC BLOOD PRESSURE: 120 MMHG | RESPIRATION RATE: 16 BRPM | DIASTOLIC BLOOD PRESSURE: 64 MMHG

## 2019-07-03 DIAGNOSIS — T78.40XA RASH DUE TO ALLERGY: Primary | ICD-10-CM

## 2019-07-03 DIAGNOSIS — R21 RASH DUE TO ALLERGY: Primary | ICD-10-CM

## 2019-07-03 PROCEDURE — 99213 OFFICE O/P EST LOW 20 MIN: CPT | Performed by: PHYSICIAN ASSISTANT

## 2019-07-03 RX ORDER — PREDNISONE 20 MG/1
20 TABLET ORAL DAILY
Qty: 5 TABLET | Refills: 0 | Status: SHIPPED | OUTPATIENT
Start: 2019-07-03 | End: 2019-09-13

## 2019-07-03 NOTE — PATIENT INSTRUCTIONS
This appears to be an allergic rash, possibly to Zyrtec.   Continue with Benadryl every 4-6 hours as needed.  If rash continued to spread, I recommend a 5 day course of prednisone.  Try hydrocortisone cream to the itching as well.  If not improving, let me know. If you notice any mouth or tongue swelling or shortness of breath, you should be seen in the ED.

## 2019-07-03 NOTE — PROGRESS NOTES
"Subjective     Maria M Marcus is a 54 year old female who presents to clinic today for the following health issues:    HPI     Rash  Onset: Monday Afternoon    Description:   Location: \"all over body\"  Character: blotchy, red  Itching (Pruritis): YES    Progression of Symptoms:  worsening    Accompanying Signs & Symptoms:  Fever: no   Body aches or joint pain: no   Sore throat symptoms: no   Recent cold symptoms: YES- \"i've had a terrible, terrible cold for the last two weeks\"    History:   Previous similar rash: no     Precipitating factors:   Exposure to similar rash: no   New exposures: None   Recent travel: no     Alleviating factors:  Benadryl     Therapies Tried and outcome: Benadryl helped with the itching    She started noticing an itching rash Monday evening in the chest area. It has spread over the past few days to the arms, legs and back. She took Benadryl yesterday and this morning which has helped the itching. The only thing different she can think of over the past week is that she went to a [employer] and [place] on Saturday and used Zyrtec for a head cold on Sunday and Monday morning. She also used [place] white strips recently but has been using these for years without any side effects. She denies any new soaps, lotions or detergents. No mouth or throat swelling an no difficulty breathing. She denies any exposure to lake/pool water or any plants recently.     Reviewed and updated as needed this visit by Provider  Tobacco  Allergies  Meds  Problems  Med Hx  Surg Hx  Fam Hx      Review of Systems   GENERAL: Denies fever, fatigue, weakness, weight gain, or weight loss.  CARDIOVASCULAR: Denies chest pain, shortness of breath, irregular heartbeats,  palpitations, or edema.  RESPIRATORY: Denies cough, hemoptysis, and shortness of breath.  SKIN: +Itching rash on neck, chest, back, arms and legs.         Objective    /64   Pulse 114   Temp 97.5  F (36.4  C) (Temporal)   Resp 16   Wt 73.5 kg " (162 lb)   SpO2 95%   BMI 33.55 kg/m    Body mass index is 33.55 kg/m .  Physical Exam   GENERAL: healthy, alert and no distress  MOUTH: No tongue or throat swelling.   RESP: lungs clear to auscultation - no rales, rhonchi or wheezes  CV: regular rate and rhythm, normal S1 S2, no S3 or S4, no murmur, click or rub  SKIN: Small, erythematous papules with areas of confluence over neck, chest, back, and proximal upper and lower extremities. A few scattered erythematous papule of left forearm.         Assessment & Plan       ICD-10-CM    1. Rash due to allergy R21 predniSONE (DELTASONE) 20 MG tablet        No hives but the nature of the rash is consistent with an allergy rash, possibly due to Zyrtec as this is the only new medication she has taken recently and it continues to spread so is consistent with a delayed drug reaction rash.  Continue with Benadryl every 4-6 hours as needed for itching.  If rash continues to spread, I recommend a 5 day course of prednisone so this was prescribed to fill if needed. She will adjust her insulin accordingly if she takes this.  I recommend she try hydrocortisone cream to the itching as well. She should avoid hot showers.  If not improving, she should be seen again. If she notices any mouth or tongue swelling or shortness of breath, she was advised to be seen in the ED.      Marcus Salgado PA-C  [facility]

## 2019-07-24 DIAGNOSIS — E10.9 TYPE 1 DIABETES MELLITUS WITHOUT COMPLICATION (H): Chronic | ICD-10-CM

## 2019-07-24 RX ORDER — INSULIN GLARGINE 100 [IU]/ML
INJECTION, SOLUTION SUBCUTANEOUS
Qty: 10 ML | Refills: 3 | Status: SHIPPED | OUTPATIENT
Start: 2019-07-24 | End: 2019-11-25

## 2019-07-24 NOTE — TELEPHONE ENCOUNTER
Faxed refill request received from [facility] [address] [place] [id number] [facility].     insulin glargine (LANTUS VIAL) 100 UNIT/ML vial 10 mL 3 3/26/2019  --   Sig: Inject 5 units in AM and 4 units in PM       Last Office Visit: 04/12/19  Next Appointment Scheduled for: 10/18/19    Marjan Almazan CMA  Adult Endocrinology  [facility]

## 2019-07-27 ENCOUNTER — NURSE TRIAGE (OUTPATIENT)
Dept: NURSING | Facility: CLINIC | Age: 54
End: 2019-07-27

## 2019-07-27 NOTE — TELEPHONE ENCOUNTER
S:R /O pink eye.  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  Was careful to wash hands. Is diabetic and starting to having vision loss.  This morning woke up with eye matted and blurred vision.  A: Per care guideline advises to see provider today.  R:Patient will go to Urgent care.    Toyin Klein RN, [facility]       Reason for Disposition    Blurred vision    Additional Information    Negative: Eye exposure to chemical or fumes    Negative: Redness of white of eye (sclera), but no pus or only a small amount of brief pus    Negative: [1] Eyelids are very swollen (shut or almost) AND [2] fever    Negative: [1] Eyelid (outer) is very red (or tender to touch) AND [2] fever    Negative: Patient sounds very sick or weak to the triager    Negative: MODERATE eye pain (e.g., interferes with normal activities)    Negative: Cloudy spot or sore seen on the cornea (clear part of the eye)    Negative: Fever > 104 F (40 C)    Protocols used: EYE - PUS OR [id number]

## 2019-08-19 NOTE — TELEPHONE ENCOUNTER
"JM not in clinic today. Will route to covering providers to review/advise, if appropriate.  Gayle Case, ALEJANDRO    Per last OV 11/30: \"Chronic neck and shoulder pain but cannot take NSAIDs or Tylenol. I will prescribe lidocaine gel to use twice daily as needed. She will also use Biofreeze, ice, heat and stretching as needed. If symptoms are not improving, I discussed trying gabapentin.\"  " ----- Message from Reji De Los Santos sent at 8/19/2019  8:26 AM EDT -----  Regarding: Dr. Toney Scale: [phone]  Pt is experiencing blood in her urine and pain. Tried backline, no answer. Advised pt to seek appropriate medical care.

## 2019-08-26 DIAGNOSIS — K21.9 GASTROESOPHAGEAL REFLUX DISEASE, ESOPHAGITIS PRESENCE NOT SPECIFIED: ICD-10-CM

## 2019-08-26 DIAGNOSIS — E03.9 HYPOTHYROIDISM, UNSPECIFIED TYPE: ICD-10-CM

## 2019-08-27 NOTE — TELEPHONE ENCOUNTER
Pending Prescriptions:                       Disp   Refills    omeprazole (PRILOSEC) 20 MG DR capsule [P*90 cap*1            Sig: TAKE 1 CAPSULE BY MOUTH ONCE DAILY    Last Written Prescription Date:  1/8/18  Last Fill Quantity: 30,  # refills: 2     Routing refill request to provider for review/approval because:  A break in medication        EUTHYROX 75 MCG tablet [Pharmacy Med Name*90 tab*1            Sig: TAKE 1 TABLET BY MOUTH ONCE DAILY    Routing refill request to provider for review/approval because:  Brand change    Angela Kirby, RN, BSN

## 2019-08-28 RX ORDER — LEVOTHYROXINE SODIUM 75 UG/1
TABLET ORAL
Qty: 90 TABLET | Refills: 1 | Status: SHIPPED | OUTPATIENT
Start: 2019-08-28 | End: 2020-03-16

## 2019-09-12 ENCOUNTER — MYC MEDICAL ADVICE (OUTPATIENT)
Dept: FAMILY MEDICINE | Facility: OTHER | Age: 54
End: 2019-09-12

## 2019-09-13 ENCOUNTER — OFFICE VISIT (OUTPATIENT)
Dept: FAMILY MEDICINE | Facility: OTHER | Age: 54
End: 2019-09-13
Payer: COMMERCIAL

## 2019-09-13 VITALS
SYSTOLIC BLOOD PRESSURE: 120 MMHG | HEART RATE: 81 BPM | HEIGHT: 58 IN | TEMPERATURE: 98.2 F | WEIGHT: 161 LBS | DIASTOLIC BLOOD PRESSURE: 78 MMHG | RESPIRATION RATE: 16 BRPM | OXYGEN SATURATION: 98 % | BODY MASS INDEX: 33.8 KG/M2

## 2019-09-13 DIAGNOSIS — M77.42 METATARSALGIA OF BOTH FEET: Primary | ICD-10-CM

## 2019-09-13 DIAGNOSIS — M77.41 METATARSALGIA OF BOTH FEET: Primary | ICD-10-CM

## 2019-09-13 PROCEDURE — 99213 OFFICE O/P EST LOW 20 MIN: CPT | Performed by: PHYSICIAN ASSISTANT

## 2019-09-13 ASSESSMENT — PAIN SCALES - GENERAL: PAINLEVEL: EXTREME PAIN (8)

## 2019-09-13 ASSESSMENT — MIFFLIN-ST. JEOR: SCORE: 1224.01

## 2019-09-13 NOTE — PATIENT INSTRUCTIONS
"Will have you see Dr. Talley to further evaluate your foot pain.  Continue with your inserts and you may need to be fitted for new ones.  Wear wide shoes.  Try Bengay or Aspercreme.  Continue with ice and warm water soaks.       Patient Education     Treating Metatarsalgia    Metatarsalgia is pain in the \"ball of your foot,\" the area between your arch and your toes. The pain usually starts in one or more of the five bones in this area under the toes. These bones are called the metatarsals. Often, a callus builds up in this area. In most cases, wearing low-heeled, well-cushioned shoes and filing down the callus will ease the pain. If these steps don t work, you may need to do exercises or have surgery to remove part of the bone. To take pressure off the ball of your foot and ease the pain, your healthcare provider may suggest that you do one or more of the following.  Wear shoes with padded soles  Wear shoes with thick padding in the soles. Keep heels low. Make sure the shoe is wide across the ball of your foot and your toes.  Using a metatarsal pad  Put a metatarsal pad in your shoe. This lifts and takes pressure off the painful area. To insert the pad:    Put a small lipstick berna on the callus.    Step into the shoe to leave a berna on the insole.    Peel the backing off the pad. Then put the pad in the shoe just behind the lipstick berna.  You may also be able to find inserts with built-in metatarsal pads.  Filing the callus  1. Soak your foot in warm water for a few minutes to soften the skin.  2. Dry the foot.  3. Gently rub the callus with a pumice stone or nail file to remove the hard skin. Stop if bleeding or pain occurs.  If you have diabetes, see your diabetes healthcare provider for foot care.  Date Last Reviewed: 1/1/2018 2000-2018 [employer]. [address] [place] [address], [place] [address]. All rights reserved. This information is not intended as a substitute for professional medical care. " Always follow your healthcare professional's instructions.

## 2019-09-13 NOTE — PROGRESS NOTES
"Subjective     Maria M Marcus is a 54 year old female who presents to clinic today for the following health issues:    HPI     Foot Pain    Onset: 3-4 months intermittently     Description:   Location: both feet, but mostly left foot - hurts putting pressure on it  Character: Dull ache, but if stepping out of a vehicle or doing a bigger step putting weight on it, it's a shooting pain     Intensity: 8-9/10    Progression of Symptoms: intermittent    Accompanying Signs & Symptoms:  Other symptoms: none    History:   Previous similar pain: no - \"on the balls of my feet, there are like sharp bones and I wear insoles but this is different.\"      Precipitating factors:   Trauma or overuse: no - thought it was due to wearing flips flops but isn't wearing those anymore.     Alleviating factors:  Improved by: rest/inactivity and ice    Therapies Tried and outcome: ice does tend to help. Can't use any sort of pain medications due to her Dexcom monitor.     She has had pain in the past but this feels different. It is mostly over the bottoms of both feet, below her toes, worse on the left side. She has orthotics that she started wearing again but they do not seem to be helping as they are 2-3 years old. She has tried Peppermint oil and ice with short term relief. She denies any numbness or tingling.     Reviewed and updated as needed this visit by Provider  Allergies  Meds  Problems     Review of Systems   GENERAL: Denies fever, fatigue, weakness, weight gain, or weight loss.  MUSCULOSKELETAL: +bilateral foot pain.   NEUROLOGIC: Denies headache, fainting, dizziness, memory loss, numbness, tingling, or seizures..       Objective    /78   Pulse 81   Temp 98.2  F (36.8  C) (Temporal)   Resp 16   Ht 1.48 m (4' 10.25\")   Wt 73 kg (161 lb)   SpO2 98%   BMI 33.36 kg/m    Body mass index is 33.36 kg/m .  Physical Exam   GENERAL: healthy, alert and no distress  MS: no gross musculoskeletal defects noted. Tenderness " over the 1st-4th plantar metatarsal heads bilaterally. Full foot range of motion. No obvious pes planus.   NEURO: Normal strength and tone, mentation intact and speech normal. Gait is stable.         Assessment & Plan       ICD-10-CM    1. Metatarsalgia of both feet M77.41 ORTHO  REFERRAL    M77.42         Symptoms are consistent with metatarsalgia as she was diagnosed with this and hallux rigidus by podiatry in 2012. She has an old pair of orthotics that have not been beneficial.  I recommend she see Dr. Talley for further evaluation and to discuss new orthotics.  In the meantime, I recommend she wear wide shoes and try to avoid walking barefoot.  I recommend warm water soaks and continued ice as needed.  I also recommend she consider creams like Bengay or Aspercreme.       Marcus Salgado PA-C  [facility]

## 2019-09-23 DIAGNOSIS — E10.9 DIABETES MELLITUS TYPE 1 (H): ICD-10-CM

## 2019-09-23 RX ORDER — BLOOD SUGAR DIAGNOSTIC
STRIP MISCELLANEOUS
Qty: 100 EACH | Refills: 3 | Status: SHIPPED | OUTPATIENT
Start: 2019-09-23 | End: 2020-01-27

## 2019-09-24 ENCOUNTER — OFFICE VISIT (OUTPATIENT)
Dept: PODIATRY | Facility: OTHER | Age: 54
End: 2019-09-24
Payer: COMMERCIAL

## 2019-09-24 ENCOUNTER — ANCILLARY PROCEDURE (OUTPATIENT)
Dept: GENERAL RADIOLOGY | Facility: OTHER | Age: 54
End: 2019-09-24
Attending: PODIATRIST
Payer: COMMERCIAL

## 2019-09-24 VITALS
DIASTOLIC BLOOD PRESSURE: 64 MMHG | HEIGHT: 58 IN | SYSTOLIC BLOOD PRESSURE: 122 MMHG | BODY MASS INDEX: 33.8 KG/M2 | WEIGHT: 161 LBS

## 2019-09-24 DIAGNOSIS — E10.9 TYPE 1 DIABETES MELLITUS WITHOUT COMPLICATION (H): Chronic | ICD-10-CM

## 2019-09-24 DIAGNOSIS — M79.672 BILATERAL FOOT PAIN: Primary | ICD-10-CM

## 2019-09-24 DIAGNOSIS — M77.41 METATARSALGIA OF BOTH FEET: ICD-10-CM

## 2019-09-24 DIAGNOSIS — M72.2 PLANTAR FASCIITIS OF LEFT FOOT: ICD-10-CM

## 2019-09-24 DIAGNOSIS — M79.671 BILATERAL FOOT PAIN: Primary | ICD-10-CM

## 2019-09-24 DIAGNOSIS — M77.42 METATARSALGIA OF BOTH FEET: ICD-10-CM

## 2019-09-24 PROCEDURE — 99243 OFF/OP CNSLTJ NEW/EST LOW 30: CPT | Performed by: PODIATRIST

## 2019-09-24 PROCEDURE — 73630 X-RAY EXAM OF FOOT: CPT | Mod: LT

## 2019-09-24 ASSESSMENT — PAIN SCALES - GENERAL: PAINLEVEL: EXTREME PAIN (9)

## 2019-09-24 ASSESSMENT — MIFFLIN-ST. JEOR: SCORE: 1224.01

## 2019-09-24 NOTE — PATIENT INSTRUCTIONS
Reliable shoe stores: To maximize your experience and provide the best possible fit.  Be sure to show them your foot concerns and tell them Dr. Talley sent you.      Stores listed in bold have only athletic shoes, and stores that are not bold are mostly casual or variety of shoes    [employer]  [address] [place] [address]  [phone]    [facility] [email or web address] [facility] [place]  [address] [place] [address]  [phone]     [employer] [facility] [place]  [address] [place] [address]  [phone]    [employer]  [address] [facility]  [facility] [address]  [phone]    [employer]  [address] [place], [place] [address] [phone]    [employer]  [address] [place] [address]  [phone]                         [employer] Locations:     [address] [place] [address]   [phone]     [address] [place] [address] [address] [place] [address]   [phone]     [address] [place] [address]   [phone]     [address] [facility] [place] [address]   [phone]     [address] [place] [address]   [phone]     [address] [place] [address]   [phone]     [address] [facility] [address]   [place] [address]   [phone]     [address] [facility]. [address]   [place] [address]   [phone]             How to find reasonable shoes          The correct width    Correct Fitting    Correct Length      Foot Distortion    Posture Distortion                          Torsional Rigidity      Grasp behind the heel and underneath the foot and twist      Bad    Excessive torsion/twist in midfoot     Less torsion/twist in midfoot is better                   Heel Counter Rigidity      Grasp just above   midsole and squeeze      Bad    Soft heel counter      Good    Rigid Heel Counter      Flexion Rigidity      Grasp shoe and bend from forefoot to rearfoot                  DIABETES AND YOUR FEET    What effect does  "diabetes have on the feet?  Diabetes can result in several problems in the feet including contractures of the tendons leading to deformities and reduced function of the bones, skin ulcers or open sores on pressure points or prominent deformities, reduced sensation, reduced blood flow and thus reduced oxygen and immune cells to the tiny vessels in our feet. This all leads to higher risk of hospitalization, infections, and amputations.     What is neuropathy?  Neuropathy is a term used to describe a loss of nerve function.  Patients with diabetes are at risk of developing neuropathy if their sugars continue to run high and are above the normal value of 140.  The elevated blood sugar in the body enters the nerves causing it to swell and impair nerve function.  The higher the blood sugar and the longer it is elevated, the more damage is done to nerves.  This damage is permanent and irreversible.  These damaged sensory nerves can then cause reduced feeling or cause pain.  Damaged motor nerves can reduce blood flow and white blood cells into into your foot, skin and bones reducing your ability to heal a small problem. And neuropathy can cause tendons to become unbalanced and contribute to the formation of deformity and contractures in our feet. Often times, neuropathy can be prevented by controlling your blood sugar.  Your risk of developing neuropathy goes up dramatically as your hemoglobin A1C raises above 7.5.      How do I know if I have neuropathy?  When a person develops neuropathy, they usually begin to feel numbness or tingling in their feet and sometimes in their legs.  Other symptoms may include painful burning or hot feet, tingling, electrical sensations or feeling like insects or ants are crawling on your feet or legs.  If blood sugar remains above 140  for long periods of time, neuropathy can also occur in the hands.  When a person loses their \"protective threshold\" or ability to detect a 5.07 [place] " Girish monofilament is when they have elevated risk for developing foot deformity, contractures, foot infections, amputations, Charcot arthropathy, or other complications. Keep your hemoglobin A1C below 7.5 to reduce this risk.    What is vascular disease?  Peripheral vascular disease is a term used to describe a loss or decrease in circulation (blood flow).  There is a problem in getting blood, immune cells, and oxygen to areas that need it.  Similar to neuropathy, sugars can build up in the walls of the arteries (blood vessels) and cause them to become swollen, thickened and hardened.  This decreases the amount of blood that can go to an area that needs it.  Though this is common in the legs of diabetic patients, it can also affect other arteries (blood vessels) in the body such as in the heart, kidney, eyes, and the blood flow into bones.  It is often seen first in the small vessels of her body notably our feet and toes.    How do I know if I have vascular disease?  In the legs, vascular disease usually results in cramping.  Patients who develop leg cramps after walking the same distance every time (i.e. One block, half a mile, ect.) need to let their doctors know so that their circulation may be checked.  Cramps causing severe pain in the feet and/or legs while sleeping and the cramps go away when you stand or hang your legs off the side of the bed, may also be a sign of poor blood circulation.  Occasional cramping in cold weather or on rare occasions with activity may not be due to poor circulation, but you should inform your doctor.    How can these problems be prevented?  The key to prevention is good blood sugar control all day every day.  Inadequate blood sugar control is the most common way patients experience these problems. Reducing, controlling and measuring your daily consumption of sugar or carbohydrates is essential to understanding and managing diabetes.  Physical activity (exercise) is a very  good way to help decrease your blood sugars.  Exercise can lower your blood sugar, blood pressure, and cholesterol.  It also reduces your risk for heart disease and stroke, relieves stress, and strengthens your heart, muscles and bones. Physical activity also increases your balance and reduces development of contractures and foot deformities over time. In addition, regular activity helps insulin work better, improves your blood circulation, and keeps your joints flexible.  If you're trying to lose weight, a combination of exercise and wise food choices can help you reach your target weight and maintain it.  Activity and exercise alone can not make up for poor diet choices, eating too much, or eating too many sugars or carbohydrates.  Ask your doctor for help when you are not meeting your blood sugar goals. Changes or increases in medication are powerful tools in reducing your blood sugar.    Know your blood sugar and hemoglobin A1C trend.  Upon first diagnosis or during acute illness, checking your blood sugar 4 times a day can help you understand how your diet, activity, and lifestyle affect your blood sugar.  Monitoring your hemoglobin A1C can help you understand how well you are managing blood sugar over the long run.  Your hemoglobin A1C tells you what your blood sugar averages all day, every day, over the past 90 days.       To experience the lowest risk of complications associated with diabetes such as neuropathy, loss of blood flow, bone or joint infection, charcot arthropathy, or amputation, the American Diabetes Association recommends a target hemoglobin A1C of less than 7.0%, while the American Association of Clinical Endocrinologists' recommendation is 6.5% or less.  Both organizations advise that the goals be individualized based on patient factors such as other health conditions, history of hypoglycemia, education, and life expectancy.  A patients risk of experiencing complications associated with  diabetes is only slightly elevated with a hemoglobin A1C above 6.0.  However, this risk goes up exponentially when the hemoglobin A1C is above 7.5.  The longer the hemoglobin A1C is elevated, the more risk that patient will experience in their lifetime. The damage that occurs to nerves, blood vessels, tendons, bones and body organs, while their hemoglobin A1C is elevated is mostly irreversible and worsens with each additional time period of elevated hemoglobin A1C.     You must understand and manage your disease.  Your health insurance or medical team cannot manage this disease for you.  When you take responsibility for understanding and managing your disease, you can expect to experience fewer problems associated with diabetes in your lifetime.  You will  Also experience a higher quality of life and health and reduced cost of health care.              Diabetic Foot Care Recommendations  The following are recommendations for avoiding serious foot problems or injury    DO'S  1. Be aware of your hemoglobin A1C and continue to follow up with your medical team for adjustments in your lifestyle and medication until your reach your A1C goal.  Keep this below 7.5 to reduce your risk of developing complications associated with diabetes.    2.  Wash your feet with lukewarm water and a mild soap and then dry them thoroughly, especially between the toes.  Gently floss your towel or washcloth between each toe at every bath.  Soaking your feet in water cannot clean dead skin, debris, and bacteria from your feet and is not necessary.   3. Examine your feet daily looking for cuts, corns, blisters, cracks, ect..., especially after wearing new shoes or increased or changed activities.  Make sure to look between your toes.  If you cannot see the bottom of your feet, set a mirror on the floor and hold your foot over it, or ask a family member to examine your feet for you daily.  Contact your doctor immediately if new problems are  noted or if sores are not healing.  4.  Immediately apply moisturizer cream such as Cetaphil to the tops and bottoms of your feet, avoiding areas between the toes.  Apply sunscreen or cover your feet if they will be exposed to extended sunlight.  5.Use clean comfortable shoes.  Socks should not have thick seams or cut off the circulation around the leg.  Break in new shoes slowly and rotate with older shoes until broken in.  Check the inside of your shoes with your hand to look for areas of irritation or objects that may have fallen into your shoes.    6. Keep slippers by the side of your bed for use during the night.  7. Shoes should be fitted by a professional and should not cause areas of irritation.  Check your feet regularly when wearing a new pair of shoes and replace them as needed.  8.  Talk to your doctor about proper exercise.  Exercise and stretching stimulate blood flow to your feet and maintain proper glucose levels.  Use it or lose it!  9.  Monitor your blood glucose level and your hemoglobin A1C.  Notify your doctor immediately if your blood sugar is abnormally high or low.  10.  Cut your nails straight across, but then gently round any sharp edges with a nail file.  If you have neuropathy, peripheral vascular disease or cannot see that well to trim your own toenails, see a medical professional for care.    DONT'S  1.  Do not soak your feet if you have an open sore or your provider has informed you that you have neuropathy or loss of protective threshold.  Use only lukewarm water and always check the temperature with your hand as hot water can easily burn your feet.    2.  Never use a hot water bottle or heating pad on your feet.  Also do not apply hot or cold compresses to your feet.  With decreased sensation, you could burn or freeze your feet.  Do not rest your feet near a heat source such as a heater or heat register.    3.  Do not apply any of these to your feet:    - over the counter medicine  for corns or warts    -  Harsh chemicals like boric acid    -  Do not self-treat corns, cuts, blisters or infections.  Always consult your doctor.   4.  Do not wear sandals, slippers or walk barefoot, especially on harsh surfaces.  5.  If you smoke, stop!!!

## 2019-09-24 NOTE — LETTER
"    9/24/2019         RE: Maria M Marcus  [address] [place] [address]        Dear Colleague,    Thank you for referring your patient, Maria M Marcus, to the [facility]. Please see a copy of my visit note below.    HPI:  Maria M Marcus is a 54 year old female who is seen in consultation at the request of Marcus Salgado PA-C.    Pt presents for eval of:   (Onset, Location, L/R, Character, Treatments, Injury if yes)    XR Left and Right foot today 9/24/2019    DM Type 1     Ongoing, plantar arch and ball of Left and Right foot pain > Left that radiates to dorsal foot at times. gymnist in high school, Right great toe fracture 1994. Presents today with supportive athletic shoes and orthotics from 2012.  Constant, sharp, stabbing, throbbing, Intermittent, burning, tingling, pain 9  started after wearing \"terrible shoes\" is diabetic    Not working due to her vision loss.      Weight management plan: Patient was referred to their PCP to discuss a diet and exercise plan.     ROS: 10 point ROS neg other than the symptoms noted above in the HPI.    Patient Active Problem List   Diagnosis     Hypothyroidism due to Hashimoto's thyroiditis     Other and unspecified disc disorder of lumbar region     Hyperlipidemia LDL goal <100     Cervical radiculopathy     Advanced directives, counseling/discussion     Frozen shoulder syndrome     Vitiligo     Chronic pain     Back pain     Hypertension goal BP (blood pressure) < 140/90     Type 1 diabetes mellitus without complication (H)     Overweight (BMI 25.0-29.9)     Retinopathy due to secondary diabetes (H)     Gastroesophageal reflux disease, esophagitis presence not specified       PAST MEDICAL HISTORY:   Past Medical History:   Diagnosis Date     Cervical radiculopathy      Diabetic eye exam (H) 12/14/11     DISC DIS NEC/NOS-LUMBAR 4/7/2007     Frozen shoulder syndrome 6/16/2011     Hyperlipidemia LDL goal <100 10/31/2010     Hypertension " 1/3/2011     Hypothyroidism due to Hashimoto's thyroiditis      Type 1 diabetes, HbA1c goal < 7% (H) dx 1967     Unspecified hypothyroidism      PAST SURGICAL HISTORY:   Past Surgical History:   Procedure Laterality Date     C  DELIVERY ONLY      C-Sections x2     C NONSPECIFIC PROCEDURE      Hysterectomy - total (cervix removed but ovaries remain)     C STOMACH SURGERY PROCEDURE UNLISTED       C TOTAL ABDOM HYSTERECTOMY       COLONOSCOPY N/A 2016    Procedure: COLONOSCOPY;  Surgeon: Efren Perry MD;  Location: [facility]     ENDOSCOPIC SINUS SURGERY Bilateral 2018    Procedure: Bilateral functional endoscopic maxillary antrostomies;  Surgeon: Woo Silva MD;  Location: [facility]     HC SACROPLASTY       LAMINECT/DISCECTOMY, CERVICAL  2007    C7-T1 hemilaminectomy, microdiscectomy, foraminotomy.     LASER YAG CAPSULOTOMY Right 10/23/2014    Procedure: LASER YAG CAPSULOTOMY;  Surgeon: Esteban Dorsey MD;  Location: [facility]     VITRECTOMY,STRIP EPIRETINAL MEMBRANE  09     MEDICATIONS:   Current Outpatient Medications:      amitriptyline (ELAVIL) 25 MG tablet, TAKE 1 TABLET BY MOUTH AT BEDTIME, Disp: 90 tablet, Rfl: 1     BASAGLAR 100 UNIT/ML injection, Inject 6 Units Subcutaneous 2 times daily, Disp: 30 mL, Rfl: 1     blood glucose monitoring (TANA CONTOUR MONITOR) meter device kit, Use to test blood sugars 5 times daily or as directed., Disp: 1 kit, Rfl: 0     blood glucose monitoring (TANA CONTOUR) test strip, Use to test blood sugar 6 times daily or as directed., Disp: 550 strip, Rfl: 3     blood glucose monitoring (SOFTCLIX) lancets, USE 1  TO CHECK GLUCOSE THREE TIMES DAILY, Disp: 100 each, Rfl: 5     EUTHYROX 75 MCG tablet, TAKE 1 TABLET BY MOUTH ONCE DAILY, Disp: 90 tablet, Rfl: 1     insulin glargine (LANTUS VIAL) 100 UNIT/ML vial, Inject 5 units in AM and 4 units in PM, Disp: 10 mL, Rfl: 3     insulin glulisine (APIDRA) 100 UNIT/ML injection, Inject 1 unit per 20 gram  "carb with meals. Total daily dose 30 units, Disp: 30 mL, Rfl: 3     insulin pen needle (B-D U/F) 31G X 5 MM miscellaneous, Use 5 pen needles daily or as directed., Disp: 450 each, Rfl: 1     insulin syringe-needle U-100 (BD INSULIN SYRINGE ULTRAFINE) 31G X 5/16\" 0.3 ML miscellaneous, KEEP ON HAND IN CASE OF PUMP FAILURE. USE 4-5 SYRINGES DAILY, Disp: 100 each, Rfl: 3     lisinopril (PRINIVIL/ZESTRIL) 10 MG tablet, TAKE 1 TABLET BY MOUTH ONCE DAILY, Disp: 90 tablet, Rfl: 3     omeprazole (PRILOSEC) 20 MG CR capsule, TAKE ONE CAPSULE BY MOUTH ONCE DAILY, Disp: 30 capsule, Rfl: 2     omeprazole (PRILOSEC) 20 MG DR capsule, TAKE 1 CAPSULE BY MOUTH ONCE DAILY, Disp: 90 capsule, Rfl: 1     pravastatin (PRAVACHOL) 10 MG tablet, TAKE 1 TABLET BY MOUTH ONCE DAILY, Disp: 90 tablet, Rfl: 3  ALLERGIES:    Allergies   Allergen Reactions     Novolog [Insulin Aspart] Swelling     Itching, rash, contact dermatitis     Humalog [Insulin Lispro] Rash     Contact dermatitis     Zyrtec [Cetirizine] Rash     SOCIAL HISTORY:   Social History     Socioeconomic History     Marital status:      Spouse name: Ra     Number of children: 2     Years of education: 12     Highest education level: Not on file   Occupational History     Occupation: receiving     Employer: [employer]   Social Needs     Financial resource strain: Not on file     Food insecurity:     Worry: Not on file     Inability: Not on file     Transportation needs:     Medical: Not on file     Non-medical: Not on file   Tobacco Use     Smoking status: Never Smoker     Smokeless tobacco: Never Used     Tobacco comment: no exposure   Substance and Sexual Activity     Alcohol use: Yes     Comment: winex1-2/3x per yr.     Drug use: No     Sexual activity: Yes     Partners: Male     Birth control/protection: Surgical, Female Surgical     Comment: hysterectomy   Lifestyle     Physical activity:     Days per week: Not on file     Minutes per session: Not on file     Stress: Not " "on file   Relationships     Social connections:     Talks on phone: Not on file     Gets together: Not on file     Attends [employer] service: Not on file     Active member of club or organization: Not on file     Attends meetings of clubs or organizations: Not on file     Relationship status: Not on file     Intimate partner violence:     Fear of current or ex partner: Not on file     Emotionally abused: Not on file     Physically abused: Not on file     Forced sexual activity: Not on file   Other Topics Concern     Parent/sibling w/ CABG, MI or angioplasty before 65F 55M? No   Social History Narrative     Not on file     FAMILY HISTORY:   Family History   Problem Relation Age of Onset     Hypertension Maternal Grandfather      EYE* Maternal Grandmother      Blood Disease Maternal Grandmother      Eye Disorder Maternal Grandmother         maccular degeneration     Osteoporosis Maternal Grandmother      Lipids Father      Gastrointestinal Disease Father         ulcers     Heart Disease Father      Cardiovascular Father         heart attack     Hypertension Paternal Grandmother      Breast Cancer Mother         dx age 73 - terminal - mother had first mammo at this age     Diabetes Paternal Uncle         Type I       EXAM:Vitals: /64 (BP Location: Right arm, Cuff Size: Adult Large)   Ht 1.48 m (4' 10.25\")   Wt 73 kg (161 lb)   BMI 33.36 kg/m     BMI= Body mass index is 33.36 kg/m .    General appearance: Patient is alert and fully cooperative with history & exam.  No sign of distress is noted during the visit.     Psychiatric: Affect is pleasant & appropriate.  Patient appears motivated to improve health.     Respiratory: Breathing is regular & unlabored while sitting.     HEENT: Hearing is intact to spoken word.  Speech is clear.  No gross evidence of visual impairment that would impact ambulation.     Vascular: DP & PT 2/4 & regular bilaterally.  No significant edema, rubor or varicosities noted.  CFT and " skin temperature is normal to both lower extremities.       Neurologic: Lower extremity sensation is intact to light touch.  No evidence of weakness in the lower extremities.  Protective threshold intact bilateral.     Dermatologic: Skin is intact to both lower extremities without significant lesions, rash or abrasion.  Normal texture turgor and tone. No paronychia or evidence of soft tissue infection is noted.    Musculoskeletal: Patient is ambulatory without assistive device or brace. Pain is noted with firm palpation along the medial band of the plantar fascia bilateral foot most notably at the origination upon the calcaneus not through the arch.  No pain with compression of the calcaneus medial to lateral or with palpation of the achilles, peroneal or posterior tibial tendons.  Slightly more than 0  of ankle joint dorsiflexion without crepitus or pain throughout the ankle, subtalar or midtarsal joints.  No pain or limitations throughout manual muscle strength testing plus 5/5 to all four quadrants bilateral.  No palpable edema noted.      Some discomfort across the ball the foot generally.    Radiographs:  Diminished calcaneal inclination angle consistent with pes valgus.    Hemoglobin A1C (%)   Date Value   04/25/2019 6.3 (H)   04/12/2019 6.2 (A)   10/12/2018 6.3 (A)   07/09/2018 6.6 (H)   04/13/2018 6.7 (H)   12/08/2017 6.6   07/28/2017 6.1   10/27/2016 6.9 (H)     Creatinine (mg/dL)   Date Value   04/25/2019 0.93   03/17/2019 0.82   11/26/2018 0.97   07/09/2018 0.81   04/13/2018 0.77   07/28/2017 0.96          ASSESSMENT:       ICD-10-CM    1. Bilateral foot pain M79.671 XR Foot Bilateral G/E 3 Views    M79.672    2. Type 1 diabetes mellitus without complication (H) E10.9 XR Foot Bilateral G/E 3 Views       PLAN:  Reviewed patient[facility] in [facility] and discussed etiology and treatment options.      Treatments:  9/24/2019  Obtained and interpreted radiographs.   Discontinue barefoot walking or unsupported  walking in shoes without shank.  Dispensed written instructions to obtain appropriate shoe gear    Prescription for custom molded orthotics 9/24/2019  Follow up in 4-5 weeks       Ruben Talley DPM      Again, thank you for allowing me to participate in the care of your patient.        Sincerely,        Ruben Talley DPM

## 2019-09-24 NOTE — PROGRESS NOTES
"HPI:  Maria M Marcus is a 54 year old female who is seen in consultation at the request of Marcus Salgado PA-C.    Pt presents for eval of:   (Onset, Location, L/R, Character, Treatments, Injury if yes)    XR Left and Right foot today 2019    DM Type 1     Ongoing, plantar arch and ball of Left and Right foot pain > Left that radiates to dorsal foot at times. gymnist in high school, Right great toe fracture . Presents today with supportive athletic shoes and orthotics from .  Constant, sharp, stabbing, throbbing, Intermittent, burning, tingling, pain 9  started after wearing \"terrible shoes\" is diabetic    Not working due to her vision loss.      Weight management plan: Patient was referred to their PCP to discuss a diet and exercise plan.     ROS: 10 point ROS neg other than the symptoms noted above in the HPI.    Patient Active Problem List   Diagnosis     Hypothyroidism due to Hashimoto's thyroiditis     Other and unspecified disc disorder of lumbar region     Hyperlipidemia LDL goal <100     Cervical radiculopathy     Advanced directives, counseling/discussion     Frozen shoulder syndrome     Vitiligo     Chronic pain     Back pain     Hypertension goal BP (blood pressure) < 140/90     Type 1 diabetes mellitus without complication (H)     Overweight (BMI 25.0-29.9)     Retinopathy due to secondary diabetes (H)     Gastroesophageal reflux disease, esophagitis presence not specified       PAST MEDICAL HISTORY:   Past Medical History:   Diagnosis Date     Cervical radiculopathy      Diabetic eye exam (H) 11     DISC DIS NEC/NOS-LUMBAR 2007     Frozen shoulder syndrome 2011     Hyperlipidemia LDL goal <100 10/31/2010     Hypertension 1/3/2011     Hypothyroidism due to Hashimoto's thyroiditis      Type 1 diabetes, HbA1c goal < 7% (H) dx 1967     Unspecified hypothyroidism      PAST SURGICAL HISTORY:   Past Surgical History:   Procedure Laterality Date     C  DELIVERY ONLY   "    C-Sections x2     C NONSPECIFIC PROCEDURE      Hysterectomy - total (cervix removed but ovaries remain)     C STOMACH SURGERY PROCEDURE UNLISTED       C TOTAL ABDOM HYSTERECTOMY       COLONOSCOPY N/A 9/21/2016    Procedure: COLONOSCOPY;  Surgeon: Efren Perry MD;  Location: [facility]     ENDOSCOPIC SINUS SURGERY Bilateral 12/4/2018    Procedure: Bilateral functional endoscopic maxillary antrostomies;  Surgeon: Woo Silva MD;  Location: [facility]     HC SACROPLASTY       LAMINECT/DISCECTOMY, CERVICAL  06/19/2007    C7-T1 hemilaminectomy, microdiscectomy, foraminotomy.     LASER YAG CAPSULOTOMY Right 10/23/2014    Procedure: LASER YAG CAPSULOTOMY;  Surgeon: Esteban Dorsey MD;  Location: [facility]     VITRECTOMY,STRIP EPIRETINAL MEMBRANE  06/24/09     MEDICATIONS:   Current Outpatient Medications:      amitriptyline (ELAVIL) 25 MG tablet, TAKE 1 TABLET BY MOUTH AT BEDTIME, Disp: 90 tablet, Rfl: 1     BASAGLAR 100 UNIT/ML injection, Inject 6 Units Subcutaneous 2 times daily, Disp: 30 mL, Rfl: 1     blood glucose monitoring (TANA CONTOUR MONITOR) meter device kit, Use to test blood sugars 5 times daily or as directed., Disp: 1 kit, Rfl: 0     blood glucose monitoring (TANA CONTOUR) test strip, Use to test blood sugar 6 times daily or as directed., Disp: 550 strip, Rfl: 3     blood glucose monitoring (SOFTCLIX) lancets, USE 1  TO CHECK GLUCOSE THREE TIMES DAILY, Disp: 100 each, Rfl: 5     EUTHYROX 75 MCG tablet, TAKE 1 TABLET BY MOUTH ONCE DAILY, Disp: 90 tablet, Rfl: 1     insulin glargine (LANTUS VIAL) 100 UNIT/ML vial, Inject 5 units in AM and 4 units in PM, Disp: 10 mL, Rfl: 3     insulin glulisine (APIDRA) 100 UNIT/ML injection, Inject 1 unit per 20 gram carb with meals. Total daily dose 30 units, Disp: 30 mL, Rfl: 3     insulin pen needle (B-D U/F) 31G X 5 MM miscellaneous, Use 5 pen needles daily or as directed., Disp: 450 each, Rfl: 1     insulin syringe-needle U-100 (BD INSULIN SYRINGE ULTRAFINE)  "31G X 5/16\" 0.3 ML miscellaneous, KEEP ON HAND IN CASE OF PUMP FAILURE. USE 4-5 SYRINGES DAILY, Disp: 100 each, Rfl: 3     lisinopril (PRINIVIL/ZESTRIL) 10 MG tablet, TAKE 1 TABLET BY MOUTH ONCE DAILY, Disp: 90 tablet, Rfl: 3     omeprazole (PRILOSEC) 20 MG CR capsule, TAKE ONE CAPSULE BY MOUTH ONCE DAILY, Disp: 30 capsule, Rfl: 2     omeprazole (PRILOSEC) 20 MG DR capsule, TAKE 1 CAPSULE BY MOUTH ONCE DAILY, Disp: 90 capsule, Rfl: 1     pravastatin (PRAVACHOL) 10 MG tablet, TAKE 1 TABLET BY MOUTH ONCE DAILY, Disp: 90 tablet, Rfl: 3  ALLERGIES:    Allergies   Allergen Reactions     Novolog [Insulin Aspart] Swelling     Itching, rash, contact dermatitis     Humalog [Insulin Lispro] Rash     Contact dermatitis     Zyrtec [Cetirizine] Rash     SOCIAL HISTORY:   Social History     Socioeconomic History     Marital status:      Spouse name: Ra     Number of children: 2     Years of education: 12     Highest education level: Not on file   Occupational History     Occupation: receiving     Employer: [employer]   Social Needs     Financial resource strain: Not on file     Food insecurity:     Worry: Not on file     Inability: Not on file     Transportation needs:     Medical: Not on file     Non-medical: Not on file   Tobacco Use     Smoking status: Never Smoker     Smokeless tobacco: Never Used     Tobacco comment: no exposure   Substance and Sexual Activity     Alcohol use: Yes     Comment: winex1-2/3x per yr.     Drug use: No     Sexual activity: Yes     Partners: Male     Birth control/protection: Surgical, Female Surgical     Comment: hysterectomy   Lifestyle     Physical activity:     Days per week: Not on file     Minutes per session: Not on file     Stress: Not on file   Relationships     Social connections:     Talks on phone: Not on file     Gets together: Not on file     Attends Rastafari service: Not on file     Active member of club or organization: Not on file     Attends meetings of clubs or " "organizations: Not on file     Relationship status: Not on file     Intimate partner violence:     Fear of current or ex partner: Not on file     Emotionally abused: Not on file     Physically abused: Not on file     Forced sexual activity: Not on file   Other Topics Concern     Parent/sibling w/ CABG, MI or angioplasty before 65F 55M? No   Social History Narrative     Not on file     FAMILY HISTORY:   Family History   Problem Relation Age of Onset     Hypertension Maternal Grandfather      EYE* Maternal Grandmother      Blood Disease Maternal Grandmother      Eye Disorder Maternal Grandmother         maccular degeneration     Osteoporosis Maternal Grandmother      Lipids Father      Gastrointestinal Disease Father         ulcers     Heart Disease Father      Cardiovascular Father         heart attack     Hypertension Paternal Grandmother      Breast Cancer Mother         dx age 73 - terminal - mother had first mammo at this age     Diabetes Paternal Uncle         Type I       EXAM:Vitals: /64 (BP Location: Right arm, Cuff Size: Adult Large)   Ht 1.48 m (4' 10.25\")   Wt 73 kg (161 lb)   BMI 33.36 kg/m    BMI= Body mass index is 33.36 kg/m .    General appearance: Patient is alert and fully cooperative with history & exam.  No sign of distress is noted during the visit.     Psychiatric: Affect is pleasant & appropriate.  Patient appears motivated to improve health.     Respiratory: Breathing is regular & unlabored while sitting.     HEENT: Hearing is intact to spoken word.  Speech is clear.  No gross evidence of visual impairment that would impact ambulation.     Vascular: DP & PT 2/4 & regular bilaterally.  No significant edema, rubor or varicosities noted.  CFT and skin temperature is normal to both lower extremities.       Neurologic: Lower extremity sensation is intact to light touch.  No evidence of weakness in the lower extremities.  Protective threshold intact bilateral.     Dermatologic: Skin is " intact to both lower extremities without significant lesions, rash or abrasion.  Normal texture turgor and tone. No paronychia or evidence of soft tissue infection is noted.    Musculoskeletal: Patient is ambulatory without assistive device or brace. Pain is noted with firm palpation along the medial band of the plantar fascia bilateral foot most notably at the origination upon the calcaneus not through the arch.  No pain with compression of the calcaneus medial to lateral or with palpation of the achilles, peroneal or posterior tibial tendons.  Slightly more than 0  of ankle joint dorsiflexion without crepitus or pain throughout the ankle, subtalar or midtarsal joints.  No pain or limitations throughout manual muscle strength testing plus 5/5 to all four quadrants bilateral.  No palpable edema noted.      Some discomfort across the ball the foot generally.    Radiographs:  Diminished calcaneal inclination angle consistent with pes valgus.    Hemoglobin A1C (%)   Date Value   04/25/2019 6.3 (H)   04/12/2019 6.2 (A)   10/12/2018 6.3 (A)   07/09/2018 6.6 (H)   04/13/2018 6.7 (H)   12/08/2017 6.6   07/28/2017 6.1   10/27/2016 6.9 (H)     Creatinine (mg/dL)   Date Value   04/25/2019 0.93   03/17/2019 0.82   11/26/2018 0.97   07/09/2018 0.81   04/13/2018 0.77   07/28/2017 0.96          ASSESSMENT:       ICD-10-CM    1. Bilateral foot pain M79.671 XR Foot Bilateral G/E 3 Views    M79.672    2. Type 1 diabetes mellitus without complication (H) E10.9 XR Foot Bilateral G/E 3 Views       PLAN:  Reviewed patient's chart in [facility] and discussed etiology and treatment options.      Treatments:  9/24/2019  Obtained and interpreted radiographs.   Discontinue barefoot walking or unsupported walking in shoes without shank.  Dispensed written instructions to obtain appropriate shoe gear    Prescription for custom molded orthotics 9/24/2019  Follow up in 4-5 weeks       Ruben Talley DPM

## 2019-09-28 ENCOUNTER — HEALTH MAINTENANCE LETTER (OUTPATIENT)
Age: 54
End: 2019-09-28

## 2019-10-15 ENCOUNTER — NURSE TRIAGE (OUTPATIENT)
Dept: FAMILY MEDICINE | Facility: OTHER | Age: 54
End: 2019-10-15

## 2019-10-15 NOTE — TELEPHONE ENCOUNTER
Returned patient's call, sore throat and hoarse voice, dry cough since being exposed to a child diagnosed with strep on Saturday.  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, who has since been dx with strep  - she has severe throat pain, since yesterday  - hoarse voice  - everything burns her throat  - no runny nose or cough  - no swallowing difficulty or SOB/difficulty breathing  - throat looks red to her, not swollen    Disposition: see today in office  - no OV available today in [facility], recommended [facility] or [facility]  - she declines other clinic, agrees to go to [facility] urgent care in [place] today      Additional Information    Negative: Severe difficulty breathing (e.g., struggling for each breath, speaks in single words)    Negative: Sounds like a life-threatening emergency to the triager    Negative: Throat culture results, call about    Negative: Productive cough is the main symptom    Negative: Runny nose is the main symptom    Negative: Drooling or spitting out saliva (because can't swallow)    Negative: Unable to open mouth completely    Negative: Drinking very little and has signs of dehydration (e.g., no urine > 12 hours, very dry mouth, very lightheaded)    Negative: Patient sounds very sick or weak to the triager    Negative: Difficulty breathing (per caller) but not severe    Negative: Fever > 103 F (39.4 C)    Negative: Refuses to drink anything for > 12 hours    SEVERE sore throat pain    Protocols used: SORE THROAT-A-OH    Angela Kirby, RN, BSN

## 2019-10-15 NOTE — TELEPHONE ENCOUNTER
Reason for call:  Patient reporting a symptom    Symptom or request: Strep concerns, sore throat    Duration (how long have symptoms been present): Saturday night    Have you been treated for this before? Yes    Additional comments: Patient does not have a fever. She is wondering if she has to have a fever to have strep. She was exposed to it over the weekend. She is wondering if she should be seen?    Phone Number patient can be reached at:  Home number on file [phone] (home) or Cell number on file:    Telephone Information:   Mobile [phone]       Best Time:  any    Can we leave a detailed message on this number:  YES    Call taken on 10/15/2019 at 11:38 AM by Namita Tom

## 2019-10-16 ENCOUNTER — NURSE TRIAGE (OUTPATIENT)
Dept: NURSING | Facility: CLINIC | Age: 54
End: 2019-10-16

## 2019-10-16 ENCOUNTER — OFFICE VISIT (OUTPATIENT)
Dept: URGENT CARE | Facility: RETAIL CLINIC | Age: 54
End: 2019-10-16
Payer: COMMERCIAL

## 2019-10-16 VITALS
DIASTOLIC BLOOD PRESSURE: 61 MMHG | HEART RATE: 103 BPM | OXYGEN SATURATION: 98 % | SYSTOLIC BLOOD PRESSURE: 144 MMHG | TEMPERATURE: 98.4 F

## 2019-10-16 DIAGNOSIS — J02.9 ACUTE PHARYNGITIS, UNSPECIFIED ETIOLOGY: Primary | ICD-10-CM

## 2019-10-16 DIAGNOSIS — J20.9 ACUTE BRONCHITIS WITH SYMPTOMS GREATER THAN 10 DAYS: ICD-10-CM

## 2019-10-16 LAB — S PYO AG THROAT QL IA.RAPID: NEGATIVE

## 2019-10-16 PROCEDURE — 99213 OFFICE O/P EST LOW 20 MIN: CPT | Performed by: INTERNAL MEDICINE

## 2019-10-16 PROCEDURE — 87880 STREP A ASSAY W/OPTIC: CPT | Mod: QW | Performed by: INTERNAL MEDICINE

## 2019-10-16 PROCEDURE — 87081 CULTURE SCREEN ONLY: CPT | Performed by: INTERNAL MEDICINE

## 2019-10-16 NOTE — PROGRESS NOTES
[facility] Care Progress Note        Kenneth Herrmann MD, MPH  10/16/2019        History:      Maria M Marcus is a pleasant 54 year old year old female with a chief complaint of sore throat,cough ( slightly productive w whitish sputum) since 5 days ago. She reports nasal congestion almost a week earlier.She reports exposure to contacts w strep pharyngitis.  No dyspnea or wheezing.   No smoking history.   No headache or neck pain.  No GI or  symptoms.   No MSK symptoms.  Hx of Type I DIABETES MELLITUS.         Assessment and Plan:        1. Acute pharyngitis, unspecified etiology    - RAPID STREP SCREEN: negative.  - BETA STREP GROUP A R/O CULTURE  Discussed supportive care with the patient  Patient was advised to use throat lozenges and gargle with salt water for symptomatic relief.    2. Acute bronchitis with symptoms greater than 10 days    - amoxicillin-clavulanate (AUGMENTIN) 875-125 MG tablet; Take 1 tablet by mouth 2 times daily for 10 days  Dispense: 20 tablet; Refill: 0  Advised to increase fluid intake and rest.  Tylenol 650 MG PO for pain q 6 hours as needed.  F/u w PCP in 4-5 days, earlier if symptoms worsen.                   Physical Exam:      BP (!) 144/61 (BP Location: Left arm)   Pulse 103   Temp 98.4  F (36.9  C) (Tympanic)   SpO2 98%      Constitutional: Patient is in no distress The patient is pleasant and cooperative.   HEENT: Head:  Head is atraumatic, normocephalic.    Eyes: Pupils are equal, round and reactive to light and accomodation.  Sclera is non-icteric. No conjunctival injection, or exudate noted. Extraocular motion is intact. Visual acuity is intact bilaterally.  Ears:  External acoustic canals are patent and clear.  There is no erythema and bulging( exudate)  of the ( R/L ) tympanic membrane(s ).   Nose:  Nasal congestion w/o drainage or mucosal ulceration is noted.  Throat:  Oral mucosa is moist.  No oral lesions are noted. Posterior pharyngeal hyperemia  w/o exudate noted.     Neck Supple.  There is cervical lymphadenopathy.  No nuchal rigidity noted.  There is no meningismus.     Cardiovascular: Heart is regular to rate and rhythm.  No murmur is noted.     Lungs: Clear in the anterior and posterior pulmonary fields.   Abdomen: Soft and non-tender.    Back No flank tenderness is noted.   Extremeties No edema, no calf tenderness.   Neuro: No focal deficit.   Skin No petechiae or purpura is noted.  There is no rash.   Mood Normal              Data:      All new lab and imaging data was reviewed.   Results for orders placed or performed in visit on 09/23/19   XR Foot Bilateral G/E 3 Views    Narrative    FOOT BILATERAL THREE OR MORE VIEWS     9/24/2019 8:28 AM     HISTORY: Bilateral foot pain. Type 1 diabetes mellitus without  complication (H).    COMPARISON: None.     FINDINGS: There are bilateral arterial calcifications which could be  associated with diabetes mellitus. Recommend clinical correlation.  There is minimal joint space loss of the bilateral first  metatarsophalangeal joints which could represent mild degenerative  change. Remaining joint spaces are grossly well-maintained  bilaterally. Mild enthesopathic changes are seen at the plantar  aponeurosis origin at the left calcaneus. No acute fracture or  malalignment is identified.    There are arterial calcifications in the bilateral feet in a pattern  corresponding with patient's known history of diabetes mellitus.      Impression    IMPRESSION:  1. Mild degenerative changes of the bilateral first  metatarsophalangeal joints.  2. Mild enthesopathic changes of the plantar aponeurosis origin at the  left calcaneus.  3. No acute fracture or malalignment. No other significant  abnormalities are identified.   4. Vascular calcifications in a pattern corresponding with patient's  known history of diabetes mellitus.  5. No cortical irregularity to suggest osteomyelitis.    SMITA ROBERTS MD

## 2019-10-17 ENCOUNTER — TELEPHONE (OUTPATIENT)
Dept: ENDOCRINOLOGY | Facility: CLINIC | Age: 54
End: 2019-10-17

## 2019-10-17 NOTE — TELEPHONE ENCOUNTER
Writer attempted to contact patient.    Line was busy.    Will attempt at later time.    Nandini Jenkins LPN  Diabetes Clinic Coordinator   Adult Endocrinology and Pediatric Specialty Clinics  [facility]

## 2019-10-17 NOTE — TELEPHONE ENCOUNTER
[facility] Call Center    Phone Message    May a detailed message be left on voicemail: yes    Reason for Call: Other: Pt is requesting a call back. She states it's not urgent, but has a question. Please advise.     Action Taken: Other: Jessica Demarco

## 2019-10-17 NOTE — TELEPHONE ENCOUNTER
"Pt calls in with complaint of EAR PAIN    Was seen in [facility] today > Dx of pharyngitis and bronchitis > ( see note)    Prescribed Augmentin     Ear pain began approx 1 hour ago 7:30 pm   No drainage     States \" it feels like they are burning and Hot \"  Left ear greater than Right  Rates pain senait in the Left ear 8 out of 10    Pt states she is a Type 1 diabetic  And cant take Motrin - nor tylenol because of her \" continuous glucose monitoring system\"    Has applied heating pad - minimal relief    Per protocol pt advised to be seen by a Provider with 4 hours     Pt states she will \"think\" about going to ED   But would like to see if we can get her in clinic tomorrow First     Pt transferred to Scheduling     appointment made for tomorrow Thursday 10/17 at 8 am in [facility]    With back -up plan ED if worsens     Protocol and care advice reviewed  Caller states understanding of the recommended disposition  Advised to call back if further questions or concerns    Juan F Martinez RN / [place]                          Reason for Disposition    Diabetes mellitus or weak immune system (e.g., HIV positive, cancer chemo, splenectomy, organ transplant, chronic steroids)    Additional Information    Negative: [1] Stiff neck (unable to touch chin to chest) AND [2] fever    Negative: [1] Bony area of skull behind the ear is pink or swollen AND [2] fever    Negative: Fever > 104 F (40 C)    Negative: Patient sounds very sick or weak to the triager    Negative: [1] SEVERE pain AND [2] not improved 2 hours after taking analgesic medication (e.g., ibuprofen or acetaminophen)    Negative: Walking is very unsteady    Negative: Sudden onset of ear pain after long - thin object was inserted into the ear canal (e.g., pencil, Q-tip)    Protocols used: EARACHE-A-AH      "

## 2019-10-18 LAB
BACTERIA SPEC CULT: NORMAL
SPECIMEN SOURCE: NORMAL

## 2019-10-18 NOTE — TELEPHONE ENCOUNTER
Attempted to contact patient. Home number busy and temp number did not have voicemail set up.    Will send patient My Chart Message.    Nandini Jenkins LPN  Diabetes Clinic Coordinator   Adult Endocrinology and Pediatric Specialty Clinics  [facility]

## 2019-10-21 ENCOUNTER — TELEPHONE (OUTPATIENT)
Dept: EDUCATION SERVICES | Facility: CLINIC | Age: 54
End: 2019-10-21

## 2019-10-21 NOTE — TELEPHONE ENCOUNTER
[facility] Call Center    Phone Message    May a detailed message be left on voicemail: yes    Reason for Call: Patient request a call back to discuss getting a new provider for Dexacom and to discuss a referall for Endocrinology. Please call back to discuss.      Action Taken: Message routed to:  Adult Clinics: Endocrinology p [phone]

## 2019-10-21 NOTE — TELEPHONE ENCOUNTER
Number provided was busy.    Nandini Jenkins LPN  Diabetes Clinic Coordinator   Adult Endocrinology and Pediatric Specialty Clinics  [facility]

## 2019-10-21 NOTE — TELEPHONE ENCOUNTER
Attempted to reach patient. Mobile line busy. Temporary number does not have voicemail set up.    Will send message via My Chart.    Nandini Jenkins LPN  Diabetes Clinic Coordinator   Adult Endocrinology and Pediatric Specialty Clinics  [facility]

## 2019-10-21 NOTE — TELEPHONE ENCOUNTER
Patient left voicemail requesting call back at [phone].      Batsheva Gonzalez RN  Endocrine Care Coordinator  [facility]

## 2019-10-22 NOTE — TELEPHONE ENCOUNTER
Patient udpate:  1. Diagnosed with pneumonia at [facility] 10/16. Treated with Amoxicillin for 10 days. BG have been manageable.  2. Insurance contract changed so no longer covering Dexcom (next shipment from [employer] was set for 10/31). Due to history of hypoglycemia, writer will attempt prior authorization to continue G6.  3. Patient had insulin pen fail so is using Apidra and Lantus vials.  4. Patient scheduled to transition to Dr. Bradford on 11/18.    Nandini Jenkins LPN  Diabetes Clinic Coordinator   Adult Endocrinology and Pediatric Specialty Clinics  [facility]

## 2019-10-23 ENCOUNTER — TELEPHONE (OUTPATIENT)
Dept: ENDOCRINOLOGY | Facility: CLINIC | Age: 54
End: 2019-10-23

## 2019-10-23 NOTE — TELEPHONE ENCOUNTER
Patients contract with insurance changed and they are no longer covering Dexcom G6. Preferred [employer].    Due to significant history of hypoglycemia unawareness PA completed via covermymeds for G6 transmitter and sensors.    Key:  [id number]-sensor  [id number]-transmitter.    Will await determination.    Nandini Jenkins LPN  Diabetes Clinic Coordinator   Adult Endocrinology and Pediatric Specialty Clinics  [facility]

## 2019-10-24 ENCOUNTER — MYC MEDICAL ADVICE (OUTPATIENT)
Dept: ENDOCRINOLOGY | Facility: CLINIC | Age: 54
End: 2019-10-24

## 2019-10-24 NOTE — TELEPHONE ENCOUNTER
Notification of Prior Authorization Denial received from [facility].    Reason for Denial: Not covered under pharmacy benefit.    Appeal letter completed and faxed to [facility] [phone] for review.    Patient updated via My Chart.    Nandini Jenkins LPN  Diabetes Clinic Coordinator   Adult Endocrinology and Pediatric Specialty Clinics  [facility]

## 2019-10-25 NOTE — TELEPHONE ENCOUNTER
I spoke with Maria M and was able to reschedule her appointment from November 18 with Dr Bradford to November 8 with Dr Jarquin arriving at 140pm.       Rambo Gill  Procedure , [place]  Peds Specialty and Adult Endocrinology

## 2019-10-26 ENCOUNTER — HEALTH MAINTENANCE LETTER (OUTPATIENT)
Age: 54
End: 2019-10-26

## 2019-11-01 ENCOUNTER — MYC MEDICAL ADVICE (OUTPATIENT)
Dept: ENDOCRINOLOGY | Facility: CLINIC | Age: 54
End: 2019-11-01

## 2019-11-01 ENCOUNTER — NURSE TRIAGE (OUTPATIENT)
Dept: FAMILY MEDICINE | Facility: OTHER | Age: 54
End: 2019-11-01

## 2019-11-01 NOTE — TELEPHONE ENCOUNTER
Spoke to patient.     States she was recently treated for bronchitis, put on antibiotics. She usually gets a yeast infection after antibiotic treatment and thought that what this is, but she started having bloody discharge and abdominal pain, which is new for her.    She states she having bloody discharge that started last night.   The discharge small and bloody but is on underwear and on toilet paper when she wipes.    Got vagisil at [facility] and used it twice, but isn't helping.     Abdominal pain started last night and is constant. Her pain in increasing, currently at a 5/10.  Very low, like menstrual cramping but covers whole lower abdomen.      RN advised ER/UCC. Patient states she would prefer an appointment. RN advised no appointments available and could have PCP review, but would likely recommend ER/UCC anyway. Patient states understanding and will seek care at [facility].     Lori Rainey, RN BSN      Additional Information    Negative: Passed out (i.e., fainted, collapsed and was not responding)    Negative: Shock suspected (e.g., cold/pale/clammy skin, too weak to stand, low BP, rapid pulse)    Negative: Sounds like a life-threatening emergency to the triager    Negative: Chest pain    Negative: Pain is mainly in upper abdomen (if needed ask: 'is it mainly above the belly button?')    Negative: Abdominal pain and pregnant > 20 weeks    Negative: Abdominal pain and pregnant < 20 weeks    Negative: SEVERE abdominal pain (e.g., excruciating)    Negative: Vomiting red blood or black (coffee ground) material    Negative: Bloody, black, or tarry bowel movements    Constant abdominal pain lasting > 2 hours    Protocols used: ABDOMINAL PAIN - FEMALE-A-OH

## 2019-11-01 NOTE — TELEPHONE ENCOUNTER
Line busy.    Will send My Chart Message.    Nandini Jenkins LPN  Diabetes Clinic Coordinator   Adult Endocrinology and Pediatric Specialty Clinics  [facility]

## 2019-11-04 NOTE — TELEPHONE ENCOUNTER
Supply and Progress Note form completed and faxed to  [employer] [phone] for Dexcom CGM supplies.    Nandini Jenkins LPN  Diabetes Clinic Coordinator   Adult Endocrinology and Pediatric Specialty Clinics  [facility]

## 2019-11-06 NOTE — PATIENT INSTRUCTIONS
Reduce lantus 4 units in the morning and 3 units in the evening.     [facility]-Department of Endocrinology  Jessica Demarco RN, Diabetes Educator: [phone]  Clinic Nurses AYAKA Herman; CMA's: Babs Phillip [phone]  SAM DelatorreN : [phone]  Clinic Fax: [phone]  On-Call Endocrine at the [place] (after hours/weekends): [phone] option 4  Scheduling Line: [phone]    Appointment Reminders:  * Please bring meter with for staff to download  * If you are due ONLY for an A1C, it is scheduled with the nurse and will be done in clinic. You do not need to schedule a lab appointment. Fasting is not required for an A1C.  * Refill request should be submitted to your pharmacy. They will contact clinic for approval.

## 2019-11-07 ENCOUNTER — NURSE TRIAGE (OUTPATIENT)
Dept: FAMILY MEDICINE | Facility: OTHER | Age: 54
End: 2019-11-07

## 2019-11-07 NOTE — TELEPHONE ENCOUNTER
Reason for call:  Patient reporting a symptom    Symptom or request: fever 100.7    Duration (how long have symptoms been present): today    Have you been treated for this before? no    Additional comments: patient stopped her uti med's yesterday and has a fever today. Would like call to advise. declined appt    Phone Number patient can be reached at:  Home number on file [phone] (home)    Best Time:  any    Can we leave a detailed message on this number:  YES    Call taken on 11/7/2019 at 4:26 PM by Abbie Islas

## 2019-11-07 NOTE — TELEPHONE ENCOUNTER
Spoke with patient.     Was seen at [facility] for UTI and was given antibiotic.  Doesn't remember which.  Was lifting some boxes yesterday.      Noticed low grade fever today and some soreness all over.  No other symptoms.  UTI fells like it is gone.    Will monitor symptoms for now.  Increase fluids and rest.  OCT fever reducer if needed.  Return call with further questions.    Home Care Advised:   See Care Advice section in Epic    Asim Holloway, RN, BSN          Additional Information    Negative: Difficult to awaken or acting confused (e.g., disoriented, slurred speech)    Negative: Pale cold skin and very weak (can't stand)    Negative: Difficulty breathing and bluish (or gray) lips or face    Negative: New onset rash with purple (or blood-colored) spots or dots    Negative: Sounds like a life-threatening emergency to the triager    Negative: Fever onset within 24 hours of receiving vaccine    Negative: Fever within 21 days of Ebola EXPOSURE    Negative: Headache and stiff neck (can't touch chin to chest)    Negative: Difficulty breathing    Negative: IV drug abuse    Negative: Fever > 103 F (39.4 C)    Negative: Fever > 101 F (38.3 C) and over 60 years of age    Negative: Fever > 100.0 F (37.8 C) and diabetes mellitus or a weak immune system (e.g., HIV positive, chemotherapy, splenectomy)    Negative: Fever > 100.0 F (37.8 C) and bedridden (e.g., nursing home patient, stroke, chronic illness, recovering from surgery)    Negative: Fever > 100.0 F (37.8 C) and indwelling urinary catheter (e.g., Posada, coude)    Negative: Fever > 100.5 F (38.1 C) and has port (portacath), central line, or PICC line    Negative: Drinking very little and has signs of dehydration (e.g., no urine > 12 hours, very dry mouth, very lightheaded)    Negative: Patient sounds very sick or weak to the triager    Negative: Fever > 100.5 F (38.1 C) and surgery in the past month    Negative: Transplant patient (e.g., liver, heart, lung,  kidney)    Negative: Widespread rash and cause unknown    Negative: Patient wants to be seen    Negative: Intermittent fever > 100.5 F persists > 3 weeks    Negative: Fever present > 3 days (72 hours)    Negative: Fever > 100.0 F (37.8 C) and foreign travel to a developing country in the past month    Fever with no signs of serious infection or localizing symptoms    Protocols used: FEVER-A-OH

## 2019-11-08 ENCOUNTER — OFFICE VISIT (OUTPATIENT)
Dept: ENDOCRINOLOGY | Facility: CLINIC | Age: 54
End: 2019-11-08
Payer: COMMERCIAL

## 2019-11-08 VITALS
OXYGEN SATURATION: 97 % | HEART RATE: 83 BPM | BODY MASS INDEX: 33.13 KG/M2 | DIASTOLIC BLOOD PRESSURE: 78 MMHG | SYSTOLIC BLOOD PRESSURE: 143 MMHG | WEIGHT: 159.9 LBS

## 2019-11-08 DIAGNOSIS — E06.3 HYPOTHYROIDISM DUE TO HASHIMOTO'S THYROIDITIS: ICD-10-CM

## 2019-11-08 DIAGNOSIS — E10.9 TYPE 1 DIABETES MELLITUS WITHOUT COMPLICATION (H): Chronic | ICD-10-CM

## 2019-11-08 LAB
ALBUMIN SERPL-MCNC: 4.1 G/DL (ref 3.4–5)
ALT SERPL W P-5'-P-CCNC: 19 U/L (ref 0–50)
ANION GAP SERPL CALCULATED.3IONS-SCNC: 3 MMOL/L (ref 3–14)
BUN SERPL-MCNC: 11 MG/DL (ref 7–30)
CALCIUM SERPL-MCNC: 9.4 MG/DL (ref 8.5–10.1)
CHLORIDE SERPL-SCNC: 105 MMOL/L (ref 94–109)
CK SERPL-CCNC: 58 U/L (ref 30–225)
CO2 SERPL-SCNC: 31 MMOL/L (ref 20–32)
CREAT SERPL-MCNC: 0.81 MG/DL (ref 0.52–1.04)
CREAT UR-MCNC: 22 MG/DL
GFR SERPL CREATININE-BSD FRML MDRD: 82 ML/MIN/{1.73_M2}
GLUCOSE SERPL-MCNC: 85 MG/DL (ref 70–99)
HBA1C MFR BLD: 5.8 % (ref 0–5.6)
HCT VFR BLD AUTO: 39.4 % (ref 35–47)
MICROALBUMIN UR-MCNC: <5 MG/L
MICROALBUMIN/CREAT UR: NORMAL MG/G CR (ref 0–25)
PHOSPHATE SERPL-MCNC: 3.3 MG/DL (ref 2.5–4.5)
POTASSIUM SERPL-SCNC: 3.7 MMOL/L (ref 3.4–5.3)
SODIUM SERPL-SCNC: 139 MMOL/L (ref 133–144)
T4 FREE SERPL-MCNC: 1.28 NG/DL (ref 0.76–1.46)
TSH SERPL DL<=0.005 MIU/L-ACNC: 0.32 MU/L (ref 0.4–4)

## 2019-11-08 PROCEDURE — 99000 SPECIMEN HANDLING OFFICE-LAB: CPT | Performed by: INTERNAL MEDICINE

## 2019-11-08 PROCEDURE — 84443 ASSAY THYROID STIM HORMONE: CPT | Performed by: INTERNAL MEDICINE

## 2019-11-08 PROCEDURE — 84439 ASSAY OF FREE THYROXINE: CPT | Performed by: INTERNAL MEDICINE

## 2019-11-08 PROCEDURE — 82550 ASSAY OF CK (CPK): CPT | Performed by: INTERNAL MEDICINE

## 2019-11-08 PROCEDURE — 86337 INSULIN ANTIBODIES: CPT | Mod: 90 | Performed by: INTERNAL MEDICINE

## 2019-11-08 PROCEDURE — 99214 OFFICE O/P EST MOD 30 MIN: CPT | Performed by: INTERNAL MEDICINE

## 2019-11-08 PROCEDURE — 80069 RENAL FUNCTION PANEL: CPT | Performed by: INTERNAL MEDICINE

## 2019-11-08 PROCEDURE — 36415 COLL VENOUS BLD VENIPUNCTURE: CPT | Performed by: INTERNAL MEDICINE

## 2019-11-08 PROCEDURE — 83036 HEMOGLOBIN GLYCOSYLATED A1C: CPT | Performed by: INTERNAL MEDICINE

## 2019-11-08 PROCEDURE — 85014 HEMATOCRIT: CPT | Performed by: INTERNAL MEDICINE

## 2019-11-08 PROCEDURE — 84681 ASSAY OF C-PEPTIDE: CPT | Performed by: INTERNAL MEDICINE

## 2019-11-08 PROCEDURE — 84460 ALANINE AMINO (ALT) (SGPT): CPT | Performed by: INTERNAL MEDICINE

## 2019-11-08 PROCEDURE — 86341 ISLET CELL ANTIBODY: CPT | Mod: 90 | Performed by: INTERNAL MEDICINE

## 2019-11-08 PROCEDURE — 82043 UR ALBUMIN QUANTITATIVE: CPT | Performed by: INTERNAL MEDICINE

## 2019-11-08 NOTE — NURSING NOTE
Maria M Marcus's goals for this visit include:   Chief Complaint   Patient presents with     Diabetes       She requests these members of her care team be copied on today's visit information: Yes    PCP: Marcus Salgado    Referring Provider:  No referring provider defined for this encounter.    BP (!) 143/78 (BP Location: Left arm, Patient Position: Chair, Cuff Size: Adult Regular)   Pulse 83   Wt 72.5 kg (159 lb 14.4 oz)   SpO2 97%   BMI 33.13 kg/m      Do you need any medication refills at today's visit? No

## 2019-11-08 NOTE — PROGRESS NOTES
Endocrinology Clinic Visit       Chief Complaint: Diabetes     Interval history:   No severe lows.   AM lows recognized by Dexcom, eats at HS to prevent lows .   No palpitation SOB  Hot flashes are new. During this time BG gets higher, persistent.   No fevers  No change in BM  No depression.     HPI:   Maria M is a v pleasant 54 year old female who presents for follow up.   She has H/O type 1 diabetes diagnosed at age 2, hypothyroidism diagnosed in her 20s and vitiligo. She takes pravastatin for hyperlipidemia and lisinopril for hypertension.  Her main initial concern was recurrent hypoglycemia. INGRAM showed negative 21 hydroxylase Ab, positive TPO and negative TTG.     History of Diabetes  She was diagnosed with type 1 diabetes mellitus since he was [age] years old.  Over the years, she had developed diabetic retinopathy for which laser treatment was required.  She does not have neuropathy or nephropathy.  In the past several years, her A1c has been in good range between 6.1 and 7.5.  No macrovascular complications.    She was transitioned from basal bolus regimen to insulin pump that she has started about 3-4 days ago. She obtained a Dexcom CGM that has reduced the frequency of hypoglycemia to a great extent.     Low BG is now improved with sensor.  The major pattern was that was detected was hyperglycemia followed by hypoglycemia.  Usually she has blood sugars over 250 and uses correction and afterwards she develops hypoglycemia.    Developed significant rash around injection site from Humalog, Novolog.   This resolved with Apidra.   No new issues at this time.   Regular insulin - patient has used this in distant past.     Family History  Maria M notes that her mother had breast cancer, a nephew and an uncle had T1DM but other AI diseases do not run in family. No history of thyroid cancer.     Meds  Aspart for T slim pump.     Prevention  Lipid  LDL Cholesterol Calculated   Date Value Ref Range Status   10/27/2016 109  "(H) <100 mg/dL Final     Comment:     Above desirable:  100-129 mg/dl   Borderline High:  130-159 mg/dL   High:             160-189 mg/dL   Very high:       >189 mg/dl     04/11/2016 132 (H) <100 mg/dL Final     Comment:     Above desirable:  100-129 mg/dl   Borderline High:  130-159 mg/dL   High:             160-189 mg/dL   Very high:       >189 mg/dl         Medications     HMG CoA Reductase Inhibitors    pravastatin (PRAVACHOL) 10 MG tablet    pravastatin (PRAVACHOL) 10 MG tablet          Renal  Medication (Note: This includes the hypertensive combination subclass to make sure to show all ACEI/ARB's.)     ACE Inhibitors Sig    lisinopril (PRINIVIL/ZESTRIL) 10 MG tablet TAKE ONE TABLET BY MOUTH ONCE DAILY            Weight  Wt Readings from Last 4 Encounters:   07/28/17 69.9 kg (154 lb)   03/01/17 67.4 kg (148 lb 9.6 oz)   02/09/17 69.4 kg (153 lb)   10/28/16 66.2 kg (146 lb)     .    Meter Download Summary:   Only few days recorded for calibration.   Diet:  she usually counts her carbs in carb choices   Exercise  no scheduled exercise     Weight trend  Wt Readings from Last 4 Encounters:   07/28/17 69.9 kg (154 lb)   03/01/17 67.4 kg (148 lb 9.6 oz)   02/09/17 69.4 kg (153 lb)   10/28/16 66.2 kg (146 lb)       A1c today is  6.6  Lab Results   Component Value Date    A1C 6.1 07/28/2017    A1C 6.9 10/27/2016    A1C 6.1 04/11/2016    A1C 6.0 10/09/2015    A1C 6.8 05/26/2015          Allergies   Allergen Reactions     No Known Drug Allergies      Current Outpatient Medications   Medication     amitriptyline (ELAVIL) 25 MG tablet     blood glucose monitoring (TANA CONTOUR MONITOR) meter device kit     blood glucose monitoring (TANA CONTOUR) test strip     blood glucose monitoring (SOFTCLIX) lancets     EUTHYROX 75 MCG tablet     insulin glargine (LANTUS VIAL) 100 UNIT/ML vial     insulin glulisine (APIDRA) 100 UNIT/ML injection     insulin syringe-needle U-100 (BD INSULIN SYRINGE ULTRAFINE) 31G X 5/16\" 0.3 ML " miscellaneous     lisinopril (PRINIVIL/ZESTRIL) 10 MG tablet     pravastatin (PRAVACHOL) 10 MG tablet     omeprazole (PRILOSEC) 20 MG CR capsule     omeprazole (PRILOSEC) 20 MG DR capsule     No current facility-administered medications for this visit.        Review of Systems     Constitutional: wt gain  Head: no headache   Eye: no vision change/ loss of peripheral vision.   ENT: No throat congestion.   Respiratory: no cough   Cardiovascular: no chest pain or tachycardia  Gastrointestinal: Negative for vomiting, abdominal pain and diarrhea.  Genitourinary: No bladder problems.  Musculoskeletal: Negative for myalgias. No weakness.  Neurological: Negative for seizures and headaches.  Psychiatric/Behavioral: Negative for behavioral problem and dysphoric mood.    PMH as above.   PSH    Past Surgical History:   Procedure Laterality Date     C  DELIVERY ONLY      C-Sections x2     C NONSPECIFIC PROCEDURE      Hysterectomy - total (cervix removed but ovaries remain)     C STOMACH SURGERY PROCEDURE UNLISTED       C TOTAL ABDOM HYSTERECTOMY       COLONOSCOPY N/A 2016    Procedure: COLONOSCOPY;  Surgeon: Efren Perry MD;  Location: [facility]     HC SACROPLASTY       LAMINECT/DISCECTOMY, CERVICAL  2007    C7-T1 hemilaminectomy, microdiscectomy, foraminotomy.     LASER YAG CAPSULOTOMY Right 10/23/2014    Procedure: LASER YAG CAPSULOTOMY;  Surgeon: Esteban Dorsey MD;  Location: [facility]     VITRECTOMY,STRIP EPIRETINAL MEMBRANE  09       Family History   Problem Relation Age of Onset     Hypertension Maternal Grandfather      EYE* Maternal Grandmother      Blood Disease Maternal Grandmother      Eye Disorder Maternal Grandmother      maccular degeneration     OSTEOPOROSIS Maternal Grandmother      Lipids Father      GASTROINTESTINAL DISEASE Father      ulcers     HEART DISEASE Father      Cardiovascular Father      heart attack     Hypertension Paternal Grandmother      Breast Cancer Mother       dx age 73 - terminal - mother had first mammo at this age     DIABETES Paternal Uncle      Type I       Social History     Social History     Marital status:      Spouse name: Ra     Number of children: 2     Years of education: 12     Occupational History     receiving [employer]     Social History Main Topics     Smoking status: Never Smoker     Smokeless tobacco: Never Used      Comment: no exposure     Alcohol use Yes      Comment: winex1-2/3x per yr.     Drug use: No     Sexual activity: Yes     Partners: Male     Birth control/ protection: Surgical, Female Surgical      Comment: hysterectomy     Other Topics Concern     Parent/Sibling W/ Cabg, Mi Or Angioplasty Before 65f 55m? No     Social History Narrative       Objective:   BP (!) 143/78 (BP Location: Left arm, Patient Position: Chair, Cuff Size: Adult Regular)   Pulse 83   Wt 72.5 kg (159 lb 14.4 oz)   SpO2 97%   BMI 33.13 kg/m     Constitutional: obese.   EYES: anicteric, normal extra-ocular movements, no lid lag or retraction   HEENT: Mouth/Throat: Mucous membrane is moist. Oropharynx is clear. No adenopathy. Normal thyroid   Cardiovascular: RRR, S1, S2 normal. No m/g/r   Pulmonary/Chest: CTAB. No wheezing or rales   Abdominal: +BS. Nontender to palpation. No organomegaly present.  Neurological: Alert. Normal speech  Extremities: No clubbing, cyanosis or inflammation   Skin: Vitiligo  Feet: Diminished vibratory sense bilateral and normal monofilament test. Due 11/2020  Lymphatic: no cervical lymphadenopathy.  Psychological: appropriate mood     In House Labs:   Lab Results   Component Value Date    A1C 6.1 07/28/2017    A1C 6.9 10/27/2016    A1C 6.1 04/11/2016    A1C 6.0 10/09/2015    A1C 6.8 05/26/2015       TSH   Date Value Ref Range Status   10/27/2016 0.90 0.40 - 4.00 mU/L Final   05/26/2015 0.06 (L) 0.40 - 4.00 mU/L Final   03/17/2014 0.78 0.4 - 5.0 mU/L Final   01/18/2013 0.65 0.4 - 5.0 mU/L Final   08/20/2012 0.60 0.4 - 5.0 mU/L Final      T4 Free   Date Value Ref Range Status   11/09/2011 1.69 0.70 - 1.85 ng/dL Final       Creatinine   Date Value Ref Range Status   10/27/2016 1.00 0.52 - 1.04 mg/dL Final   ]    Recent Labs   Lab Test  10/27/16   0748  04/11/16   1013  05/26/15   0738  03/17/14   0735   CHOL  186  210*  167  165   HDL  56  63  46*  58   LDL  109*  132*  84  90   TRIG  104  77  183*  82   CHOLHDLRATIO   --    --   3.6  3.0       Work up  (8/17)   21 OH ab normal - this was done due to frequent lows.   No celiac disease antibodies (gluten sensitivity)  positive TPO.       Continuous glucose monitoring physician interpretation  Sensory use 30/30 days     low alert 85  High alert 280    Average blood sugar 147, standard deviation 48  64% within range, 32% above range and 3% below range  Minimal risk of hypoglycemia  Calibration 0    Major patterns  Early AM hypoglycemia after snacking at HS and using Apidra.   Post correction hypoglycemia at night, with rise early in am -- could be dgamar phenomena but also eats to correct lows which could be some of these rise in bg.      Labs:   A1c was 6.2      Assessment/Treatment Plan:      Maria M Marcus is a 52 year old year old female with    1. Type 1 Diabetes with  retinopathy and mild neuropathy and hypoglycemia unawareness on CGM  2.  Left side numbness in fourth and fifth finger   3.  Hypothyroidism   4.  Vitiligo   5.  Possible scoliosis in a postmenopausal female.      1. Type 1 Diabetes with  retinopathy and mild neuropathy and hypoglycemia unawareness on CGM  Barriers to achieving glycemic goals  Overcorrection: lows are less frequent with current regimen of 1 per 20   Hypoglycemia unawareness and frequent hypoglycemia.  Continue Dexcom.    Basal to avoid AM lows to 4 units in am and 3 units in pm. Avoid evening snack. Low alert to 85 to avoid lows.     Low A1c is likely driven by significant low values.   Hypoglycemia has improved compared to last visit but still problematic,  happens unless she eats to improve BG.   Check insulin antibodies.     Karin phenomena some preceded by early AM lows.   Less correction at hs to avoid overnight lows.     Hypothyroidism on LT4      Vitiligo: She has multiple autoimmune disease negative celiac disease, ACTH 21 OH levels    Possible kyphosis:   Plan on DXA in future.     Hot flashes. Observe for now. BG higher during these episodes.     Return to clinic in 6 months.      Sidney Jarquin MD  [id number]  Endocrinology Service

## 2019-11-08 NOTE — LETTER
11/8/2019         RE: Maria M Marcus  [address] [place] [address]        Dear Colleague,    Thank you for referring your patient, Maria M Marcus, to the [facility]. Please see a copy of my visit note below.    Endocrinology Clinic Visit       Chief Complaint: Diabetes     Interval history:   No severe lows.   AM lows recognized by Dexcom, eats at HS to prevent lows .   No palpitation SOB  Hot flashes are new. During this time BG gets higher, persistent.   No fevers  No change in BM  No depression.     HPI:   Maria M is a v pleasant 54 year old female who presents for follow up.   She has H/O type 1 diabetes diagnosed at age 2, hypothyroidism diagnosed in her 20s and vitiligo. She takes pravastatin for hyperlipidemia and lisinopril for hypertension.  Her main initial concern was recurrent hypoglycemia. INGRAM showed negative 21 hydroxylase Ab, positive TPO and negative TTG.     History of Diabetes  She was diagnosed with type 1 diabetes mellitus since he was [age] years old.  Over the years, she had developed diabetic retinopathy for which laser treatment was required.  She does not have neuropathy or nephropathy.  In the past several years, her A1c has been in good range between 6.1 and 7.5.  No macrovascular complications.    She was transitioned from basal bolus regimen to insulin pump that she has started about 3-4 days ago. She obtained a Dexcom CGM that has reduced the frequency of hypoglycemia to a great extent.     Low BG is now improved with sensor.  The major pattern was that was detected was hyperglycemia followed by hypoglycemia.  Usually she has blood sugars over 250 and uses correction and afterwards she develops hypoglycemia.    Developed significant rash around injection site from Humalog, Novolog.   This resolved with Apidra.   No new issues at this time.   Regular insulin - patient has used this in distant past.     Family History  Maria M notes that her mother had  breast cancer, a nephew and an uncle had T1DM but other AI diseases do not run in family. No history of thyroid cancer.     Meds  Aspart for T slim pump.     Prevention  Lipid  LDL Cholesterol Calculated   Date Value Ref Range Status   10/27/2016 109 (H) <100 mg/dL Final     Comment:     Above desirable:  100-129 mg/dl   Borderline High:  130-159 mg/dL   High:             160-189 mg/dL   Very high:       >189 mg/dl     04/11/2016 132 (H) <100 mg/dL Final     Comment:     Above desirable:  100-129 mg/dl   Borderline High:  130-159 mg/dL   High:             160-189 mg/dL   Very high:       >189 mg/dl         Medications     HMG CoA Reductase Inhibitors    pravastatin (PRAVACHOL) 10 MG tablet    pravastatin (PRAVACHOL) 10 MG tablet          Renal  Medication (Note: This includes the hypertensive combination subclass to make sure to show all ACEI/ARB's.)     ACE Inhibitors Sig    lisinopril (PRINIVIL/ZESTRIL) 10 MG tablet TAKE ONE TABLET BY MOUTH ONCE DAILY            Weight  Wt Readings from Last 4 Encounters:   07/28/17 69.9 kg (154 lb)   03/01/17 67.4 kg (148 lb 9.6 oz)   02/09/17 69.4 kg (153 lb)   10/28/16 66.2 kg (146 lb)     .    Meter Download Summary:   Only few days recorded for calibration.   Diet:  she usually counts her carbs in carb choices   Exercise  no scheduled exercise     Weight trend  Wt Readings from Last 4 Encounters:   07/28/17 69.9 kg (154 lb)   03/01/17 67.4 kg (148 lb 9.6 oz)   02/09/17 69.4 kg (153 lb)   10/28/16 66.2 kg (146 lb)       A1c today is  6.6  Lab Results   Component Value Date    A1C 6.1 07/28/2017    A1C 6.9 10/27/2016    A1C 6.1 04/11/2016    A1C 6.0 10/09/2015    A1C 6.8 05/26/2015          Allergies   Allergen Reactions     No Known Drug Allergies      Current Outpatient Medications   Medication     amitriptyline (ELAVIL) 25 MG tablet     blood glucose monitoring (VeraLight CONTOUR MONITOR) meter device kit     blood glucose monitoring (TANA CONTOUR) test strip     blood glucose  "monitoring (SOFTCLIX) lancets     EUTHYROX 75 MCG tablet     insulin glargine (LANTUS VIAL) 100 UNIT/ML vial     insulin glulisine (APIDRA) 100 UNIT/ML injection     insulin syringe-needle U-100 (BD INSULIN SYRINGE ULTRAFINE) 31G X 5/16\" 0.3 ML miscellaneous     lisinopril (PRINIVIL/ZESTRIL) 10 MG tablet     pravastatin (PRAVACHOL) 10 MG tablet     omeprazole (PRILOSEC) 20 MG CR capsule     omeprazole (PRILOSEC) 20 MG DR capsule     No current facility-administered medications for this visit.        Review of Systems     Constitutional: wt gain  Head: no headache   Eye: no vision change/ loss of peripheral vision.   ENT: No throat congestion.   Respiratory: no cough   Cardiovascular: no chest pain or tachycardia  Gastrointestinal: Negative for vomiting, abdominal pain and diarrhea.  Genitourinary: No bladder problems.  Musculoskeletal: Negative for myalgias. No weakness.  Neurological: Negative for seizures and headaches.  Psychiatric/Behavioral: Negative for behavioral problem and dysphoric mood.    PMH as above.   PSH    Past Surgical History:   Procedure Laterality Date     C  DELIVERY ONLY      C-Sections x2     C NONSPECIFIC PROCEDURE      Hysterectomy - total (cervix removed but ovaries remain)     C STOMACH SURGERY PROCEDURE UNLISTED       C TOTAL ABDOM HYSTERECTOMY       COLONOSCOPY N/A 2016    Procedure: COLONOSCOPY;  Surgeon: Efren Perry MD;  Location:  GI     HC SACROPLASTY       LAMINECT/DISCECTOMY, CERVICAL  2007    C7-T1 hemilaminectomy, microdiscectomy, foraminotomy.     LASER YAG CAPSULOTOMY Right 10/23/2014    Procedure: LASER YAG CAPSULOTOMY;  Surgeon: Esteban Dorsey MD;  Location:  OR     VITRECTOMY,STRIP EPIRETINAL MEMBRANE  09       Family History   Problem Relation Age of Onset     Hypertension Maternal Grandfather      EYE* Maternal Grandmother      Blood Disease Maternal Grandmother      Eye Disorder Maternal Grandmother      maccular degeneration "     OSTEOPOROSIS Maternal Grandmother      Lipids Father      GASTROINTESTINAL DISEASE Father      ulcers     HEART DISEASE Father      Cardiovascular Father      heart attack     Hypertension Paternal Grandmother      Breast Cancer Mother      dx age 73 - terminal - mother had first mammo at this age     DIABETES Paternal Uncle      Type I       Social History     Social History     Marital status:      Spouse name: Ra     Number of children: 2     Years of education: 12     Occupational History     receiving [employer]     Social History Main Topics     Smoking status: Never Smoker     Smokeless tobacco: Never Used      Comment: no exposure     Alcohol use Yes      Comment: winex1-2/3x per yr.     Drug use: No     Sexual activity: Yes     Partners: Male     Birth control/ protection: Surgical, Female Surgical      Comment: hysterectomy     Other Topics Concern     Parent/Sibling W/ Cabg, Mi Or Angioplasty Before 65f 55m? No     Social History Narrative       Objective:   BP (!) 143/78 (BP Location: Left arm, Patient Position: Chair, Cuff Size: Adult Regular)   Pulse 83   Wt 72.5 kg (159 lb 14.4 oz)   SpO2 97%   BMI 33.13 kg/m      Constitutional: obese.   EYES: anicteric, normal extra-ocular movements, no lid lag or retraction   HEENT: Mouth/Throat: Mucous membrane is moist. Oropharynx is clear. No adenopathy. Normal thyroid   Cardiovascular: RRR, S1, S2 normal. No m/g/r   Pulmonary/Chest: CTAB. No wheezing or rales   Abdominal: +BS. Nontender to palpation. No organomegaly present.  Neurological: Alert. Normal speech  Extremities: No clubbing, cyanosis or inflammation   Skin: Vitiligo  Feet: Diminished vibratory sense bilateral and normal monofilament test. Due 11/2020  Lymphatic: no cervical lymphadenopathy.  Psychological: appropriate mood     In House Labs:   Lab Results   Component Value Date    A1C 6.1 07/28/2017    A1C 6.9 10/27/2016    A1C 6.1 04/11/2016    A1C 6.0 10/09/2015    A1C 6.8  05/26/2015       TSH   Date Value Ref Range Status   10/27/2016 0.90 0.40 - 4.00 mU/L Final   05/26/2015 0.06 (L) 0.40 - 4.00 mU/L Final   03/17/2014 0.78 0.4 - 5.0 mU/L Final   01/18/2013 0.65 0.4 - 5.0 mU/L Final   08/20/2012 0.60 0.4 - 5.0 mU/L Final     T4 Free   Date Value Ref Range Status   11/09/2011 1.69 0.70 - 1.85 ng/dL Final       Creatinine   Date Value Ref Range Status   10/27/2016 1.00 0.52 - 1.04 mg/dL Final   ]    Recent Labs   Lab Test  10/27/16   0748  04/11/16   1013  05/26/15   0738  03/17/14   0735   CHOL  186  210*  167  165   HDL  56  63  46*  58   LDL  109*  132*  84  90   TRIG  104  77  183*  82   CHOLHDLRATIO   --    --   3.6  3.0       Work up  (8/17)   21 OH ab normal - this was done due to frequent lows.   No celiac disease antibodies (gluten sensitivity)  positive TPO.       Continuous glucose monitoring physician interpretation  Sensory use 30/30 days     low alert 85  High alert 280    Average blood sugar 147, standard deviation 48  64% within range, 32% above range and 3% below range  Minimal risk of hypoglycemia  Calibration 0    Major patterns  Early AM hypoglycemia after snacking at HS and using Apidra.   Post correction hypoglycemia at night, with rise early in am -- could be dagmar phenomena but also eats to correct lows which could be some of these rise in bg.      Labs:   A1c was 6.2      Assessment/Treatment Plan:      Maria M Marcus is a 52 year old year old female with    1. Type 1 Diabetes with  retinopathy and mild neuropathy and hypoglycemia unawareness on CGM  2.  Left side numbness in fourth and fifth finger   3.  Hypothyroidism   4.  Vitiligo   5.  Possible scoliosis in a postmenopausal female.      1. Type 1 Diabetes with  retinopathy and mild neuropathy and hypoglycemia unawareness on CGM  Barriers to achieving glycemic goals  Overcorrection: lows are less frequent with current regimen of 1 per 20   Hypoglycemia unawareness and frequent hypoglycemia.  Continue  Dexcom.    Basal to avoid AM lows to 4 units in am and 3 units in pm. Avoid evening snack. Low alert to 85 to avoid lows.     Low A1c is likely driven by significant low values.   Hypoglycemia has improved compared to last visit but still problematic, happens unless she eats to improve BG.   Check insulin antibodies.     Karin phenomena some preceded by early AM lows.   Less correction at hs to avoid overnight lows.     Hypothyroidism on LT4      Vitiligo: She has multiple autoimmune disease negative celiac disease, ACTH 21 OH levels    Possible kyphosis:   Plan on DXA in future.     Hot flashes. Observe for now. BG higher during these episodes.     Return to clinic in 6 months.      Sidney Jarquin MD  [id number]  Endocrinology Service        Again, thank you for allowing me to participate in the care of your patient.        Sincerely,        Sidney Jarquin MD

## 2019-11-11 LAB
GAD65 AB SER IA-ACNC: <5 IU/ML (ref 0–5)
INSULIN HUMAN AB SER-ACNC: >50 U/ML (ref 0–0.4)

## 2019-11-12 LAB — C PEPTIDE SERPL-MCNC: <0.1 NG/ML (ref 0.9–6.9)

## 2019-11-13 ENCOUNTER — MYC MEDICAL ADVICE (OUTPATIENT)
Dept: ENDOCRINOLOGY | Facility: CLINIC | Age: 54
End: 2019-11-13

## 2019-11-13 NOTE — RESULT ENCOUNTER NOTE
Hi Maria M,   The labs were done to measure insulin antibodies -- which was high, but not uncommon in long standing Type 1 diabetes. We did the test to see if insulin clumps up and eventually gets released leading to low. There is a risk for that, now that we know you have these antibodies. There is no treatment available against antibodies.     The main way to prevent low sugars is to first try and reduce the Lantus further to 3 units BID if the lows are still occurring and then to 5 units once daily.     Please drop off your dexcom in about 2 - 3 weeks.     Other labs: Thyroid labs showed slightly low TSH but I recommend no change in medications for now. We will repeat this in future.     Electrolytes and renal functions remain stable.     Best regards,   Sidney Jarquin MD  Endocrinology Service

## 2019-11-14 ENCOUNTER — NURSE TRIAGE (OUTPATIENT)
Dept: NURSING | Facility: CLINIC | Age: 54
End: 2019-11-14

## 2019-11-14 ENCOUNTER — APPOINTMENT (OUTPATIENT)
Dept: GENERAL RADIOLOGY | Facility: CLINIC | Age: 54
End: 2019-11-14
Attending: EMERGENCY MEDICINE
Payer: COMMERCIAL

## 2019-11-14 ENCOUNTER — HOSPITAL ENCOUNTER (EMERGENCY)
Facility: CLINIC | Age: 54
Discharge: HOME | End: 2019-11-14
Attending: EMERGENCY MEDICINE | Admitting: EMERGENCY MEDICINE
Payer: COMMERCIAL

## 2019-11-14 ENCOUNTER — TELEPHONE (OUTPATIENT)
Dept: FAMILY MEDICINE | Facility: OTHER | Age: 54
End: 2019-11-14

## 2019-11-14 VITALS
TEMPERATURE: 98.8 F | OXYGEN SATURATION: 98 % | WEIGHT: 158 LBS | RESPIRATION RATE: 18 BRPM | HEART RATE: 96 BPM | DIASTOLIC BLOOD PRESSURE: 74 MMHG | BODY MASS INDEX: 32.74 KG/M2 | SYSTOLIC BLOOD PRESSURE: 148 MMHG

## 2019-11-14 DIAGNOSIS — L50.9 URTICARIA: ICD-10-CM

## 2019-11-14 LAB
ANION GAP SERPL CALCULATED.3IONS-SCNC: 7 MMOL/L (ref 3–14)
BASOPHILS # BLD AUTO: 0 10E9/L (ref 0–0.2)
BASOPHILS NFR BLD AUTO: 0.4 %
BUN SERPL-MCNC: 12 MG/DL (ref 7–30)
CALCIUM SERPL-MCNC: 8.5 MG/DL (ref 8.5–10.1)
CHLORIDE SERPL-SCNC: 107 MMOL/L (ref 94–109)
CO2 SERPL-SCNC: 27 MMOL/L (ref 20–32)
CREAT SERPL-MCNC: 1.08 MG/DL (ref 0.52–1.04)
DIFFERENTIAL METHOD BLD: NORMAL
EOSINOPHIL NFR BLD AUTO: 2.1 %
ERYTHROCYTE [DISTWIDTH] IN BLOOD BY AUTOMATED COUNT: 13.2 % (ref 10–15)
GFR SERPL CREATININE-BSD FRML MDRD: 58 ML/MIN/{1.73_M2}
GLUCOSE SERPL-MCNC: 149 MG/DL (ref 70–99)
HCT VFR BLD AUTO: 36 % (ref 35–47)
HGB BLD-MCNC: 11.7 G/DL (ref 11.7–15.7)
IMM GRANULOCYTES # BLD: 0 10E9/L (ref 0–0.4)
IMM GRANULOCYTES NFR BLD: 0.4 %
LYMPHOCYTES # BLD AUTO: 2 10E9/L (ref 0.8–5.3)
LYMPHOCYTES NFR BLD AUTO: 23.1 %
MCH RBC QN AUTO: 29.4 PG (ref 26.5–33)
MCHC RBC AUTO-ENTMCNC: 32.5 G/DL (ref 31.5–36.5)
MCV RBC AUTO: 91 FL (ref 78–100)
MONOCYTES # BLD AUTO: 0.4 10E9/L (ref 0–1.3)
MONOCYTES NFR BLD AUTO: 4.5 %
NEUTROPHILS # BLD AUTO: 5.9 10E9/L (ref 1.6–8.3)
NEUTROPHILS NFR BLD AUTO: 69.5 %
NRBC # BLD AUTO: 0 10*3/UL
NRBC BLD AUTO-RTO: 0 /100
PLATELET # BLD AUTO: 256 10E9/L (ref 150–450)
POTASSIUM SERPL-SCNC: 3.8 MMOL/L (ref 3.4–5.3)
RBC # BLD AUTO: 3.98 10E12/L (ref 3.8–5.2)
SODIUM SERPL-SCNC: 141 MMOL/L (ref 133–144)
WBC # BLD AUTO: 8.5 10E9/L (ref 4–11)

## 2019-11-14 PROCEDURE — 71046 X-RAY EXAM CHEST 2 VIEWS: CPT | Mod: TC

## 2019-11-14 PROCEDURE — 99285 EMERGENCY DEPT VISIT HI MDM: CPT | Mod: Z6 | Performed by: EMERGENCY MEDICINE

## 2019-11-14 PROCEDURE — 85025 COMPLETE CBC W/AUTO DIFF WBC: CPT | Performed by: EMERGENCY MEDICINE

## 2019-11-14 PROCEDURE — 96374 THER/PROPH/DIAG INJ IV PUSH: CPT | Performed by: EMERGENCY MEDICINE

## 2019-11-14 PROCEDURE — 80048 BASIC METABOLIC PNL TOTAL CA: CPT | Performed by: EMERGENCY MEDICINE

## 2019-11-14 PROCEDURE — 99284 EMERGENCY DEPT VISIT MOD MDM: CPT | Mod: 25 | Performed by: EMERGENCY MEDICINE

## 2019-11-14 PROCEDURE — 99285 EMERGENCY DEPT VISIT HI MDM: CPT | Mod: 25 | Performed by: EMERGENCY MEDICINE

## 2019-11-14 PROCEDURE — 96375 TX/PRO/DX INJ NEW DRUG ADDON: CPT | Performed by: EMERGENCY MEDICINE

## 2019-11-14 PROCEDURE — 70360 X-RAY EXAM OF NECK: CPT | Mod: TC

## 2019-11-14 PROCEDURE — 25000128 H RX IP 250 OP 636: Performed by: EMERGENCY MEDICINE

## 2019-11-14 PROCEDURE — 25000125 ZZHC RX 250: Performed by: EMERGENCY MEDICINE

## 2019-11-14 RX ORDER — METHYLPREDNISOLONE 4 MG
TABLET, DOSE PACK ORAL
Qty: 21 TABLET | Refills: 0 | Status: SHIPPED | OUTPATIENT
Start: 2019-11-14 | End: 2019-11-15

## 2019-11-14 RX ORDER — FAMOTIDINE 20 MG/1
20 TABLET, FILM COATED ORAL 2 TIMES DAILY
Qty: 14 TABLET | Refills: 0 | Status: SHIPPED | OUTPATIENT
Start: 2019-11-14 | End: 2020-01-30

## 2019-11-14 RX ORDER — DEXAMETHASONE SODIUM PHOSPHATE 10 MG/ML
10 INJECTION, SOLUTION INTRAMUSCULAR; INTRAVENOUS ONCE
Status: COMPLETED | OUTPATIENT
Start: 2019-11-14 | End: 2019-11-14

## 2019-11-14 RX ADMIN — DEXAMETHASONE SODIUM PHOSPHATE 10 MG: 10 INJECTION, SOLUTION INTRAMUSCULAR; INTRAVENOUS at 20:45

## 2019-11-14 RX ADMIN — FAMOTIDINE 20 MG: 10 INJECTION, SOLUTION INTRAVENOUS at 20:50

## 2019-11-14 ASSESSMENT — ENCOUNTER SYMPTOMS
CONSTITUTIONAL NEGATIVE: 1
GASTROINTESTINAL NEGATIVE: 1
CARDIOVASCULAR NEGATIVE: 1
RESPIRATORY NEGATIVE: 1

## 2019-11-14 NOTE — PROGRESS NOTES
"Subjective     Maria M Marcus is a 54 year old female who presents to clinic today for the following health issues:    HPI     ED/UC Followup:    Facility:  [facility]  Date of visit: 11/14/2019  Reason for visit: Hives   Current Status: Patient was given a steroid in the hospital - which has caused her blood pressures to spike. She states that the lowest blood sugar since last night has been 197. She was given Prednisone in the hospital and she picked it up, but she hasn't taken any yet because of how high her blood sugars have been. Her meter is \"so high right now it's not even giving me a number because it's so high.\"      ER Note: \"Maria M Marcus is a 54 year old female who presents for allergic reaction and hives.  She states that she noticed the hives around 2 AM on Wednesday morning.  She started taking Benadryl, 25 mg p.o. every 4 hours for the itching, but has not had any relief.  She says that due to her needing to be home alone, and her being a diabetic, she did not want to take more than 25 mg of Benadryl since it makes her so drowsy.  She feels that the hives are getting worse, and seem to be spreading.  She feels like her hands are swelling.  She noticed within the last hour it feels like her throat is swelling.  She has not come in to contact with any known allergens.  Does have a history of allergies to NovoLog and Humalog, her reaction was a contact dermatitis, and she was switched to a different injectable insulin 2 years ago and has had no problems since that time.  She notes eating some pistachios, though she has eaten these in the past without any difficulty, but recently started eating them in greater quantities.  She also recently ate a prepackaged salad kit on Tuesday evening, and is unsure if may be the soy ingredients were related to her current reaction.  She denies any swelling of her lips or tongue, no nausea or vomiting, no shortness of breath.  When she " called the nurse line of her primary care office, they encouraged her to come to the ER for further evaluation.  Her last dose of Benadryl was at 1800.  Maria M is a 54-year-old female with type 1 diabetes who presents for worsening hives.  She says she noticed approximately 1 and half days ago that she woke up with hives on her arms and in her groin and hips.  She was unsure of any particular trigger or cause, but does note that she has started eating pistachios a lot more, and the night before had eaten a new premixed salad kit, and the ingredient listed soy which she is concerned she may have a problem with.  No new soaps, detergents, no animals that are primarily outdoors, has not spent recent time outdoors.  She has been taking Benadryl, 25 mg p.o. q 4 hours since she does not want to be too sleepy, but is still having a lot of itching.  She called the nurse helpline twice, has a primary care appointment tomorrow afternoon, but was instructed to come to the ER for further evaluation since things were getting worse.  She took 25 mg Benadryl at 1800 today.  Patient is well-appearing, no acute distress.  No angioedema, no mucosal involvement.  She does have hives scattered on her upper extremities as well as her lower extremities from hips to knees, and a few scattered on her abdomen.  The remainder of her exam is unremarkable.  Labs obtained, as above.  Her blood glucose is within expected limits for a type I diabetic.  X-ray of chest and neck soft tissue reviewed by me, shows no acute process.   Maria M states she is feeling better after administration of Decadron and Pepcid.  Discussed work-up, and states that hives may take some time to fully resolve.  Also difficult to say exactly what caused this reaction, but would recommend avoiding pistachios and the premixed salad kit until she can be further evaluated.  Discussed close monitoring of her blood sugars while on steroids, she has a continuous glucose  "monitor and is very compliant with her medication regimen.  She is aware that she may need to adjust her sliding scale insulin while on steroids.  I advised her to keep her follow-up appointment with her primary provider tomorrow at 3 PM to evaluate for improvement of her condition.  Did give a ED precautions to return, including but not limited to difficulty breathing, angioedema, worsening symptoms, or any other concern.  She understands and agrees.  She is discharged from the ED in stable condition, no acute distress.\"    Patient reports she made this appointment yesterday morning due to waking up from hives in the middle of the night. She tried benedryl to alleviate the itching. She tried a salad kit that had a soy based dressing and some pistachios.    She reports she had some SOB, called nurse triage. She shows picture of edema in left hand as well as erythematous patches on her palms. She was given IV steroids in the ED yesterday. She states the swelling was so bad that she was unable to give herself insulin injections.     She did not eat breakfast this morning because of self reported blood sugars in the 250s. She had lunch and had 10 units of insulin because sugars were in the 200s, her current readings are 400s.     Patient saw endocrinology that her cells might be \"pocketing insulin\" which then causes her blood sugars to drop below 40.     She is having nausea, some light headedness. Blurred vision \"comes and goes\".     She reports that the ER noticed petechiae on her forearms, but thinks it could be related to the urticaria and edema. She reports she had a hysterectomy at the age of 24 due to \"hemorrhaging\". She denies bleeding gums when she brushes and flosses her teeth and does not have bloody noses. Her swelling and rash have improved significant since yesterday. She was given a Medrol dose pack but has not started this yet due to her high blood sugars.     Reviewed and updated as needed this visit " "by Provider  Allergies  Meds  Problems  Med Hx     Review of Systems   ROS COMP: Constitutional, HEENT, cardiovascular, pulmonary, gi and gu systems are negative, except as otherwise noted.      Objective    /76   Pulse 109   Temp 99.1  F (37.3  C) (Temporal)   Resp 18   Ht 1.48 m (4' 10.25\")   Wt 73 kg (161 lb)   SpO2 98%   BMI 33.36 kg/m    Body mass index is 33.36 kg/m .     Physical Exam   GENERAL: healthy, alert and no distress  EYES: Eyes grossly normal to inspection, PERRL and conjunctivae and sclerae normal. No facial swelling.   NECK: no adenopathy, no asymmetry, masses, or scars and thyroid normal to palpation  RESP: lungs clear to auscultation - no rales, rhonchi or wheezes  CV: regular rate and rhythm, normal S1 S2, no S3 or S4, no murmur, click or rub, no peripheral edema and peripheral pulses strong  Derm:  She has bruising evident from her IV sites on the dorsal aspect of her hands. Significantly improving rash/urticaria over bilateral forearms.     Assessment & Plan     1. Urticaria  Patient was instructed to not take the prescribed steroid pack from the ER since symptoms have improved and due to her elevated glucose, currently above 400. She was advised to continue Benedryl as needed for the hives. She was advised that if symptoms return or she experiences difficulty breathing to seek emergency medical care. Patient is referred to see an allergy specialist to determine if she has a food allergy or hypersensitivity. She was advised to stay away from soy and pistachios in the meantime.   - ALLERGY/ASTHMA ADULT REFERRAL    2. Type 1 diabetes mellitus without complication (H)  Patient is advised to continue hydration with water and to walk for exercise to lower her glucose. She is to continue monitoring her blood sugars and if they remain within the 400s to consider 1-2 more units of short acting insulin after 2 hours. She was advised to continue using her manual blood glucose monitor " with her automatic Dexacom. If blood sugars increase above 500 or continue to stay high until tomorrow to seek emergency medical care. She continues to follow up with endocrinology regarding her diabetes management.     3. Easy bruising  Patient reports increased bruising, particularly at injection sites. However, since she denies blood in her stool, or bleeding gums while brushing or flossing she is to monitor her bleeding. Normal platelets yesterday. If worsening bruising, petechiae, or blood in stool she is to follow up.     Patient will follow up in 6 months for well-exams.     Patient seen in conjunction with EASTON ArdonS      EASTON LeighC  [facility]

## 2019-11-14 NOTE — ED AVS SNAPSHOT
[facility]  [address] [place] [address]  Phone:  [phone]  Fax:  [phone]                                    Maria M Marcus   MRN: [id number]    Department:  [facility]   Date of Visit:  11/14/2019           After Visit Summary Signature Page    I have received my discharge instructions, and my questions have been answered. I have discussed any challenges I see with this plan with the nurse or doctor.    ..........................................................................................................................................  Patient/Patient Representative Signature      ..........................................................................................................................................  Patient Representative Print Name and Relationship to Patient    ..................................................               ................................................  Date                                   Time    ..........................................................................................................................................  Reviewed by Signature/Title    ...................................................              ..............................................  Date                                               Time          22EPIC Rev 08/18

## 2019-11-14 NOTE — TELEPHONE ENCOUNTER
Reason for Call:  Other call back    Detailed comments: Pt called and would like to speak with a nurse. She broke out in hives in the middle of the night and she said as long as she is taking benadryl they dont itch but rash is getting worse. Please Advise thank you    Phone Number Patient can be reached at: Home number on file [phone] (home)    Best Time: anytime    Can we leave a detailed message on this number? YES    Call taken on 11/14/2019 at 2:42 PM by Oneida Sparks

## 2019-11-14 NOTE — TELEPHONE ENCOUNTER
Maria M Marcus is a 54 year old female who calls with hives.    NURSING ASSESSMENT:  Description:  I spoke with the pt who states last night she got up in the middle night and felt itchy. Got up and put some lotion on. Had hives on arms, elbows up into armpit. A couple of spots in inside elbows. Hips and buttock. Now it is on her hands and few on belly and legs. No new medications, no new soaps or lotions. Did just eat a new salad mix that comes all together. She is wondering if something she ate? No nausea. A little heartburn last night. No swelling or hives in lips or face. Throat is fine.   Onset/duration:  Last night  Precip. factors:  Has hives before.   Associated symptoms:  Had bronchitis and sinus infection a month ago. Bladder infection a couple of weeks ago, the day she finished her meds she had a fever for a day. This was a week ago. All these symptoms have cleared.   Improves/worsens symptoms:  Took some benadryl and that helped.   Pain scale (0-10)   0/10    Allergies:   Allergies   Allergen Reactions     Novolog [Insulin Aspart] Swelling     Itching, rash, contact dermatitis     Humalog [Insulin Lispro] Rash     Contact dermatitis     Zyrtec [Cetirizine] Rash     NURSING PLAN: Nursing advice to patient Benadryl, pt has allergy to Zyrtec    RECOMMENDED DISPOSITION:  OV tomorrow if hives not improving.   Will comply with recommendation: Yes  If further questions/concerns or if symptoms do not improve, worsen or new symptoms develop, call your PCP or [facility] as soon as possible.    Next 5 appointments (look out 90 days)    Nov 15, 2019  3:30 PM CST  SHORT with Marcus Salgado [facility]-C  [facility] [address] [place] [address] [id number] [phone]        Guideline used: rash  Telephone Triage Protocols for Nurses, Fifth Edition, Yojana Celeste, RN, RN

## 2019-11-15 ENCOUNTER — OFFICE VISIT (OUTPATIENT)
Dept: FAMILY MEDICINE | Facility: OTHER | Age: 54
End: 2019-11-15
Payer: COMMERCIAL

## 2019-11-15 ENCOUNTER — NURSE TRIAGE (OUTPATIENT)
Dept: NURSING | Facility: CLINIC | Age: 54
End: 2019-11-15

## 2019-11-15 VITALS
SYSTOLIC BLOOD PRESSURE: 138 MMHG | HEART RATE: 109 BPM | HEIGHT: 58 IN | DIASTOLIC BLOOD PRESSURE: 76 MMHG | OXYGEN SATURATION: 98 % | BODY MASS INDEX: 33.8 KG/M2 | TEMPERATURE: 99.1 F | RESPIRATION RATE: 18 BRPM | WEIGHT: 161 LBS

## 2019-11-15 DIAGNOSIS — R23.3 EASY BRUISING: ICD-10-CM

## 2019-11-15 DIAGNOSIS — E10.9 TYPE 1 DIABETES MELLITUS WITHOUT COMPLICATION (H): ICD-10-CM

## 2019-11-15 DIAGNOSIS — L50.9 URTICARIA: Primary | ICD-10-CM

## 2019-11-15 PROCEDURE — 99214 OFFICE O/P EST MOD 30 MIN: CPT | Performed by: PHYSICIAN ASSISTANT

## 2019-11-15 ASSESSMENT — MIFFLIN-ST. JEOR: SCORE: 1224.01

## 2019-11-15 NOTE — DISCHARGE INSTRUCTIONS
Start steroid prescription tomorrow.  You were given a dose of steroids here in the ER.  Steroids can elevate your blood sugar.  Continue to monitor closely and adjust insulin dose as needed  May take Benadryl as needed per bottle instructions for itching.  Take Pepcid daily.  Keep your follow-up appointment with your primary provider tomorrow at 3 PM.  Avoid possible triggers, including pistachios and premixed salad kit  If symptoms worsen, if you have difficulty breathing, difficulty swallowing, or any new or concerning symptoms, return to the ER immediately.

## 2019-11-15 NOTE — PATIENT INSTRUCTIONS
Continue with Benadryl as needed.  Do not take the steroids.  Take 2 units of insulin if glucose is not decreasing within the next hour.  Can repeat 2 units as needed but keep food/candy on you.  If not dropping overnight or continues to rise, you should be seen.    Will get you set up with an allergist to further evaluate for food allergies.

## 2019-11-15 NOTE — TELEPHONE ENCOUNTER
Patient calling as the hives she spoke with the clinic about today are getting worse.  They have now moved onto her hands and she is noticing swelling in her left hand.  It is harder to open and close her hands.  While on the phone with the patient, she is reporting that she is starting to now feel her skin pulling.   She has been taking benadryl q4 hours for the last 18 hours since 0200.    Per protocol, advised patient to seek care at the closest ER, which is 25-30 minutes away in [place].  Patient states that her  needs to take a shower first, and I did advise her that she needs to go very quickly and what to watch for that would require a 911 call.   Patient verbalized understanding     Namita Leon RN on 11/14/2019 at 7:15 PM    Reason for Disposition    Patient sounds very sick or weak to the triager    Additional Information    Negative: [1] Life-threatening reaction (anaphylaxis) in the past to similar substance (e.g., food, insect bite/sting, chemical, etc.) AND [2] < 2 hours since exposure    Negative: Difficulty breathing or wheezing now    Negative: [1] Swollen tongue AND [2] rapid onset    Negative: [1] Hoarseness or cough now AND [2] rapid onset    Negative: Shock suspected (e.g., cold/pale/clammy skin, too weak to stand, low BP, rapid pulse)    Negative: Difficult to awaken or acting confused (e.g., disoriented, slurred speech)    Negative: Sounds like a life-threatening emergency to the triager    Negative: Swollen tongue    Negative: [1] Widespread hives AND [2] onset < 2 hours of exposure to high-risk allergen (e.g., peanuts, tree nuts, fish or shellfish)    Protocols used: HIVES-A-

## 2019-11-15 NOTE — TELEPHONE ENCOUNTER
Maria M was into clinic today and is type I diabetic.  Maria M was put on steroids due to hospitalization with hives.  Maria M was given steroids iv for hives.  In the mean time her glucose is high around 400.  Maria M was seen by MD Salgado today and was instructed on how to adjust insulin for increasing sugars.   Maria M has not eaten anything today.  Maria M is staying hydrated.  Maria M is calling and states that hives are coming back again.  Denies swallowing troubles or shortness of breath.  Maria M states that she will try benadryl for hives and continue to monitor her glucose.  Maria M states that if her blood sugar gets to high it is coming down now she will go directly to the ED.  FNA advised to phone back with any questions.      Additional Information    Negative: [1] Life-threatening reaction (anaphylaxis) in the past to similar substance (e.g., food, insect bite/sting, chemical, etc.) AND [2] < 2 hours since exposure    Negative: Difficulty breathing or wheezing now    Negative: [1] Swollen tongue AND [2] rapid onset    Negative: [1] Hoarseness or cough now AND [2] rapid onset    Negative: Shock suspected (e.g., cold/pale/clammy skin, too weak to stand, low BP, rapid pulse)    Negative: Difficult to awaken or acting confused (e.g., disoriented, slurred speech)    Negative: Sounds like a life-threatening emergency to the triager    Negative: Swollen tongue    Negative: [1] Widespread hives AND [2] onset < 2 hours of exposure to high-risk allergen (e.g., peanuts, tree nuts, fish or shellfish)    Negative: Patient sounds very sick or weak to the triager    Negative: [1] MODERATE-SEVERE hives persist (i.e., hives interfere with normal activities or work) AND [2] taking antihistamine (e.g., Benadryl, Claritin) > 24 hours    Negative: [1] Hives have become worse AND [2] taking oral steroids (e.g., prednisone) > 24 hours    Negative: Joint swelling    Negative: [1] Abdominal pain AND [2] pain present > 12  hours    Negative: Fever    Negative: [1] Widespread hives, itching or facial swelling AND [2] onset > 2 hours after exposure to high-risk allergen (e.g., sting, nuts, 1st dose of antibiotic)    Negative: [1] Hives from food reaction AND [2] diagnosis never confirmed by a HCP    Negative: Hives persist > 1 week    Negative: [1] Hives has occurred 3 or more times in the last year AND [2] the cause was not found    Negative: [1] Hives from food reaction AND [2] diagnosis already confirmed    Localized hives    Protocols used: HIVES-A-

## 2019-11-15 NOTE — ED PROVIDER NOTES
History     Chief Complaint   Patient presents with     Hives     HPI  Maria M Marcus is a 54 year old female who presents for allergic reaction and hives.  She states that she noticed the hives around 2 AM on Wednesday morning.  She started taking Benadryl, 25 mg p.o. every 4 hours for the itching, but has not had any relief.  She says that due to her needing to be home alone, and her being a diabetic, she did not want to take more than 25 mg of Benadryl since it makes her so drowsy.  She feels that the hives are getting worse, and seem to be spreading.  She feels like her hands are swelling.  She noticed within the last hour it feels like her throat is swelling.  She has not come in to contact with any known allergens.  Does have a history of allergies to NovoLog and Humalog, her reaction was a contact dermatitis, and she was switched to a different injectable insulin 2 years ago and has had no problems since that time.  She notes eating some pistachios, though she has eaten these in the past without any difficulty, but recently started eating them in greater quantities.  She also recently ate a prepackaged salad kit on Tuesday evening, and is unsure if may be the soy ingredients were related to her current reaction.  She denies any swelling of her lips or tongue, no nausea or vomiting, no shortness of breath.  When she called the nurse line of her primary care office, they encouraged her to come to the ER for further evaluation.  Her last dose of Benadryl was at 1800.    Allergies:  Allergies   Allergen Reactions     Novolog [Insulin Aspart] Swelling     Itching, rash, contact dermatitis     Humalog [Insulin Lispro] Rash     Contact dermatitis     Zyrtec [Cetirizine] Rash       Problem List:    Patient Active Problem List    Diagnosis Date Noted     Gastroesophageal reflux disease, esophagitis presence not specified 09/08/2017     Priority: Medium     Retinopathy due to secondary diabetes (H) 10/29/2016      Priority: Medium     Type 1 diabetes mellitus without complication (H) 11/01/2015     Priority: Medium     Overweight (BMI 25.0-29.9) 11/01/2015     Priority: Medium     Hypertension goal BP (blood pressure) < 140/90 09/29/2014     Priority: Medium     Back pain 03/23/2013     Priority: Medium     Chronic pain 11/09/2011     Priority: Medium     Related to frozen shoulder per ortho will be two years of intermittent pain, agreed to #45 percocet for 90 day supply, only get narcotics from me.         Vitiligo 07/15/2011     Priority: Medium     Frozen shoulder syndrome 06/16/2011     Priority: Medium     Advanced directives, counseling/discussion 06/13/2011     Priority: Medium     Advance Directive Problem List Overview:   Name Relationship Phone    Primary Health Care Agent            Alternative Health Care Agent        9/14/11  Patient presents for [employer] Informational meeting. Patient given [employer] folder. Patient will complete Advance Care Directive and bring to clinic...Mary Lee RN              Cervical radiculopathy      Priority: Medium     Hyperlipidemia LDL goal <100 10/31/2010     Priority: Medium     [place] 10-year CHD Risk Score: 20% (Diabetic)   Values used to calculate score:     Age: 46 years -- Points: 3     Total Cholesterol: 174 mg/dL -- Points: 3     HDL Cholesterol: 50 mg/dL -- Points: 0     Systolic BP (treated): 128 mmHg -- Points: 3    The patient is not a smoker. -- Points: 0     The patient has a diagnosis of diabetes. -- Points: 20% Risk    The patient does not have a family history of CHD. -- Points:0     [place]      LDL goal  >= 20%  <100         Other and unspecified disc disorder of lumbar region 04/07/2007     Priority: Medium     Hypothyroidism due to Hashimoto's thyroiditis 10/05/2004     Priority: Medium     Problem list name updated by automated process. Provider to review          Past Medical History:    Past Medical History:   Diagnosis Date      Cervical radiculopathy      Diabetic eye exam (H) 11     DISC DIS NEC/NOS-LUMBAR 2007     Frozen shoulder syndrome 2011     Hyperlipidemia LDL goal <100 10/31/2010     Hypertension 1/3/2011     Hypothyroidism due to Hashimoto's thyroiditis      Type 1 diabetes, HbA1c goal < 7% (H) dx 1967     Unspecified hypothyroidism        Past Surgical History:    Past Surgical History:   Procedure Laterality Date     C  DELIVERY ONLY      C-Sections x2     C NONSPECIFIC PROCEDURE      Hysterectomy - total (cervix removed but ovaries remain)     C STOMACH SURGERY PROCEDURE UNLISTED       C TOTAL ABDOM HYSTERECTOMY       COLONOSCOPY N/A 2016    Procedure: COLONOSCOPY;  Surgeon: Efren Perry MD;  Location: [facility]     ENDOSCOPIC SINUS SURGERY Bilateral 2018    Procedure: Bilateral functional endoscopic maxillary antrostomies;  Surgeon: Woo Silva MD;  Location: [facility]     HC SACROPLASTY       LAMINECT/DISCECTOMY, CERVICAL  2007    C7-T1 hemilaminectomy, microdiscectomy, foraminotomy.     LASER YAG CAPSULOTOMY Right 10/23/2014    Procedure: LASER YAG CAPSULOTOMY;  Surgeon: Esteban Dorsey MD;  Location: [facility]     VITRECTOMY,STRIP EPIRETINAL MEMBRANE  09       Family History:    Family History   Problem Relation Age of Onset     Hypertension Maternal Grandfather      EYE* Maternal Grandmother      Blood Disease Maternal Grandmother      Eye Disorder Maternal Grandmother         maccular degeneration     Osteoporosis Maternal Grandmother      Lipids Father      Gastrointestinal Disease Father         ulcers     Heart Disease Father      Cardiovascular Father         heart attack     Hypertension Paternal Grandmother      Breast Cancer Mother         dx age 73 - terminal - mother had first mammo at this age     Diabetes Paternal Uncle         Type I       Social History:  Marital Status:   [2]  Social History     Tobacco Use     Smoking status: Never Smoker      "Smokeless tobacco: Never Used     Tobacco comment: no exposure   Substance Use Topics     Alcohol use: Yes     Comment: winex1-2/3x per yr.     Drug use: No        Medications:    amitriptyline (ELAVIL) 25 MG tablet  blood glucose monitoring (TANA CONTOUR MONITOR) meter device kit  blood glucose monitoring (TANA CONTOUR) test strip  blood glucose monitoring (SOFTCLIX) lancets  EUTHYROX 75 MCG tablet  insulin glargine (LANTUS VIAL) 100 UNIT/ML vial  insulin glulisine (APIDRA) 100 UNIT/ML injection  insulin syringe-needle U-100 (BD INSULIN SYRINGE ULTRAFINE) 31G X 5/16\" 0.3 ML miscellaneous  lisinopril (PRINIVIL/ZESTRIL) 10 MG tablet  omeprazole (PRILOSEC) 20 MG CR capsule  omeprazole (PRILOSEC) 20 MG DR capsule  pravastatin (PRAVACHOL) 10 MG tablet          Review of Systems   Constitutional: Negative.    HENT: Negative.    Respiratory: Negative.    Cardiovascular: Negative.    Gastrointestinal: Negative.    Skin: Positive for rash.   All other systems reviewed and are negative.      Physical Exam   BP: (!) 178/79  Pulse: 96  Heart Rate: 96  Temp: 98.8  F (37.1  C)  Resp: 18  Weight: 71.7 kg (158 lb)  SpO2: 100 %      Physical Exam  Nursing note reviewed. Exam conducted with a chaperone present.   Constitutional:       General: She is not in acute distress.  HENT:      Right Ear: Tympanic membrane and ear canal normal.      Left Ear: Tympanic membrane and ear canal normal.      Nose: Nose normal.      Mouth/Throat:      Mouth: Mucous membranes are moist.      Pharynx: Oropharynx is clear. No oropharyngeal exudate.   Neck:      Musculoskeletal: Normal range of motion and neck supple.   Cardiovascular:      Rate and Rhythm: Normal rate and regular rhythm.      Pulses: Normal pulses.   Pulmonary:      Effort: Pulmonary effort is normal.      Breath sounds: Normal breath sounds.   Lymphadenopathy:      Cervical: No cervical adenopathy.   Skin:     General: Skin is warm and dry.      Capillary Refill: Capillary refill " takes less than 2 seconds.      Findings: Rash present. Rash is urticarial.      Comments: Urticarial rash over bilateral arms, bilateral lower extremities, and right hip and groin   Neurological:      Mental Status: She is alert.         ED Course        Procedures               Critical Care time:  none               Results for orders placed or performed during the hospital encounter of 11/14/19 (from the past 24 hour(s))   CBC with platelets differential   Result Value Ref Range    WBC 8.5 4.0 - 11.0 10e9/L    RBC Count 3.98 3.8 - 5.2 10e12/L    Hemoglobin 11.7 11.7 - 15.7 g/dL    Hematocrit 36.0 35.0 - 47.0 %    MCV 91 78 - 100 fl    MCH 29.4 26.5 - 33.0 pg    MCHC 32.5 31.5 - 36.5 g/dL    RDW 13.2 10.0 - 15.0 %    Platelet Count 256 150 - 450 10e9/L    Diff Method Automated Method     % Neutrophils 69.5 %    % Lymphocytes 23.1 %    % Monocytes 4.5 %    % Eosinophils 2.1 %    % Basophils 0.4 %    % Immature Granulocytes 0.4 %    Nucleated RBCs 0 0 /100    Absolute Neutrophil 5.9 1.6 - 8.3 10e9/L    Absolute Lymphocytes 2.0 0.8 - 5.3 10e9/L    Absolute Monocytes 0.4 0.0 - 1.3 10e9/L    Absolute Basophils 0.0 0.0 - 0.2 10e9/L    Abs Immature Granulocytes 0.0 0 - 0.4 10e9/L    Absolute Nucleated RBC 0.0    Basic metabolic panel   Result Value Ref Range    Sodium 141 133 - 144 mmol/L    Potassium 3.8 3.4 - 5.3 mmol/L    Chloride 107 94 - 109 mmol/L    Carbon Dioxide 27 20 - 32 mmol/L    Anion Gap 7 3 - 14 mmol/L    Glucose 149 (H) 70 - 99 mg/dL    Urea Nitrogen 12 7 - 30 mg/dL    Creatinine 1.08 (H) 0.52 - 1.04 mg/dL    GFR Estimate 58 (L) >60 mL/min/[1.73_m2]    GFR Estimate If Black 67 >60 mL/min/[1.73_m2]    Calcium 8.5 8.5 - 10.1 mg/dL   XR Chest 2 Views    Narrative    CHEST TWO VIEWS    11/14/2019 9:28 PM     HISTORY: Allergic reaction.    COMPARISON: Chest x-ray on 1/28/2014.      Impression    IMPRESSION: No acute airspace disease.   Neck soft tissue XR    Narrative    Examination:  XR NECK SOFT  TISSUE    Date:  11/14/2019 9:28 PM     Clinical Information: Allergic reaction, difficulty swallowing     Additional Information: none    Comparison: none      Impression    Impression: No prevertebral soft tissue swelling. Normal epiglottis.  The laryngotracheal air stripe is well-visualized. No radiopaque  foreign body present. Degenerative changes in the spine.    ALAN ANAYA MD       Medications   dexamethasone PF (DECADRON) injection 10 mg (10 mg Intravenous Given 11/14/19 2045)   famotidine (PEPCID) injection 20 mg (20 mg Intravenous Given 11/14/19 2050)       Assessments & Plan (with Medical Decision Making)  Maria M is a 54-year-old female with type 1 diabetes who presents for worsening hives.  She says she noticed approximately 1 and half days ago that she woke up with hives on her arms and in her groin and hips.  She was unsure of any particular trigger or cause, but does note that she has started eating pistachios a lot more, and the night before had eaten a new premixed salad kit, and the ingredient listed soy which she is concerned she may have a problem with.  No new soaps, detergents, no animals that are primarily outdoors, has not spent recent time outdoors.  She has been taking Benadryl, 25 mg p.o. q 4 hours since she does not want to be too sleepy, but is still having a lot of itching.  She called the nurse helpline twice, has a primary care appointment tomorrow afternoon, but was instructed to come to the ER for further evaluation since things were getting worse.  She took 25 mg Benadryl at 1800 today.  Patient is well-appearing, no acute distress.  No angioedema, no mucosal involvement.  She does have hives scattered on her upper extremities as well as her lower extremities from hips to knees, and a few scattered on her abdomen.  The remainder of her exam is unremarkable.  Labs obtained, as above.  Her blood glucose is within expected limits for a type I diabetic.  X-ray of chest and  neck soft tissue reviewed by me, shows no acute process.   Maria M states she is feeling better after administration of Decadron and Pepcid.  Discussed work-up, and states that hives may take some time to fully resolve.  Also difficult to say exactly what caused this reaction, but would recommend avoiding pistachios and the premixed salad kit until she can be further evaluated.  Discussed close monitoring of her blood sugars while on steroids, she has a continuous glucose monitor and is very compliant with her medication regimen.  She is aware that she may need to adjust her sliding scale insulin while on steroids.  I advised her to keep her follow-up appointment with her primary provider tomorrow at 3 PM to evaluate for improvement of her condition.  Did give a ED precautions to return, including but not limited to difficulty breathing, angioedema, worsening symptoms, or any other concern.  She understands and agrees.  She is discharged from the ED in stable condition, no acute distress.     I have reviewed the nursing notes.    I have reviewed the findings, diagnosis, plan and need for follow up with the patient.       New Prescriptions    FAMOTIDINE (PEPCID) 20 MG TABLET    Take 1 tablet (20 mg) by mouth 2 times daily    METHYLPREDNISOLONE (MEDROL DOSEPAK) 4 MG TABLET THERAPY PACK    Follow Package Directions       Final diagnoses:   Urticaria       11/14/2019   [facility] EMERGENCY DEPARTMENT     Melodie Mittal DO  11/14/19 8307

## 2019-11-16 ENCOUNTER — HOSPITAL ENCOUNTER (EMERGENCY)
Facility: CLINIC | Age: 54
Discharge: HOME | End: 2019-11-16
Attending: FAMILY MEDICINE | Admitting: FAMILY MEDICINE
Payer: COMMERCIAL

## 2019-11-16 ENCOUNTER — TELEPHONE (OUTPATIENT)
Dept: NURSING | Facility: CLINIC | Age: 54
End: 2019-11-16

## 2019-11-16 VITALS
HEIGHT: 58 IN | DIASTOLIC BLOOD PRESSURE: 86 MMHG | WEIGHT: 159 LBS | TEMPERATURE: 98.4 F | HEART RATE: 115 BPM | SYSTOLIC BLOOD PRESSURE: 183 MMHG | BODY MASS INDEX: 33.37 KG/M2 | OXYGEN SATURATION: 99 % | RESPIRATION RATE: 19 BRPM

## 2019-11-16 DIAGNOSIS — L50.9 HIVES: ICD-10-CM

## 2019-11-16 PROCEDURE — 99282 EMERGENCY DEPT VISIT SF MDM: CPT | Performed by: FAMILY MEDICINE

## 2019-11-16 PROCEDURE — 99284 EMERGENCY DEPT VISIT MOD MDM: CPT | Mod: Z6 | Performed by: FAMILY MEDICINE

## 2019-11-16 ASSESSMENT — MIFFLIN-ST. JEOR: SCORE: 1210.97

## 2019-11-16 NOTE — ED AVS SNAPSHOT
[facility] Emergency Department  [address] [place] [address]  Phone:  [phone]  Fax:  [phone]                                    Maria M Marcus   MRN: [id number]    Department:  [facility] Emergency Department   Date of Visit:  11/16/2019           After Visit Summary Signature Page    I have received my discharge instructions, and my questions have been answered. I have discussed any challenges I see with this plan with the nurse or doctor.    ..........................................................................................................................................  Patient/Patient Representative Signature      ..........................................................................................................................................  Patient Representative Print Name and Relationship to Patient    ..................................................               ................................................  Date                                   Time    ..........................................................................................................................................  Reviewed by Signature/Title    ...................................................              ..............................................  Date                                               Time          22EPIC Rev 08/18

## 2019-11-16 NOTE — TELEPHONE ENCOUNTER
55 y/o female, Type I Diabetic with implanted calls Nurse Triage line about recent onset of hives, Benadryl was not helping, so she was seen in ER per nurse advisor recommendation, and was given steroids.  This increased her blood sugars over 400, she was symptomatic, but hives went away.  She was seen by PCP yesterday, who had her stop the steroids butnow tonight the hives are back and worse, all over, spreading - per patient.  She denies any on her face, lips or swelling of tongue or airway, but then declines further triage from nurse or nurse recommendaitons, she request On-call MD be paged and she wants to speak with him/her directly.    RN paged on [facility] physician, Dr. Mittal for the [facility] at 1:09AM , advised in text that patient declines RN triage and wants to speak with MD. RN advised patient that if she has not heard from the physician in 20 minutes, to call me back and I will re-page.    RN received call back from Dr. Mittal who requested we conference call the patient, RN reached patient, MD discussed the risk of taking the steroids for the hives, she is home alone with grandchildren, same reason she does not want to come to the ER.  Provider discussed risks, patient wanting to try steroids, MD then discussed counter measures with insulin if she chooses this option, increasing long acting insulin at the same time taking the steroids (she has an indwelling pump with very little short term insulin and sliding scale is similar low dosing - would not have an effect on the steroid to combat hyperglycemia) patient verbalized this plan, verbalized risks of continuing to drive up her blood sugar to really high numbers, can't keep doing this while waiting to get into allergist. She agreed to come to  ER if within 1 -1.5 hours she did not have relief of hives despite steroid treatment.     Joslyn Walters RN - [place] Nurse Advisor  11/16/2019   12:57

## 2019-11-17 NOTE — DISCHARGE INSTRUCTIONS
Take Benadryl 25-50 mg every 6 hours as needed for your hives.  Take Pepcid 20 mg twice a day, and complete your Medrol Dosepak.  You could add Claritin or Allegra once a day for additional antihistamine effect.  Try the cool compresses to the face to try to reduce the swelling.  Follow-up with your primary care provider in 3 days if not improving.  Keep your appointment with the allergist as planned.  Return to the emergency department if your symptoms worsen.

## 2019-11-17 NOTE — ED PROVIDER NOTES
[facility] ED Provider Note   Patient: Maria M Marcus  MRN #:  [id number]  Date of Visit: November 16, 2019    CC:     Chief Complaint   Patient presents with     Hives     HPI:  Maria M Marcus is a 54 year old female who presented to the emergency department with persistent hives since Wednesday, 3 days ago.  Patient was on an antibiotic for a URI/sinusitis 2 weeks ago.  She then developed a urinary tract infection, and since the beginning of her illness she has been on a 10-day course of antibiotics for the sinusitis, and a 5-day course of antibiotics for her urinary tract infection.  Her antibiotics were completed approximately 10 days ago.  A week after she left took her last antibiotic, she developed hives that been diffuse.  The hives are fairly large plaques and involve the face, neck, trunk, upper extremities, and proximal lower legs.  She has not had any angioedema of the lips or tongue, difficulty breathing or any GI symptoms.  She is a type I diabetic, and her blood sugar was over 400 for 6 hours after she had a dose of steroid 2 days ago.  It did help with her hives however.  She has been taking Benadryl 1 tablet during the day and 2 tablets at night since it is making her groggy.  She just started taking Pepcid this morning.  She spoke with her endocrinologist, who recommended that she begin a Medrol Dosepak since it has helped with her hives and he thought that treatment of the hives had higher priority than the high blood sugars that she would have briefly.  He increased her insulin during this time and she took her first dose of prednisone this morning.  She presents to the ED with concerns for some mild swelling around the corner of the lip and left eye.  Patient did have a mixed solid the evening before she developed the onset of the hives.  She did take a picture of the ingredients that were in the mixed salad.  She has  an appointment to see an allergist during the first week of December.  She currently does not have any fever, or URI symptoms.  She has a history of insulin antibodies, reflecting an autoimmune process.  She has had one previous episode of hives following sinus surgery in which she took Zyrtec.  The hives cleared when she discontinued the Zyrtec.    Problem List:  Patient Active Problem List    Diagnosis Date Noted     Gastroesophageal reflux disease, esophagitis presence not specified 09/08/2017     Priority: Medium     Retinopathy due to secondary diabetes (H) 10/29/2016     Priority: Medium     Type 1 diabetes mellitus without complication (H) 11/01/2015     Priority: Medium     Overweight (BMI 25.0-29.9) 11/01/2015     Priority: Medium     Hypertension goal BP (blood pressure) < 140/90 09/29/2014     Priority: Medium     Back pain 03/23/2013     Priority: Medium     Chronic pain 11/09/2011     Priority: Medium     Related to frozen shoulder per ortho will be two years of intermittent pain, agreed to #45 percocet for 90 day supply, only get narcotics from me.         Vitiligo 07/15/2011     Priority: Medium     Frozen shoulder syndrome 06/16/2011     Priority: Medium     Advanced directives, counseling/discussion 06/13/2011     Priority: Medium     Advance Directive Problem List Overview:   Name Relationship Phone    Primary Health Care Agent            Alternative Health Care Agent        9/14/11  Patient presents for Honoring Choices Informational meeting. Patient given Honoring Choices folder. Patient will complete Advance Care Directive and bring to clinic...Mary Lee RN              Cervical radiculopathy      Priority: Medium     Hyperlipidemia LDL goal <100 10/31/2010     Priority: Medium     [place] 10-year CHD Risk Score: 20% (Diabetic)   Values used to calculate score:     Age: 46 years -- Points: 3     Total Cholesterol: 174 mg/dL -- Points: 3     HDL Cholesterol: 50 mg/dL -- Points: 0      "Systolic BP (treated): 128 mmHg -- Points: 3    The patient is not a smoker. -- Points: 0     The patient has a diagnosis of diabetes. -- Points: 20% Risk    The patient does not have a family history of CHD. -- Points:0     [place]      LDL goal  >= 20%  <100         Other and unspecified disc disorder of lumbar region 2007     Priority: Medium     Hypothyroidism due to Hashimoto's thyroiditis 10/05/2004     Priority: Medium     Problem list name updated by automated process. Provider to review         Past Medical History:   Diagnosis Date     Cervical radiculopathy      Diabetic eye exam (H) 11     DISC DIS NEC/NOS-LUMBAR 2007     Frozen shoulder syndrome 2011     Hyperlipidemia LDL goal <100 10/31/2010     Hypertension 1/3/2011     Hypothyroidism due to Hashimoto's thyroiditis      Type 1 diabetes, HbA1c goal < 7% (H) dx 1967     Unspecified hypothyroidism        MEDS: amitriptyline (ELAVIL) 25 MG tablet  blood glucose monitoring (Zwittle CONTOUR MONITOR) meter device kit  blood glucose monitoring (TANA CONTOUR) test strip  blood glucose monitoring (SOFTCLIX) lancets  EUTHYROX 75 MCG tablet  famotidine (PEPCID) 20 MG tablet  insulin glargine (LANTUS VIAL) 100 UNIT/ML vial  insulin glulisine (APIDRA) 100 UNIT/ML injection  insulin syringe-needle U-100 (BD INSULIN SYRINGE ULTRAFINE) 31G X 5/16\" 0.3 ML miscellaneous  lisinopril (PRINIVIL/ZESTRIL) 10 MG tablet  omeprazole (PRILOSEC) 20 MG DR capsule  pravastatin (PRAVACHOL) 10 MG tablet        ALLERGIES:    Allergies   Allergen Reactions     Novolog [Insulin Aspart] Swelling     Itching, rash, contact dermatitis     Humalog [Insulin Lispro] Rash     Contact dermatitis     Zyrtec [Cetirizine] Rash       Past Surgical History:   Procedure Laterality Date     C  DELIVERY ONLY      C-Sections x2     C NONSPECIFIC PROCEDURE      Hysterectomy - total (cervix removed but ovaries remain)     C STOMACH SURGERY PROCEDURE UNLISTED       C TOTAL " "ABDOM HYSTERECTOMY       COLONOSCOPY N/A 9/21/2016    Procedure: COLONOSCOPY;  Surgeon: Efren Perry MD;  [facility]     ENDOSCOPIC SINUS SURGERY Bilateral 12/4/2018    Procedure: Bilateral functional endoscopic maxillary antrostomies;  Surgeon: Woo Silva MD;  Location: [facility]     HC SACROPLASTY       LAMINECT/DISCECTOMY, CERVICAL  06/19/2007    C7-T1 hemilaminectomy, microdiscectomy, foraminotomy.     LASER YAG CAPSULOTOMY Right 10/23/2014    Procedure: LASER YAG CAPSULOTOMY;  Surgeon: Esteban Dorsey MD;  Location: [facility]     VITRECTOMY,STRIP EPIRETINAL MEMBRANE  06/24/09       Social History     Tobacco Use     Smoking status: Never Smoker     Smokeless tobacco: Never Used     Tobacco comment: no exposure   Substance Use Topics     Alcohol use: Yes     Comment: winex1-2/3x per yr.     Drug use: No         Review of Systems   Except as noted in HPI, all other systems were reviewed and are negative    Physical Exam     Vitals were reviewed  Patient Vitals for the past 8 hrs:   BP Temp Temp src Pulse Resp SpO2 Height Weight   11/16/19 1915 (!) 183/86 98.4  F (36.9  C) Oral 115 19 99 % 1.473 m (4' 10\") 72.1 kg (159 lb)     GENERAL APPEARANCE: Alert and oriented x3, no apparent distress, no respiratory difficulty  FACE: Slight facial redness and swelling; no angioedema of the lips or tongue  EYES: Pupils are equal hent: Conjunctiva noninjected  HENT: normal external exam; mucous membranes are clear; no oral lesions  NECK: no adenopathy or asymmetry  RESP: normal respiratory effort; clear breath sounds bilaterally  CV: regular rate and rhythm; no significant murmurs, gallops or rubs  ABD: soft, no tenderness; no rebound or guarding; bowel sounds are normal  MS: no gross deformities noted; normal muscle tone.  EXT: No calf tenderness or pitting edema  SKIN: Diffuse urticarial plaques over the upper extremities, right abdominal area, and proximal thighs: No involvement of the palms  NEURO: no " facial droop; no focal deficits, speech is normal  PSYCH: normal mood and affect      Available Lab/Imaging Results   No results found for this or any previous visit (from the past 24 hour(s)).             Impression     Final diagnoses:   Hives         ED Course & Medical Decision Making   Maria M Marcus is a 54 year old female with type 1 diabetes mellitus who presented to the emergency department with diffuse urticaria that started 3 days ago, possibly after eating a bag of mixed salad that she bought at the store.  She had a preceding URI and bladder infection in which she completed a 10-day, and additional 5-day course of antibiotics.  Her hives started about a week after she completed her last dose of antibiotics.  She does not have any signs of angioedema or anaphylaxis.  Patient feels well other than for the itching from the hives.  I recommended that we maximize her treatment for her hives including increasing her Benadryl to 25-50 mg every 6 hours scheduled.  Add Allegra or Claritin for additional antihistamine effect.  Continue Pepcid 20 mill grams twice a day.  Complete her Medrol Dosepak since it seemed to help but also closely monitor her blood sugars.  She has an implanted device and her endocrinologist has increased her insulin dosage during the time that she is on steroids.  I asked her to follow-up with her primary care provider in 3 days, and with the allergist in the beginning of December which is the earliest appointment she can get.  Return to the ED at any time with new or worsening symptoms.           Written after-visit summary and instructions were given at the time of discharge.    Follow up Plan:   Marcus Salgado PA-C  [address] [place] [address] [address]  [facility] [address]  [phone]    In 3 days  if not improving           Disclaimer: This note consists of words and symbols derived from keyboarding and dictation using voice recognition software.  As a result, there may be errors that  have gone undetected.  Please consider this when interpreting information found in this note.       Caro Velasco MD  11/16/19 1943

## 2019-11-17 NOTE — ED TRIAGE NOTES
Presents with hives that have not gotten much better from last visit 2 days ago.  Is diabetic and her primary stopped her steroids she was prescribed due to sugars being very high.

## 2019-11-25 DIAGNOSIS — E10.9 TYPE 1 DIABETES MELLITUS WITHOUT COMPLICATION (H): Chronic | ICD-10-CM

## 2019-11-26 RX ORDER — INSULIN GLARGINE 100 [IU]/ML
INJECTION, SOLUTION SUBCUTANEOUS
Qty: 10 ML | Refills: 1 | Status: SHIPPED | OUTPATIENT
Start: 2019-11-26 | End: 2020-02-10

## 2019-12-03 ENCOUNTER — OFFICE VISIT (OUTPATIENT)
Dept: ALLERGY | Facility: OTHER | Age: 54
End: 2019-12-03
Attending: PHYSICIAN ASSISTANT
Payer: COMMERCIAL

## 2019-12-03 VITALS
OXYGEN SATURATION: 98 % | WEIGHT: 159 LBS | DIASTOLIC BLOOD PRESSURE: 86 MMHG | TEMPERATURE: 98.1 F | HEART RATE: 86 BPM | BODY MASS INDEX: 33.37 KG/M2 | HEIGHT: 58 IN | SYSTOLIC BLOOD PRESSURE: 140 MMHG

## 2019-12-03 DIAGNOSIS — I10 HYPERTENSION GOAL BP (BLOOD PRESSURE) < 140/90: ICD-10-CM

## 2019-12-03 DIAGNOSIS — L50.9 HIVES: ICD-10-CM

## 2019-12-03 DIAGNOSIS — Z23 NEED FOR PROPHYLACTIC VACCINATION AND INOCULATION AGAINST INFLUENZA: Primary | ICD-10-CM

## 2019-12-03 DIAGNOSIS — E06.3 HYPOTHYROIDISM DUE TO HASHIMOTO'S THYROIDITIS: ICD-10-CM

## 2019-12-03 LAB — TSH SERPL DL<=0.005 MIU/L-ACNC: 0.51 MU/L (ref 0.4–4)

## 2019-12-03 PROCEDURE — 84443 ASSAY THYROID STIM HORMONE: CPT | Performed by: ALLERGY & IMMUNOLOGY

## 2019-12-03 PROCEDURE — 86003 ALLG SPEC IGE CRUDE XTRC EA: CPT | Performed by: ALLERGY & IMMUNOLOGY

## 2019-12-03 PROCEDURE — 36415 COLL VENOUS BLD VENIPUNCTURE: CPT | Performed by: ALLERGY & IMMUNOLOGY

## 2019-12-03 PROCEDURE — 90682 RIV4 VACC RECOMBINANT DNA IM: CPT | Performed by: ALLERGY & IMMUNOLOGY

## 2019-12-03 PROCEDURE — 99244 OFF/OP CNSLTJ NEW/EST MOD 40: CPT | Mod: 25 | Performed by: ALLERGY & IMMUNOLOGY

## 2019-12-03 PROCEDURE — 90471 IMMUNIZATION ADMIN: CPT | Performed by: ALLERGY & IMMUNOLOGY

## 2019-12-03 ASSESSMENT — MIFFLIN-ST. JEOR: SCORE: 1210.97

## 2019-12-03 NOTE — PROGRESS NOTES
Maria M Marcus is a 54 year old White female with previous medical history significant for hypothyroidism, DM type 1, hypertension and hyperlipidemia. Maria M Marcus is being seen today for evaluation of hives. The patient is being seen in consultation at the request of Marcus Salgado PA-C.     The patient reports that in the middle of November around 2:00 in the morning she woke up and had itching near sites of pressure including her underwear line and bra line.  She noted erythematous welts at the sites.  She took 2 tablets of diphenhydramine.  The following morning when she woke up at 8 AM she had hives all over her arms and bilateral hand swelling.  Diffusely pruritic.  Progressed to involve her legs and trunk.  Went to the ER was given prednisone.  This caused significant elevation of her blood sugars.  However, it did help with her hives.  But she only took 1 dose because of the blood sugar elevation.  Hives subsequently worsened.  Worked with endocrinology on insulin dosing and was able to go back on prednisone.  Hives lasted less than 24 hours individually.  No scarring or discoloration.  Total duration of hives was 5 to 6 days.  The evening prior to having the hives she went to bed at 11 PM.  In between 5 and 6 she had a salad for dinner which contained a ranch dressing that contain soy, skinner, bread.  Around 8 PM she had pistachios.  She was asymptomatic when she went to bed.  She denied any use of NSAIDs, over-the-counter supplements, other medications that were new, new soaps, detergents, fabric softeners.  She had been sick prior to the hives developing.  She has subsequently avoided tree nuts.   TSH recently low. I reviewed this lab testing. I reviewed ER note from 11/16/2019.    ENVIRONMENTAL HISTORY: [place] The home is heated with a forced air. They do have central air conditioning. The patient's bedroom is furnished with hard lm in  bedroom.  Pets inside the house include 1 cat(s). There is no history of cockroach or mice infestation. There is/are 0 smokers in the house.  The house does not have a damp basement.       Past Medical History:   Diagnosis Date     Cervical radiculopathy      Diabetic eye exam (H) 11     DISC DIS NEC/NOS-LUMBAR 2007     Frozen shoulder syndrome 2011     Hyperlipidemia LDL goal <100 10/31/2010     Hypertension 1/3/2011     Hypothyroidism due to Hashimoto's thyroiditis      Type 1 diabetes, HbA1c goal < 7% (H) dx 1967     Unspecified hypothyroidism      Family History   Problem Relation Age of Onset     Hypertension Maternal Grandfather      EYE* Maternal Grandmother      Blood Disease Maternal Grandmother      Eye Disorder Maternal Grandmother         maccular degeneration     Osteoporosis Maternal Grandmother      Lipids Father      Gastrointestinal Disease Father         ulcers     Heart Disease Father      Cardiovascular Father         heart attack     Hypertension Paternal Grandmother      Breast Cancer Mother         dx age 73 - terminal - mother had first mammo at this age     Diabetes Paternal Uncle         Type I     Past Surgical History:   Procedure Laterality Date     C  DELIVERY ONLY      C-Sections x2     C NONSPECIFIC PROCEDURE      Hysterectomy - total (cervix removed but ovaries remain)     C STOMACH SURGERY PROCEDURE UNLISTED       C TOTAL ABDOM HYSTERECTOMY       COLONOSCOPY N/A 2016    Procedure: COLONOSCOPY;  Surgeon: Efren Perry MD;  Location:  GI     ENDOSCOPIC SINUS SURGERY Bilateral 2018    Procedure: Bilateral functional endoscopic maxillary antrostomies;  Surgeon: Woo Silva MD;  Location:  OR      SACROPLASTY       LAMINECT/DISCECTOMY, CERVICAL  2007    C7-T1 hemilaminectomy, microdiscectomy, foraminotomy.     LASER YAG CAPSULOTOMY Right 10/23/2014    Procedure: LASER YAG CAPSULOTOMY;  Surgeon: Esteban Dorsey MD;  [facility]     VITRECTOMY,STRIP EPIRETINAL MEMBRANE  06/24/09       REVIEW OF SYSTEMS:  General: negative for weight gain. negative for weight loss. negative for changes in sleep.   Ears: negative for fullness. negative for hearing loss. negative for dizziness.   Nose: negative for snoring.negative for changes in smell. negative for drainage.   Eyes: negative for eye watering. negative for eye itching. negative for vision changes. negative for eye redness.  Throat: negative for hoarseness. negative for sore throat. negative for trouble swallowing.   Lungs: negative for shortness of breath.negative for wheezing. negative for sputum production.   Cardiovascular: negative for chest pain. negative for swelling of ankles. negative for fast or irregular heartbeat.   Gastrointestinal: negative for nausea. negative for heartburn. negative for acid reflux.   Musculoskeletal: negative for joint pain. negative for joint stiffness. negative for joint swelling.   Neurologic: negative for seizures. negative for fainting. negative for weakness.   Psychiatric: negative for changes in mood. negative for anxiety.   Endocrine: negative for cold intolerance. negative for heat intolerance. negative for tremors.   Lymphatic: negative for lower extremity swelling. negative for lymph node swelling.   Hematologic: negative for easy bruising. negative for easy bleeding.  Integumentary: positive  for rash. negative for scaling. negative for nail changes.       Current Outpatient Medications:      amitriptyline (ELAVIL) 25 MG tablet, TAKE 1 TABLET BY MOUTH AT BEDTIME, Disp: 90 tablet, Rfl: 1     blood glucose monitoring (TANA CONTOUR MONITOR) meter device kit, Use to test blood sugars 5 times daily or as directed., Disp: 1 kit, Rfl: 0     blood glucose monitoring (TANA CONTOUR) test strip, Use to test blood sugar 6 times daily or as directed., Disp: 550 strip, Rfl: 3     blood glucose monitoring (SOFTCLIX) lancets, USE 1  TO CHECK GLUCOSE THREE  "TIMES DAILY, Disp: 100 each, Rfl: 5     EUTHYROX 75 MCG tablet, TAKE 1 TABLET BY MOUTH ONCE DAILY, Disp: 90 tablet, Rfl: 1     famotidine (PEPCID) 20 MG tablet, Take 1 tablet (20 mg) by mouth 2 times daily, Disp: 14 tablet, Rfl: 0     insulin glargine (LANTUS VIAL) 100 UNIT/ML vial, Inject up to 10 units daily + 2 units for priming of pen, Disp: 10 mL, Rfl: 1     insulin glulisine (APIDRA) 100 UNIT/ML injection, Inject 1 unit per 20 gram carb with meals. Total daily dose 30 units, Disp: 30 mL, Rfl: 3     insulin syringe-needle U-100 (BD INSULIN SYRINGE ULTRAFINE) 31G X 5/16\" 0.3 ML miscellaneous, KEEP ON HAND IN CASE OF PUMP FAILURE. USE 4-5 SYRINGES DAILY, Disp: 100 each, Rfl: 3     lisinopril (PRINIVIL/ZESTRIL) 10 MG tablet, TAKE 1 TABLET BY MOUTH ONCE DAILY, Disp: 90 tablet, Rfl: 3     omeprazole (PRILOSEC) 20 MG DR capsule, TAKE 1 CAPSULE BY MOUTH ONCE DAILY, Disp: 90 capsule, Rfl: 1     pravastatin (PRAVACHOL) 10 MG tablet, TAKE 1 TABLET BY MOUTH ONCE DAILY, Disp: 90 tablet, Rfl: 3  Immunization History   Administered Date(s) Administered     HepB 10/14/1997, 12/04/1997     Influenza (IIV3) PF 10/27/1995, 10/14/1997, 11/03/2003, 10/25/2016     Influenza (intradermal) 11/17/2017     Influenza Quad, Recombinant, p-free (RIV4) 09/28/2018, 12/03/2019     Influenza Vaccine IM > 6 months Valent IIV4 10/13/2014, 10/09/2015, 11/17/2017     Pneumococcal 23 valent 10/14/1997     TDAP Vaccine (Adacel) 06/16/2011     Allergies   Allergen Reactions     Novolog [Insulin Aspart] Swelling     Itching, rash, contact dermatitis     Humalog [Insulin Lispro] Rash     Contact dermatitis     Zyrtec [Cetirizine] Rash         EXAM:   Constitutional:  Appears well-developed and well-nourished. No distress.   HEENT:   Head: Normocephalic.   Right Ear: External ear normal. TM normal  Left Ear: External ear normal. TM normal  Mouth/Throat: No oropharyngeal exudate present.   No cobblestoning of posterior oropharynx.   Nasal tissue pink " and normal appearing.  No rhinorrhea noted.    Eyes: Conjunctivae are non-erythematous   Cardiovascular: Normal rate, regular rhythm and normal heart sounds. Exam reveals no gallop and no friction rub.   No murmur heard.  Respiratory: Effort normal and breath sounds normal. No respiratory distress. No wheezes. No rales.   Musculoskeletal: Normal range of motion.   Lymphadenopathy:   No cervical adenopathy.    Neuro: Oriented to person, place, and time.  Skin: Skin is warm and dry. No rash noted.   Psychiatric: Normal mood and affect.     Nursing note and vitals reviewed.      ASSESSMENT/PLAN:  Problem List Items Addressed This Visit        Endocrine    Hypothyroidism due to Hashimoto's thyroiditis       Circulatory    Hypertension goal BP (blood pressure) < 140/90     Crystal to follow up with Primary Care provider regarding elevated blood pressure.              Musculoskeletal and Integumentary    Hives     Hives that persisted 5 to 6 days.  Angioedema of hands.  She had been battling an infection prior to hives developing.  She ate pistachio at 8 PM.  When she went to bed at 11 PM she was asymptomatic.  Woke up with hives at 2 AM.  No scarring or discoloration.  No association with NSAIDs.  No other clear etiology.  History of hypothyroidism.  Autoimmune thyroid disease.  On thyroid hormone replacement.  Most recent TSH was low.    - Timing would be on the long side to be blamed on tree nuts as 3 hours after consuming when she went to bed she was asymptomatic.  Regardless this was the last thing she ate and will evaluate for tree nut allergy as this can be a significant allergen.  Blood testing today given recent antihistamine suppressing medications.  Continue to avoid tree nuts.  We will additionally obtain TSH.  If hives return take Allegra 2 tablets by mouth twice daily. Hives could be secondary to recent infection as well. If hives return keep hive diary.          Relevant Orders    TSH with free T4 reflex  (Completed)    Allergen cashew IgE (Completed)    Allergen pistachio nut IgE (Completed)    Allergen almonds IgE (Completed)    Allergen pecan nut IgE (Completed)    Allergen walnuts IgE (Completed)      Other Visit Diagnoses     Need for prophylactic vaccination and inoculation against influenza    -  Primary    Relevant Orders    INFLUENZA QUAD, RECOMBINANT, P-FREE (RIV4) (FLUBLOCK) [54463] (Completed)    Vaccine Administration, Initial [75443] (Completed)          Chart documentation with Dragon Voice recognition Software. Although reviewed after completion, some words and grammatical errors may remain.    David Limon DO FAAAAI  Allergy/Immunology  [place], [place]

## 2019-12-03 NOTE — PATIENT INSTRUCTIONS
Allergy Staff Appt Hours Shot Hours Locations    Physician     David Limon DO       Support Staff     AYAKA Hogue, ALEJANDRO  Tuesday:        [place] 7-4:20     Wednesday:        [place]: 7-5 Thursday:                    [place] 7-6:40     Friday:  [place]  7-2:40   [place]        Thursday: 1-5:50        Friday: 7-10:50     [place]        Tuesday: 7- 3:20        Wednesday: 7-4:20     [place] Monday: 7-4:20        Tuesday: 1-6:20         [facility]  [address] [place] [address]  Appt Line: [phone]  Allergy RN:  [phone]    [facility]  [address] [place] [address]  Appt Line: [phone]  Allergy RN:  [phone]       Important Scheduling Information  Aspirin Desensitization: Appt will last 2 clinic days. Please call the Allergy RN line for your clinic to schedule. Discontinue antihistamines 7 days prior to the appointment.     Food Challenges: Appt will last 3-4 hours. Please call the Allergy RN line for your clinic to schedule. Discontinue antihistamines 7 days prior to the appointment.     Penicillin Testing: Appt will last 2-3 hours. Please call the Allergy RN line for your clinic to schedule. Discontinue antihistamines 7 days prior to the appointment.     Skin Testing: Appt will about 40 minutes. Call the appointment line for your clinic to schedule. Discontinue antihistamines 7 days prior to the appointment.     Venom Testing: Appt will last 2-3 hours. Please call the Allergy RN line for your clinic to schedule. Discontinue antihistamines 7 days prior to the appointment.     Thank you for trusting us with your Allergy, Asthma, and Immunology care. Please feel free to contact us with any questions or concerns you may have.      - Blood testing today.   - Continue to avoid tree nuts.   - If hives return I want you to keep detailed diary of potential causes.   - Also if hives return start Allegra (fexofenadine) 2 tablets up to twice daily (so  4 total) as needed and contact our office.

## 2019-12-03 NOTE — LETTER
12/3/2019         RE: Maria M Marcus  [address] [place] [address]        Dear Colleague,    Thank you for referring your patient, Maria M Marcus, to the [facility]. Please see a copy of my visit note below.    Maria M Marcus is a 54 year old White female with previous medical history significant for hypothyroidism, DM type 1, hypertension and hyperlipidemia. Maria M Marcus is being seen today for evaluation of hives. The patient is being seen in consultation at the request of Marcus Salgado PA-C.     The patient reports that in the middle of November around 2:00 in the morning she woke up and had itching near sites of pressure including her underwear line and bra line.  She noted erythematous welts at the sites.  She took 2 tablets of diphenhydramine.  The following morning when she woke up at 8 AM she had hives all over her arms and bilateral hand swelling.  Diffusely pruritic.  Progressed to involve her legs and trunk.  Went to the ER was given prednisone.  This caused significant elevation of her blood sugars.  However, it did help with her hives.  But she only took 1 dose because of the blood sugar elevation.  Hives subsequently worsened.  Worked with endocrinology on insulin dosing and was able to go back on prednisone.  Hives lasted less than 24 hours individually.  No scarring or discoloration.  Total duration of hives was 5 to 6 days.  The evening prior to having the hives she went to bed at 11 PM.  In between 5 and 6 she had a salad for dinner which contained a ranch dressing that contain soy, skinner, bread.  Around 8 PM she had pistachios.  She was asymptomatic when she went to bed.  She denied any use of NSAIDs, over-the-counter supplements, other medications that were new, new soaps, detergents, fabric softeners.  She had been sick prior to the hives developing.  She has subsequently avoided tree nuts.   TSH recently low. I reviewed this lab testing. I  reviewed ER note from 2019.    ENVIRONMENTAL HISTORY: [place] The home is heated with a forced air. They do have central air conditioning. The patient's bedroom is furnished with hard lm in bedroom.  Pets inside the house include 1 cat(s). There is no history of cockroach or mice infestation. There is/are 0 smokers in the house.  The house does not have a damp basement.       Past Medical History:   Diagnosis Date     Cervical radiculopathy      Diabetic eye exam (H) 11     DISC DIS NEC/NOS-LUMBAR 2007     Frozen shoulder syndrome 2011     Hyperlipidemia LDL goal <100 10/31/2010     Hypertension 1/3/2011     Hypothyroidism due to Hashimoto's thyroiditis      Type 1 diabetes, HbA1c goal < 7% (H) dx 1967     Unspecified hypothyroidism      Family History   Problem Relation Age of Onset     Hypertension Maternal Grandfather      EYE* Maternal Grandmother      Blood Disease Maternal Grandmother      Eye Disorder Maternal Grandmother         maccular degeneration     Osteoporosis Maternal Grandmother      Lipids Father      Gastrointestinal Disease Father         ulcers     Heart Disease Father      Cardiovascular Father         heart attack     Hypertension Paternal Grandmother      Breast Cancer Mother         dx age 73 - terminal - mother had first mammo at this age     Diabetes Paternal Uncle         Type I     Past Surgical History:   Procedure Laterality Date     C  DELIVERY ONLY      C-Sections x2     C NONSPECIFIC PROCEDURE      Hysterectomy - total (cervix removed but ovaries remain)     C STOMACH SURGERY PROCEDURE UNLISTED       C TOTAL ABDOM HYSTERECTOMY       COLONOSCOPY N/A 2016    Procedure: COLONOSCOPY;  Surgeon: Efren Perry MD;  Location:  GI     ENDOSCOPIC SINUS SURGERY Bilateral 2018    Procedure: Bilateral functional endoscopic maxillary antrostomies;  Surgeon: Woo Silva MD;  Location:  OR      HC SACROPLASTY       LAMINECT/DISCECTOMY, CERVICAL  06/19/2007    C7-T1 hemilaminectomy, microdiscectomy, foraminotomy.     LASER YAG CAPSULOTOMY Right 10/23/2014    Procedure: LASER YAG CAPSULOTOMY;  Surgeon: Esteban Dorsey MD;  Location: [facility] OR     VITRECTOMY,STRIP EPIRETINAL MEMBRANE  06/24/09       REVIEW OF SYSTEMS:  General: negative for weight gain. negative for weight loss. negative for changes in sleep.   Ears: negative for fullness. negative for hearing loss. negative for dizziness.   Nose: negative for snoring.negative for changes in smell. negative for drainage.   Eyes: negative for eye watering. negative for eye itching. negative for vision changes. negative for eye redness.  Throat: negative for hoarseness. negative for sore throat. negative for trouble swallowing.   Lungs: negative for shortness of breath.negative for wheezing. negative for sputum production.   Cardiovascular: negative for chest pain. negative for swelling of ankles. negative for fast or irregular heartbeat.   Gastrointestinal: negative for nausea. negative for heartburn. negative for acid reflux.   Musculoskeletal: negative for joint pain. negative for joint stiffness. negative for joint swelling.   Neurologic: negative for seizures. negative for fainting. negative for weakness.   Psychiatric: negative for changes in mood. negative for anxiety.   Endocrine: negative for cold intolerance. negative for heat intolerance. negative for tremors.   Lymphatic: negative for lower extremity swelling. negative for lymph node swelling.   Hematologic: negative for easy bruising. negative for easy bleeding.  Integumentary: positive  for rash. negative for scaling. negative for nail changes.       Current Outpatient Medications:      amitriptyline (ELAVIL) 25 MG tablet, TAKE 1 TABLET BY MOUTH AT BEDTIME, Disp: 90 tablet, Rfl: 1     blood glucose monitoring (TANA CONTOUR MONITOR) meter device kit, Use to test blood sugars 5 times daily  "or as directed., Disp: 1 kit, Rfl: 0     blood glucose monitoring (TANA CONTOUR) test strip, Use to test blood sugar 6 times daily or as directed., Disp: 550 strip, Rfl: 3     blood glucose monitoring (SOFTCLIX) lancets, USE 1  TO CHECK GLUCOSE THREE TIMES DAILY, Disp: 100 each, Rfl: 5     EUTHYROX 75 MCG tablet, TAKE 1 TABLET BY MOUTH ONCE DAILY, Disp: 90 tablet, Rfl: 1     famotidine (PEPCID) 20 MG tablet, Take 1 tablet (20 mg) by mouth 2 times daily, Disp: 14 tablet, Rfl: 0     insulin glargine (LANTUS VIAL) 100 UNIT/ML vial, Inject up to 10 units daily + 2 units for priming of pen, Disp: 10 mL, Rfl: 1     insulin glulisine (APIDRA) 100 UNIT/ML injection, Inject 1 unit per 20 gram carb with meals. Total daily dose 30 units, Disp: 30 mL, Rfl: 3     insulin syringe-needle U-100 (BD INSULIN SYRINGE ULTRAFINE) 31G X 5/16\" 0.3 ML miscellaneous, KEEP ON HAND IN CASE OF PUMP FAILURE. USE 4-5 SYRINGES DAILY, Disp: 100 each, Rfl: 3     lisinopril (PRINIVIL/ZESTRIL) 10 MG tablet, TAKE 1 TABLET BY MOUTH ONCE DAILY, Disp: 90 tablet, Rfl: 3     omeprazole (PRILOSEC) 20 MG DR capsule, TAKE 1 CAPSULE BY MOUTH ONCE DAILY, Disp: 90 capsule, Rfl: 1     pravastatin (PRAVACHOL) 10 MG tablet, TAKE 1 TABLET BY MOUTH ONCE DAILY, Disp: 90 tablet, Rfl: 3  Immunization History   Administered Date(s) Administered     HepB 10/14/1997, 12/04/1997     Influenza (IIV3) PF 10/27/1995, 10/14/1997, 11/03/2003, 10/25/2016     Influenza (intradermal) 11/17/2017     Influenza Quad, Recombinant, p-free (RIV4) 09/28/2018, 12/03/2019     Influenza Vaccine IM > 6 months Valent IIV4 10/13/2014, 10/09/2015, 11/17/2017     Pneumococcal 23 valent 10/14/1997     TDAP Vaccine (Adacel) 06/16/2011     Allergies   Allergen Reactions     Novolog [Insulin Aspart] Swelling     Itching, rash, contact dermatitis     Humalog [Insulin Lispro] Rash     Contact dermatitis     Zyrtec [Cetirizine] Rash         EXAM:   Constitutional:  Appears well-developed and " well-nourished. No distress.   HEENT:   Head: Normocephalic.   Right Ear: External ear normal. TM normal  Left Ear: External ear normal. TM normal  Mouth/Throat: No oropharyngeal exudate present.   No cobblestoning of posterior oropharynx.   Nasal tissue pink and normal appearing.  No rhinorrhea noted.    Eyes: Conjunctivae are non-erythematous   Cardiovascular: Normal rate, regular rhythm and normal heart sounds. Exam reveals no gallop and no friction rub.   No murmur heard.  Respiratory: Effort normal and breath sounds normal. No respiratory distress. No wheezes. No rales.   Musculoskeletal: Normal range of motion.   Lymphadenopathy:   No cervical adenopathy.    Neuro: Oriented to person, place, and time.  Skin: Skin is warm and dry. No rash noted.   Psychiatric: Normal mood and affect.     Nursing note and vitals reviewed.      ASSESSMENT/PLAN:  Problem List Items Addressed This Visit        Endocrine    Hypothyroidism due to Hashimoto's thyroiditis       Circulatory    Hypertension goal BP (blood pressure) < 140/90     Crystal to follow up with Primary Care provider regarding elevated blood pressure.              Musculoskeletal and Integumentary    Hives     Hives that persisted 5 to 6 days.  Angioedema of hands.  She had been battling an infection prior to hives developing.  She ate pistachio at 8 PM.  When she went to bed at 11 PM she was asymptomatic.  Woke up with hives at 2 AM.  No scarring or discoloration.  No association with NSAIDs.  No other clear etiology.  History of hypothyroidism.  Autoimmune thyroid disease.  On thyroid hormone replacement.  Most recent TSH was low.    - Timing would be on the long side to be blamed on tree nuts as 3 hours after consuming when she went to bed she was asymptomatic.  Regardless this was the last thing she ate and will evaluate for tree nut allergy as this can be a significant allergen.  Blood testing today given recent antihistamine suppressing medications.   Continue to avoid tree nuts.  We will additionally obtain TSH.  If hives return take Allegra 2 tablets by mouth twice daily. Hives could be secondary to recent infection as well. If hives return keep hive diary.          Relevant Orders    TSH with free T4 reflex (Completed)    Allergen cashew IgE (Completed)    Allergen pistachio nut IgE (Completed)    Allergen almonds IgE (Completed)    Allergen pecan nut IgE (Completed)    Allergen walnuts IgE (Completed)      Other Visit Diagnoses     Need for prophylactic vaccination and inoculation against influenza    -  Primary    Relevant Orders    INFLUENZA QUAD, RECOMBINANT, P-FREE (RIV4) (FLUBLOCK) [37796] (Completed)    Vaccine Administration, Initial [10924] (Completed)          Chart documentation with Dragon Voice recognition Software. Although reviewed after completion, some words and grammatical errors may remain.    David Limon DO FAAAAI  Allergy/Immunology  [place], [place]      Again, thank you for allowing me to participate in the care of your patient.        Sincerely,        David Limon DO

## 2019-12-04 LAB
ALMOND IGE QN: <0.1 KU(A)/L
CASHEW NUT IGE QN: <0.1 KU(A)/L
PECAN/HICK NUT IGE QN: <0.1 KU(A)/L
PISTACHIO IGE QN: <0.1 KU(A)/L
WALNUT IGE QN: <0.1 KU(A)/L

## 2019-12-04 NOTE — ASSESSMENT & PLAN NOTE
Hives that persisted 5 to 6 days.  Angioedema of hands.  She had been battling an infection prior to hives developing.  She ate pistachio at 8 PM.  When she went to bed at 11 PM she was asymptomatic.  Woke up with hives at 2 AM.  No scarring or discoloration.  No association with NSAIDs.  No other clear etiology.  History of hypothyroidism.  Autoimmune thyroid disease.  On thyroid hormone replacement.  Most recent TSH was low.    - Timing would be on the long side to be blamed on tree nuts as 3 hours after consuming when she went to bed she was asymptomatic.  Regardless this was the last thing she ate and will evaluate for tree nut allergy as this can be a significant allergen.  Blood testing today given recent antihistamine suppressing medications.  Continue to avoid tree nuts.  We will additionally obtain TSH.  If hives return take Allegra 2 tablets by mouth twice daily. Hives could be secondary to recent infection as well. If hives return keep hive diary.

## 2019-12-04 NOTE — RESULT ENCOUNTER NOTE
All testing for tree nuts was negative.  I do not think tree nuts likely cause of hives given prolonged time after eating pistachios and development of symptoms. Continue the plan as we discussed. Thanks.     Dr. Limon

## 2020-01-09 ENCOUNTER — TRANSFERRED RECORDS (OUTPATIENT)
Dept: HEALTH INFORMATION MANAGEMENT | Facility: CLINIC | Age: 55
End: 2020-01-09

## 2020-01-25 DIAGNOSIS — E10.9 DIABETES MELLITUS TYPE 1 (H): ICD-10-CM

## 2020-01-27 RX ORDER — SYRING-NEEDL,DISP,INSUL,0.3 ML 31GX15/64"
SYRINGE, EMPTY DISPOSABLE MISCELLANEOUS
Qty: 100 EACH | Refills: 8 | Status: SHIPPED | OUTPATIENT
Start: 2020-01-27 | End: 2020-09-01

## 2020-01-30 ENCOUNTER — TELEPHONE (OUTPATIENT)
Dept: ENDOCRINOLOGY | Facility: CLINIC | Age: 55
End: 2020-01-30

## 2020-01-30 ENCOUNTER — HOSPITAL ENCOUNTER (OUTPATIENT)
Facility: CLINIC | Age: 55
Setting detail: OBSERVATION
Discharge: HOME | End: 2020-02-01
Attending: EMERGENCY MEDICINE | Admitting: INTERNAL MEDICINE
Payer: COMMERCIAL

## 2020-01-30 ENCOUNTER — APPOINTMENT (OUTPATIENT)
Dept: CT IMAGING | Facility: CLINIC | Age: 55
End: 2020-01-30
Attending: EMERGENCY MEDICINE
Payer: COMMERCIAL

## 2020-01-30 ENCOUNTER — NURSE TRIAGE (OUTPATIENT)
Dept: NURSING | Facility: CLINIC | Age: 55
End: 2020-01-30

## 2020-01-30 DIAGNOSIS — R19.7 DIARRHEA, UNSPECIFIED TYPE: ICD-10-CM

## 2020-01-30 DIAGNOSIS — R11.2 NAUSEA VOMITING AND DIARRHEA: ICD-10-CM

## 2020-01-30 DIAGNOSIS — E10.65 TYPE 1 DIABETES MELLITUS WITH HYPERGLYCEMIA (H): ICD-10-CM

## 2020-01-30 DIAGNOSIS — K52.9 COLITIS: Primary | ICD-10-CM

## 2020-01-30 DIAGNOSIS — R11.2 NAUSEA AND VOMITING, INTRACTABILITY OF VOMITING NOT SPECIFIED, UNSPECIFIED VOMITING TYPE: ICD-10-CM

## 2020-01-30 DIAGNOSIS — R19.7 NAUSEA VOMITING AND DIARRHEA: ICD-10-CM

## 2020-01-30 LAB
ALBUMIN SERPL-MCNC: 3.8 G/DL (ref 3.4–5)
ALBUMIN UR-MCNC: NEGATIVE MG/DL
ALP SERPL-CCNC: 92 U/L (ref 40–150)
ALT SERPL W P-5'-P-CCNC: 17 U/L (ref 0–50)
ANION GAP SERPL CALCULATED.3IONS-SCNC: 8 MMOL/L (ref 3–14)
APPEARANCE UR: CLEAR
AST SERPL W P-5'-P-CCNC: 33 U/L (ref 0–45)
BASE DEFICIT BLDV-SCNC: 0.4 MMOL/L
BASOPHILS # BLD AUTO: 0 10E9/L (ref 0–0.2)
BASOPHILS NFR BLD AUTO: 0.2 %
BILIRUB SERPL-MCNC: 0.7 MG/DL (ref 0.2–1.3)
BILIRUB UR QL STRIP: NEGATIVE
BUN SERPL-MCNC: 12 MG/DL (ref 7–30)
CALCIUM SERPL-MCNC: 9.2 MG/DL (ref 8.5–10.1)
CHLORIDE SERPL-SCNC: 107 MMOL/L (ref 94–109)
CO2 SERPL-SCNC: 23 MMOL/L (ref 20–32)
COLOR UR AUTO: YELLOW
CREAT SERPL-MCNC: 0.77 MG/DL (ref 0.52–1.04)
DIFFERENTIAL METHOD BLD: ABNORMAL
EOSINOPHIL # BLD AUTO: 0 10E9/L (ref 0–0.7)
EOSINOPHIL NFR BLD AUTO: 0.1 %
ERYTHROCYTE [DISTWIDTH] IN BLOOD BY AUTOMATED COUNT: 13 % (ref 10–15)
GFR SERPL CREATININE-BSD FRML MDRD: 87 ML/MIN/{1.73_M2}
GLUCOSE BLDC GLUCOMTR-MCNC: 163 MG/DL (ref 70–99)
GLUCOSE BLDC GLUCOMTR-MCNC: 168 MG/DL (ref 70–99)
GLUCOSE BLDC GLUCOMTR-MCNC: 218 MG/DL (ref 70–99)
GLUCOSE SERPL-MCNC: 225 MG/DL (ref 70–99)
GLUCOSE UR STRIP-MCNC: 70 MG/DL
HCO3 BLDV-SCNC: 25 MMOL/L (ref 21–28)
HCT VFR BLD AUTO: 39.7 % (ref 35–47)
HGB BLD-MCNC: 13.1 G/DL (ref 11.7–15.7)
HGB UR QL STRIP: NEGATIVE
IMM GRANULOCYTES # BLD: 0 10E9/L (ref 0–0.4)
IMM GRANULOCYTES NFR BLD: 0.3 %
KETONES BLD-SCNC: 0.4 MMOL/L (ref 0–0.6)
KETONES UR STRIP-MCNC: 40 MG/DL
LACTATE BLD-SCNC: 1.5 MMOL/L (ref 0.7–2)
LEUKOCYTE ESTERASE UR QL STRIP: NEGATIVE
LYMPHOCYTES # BLD AUTO: 0.8 10E9/L (ref 0.8–5.3)
LYMPHOCYTES NFR BLD AUTO: 7.4 %
MCH RBC QN AUTO: 29.5 PG (ref 26.5–33)
MCHC RBC AUTO-ENTMCNC: 33 G/DL (ref 31.5–36.5)
MCV RBC AUTO: 89 FL (ref 78–100)
MONOCYTES # BLD AUTO: 0.3 10E9/L (ref 0–1.3)
MONOCYTES NFR BLD AUTO: 2.8 %
MUCOUS THREADS #/AREA URNS LPF: PRESENT /LPF
NEUTROPHILS # BLD AUTO: 9.6 10E9/L (ref 1.6–8.3)
NEUTROPHILS NFR BLD AUTO: 89.2 %
NITRATE UR QL: NEGATIVE
NRBC # BLD AUTO: 0 10*3/UL
NRBC BLD AUTO-RTO: 0 /100
O2/TOTAL GAS SETTING VFR VENT: 1 %
PCO2 BLDV: 41 MM HG (ref 40–50)
PH BLDV: 7.39 PH (ref 7.32–7.43)
PH UR STRIP: 5 PH (ref 5–7)
PLATELET # BLD AUTO: 238 10E9/L (ref 150–450)
PO2 BLDV: 49 MM HG (ref 25–47)
POTASSIUM SERPL-SCNC: 5 MMOL/L (ref 3.4–5.3)
PROT SERPL-MCNC: 7.1 G/DL (ref 6.8–8.8)
RBC # BLD AUTO: 4.44 10E12/L (ref 3.8–5.2)
RBC #/AREA URNS AUTO: 0 /HPF (ref 0–2)
SODIUM SERPL-SCNC: 137 MMOL/L (ref 133–144)
SOURCE: ABNORMAL
SP GR UR STRIP: 1.02 (ref 1–1.03)
SQUAMOUS #/AREA URNS AUTO: 1 /HPF (ref 0–1)
UROBILINOGEN UR STRIP-MCNC: NORMAL MG/DL (ref 0–2)
WBC # BLD AUTO: 10.7 10E9/L (ref 4–11)
WBC #/AREA URNS AUTO: 2 /HPF (ref 0–5)

## 2020-01-30 PROCEDURE — 81001 URINALYSIS AUTO W/SCOPE: CPT | Performed by: EMERGENCY MEDICINE

## 2020-01-30 PROCEDURE — 99207 ZZC CDG-CODE CATEGORY CHANGED: CPT | Performed by: INTERNAL MEDICINE

## 2020-01-30 PROCEDURE — 96375 TX/PRO/DX INJ NEW DRUG ADDON: CPT | Performed by: EMERGENCY MEDICINE

## 2020-01-30 PROCEDURE — 00000146 ZZHCL STATISTIC GLUCOSE BY METER IP

## 2020-01-30 PROCEDURE — 25800030 ZZH RX IP 258 OP 636: Performed by: EMERGENCY MEDICINE

## 2020-01-30 PROCEDURE — 85025 COMPLETE CBC W/AUTO DIFF WBC: CPT | Performed by: EMERGENCY MEDICINE

## 2020-01-30 PROCEDURE — G0378 HOSPITAL OBSERVATION PER HR: HCPCS

## 2020-01-30 PROCEDURE — 96361 HYDRATE IV INFUSION ADD-ON: CPT | Performed by: EMERGENCY MEDICINE

## 2020-01-30 PROCEDURE — 99284 EMERGENCY DEPT VISIT MOD MDM: CPT | Mod: Z6 | Performed by: EMERGENCY MEDICINE

## 2020-01-30 PROCEDURE — 82010 KETONE BODYS QUAN: CPT | Performed by: EMERGENCY MEDICINE

## 2020-01-30 PROCEDURE — 99207 ZZC APP CREDIT; MD BILLING SHARED VISIT: CPT | Performed by: PHYSICIAN ASSISTANT

## 2020-01-30 PROCEDURE — 99285 EMERGENCY DEPT VISIT HI MDM: CPT | Mod: 25 | Performed by: EMERGENCY MEDICINE

## 2020-01-30 PROCEDURE — 80053 COMPREHEN METABOLIC PANEL: CPT | Performed by: EMERGENCY MEDICINE

## 2020-01-30 PROCEDURE — 25800030 ZZH RX IP 258 OP 636: Performed by: PHYSICIAN ASSISTANT

## 2020-01-30 PROCEDURE — 96367 TX/PROPH/DG ADDL SEQ IV INF: CPT | Performed by: EMERGENCY MEDICINE

## 2020-01-30 PROCEDURE — 25000128 H RX IP 250 OP 636: Performed by: PHYSICIAN ASSISTANT

## 2020-01-30 PROCEDURE — 83605 ASSAY OF LACTIC ACID: CPT | Performed by: EMERGENCY MEDICINE

## 2020-01-30 PROCEDURE — 82803 BLOOD GASES ANY COMBINATION: CPT | Performed by: EMERGENCY MEDICINE

## 2020-01-30 PROCEDURE — 25000128 H RX IP 250 OP 636: Performed by: STUDENT IN AN ORGANIZED HEALTH CARE EDUCATION/TRAINING PROGRAM

## 2020-01-30 PROCEDURE — 99220 ZZC INITIAL OBSERVATION CARE,LEVL III: CPT | Performed by: INTERNAL MEDICINE

## 2020-01-30 PROCEDURE — 74177 CT ABD & PELVIS W/CONTRAST: CPT

## 2020-01-30 PROCEDURE — 25000132 ZZH RX MED GY IP 250 OP 250 PS 637: Performed by: PHYSICIAN ASSISTANT

## 2020-01-30 PROCEDURE — 87506 IADNA-DNA/RNA PROBE TQ 6-11: CPT | Performed by: PHYSICIAN ASSISTANT

## 2020-01-30 PROCEDURE — 25000128 H RX IP 250 OP 636: Performed by: EMERGENCY MEDICINE

## 2020-01-30 PROCEDURE — 96365 THER/PROPH/DIAG IV INF INIT: CPT | Mod: 59 | Performed by: EMERGENCY MEDICINE

## 2020-01-30 RX ORDER — IOPAMIDOL 755 MG/ML
97 INJECTION, SOLUTION INTRAVASCULAR ONCE
Status: COMPLETED | OUTPATIENT
Start: 2020-01-30 | End: 2020-01-30

## 2020-01-30 RX ORDER — NALOXONE HYDROCHLORIDE 0.4 MG/ML
.1-.4 INJECTION, SOLUTION INTRAMUSCULAR; INTRAVENOUS; SUBCUTANEOUS
Status: DISCONTINUED | OUTPATIENT
Start: 2020-01-30 | End: 2020-02-01 | Stop reason: HOSPADM

## 2020-01-30 RX ORDER — ACETAMINOPHEN 325 MG/1
650 TABLET ORAL EVERY 4 HOURS PRN
Status: CANCELLED | OUTPATIENT
Start: 2020-01-30

## 2020-01-30 RX ORDER — SODIUM CHLORIDE 9 MG/ML
INJECTION, SOLUTION INTRAVENOUS CONTINUOUS
Status: ACTIVE | OUTPATIENT
Start: 2020-01-30 | End: 2020-01-31

## 2020-01-30 RX ORDER — FAMOTIDINE 20 MG/1
20 TABLET, FILM COATED ORAL 2 TIMES DAILY
Status: DISCONTINUED | OUTPATIENT
Start: 2020-01-30 | End: 2020-01-31

## 2020-01-30 RX ORDER — ONDANSETRON 2 MG/ML
4 INJECTION INTRAMUSCULAR; INTRAVENOUS EVERY 6 HOURS PRN
Status: DISCONTINUED | OUTPATIENT
Start: 2020-01-30 | End: 2020-02-01 | Stop reason: HOSPADM

## 2020-01-30 RX ORDER — LISINOPRIL 10 MG/1
10 TABLET ORAL DAILY
Status: DISCONTINUED | OUTPATIENT
Start: 2020-01-31 | End: 2020-02-01 | Stop reason: HOSPADM

## 2020-01-30 RX ORDER — PRAVASTATIN SODIUM 10 MG
10 TABLET ORAL AT BEDTIME
Status: DISCONTINUED | OUTPATIENT
Start: 2020-01-30 | End: 2020-02-01 | Stop reason: HOSPADM

## 2020-01-30 RX ORDER — ONDANSETRON 2 MG/ML
4 INJECTION INTRAMUSCULAR; INTRAVENOUS ONCE
Status: COMPLETED | OUTPATIENT
Start: 2020-01-30 | End: 2020-01-30

## 2020-01-30 RX ORDER — ACETAMINOPHEN 650 MG/1
650 SUPPOSITORY RECTAL EVERY 4 HOURS PRN
Status: CANCELLED | OUTPATIENT
Start: 2020-01-30

## 2020-01-30 RX ORDER — HYDRALAZINE HYDROCHLORIDE 20 MG/ML
10 INJECTION INTRAMUSCULAR; INTRAVENOUS EVERY 6 HOURS PRN
Status: DISCONTINUED | OUTPATIENT
Start: 2020-01-30 | End: 2020-02-01 | Stop reason: HOSPADM

## 2020-01-30 RX ORDER — CIPROFLOXACIN 2 MG/ML
400 INJECTION, SOLUTION INTRAVENOUS EVERY 12 HOURS
Status: DISCONTINUED | OUTPATIENT
Start: 2020-01-30 | End: 2020-02-01 | Stop reason: HOSPADM

## 2020-01-30 RX ORDER — ONDANSETRON 4 MG/1
4 TABLET, ORALLY DISINTEGRATING ORAL EVERY 6 HOURS PRN
Status: DISCONTINUED | OUTPATIENT
Start: 2020-01-30 | End: 2020-02-01 | Stop reason: HOSPADM

## 2020-01-30 RX ADMIN — IOPAMIDOL 97 ML: 755 INJECTION, SOLUTION INTRAVENOUS at 17:31

## 2020-01-30 RX ADMIN — SODIUM CHLORIDE: 9 INJECTION, SOLUTION INTRAVENOUS at 23:22

## 2020-01-30 RX ADMIN — FAMOTIDINE 20 MG: 20 TABLET ORAL at 21:47

## 2020-01-30 RX ADMIN — PRAVASTATIN SODIUM 10 MG: 10 TABLET ORAL at 21:47

## 2020-01-30 RX ADMIN — CIPROFLOXACIN 400 MG: 2 INJECTION, SOLUTION INTRAVENOUS at 22:11

## 2020-01-30 RX ADMIN — SODIUM CHLORIDE 1000 ML: 9 INJECTION, SOLUTION INTRAVENOUS at 15:21

## 2020-01-30 RX ADMIN — AMITRIPTYLINE HYDROCHLORIDE 25 MG: 25 TABLET, FILM COATED ORAL at 21:47

## 2020-01-30 RX ADMIN — SODIUM CHLORIDE 1000 ML: 9 INJECTION, SOLUTION INTRAVENOUS at 13:08

## 2020-01-30 RX ADMIN — METRONIDAZOLE 500 MG: 500 INJECTION, SOLUTION INTRAVENOUS at 21:04

## 2020-01-30 RX ADMIN — ONDANSETRON 4 MG: 2 INJECTION INTRAMUSCULAR; INTRAVENOUS at 13:07

## 2020-01-30 ASSESSMENT — ENCOUNTER SYMPTOMS
VOMITING: 1
ABDOMINAL PAIN: 1
HEADACHES: 0
NECK STIFFNESS: 0
DIFFICULTY URINATING: 0
SHORTNESS OF BREATH: 0
CONFUSION: 0
ARTHRALGIAS: 0
NAUSEA: 1
COLOR CHANGE: 0
FEVER: 0
DIARRHEA: 1
EYE REDNESS: 0

## 2020-01-30 ASSESSMENT — MIFFLIN-ST. JEOR: SCORE: 1206.43

## 2020-01-30 NOTE — TELEPHONE ENCOUNTER
Update forwarded to AMY Escobar.    Nandini Jenkins LPN  Diabetes Clinic Coordinator   Adult Endocrinology and Pediatric Specialty Clinics  [facility]

## 2020-01-30 NOTE — TELEPHONE ENCOUNTER
Pt reporting, having high blood sugars for over the last few days.     Blood Sugars ranging from 200-400.   Pt has been taking her insulin.   But it does not seem to reduce the blood sugar.   Pt having some issues with nausea.    Vomited a couple of times within the last 24 hours.     No diarrhea or constipation noted.     Pt just not feeling good.       Pt called the [facility] for an office visit.   But doctor is out of the office today.    Pt wanted an office visit at [facility].    But no appointments available.        Due to Pt having type 1 Diabetes.     Was told to go to the ER for an evaluation for Pt care.  Pt mentioned she would come to the [facility] ER for evaluation due to there are Endo doctor's at the [facility]   ER.  Pt will have a family member drive her to the ER for Pt care.      Additional Information    Negative: Unconscious or difficult to awaken    Negative: Acting confused (e.g., disoriented, slurred speech)    Negative: Very weak (can't stand)    Negative: Sounds like a life-threatening emergency to the triager    Negative: Vomiting and signs of dehydration (e.g., very dry mouth, lightheaded, etc.)    Negative: Blood glucose > 240 mg/dl (13 mmol/l) and rapid breathing    Negative: Blood glucose > 500 mg/dl (27.5 mmol/l)    Negative: Blood glucose > 240 mg/dl (13 mmol/l) AND urine ketones moderate-large (or more than 1+)    Negative: Blood glucose > 240 mg/dl (13 mmol/l) and blood ketones > 1.5 mmol/l    Negative: Blood glucose > 240 mg/dl (13 mmol/l) AND vomiting AND unable to check for ketones (in blood or urine)    Negative: Vomiting lasting > 4 hours    Patient sounds very sick or weak to the triager    Protocols used: DIABETES - HIGH BLOOD SUGAR-A-OH

## 2020-01-30 NOTE — ED PROVIDER NOTES
"      [place] EMERGENCY DEPARTMENT ([facility])  January 30, 2020    History     Chief Complaint   Patient presents with     Nausea, Vomiting, & Diarrhea     HPI  Maria M Marcus is a 54 year old female with a history of type 1 DM who presents with nausea, vomiting, and diarrhea. Patient reports onset of nausea and \"not feeling well\" yesterday. She noticed her blood sugars spike to the 200-300's yesterday (baseline average 110's). Today, began with vomiting, diarrhea, and abdominal cramping.  was recently treated for Norovirus earlier this month. No recent history of DKA.    I have reviewed the Medications, Allergies, Past Medical and Surgical History, and Social History in the Epic system.    Review of Systems   Constitutional: Negative for fever.   HENT: Negative for congestion.    Eyes: Negative for redness.   Respiratory: Negative for shortness of breath.    Cardiovascular: Negative for chest pain.   Gastrointestinal: Positive for abdominal pain, diarrhea, nausea and vomiting.   Genitourinary: Negative for difficulty urinating.   Musculoskeletal: Negative for arthralgias and neck stiffness.   Skin: Negative for color change.   Neurological: Negative for headaches.   Psychiatric/Behavioral: Negative for confusion.       Physical Exam   BP: (!) 185/63  Pulse: 78  Heart Rate: 94  Temp: 98.4  F (36.9  C)  Resp: 16  Height: 147.3 cm (4' 10\")  Weight: 71.7 kg (158 lb)  SpO2: 98 %      Physical Exam  Vitals signs and nursing note reviewed.   Constitutional:       General: She is not in acute distress.     Appearance: Normal appearance. She is not diaphoretic.   HENT:      Head: Normocephalic and atraumatic.      Mouth/Throat:      Pharynx: No oropharyngeal exudate.   Eyes:      General: No scleral icterus.     Pupils: Pupils are equal, round, and reactive to light.   Neck:      Musculoskeletal: Neck supple.   Cardiovascular:      Rate and Rhythm: Normal rate and regular rhythm.      Pulses: Normal " pulses.      Heart sounds: Normal heart sounds.   Pulmonary:      Effort: Pulmonary effort is normal. No respiratory distress.      Breath sounds: Normal breath sounds.   Abdominal:      General: Abdomen is flat. Bowel sounds are normal.      Palpations: Abdomen is soft.      Tenderness: There is no abdominal tenderness.   Musculoskeletal:         General: No tenderness.      Right lower leg: No edema.      Left lower leg: No edema.   Skin:     General: Skin is warm.      Capillary Refill: Capillary refill takes less than 2 seconds.      Findings: No rash.   Neurological:      General: No focal deficit present.      Mental Status: She is alert and oriented to person, place, and time.   Psychiatric:         Mood and Affect: Mood normal.         Behavior: Behavior normal.         ED Course       Procedures      Labs Ordered and Resulted from Time of ED Arrival Up to the Time of Departure from the ED   CBC WITH PLATELETS DIFFERENTIAL - Abnormal; Notable for the following components:       Result Value    Absolute Neutrophil 9.6 (*)     All other components within normal limits   COMPREHENSIVE METABOLIC PANEL - Abnormal; Notable for the following components:    Glucose 225 (*)     All other components within normal limits   BLOOD GAS VENOUS - Abnormal; Notable for the following components:    PO2 Venous 49 (*)     All other components within normal limits   ROUTINE UA WITH MICROSCOPIC REFLEX TO CULTURE - Abnormal; Notable for the following components:    Glucose Urine 70 (*)     Ketones Urine 40 (*)     Mucous Urine Present (*)     All other components within normal limits   KETONE BETA-HYDROXYBUTYRATE QUANTITATIVE   LACTIC ACID WHOLE BLOOD   PERIPHERAL IV CATHETER           Assessments & Plan (with Medical Decision Making)   Patient was seen and examined.  Labs were drawn.  IV fluids and IV Zofran were ordered.  Blood work shows normal white blood cell count, electrolytes, blood sugar 225.  Normal lactic acid and beta  hydroxybutyrate.  Urine with small amount of ketones, no infection.  No evidence of DKA.  Patient tolerating crackers and water on reevaluation.  She is concerned because her blood sugar is still in the 200s despite IV fluids and a dose of insulin.  She was given an additional fluid bolus.  On reevaluation, the patient continues to feel nauseous.  She also notes that she has some blood in her stool.  CT abdomen and pelvis was ordered and is pending at this time.  Patient will be admitted to the hospital for continued hydration and monitoring of her blood sugars.  Patient was signed out to the evening physician pending CT results.    I have reviewed the nursing notes.    I have reviewed the findings, diagnosis, plan and need for follow up with the patient.    New Prescriptions    No medications on file       Final diagnoses:   Type 1 diabetes mellitus with hyperglycemia (H)   Nausea vomiting and diarrhea       1/30/2020   [facility], [place], EMERGENCY DEPARTMENT     Oneida Pickard DO  01/30/20 [id number]

## 2020-01-30 NOTE — ED TRIAGE NOTES
Pt arrived via car with c/o 24 hours of n/v/d. Pt is a type 1 diabetic and also reports that she cannot get her blood sugars down. Per pt yesterday they were about 300. Right now her blood sugar is 288. Pt reports she is not feeling well.

## 2020-01-30 NOTE — TELEPHONE ENCOUNTER
Triage Nurse called stating Pt was experiencing high blood sugars not responding to insulin. BS results above 400. Pt is type 1, recommended PT be seen in ER for immediate assessment. Triage nurse confirmed understanding.   Tami Salgado RN on 1/30/2020 at 11:01 AM

## 2020-01-31 LAB
ALBUMIN SERPL-MCNC: 3.1 G/DL (ref 3.4–5)
ALP SERPL-CCNC: 64 U/L (ref 40–150)
ALT SERPL W P-5'-P-CCNC: 14 U/L (ref 0–50)
ANION GAP SERPL CALCULATED.3IONS-SCNC: 4 MMOL/L (ref 3–14)
AST SERPL W P-5'-P-CCNC: 7 U/L (ref 0–45)
BILIRUB SERPL-MCNC: 0.6 MG/DL (ref 0.2–1.3)
BUN SERPL-MCNC: 7 MG/DL (ref 7–30)
C COLI+JEJUNI+LARI FUSA STL QL NAA+PROBE: NOT DETECTED
C DIFF TOX B STL QL: NEGATIVE
CALCIUM SERPL-MCNC: 8 MG/DL (ref 8.5–10.1)
CHLORIDE SERPL-SCNC: 110 MMOL/L (ref 94–109)
CO2 SERPL-SCNC: 25 MMOL/L (ref 20–32)
CREAT SERPL-MCNC: 0.77 MG/DL (ref 0.52–1.04)
EC STX1 GENE STL QL NAA+PROBE: NOT DETECTED
EC STX2 GENE STL QL NAA+PROBE: NOT DETECTED
ENTERIC PATHOGEN COMMENT: NORMAL
GFR SERPL CREATININE-BSD FRML MDRD: 88 ML/MIN/{1.73_M2}
GLUCOSE BLDC GLUCOMTR-MCNC: 120 MG/DL (ref 70–99)
GLUCOSE BLDC GLUCOMTR-MCNC: 168 MG/DL (ref 70–99)
GLUCOSE BLDC GLUCOMTR-MCNC: 179 MG/DL (ref 70–99)
GLUCOSE BLDC GLUCOMTR-MCNC: 201 MG/DL (ref 70–99)
GLUCOSE BLDC GLUCOMTR-MCNC: 205 MG/DL (ref 70–99)
GLUCOSE BLDC GLUCOMTR-MCNC: 230 MG/DL (ref 70–99)
GLUCOSE BLDC GLUCOMTR-MCNC: 268 MG/DL (ref 70–99)
GLUCOSE SERPL-MCNC: 144 MG/DL (ref 70–99)
NOROV GI+II ORF1-ORF2 JNC STL QL NAA+PR: NOT DETECTED
POTASSIUM SERPL-SCNC: 3.6 MMOL/L (ref 3.4–5.3)
PROT SERPL-MCNC: 5.6 G/DL (ref 6.8–8.8)
RVA NSP5 STL QL NAA+PROBE: NOT DETECTED
SALMONELLA SP RPOD STL QL NAA+PROBE: NOT DETECTED
SHIGELLA SP+EIEC IPAH STL QL NAA+PROBE: NOT DETECTED
SODIUM SERPL-SCNC: 139 MMOL/L (ref 133–144)
SPECIMEN SOURCE: NORMAL
V CHOL+PARA RFBL+TRKH+TNAA STL QL NAA+PR: NOT DETECTED
Y ENTERO RECN STL QL NAA+PROBE: NOT DETECTED

## 2020-01-31 PROCEDURE — 96372 THER/PROPH/DIAG INJ SC/IM: CPT

## 2020-01-31 PROCEDURE — G0378 HOSPITAL OBSERVATION PER HR: HCPCS

## 2020-01-31 PROCEDURE — 00000146 ZZHCL STATISTIC GLUCOSE BY METER IP

## 2020-01-31 PROCEDURE — 87493 C DIFF AMPLIFIED PROBE: CPT | Performed by: HOSPITALIST

## 2020-01-31 PROCEDURE — 25000132 ZZH RX MED GY IP 250 OP 250 PS 637: Performed by: HOSPITALIST

## 2020-01-31 PROCEDURE — 25000128 H RX IP 250 OP 636: Performed by: PHYSICIAN ASSISTANT

## 2020-01-31 PROCEDURE — 25000132 ZZH RX MED GY IP 250 OP 250 PS 637: Performed by: PHYSICIAN ASSISTANT

## 2020-01-31 PROCEDURE — 96376 TX/PRO/DX INJ SAME DRUG ADON: CPT

## 2020-01-31 PROCEDURE — 87209 SMEAR COMPLEX STAIN: CPT | Performed by: HOSPITALIST

## 2020-01-31 PROCEDURE — 87177 OVA AND PARASITES SMEARS: CPT | Performed by: HOSPITALIST

## 2020-01-31 PROCEDURE — 40000556 ZZH STATISTIC PERIPHERAL IV START W US GUIDANCE

## 2020-01-31 PROCEDURE — 80053 COMPREHEN METABOLIC PANEL: CPT | Performed by: PHYSICIAN ASSISTANT

## 2020-01-31 PROCEDURE — 99225 ZZC SUBSEQUENT OBSERVATION CARE,LEVEL II: CPT | Performed by: HOSPITALIST

## 2020-01-31 RX ORDER — NICOTINE POLACRILEX 4 MG
15-30 LOZENGE BUCCAL
Status: DISCONTINUED | OUTPATIENT
Start: 2020-01-31 | End: 2020-02-01 | Stop reason: HOSPADM

## 2020-01-31 RX ORDER — ACETAMINOPHEN 325 MG/1
650 TABLET ORAL EVERY 6 HOURS PRN
Status: DISCONTINUED | OUTPATIENT
Start: 2020-01-31 | End: 2020-02-01 | Stop reason: HOSPADM

## 2020-01-31 RX ORDER — DEXTROSE MONOHYDRATE 25 G/50ML
25-50 INJECTION, SOLUTION INTRAVENOUS
Status: DISCONTINUED | OUTPATIENT
Start: 2020-01-31 | End: 2020-02-01 | Stop reason: HOSPADM

## 2020-01-31 RX ADMIN — AMITRIPTYLINE HYDROCHLORIDE 25 MG: 25 TABLET, FILM COATED ORAL at 21:53

## 2020-01-31 RX ADMIN — CIPROFLOXACIN 400 MG: 2 INJECTION, SOLUTION INTRAVENOUS at 19:21

## 2020-01-31 RX ADMIN — METRONIDAZOLE 500 MG: 500 INJECTION, SOLUTION INTRAVENOUS at 22:58

## 2020-01-31 RX ADMIN — LEVOTHYROXINE SODIUM 75 MCG: 25 TABLET ORAL at 09:38

## 2020-01-31 RX ADMIN — FAMOTIDINE 20 MG: 20 TABLET ORAL at 09:38

## 2020-01-31 RX ADMIN — PRAVASTATIN SODIUM 10 MG: 10 TABLET ORAL at 21:53

## 2020-01-31 RX ADMIN — LISINOPRIL 10 MG: 10 TABLET ORAL at 09:38

## 2020-01-31 RX ADMIN — METRONIDAZOLE 500 MG: 500 INJECTION, SOLUTION INTRAVENOUS at 07:09

## 2020-01-31 RX ADMIN — OMEPRAZOLE 20 MG: 20 CAPSULE, DELAYED RELEASE ORAL at 07:09

## 2020-01-31 RX ADMIN — CIPROFLOXACIN 400 MG: 2 INJECTION, SOLUTION INTRAVENOUS at 09:40

## 2020-01-31 RX ADMIN — ACETAMINOPHEN 650 MG: 325 TABLET, FILM COATED ORAL at 03:03

## 2020-01-31 RX ADMIN — ACETAMINOPHEN 650 MG: 325 TABLET, FILM COATED ORAL at 19:33

## 2020-01-31 RX ADMIN — METRONIDAZOLE 500 MG: 500 INJECTION, SOLUTION INTRAVENOUS at 15:29

## 2020-01-31 RX ADMIN — OMEPRAZOLE 20 MG: 20 CAPSULE, DELAYED RELEASE ORAL at 15:36

## 2020-01-31 ASSESSMENT — PAIN DESCRIPTION - DESCRIPTORS: DESCRIPTORS: ACHING

## 2020-01-31 NOTE — H&P
[facility], [place]    History and Physical - Hospitalist Service, Gold Night Service       Date of Admission:  1/30/2020    Assessment & Plan   Maria M Marcus is a 54 year old female admitted on 1/30/2020. She has a past medical history of type 1 diabetes who presents to the ER with a 2 day history of nausea, vomiting, abdominal cramping.     1. Acute infectious colitis with nausea, vomiting, bloody diarrhea  -Hemoglobin is stable at 13. CT scan of the abdomen and pelvis reveals thickened edematous bowel with fat stranding, consistent with colitis but no evidence of active bleeding.    -Start Cipro 400mg IV q12h, Flagyl 500mg IV q8h  -Monitor q12h hemoglobin overnight  -Enteric stool panel  -Provide 0.9% NS 1L of fluid overnight and encourage oral intake now that her nausea is better  -Zofran prn if she develops nausea again  -Tylenol prn for any crampy abdominal pain. Avoid NSAIDs and narcotics unless absolutely necessary.    2. Type 1 diabetes  3. Severe Aspart and Lispro insulin allergy - uses home insulin only  -She has tried almost all of the insulin options out there and her current regimen is what works best for. Pharmacy will profile her home insulin and she will dose herself. In the event that Ms. Marcus is not able to dose her own insulin, we will keep her insulin at the bedside to take over dosing  -Her subcu glucometer was taken out for her CT scan this evening. She does not have a fresh replacement with her. She needs a Dexcom G6 and RN staff is checking with Endocrinology to see if they have a back up. Otherwise, she will have this replaced once she gets home.         4. Hypertension  -Continue home regimen of Lisinopril 10mg once daily with added Hydralazine 10mg IV q6h prn for any SBP >160.     Diet: Diabetic Diet  DVT Prophylaxis: Low Risk/Ambulatory with no VTE prophylaxis indicated  Posada Catheter: not present  Code Status: FULL CODE    Disposition Plan    Expected discharge: Tomorrow, recommended to prior living arrangement once hemoglobin stable.  Entered: JOSSELINE Clements 01/30/2020, 6:39 PM       JOSSELINE Clements  [facility], [place]  Pager: [phone]  Please see sticky note for cross cover information  ______________________________________________________________________    Chief Complaint   Abdominal cramping and bloody stools    History is obtained from the patient    History of Present Illness   Maria M Marcus is a 54 year old female admitted on 1/30/2020. She has a past medical history of type 1 diabetes (since the age of 2) managed with a rare type of non-allergenic insulin and constant glucometer who is otherwise healthy and presents to the ER with a 2 day history of nausea, vomiting, abdominal cramping. She also notes that this morning she started to see bright red streaks of blood mixed in with her stool. Her vitals signs were stable, she was afebrile. Labwork including CBC, BMP revealed no acute abnormalities and hemoglobin is stable at 13.0g/dL. CT scan of the abdomen and pelvis was done, but results have not returned yet.  She denies any blood clots, lightheadedness, history of GI bleeding, recent use of NSAIDs. She denies any chest pain, shortness of breath, fevers, chills, chest pain, headaches, vision changes. Her biggest concern is that her glucometer that reads blood sugar in real time was removed for her CT scan today and could not be replaced by staff yet.     Review of Systems    The 10 point Review of Systems is negative other than noted in the HPI or here.     Past Medical History    I have reviewed this patient's medical history and updated it with pertinent information if needed.   Past Medical History:   Diagnosis Date     Cervical radiculopathy      Diabetic eye exam (H) 12/14/11     DISC DIS NEC/NOS-LUMBAR 4/7/2007     Frozen shoulder syndrome 6/16/2011     Hyperlipidemia LDL goal <100 10/31/2010      Hypertension 1/3/2011     Hypothyroidism due to Hashimoto's thyroiditis      Type 1 diabetes, HbA1c goal < 7% (H) dx 1967     Unspecified hypothyroidism        Past Surgical History   I have reviewed this patient's surgical history and updated it with pertinent information if needed.  Past Surgical History:   Procedure Laterality Date     C  DELIVERY ONLY      C-Sections x2     C NONSPECIFIC PROCEDURE      Hysterectomy - total (cervix removed but ovaries remain)     C STOMACH SURGERY PROCEDURE UNLISTED       C TOTAL ABDOM HYSTERECTOMY       COLONOSCOPY N/A 2016    Procedure: COLONOSCOPY;  Surgeon: Efren Perry MD;  Location: [facility]     ENDOSCOPIC SINUS SURGERY Bilateral 2018    Procedure: Bilateral functional endoscopic maxillary antrostomies;  Surgeon: Woo Silva MD;  Location: [facility]     HC SACROPLASTY       LAMINECT/DISCECTOMY, CERVICAL  2007    C7-T1 hemilaminectomy, microdiscectomy, foraminotomy.     LASER YAG CAPSULOTOMY Right 10/23/2014    Procedure: LASER YAG CAPSULOTOMY;  Surgeon: Esteban Dorsey MD;  Location: [facility]     VITRECTOMY,STRIP EPIRETINAL MEMBRANE  09       Social History   I have reviewed this patient's social history and updated it with pertinent information if needed.  Social History     Tobacco Use     Smoking status: Never Smoker     Smokeless tobacco: Never Used     Tobacco comment: no exposure   Substance Use Topics     Alcohol use: Yes     Comment: winex1-2/3x per yr.     Drug use: No       Family History   I have reviewed this patient's family history and updated it with pertinent information if needed.   Family History   Problem Relation Age of Onset     Hypertension Maternal Grandfather      EYE* Maternal Grandmother      Blood Disease Maternal Grandmother      Eye Disorder Maternal Grandmother         maccular degeneration     Osteoporosis Maternal Grandmother      Lipids Father      Gastrointestinal Disease Father         ulcers      "Heart Disease Father      Cardiovascular Father         heart attack     Hypertension Paternal Grandmother      Breast Cancer Mother         dx age 73 - terminal - mother had first mammo at this age     Diabetes Paternal Uncle         Type I       Prior to Admission Medications   Prior to Admission Medications   Prescriptions Last Dose Informant Patient Reported? Taking?   BD VEO INSULIN SYRINGE U/F 31G X 15/64\" 0.3 ML   No No   Sig: USE 1 SYRINGE 4 TO 5 TIMES DAILY. KEEP ON HAND IN CASE OF PUMP FAILURE   EUTHYROX 75 MCG tablet   No No   Sig: TAKE 1 TABLET BY MOUTH ONCE DAILY   amitriptyline (ELAVIL) 25 MG tablet   No No   Sig: TAKE 1 TABLET BY MOUTH AT BEDTIME   blood glucose monitoring ([employer] CONTOUR MONITOR) meter device kit   No No   Sig: Use to test blood sugars 5 times daily or as directed.   blood glucose monitoring (TANA CONTOUR) test strip   No No   Sig: Use to test blood sugar 6 times daily or as directed.   blood glucose monitoring (SOFTCLIX) lancets   No No   Sig: USE 1  TO CHECK GLUCOSE THREE TIMES DAILY   famotidine (PEPCID) 20 MG tablet   No No   Sig: Take 1 tablet (20 mg) by mouth 2 times daily   insulin glargine (LANTUS VIAL) 100 UNIT/ML vial   No No   Sig: Inject up to 10 units daily + 2 units for priming of pen   insulin glulisine (APIDRA) 100 UNIT/ML injection   No No   Sig: Inject 1 unit per 20 gram carb with meals. Total daily dose 30 units   lisinopril (PRINIVIL/ZESTRIL) 10 MG tablet   No No   Sig: TAKE 1 TABLET BY MOUTH ONCE DAILY   omeprazole (PRILOSEC) 20 MG DR capsule   No No   Sig: TAKE 1 CAPSULE BY MOUTH ONCE DAILY   pravastatin (PRAVACHOL) 10 MG tablet   No No   Sig: TAKE 1 TABLET BY MOUTH ONCE DAILY      Facility-Administered Medications: None     Allergies   Allergies   Allergen Reactions     Novolog [Insulin Aspart] Swelling     Itching, rash, contact dermatitis     Humalog [Insulin Lispro] Rash     Contact dermatitis     Zyrtec [Cetirizine] Rash       Physical Exam   Vital Signs: " Temp: 98.2  F (36.8  C) Temp src: Oral BP: (!) 159/67 Pulse: 85 Heart Rate: 78 Resp: 18 SpO2: 99 % O2 Device: None (Room air)    Weight: 158 lbs 0 oz    General Appearance: 53yo female sitting up in bed, no acute distress, reports some mild cramping but otherwise comfortable  HEENT: normocephalic, atraumatic, PERRLA, glasses in place, anicteric sclerae  Respiratory: breathing comfortably on room air, no adventitious sounds to bilateral auscultation  Cardiovascular: regular rate and rhythm, no appreicable murmurs, rubs or gallops  GI: tinkering bowel sounds present, soft, minimally tender to palpation along upper quadrants bilaterally, no rebound or guarding  Skin: no open sores, lesions or ulcerations. Site where her glucometer was removed with mildly irritated.  Musculoskeletal: able to walk independently, maintains equal strength in bilateral upper and lower extremities  Neurologic: CN II-XII grossly intact, no focal deficits onn exam  Psychiatric: alert, oriented to name, date, hospital and recent events, denies any recent depressed mood or anxiety    Data   Data reviewed today: I reviewed all medications, new labs and imaging results over the last 24 hours. I personally reviewed     Recent Labs   Lab 01/30/20  1249   WBC 10.7   HGB 13.1   MCV 89         POTASSIUM 5.0   CHLORIDE 107   CO2 23   BUN 12   CR 0.77   ANIONGAP 8   DORIS 9.2   *   ALBUMIN 3.8   PROTTOTAL 7.1   BILITOTAL 0.7   ALKPHOS 92   ALT 17   AST 33     Order   CT Abdomen Pelvis w Contrast [[id number]] (Order [id number])   Exam Information     Exam Date Exam Time Accession # Performing Department Results    1/30/20  5:38 PM [id number] [place], [place]    PACS Images      Show images for CT Abdomen Pelvis w Contrast   Study Result     EXAMINATION: CT abdomen/pelvis with contrast, 1/30/2020.     INDICATION: Abd pain, diverticulitis suspected.     COMPARISON: None.     TECHNIQUE: Routine images from the level of the diaphragm  through the  pelvis were obtained with contrast.     Contrast: iopamidol (ISOVUE-370) solution 97 mL       Total DLP: 893 mGy*cm.     FINDINGS:     Small calcific granuloma in the left lobe of liver. Otherwise the  liver parenchyma is homogenous. No focal hepatic lesion. The  gallbladder is nondistended. No cholelithiasis. No intrahepatic or  extra hepatic biliary duct dilation. Calcified granuloma in the  spleen. The pancreas is grossly unremarkable. Bilateral adrenal glands  are within normal limits.     Bilaterally symmetrically enhancing kidneys. No appreciable renal  lesions. No hydronephrosis. No hydroureter. The bladder is grossly  unremarkable. No pelvic masses. Postsurgical changes of total  hysterectomy.     The large and small bowel are normal in caliber. Thickened edematous  bowel with mild contrast enhancement of the transverse colon through  the sigmoid, most prominently noted at the splenic flexure with mild  adjacent fat stranding. No evidence for perforation. No  diverticulitis. The pancreas is grossly unremarkable. The stomach is  within normal limits.     The abdominal aorta and major abdominal branches are patent without  evidence of aneurysmal dilation or stenosis. No appreciable  intra-abdominal lymphadenopathy by size criteria.     No acute osseous abnormalities. Soft tissues are grossly unremarkable.        Bases: Partially visualized granuloma in the left lower lobe. No focal  airspace opacity. No pleural effusion. No pneumothorax. No suspicious  pulmonary nodules.                                                                      IMPRESSION:   Thickened contrast-enhancing edematous bowel with with adjacent fat  stranding from the transverse colon through the sigmoid colon.  Consistent with colitis, no changes of diverticulitis.           I have personally reviewed the examination and initial interpretation  and I agree with the findings.     DAVID RUBIO MD

## 2020-01-31 NOTE — ED NOTES
Pt's blood sugar 279 - per pt internal device. Pt ate crackers and took 13-15 g carbs and 2 units insulin - Apidra.

## 2020-01-31 NOTE — PROGRESS NOTES
[facility]    Medicine Progress Note - Hospitalist Service, Gold 8    Date of Admission:  1/30/2020    Assessment & Plan   Maria M Marcus is a 54 year old female with past medical history of brittle type 1 diabetes and obesity with a BMI of 33 was presenting to the hospital with acute onset of abdominal pain and dysentery.    Dysentery:  The patient has had clinical response to fluid resuscitation and empiric antibiotics.  Stool studies without identified pathogen at this time.  Her abdominal examination is reassuring without evidence of peritonitis.  Concerning for noninfectious colitis due to ischemia or inflammatory picture given negative infectious work-up.  -Given significant bloody diarrhea in the hospital, will monitor patient in the hospital overnight  -Continue infectious evaluation with C. difficile and O&P  -Advance diet and discontinue IV fluids  -Continue empiric ciprofloxacin and Flagyl; anticipate discharging patient on 7-day course  -Symptomatic control    Brittle type 1 diabetes mellitus:  The patient has an allergy to multiple formulations of short acting insulin.  She manages her own blood sugars with a continuous blood glucose monitor.  -Plan to continue usual glargine 4-5 units daily as per patient's usual regimen  -Plan to continue the patient's usual correctional insulin regimen with her home insulin; she has her own sliding scale and carb counting regimen     Obesity with a BMI of 33:  -Weight loss advised    Diet: Regular Diet Adult    DVT Prophylaxis: Pneumatic Compression Devices  Posada Catheter: not present  Code Status: Full Code      Disposition Plan   Expected discharge: Tomorrow, recommended to prior living arrangement once dysentery improved.     The patient's care was discussed with the Bedside Nurse and Patient.    Rio Arthur MD  Hospitalist Service, [address]  Pager: [phone]  Please see  sticky note for cross cover information    Interval History   Patient stated that she responded to treatment.  She noted the most improvement after receiving antibiotics.  She has noted improvement in her nausea and tolerated solid food.  She reported an increase pain and nausea after she ate.  She had a large bloody bowel movement in the emergency room late in the morning.  She denies chest pain, shortness of breath, vomiting.  She is reluctant to be discharged home because her  is immunosuppressed because he has an organ transplant.    Data reviewed today: I reviewed all new labs and imaging results over the last 24 hours. I personally reviewed the chest CT image(s) showing colitis.    Physical Exam   Temp: 97.4  F (36.3  C) Temp src: Oral BP: (!) 144/81 Pulse: 90 Heart Rate: 84 Resp: 16 SpO2: 100 % O2 Device: None (Room air)    Vitals:    01/30/20 1209   Weight: 71.7 kg (158 lb)     Vital Signs with Ranges  Temp:  [97.4  F (36.3  C)-99.2  F (37.3  C)] 97.4  F (36.3  C)  Pulse:  [81-90] 90  Heart Rate:  [84] 84  Resp:  [14-16] 16  BP: (144-162)/(53-81) 144/81  SpO2:  [99 %-100 %] 100 %  No intake/output data recorded.  Constitutional: NAD  Respiratory: CTAB, nonlabored respirations   Cardiovascular: RRR, no edema  GI: Soft, tender to palpable lower quadrants  Skin/Integumen: Rash  Neuro: CN grossly intact, GRAHAM, sensation grossly intact    Medications       amitriptyline  25 mg Oral At Bedtime     ciprofloxacin  400 mg Intravenous Q12H     [START ON 2/1/2020] insulin glargine  5 Units Subcutaneous QAM    And     insulin glargine  4.5 Units Subcutaneous QPM     insulin glulisine  1-10 Units Subcutaneous TID w/meals     levothyroxine  75 mcg Oral QAM AC     lisinopril  10 mg Oral Daily     metroNIDAZOLE  500 mg Intravenous Q8H     omeprazole  20 mg Oral BID AC     pravastatin  10 mg Oral At Bedtime       Data   Recent Labs   Lab 01/31/20  0524 01/30/20  1249   WBC  --  10.7   HGB  --  13.1   MCV  --  89   PLT   --  238    137   POTASSIUM 3.6 5.0   CHLORIDE 110* 107   CO2 25 23   BUN 7 12   CR 0.77 0.77   ANIONGAP 4 8   DORIS 8.0* 9.2   * 225*   ALBUMIN 3.1* 3.8   PROTTOTAL 5.6* 7.1   BILITOTAL 0.6 0.7   ALKPHOS 64 92   ALT 14 17   AST 7 33       Recent Results (from the past 24 hour(s))   CT Abdomen Pelvis w Contrast    Narrative    EXAMINATION: CT abdomen/pelvis with contrast, 1/30/2020.    INDICATION: Abd pain, diverticulitis suspected.    COMPARISON: None.    TECHNIQUE: Routine images from the level of the diaphragm through the  pelvis were obtained with contrast.    Contrast: iopamidol (ISOVUE-370) solution 97 mL      Total DLP: 893 mGy*cm.    FINDINGS:    Small calcific granuloma in the left lobe of liver. Otherwise the  liver parenchyma is homogenous. No focal hepatic lesion. The  gallbladder is nondistended. No cholelithiasis. No intrahepatic or  extra hepatic biliary duct dilation. Calcified granuloma in the  spleen. The pancreas is grossly unremarkable. Bilateral adrenal glands  are within normal limits.    Bilaterally symmetrically enhancing kidneys. No appreciable renal  lesions. No hydronephrosis. No hydroureter. The bladder is grossly  unremarkable. No pelvic masses. Postsurgical changes of total  hysterectomy.    The large and small bowel are normal in caliber. Thickened edematous  bowel with mild contrast enhancement of the transverse colon through  the sigmoid, most prominently noted at the splenic flexure with mild  adjacent fat stranding. No evidence for perforation. No  diverticulitis. The pancreas is grossly unremarkable. The stomach is  within normal limits.    The abdominal aorta and major abdominal branches are patent without  evidence of aneurysmal dilation or stenosis. No appreciable  intra-abdominal lymphadenopathy by size criteria.    No acute osseous abnormalities. Soft tissues are grossly unremarkable.      Bases: Partially visualized granuloma in the left lower lobe. No  focal  airspace opacity. No pleural effusion. No pneumothorax. No suspicious  pulmonary nodules.      Impression    IMPRESSION:   Thickened contrast-enhancing edematous bowel with with adjacent fat  stranding from the transverse colon through the sigmoid colon.  Consistent with colitis, no changes of diverticulitis.         I have personally reviewed the examination and initial interpretation  and I agree with the findings.    DAVID RUBIO MD

## 2020-01-31 NOTE — ED NOTES
[facility] [place]   ED Nurse to Floor Handoff     Maria M Marcus is a 54 year old female who speaks English and lives with a spouse,  in a home  They arrived in the ED by car from home    ED Chief Complaint: Nausea, Vomiting, & Diarrhea    ED Dx;   Final diagnoses:   Type 1 diabetes mellitus with hyperglycemia (H)   Nausea vomiting and diarrhea         Needed?: No    Allergies:   Allergies   Allergen Reactions     Novolog [Insulin Aspart] Swelling     Itching, rash, contact dermatitis     Humalog [Insulin Lispro] Rash     Contact dermatitis     Zyrtec [Cetirizine] Rash   .  Past Medical Hx:   Past Medical History:   Diagnosis Date     Cervical radiculopathy      Diabetic eye exam (H) 12/14/11     DISC DIS NEC/NOS-LUMBAR 4/7/2007     Frozen shoulder syndrome 6/16/2011     Hyperlipidemia LDL goal <100 10/31/2010     Hypertension 1/3/2011     Hypothyroidism due to Hashimoto's thyroiditis      Type 1 diabetes, HbA1c goal < 7% (H) dx 1967     Unspecified hypothyroidism       Baseline Mental status: WDL  Current Mental Status changes: at basesline    Infection present or suspected this encounter: no  Sepsis suspected: No  Isolation type: No active isolations     Activity level - Baseline/Home:  Independent  Activity Level - Current:   Independent    Bariatric equipment needed?: No    In the ED these meds were given:   Medications   0.9% sodium chloride BOLUS (0 mLs Intravenous Stopped 1/30/20 1518)   ondansetron (ZOFRAN) injection 4 mg (4 mg Intravenous Given 1/30/20 1307)   0.9% sodium chloride BOLUS (0 mLs Intravenous Stopped 1/30/20 1753)   sodium chloride (PF) 0.9% PF flush 74 mL (74 mLs Intravenous Given 1/30/20 1731)   iopamidol (ISOVUE-370) solution 97 mL (97 mLs Intravenous Given 1/30/20 1731)       Drips running?  No    Home pump  No    Current LDAs  Peripheral IV 01/30/20 Right Lower forearm (Active)   Site Assessment WDL 1/30/2020 12:48 PM   Number of days: 0      "  Peripheral IV 01/30/20 Right Upper forearm (Active)   Site Assessment WDL 1/30/2020  5:30 PM   Line Status Saline locked 1/30/2020  5:30 PM   Phlebitis Scale 0-->no symptoms 1/30/2020  5:30 PM   Number of days: 0       Labs results:   Labs Ordered and Resulted from Time of ED Arrival Up to the Time of Departure from the ED   CBC WITH PLATELETS DIFFERENTIAL - Abnormal; Notable for the following components:       Result Value    Absolute Neutrophil 9.6 (*)     All other components within normal limits   COMPREHENSIVE METABOLIC PANEL - Abnormal; Notable for the following components:    Glucose 225 (*)     All other components within normal limits   BLOOD GAS VENOUS - Abnormal; Notable for the following components:    PO2 Venous 49 (*)     All other components within normal limits   ROUTINE UA WITH MICROSCOPIC REFLEX TO CULTURE - Abnormal; Notable for the following components:    Glucose Urine 70 (*)     Ketones Urine 40 (*)     Mucous Urine Present (*)     All other components within normal limits   KETONE BETA-HYDROXYBUTYRATE QUANTITATIVE   LACTIC ACID WHOLE BLOOD   PERIPHERAL IV CATHETER   ENTERIC BACTERIA AND VIRUS PANEL BY GEMA STOOL       Imaging Studies:   Recent Results (from the past 24 hour(s))   CT Abdomen Pelvis w Contrast    Impression    IMPRESSION:   Thickened contrast-enhancing edematous bowel with with adjacent fat  stranding from the transverse colon through the sigmoid colon.  Consistent with colitis.            Recent vital signs:   BP (!) 159/67   Pulse 85   Temp 98.2  F (36.8  C) (Oral)   Resp 18   Ht 1.473 m (4' 10\")   Wt 71.7 kg (158 lb)   SpO2 99%   BMI 33.02 kg/m      Josie Coma Scale Score: 15 (01/30/20 1436)       Cardiac Rhythm: Normal Sinus  Pt needs tele? No  Skin/wound Issues: None    Code Status: Full Code    Pain control: good    Nausea control: good    Abnormal labs/tests/findings requiring intervention: Pt has been checking own blood sugars via internal device and " administering own insulin     Family present during ED course? No   Family Comments/Social Situation comments: Pt lives @ home with     Tasks needing completion: None    Jenni Franz    [phone] [facility]

## 2020-01-31 NOTE — PHARMACY-ADMISSION MEDICATION HISTORY
Admission medication history interview status for the 1/30/2020 admission is complete. See Epic admission navigator for allergy information, pharmacy, prior to admission medications and immunization status.     Medication history interview sources:  Patient, reconcile dispense    Changes made to PTA medication list (reason)  Added: None    Deleted:   -Famotidine 20 mg tablet: Patient is no longer taking this medication     Changed: None    Additional medication history information (including reliability of information, actions taken by pharmacist):  -Patient was a very knowledgeable historian. Patient could recall all medication names, doses, and administration times.   -Continuous blood glucose monitor: Dexcom CGM (was removed during this visit to the ED so patient could get a CT scan). Patient is anxious about this because her BG has been very irregular and she relies on her Dexcom/phone to alert her of highs and lows.   -Patient states that she has tolerated intravenous insulin in the past. She experiences localized contact dermatitis when she injects Novolog and Humalog.  -Blood glucose readings have been high the last few days. She has been waking up and they have been in the 300's. She does not feel well when her sugars are high.  -Takes extra dose of insulin glulisine during the night at around 1778-2823 due to BG spikes. Usually takes 0.5-1 unit.   -Lantus 5 units subcutaneously in the morning and 4.5 units subcutaneously at night. Last night she did take 5 units of Lantus before bed (~2100) because her BG has been running higher than usual.  -Patient brought her own supply of insulin and will keep it at bedside. She understands that Apidra is a non-formulary medication.   -Patient only took insulin and omeprazole this morning due to symptoms.   -Patient denies taking over the counter medications such as ibuprofen or acetaminophen. She understands that acetaminophen may inaccurately raise her BG readings.  "  [facility] [place] [phone]    Last doses of insulin:   Insulin glargine (Lantus)  1000-5 units administered    Insulin glulisine (apidra):  0200-1 unit administered  0600-1 unit administered  1000-1.5 units administered  1450-2 units administered (in emergency department, ate 5 packets of crackers).    Prior to Admission medications    Medication Sig Last Dose Taking? Auth Provider   amitriptyline (ELAVIL) 25 MG tablet TAKE 1 TABLET BY MOUTH AT BEDTIME 1/29/2020 at 2100 Yes Sam Miranda PA-C   EUTHYROX 75 MCG tablet TAKE 1 TABLET BY MOUTH ONCE DAILY 1/29/2020 at 0900 Yes Marcus Salgado PA-C   insulin glargine (LANTUS VIAL) 100 UNIT/ML vial Inject up to 10 units daily + 2 units for priming of pen 1/30/2020 at 1000 Yes Sidney Jarquin MD   insulin glulisine (APIDRA) 100 UNIT/ML injection Inject 1 unit per 20 gram carb with meals. Total daily dose 30 units 1/30/2020 at 1450 Yes Sidney Jarquin MD   lisinopril (PRINIVIL/ZESTRIL) 10 MG tablet TAKE 1 TABLET BY MOUTH ONCE DAILY 1/29/2020 at 0900 Yes Sam Miranda PA-C   omeprazole (PRILOSEC) 20 MG DR capsule TAKE 1 CAPSULE BY MOUTH ONCE DAILY 1/30/2020 at 0900 Yes Marcus Salgado PA-C   pravastatin (PRAVACHOL) 10 MG tablet TAKE 1 TABLET BY MOUTH ONCE DAILY 1/29/2020 at 0900 Yes Marcus Salgado PA-C   BD VEO INSULIN SYRINGE U/F 31G X 15/64\" 0.3 ML USE 1 SYRINGE 4 TO 5 TIMES DAILY. KEEP ON HAND IN CASE OF PUMP FAILURE   Yael Pena MD   blood glucose monitoring (TANA CONTOUR MONITOR) meter device kit Use to test blood sugars 5 times daily or as directed.   Kirill Berman MD   blood glucose monitoring (TANA CONTOUR) test strip Use to test blood sugar 6 times daily or as directed.   Sidney Jarquin MD   blood glucose monitoring (SOFTCLIX) lancets USE 1  TO CHECK GLUCOSE THREE TIMES DAILY   Sam Miranda PA-C     Medication history completed by:   Aleyda Wu "   Student Pharmacist   [facility]  1/30/2020

## 2020-01-31 NOTE — PLAN OF CARE
Outpatient/Observation goals to be met before discharge home:     -diagnostic tests and consults completed and resulted : no  -vital signs normal or at patient baseline : yes

## 2020-02-01 VITALS
OXYGEN SATURATION: 98 % | TEMPERATURE: 99.4 F | BODY MASS INDEX: 33.17 KG/M2 | WEIGHT: 158 LBS | DIASTOLIC BLOOD PRESSURE: 63 MMHG | SYSTOLIC BLOOD PRESSURE: 146 MMHG | HEIGHT: 58 IN | HEART RATE: 84 BPM | RESPIRATION RATE: 16 BRPM

## 2020-02-01 LAB
ANION GAP SERPL CALCULATED.3IONS-SCNC: 6 MMOL/L (ref 3–14)
BUN SERPL-MCNC: 13 MG/DL (ref 7–30)
CALCIUM SERPL-MCNC: 8.5 MG/DL (ref 8.5–10.1)
CHLORIDE SERPL-SCNC: 107 MMOL/L (ref 94–109)
CO2 SERPL-SCNC: 26 MMOL/L (ref 20–32)
CREAT SERPL-MCNC: 0.83 MG/DL (ref 0.52–1.04)
ERYTHROCYTE [DISTWIDTH] IN BLOOD BY AUTOMATED COUNT: 12.8 % (ref 10–15)
GFR SERPL CREATININE-BSD FRML MDRD: 80 ML/MIN/{1.73_M2}
GLUCOSE BLDC GLUCOMTR-MCNC: 176 MG/DL (ref 70–99)
GLUCOSE BLDC GLUCOMTR-MCNC: 439 MG/DL (ref 70–99)
GLUCOSE BLDC GLUCOMTR-MCNC: 98 MG/DL (ref 70–99)
GLUCOSE SERPL-MCNC: 232 MG/DL (ref 70–99)
HCT VFR BLD AUTO: 35.8 % (ref 35–47)
HGB BLD-MCNC: 11.5 G/DL (ref 11.7–15.7)
MCH RBC QN AUTO: 29.2 PG (ref 26.5–33)
MCHC RBC AUTO-ENTMCNC: 32.1 G/DL (ref 31.5–36.5)
MCV RBC AUTO: 91 FL (ref 78–100)
PLATELET # BLD AUTO: 187 10E9/L (ref 150–450)
POTASSIUM SERPL-SCNC: 4.1 MMOL/L (ref 3.4–5.3)
RBC # BLD AUTO: 3.94 10E12/L (ref 3.8–5.2)
SODIUM SERPL-SCNC: 139 MMOL/L (ref 133–144)
WBC # BLD AUTO: 7 10E9/L (ref 4–11)

## 2020-02-01 PROCEDURE — 25000132 ZZH RX MED GY IP 250 OP 250 PS 637: Performed by: PHYSICIAN ASSISTANT

## 2020-02-01 PROCEDURE — 96376 TX/PRO/DX INJ SAME DRUG ADON: CPT

## 2020-02-01 PROCEDURE — 36415 COLL VENOUS BLD VENIPUNCTURE: CPT | Performed by: HOSPITALIST

## 2020-02-01 PROCEDURE — 85027 COMPLETE CBC AUTOMATED: CPT | Performed by: HOSPITALIST

## 2020-02-01 PROCEDURE — 99217 ZZC OBSERVATION CARE DISCHARGE: CPT | Performed by: HOSPITALIST

## 2020-02-01 PROCEDURE — 96372 THER/PROPH/DIAG INJ SC/IM: CPT

## 2020-02-01 PROCEDURE — 25000128 H RX IP 250 OP 636: Performed by: PHYSICIAN ASSISTANT

## 2020-02-01 PROCEDURE — G0378 HOSPITAL OBSERVATION PER HR: HCPCS

## 2020-02-01 PROCEDURE — 00000146 ZZHCL STATISTIC GLUCOSE BY METER IP

## 2020-02-01 PROCEDURE — 25000132 ZZH RX MED GY IP 250 OP 250 PS 637: Performed by: HOSPITALIST

## 2020-02-01 PROCEDURE — 80048 BASIC METABOLIC PNL TOTAL CA: CPT | Performed by: HOSPITALIST

## 2020-02-01 RX ORDER — CIPROFLOXACIN 500 MG/1
500 TABLET, FILM COATED ORAL 2 TIMES DAILY
Qty: 10 TABLET | Refills: 0 | Status: SHIPPED | OUTPATIENT
Start: 2020-02-01 | End: 2020-03-09

## 2020-02-01 RX ORDER — METRONIDAZOLE 500 MG/1
500 TABLET ORAL 3 TIMES DAILY
Qty: 15 TABLET | Refills: 0 | Status: SHIPPED | OUTPATIENT
Start: 2020-02-01 | End: 2020-03-09

## 2020-02-01 RX ADMIN — OMEPRAZOLE 20 MG: 20 CAPSULE, DELAYED RELEASE ORAL at 08:41

## 2020-02-01 RX ADMIN — LEVOTHYROXINE SODIUM 75 MCG: 25 TABLET ORAL at 08:41

## 2020-02-01 RX ADMIN — CIPROFLOXACIN 400 MG: 2 INJECTION, SOLUTION INTRAVENOUS at 08:42

## 2020-02-01 RX ADMIN — METRONIDAZOLE 500 MG: 500 INJECTION, SOLUTION INTRAVENOUS at 06:51

## 2020-02-01 RX ADMIN — ACETAMINOPHEN 650 MG: 325 TABLET, FILM COATED ORAL at 10:59

## 2020-02-01 RX ADMIN — LISINOPRIL 10 MG: 10 TABLET ORAL at 08:41

## 2020-02-01 NOTE — PLAN OF CARE
"Observation Goals  -diagnostic tests and consults completed and resulted Yes  -vital signs normal or at patient baseline Yes BP (!) 155/72 (BP Location: Right arm)   Pulse 87   Temp 98.3  F (36.8  C) (Oral)   Resp 16   Ht 1.473 m (4' 10\")   Wt 71.7 kg (158 lb)   SpO2 99%   BMI 33.02 kg/m    "

## 2020-02-01 NOTE — PLAN OF CARE
Outpatient/Observation goals to be met before discharge home:     -diagnostic tests and consults completed and resulted : yes  -vital signs normal or at patient baseline : yes

## 2020-02-01 NOTE — PLAN OF CARE
"Observation Goals  -diagnostic tests and consults completed and resulted Yes  -vital signs normal or at patient baseline Yes /50 (BP Location: Right arm)   Pulse 73   Temp 98.3  F (36.8  C) (Oral)   Resp 16   Ht 1.473 m (4' 10\")   Wt 71.7 kg (158 lb)   SpO2 97%   BMI 33.02 kg/m    "

## 2020-02-03 ENCOUNTER — TELEPHONE (OUTPATIENT)
Dept: FAMILY MEDICINE | Facility: OTHER | Age: 55
End: 2020-02-03

## 2020-02-03 PROBLEM — K52.9 COLITIS: Status: ACTIVE | Noted: 2020-01-30

## 2020-02-03 NOTE — DISCHARGE SUMMARY
[facility] [place]    Hospitalist Discharge Summary    Date of Admission:  1/30/2020  Date of Discharge:  2/1/2020  1:07 PM  Discharging Provider: Rio Arthur MD  Date of Service (when I saw the patient): 2/1/2020  Discharge Team: Hospitalist Service, Gold 8    Discharge Diagnoses   Colitis  DM1 with hyperglycemia  Obesity BMI 33    Follow-ups Needed After Discharge   Follow-up Appointments     Adult [facility] Follow-up and recommended labs and tests      Follow up with primary care provider, Marcus Salgado, within 7 days for   hospital follow- up.     Follow up with Gastroenterology clinic.    Appointments on [place] and/or [facility] (with [facility] or [facility]   provider or service). Call [phone] if you haven't heard regarding   these appointments within 7 days of discharge.            History of Present Illness   Maria M Marcus is an 54 year old female wth past medical history of brittle type 1 diabetes, allergic to several formulations of insulin, and obesity with a BMI of 33 presenting to the hospital with acute onset of abdominal pain and dysentery due to colitis.    Hospital Course   Maria M Marcus was admitted on 1/30/2020.  The following problems were addressed during her hospitalization:    Colitis with dysentery:  Acute anemia:  The patient had clinical response to fluid resuscitation and empiric antibiotics.  Stool studies without identified pathogen at this time (negative enteric pathogen panel, C. Diff). O&P pending upon discharge.  Her abdominal examination was reassuring throughout hospitalization.  Concerning for noninfectious colitis due to ischemia (less likely given colonic territory in watershed region) or inflammatory picture given negative infectious work-up. Although she reported a couple episodes of blood diarrhea, suspect her acute dilutional anemia was due to dilution from IVF. Prior to discharge she had nonbloody bowel movements. She will  follow up with PCP next to recheck CBC. She tolerated oral intake, and had no significant abdominal pain. She was discharged with a total of 7 days of cipro/flagyl. She was referred to gastroenterology for endoscopic evaluation.     Brittle type 1 diabetes mellitus:  While more hyperglycemic during hospitalization, there was no DKA on metabolic panel (ketones on UA on admission). The patient has an allergy to multiple formulations of short acting insulin.  She manages her own blood sugars with a continuous blood glucose monitor. Continued usual glargine 4-5 units daily as per patient's usual regimen the patient's usual correctional insulin regimen and carb counting insulin regimen.     Obesity with a BMI of 33:  -Weight loss advised    Significant Results and Procedures   Anemia  Colitis    Pending Results   These results will be followed up by Primary care physician  Unresulted Labs Ordered in the Past 30 Days of this Admission     Date and Time Order Name Status Description    1/31/2020 1429 Ova and Parasite Exam Routine In process         Code Status   Full Code       Primary Care Physician   Marcus Salgado    Physical Exam   Vital Signs:                   Weight: 158 lbs 0 oz  onstitutional:    NAD  Respiratory:       CTAB, nonlabored respirations   Cardiovascular: RRR, no edema  GI: Soft, nontender, nondistended  Skin/Integumen: No rash  MSK: GRAHAM  Neuro: CN grossly intact, GRAHAM, sensation grossly intact    Discharge Disposition   Discharged to home  Condition at discharge: Good    Consultations This Hospital Stay   MEDICATION HISTORY IP PHARMACY CONSULT  VASCULAR ACCESS CARE ADULT IP CONSULT  ENDOCRINE DIABETES ADULT IP CONSULT    Time Spent on this Encounter   IRoi MD, personally saw the patient today and spent greater than 30 minutes discharging this patient.    Discharge Orders      GASTROENTEROLOGY ADULT REF CONSULT ONLY      Reason for your hospital stay    Colitis     Activity    Your  activity upon discharge: activity as tolerated     Adult [facility] Follow-up and recommended labs and tests    Follow up with primary care provider, Marcus Salgado, within 7 days for hospital follow- up.  Recommend CBC.    Follow up with Gastroenterology clinic.    Appointments on [place] and/or [facility] (with [facility] or [facility] provider or service). Call [phone] if you haven't heard regarding these appointments within 7 days of discharge.     Full Code     Diet    Follow this diet upon discharge: Diabetic Diet Adult     Discharge Medications   Discharge Medication List as of 2/1/2020 11:51 AM      START taking these medications    Details   ciprofloxacin (CIPRO) 500 MG tablet Take 1 tablet (500 mg) by mouth 2 times daily for 5 days, Disp-10 tablet, R-0, E-Prescribe      metroNIDAZOLE (FLAGYL) 500 MG tablet Take 1 tablet (500 mg) by mouth 3 times daily for 5 days, Disp-15 tablet, R-0, E-Prescribe         CONTINUE these medications which have NOT CHANGED    Details   amitriptyline (ELAVIL) 25 MG tablet TAKE 1 TABLET BY MOUTH AT BEDTIME, Disp-30 tablet, R-0, E-Prescribe      EUTHYROX 75 MCG tablet TAKE 1 TABLET BY MOUTH ONCE DAILY, Disp-90 tablet, R-1, E-Prescribe      insulin glargine (LANTUS VIAL) 100 UNIT/ML vial Inject up to 10 units daily + 2 units for priming of pen, Disp-10 mL, R-1, E-PrescribePlease consider 90 day supplies to promote better adherence      insulin glulisine (APIDRA) 100 UNIT/ML injection Inject 1 unit per 20 gram carb with meals. Total daily dose 30 units, Disp-30 mL, R-3, E-Prescribe      lisinopril (PRINIVIL/ZESTRIL) 10 MG tablet TAKE 1 TABLET BY MOUTH ONCE DAILY, Disp-30 tablet, R-0, E-Prescribe      omeprazole (PRILOSEC) 20 MG DR capsule TAKE 1 CAPSULE BY MOUTH ONCE DAILY, Disp-90 capsule, R-1, E-Prescribe      pravastatin (PRAVACHOL) 10 MG tablet TAKE 1 TABLET BY MOUTH ONCE DAILY, Disp-90 tablet, R-3, E-PrescribePlease consider 90 day supplies to promote better adherence      BD  "VEO INSULIN SYRINGE U/F 31G X 15/64\" 0.3 ML USE 1 SYRINGE 4 TO 5 TIMES DAILY. KEEP ON HAND IN CASE OF PUMP FAILURE, Disp-100 each, R-8, E-Prescribe      blood glucose monitoring (TANA CONTOUR MONITOR) meter device kit Use to test blood sugars 5 times daily or as directed.Disp-1 kit, [id number]      blood glucose monitoring (TANA CONTOUR) test strip Use to test blood sugar 6 times daily or as directed., Disp-550 strip, R-3, E-Prescribe      blood glucose monitoring (SOFTCLIX) lancets USE 1  TO CHECK GLUCOSE THREE TIMES DAILY, Disp-100 each, R-5, E-PrescribePlease consider 90 day supplies to promote better adherence           Allergies   Allergies   Allergen Reactions     Novolog [Insulin Aspart] Swelling     Itching, rash, contact dermatitis     Humalog [Insulin Lispro] Rash     Contact dermatitis     Zyrtec [Cetirizine] Rash     Data   Most Recent 3 CBC's:  Recent Labs   Lab Test 02/01/20  0900 01/30/20  1249 11/14/19  2056   WBC 7.0 10.7 8.5   HGB 11.5* 13.1 11.7   MCV 91 89 91    238 256      Most Recent 3 BMP's:  Recent Labs   Lab Test 02/01/20  0900 01/31/20  0524 01/30/20  1249    139 137   POTASSIUM 4.1 3.6 5.0   CHLORIDE 107 110* 107   CO2 26 25 23   BUN 13 7 12   CR 0.83 0.77 0.77   ANIONGAP 6 4 8   DORIS 8.5 8.0* 9.2   * 144* 225*     Most Recent 2 LFT's:  Recent Labs   Lab Test 01/31/20  0524 01/30/20  1249   AST 7 33   ALT 14 17   ALKPHOS 64 92   BILITOTAL 0.6 0.7     Most Recent INR's and Anticoagulation Dosing History:  Anticoagulation Dose History     There is no flowsheet data to display.        Most Recent 3 Troponin's:  Recent Labs   Lab Test 03/17/19  0405   TROPI <0.015     Most Recent Cholesterol Panel:  Recent Labs   Lab Test 04/25/19  0748   CHOL 181   *   HDL 47*   TRIG 143     Most Recent TSH, T4 and A1c Labs:  Recent Labs   Lab Test 12/03/19  1546 11/08/19  1401 11/08/19   TSH 0.51 0.32*  --    T4  --  1.28  --    A1C  --   --  5.8*     Results for orders " placed or performed during the hospital encounter of 01/30/20   CT Abdomen Pelvis w Contrast    Narrative    EXAMINATION: CT abdomen/pelvis with contrast, 1/30/2020.    INDICATION: Abd pain, diverticulitis suspected.    COMPARISON: None.    TECHNIQUE: Routine images from the level of the diaphragm through the  pelvis were obtained with contrast.    Contrast: iopamidol (ISOVUE-370) solution 97 mL      Total DLP: 893 mGy*cm.    FINDINGS:    Small calcific granuloma in the left lobe of liver. Otherwise the  liver parenchyma is homogenous. No focal hepatic lesion. The  gallbladder is nondistended. No cholelithiasis. No intrahepatic or  extra hepatic biliary duct dilation. Calcified granuloma in the  spleen. The pancreas is grossly unremarkable. Bilateral adrenal glands  are within normal limits.    Bilaterally symmetrically enhancing kidneys. No appreciable renal  lesions. No hydronephrosis. No hydroureter. The bladder is grossly  unremarkable. No pelvic masses. Postsurgical changes of total  hysterectomy.    The large and small bowel are normal in caliber. Thickened edematous  bowel with mild contrast enhancement of the transverse colon through  the sigmoid, most prominently noted at the splenic flexure with mild  adjacent fat stranding. No evidence for perforation. No  diverticulitis. The pancreas is grossly unremarkable. The stomach is  within normal limits.    The abdominal aorta and major abdominal branches are patent without  evidence of aneurysmal dilation or stenosis. No appreciable  intra-abdominal lymphadenopathy by size criteria.    No acute osseous abnormalities. Soft tissues are grossly unremarkable.      Bases: Partially visualized granuloma in the left lower lobe. No focal  airspace opacity. No pleural effusion. No pneumothorax. No suspicious  pulmonary nodules.      Impression    IMPRESSION:   Thickened contrast-enhancing edematous bowel with with adjacent fat  stranding from the transverse colon through  the sigmoid colon.  Consistent with colitis, no changes of diverticulitis.         I have personally reviewed the examination and initial interpretation  and I agree with the findings.    DAVID RUBIO MD

## 2020-02-03 NOTE — TELEPHONE ENCOUNTER
ED / Discharge Outreach Protocol    Patient Contact    Attempt # 1    Was call answered?  No.  Left message on voicemail with information to call me back.    Next 5 appointments (look out 90 days)    Feb 13, 2020  1:00 PM CST  [facility] [address]  [facility] [address]  [phone]   Mar 09, 2020  1:10 PM CDT  Return Visit with Lena Bradford MD, MG ENDO NURSE  [facility] [address] [place] [id number]  [phone]          Angela Kirby, TERESAN, RN, PHN

## 2020-02-03 NOTE — TELEPHONE ENCOUNTER
RN to call for hospital follow up:    Reason for follow up: Maria M Marcus appeared on our list for being seen in an Emergency Room or a recent Hospital discharge.    Admitting date: 1/30/2020  Discharge date: 2/1/2020  Location: [facility]  Reason for visit: Colitis    Next 5 appointments (look out 90 days)    Mar 09, 2020  1:10 PM CDT  Return Visit with Lena Bradford MD, MG ENDO NURSE  [facility] [address] [place] [id number]  [phone]            Lori Rainey RN BSN

## 2020-02-04 LAB
O+P STL MICRO: NORMAL
SPECIMEN SOURCE: NORMAL

## 2020-02-04 NOTE — TELEPHONE ENCOUNTER
ED / Discharge Outreach Protocol    Patient Contact    Attempt # 2    Was call answered?  No.  Left message on voicemail with information to call me back.    Angela Kirby, BSN, RN, PHN

## 2020-02-05 NOTE — TELEPHONE ENCOUNTER
ED / Discharge Outreach Protocol    Patient Contact    Attempt # 3    Was call answered?  No.  Left message on voicemail with information to call me back.    Angela Kirby, BSN, RN, PHN

## 2020-02-09 DIAGNOSIS — E10.9 TYPE 1 DIABETES MELLITUS WITHOUT COMPLICATION (H): ICD-10-CM

## 2020-02-09 DIAGNOSIS — I10 HYPERTENSION GOAL BP (BLOOD PRESSURE) < 140/90: ICD-10-CM

## 2020-02-11 RX ORDER — LISINOPRIL 10 MG/1
TABLET ORAL
Qty: 90 TABLET | Refills: 2 | Status: SHIPPED | OUTPATIENT
Start: 2020-02-11 | End: 2020-11-02

## 2020-02-11 NOTE — TELEPHONE ENCOUNTER
Pending Prescriptions:                       Disp   Refills    lisinopril (PRINIVIL/ZESTRIL) 10 MG table*       0            Sig: TAKE 1 TABLET BY MOUTH ONCE DAILY    Prescription approved per [facility].      Angela Kirby, BSN, RN, PHN

## 2020-03-02 ENCOUNTER — MYC MEDICAL ADVICE (OUTPATIENT)
Dept: ENDOCRINOLOGY | Facility: CLINIC | Age: 55
End: 2020-03-02

## 2020-03-02 DIAGNOSIS — E10.9 TYPE 1 DIABETES MELLITUS WITHOUT COMPLICATION (H): Chronic | ICD-10-CM

## 2020-03-02 DIAGNOSIS — E10.9 DIABETES MELLITUS TYPE 1 (H): ICD-10-CM

## 2020-03-04 RX ORDER — BLOOD SUGAR DIAGNOSTIC
STRIP MISCELLANEOUS
Refills: 0 | OUTPATIENT
Start: 2020-03-04

## 2020-03-04 NOTE — TELEPHONE ENCOUNTER
"Medication sent 1/27/2020      BD VEO INSULIN SYRINGE U/F 31G X 15/64\" 0.3  each 8 1/27/2020  No   Sig: USE 1 SYRINGE 4 TO 5 TIMES DAILY. KEEP ON HAND IN CASE OF PUMP FAILURE   Sent to pharmacy as: BD VEO INSULIN SYRINGE U/F 31G X 15/64\" 0.3 ML   Class: E-Prescribe   Order: [id number]   E-Prescribing Status: Receipt confirmed by pharmacy (1/27/2020  6:28 PM CST)       Nirali Palacio RN  Central Triage Red Flags/Med Refills      "

## 2020-03-06 NOTE — PATIENT INSTRUCTIONS
[facility]  Jessica Demarco RN, Diabetes Educator: [phone]  Clinic Nurses AYAKA Herman; CMA's: Marjan [phone]  CATHY Delatorre : [phone]  Clinic Fax: [phone]  [facility]-Call Endocrine [facility] (after hours/weekends): [phone] option 4  Scheduling Line: [phone]    Appointment Reminders:  * Please bring meter with for staff to download  * If you are due ONLY for an A1C, it is scheduled with the nurse and will be done in clinic. You do not need to schedule a lab appointment. Fasting is not required for an A1C.  * Refill request should be submitted to your pharmacy. They will contact clinic for approval.

## 2020-03-09 ENCOUNTER — MYC MEDICAL ADVICE (OUTPATIENT)
Dept: ENDOCRINOLOGY | Facility: CLINIC | Age: 55
End: 2020-03-09

## 2020-03-09 ENCOUNTER — MYC MEDICAL ADVICE (OUTPATIENT)
Dept: FAMILY MEDICINE | Facility: OTHER | Age: 55
End: 2020-03-09

## 2020-03-09 ENCOUNTER — OFFICE VISIT (OUTPATIENT)
Dept: URGENT CARE | Facility: RETAIL CLINIC | Age: 55
End: 2020-03-09
Payer: COMMERCIAL

## 2020-03-09 ENCOUNTER — OFFICE VISIT (OUTPATIENT)
Dept: ENDOCRINOLOGY | Facility: CLINIC | Age: 55
End: 2020-03-09
Payer: COMMERCIAL

## 2020-03-09 VITALS
SYSTOLIC BLOOD PRESSURE: 140 MMHG | DIASTOLIC BLOOD PRESSURE: 77 MMHG | TEMPERATURE: 99 F | HEART RATE: 83 BPM | OXYGEN SATURATION: 100 %

## 2020-03-09 VITALS
WEIGHT: 160.3 LBS | OXYGEN SATURATION: 98 % | HEART RATE: 74 BPM | DIASTOLIC BLOOD PRESSURE: 81 MMHG | BODY MASS INDEX: 33.5 KG/M2 | SYSTOLIC BLOOD PRESSURE: 139 MMHG

## 2020-03-09 DIAGNOSIS — J02.9 ACUTE PHARYNGITIS, UNSPECIFIED ETIOLOGY: Primary | ICD-10-CM

## 2020-03-09 DIAGNOSIS — J06.9 VIRAL UPPER RESPIRATORY INFECTION: ICD-10-CM

## 2020-03-09 DIAGNOSIS — E06.3 HYPOTHYROIDISM DUE TO HASHIMOTO'S THYROIDITIS: ICD-10-CM

## 2020-03-09 DIAGNOSIS — E10.9 TYPE 1 DIABETES MELLITUS WITHOUT COMPLICATION (H): Primary | ICD-10-CM

## 2020-03-09 DIAGNOSIS — R05.9 COUGH: ICD-10-CM

## 2020-03-09 LAB
HBA1C MFR BLD: 5.8 % (ref 0–5.6)
S PYO AG THROAT QL IA.RAPID: NEGATIVE

## 2020-03-09 PROCEDURE — 36415 COLL VENOUS BLD VENIPUNCTURE: CPT | Performed by: INTERNAL MEDICINE

## 2020-03-09 PROCEDURE — 99213 OFFICE O/P EST LOW 20 MIN: CPT | Performed by: PHYSICIAN ASSISTANT

## 2020-03-09 PROCEDURE — 87880 STREP A ASSAY W/OPTIC: CPT | Mod: QW | Performed by: PHYSICIAN ASSISTANT

## 2020-03-09 PROCEDURE — 99214 OFFICE O/P EST MOD 30 MIN: CPT | Performed by: INTERNAL MEDICINE

## 2020-03-09 PROCEDURE — 87081 CULTURE SCREEN ONLY: CPT | Performed by: PHYSICIAN ASSISTANT

## 2020-03-09 PROCEDURE — 83036 HEMOGLOBIN GLYCOSYLATED A1C: CPT | Performed by: INTERNAL MEDICINE

## 2020-03-09 RX ORDER — FAMOTIDINE 20 MG/1
20 TABLET, FILM COATED ORAL 2 TIMES DAILY
Refills: 0 | COMMUNITY
Start: 2019-11-16 | End: 2020-03-31

## 2020-03-09 NOTE — LETTER
"    3/9/2020         RE: Maria M Marcus  [address] [place] [address]        Dear Colleague,    Thank you for referring your patient, Maria M Marcus, to the [facility]. Please see a copy of my visit note below.    Endocrinology and Diabetes Clinic    Interval history:   No severe lows.   AM lows recognized by Dexcom, however delays eating when getting alarms  More problems with vision, unable to drive at this point, followed by ophthalmology every 3-month  New pain in her feet bilaterally, thought to be arthritic pain  Problems with obtaining Dexcom transmitter's, patient is always almost out, her insurance is awaiting prior authorization also this already had been obtained  Patient is looking for and disability paperwork  No fevers  No change in BM  No depression.   Dexcom download average blood sugar 141, standard deviation 44, normal range 73%, hypoglycemia 24%, severe hypoglycemia 1%    HPI:   Maria M is a 54 year old female who presents for follow up.   She has H/O type 1 diabetes diagnosed at age 2, hypothyroidism diagnosed in her 20s and vitiligo. She takes pravastatin for hyperlipidemia and lisinopril for hypertension.  Her main initial concern was recurrent hypoglycemia. INGRAM showed negative 21 hydroxylase Ab, positive TPO and negative TTG.     Medications:   Current Outpatient Medications   Medication Sig Dispense Refill     amitriptyline (ELAVIL) 25 MG tablet TAKE 1 TABLET BY MOUTH AT BEDTIME 30 tablet 0     BD VEO INSULIN SYRINGE U/F 31G X 15/64\" 0.3 ML USE 1 SYRINGE 4 TO 5 TIMES DAILY. KEEP ON HAND IN CASE OF PUMP FAILURE 100 each 8     blood glucose monitoring (TANA CONTOUR MONITOR) meter device kit Use to test blood sugars 5 times daily or as directed. 1 kit 0     blood glucose monitoring (TANA CONTOUR) test strip Use to test blood sugar 6 times daily or as directed. 550 strip 3     blood glucose monitoring (SOFTCLIX) lancets USE 1  TO CHECK GLUCOSE THREE TIMES " DAILY 100 each 5     EUTHYROX 75 MCG tablet TAKE 1 TABLET BY MOUTH ONCE DAILY 90 tablet 1     insulin glargine (LANTUS VIAL) 100 UNIT/ML vial INJECT UP TO 10 UNITS SUBCUTANEOUSLY DAILY + 2 UNITS FOR PRIMING OF PEN 10 mL 0     insulin glulisine (APIDRA) 100 UNIT/ML injection Inject 1 unit per 20 gram carb with meals. Total daily dose 30 units 30 mL 3     lisinopril (PRINIVIL/ZESTRIL) 10 MG tablet TAKE 1 TABLET BY MOUTH ONCE DAILY 90 tablet 2     omeprazole (PRILOSEC) 20 MG DR capsule TAKE 1 CAPSULE BY MOUTH ONCE DAILY 90 capsule 1     pravastatin (PRAVACHOL) 10 MG tablet TAKE 1 TABLET BY MOUTH ONCE DAILY 90 tablet 3     Physical Examination:  Blood pressure 139/81, pulse 74, weight 72.7 kg (160 lb 4.8 oz), SpO2 98 %, not currently breastfeeding.  Body mass index is 33.5 kg/m .  Gen: pleasant stated age appearing NAD  Cardiac:  Insulin injection sites:    Foot exam:   foot check: normal DP and PT pulses, no trophic changes or ulcerative lesions and normal sensory exam    Wt Readings from Last 4 Encounters:   03/09/20 72.7 kg (160 lb 4.8 oz)   01/30/20 71.7 kg (158 lb)   12/03/19 72.1 kg (159 lb)   11/16/19 72.1 kg (159 lb)         Labs and Studies:   Recent Labs   Lab Test 03/09/20 02/01/20  0900 01/31/20  0524  12/03/19  1546  11/08/19  1414 11/08/19  1401 11/08/19 04/25/19  0753 04/25/19  0748  04/13/18  0750   A1C 5.8*  --   --   --   --   --   --   --  5.8*  --  6.3*   < > 6.7*   TSH  --   --   --   --  0.51  --   --  0.32*  --   --  3.36   < > 4.17*   T4  --   --   --   --   --   --   --  1.28  --   --   --   --  1.05   LDL  --   --   --   --   --   --   --   --   --   --  105*  --  118*   HDL  --   --   --   --   --   --   --   --   --   --  47*  --  57   TRIG  --   --   --   --   --   --   --   --   --   --  143  --  90   CR  --  0.83 0.77   < >  --    < >  --  0.81  --   --  0.93   < > 0.77   MICROL  --   --   --   --   --   --  <5  --   --  <5  --   --  <5    < > = values in this interval not displayed.       4/25/19   7:48 AM  [id number]     Component  Value  Flag  Ref Range  Units  Status  Collected  Lab    Cholesterol  181   <200  mg/dL  Final  04/25/2019  7:48 AM  [facility]    Triglycerides  143   <150  mg/dL  Final  04/25/2019  7:48 AM  [facility]    HDL Cholesterol  47  Low    >49  mg/dL  Final  04/25/2019  7:48 AM  [facility]    LDL Cholesterol Calculated  105  High    <100  mg/dL  Final           Assessment:  1.  Type 1 diabetes with advanced retinopathy and decreased vision, hypothyroidism, vitiligo, now with arthritis.  Quite good glucose control, occasional hypoglycemia which due to Dexcom she is able to correct.    2. Feet exam today completely normal    3.  Hypertension blood pressure not controlled on lisinopril 10 mg daily, suggest to increase to 20 mg however patient would like to wait for next blood pressure check  Dyslipidemia patient is on 10 mg of pravastatin    4.  Disability paperwork I defer this to the primary care provider as the paperwork had nothing pertinent to diabetes which indeed typically is not indication for disability, unless there are diabetes complications which for her would be decreasing vision    Plan:   Continue current insulin treatment   follow-up in 6 months  We are trying to contact Medicare for sensors for Dexcom  Blood work prior to next office visit      Orders Placed This Encounter   Procedures     Hemoglobin A1c POCT     This was a 25 min visit of which more than 23 minutes were spent in counseling in regards to diagnosis, clinical consequences and treatment indications and options of T1DM    Lena Bradford MD  Endocrinology and Diabetes  [address]  [facility] [address]  Tel [phone]      Again, thank you for allowing me to participate in the care of your patient.        Sincerely,        Lena Bradford MD

## 2020-03-09 NOTE — NURSING NOTE
Maria M Marcus's goals for this visit include: 4 month follow up Diabetes/ Transfer from Dr. Jarquin  She requests these members of her care team be copied on today's visit information: YES    PCP: Marcus Salgado    Referring Provider:  No referring provider defined for this encounter.    /81 (BP Location: Left arm, Patient Position: Sitting, Cuff Size: Adult Large)   Pulse 74   Wt 72.7 kg (160 lb 4.8 oz)   SpO2 98%   BMI 33.50 kg/m      Do you need any medication refills at today's visit? No    Chikis Kaufman CMA

## 2020-03-09 NOTE — PROGRESS NOTES
"Endocrinology and Diabetes Clinic    Interval history:   No severe lows.   AM lows recognized by Dexcom, however delays eating when getting alarms  More problems with vision, unable to drive at this point, followed by ophthalmology every 3-month  New pain in her feet bilaterally, thought to be arthritic pain  Problems with obtaining Dexcom transmitter's, patient is always almost out, her insurance is awaiting prior authorization also this already had been obtained  Patient is looking for and disability paperwork  No fevers  No change in BM  No depression.   Dexcom download average blood sugar 141, standard deviation 44, normal range 73%, hypoglycemia 24%, severe hypoglycemia 1%    HPI:   Maria M is a 54 year old female who presents for follow up.   She has H/O type 1 diabetes diagnosed at age 2, hypothyroidism diagnosed in her 20s and vitiligo. She takes pravastatin for hyperlipidemia and lisinopril for hypertension.  Her main initial concern was recurrent hypoglycemia. INGRAM showed negative 21 hydroxylase Ab, positive TPO and negative TTG.     Medications:   Current Outpatient Medications   Medication Sig Dispense Refill     amitriptyline (ELAVIL) 25 MG tablet TAKE 1 TABLET BY MOUTH AT BEDTIME 30 tablet 0     BD VEO INSULIN SYRINGE U/F 31G X 15/64\" 0.3 ML USE 1 SYRINGE 4 TO 5 TIMES DAILY. KEEP ON HAND IN CASE OF PUMP FAILURE 100 each 8     blood glucose monitoring (TANA CONTOUR MONITOR) meter device kit Use to test blood sugars 5 times daily or as directed. 1 kit 0     blood glucose monitoring (TANA CONTOUR) test strip Use to test blood sugar 6 times daily or as directed. 550 strip 3     blood glucose monitoring (SOFTCLIX) lancets USE 1  TO CHECK GLUCOSE THREE TIMES DAILY 100 each 5     EUTHYROX 75 MCG tablet TAKE 1 TABLET BY MOUTH ONCE DAILY 90 tablet 1     insulin glargine (LANTUS VIAL) 100 UNIT/ML vial INJECT UP TO 10 UNITS SUBCUTANEOUSLY DAILY + 2 UNITS FOR PRIMING OF PEN 10 mL 0     insulin glulisine (APIDRA) " 100 UNIT/ML injection Inject 1 unit per 20 gram carb with meals. Total daily dose 30 units 30 mL 3     lisinopril (PRINIVIL/ZESTRIL) 10 MG tablet TAKE 1 TABLET BY MOUTH ONCE DAILY 90 tablet 2     omeprazole (PRILOSEC) 20 MG DR capsule TAKE 1 CAPSULE BY MOUTH ONCE DAILY 90 capsule 1     pravastatin (PRAVACHOL) 10 MG tablet TAKE 1 TABLET BY MOUTH ONCE DAILY 90 tablet 3     Physical Examination:  Blood pressure 139/81, pulse 74, weight 72.7 kg (160 lb 4.8 oz), SpO2 98 %, not currently breastfeeding.  Body mass index is 33.5 kg/m .  Gen: pleasant stated age appearing NAD  Cardiac:  Insulin injection sites:    Foot exam:   foot check: normal DP and PT pulses, no trophic changes or ulcerative lesions and normal sensory exam    Wt Readings from Last 4 Encounters:   03/09/20 72.7 kg (160 lb 4.8 oz)   01/30/20 71.7 kg (158 lb)   12/03/19 72.1 kg (159 lb)   11/16/19 72.1 kg (159 lb)         Labs and Studies:   Recent Labs   Lab Test 03/09/20 02/01/20  0900 01/31/20  0524  12/03/19  1546  11/08/19  1414 11/08/19  1401 11/08/19 04/25/19  0753 04/25/19  0748  04/13/18  0750   A1C 5.8*  --   --   --   --   --   --   --  5.8*  --  6.3*   < > 6.7*   TSH  --   --   --   --  0.51  --   --  0.32*  --   --  3.36   < > 4.17*   T4  --   --   --   --   --   --   --  1.28  --   --   --   --  1.05   LDL  --   --   --   --   --   --   --   --   --   --  105*  --  118*   HDL  --   --   --   --   --   --   --   --   --   --  47*  --  57   TRIG  --   --   --   --   --   --   --   --   --   --  143  --  90   CR  --  0.83 0.77   < >  --    < >  --  0.81  --   --  0.93   < > 0.77   MICROL  --   --   --   --   --   --  <5  --   --  <5  --   --  <5    < > = values in this interval not displayed.      4/25/19   7:48 AM  [id number]     Component  Value  Flag  Ref Range  Units  Status  Collected  Lab    Cholesterol  181   <200  mg/dL  Final  04/25/2019  7:48 AM  [facility]    Triglycerides  143   <150  mg/dL  Final   04/25/2019  7:48 AM  [facility]    HDL Cholesterol  47  Low    >49  mg/dL  Final  04/25/2019  7:48 AM  [facility]    LDL Cholesterol Calculated  105  High    <100  mg/dL  Final           Assessment:  1.  Type 1 diabetes with advanced retinopathy and decreased vision, hypothyroidism, vitiligo, now with arthritis.  Quite good glucose control, occasional hypoglycemia which due to Dexcom she is able to correct.    2. Feet exam today completely normal    3.  Hypertension blood pressure not controlled on lisinopril 10 mg daily, suggest to increase to 20 mg however patient would like to wait for next blood pressure check  Dyslipidemia patient is on 10 mg of pravastatin    4.  Disability paperwork I defer this to the primary care provider as the paperwork had nothing pertinent to diabetes which indeed typically is not indication for disability, unless there are diabetes complications which for her would be decreasing vision    Plan:   Continue current insulin treatment   follow-up in 6 months  We are trying to contact Medicare for sensors for Dexcom  Blood work prior to next office visit      Orders Placed This Encounter   Procedures     Hemoglobin A1c POCT     This was a 25 min visit of which more than 23 minutes were spent in counseling in regards to diagnosis, clinical consequences and treatment indications and options of T1DM    Lena Bradford MD  Endocrinology and Diabetes  [facility]  [address]  [facility] [address]  Tel [phone]

## 2020-03-10 NOTE — PROGRESS NOTES
"Chief Complaint   Patient presents with     Pharyngitis     s/t x 1 day      Otalgia     both ears sting and burn      Cough     cough x 1 day  she says stuff is moving      Chills     chills started today      SUBJECTIVE:  Maria M Marcus is a 54 year old female with a chief complaint of sore throat.  Onset of symptoms was 1 day(s) ago.    Course of illness: still present.  Severity moderate  Current and Associated symptoms: sore throat, ear discomfort, chills, cough  Treatment measures tried include fluids  Predisposing factors include type 1 DM    Past Medical History:   Diagnosis Date     Cervical radiculopathy      Diabetic eye exam (H) 12/14/11     DISC DIS NEC/NOS-LUMBAR 4/7/2007     Frozen shoulder syndrome 6/16/2011     Hyperlipidemia LDL goal <100 10/31/2010     Hypertension 1/3/2011     Hypothyroidism due to Hashimoto's thyroiditis      Type 1 diabetes, HbA1c goal < 7% (H) dx 1967     Unspecified hypothyroidism      Current Outpatient Medications   Medication Sig Dispense Refill     BD VEO INSULIN SYRINGE U/F 31G X 15/64\" 0.3 ML USE 1 SYRINGE 4 TO 5 TIMES DAILY. KEEP ON HAND IN CASE OF PUMP FAILURE 100 each 8     blood glucose monitoring (TANA CONTOUR) test strip Use to test blood sugar 6 times daily or as directed. 550 strip 3     blood glucose monitoring (SOFTCLIX) lancets USE 1  TO CHECK GLUCOSE THREE TIMES DAILY 100 each 5     EUTHYROX 75 MCG tablet TAKE 1 TABLET BY MOUTH ONCE DAILY 90 tablet 1     insulin glargine (LANTUS VIAL) 100 UNIT/ML vial INJECT UP TO 10 UNITS SUBCUTANEOUSLY DAILY + 2 UNITS FOR PRIMING OF PEN 10 mL 0     insulin glulisine (APIDRA) 100 UNIT/ML injection Inject 1 unit per 20 gram carb with meals. Total daily dose 30 units 30 mL 3     lisinopril (PRINIVIL/ZESTRIL) 10 MG tablet TAKE 1 TABLET BY MOUTH ONCE DAILY 90 tablet 2     omeprazole (PRILOSEC) 20 MG DR capsule TAKE 1 CAPSULE BY MOUTH ONCE DAILY 90 capsule 1     pravastatin (PRAVACHOL) 10 MG tablet TAKE 1 TABLET BY MOUTH " "ONCE DAILY 90 tablet 3     amitriptyline (ELAVIL) 25 MG tablet TAKE 1 TABLET BY MOUTH AT BEDTIME (Patient not taking: Reported on 3/9/2020) 30 tablet 0     blood glucose monitoring (Rabbit CONTOUR MONITOR) meter device kit Use to test blood sugars 5 times daily or as directed. 1 kit 0     famotidine (PEPCID) 20 MG tablet Take 20 mg by mouth 2 times daily  0     History   Smoking Status     Never Smoker   Smokeless Tobacco     Never Used     Comment: no exposure       ROS:  CONSTITUTIONAL:POSITIVE  for chills and NEGATIVE  for fever   ENT/MOUTH: POSITIVE for sore throat and ear discomfort  RESP:POSITIVE for cough and NEGATIVE for wheezing    OBJECTIVE:   BP (!) 140/77   Pulse 83   Temp 99  F (37.2  C) (Tympanic)   SpO2 100%   GENERAL APPEARANCE: mildly ill appearing  EYES: conjunctiva clear  HENT: ear canals clear. TMs dull, serous effusion present  Nose scant rhinorrhea.  Pharynx erythematous with no exudate noted.  NECK: supple, non-tender to palpation, no adenopathy noted  RESP: lungs clear to auscultation - no rales, rhonchi or wheezes  CV: regular rates and rhythm, normal S1 S2, no murmur noted    Rapid Strep test is negative; await throat culture results.    ASSESSMENT:     Acute pharyngitis, unspecified etiology  Viral upper respiratory infection  Cough    PLAN:   Patient Instructions   Rapid strep test today is negative.   Throat culture is pending. [facility] will call if positive results to start antibiotics at that time; No call if the culture is negative.  Symptomatic measures: plenty of fluids (mainly water) and rest.   Take Tylenol/Acetaminophen or Motrin/Ibuprofen as needed every 6 hrs for pain or fever.  May drink tea mixed with a few tablespoons of honey to soothe throat.  \"Throat Coat\" tea is soothing as well.  Gargling with warm salt water can help reduce throat pain. Dissolve 1/2 teaspoon of salt into 1 glass of warm water.    Sucrets or Cepacol spray are over the counter medications that can " temporarily numb your throat.  For ear pressure - warm compresses next to ears for 10-15 minutes once a day  Please follow up with primary care provider if not improving, worsening or new symptoms       Meghana Guan PA-C  [facility]

## 2020-03-10 NOTE — TELEPHONE ENCOUNTER
Writer contacted patient on husbands phone [phone].    [employer] contacted patient and supplies are being shipped. [facility] approved CGM. Expected delivery date 3/13/2020.    Nandini Jenkins LPN  Diabetes Clinic Coordinator   Adult Endocrinology and Pediatric Specialty Clinics  [facility]

## 2020-03-10 NOTE — PATIENT INSTRUCTIONS
"Rapid strep test today is negative.   Throat culture is pending. [facility] will call if positive results to start antibiotics at that time; No call if the culture is negative.  Symptomatic measures: plenty of fluids (mainly water) and rest.   Take Tylenol/Acetaminophen or Motrin/Ibuprofen as needed every 6 hrs for pain or fever.  May drink tea mixed with a few tablespoons of honey to soothe throat.  \"Throat Coat\" tea is soothing as well.  Gargling with warm salt water can help reduce throat pain. Dissolve 1/2 teaspoon of salt into 1 glass of warm water.    Sucrets or Cepacol spray are over the counter medications that can temporarily numb your throat.  For ear pressure - warm compresses next to ears for 10-15 minutes once a day  Please follow up with primary care provider if not improving, worsening or new symptoms    "

## 2020-03-11 DIAGNOSIS — E10.9 TYPE 1 DIABETES MELLITUS WITHOUT COMPLICATION (H): Chronic | ICD-10-CM

## 2020-03-11 DIAGNOSIS — E03.9 HYPOTHYROIDISM, UNSPECIFIED TYPE: ICD-10-CM

## 2020-03-11 DIAGNOSIS — K21.9 GASTROESOPHAGEAL REFLUX DISEASE, ESOPHAGITIS PRESENCE NOT SPECIFIED: ICD-10-CM

## 2020-03-11 NOTE — TELEPHONE ENCOUNTER
Lantus 100 UNIT/ML Subcutaneous Solution      Last Written Prescription Date:  2/10/2020  Last Fill Quantity: 10,   # refills: 0  Last Office Visit : 3/9/2020  Future Office visit:  9/14/2020    Routing refill request to provider for review/approval because:  Drug not on the [facility], [facility] or [facility] refill protocol or controlled substance      Nirali Palacio RN  Central Triage Red Flags/Med Refills    
Patient now sees Dr. Bradford. Will send to Dr. Bradford to sign refill for Lantus.    Batsheva Gonzalez, RN  Endocrine Care Coordinator  [facility]    
Adequate: hears normal conversation without difficulty

## 2020-03-12 LAB
BACTERIA SPEC CULT: NORMAL
SPECIMEN SOURCE: NORMAL

## 2020-03-12 RX ORDER — INSULIN GLARGINE 100 [IU]/ML
INJECTION, SOLUTION SUBCUTANEOUS
Qty: 10 ML | Refills: 1 | Status: SHIPPED | OUTPATIENT
Start: 2020-03-12 | End: 2020-03-17

## 2020-03-14 ENCOUNTER — MYC REFILL (OUTPATIENT)
Dept: FAMILY MEDICINE | Facility: OTHER | Age: 55
End: 2020-03-14

## 2020-03-14 DIAGNOSIS — K21.9 GASTROESOPHAGEAL REFLUX DISEASE, ESOPHAGITIS PRESENCE NOT SPECIFIED: ICD-10-CM

## 2020-03-14 DIAGNOSIS — E03.9 HYPOTHYROIDISM, UNSPECIFIED TYPE: ICD-10-CM

## 2020-03-14 RX ORDER — LEVOTHYROXINE SODIUM 75 UG/1
75 TABLET ORAL DAILY
Qty: 90 TABLET | Refills: 1 | Status: CANCELLED | OUTPATIENT
Start: 2020-03-14

## 2020-03-16 ENCOUNTER — VIRTUAL VISIT (OUTPATIENT)
Dept: FAMILY MEDICINE | Facility: OTHER | Age: 55
End: 2020-03-16
Payer: COMMERCIAL

## 2020-03-16 ENCOUNTER — TELEPHONE (OUTPATIENT)
Dept: FAMILY MEDICINE | Facility: OTHER | Age: 55
End: 2020-03-16

## 2020-03-16 DIAGNOSIS — G89.4 CHRONIC PAIN SYNDROME: ICD-10-CM

## 2020-03-16 DIAGNOSIS — M54.12 CERVICAL RADICULOPATHY: ICD-10-CM

## 2020-03-16 DIAGNOSIS — J06.9 URI WITH COUGH AND CONGESTION: Primary | ICD-10-CM

## 2020-03-16 PROCEDURE — 99213 OFFICE O/P EST LOW 20 MIN: CPT | Mod: 95 | Performed by: PHYSICIAN ASSISTANT

## 2020-03-16 RX ORDER — AZITHROMYCIN 250 MG/1
TABLET, FILM COATED ORAL
Qty: 6 TABLET | Refills: 0 | Status: SHIPPED | OUTPATIENT
Start: 2020-03-16 | End: 2020-05-25

## 2020-03-16 RX ORDER — LEVOTHYROXINE SODIUM 75 UG/1
TABLET ORAL
Qty: 90 TABLET | Refills: 1 | Status: SHIPPED | OUTPATIENT
Start: 2020-03-16 | End: 2020-09-04

## 2020-03-16 RX ORDER — DULOXETIN HYDROCHLORIDE 30 MG/1
30 CAPSULE, DELAYED RELEASE ORAL DAILY
Qty: 30 CAPSULE | Refills: 2 | Status: SHIPPED | OUTPATIENT
Start: 2020-03-16 | End: 2020-06-09

## 2020-03-16 NOTE — PROGRESS NOTES
"Maria M Marcus complains of  No chief complaint on file.      I have reviewed and updated the patient's Past Medical History, Social History, Family History and Medication List.    ALLERGIES  Novolog [insulin aspart]; Humalog [insulin lispro]; and Zyrtec [cetirizine]    Gayle Case CMA  (MA signature)    Additional provider notes: She states she has had \"cold\" symptoms for the past 2 weeks including a cough, sore throat, and ear pain. She denies fevers, shortness of breath or body aches. She states that she may have come into contact with someone who tested positive for COVID-19 as she and her  were at the business where this individual worked on 3/3 and 3/8 and the individual was not sent home from work on 3/10. She is unsure exactly who the person was but is pretty sure she came into close contact with them. Her upper respiratory symptoms have slightly worsened over the past week. She tried to complete an [facility] visit but states it would not let her complete it because she marked herself as having an autoimmune disease (type 1 diabetes). She is wondering what she should do. She lives with her  who is immunocompromised due to previous kidney transplant but he has had no symptoms whatsoever. Her mother is also staying with them and she also has no symptoms. She has been trying to keep her distance from both her mother and , sleeping in separate bedrooms and cleaning the bathroom with bleach wipes after each use since there is only 1 bathroom in the house.     In addition, she continues to experience chronic bilateral arm pain, left > right due to previous cervical spine procedure in 2007. She had been on amitriptyline for the past 1.5 years for this but since her increased dose, she has been unable to sleep while taking this medication. She went back down to 1/2 tablet and was unable to sleep either. She stopped the medication a few weeks ago and is wondering if there is something " else she can try. She cannot be on gabapentin due to confusion on this medication .      Assessment/Plan:    ICD-10-CM    1. URI with cough and congestion  J06.9    2. Chronic pain syndrome  G89.4    3. Cervical radiculopathy  M54.12        1. Given her high potential of COVID-19 exposure I strongly recommend she be tested as she is high risk given her type 1 diabetes. [place] is the closest testing center but it closing soon so she will not have time to get there but will go there at 11:00 am tomorrow when they open. She was instructed to avoid all contact with her mother and  until she is tested as she needs to isolate herself. She should remain at least 6 feet away from any person she comes into contact with and should continue to wash her hands frequently. She states she and her  are supposed to  and deliver some product for a friend tomorrow as they are the only people that can help her but I strictly instructed her not to do this as she could be putting other people at risk. Her  should not do this either as he also may have been exposed even though he has no symptoms. She states they will find someone else to handle this. Given her continued productive cough, I would recommend treated with azithromycin to prevent progression to pneumonia given her concomitant diabetes. I also recommend Mucinex, rest, plenty of fluids and using a humidifier. She voiced understanding and will be seen in [place] right away tomorrow when they open.    2-3. She has continued upper extremity pain and was no longer tolerating amitriptyline so she stopped this. I recommend she try Cymbalta 30 mg daily. I discussed common side effects and that this can take 4-6 weeks to take full effect. Will plan on recheck via phone visit in 6 weeks.       I have reviewed the note as documented above.  This accurately captures the substance of my conversation with the patient.  Marcus Salgado PA-C      Phone  call contact time  Call Started at 6:15 pm  Call Ended at 6:35 pm

## 2020-03-16 NOTE — TELEPHONE ENCOUNTER
Pending Prescriptions:                       Disp   Refills    omeprazole (PRILOSEC) 20 MG DR capsule [P*90 cap*0            Sig: Take 1 capsule by mouth once daily    levothyroxine (SYNTHROID/LEVOTHROID) 75 M*90 tab*0            Sig: Take 1 tablet by mouth once daily    Prescription approved per [facility] Refill Protocol.    Sophia Celeste, MSN, RN

## 2020-03-16 NOTE — TELEPHONE ENCOUNTER
Spoke to patient.     Been sick for a while more than 10 days.   Sore throat and earache.   Was advised a week ago Thursday for eval.   No sore throat.   No fever above 99.4.     Not been notified but 3 times in last two weeks that is now closed for COVID19 person.     Don't know if they have had close contact.   But state they don't have the option to be quarantined.     Store has not called anyone.     RN advised [email or web address] visit. Patient states they are setting up a phone appointment with provider. Patient states understanding.    Lori Rainey RN BSN

## 2020-03-16 NOTE — TELEPHONE ENCOUNTER
Reason for call:  Patient reporting a symptom    Symptom or request: No Fever, No Rash, Yes Cough, sore throat , headaches. No travel but YES possible exposure to COVID 19 from going to a store where someone tested positive. Patient saw on news that the building had been shut down due to someone testing positive and they had just been there 2 days in a row. Patient is type 1 diabetic.     Duration (how long have symptoms been present): over a week (patient was seen for a cold last week)    Have you been treated for this before? No    Additional comments: Triage    Phone Number patient can be reached at:  Home number on file [phone] (home)    Best Time:  any    Can we leave a detailed message on this number:  YES    Call taken on 3/16/2020 at 12:13 PM by Paola Shah

## 2020-03-17 ENCOUNTER — TELEPHONE (OUTPATIENT)
Dept: FAMILY MEDICINE | Facility: OTHER | Age: 55
End: 2020-03-17

## 2020-03-17 ENCOUNTER — TELEPHONE (OUTPATIENT)
Dept: ENDOCRINOLOGY | Facility: CLINIC | Age: 55
End: 2020-03-17

## 2020-03-17 DIAGNOSIS — E10.9 TYPE 1 DIABETES MELLITUS WITHOUT COMPLICATION (H): Chronic | ICD-10-CM

## 2020-03-17 RX ORDER — INSULIN GLARGINE 100 [IU]/ML
INJECTION, SOLUTION SUBCUTANEOUS
Qty: 10 ML | Refills: 1 | Status: SHIPPED | OUTPATIENT
Start: 2020-03-17 | End: 2020-05-10

## 2020-03-17 NOTE — TELEPHONE ENCOUNTER
Central Prior Authorization Team   Phone: [phone]    PA Initiation    Medication: omeprazole (PRILOSEC) 20 MG DR capsule   [facility] - Phone [phone] Fax [phone]  Pharmacy Filling the Rx: [facility] [address] [place] [address] [facility]  Filling Pharmacy Phone: [phone]  Filling Pharmacy Fax: [phone]  Start Date: 3/17/2020

## 2020-03-17 NOTE — LETTER
[address]  [facility] [address]  Phone: [phone]    March 19, 2020        Maria M Marcus  [address]  [facility] [address]          To whom it may concern:    RE: Maria M Marcus has been on omeprazole for gastroesophageal reflux since 2015. She has tried and failed H2 blockers including famotidine and ranitidine and also tried and failed pantoprazole. Omeprazole has been the only medication effective for her symptoms so it is medically necessary that she be on this year-round.  Thank you.     Please contact me for questions or concerns.      Sincerely,        Marcus Salgado PA-C

## 2020-03-17 NOTE — TELEPHONE ENCOUNTER
Pharmacy is requesting a new prescription be sent in for the Lantus vial since it is a vial please remove priming directions. Writer pended prescription with priming directions removed. Will forward to Jessica Demarco to authorize.    Marjan Almazan [facility]  Adult Endocrinology  [facility]

## 2020-03-17 NOTE — TELEPHONE ENCOUNTER
Prior Authorization Retail Medication Request    Medication/Dose: omeprazole (PRILOSEC) 20 MG DR capsule   ICD code (if different than what is on RX):    Previously Tried and Failed:    Rationale:      Insurance Name:    Insurance ID:        Pharmacy Information (if different than what is on RX)  Name:    Phone:

## 2020-03-18 ENCOUNTER — MYC MEDICAL ADVICE (OUTPATIENT)
Dept: FAMILY MEDICINE | Facility: OTHER | Age: 55
End: 2020-03-18

## 2020-03-18 DIAGNOSIS — K21.9 GASTROESOPHAGEAL REFLUX DISEASE, ESOPHAGITIS PRESENCE NOT SPECIFIED: ICD-10-CM

## 2020-03-19 NOTE — TELEPHONE ENCOUNTER
PRIOR AUTHORIZATION DENIED    Medication: omeprazole (PRILOSEC) 20 MG-DENIED    Denial Date: 3/18/2020    Denial Rational: PATIENT IS ABLE TO RECEIVE 30 CAPS PER 30 DAYS FOR A MAXIMUM  DAYS WITHIN 365 DAYS.            Appeal Information:  IF YOU WOULD LIKE TO APPEAL PLEASE SUPPLY PA TEAM WITH A LETTER OF MEDICAL NECESSITY WITH CLINICAL REASON.

## 2020-03-23 NOTE — TELEPHONE ENCOUNTER
Medication Appeal Initiation    We have initiated an appeal for the requested medication:  Medication: omeprazole (PRILOSEC) 20 MG-APPEAL FAXED  Appeal Start Date:  3/23/2020  Insurance Company:    Comments:  APPEAL LETTER FAXED.

## 2020-03-24 ENCOUNTER — MYC MEDICAL ADVICE (OUTPATIENT)
Dept: FAMILY MEDICINE | Facility: OTHER | Age: 55
End: 2020-03-24

## 2020-03-30 ENCOUNTER — MYC MEDICAL ADVICE (OUTPATIENT)
Dept: FAMILY MEDICINE | Facility: OTHER | Age: 55
End: 2020-03-30

## 2020-03-30 NOTE — PROGRESS NOTES
"Subjective     Maria M Marcus is a 54 year old female who is being evaluated via a billable telephone visit.      The patient has been notified of following:     \"This telephone visit will be conducted via a call between you and your physician/provider. We have found that certain health care needs can be provided without the need for a physical exam.  This service lets us provide the care you need with a short phone conversation.  If a prescription is necessary we can send it directly to your pharmacy.  If lab work is needed we can place an order for that and you can then stop by our lab to have the test done at a later time.    If during the course of the call the physician/provider feels a telephone visit is not appropriate, you will not be charged for this service.\"     Physician has received verbal consent for a Telephone Visit from the patient? Yes    Maria M Marcus complains of No chief complaint on file.      She was treated with azithromycin on 3/16 due to 1-2 weeks of a persistent cough. Symptoms improved minimally but she still has a persistent, productive cough and some nasal congestion. She denies fevers, chills, body aches or shortness of breath. Her immunocompromised  has not had any symptoms since her illness began. She did quarantine herself at home for 2 weeks after she was treated on 3/16 but now has started to go out again but if she goes to grocery stores, she wears a mask and gloves.       Assessment/Plan:    ICD-10-CM    1. Acute bronchitis with symptoms > 10 days  J20.9 doxycycline hyclate (VIBRAMYCIN) 100 MG capsule     benzonatate (TESSALON) 100 MG capsule       Given continued symptoms, will treat with doxycycline for 10 days to cover for pneumonia.   She was instructed to take a probiotic or daily Activia yogurt while on this.  I recommend plenty of fluids along with breathing in warm, moist air/humidifer.   Continued with Mucinex and can try Flonase nasal spray to help " with congestion.  Honey and tea can help with cough. I will also prescribe Tessalon pearls to use 3 times daily as needed.   Given persistent symptoms with no fever or shortness of breath for the past month, COVID-19 is very unlikely but she was instructed to continue to practice social distancing and frequent hand washing which she is doing.   If symptoms are not improving within the next week, she will contact the clinic and may need to be seen in clinic.       Phone call duration:  5 minutes    Marcus Salgado PA-C

## 2020-03-30 NOTE — TELEPHONE ENCOUNTER
Spoke to patient.     States it's not anything bad.     Was given a Zpak a couple weeks ago for her sinuses.   States she normally has to have two rounds of antibiotics.     States she started feeling bad on Friday or Saturday.   No fever.   States cough is productive.  Mornings yellow phlegm and then clears throughout the day.   No new body aches.   No difficulty breathing.   States her chest feels tight. States she thinks her muscles are sore from coughing.   Moderate muscle pain.   States the cough is getting worse.   Cough is more frequent.   Able to swallow well.     RN advised telephone visit. RN advised to seek emergency care if severe difficulty breathing, chest pain. Patient states understanding.     Lori Rainey RN BSN

## 2020-03-31 ENCOUNTER — VIRTUAL VISIT (OUTPATIENT)
Dept: FAMILY MEDICINE | Facility: OTHER | Age: 55
End: 2020-03-31
Payer: COMMERCIAL

## 2020-03-31 DIAGNOSIS — J20.9 ACUTE BRONCHITIS WITH SYMPTOMS > 10 DAYS: Primary | ICD-10-CM

## 2020-03-31 PROCEDURE — 99213 OFFICE O/P EST LOW 20 MIN: CPT | Mod: TEL | Performed by: PHYSICIAN ASSISTANT

## 2020-03-31 RX ORDER — DOXYCYCLINE 100 MG/1
100 CAPSULE ORAL 2 TIMES DAILY
Qty: 20 CAPSULE | Refills: 0 | Status: SHIPPED | OUTPATIENT
Start: 2020-03-31 | End: 2020-05-25

## 2020-03-31 RX ORDER — BENZONATATE 100 MG/1
100 CAPSULE ORAL 3 TIMES DAILY PRN
Qty: 30 CAPSULE | Refills: 0 | Status: SHIPPED | OUTPATIENT
Start: 2020-03-31 | End: 2020-05-25

## 2020-04-09 ENCOUNTER — MYC MEDICAL ADVICE (OUTPATIENT)
Dept: FAMILY MEDICINE | Facility: OTHER | Age: 55
End: 2020-04-09

## 2020-04-09 NOTE — TELEPHONE ENCOUNTER
MEDICATION APPEAL DENIED    Medication: omeprazole (PRILOSEC) 20 MG-APPEAL DENIED    Denial Date: 4/8/2020    Denial Rational: INSURANCE STATES CRITERIA WAS NOT MET.        Second Level Appeal Information:      Second level appeals will be managed by the clinic staff and provider. Please contact the [employer] Prior Authorization Team if additional information about the denial is needed.

## 2020-04-09 NOTE — TELEPHONE ENCOUNTER
Please notify patient.  The appeal was denied.   Will forward to JM to see when he is back in clinic as well to determine if he has any other recommendations.       Sam Miranda PA-C

## 2020-04-13 ENCOUNTER — TELEPHONE (OUTPATIENT)
Dept: FAMILY MEDICINE | Facility: OTHER | Age: 55
End: 2020-04-13

## 2020-04-13 ENCOUNTER — MYC MEDICAL ADVICE (OUTPATIENT)
Dept: FAMILY MEDICINE | Facility: OTHER | Age: 55
End: 2020-04-13

## 2020-04-13 DIAGNOSIS — K21.9 GASTROESOPHAGEAL REFLUX DISEASE, ESOPHAGITIS PRESENCE NOT SPECIFIED: Primary | ICD-10-CM

## 2020-04-13 NOTE — TELEPHONE ENCOUNTER
So she is able to get 4 months of omeprazole every year per insurance so she could   A. Pay for the remaining 8 months out of pocket via over the counter omeprazole  B. File an appeal to her insurance as they unfortunately denied the letter I sent for medical necessity.     Thanks.    Marcus Salgado PA-C

## 2020-04-13 NOTE — TELEPHONE ENCOUNTER
Patient returned call and was given message from below. She states she has been out of this medication for about a month.

## 2020-04-13 NOTE — TELEPHONE ENCOUNTER
Fer it looks like a PA & appeal was already completed but denied by her insurance plan see telephone encounter from 03/17/20 for rationale.

## 2020-04-13 NOTE — TELEPHONE ENCOUNTER
So she can only get 30 per month? I am confused as to what this message means. I recommend she continue omeprazole as she has failed other antacids. Recommend a PA if not covered.    Marcus Salgado PA-C

## 2020-04-14 NOTE — TELEPHONE ENCOUNTER
Central Prior Authorization Team  Phone: [phone]    PA Initiation    Medication: esomeprazole (NEXIUM) 20 MG DR capsule  Insurance Company: [employer] - Phone [phone] Fax [phone]  Pharmacy Filling the Rx: [facility] [address] [place] [address] [facility]  Filling Pharmacy Phone: [phone]  Filling Pharmacy Fax:    Start Date: 4/14/2020

## 2020-04-14 NOTE — TELEPHONE ENCOUNTER
Prior Authorization Retail Medication Request    Medication/Dose: esomeprazole (NEXIUM) 20 MG DR capsule  ICD code (if different than what is on RX):    Previously Tried and Failed:    Rationale:      Insurance Name:    Insurance ID:        Pharmacy Information (if different than what is on RX)  Name:    Phone:

## 2020-04-15 ENCOUNTER — MYC MEDICAL ADVICE (OUTPATIENT)
Dept: FAMILY MEDICINE | Facility: OTHER | Age: 55
End: 2020-04-15

## 2020-04-15 NOTE — TELEPHONE ENCOUNTER
PRIOR AUTHORIZATION DENIED    Medication: esomeprazole (NEXIUM) 20 MG DR capsule- DENIED    Denial Date: 4/14/2020    Denial Rational:       Appeal Information: If provider would like to appeal please provide a letter of medical necessity.

## 2020-04-28 ENCOUNTER — PATIENT OUTREACH (OUTPATIENT)
Dept: CARE COORDINATION | Facility: CLINIC | Age: 55
End: 2020-04-28

## 2020-04-28 DIAGNOSIS — Z71.89 COMPLEX CARE COORDINATION: Primary | ICD-10-CM

## 2020-04-28 NOTE — PROGRESS NOTES
Clinic Care Coordination Contact    Clinic Care Coordination Contact  OUTREACH    Referral Information:  Referral Source: Pro-Active Outreach    Payor request for Proactive Outreach due to COVID-19 risk     Primary Diagnosis: Psychosocial    Chief Complaint   Patient presents with     Clinic Care Coordination - Initial             Universal Utilization: [employer]  Difficulty keeping appointments:: No  Compliance Concerns: No  No-Show Concerns: No  No PCP office visit in Past Year: No  Utilization    Last refreshed: 4/28/2020 12:16 PM:  Hospital Admissions 1           Last refreshed: 4/28/2020 12:16 PM:  ED Visits 3           Last refreshed: 4/28/2020 12:16 PM:  No Show Count (past year) 0              Current as of: 4/28/2020 12:16 PM              Clinical Concerns:  Current Medical Concerns:    Patient Active Problem List   Diagnosis     Hypothyroidism due to Hashimoto's thyroiditis     Other and unspecified disc disorder of lumbar region     Hyperlipidemia LDL goal <100     Cervical radiculopathy     Advanced directives, counseling/discussion     Frozen shoulder syndrome     Vitiligo     Chronic pain     Back pain     Hypertension goal BP (blood pressure) < 140/90     Type 1 diabetes mellitus without complication (H)     Overweight (BMI 25.0-29.9)     Retinopathy due to secondary diabetes (H)     Gastroesophageal reflux disease, esophagitis presence not specified     Hives     Colitis         Current Behavioral Concerns: None. Patient was engaged in conversation    Education Provided to patient: Introduced self and role      Health Maintenance Reviewed: Due/Overdue   Health Maintenance Due   Topic Date Due     URINE DRUG SCREEN  1965     PREVENTIVE CARE VISIT  03/26/2015     ZOSTER IMMUNIZATION (1 of 2) 04/26/2015     ADVANCE CARE PLANNING  06/13/2016     LIPID  04/25/2020       Clinical Pathway: None    Medication Management:  Did not discuss  medications during this conversation     Functional Status:       Living Situation:       Lifestyle & Psychosocial Needs: [facility] reached out to patient. Payor request for Proactive Outreach due to COVID-19 risk. Patient reports having an upcoming appt with eye doctor d/t her retina situation and she is wondering if that appt will still be happening next Thursday. Patient stated that she hasn't heard from provider that it was cancelled or changed to an over the phone appt yet. [facility] encouraged patient to reach out to her clinic directly to verify the status of that appointment. Patient stated that she will do that today. Patient stated that everything else has been going well for her. She reports feeling great. Patient declined any further Care Coordination follow up.       Diet:: Regular  Inadequate nutrition (GOAL):: No  Tube Feeding: No  Inadequate activity/exercise (GOAL):: No  Significant changes in sleep pattern (GOAL): No  Transportation means:: Regular car         Socioeconomic History     Marital status:      Spouse name: Ra     Number of children: 2     Years of education: 12     Highest education level: Not on file   Occupational History     Occupation: receiving     Employer: [employer]     Tobacco Use     Smoking status: Never Smoker     Smokeless tobacco: Never Used     Tobacco comment: no exposure   Substance and Sexual Activity     Alcohol use: Yes     Comment: winex1-2/3x per yr.     Drug use: No     Sexual activity: Yes     Partners: Male     Birth control/protection: Surgical, Female Surgical     Comment: hysterectomy        Resources and Interventions:  Current Resources:      Community Resources: None  Supplies used at home:: None  Equipment Currently Used at Home: none         Referrals Placed: None     Goals: No goals were created during this conversation. Patient declined Care Coordination services      Patient/Caregiver understanding: Yes    Outreach Frequency: monthly  Future  Appointments              In 4 months Lena Bradford MD; MG ENDO NURSE [facility] [place]    In 4 months Ruben Talley DPM [facility], [facility]          Plan: No further follow up needed. Patient declined needing Care Coordination services at this time. Patient was instructed on how to reach Care Coordination in the future, if needed.    FAITH Sen  Primary Care Clinic- Social Work Care Coordinator  [facility]  Ph: [phone]  4/28/2020 12:25 PM

## 2020-05-03 DIAGNOSIS — E10.9 TYPE 1 DIABETES MELLITUS WITHOUT COMPLICATION (H): Chronic | ICD-10-CM

## 2020-05-06 RX ORDER — INSULIN GLULISINE 100 [IU]/ML
INJECTION, SOLUTION SUBCUTANEOUS
Qty: 10 ML | Refills: 0 | Status: SHIPPED | OUTPATIENT
Start: 2020-05-06 | End: 2020-06-12

## 2020-05-06 NOTE — TELEPHONE ENCOUNTER
Apidra 100 UNIT/ML Injection Solution       Last Written Prescription Date:  3-20-19  Last Fill Quantity: 30 ml,   # refills: 3  Last Office Visit : 3-9-20 ( [facility])  Future Office visit:  9-14-20    Routing refill request to provider for review/approval because:  Insulin - refilled per clinic

## 2020-05-07 ENCOUNTER — MYC MEDICAL ADVICE (OUTPATIENT)
Dept: ENDOCRINOLOGY | Facility: CLINIC | Age: 55
End: 2020-05-07

## 2020-05-08 NOTE — TELEPHONE ENCOUNTER
PA for Apidra faxed to [employer] @ [phone].    Jessica Demarco RN, BSN, CDE   [facility]

## 2020-05-08 NOTE — TELEPHONE ENCOUNTER
Writer contacted [employer] to review as no PA request has been received.    PA initiated through [employer] [phone] for continued coverage (please refer to 3/18/19 encounter).    PA cannot be completed verbally.     Will await PA form for completion.    Nandini Jenkins LPN  Diabetes Clinic Coordinator   Adult Endocrinology and Pediatric Specialty Clinics  [facility]

## 2020-05-10 ENCOUNTER — MYC REFILL (OUTPATIENT)
Dept: FAMILY MEDICINE | Facility: OTHER | Age: 55
End: 2020-05-10

## 2020-05-10 ENCOUNTER — MYC REFILL (OUTPATIENT)
Dept: ENDOCRINOLOGY | Facility: CLINIC | Age: 55
End: 2020-05-10

## 2020-05-10 DIAGNOSIS — E78.5 HYPERLIPIDEMIA LDL GOAL <100: ICD-10-CM

## 2020-05-10 DIAGNOSIS — E10.9 TYPE 1 DIABETES MELLITUS WITHOUT COMPLICATION (H): Chronic | ICD-10-CM

## 2020-05-10 RX ORDER — PRAVASTATIN SODIUM 10 MG
10 TABLET ORAL DAILY
Qty: 90 TABLET | Refills: 3 | Status: CANCELLED | OUTPATIENT
Start: 2020-05-10

## 2020-05-11 DIAGNOSIS — G89.4 CHRONIC PAIN SYNDROME: ICD-10-CM

## 2020-05-11 DIAGNOSIS — M54.12 CERVICAL RADICULOPATHY: ICD-10-CM

## 2020-05-11 RX ORDER — INSULIN GLARGINE 100 [IU]/ML
INJECTION, SOLUTION SUBCUTANEOUS
Qty: 10 ML | Refills: 1 | Status: SHIPPED | OUTPATIENT
Start: 2020-05-11 | End: 2020-09-14

## 2020-05-11 NOTE — TELEPHONE ENCOUNTER
Pending Prescriptions:                       Disp   Refills    pravastatin (PRAVACHOL) 10 MG tablet [Phar*30 tab*0        Sig: TAKE 1 TABLET BY MOUTH ONCE DAILY      Routing refill request to provider for review/approval because:  Labs not current:  LDL    Sophia Celeste, MSN, RN

## 2020-05-12 ENCOUNTER — MYC MEDICAL ADVICE (OUTPATIENT)
Dept: ENDOCRINOLOGY | Facility: CLINIC | Age: 55
End: 2020-05-12

## 2020-05-12 RX ORDER — DULOXETIN HYDROCHLORIDE 30 MG/1
CAPSULE, DELAYED RELEASE ORAL
Qty: 30 CAPSULE | Refills: 0 | OUTPATIENT
Start: 2020-05-12

## 2020-05-12 RX ORDER — PRAVASTATIN SODIUM 10 MG
TABLET ORAL
Qty: 90 TABLET | Refills: 0 | Status: SHIPPED | OUTPATIENT
Start: 2020-05-12 | End: 2020-07-06

## 2020-05-12 NOTE — TELEPHONE ENCOUNTER
Pending Prescriptions:                       Disp   Refills    DULoxetine (CYMBALTA) 30 MG capsule [Phar*30 cap*0            Sig: Take 1 capsule by mouth once daily    Sent 3/16/20 with 90 day supply. Refill not appropriate at this time.     Angela Kirby, BSN, RN, PHN

## 2020-05-13 NOTE — TELEPHONE ENCOUNTER
PA refaxed to [facility] @ [phone].    Jessica Demarco, RN, BSN, CDE   [facility]

## 2020-05-13 NOTE — TELEPHONE ENCOUNTER
Note from [facility] states authorization in place from 2/13/20 - 5/13/21.  Patient informed via [employer].    Marjan Almazan CMA  Adult Endocrinology  [facility]

## 2020-05-18 ENCOUNTER — TELEPHONE (OUTPATIENT)
Dept: ENDOCRINOLOGY | Facility: CLINIC | Age: 55
End: 2020-05-18

## 2020-05-18 NOTE — TELEPHONE ENCOUNTER
Prior Authorization Retail Medication Request    Medication/Dose: Apidra  ICD code (if different than what is on RX):E10.9  Rationale: Type 2 diabetes     Insurance Name: Blue plus   Insurance ID: [id number]       Pharmacy Information (if different than what is on RX)  Name:    Phone:

## 2020-05-19 NOTE — TELEPHONE ENCOUNTER
Prior Authorization Not Needed per Insurance-There is already a PA in place from 2/13/20-5/13/21.     Medication: Apidra-PA Not Needed  Insurance Company: [employer] - Phone [phone] Fax [phone]  Expected CoPay:      Pharmacy Filling the Rx: [facility] [address] [place] [id number] [facility]  Pharmacy Notified: Yes  Patient Notified: No

## 2020-05-19 NOTE — TELEPHONE ENCOUNTER
Central Prior Authorization Team   Phone: [phone]      PA Initiation    Medication: Apidra-PA initiated  Insurance Company: [facility] - Phone [phone] Fax [phone]  Pharmacy Filling the Rx: [facility] [address] [place] [id number] [facility]  Filling Pharmacy Phone: [phone]  Filling Pharmacy Fax:    Start Date: 5/19/2020

## 2020-05-25 ENCOUNTER — NURSE TRIAGE (OUTPATIENT)
Dept: NURSING | Facility: CLINIC | Age: 55
End: 2020-05-25

## 2020-05-25 ENCOUNTER — HOSPITAL ENCOUNTER (EMERGENCY)
Facility: CLINIC | Age: 55
Discharge: HOME | End: 2020-05-25
Attending: EMERGENCY MEDICINE | Admitting: EMERGENCY MEDICINE
Payer: COMMERCIAL

## 2020-05-25 VITALS
OXYGEN SATURATION: 99 % | SYSTOLIC BLOOD PRESSURE: 163 MMHG | TEMPERATURE: 98.9 F | WEIGHT: 154 LBS | HEART RATE: 102 BPM | RESPIRATION RATE: 18 BRPM | BODY MASS INDEX: 32.19 KG/M2 | DIASTOLIC BLOOD PRESSURE: 84 MMHG

## 2020-05-25 DIAGNOSIS — N30.01 ACUTE CYSTITIS WITH HEMATURIA: ICD-10-CM

## 2020-05-25 LAB
ALBUMIN SERPL-MCNC: 4 G/DL (ref 3.4–5)
ALBUMIN UR-MCNC: 100 MG/DL
ALP SERPL-CCNC: 92 U/L (ref 40–150)
ALT SERPL W P-5'-P-CCNC: 20 U/L (ref 0–50)
ANION GAP SERPL CALCULATED.3IONS-SCNC: 5 MMOL/L (ref 3–14)
APPEARANCE UR: ABNORMAL
AST SERPL W P-5'-P-CCNC: 17 U/L (ref 0–45)
BASOPHILS # BLD AUTO: 0 10E9/L (ref 0–0.2)
BASOPHILS NFR BLD AUTO: 0.4 %
BILIRUB SERPL-MCNC: 0.2 MG/DL (ref 0.2–1.3)
BILIRUB UR QL STRIP: NEGATIVE
BUN SERPL-MCNC: 11 MG/DL (ref 7–30)
CALCIUM SERPL-MCNC: 8.4 MG/DL (ref 8.5–10.1)
CHLORIDE SERPL-SCNC: 106 MMOL/L (ref 94–109)
CO2 SERPL-SCNC: 27 MMOL/L (ref 20–32)
COLOR UR AUTO: YELLOW
CREAT SERPL-MCNC: 0.96 MG/DL (ref 0.52–1.04)
DIFFERENTIAL METHOD BLD: NORMAL
EOSINOPHIL NFR BLD AUTO: 0.9 %
ERYTHROCYTE [DISTWIDTH] IN BLOOD BY AUTOMATED COUNT: 13.9 % (ref 10–15)
GFR SERPL CREATININE-BSD FRML MDRD: 66 ML/MIN/{1.73_M2}
GLUCOSE SERPL-MCNC: 137 MG/DL (ref 70–99)
GLUCOSE UR STRIP-MCNC: NEGATIVE MG/DL
HCT VFR BLD AUTO: 38.3 % (ref 35–47)
HGB BLD-MCNC: 12.6 G/DL (ref 11.7–15.7)
HGB UR QL STRIP: ABNORMAL
IMM GRANULOCYTES # BLD: 0 10E9/L (ref 0–0.4)
IMM GRANULOCYTES NFR BLD: 0.2 %
KETONES UR STRIP-MCNC: NEGATIVE MG/DL
LACTATE BLD-SCNC: 1 MMOL/L (ref 0.7–2)
LEUKOCYTE ESTERASE UR QL STRIP: ABNORMAL
LYMPHOCYTES # BLD AUTO: 1.9 10E9/L (ref 0.8–5.3)
LYMPHOCYTES NFR BLD AUTO: 18.5 %
MCH RBC QN AUTO: 30.1 PG (ref 26.5–33)
MCHC RBC AUTO-ENTMCNC: 32.9 G/DL (ref 31.5–36.5)
MCV RBC AUTO: 92 FL (ref 78–100)
MONOCYTES # BLD AUTO: 0.5 10E9/L (ref 0–1.3)
MONOCYTES NFR BLD AUTO: 5.4 %
MUCOUS THREADS #/AREA URNS LPF: PRESENT /LPF
NEUTROPHILS # BLD AUTO: 7.4 10E9/L (ref 1.6–8.3)
NEUTROPHILS NFR BLD AUTO: 74.6 %
NITRATE UR QL: NEGATIVE
NRBC # BLD AUTO: 0 10*3/UL
NRBC BLD AUTO-RTO: 0 /100
PH UR STRIP: 6 PH (ref 5–7)
PLATELET # BLD AUTO: 255 10E9/L (ref 150–450)
POTASSIUM SERPL-SCNC: 4 MMOL/L (ref 3.4–5.3)
PROT SERPL-MCNC: 7.3 G/DL (ref 6.8–8.8)
RBC # BLD AUTO: 4.18 10E12/L (ref 3.8–5.2)
RBC #/AREA URNS AUTO: 33 /HPF (ref 0–2)
SODIUM SERPL-SCNC: 138 MMOL/L (ref 133–144)
SOURCE: ABNORMAL
SP GR UR STRIP: 1 (ref 1–1.03)
SQUAMOUS #/AREA URNS AUTO: 1 /HPF (ref 0–1)
UROBILINOGEN UR STRIP-MCNC: 0 MG/DL (ref 0–2)
WBC # BLD AUTO: 10 10E9/L (ref 4–11)
WBC #/AREA URNS AUTO: >182 /HPF (ref 0–5)
WBC CLUMPS #/AREA URNS HPF: PRESENT /HPF

## 2020-05-25 PROCEDURE — 25800030 ZZH RX IP 258 OP 636: Performed by: EMERGENCY MEDICINE

## 2020-05-25 PROCEDURE — 87086 URINE CULTURE/COLONY COUNT: CPT | Performed by: EMERGENCY MEDICINE

## 2020-05-25 PROCEDURE — 87088 URINE BACTERIA CULTURE: CPT | Performed by: EMERGENCY MEDICINE

## 2020-05-25 PROCEDURE — 96365 THER/PROPH/DIAG IV INF INIT: CPT | Performed by: EMERGENCY MEDICINE

## 2020-05-25 PROCEDURE — 25000128 H RX IP 250 OP 636: Performed by: EMERGENCY MEDICINE

## 2020-05-25 PROCEDURE — 80053 COMPREHEN METABOLIC PANEL: CPT | Performed by: EMERGENCY MEDICINE

## 2020-05-25 PROCEDURE — 87186 SC STD MICRODIL/AGAR DIL: CPT | Performed by: EMERGENCY MEDICINE

## 2020-05-25 PROCEDURE — 99284 EMERGENCY DEPT VISIT MOD MDM: CPT | Mod: 25 | Performed by: EMERGENCY MEDICINE

## 2020-05-25 PROCEDURE — 99284 EMERGENCY DEPT VISIT MOD MDM: CPT | Mod: Z6 | Performed by: EMERGENCY MEDICINE

## 2020-05-25 PROCEDURE — 81001 URINALYSIS AUTO W/SCOPE: CPT | Performed by: EMERGENCY MEDICINE

## 2020-05-25 PROCEDURE — 85025 COMPLETE CBC W/AUTO DIFF WBC: CPT | Performed by: EMERGENCY MEDICINE

## 2020-05-25 PROCEDURE — 83605 ASSAY OF LACTIC ACID: CPT | Performed by: EMERGENCY MEDICINE

## 2020-05-25 RX ORDER — PHENAZOPYRIDINE HYDROCHLORIDE 200 MG/1
200 TABLET, FILM COATED ORAL 3 TIMES DAILY PRN
Qty: 9 TABLET | Refills: 0 | Status: SHIPPED | OUTPATIENT
Start: 2020-05-25 | End: 2020-08-13

## 2020-05-25 RX ORDER — CEFTRIAXONE 1 G/1
1 INJECTION, POWDER, FOR SOLUTION INTRAMUSCULAR; INTRAVENOUS ONCE
Status: COMPLETED | OUTPATIENT
Start: 2020-05-25 | End: 2020-05-25

## 2020-05-25 RX ORDER — SODIUM CHLORIDE 9 MG/ML
1000 INJECTION, SOLUTION INTRAVENOUS CONTINUOUS
Status: DISCONTINUED | OUTPATIENT
Start: 2020-05-25 | End: 2020-05-25 | Stop reason: HOSPADM

## 2020-05-25 RX ORDER — CIPROFLOXACIN 500 MG/1
500 TABLET, FILM COATED ORAL 2 TIMES DAILY
Qty: 14 TABLET | Refills: 0 | Status: SHIPPED | OUTPATIENT
Start: 2020-05-25 | End: 2020-05-28

## 2020-05-25 RX ADMIN — SODIUM CHLORIDE 1000 ML: 9 INJECTION, SOLUTION INTRAVENOUS at 18:48

## 2020-05-25 RX ADMIN — CEFTRIAXONE SODIUM 1 G: 1 INJECTION, POWDER, FOR SOLUTION INTRAMUSCULAR; INTRAVENOUS at 19:17

## 2020-05-25 NOTE — TELEPHONE ENCOUNTER
"Caller states she has Type I DM and \"I have a bladder infection\". Blood in urine and having difficulty urinating. Advised ED now.     Reason for Disposition    [1] Unable to urinate (or only a few drops) > 4 hours AND [2] bladder feels very full (e.g., palpable bladder or strong urge to urinate)    Additional Information    Negative: Shock suspected (e.g., cold/pale/clammy skin, too weak to stand, low BP, rapid pulse)    Negative: Sounds like a life-threatening emergency to the triager    Negative: Followed a genital area injury    Negative: Followed a genital area injury (penis, scrotum)    Negative: Vaginal discharge    Negative: Pus (white, yellow) or bloody discharge from end of penis    Negative: [1] Taking antibiotic for urinary tract infection (UTI) AND [2] female    Negative: [1] Taking antibiotic for urinary tract infection (UTI) AND [2] male    Negative: [1] Discomfort (pain, burning or stinging) when passing urine AND [2] pregnant    Negative: [1] Discomfort (pain, burning or stinging) when passing urine AND [2] postpartum (from 0 to 6 weeks after delivery)    Negative: [1] Discomfort (pain, burning or stinging) when passing urine AND [2] female    Negative: [1] Discomfort (pain, burning or stinging) when passing urine AND [2] male    Negative: Pain or itching in the vulvar area    Negative: Pain in scrotum is main symptom    Negative: Blood in the urine is main symptom    Negative: Symptoms arising from use of a urinary catheter (Posada or Coude)    Negative: Shock suspected (e.g., cold/pale/clammy skin, too weak to stand, low BP, rapid pulse)    Negative: Sounds like a life-threatening emergency to the triager    Negative: Urinary catheter, questions about    Negative: Recent back or abdominal injury    Negative: Recent genital injury    Protocols used: URINE - BLOOD IN-A-AH, URINARY SYMPTOMS-A-AH      "

## 2020-05-25 NOTE — ED PROVIDER NOTES
History     Chief Complaint   Patient presents with     UTI     HPI  Maria M Marcus is a 55 year old female who presents with 2 days of urinary frequency and now hematuria.  States very similar to her previous urinary tract infections.  Denies history of pyelonephritis.  No history of kidney stone.  She has no flank pain currently.  Denies any nausea or vomiting.  No diarrhea.  No vaginal discharge.  He is a type I diabetic but states her blood sugars been okay.  No exposure to infectious illness.  No lightheadedness, chest pain, shortness breath or cough.  No unusual bleeding or bruising.  Pushing fluids otherwise no treatment for symptoms prior to arrival.  Dates due to the hot humid weather she thinks she may be mildly dehydrated.    Allergies:  Allergies   Allergen Reactions     Novolog [Insulin Aspart] Swelling     Itching, rash, contact dermatitis     Humalog [Insulin Lispro] Rash     Contact dermatitis     Zyrtec [Cetirizine] Rash       Problem List:    Patient Active Problem List    Diagnosis Date Noted     Colitis 01/30/2020     Priority: Medium     Hives 12/03/2019     Priority: Medium     Gastroesophageal reflux disease, esophagitis presence not specified 09/08/2017     Priority: Medium     Retinopathy due to secondary diabetes (H) 10/29/2016     Priority: Medium     Type 1 diabetes mellitus without complication (H) 11/01/2015     Priority: Medium     Overweight (BMI 25.0-29.9) 11/01/2015     Priority: Medium     Hypertension goal BP (blood pressure) < 140/90 09/29/2014     Priority: Medium     Back pain 03/23/2013     Priority: Medium     Chronic pain 11/09/2011     Priority: Medium     Related to frozen shoulder per ortho will be two years of intermittent pain, agreed to #45 percocet for 90 day supply, only get narcotics from me.         Vitiligo 07/15/2011     Priority: Medium     Frozen shoulder syndrome 06/16/2011     Priority: Medium     Advanced directives, counseling/discussion 06/13/2011      Priority: Medium     Advance Directive Problem List Overview:   Name Relationship Phone    Primary Health Care Agent            Alternative Health Care Agent        11  Patient presents for [employer] Informational meeting. Patient given [employer] folder. Patient will complete Advance Care Directive and bring to clinic...aMry Lee RN              Cervical radiculopathy      Priority: Medium     Hyperlipidemia LDL goal <100 10/31/2010     Priority: Medium     Acosta 10-year CHD Risk Score: 20% (Diabetic)   Values used to calculate score:     Age: 46 years -- Points: 3     Total Cholesterol: 174 mg/dL -- Points: 3     HDL Cholesterol: 50 mg/dL -- Points: 0     Systolic BP (treated): 128 mmHg -- Points: 3    The patient is not a smoker. -- Points: 0     The patient has a diagnosis of diabetes. -- Points: 20% Risk    The patient does not have a family history of CHD. -- Points:0     [place]      LDL goal  >= 20%  <100         Other and unspecified disc disorder of lumbar region 2007     Priority: Medium     Hypothyroidism due to Hashimoto's thyroiditis 10/05/2004     Priority: Medium     Problem list name updated by automated process. Provider to review          Past Medical History:    Past Medical History:   Diagnosis Date     Cervical radiculopathy      Diabetic eye exam (H) 11     DISC DIS NEC/NOS-LUMBAR 2007     Frozen shoulder syndrome 2011     Hyperlipidemia LDL goal <100 10/31/2010     Hypertension 1/3/2011     Hypothyroidism due to Hashimoto's thyroiditis      Type 1 diabetes, HbA1c goal < 7% (H) dx 1967     Unspecified hypothyroidism        Past Surgical History:    Past Surgical History:   Procedure Laterality Date     C  DELIVERY ONLY      C-Sections x2     C NONSPECIFIC PROCEDURE      Hysterectomy - total (cervix removed but ovaries remain)     C STOMACH SURGERY PROCEDURE UNLISTED       C TOTAL ABDOM HYSTERECTOMY       COLONOSCOPY N/A 2016     "Procedure: COLONOSCOPY;  Surgeon: Efren Perry MD;  Location: [facility]     ENDOSCOPIC SINUS SURGERY Bilateral 12/4/2018    Procedure: Bilateral functional endoscopic maxillary antrostomies;  Surgeon: Woo Silva MD;  Location: [facility]     HC SACROPLASTY       LAMINECT/DISCECTOMY, CERVICAL  06/19/2007    C7-T1 hemilaminectomy, microdiscectomy, foraminotomy.     LASER YAG CAPSULOTOMY Right 10/23/2014    Procedure: LASER YAG CAPSULOTOMY;  Surgeon: Esteban Dorsey MD;  Location: [facility]     VITRECTOMY,STRIP EPIRETINAL MEMBRANE  06/24/09       Family History:    Family History   Problem Relation Age of Onset     Hypertension Maternal Grandfather      EYE* Maternal Grandmother      Blood Disease Maternal Grandmother      Eye Disorder Maternal Grandmother         maccular degeneration     Osteoporosis Maternal Grandmother      Lipids Father      Gastrointestinal Disease Father         ulcers     Heart Disease Father      Cardiovascular Father         heart attack     Hypertension Paternal Grandmother      Breast Cancer Mother         dx age 73 - terminal - mother had first mammo at this age     Diabetes Paternal Uncle         Type I       Social History:  Marital Status:   [2]  Social History     Tobacco Use     Smoking status: Never Smoker     Smokeless tobacco: Never Used     Tobacco comment: no exposure   Substance Use Topics     Alcohol use: Yes     Comment: winex1-2/3x per yr.     Drug use: No        Medications:    ciprofloxacin (CIPRO) 500 MG tablet  phenazopyridine (PYRIDIUM) 200 MG tablet  BD VEO INSULIN SYRINGE U/F 31G X 15/64\" 0.3 ML  blood glucose monitoring (TANA CONTOUR) test strip  blood glucose monitoring (SOFTCLIX) lancets  DULoxetine (CYMBALTA) 30 MG capsule  esomeprazole (NEXIUM) 20 MG DR capsule  insulin glargine (LANTUS VIAL) 100 UNIT/ML vial  insulin glulisine (APIDRA VIAL) 100 UNIT/ML injection  levothyroxine (SYNTHROID/LEVOTHROID) 75 MCG tablet  lisinopril (PRINIVIL/ZESTRIL) 10 " MG tablet  omeprazole (PRILOSEC) 20 MG DR capsule  pravastatin (PRAVACHOL) 10 MG tablet          Review of Systems all other systems are reviewed and are negative.    Physical Exam   BP: (!) 163/84  Pulse: 102  Temp: 98.9  F (37.2  C)  Resp: 16  Weight: 69.9 kg (154 lb)  SpO2: 98 %      Physical Exam alert cooperative female in mild to moderate stress.  HEENT shows no scleral icterus.  Oral mucosa is moist.  Speech is clear.  Neck is supple.  Lungs are clear without adventitious sounds.  No CVA tenderness.  Abdomen reveals active bowel sounds, mild obesity, with suprapubic tenderness.  No guarding or rebound.    ED Course        Procedures               Critical Care time:  none               Results for orders placed or performed during the hospital encounter of 05/25/20 (from the past 24 hour(s))   CBC with platelets differential   Result Value Ref Range    WBC 10.0 4.0 - 11.0 10e9/L    RBC Count 4.18 3.8 - 5.2 10e12/L    Hemoglobin 12.6 11.7 - 15.7 g/dL    Hematocrit 38.3 35.0 - 47.0 %    MCV 92 78 - 100 fl    MCH 30.1 26.5 - 33.0 pg    MCHC 32.9 31.5 - 36.5 g/dL    RDW 13.9 10.0 - 15.0 %    Platelet Count 255 150 - 450 10e9/L    Diff Method Automated Method     % Neutrophils 74.6 %    % Lymphocytes 18.5 %    % Monocytes 5.4 %    % Eosinophils 0.9 %    % Basophils 0.4 %    % Immature Granulocytes 0.2 %    Nucleated RBCs 0 0 /100    Absolute Neutrophil 7.4 1.6 - 8.3 10e9/L    Absolute Lymphocytes 1.9 0.8 - 5.3 10e9/L    Absolute Monocytes 0.5 0.0 - 1.3 10e9/L    Absolute Basophils 0.0 0.0 - 0.2 10e9/L    Abs Immature Granulocytes 0.0 0 - 0.4 10e9/L    Absolute Nucleated RBC 0.0    Comprehensive metabolic panel   Result Value Ref Range    Sodium 138 133 - 144 mmol/L    Potassium 4.0 3.4 - 5.3 mmol/L    Chloride 106 94 - 109 mmol/L    Carbon Dioxide 27 20 - 32 mmol/L    Anion Gap 5 3 - 14 mmol/L    Glucose 137 (H) 70 - 99 mg/dL    Urea Nitrogen 11 7 - 30 mg/dL    Creatinine 0.96 0.52 - 1.04 mg/dL    GFR Estimate 66  >60 mL/min/[1.73_m2]    GFR Estimate If Black 77 >60 mL/min/[1.73_m2]    Calcium 8.4 (L) 8.5 - 10.1 mg/dL    Bilirubin Total 0.2 0.2 - 1.3 mg/dL    Albumin 4.0 3.4 - 5.0 g/dL    Protein Total 7.3 6.8 - 8.8 g/dL    Alkaline Phosphatase 92 40 - 150 U/L    ALT 20 0 - 50 U/L    AST 17 0 - 45 U/L   Lactic acid whole blood   Result Value Ref Range    Lactic Acid 1.0 0.7 - 2.0 mmol/L   UA reflex to Microscopic   Result Value Ref Range    Color Urine Yellow     Appearance Urine Cloudy     Glucose Urine Negative NEG^Negative mg/dL    Bilirubin Urine Negative NEG^Negative    Ketones Urine Negative NEG^Negative mg/dL    Specific Gravity Urine 1.004 1.003 - 1.035    Blood Urine Large (A) NEG^Negative    pH Urine 6.0 5.0 - 7.0 pH    Protein Albumin Urine 100 (A) NEG^Negative mg/dL    Urobilinogen mg/dL 0.0 0.0 - 2.0 mg/dL    Nitrite Urine Negative NEG^Negative    Leukocyte Esterase Urine Large (A) NEG^Negative    Source Midstream Urine     RBC Urine 33 (H) 0 - 2 /HPF    WBC Urine >182 (H) 0 - 5 /HPF    WBC Clumps Present (A) NEG^Negative /HPF    Squamous Epithelial /HPF Urine 1 0 - 1 /HPF    Mucous Urine Present (A) NEG^Negative /LPF       Medications   0.9% sodium chloride BOLUS (1,000 mLs Intravenous New Bag 5/25/20 1848)     Followed by   sodium chloride 0.9% infusion (has no administration in time range)   cefTRIAXone (ROCEPHIN) 1 g vial to attach to  mL bag for ADULTS or NS 50 mL bag for PEDS (1 g Intravenous New Bag 5/25/20 1917)     IV is established and fluid bolus was provided.  Blood work and urine ordered.  Assessments & Plan (with Medical Decision Making)   Maria M Marcus is a 55 year old female who presents with 2 days of urinary frequency and now hematuria.  States very similar to her previous urinary tract infections.  Denies history of pyelonephritis.  No history of kidney stone.  She has no flank pain currently.  Denies any nausea or vomiting.  No diarrhea.  No vaginal discharge.  He is a type I  diabetic but states her blood sugars been okay.  No exposure to infectious illness.  No lightheadedness, chest pain, shortness breath or cough.  No unusual bleeding or bruising.  Pushing fluids otherwise no treatment for symptoms prior to arrival.  Dates due to the hot humid weather she thinks she may be mildly dehydrated.  On exam she has no CVA tenderness.  She had suprapubic tenderness on abdominal exam but no guarding or rebound.  Patient's blood work showed.  Her urine was grossly positive as above.  A culture was added..  She was given fluids and Rocephin.  Information on urinary tract infections provided.  Cipro twice daily for 7 days.  Begin that tomorrow.  Pyridium as needed for bladder spasm.  Have your doctor recheck urine after completion of antibiotics to make sure the infection is cleared.  If the culture is resistant to treatment we will contact you.  If you have worsening symptoms you should return to the emergency room.  I have reviewed the nursing notes.    I have reviewed the findings, diagnosis, plan and need for follow up with the patient.       New Prescriptions    CIPROFLOXACIN (CIPRO) 500 MG TABLET    Take 1 tablet (500 mg) by mouth 2 times daily for 7 days    PHENAZOPYRIDINE (PYRIDIUM) 200 MG TABLET    Take 1 tablet (200 mg) by mouth 3 times daily as needed for irritation       Final diagnoses:   Acute cystitis with hematuria       5/25/2020   [facility] EMERGENCY DEPARTMENT     Rubén Jefferson MD  05/25/20 1949

## 2020-05-25 NOTE — ED AVS SNAPSHOT
[facility] Emergency Department  [address] [place] [address]  Phone:  [phone]  Fax:  [phone]                                    Maria M Marcus   MRN: [id number]    Department:  [facility] Emergency Department   Date of Visit:  5/25/2020           After Visit Summary Signature Page    I have received my discharge instructions, and my questions have been answered. I have discussed any challenges I see with this plan with the nurse or doctor.    ..........................................................................................................................................  Patient/Patient Representative Signature      ..........................................................................................................................................  Patient Representative Print Name and Relationship to Patient    ..................................................               ................................................  Date                                   Time    ..........................................................................................................................................  Reviewed by Signature/Title    ...................................................              ..............................................  Date                                               Time          22EPIC Rev 08/18

## 2020-05-26 NOTE — DISCHARGE INSTRUCTIONS
Encourage fluids.  Cipro twice daily for 7 days.  Begin this tomorrow.  Pyridium 3 times a day for bladder spasm.  This will turn your urine bright orange.  After you complete your antibiotics please have your doctor recheck the urine to make sure is completely cleared.  If your urine culture shows that the antibiotic is not covering properly we will contact you.  If you have worsening symptoms or develop fever, vomiting, or uncontrolled blood sugar you should return to the emergency room.

## 2020-05-27 LAB
BACTERIA SPEC CULT: ABNORMAL
BACTERIA SPEC CULT: ABNORMAL
Lab: ABNORMAL
SPECIMEN SOURCE: ABNORMAL

## 2020-05-28 ENCOUNTER — TELEPHONE (OUTPATIENT)
Dept: NURSING | Facility: CLINIC | Age: 55
End: 2020-05-28

## 2020-05-28 RX ORDER — CEPHALEXIN 500 MG/1
500 CAPSULE ORAL 4 TIMES DAILY
Qty: 40 CAPSULE | Refills: 0 | Status: SHIPPED | OUTPATIENT
Start: 2020-05-28 | End: 2020-06-23

## 2020-05-28 NOTE — TELEPHONE ENCOUNTER
[facility] Emergency Department/Urgent Care Lab result notification     Patient/parent Name  Crystal    RN Assessment (Patient s current Symptoms), include time called.  [Insert Left message here if message left]  Symptoms improving.  No blood in urine.  Pain in the abdomen is better    Lab result (if applicable):  Final Urine Culture Report on 5/28/20  Emergency Dept/Urgent Care discharge antibiotic prescribed: Ciprofloxacin (Cipro) 500 mg tablet, 1 tablet (500 mg) by mouth 2 times daily for 7 days.   #1. Bacteria,10,000 to 50,000 colonies/mLEscherichia coli,  is [RESISTANT] to antibiotic.   Change in treatment as per [place] ED Lab result protocol.    RN Recommendations/Instructions per [place] ED lab result protocol  At 11:50A, Notified of urine culture result and treatment recommendatoins. Advised to discontinue the Ciprofloxacin and switch to Cephalexin 500 mg PO QID for 10 days.    Review uti prevention information.     Please Contact your PCP clinic or return to the Emergency department if your:    Symptoms do not resolve after completing antibiotic.    Symptoms worsen or other concerning symptom's.    PCP follow-up Questions asked: YES       [RN Name]  Valeriy Montes RN  [facility] [employer] [employer]  Emergency Dept Lab Result RN  Ph# [phone]

## 2020-05-28 NOTE — RESULT ENCOUNTER NOTE
At 11:50A, Notified of urine culture result and treatment recommendatoins. Advised to discontinue the Ciprofloxacin and switch to Cephalexin 500 mg PO QID for 10 days.

## 2020-05-28 NOTE — TELEPHONE ENCOUNTER
[facility] Emergency Department Lab result notification [Adult-Female]    [facility] ED lab result protocol used  Urine culture    Reason for call  Notify of lab results, assess symptoms,  review ED providers recommendations/discharge instructions (if necessary) and advise per ED lab result f/u protocol    Lab Result (including Rx patient on, if applicable)  Final Urine Culture Report on 5/28/20  Emergency Dept/Urgent Care discharge antibiotic prescribed: Ciprofloxacin (Cipro) 500 mg tablet, 1 tablet (500 mg) by mouth 2 times daily for 7 days.   #1. Bacteria,10,000 to 50,000 colonies/mLEscherichia coli,  is [RESISTANT] to antibiotic.   Change in treatment as per [facility] ED Lab result protocol.  Information table from ED Provider visit on 5/25/20  Symptoms reported at ED visit (Chief complaint, HPI)   UTI     HPI  Maria M Marcus is a 55 year old female who presents with 2 days of urinary frequency and now hematuria.  States very similar to her previous urinary tract infections.  Denies history of pyelonephritis.  No history of kidney stone.  She has no flank pain currently.  Denies any nausea or vomiting.  No diarrhea.  No vaginal discharge.  He is a type I diabetic but states her blood sugars been okay.  No exposure to infectious illness.  No lightheadedness, chest pain, shortness breath or cough.  No unusual bleeding or bruising.  Pushing fluids otherwise no treatment for symptoms prior to arrival.  Dates due to the hot humid weather she thinks she may be mildly dehydrated.     Significant Medical hx, if applicable (i.e. CKD, diabetes) Diabetic   Allergies Allergies   Allergen Reactions     Novolog [Insulin Aspart] Swelling     Itching, rash, contact dermatitis     Humalog [Insulin Lispro] Rash     Contact dermatitis     Zyrtec [Cetirizine] Rash      Weight, if applicable Wt Readings from Last 2 Encounters:   05/25/20 69.9 kg (154 lb)   03/09/20 72.7 kg (160 lb 4.8 oz)      Coumadin/Warfarin  [Yes /No] NO   Creatinine Level (mg/dl) Creatinine   Date Value Ref Range Status   05/25/2020 0.96 0.52 - 1.04 mg/dL Final      Creatinine clearance (ml/min), if applicable Serum creatinine: 0.96 mg/dL 05/25/20 1847  Estimated creatinine clearance: 73.1 mL/min   Pregnant (Yes/No/NA) No   Breastfeeding (Yes/No/NA) No   ED providers Impression and Plan (applicable information) Maria M Marcus is a 55 year old female who presents with 2 days of urinary frequency and now hematuria.  States very similar to her previous urinary tract infections.  Denies history of pyelonephritis.  No history of kidney stone.  She has no flank pain currently.  Denies any nausea or vomiting.  No diarrhea.  No vaginal discharge.  He is a type I diabetic but states her blood sugars been okay.  No exposure to infectious illness.  No lightheadedness, chest pain, shortness breath or cough.  No unusual bleeding or bruising.  Pushing fluids otherwise no treatment for symptoms prior to arrival.  Dates due to the hot humid weather she thinks she may be mildly dehydrated.  On exam she has no CVA tenderness.  She had suprapubic tenderness on abdominal exam but no guarding or rebound.  Patient's blood work showed.  Her urine was grossly positive as above.  A culture was added..  She was given fluids and Rocephin.  Information on urinary tract infections provided.  Cipro twice daily for 7 days.  Begin that tomorrow.  Pyridium as needed for bladder spasm.  Have your doctor recheck urine after completion of antibiotics to make sure the infection is cleared.  If the culture is resistant to treatment we will contact you.  If you have worsening symptoms you should return to the emergency room.   ED diagnosis Acute cystitis with hematuria   ED provider Rubén Jefferson MD  05/25/20 1949      RN Assessment (Patient s current Symptoms), include time called.  [Insert Left message here if message left]  11:07 am Message left to call us back at [phone],  between 10 am and 6 pm, seven days a week. May leave a message 24/7, if no one available.     PCP follow-up Questions asked: NO    Mayela Weeks RN  [facility]   Lung Nodule and ED Lab Result F/U RN  Epic pool (ED late result f/u RN): [id number]  # [phone]    Copy of Lab result   Status:  Final result   Visible to patient:  No (not released) Dx:  Acute cystitis with hematuria   Specimen Information:  Unspecified Urine          Component  3d ago   Specimen Description  Unspecified Urine     Special Requests  Specimen received in preservative     Culture Micro  Abnormal     10,000 to 50,000 colonies/mL   Escherichia coli     Culture Micro  <10,000 colonies/mL   mixed urogenital jesus alberto    Resulting Agency  [facility]    Susceptibility     Escherichia coli (1)     Antibiotic  Interpretation  Sensitivity  Method  Status     AMPICILLIN  Resistant  >=32  ug/mL  JAZMÍN  Final     CEFAZOLIN  Sensitive  <=4  ug/mL  JAZMÍN  Final      Cefazolin JAZMÍN breakpoints are for the treatment of uncomplicated urinary tract    infections.  For the treatment of systemic infections, please contact the   laboratory for additional testing.    CEFOXITIN  Sensitive  <=4  ug/mL  JAZMÍN  Final     CEFTAZIDIME  Sensitive  <=1  ug/mL  JAZMÍN  Final     CEFTRIAXONE  Sensitive  <=1  ug/mL  JAZMÍN  Final     CIPROFLOXACIN  Resistant  >=4  ug/mL  JAZMÍN  Final     GENTAMICIN  Sensitive  <=1  ug/mL  JAZMÍN  Final     LEVOFLOXACIN  Resistant  >=8  ug/mL  JAZMÍN  Final     NITROFURANTOIN  Sensitive  <=16  ug/mL  JAZMÍN  Final     TOBRAMYCIN  Sensitive  <=1  ug/mL  JAZMÍN  Final     Trimethoprim/Sulfa  Resistant  >=16/304  ug/mL  JAZMÍN  Final     AMPICILLIN/SULBACTAM  Resistant  >=32  ug/mL  JAZMÍN  Final     Piperacillin/Tazo  Sensitive  <=4  ug/mL  JAZMÍN  Final     CEFEPIME  Sensitive  <=1  ug/mL  JAZMÍN  Final           Specimen Collected: 05/25/20  6:48 PM  Last Resulted: 05/27/20 11:07 PM

## 2020-06-08 DIAGNOSIS — G89.4 CHRONIC PAIN SYNDROME: ICD-10-CM

## 2020-06-08 DIAGNOSIS — M54.12 CERVICAL RADICULOPATHY: ICD-10-CM

## 2020-06-08 DIAGNOSIS — E10.9 TYPE 1 DIABETES MELLITUS WITHOUT COMPLICATION (H): Chronic | ICD-10-CM

## 2020-06-09 RX ORDER — DULOXETIN HYDROCHLORIDE 30 MG/1
CAPSULE, DELAYED RELEASE ORAL
Qty: 30 CAPSULE | Refills: 5 | Status: SHIPPED | OUTPATIENT
Start: 2020-06-09 | End: 2020-12-16

## 2020-06-09 NOTE — TELEPHONE ENCOUNTER
Pending Prescriptions:                       Disp   Refills    DULoxetine (CYMBALTA) 30 MG capsule [Phar*30 cap*0            Sig: Take 1 capsule by mouth once daily    Routing refill request to provider for review/approval because:  Diagnosis not depression or anxiety    Angela Kirby, BSN, RN, PHN

## 2020-06-11 ENCOUNTER — MYC MEDICAL ADVICE (OUTPATIENT)
Dept: ENDOCRINOLOGY | Facility: CLINIC | Age: 55
End: 2020-06-11

## 2020-06-11 DIAGNOSIS — E10.9 TYPE 1 DIABETES MELLITUS WITHOUT COMPLICATION (H): ICD-10-CM

## 2020-06-11 NOTE — TELEPHONE ENCOUNTER
Will forward to AMY Escobar to review 6/12/2020.    Nandini Jenkins LPN  Diabetes Clinic Coordinator   Adult Endocrinology and Pediatric Specialty Clinics  [facility]         lab

## 2020-06-11 NOTE — TELEPHONE ENCOUNTER
Apidra 100 UNIT/ML Injection Solution       Last Written Prescription Date:  5-6-20  Last Fill Quantity: 10 ml,   # refills: 0  Last Office Visit : 3-9-20 (PORFIRIO)  Future Office visit:  9-14-20    Routing refill request to provider for review/approval because:  Insulin - refilled per clinic

## 2020-06-11 NOTE — TELEPHONE ENCOUNTER
Will forward to AMY Escobar for completion on 6/12/2020.    Nandini Jenkins LPN  Diabetes Clinic Coordinator   Adult Endocrinology and Pediatric Specialty Clinics  [facility]

## 2020-06-12 RX ORDER — INSULIN GLULISINE 100 [IU]/ML
INJECTION, SOLUTION SUBCUTANEOUS
Qty: 10 ML | Refills: 0 | Status: SHIPPED | OUTPATIENT
Start: 2020-06-12 | End: 2020-09-04

## 2020-06-23 ENCOUNTER — MYC MEDICAL ADVICE (OUTPATIENT)
Dept: FAMILY MEDICINE | Facility: OTHER | Age: 55
End: 2020-06-23

## 2020-06-23 NOTE — PROGRESS NOTES
Subjective     Maria M Marcus is a 55 year old female who presents to clinic today for the following health issues:    HPI   URINARY TRACT SYMPTOMS  Onset: about 4 days     Description:   Painful urination (Dysuria): no. Feels like she cant empty her bladder           Frequency: YES  Blood in urine (Hematuria): no   Delay in urine (Hesitency): no     Intensity: moderate    Progression of Symptoms:  same    Accompanying Signs & Symptoms:  Fever/chills: no   Flank pain YES  Nausea and vomiting: no   Any vaginal symptoms: none  Abdominal/Pelvic Pain: YES- lower left abd only    History:   History of frequent UTI's: no but ecoli inf frequently in most recent UTI and colitis   History of kidney stones: no   Sexually Active: YES  Possibility of pregnancy: No    Precipitating factors:   nothing    Therapies Tried and outcome: nothing     Pain in the left pelvic area that has occurred before with previous urinary tract infections. She does have a history of weak left sided pelvic muscles due to abnormal uterine position in the past and had a hysterectomy 25+ years ago but still has occasional left sided pelvic discomfort. She has not noticed any nodules or masses. No fevers, chills or change in bowel habits. No vaginal discharge.     Reviewed and updated as needed this visit by Provider  Allergies  Meds  Problems     Review of Systems   GENERAL: Denies fever, fatigue, weakness, weight gain, or weight loss.  CARDIOVASCULAR: Denies chest pain, shortness of breath, irregular heartbeats, palpitations, or edema.  RESPIRATORY: Denies cough, hemoptysis, and shortness of breath.  GASTROINTESTINAL: Denies nausea, vomiting, change in appetite, abdominal pain, diarrhea, or constipation.  GENITOURINARY: +Frequency, difficulty emptying bladder. Denies urgency, dysuria, hematuria, or incontinence.       Objective    /62   Pulse 102   Temp 97.4  F (36.3  C) (Temporal)   Resp 16   Wt 70.2 kg (154 lb 12.8 oz)   LMP  (LMP  Unknown)   SpO2 99%   BMI 32.35 kg/m    Body mass index is 32.35 kg/m .  Physical Exam   GENERAL: healthy, alert and no distress  RESP: lungs clear to auscultation - no rales, rhonchi or wheezes  CV: regular rate and rhythm, normal S1 S2, no S3 or S4, no murmur, click or rub, no peripheral edema and peripheral pulses strong  ABDOMEN: soft, mild bilateral pelvic tenderness, no hepatosplenomegaly, no masses and bowel sounds normal    Diagnostic Test Results:  Results for orders placed or performed in visit on 06/24/20   *UA reflex to Microscopic and Culture ([place] and [facility] (except [place] and [place])     Status: None    Specimen: Unspecified Urine   Result Value Ref Range    Color Urine Yellow     Appearance Urine Clear     Glucose Urine Negative NEG^Negative mg/dL    Bilirubin Urine Negative NEG^Negative    Ketones Urine Negative NEG^Negative mg/dL    Specific Gravity Urine 1.015 1.003 - 1.035    Blood Urine Negative NEG^Negative    pH Urine 5.0 5.0 - 7.0 pH    Protein Albumin Urine Negative NEG^Negative mg/dL    Urobilinogen Urine 0.2 0.2 - 1.0 EU/dL    Nitrite Urine Negative NEG^Negative    Leukocyte Esterase Urine Negative NEG^Negative    Source Unspecified Urine    Wet prep     Status: None    Specimen: Vagina   Result Value Ref Range    Specimen Description Vagina     Wet Prep No Trichomonas seen     Wet Prep No clue cells seen     Wet Prep No yeast seen     Wet Prep WBC'S seen  Few              Assessment & Plan       ICD-10-CM    1. Pelvic pain  R10.2 *UA reflex to Microscopic and Culture ([place] and [facility] (except [place] and [place])     Wet prep   2. Urinary frequency  R35.0         Left sided pelvic discomfort pain with minimal tenderness on exam.  UA and wet prep are both normal.  She states the intermittent left sided pelvic pain is more of a chronic issue since her hysterectomy in the 1990s. She still has her ovaries. She also has been having some low back pain recently  and saw the chiropractor recently.  Her pain could very well be muscular as there are no signs of infection.  Recommend she push the fluids and use Tylenol or NSAIDs as needed.   If not improving or worsening, will consider a pelvic US.       Marcus Salgado PA-C  [facility]

## 2020-06-23 NOTE — TELEPHONE ENCOUNTER
I spoke with the patient who states she is having lower left abdominal pain and then she got a bladder infection. She also had acute colitis in January. She is feeling like she is getting another bladder infection. Stomach is sore all the time. Started 3 days ago. No changes in BMs but some constipation. No fevers. Today she is having some urgency. No burning, no fever. Feels like it is a UTI. Offered appts today but pt is not available today. Schedule in person visit tomorrow with TEODORA due to history. She will come early so she can go to lab first.    Sophia Celeste, MSN, RN

## 2020-06-24 ENCOUNTER — OFFICE VISIT (OUTPATIENT)
Dept: FAMILY MEDICINE | Facility: OTHER | Age: 55
End: 2020-06-24
Payer: COMMERCIAL

## 2020-06-24 VITALS
TEMPERATURE: 97.4 F | WEIGHT: 154.8 LBS | SYSTOLIC BLOOD PRESSURE: 138 MMHG | OXYGEN SATURATION: 99 % | DIASTOLIC BLOOD PRESSURE: 62 MMHG | HEART RATE: 102 BPM | RESPIRATION RATE: 16 BRPM | BODY MASS INDEX: 32.35 KG/M2

## 2020-06-24 DIAGNOSIS — R10.2 PELVIC PAIN: Primary | ICD-10-CM

## 2020-06-24 DIAGNOSIS — R35.0 URINARY FREQUENCY: ICD-10-CM

## 2020-06-24 LAB
ALBUMIN UR-MCNC: NEGATIVE MG/DL
APPEARANCE UR: CLEAR
BILIRUB UR QL STRIP: NEGATIVE
COLOR UR AUTO: YELLOW
GLUCOSE UR STRIP-MCNC: NEGATIVE MG/DL
HGB UR QL STRIP: NEGATIVE
KETONES UR STRIP-MCNC: NEGATIVE MG/DL
LEUKOCYTE ESTERASE UR QL STRIP: NEGATIVE
NITRATE UR QL: NEGATIVE
PH UR STRIP: 5 PH (ref 5–7)
SOURCE: NORMAL
SP GR UR STRIP: 1.01 (ref 1–1.03)
SPECIMEN SOURCE: NORMAL
UROBILINOGEN UR STRIP-ACNC: 0.2 EU/DL (ref 0.2–1)
WET PREP SPEC: NORMAL

## 2020-06-24 PROCEDURE — 81003 URINALYSIS AUTO W/O SCOPE: CPT | Performed by: PHYSICIAN ASSISTANT

## 2020-06-24 PROCEDURE — 87210 SMEAR WET MOUNT SALINE/INK: CPT | Performed by: PHYSICIAN ASSISTANT

## 2020-06-24 PROCEDURE — 99213 OFFICE O/P EST LOW 20 MIN: CPT | Performed by: PHYSICIAN ASSISTANT

## 2020-06-24 NOTE — PATIENT INSTRUCTIONS
Urine is clear.  Will check a wet prep today to make sure there is still no other infection.  Stay well hydrated and eat a high fiber diet.  If pelvic pain is worsening, will consider an ultrasound.

## 2020-07-06 DIAGNOSIS — E78.5 HYPERLIPIDEMIA LDL GOAL <100: ICD-10-CM

## 2020-07-06 RX ORDER — PRAVASTATIN SODIUM 10 MG
10 TABLET ORAL DAILY
Qty: 90 TABLET | Refills: 3 | Status: SHIPPED | OUTPATIENT
Start: 2020-07-06 | End: 2021-06-23

## 2020-07-06 NOTE — TELEPHONE ENCOUNTER
Pending Prescriptions:                       Disp   Refills    pravastatin (PRAVACHOL) 10 MG tablet       90 tab*0        Sig: Take 1 tablet (10 mg) by mouth daily      Routing refill request to provider for review/approval because:  Labs not current:  LDL    Sophia Celeste, MSN, RN

## 2020-07-21 ENCOUNTER — MYC MEDICAL ADVICE (OUTPATIENT)
Dept: ENDOCRINOLOGY | Facility: CLINIC | Age: 55
End: 2020-07-21

## 2020-07-21 DIAGNOSIS — E10.9 TYPE 1 DIABETES MELLITUS WITHOUT COMPLICATION (H): ICD-10-CM

## 2020-07-21 RX ORDER — BLOOD-GLUCOSE METER
KIT MISCELLANEOUS
Qty: 1 KIT | Refills: 1 | Status: SHIPPED | OUTPATIENT
Start: 2020-07-21 | End: 2020-08-13

## 2020-07-21 RX ORDER — LANCETS
EACH MISCELLANEOUS
Qty: 400 EACH | Refills: 2 | Status: SHIPPED | OUTPATIENT
Start: 2020-07-21 | End: 2021-03-03

## 2020-07-21 RX ORDER — BLOOD-GLUCOSE METER
1 EACH MISCELLANEOUS DAILY
Qty: 1 KIT | Refills: 1 | Status: SHIPPED | OUTPATIENT
Start: 2020-07-21 | End: 2022-07-20

## 2020-07-22 DIAGNOSIS — E10.9 TYPE 1 DIABETES MELLITUS WITHOUT COMPLICATION (H): Primary | ICD-10-CM

## 2020-08-03 ENCOUNTER — OFFICE VISIT (OUTPATIENT)
Dept: OBGYN | Facility: CLINIC | Age: 55
End: 2020-08-03
Payer: COMMERCIAL

## 2020-08-03 VITALS
HEART RATE: 96 BPM | WEIGHT: 154.6 LBS | BODY MASS INDEX: 32.45 KG/M2 | HEIGHT: 58 IN | DIASTOLIC BLOOD PRESSURE: 70 MMHG | SYSTOLIC BLOOD PRESSURE: 144 MMHG

## 2020-08-03 DIAGNOSIS — Z12.31 ENCOUNTER FOR SCREENING MAMMOGRAM FOR BREAST CANCER: Primary | ICD-10-CM

## 2020-08-03 DIAGNOSIS — N95.1 SYMPTOMATIC MENOPAUSAL OR FEMALE CLIMACTERIC STATES: ICD-10-CM

## 2020-08-03 PROCEDURE — 99386 PREV VISIT NEW AGE 40-64: CPT | Performed by: OBSTETRICS & GYNECOLOGY

## 2020-08-03 ASSESSMENT — MIFFLIN-ST. JEOR: SCORE: 1186.01

## 2020-08-03 NOTE — PROGRESS NOTES
"Maria M is a 55 year old female, , who is here for her annual exam and is new to the [facility] gyn dept.  She is s/p AMARILIS in  for the treatment of menorrhagia and pelvic pain, and states that the pathology was all benign.  She c/o \"hormonal fluctuation\" that appear to be affecting her blood sugars/diabetes so she would like to try hormonal replacement therapy in the pill format.  She is overdue for a mammogram but wants to have this done in [place] on a different date.  She will f/u with GI for a colon issue.  Her mother is a 7-year survivor from breast cancer.  She is not in a fasting state for labs that are a standing order from Dr. Bradford so the pt will return for these in the future.      ROS: [place] point review of systems was reviewed and negative except the above.    Health Maintenance   Topic Date Due     URINE DRUG SCREEN  1965     HEPATITIS B IMMUNIZATION (1 of 3 - Risk 3-dose series) 1984     PREVENTIVE CARE VISIT  2015     ZOSTER IMMUNIZATION (1 of 2) 2015     ADVANCE CARE PLANNING  2016     LIPID  2020     INFLUENZA VACCINE (1) 2020     A1C  2020     MAMMO SCREENING  10/12/2020     MICROALBUMIN  2020     TSH W/FREE T4 REFLEX  2020     EYE EXAM  2021     DIABETIC FOOT EXAM  2021     BMP  2021     CMP  2021     DTAP/TDAP/TD IMMUNIZATION (2 - Td) 2021     COLORECTAL CANCER SCREENING  2026     HEPATITIS C SCREENING  Completed     HIV SCREENING  Completed     PHQ-2  Completed     PNEUMOCOCCAL IMMUNIZATION 19-64 MEDIUM RISK  Completed     IPV IMMUNIZATION  Aged Out     MENINGITIS IMMUNIZATION  Aged Out     PAP  Discontinued      Last pap: not due since she had a AMARILIS for benign pathology in   Last Mammogram: 10/12/18 so overdue  Last Dexa: not due  Last Colonoscopy: due in 2026 or earlier prn  Lab Results   Component Value Date    CHOL 181 2019     Lab Results   Component Value Date    HDL 47 " "2019     Lab Results   Component Value Date     2019     Lab Results   Component Value Date    TRIG 143 2019     Lab Results   Component Value Date    CHOLHDLRATIO 3.6 2015         OBHX:      PSH:   Past Surgical History:   Procedure Laterality Date     C  DELIVERY ONLY      C-Sections x2     C NONSPECIFIC PROCEDURE      Hysterectomy - total (cervix removed but ovaries remain)     C STOMACH SURGERY PROCEDURE UNLISTED       C TOTAL ABDOM HYSTERECTOMY       COLONOSCOPY N/A 2016    Procedure: COLONOSCOPY;  Surgeon: Efren Perry MD;  Location: [facility]     ENDOSCOPIC SINUS SURGERY Bilateral 2018    Procedure: Bilateral functional endoscopic maxillary antrostomies;  Surgeon: Woo Silva MD;  Location: [facility] OR     HC SACROPLASTY       LAMINECT/DISCECTOMY, CERVICAL  2007    C7-T1 hemilaminectomy, microdiscectomy, foraminotomy.     LASER YAG CAPSULOTOMY Right 10/23/2014    Procedure: LASER YAG CAPSULOTOMY;  Surgeon: Esteban Dorsey MD;  Location: [facility] OR     VITRECTOMY,STRIP EPIRETINAL MEMBRANE  09         PMH: Her past medical, surgical, and obstetric histories were reviewed and are documented in their appropriate chart areas.    ALL/Meds: Her medication and allergy histories were reviewed and are documented in their appropriate chart areas.    SH/FMH: Her social and family history was reviewed and documented in its appropriate chart area.    PE: BP (!) 144/70   Pulse 96   Ht 1.473 m (4' 10\")   Wt 70.1 kg (154 lb 9.6 oz)   LMP  (LMP Unknown)   BMI 32.31 kg/m    Body mass index is 32.31 kg/m .    General Appearance:  healthy, alert, active, no distress  Cardiovascular:  Regular rate and Rhythm without murmur  Neck: Supple, no adenopathy and thyroid normal  Lungs:  Clear, without wheeze, rale or rhonchi  Breast: normal breast exam  Abdomen: Benign, Soft, flat, non-tender, No masses, organomegaly, No inguinal nodes and Bowel sounds normoactive. "   Pelvic:       - Ext: Vulva and perineum are normal without lesion, mass or discharge        - Urethra: normal without discharge        - Urethral Meatus: normal appearance       - Bladder: no tenderness, no masses       - Vagina: Normal mucosa, no discharge with a normal-appearing cuff       - Cervix: surgically absent       - Uterus: surgically absent       - Adnexa: Non palpable       - Rectal: sphincter tone normal and no mass    A/P:  Well Woman Exam, Menopausal Symptoms so Initiated HRT     -  I discussed the new pap recommendations regarding screening.  Explained the rationale for increased intervals between paps.  Questions asked and answered.  She does agree to this regiment.  Pap was not obtained since no longer needs s/p AMARILIS for benign pathology   -  BC: s/p hysterectomy   -  Schedule a mammogram - mother has a hx of breast cancer   -  We discussed HRT and she prefers Premarin in a tablet form so this script was sent to her pharmacy   -  F/u with GI for bowel issues   -  Return in a fasting state for labwork as ordered by Dr. Bradford  Orders Placed This Encounter   Procedures     *MA Screening Digital Bilateral      -  Encouraged self-breast exam   -  Encouraged low fat diet, regular exercise, and adequate calcium intake.    Gisel Burns, DO  FACOG, FACS

## 2020-08-04 ENCOUNTER — TELEPHONE (OUTPATIENT)
Dept: OBGYN | Facility: CLINIC | Age: 55
End: 2020-08-04

## 2020-08-04 NOTE — TELEPHONE ENCOUNTER
Central Prior Authorization Team   Phone: [phone]    PA Initiation    Medication:   Insurance Company: [facility] - Phone [phone] Fax [phone]  Pharmacy Filling the Rx: [facility] [address] [place] [id number] [facility]  Filling Pharmacy Phone: [phone]  Filling Pharmacy Fax: [phone]  Start Date: 8/4/2020

## 2020-08-04 NOTE — TELEPHONE ENCOUNTER
RN received PA request for following medication through [facility].    estrogen conj (PREMARIN) 0.625 MG tablet  90 tablet  3  8/3/2020   --    Sig - Route: Take 1 tablet (0.625 mg) by mouth daily - Oral    Sent to pharmacy as: Estrogens Conjugated 0.625 MG Oral Tablet (PREMARIN)    Class: E-Prescribe    Order: [id number]    E-Prescribing Status: Receipt confirmed by pharmacy (8/3/2020 11:33 AM CDT)      RN routing to JOSSELINE cox.    Allison Salgado RN on 8/4/2020 at 1:55 PM

## 2020-08-05 ENCOUNTER — OFFICE VISIT (OUTPATIENT)
Dept: PODIATRY | Facility: CLINIC | Age: 55
End: 2020-08-05
Payer: COMMERCIAL

## 2020-08-05 ENCOUNTER — ANCILLARY PROCEDURE (OUTPATIENT)
Dept: GENERAL RADIOLOGY | Facility: CLINIC | Age: 55
End: 2020-08-05
Attending: PODIATRIST
Payer: COMMERCIAL

## 2020-08-05 VITALS
HEIGHT: 58 IN | BODY MASS INDEX: 32.45 KG/M2 | DIASTOLIC BLOOD PRESSURE: 70 MMHG | SYSTOLIC BLOOD PRESSURE: 128 MMHG | WEIGHT: 154.6 LBS

## 2020-08-05 DIAGNOSIS — M72.2 PLANTAR FASCIITIS OF LEFT FOOT: Primary | ICD-10-CM

## 2020-08-05 DIAGNOSIS — M77.41 METATARSALGIA OF BOTH FEET: ICD-10-CM

## 2020-08-05 DIAGNOSIS — M72.2 PLANTAR FASCIITIS OF LEFT FOOT: ICD-10-CM

## 2020-08-05 DIAGNOSIS — M77.42 METATARSALGIA OF BOTH FEET: ICD-10-CM

## 2020-08-05 DIAGNOSIS — E10.9 TYPE 1 DIABETES MELLITUS WITHOUT COMPLICATION (H): ICD-10-CM

## 2020-08-05 PROCEDURE — 99213 OFFICE O/P EST LOW 20 MIN: CPT | Performed by: PODIATRIST

## 2020-08-05 PROCEDURE — 73630 X-RAY EXAM OF FOOT: CPT | Mod: TC

## 2020-08-05 ASSESSMENT — PAIN SCALES - GENERAL: PAINLEVEL: NO PAIN (0)

## 2020-08-05 ASSESSMENT — MIFFLIN-ST. JEOR: SCORE: 1186.01

## 2020-08-05 NOTE — PATIENT INSTRUCTIONS
"DIABETES AND YOUR FEET    What effect does diabetes have on the feet?  Diabetes can result in several problems in the feet including contractures of the tendons leading to deformities and reduced function of the bones, skin ulcers or open sores on pressure points or prominent deformities, reduced sensation, reduced blood flow and thus reduced oxygen and immune cells to the tiny vessels in our feet. This all leads to higher risk of hospitalization, infections, and amputations.     What is neuropathy?  Neuropathy is a term used to describe a loss of nerve function.  Patients with diabetes are at risk of developing neuropathy if their sugars continue to run high and are above the normal value of 140.  The elevated blood sugar in the body enters the nerves causing it to swell and impair nerve function.  The higher the blood sugar and the longer it is elevated, the more damage is done to nerves.  This damage is permanent and irreversible.  These damaged sensory nerves can then cause reduced feeling or cause pain.  Damaged motor nerves can reduce blood flow and white blood cells into into your foot, skin and bones reducing your ability to heal a small problem. And neuropathy can cause tendons to become unbalanced and contribute to the formation of deformity and contractures in our feet. Often times, neuropathy can be prevented by controlling your blood sugar.  Your risk of developing neuropathy goes up dramatically as your hemoglobin A1C raises above 7.5.      How do I know if I have neuropathy?  When a person develops neuropathy, they usually begin to feel numbness or tingling in their feet and sometimes in their legs.  Other symptoms may include painful burning or hot feet, tingling, electrical sensations or feeling like insects or ants are crawling on your feet or legs.  If blood sugar remains above 140  for long periods of time, neuropathy can also occur in the hands.  When a person loses their \"protective threshold\" " or ability to detect a 5.07 Taylor Girish monofilament is when they have elevated risk for developing foot deformity, contractures, foot infections, amputations, Charcot arthropathy, or other complications. Keep your hemoglobin A1C below 7.5 to reduce this risk.    What is vascular disease?  Peripheral vascular disease is a term used to describe a loss or decrease in circulation (blood flow).  There is a problem in getting blood, immune cells, and oxygen to areas that need it.  Similar to neuropathy, sugars can build up in the walls of the arteries (blood vessels) and cause them to become swollen, thickened and hardened.  This decreases the amount of blood that can go to an area that needs it.  Though this is common in the legs of diabetic patients, it can also affect other arteries (blood vessels) in the body such as in the heart, kidney, eyes, and the blood flow into bones.  It is often seen first in the small vessels of her body notably our feet and toes.    How do I know if I have vascular disease?  In the legs, vascular disease usually results in cramping.  Patients who develop leg cramps after walking the same distance every time (i.e. One block, half a mile, ect.) need to let their doctors know so that their circulation may be checked.  Cramps causing severe pain in the feet and/or legs while sleeping and the cramps go away when you stand or hang your legs off the side of the bed, may also be a sign of poor blood circulation.  Occasional cramping in cold weather or on rare occasions with activity may not be due to poor circulation, but you should inform your doctor.    How can these problems be prevented?  The key to prevention is good blood sugar control all day every day.  Inadequate blood sugar control is the most common way patients experience these problems. Reducing, controlling and measuring your daily consumption of sugar or carbohydrates is essential to understanding and managing diabetes.   Physical activity (exercise) is a very good way to help decrease your blood sugars.  Exercise can lower your blood sugar, blood pressure, and cholesterol.  It also reduces your risk for heart disease and stroke, relieves stress, and strengthens your heart, muscles and bones. Physical activity also increases your balance and reduces development of contractures and foot deformities over time. In addition, regular activity helps insulin work better, improves your blood circulation, and keeps your joints flexible.  If you're trying to lose weight, a combination of exercise and wise food choices can help you reach your target weight and maintain it.  Activity and exercise alone can not make up for poor diet choices, eating too much, or eating too many sugars or carbohydrates.  Ask your doctor for help when you are not meeting your blood sugar goals. Changes or increases in medication are powerful tools in reducing your blood sugar.    Know your blood sugar and hemoglobin A1C trend.  Upon first diagnosis or during acute illness, checking your blood sugar 4 times a day can help you understand how your diet, activity, and lifestyle affect your blood sugar.  Monitoring your hemoglobin A1C can help you understand how well you are managing blood sugar over the long run.  Your hemoglobin A1C tells you what your blood sugar averages all day, every day, over the past 90 days.       To experience the lowest risk of complications associated with diabetes such as neuropathy, loss of blood flow, bone or joint infection, charcot arthropathy, or amputation, the American Diabetes Association recommends a target hemoglobin A1C of less than 7.0%, while the American Association of Clinical Endocrinologists' recommendation is 6.5% or less.  Both organizations advise that the goals be individualized based on patient factors such as other health conditions, history of hypoglycemia, education, and life expectancy.  A patients risk of  experiencing complications associated with diabetes is only slightly elevated with a hemoglobin A1C above 6.0.  However, this risk goes up exponentially when the hemoglobin A1C is above 7.5.  The longer the hemoglobin A1C is elevated, the more risk that patient will experience in their lifetime. The damage that occurs to nerves, blood vessels, tendons, bones and body organs, while their hemoglobin A1C is elevated is mostly irreversible and worsens with each additional time period of elevated hemoglobin A1C.     You must understand and manage your disease.  Your health insurance or medical team cannot manage this disease for you.  When you take responsibility for understanding and managing your disease, you can expect to experience fewer problems associated with diabetes in your lifetime.  You will  Also experience a higher quality of life and health and reduced cost of health care.    Diabetic Foot Care Recommendations  The following are recommendations for avoiding serious foot problems or injury    DO'S  1. Be aware of your hemoglobin A1C and continue to follow up with your medical team for adjustments in your lifestyle and medication until your reach your A1C goal.  Keep this below 7.5 to reduce your risk of developing complications associated with diabetes.    2.  Wash your feet with lukewarm water and a mild soap and then dry them thoroughly, especially between the toes.  Gently floss your towel or washcloth between each toe at every bath.  Soaking your feet in water cannot clean dead skin, debris, and bacteria from your feet and is not necessary.   3. Examine your feet daily looking for cuts, corns, blisters, cracks, ect..., especially after wearing new shoes or increased or changed activities.  Make sure to look between your toes.  If you cannot see the bottom of your feet, set a mirror on the floor and hold your foot over it, or ask a family member to examine your feet for you daily.  Contact your doctor  immediately if new problems are noted or if sores are not healing.  4.  Immediately apply moisturizer cream such as Cetaphil to the tops and bottoms of your feet, avoiding areas between the toes.  Apply sunscreen or cover your feet if they will be exposed to extended sunlight.  5.Use clean comfortable shoes.  Socks should not have thick seams or cut off the circulation around the leg.  Break in new shoes slowly and rotate with older shoes until broken in.  Check the inside of your shoes with your hand to look for areas of irritation or objects that may have fallen into your shoes.    6. Keep slippers by the side of your bed for use during the night.  7. Shoes should be fitted by a professional and should not cause areas of irritation.  Check your feet regularly when wearing a new pair of shoes and replace them as needed.  8.  Talk to your doctor about proper exercise.  Exercise and stretching stimulate blood flow to your feet and maintain proper glucose levels.  Use it or lose it!  9.  Monitor your blood glucose level and your hemoglobin A1C.  Notify your doctor immediately if your blood sugar is abnormally high or low.  10.  Cut your nails straight across, but then gently round any sharp edges with a nail file.  If you have neuropathy, peripheral vascular disease or cannot see that well to trim your own toenails, see a medical professional for care.    DONT'S  1.  Do not soak your feet if you have an open sore or your provider has informed you that you have neuropathy or loss of protective threshold.  Use only lukewarm water and always check the temperature with your hand as hot water can easily burn your feet.    2.  Never use a hot water bottle or heating pad on your feet.  Also do not apply hot or cold compresses to your feet.  With decreased sensation, you could burn or freeze your feet.  Do not rest your feet near a heat source such as a heater or heat register.    3.  Do not apply any of these to your  feet:    - over the counter medicine for corns or warts    -  Harsh chemicals like boric acid    -  Do not self-treat corns, cuts, blisters or infections.  Always consult your doctor.   4.  Do not wear sandals, slippers or walk barefoot, especially on harsh surfaces.  5.  If you smoke, stop!!!

## 2020-08-05 NOTE — LETTER
"    8/5/2020         RE: Maria M Marcus  [address] [place] [address]        Dear Colleague,    Thank you for referring your patient, Maria M Marcus, to the [facility]. Please see a copy of my visit note below.    Chief Complaint   Patient presents with     RECHECK     improvement with orthotics, no pain today - Left plantar fasciits and B/L metatarsalgia; XR B/L foot 8/5/2020; LOV 9/24/2019     Diabetes     DM type 1 exam       Weight management plan: Patient was referred to their PCP to discuss a diet and exercise plan.     HPI:  Maria M Marcus is a 54 year old female who is seen in consultation at the request of Marcus Salgado PA-C.    Pt presents for eval of:   (Onset, Location, L/R, Character, Treatments, Injury if yes)    XR Left and Right foot today 9/24/2019    DM Type 1     Ongoing, plantar arch and ball of Left and Right foot pain > Left that radiates to dorsal foot at times. gymnist in high school, Right great toe fracture 1994. Presents today with supportive athletic shoes and orthotics from 2012.  Constant, sharp, stabbing, throbbing, Intermittent, burning, tingling, pain 9  started after wearing \"terrible shoes\" is diabetic    Not working due to her vision loss.      ROS: 10 point ROS neg other than the symptoms noted above in the HPI.    Patient Active Problem List   Diagnosis     Hypothyroidism due to Hashimoto's thyroiditis     Other and unspecified disc disorder of lumbar region     Hyperlipidemia LDL goal <100     Cervical radiculopathy     Advanced directives, counseling/discussion     Frozen shoulder syndrome     Vitiligo     Chronic pain     Back pain     Hypertension goal BP (blood pressure) < 140/90     Type 1 diabetes mellitus without complication (H)     Overweight (BMI 25.0-29.9)     Retinopathy due to secondary diabetes (H)     Gastroesophageal reflux disease, esophagitis presence not specified     Hives     Colitis       PAST MEDICAL HISTORY: " "  Past Medical History:   Diagnosis Date     Cervical radiculopathy      Diabetic eye exam (H) 11     DISC DIS NEC/NOS-LUMBAR 2007     Frozen shoulder syndrome 2011     Hyperlipidemia LDL goal <100 10/31/2010     Hypertension 1/3/2011     Hypothyroidism due to Hashimoto's thyroiditis      Type 1 diabetes, HbA1c goal < 7% (H) dx 1967     Unspecified hypothyroidism      PAST SURGICAL HISTORY:   Past Surgical History:   Procedure Laterality Date     C  DELIVERY ONLY      C-Sections x2     C NONSPECIFIC PROCEDURE      Hysterectomy - total (cervix removed but ovaries remain)     C STOMACH SURGERY PROCEDURE UNLISTED       C TOTAL ABDOM HYSTERECTOMY       COLONOSCOPY N/A 2016    Procedure: COLONOSCOPY;  Surgeon: Efren Perry MD;  Location:  GI     ENDOSCOPIC SINUS SURGERY Bilateral 2018    Procedure: Bilateral functional endoscopic maxillary antrostomies;  Surgeon: Woo Silva MD;  Location:  OR     HC SACROPLASTY       LAMINECT/DISCECTOMY, CERVICAL  2007    C7-T1 hemilaminectomy, microdiscectomy, foraminotomy.     LASER YAG CAPSULOTOMY Right 10/23/2014    Procedure: LASER YAG CAPSULOTOMY;  Surgeon: Esteban Dorsey MD;  Location: [facility]     VITRECTOMY,STRIP EPIRETINAL MEMBRANE  09     MEDICATIONS:   Current Outpatient Medications:      APIDRA VIAL 100 UNIT/ML soln, INJECT 1 UNIT PER 20 GRAM CARB WITH MEALS. TOTAL DAILY DOSE 30 UNITS, Disp: 10 mL, Rfl: 0     BD VEO INSULIN SYRINGE U/F 31G X 15/64\" 0.3 ML, USE 1 SYRINGE 4 TO 5 TIMES DAILY. KEEP ON HAND IN CASE OF PUMP FAILURE, Disp: 100 each, Rfl: 8     blood glucose (TANA CONTOUR) test strip, Use to test blood sugar 6 times daily or as directed., Disp: 400 strip, Rfl: 2     blood glucose (CONTOUR NEXT TEST) test strip, Use to test blood sugar up to 6 times daily., Disp: 500 each, Rfl: 2     blood glucose (NO BRAND SPECIFIED) lancets standard, Use to test blood sugar 6 times daily or as directed. Use brand " compatible with patients insurance and device., Disp: 400 each, Rfl: 2     blood glucose monitoring (SOFTCLIX) lancets, Use to test blood sugar 6 times daily or as directed., Disp: 400 each, Rfl: 2     Blood Glucose Monitoring Suppl (CONTOUR BLOOD GLUCOSE SYSTEM) w/Device KIT, Use to test blood sugar daily., Disp: 1 kit, Rfl: 1     Blood Glucose Monitoring Suppl (CONTOUR NEXT ONE) KIT, 1 each daily Use to test blood glucose., Disp: 1 kit, Rfl: 1     DULoxetine (CYMBALTA) 30 MG capsule, Take 1 capsule by mouth once daily, Disp: 30 capsule, Rfl: 5     esomeprazole (NEXIUM) 20 MG DR capsule, Take 1 capsule (20 mg) by mouth every morning (before breakfast) Take 30-60 minutes before eating., Disp: 90 capsule, Rfl: 1     estrogen conj (PREMARIN) 0.625 MG tablet, Take 1 tablet (0.625 mg) by mouth daily, Disp: 90 tablet, Rfl: 3     insulin glargine (LANTUS VIAL) 100 UNIT/ML vial, INJECT UP TO 10 UNITS SUBCUTANEOUSLY DAILY, Disp: 10 mL, Rfl: 1     levothyroxine (SYNTHROID/LEVOTHROID) 75 MCG tablet, Take 1 tablet by mouth once daily, Disp: 90 tablet, Rfl: 1     lisinopril (PRINIVIL/ZESTRIL) 10 MG tablet, TAKE 1 TABLET BY MOUTH ONCE DAILY, Disp: 90 tablet, Rfl: 2     omeprazole (PRILOSEC) 20 MG DR capsule, Take 1 capsule by mouth once daily, Disp: 90 capsule, Rfl: 1     pravastatin (PRAVACHOL) 10 MG tablet, Take 1 tablet (10 mg) by mouth daily, Disp: 90 tablet, Rfl: 3     phenazopyridine (PYRIDIUM) 200 MG tablet, Take 1 tablet (200 mg) by mouth 3 times daily as needed for irritation, Disp: 9 tablet, Rfl: 0  ALLERGIES:    Allergies   Allergen Reactions     Novolog [Insulin Aspart] Swelling     Itching, rash, contact dermatitis     Humalog [Insulin Lispro] Rash     Contact dermatitis     Zyrtec [Cetirizine] Rash     SOCIAL HISTORY:   Social History     Socioeconomic History     Marital status:      Spouse name: Ra     Number of children: 2     Years of education: 12     Highest education level: Not on file    Occupational History     Occupation: receiving     Employer: [employer]   Social Needs     Financial resource strain: Not on file     Food insecurity     Worry: Not on file     Inability: Not on file     Transportation needs     Medical: Not on file     Non-medical: Not on file   Tobacco Use     Smoking status: Never Smoker     Smokeless tobacco: Never Used     Tobacco comment: no exposure   Substance and Sexual Activity     Alcohol use: Yes     Comment: winex1-2/3x per yr.     Drug use: No     Sexual activity: Yes     Partners: Male     Birth control/protection: Surgical, Female Surgical     Comment: hysterectomy   Lifestyle     Physical activity     Days per week: Not on file     Minutes per session: Not on file     Stress: Not on file   Relationships     Social connections     Talks on phone: Not on file     Gets together: Not on file     Attends Tenriism service: Not on file     Active member of club or organization: Not on file     Attends meetings of clubs or organizations: Not on file     Relationship status: Not on file     Intimate partner violence     Fear of current or ex partner: Not on file     Emotionally abused: Not on file     Physically abused: Not on file     Forced sexual activity: Not on file   Other Topics Concern     Parent/sibling w/ CABG, MI or angioplasty before 65F 55M? No   Social History Narrative     Not on file     FAMILY HISTORY:   Family History   Problem Relation Age of Onset     Hypertension Maternal Grandfather      EYE* Maternal Grandmother      Blood Disease Maternal Grandmother      Eye Disorder Maternal Grandmother         maccular degeneration     Osteoporosis Maternal Grandmother      Lipids Father      Gastrointestinal Disease Father         ulcers     Heart Disease Father      Cardiovascular Father         heart attack     Hypertension Paternal Grandmother      Breast Cancer Mother         dx age 73 - terminal - mother had first mammo at this age     Diabetes Paternal  "Uncle         Type I     EXAM:Vitals: /70 (BP Location: Left arm, Patient Position: Sitting, Cuff Size: Adult Regular)   Ht 1.473 m (4' 10\")   Wt 70.1 kg (154 lb 9.6 oz)   LMP  (LMP Unknown)   BMI 32.31 kg/m    BMI= Body mass index is 32.31 kg/m .    General appearance: Patient is alert and fully cooperative with history & exam.  No sign of distress is noted during the visit.     Psychiatric: Affect is pleasant & appropriate.  Patient appears motivated to improve health.     Respiratory: Breathing is regular & unlabored while sitting.     HEENT: Hearing is intact to spoken word.  Speech is clear.  No gross evidence of visual impairment that would impact ambulation.     Vascular: DP & PT 2/4 & regular bilaterally.  No significant edema, rubor or varicosities noted.  CFT and skin temperature is normal to both lower extremities.       Neurologic: Lower extremity sensation is intact to light touch.  No evidence of weakness in the lower extremities.  Protective threshold intact bilateral.     Dermatologic: Skin is intact to both lower extremities without significant lesions, rash or abrasion.  Normal texture turgor and tone. No paronychia or evidence of soft tissue infection is noted.    Musculoskeletal: Patient is ambulatory without assistive device or brace. Pain is noted with firm palpation along the medial band of the plantar fascia bilateral foot most notably at the origination upon the calcaneus not through the arch.  No pain with compression of the calcaneus medial to lateral or with palpation of the achilles, peroneal or posterior tibial tendons.  Slightly more than 0  of ankle joint dorsiflexion without crepitus or pain throughout the ankle, subtalar or midtarsal joints.  No pain or limitations throughout manual muscle strength testing plus 5/5 to all four quadrants bilateral.  No palpable edema noted.      Some discomfort across the ball the foot generally.    Radiographs:  Diminished calcaneal " inclination angle consistent with pes valgus.    Hemoglobin A1C (%)   Date Value   03/09/2020 5.8 (A)   11/08/2019 5.8 (A)   04/25/2019 6.3 (H)   04/12/2019 6.2 (A)   10/12/2018 6.3 (A)   07/09/2018 6.6 (H)   04/13/2018 6.7 (H)   12/08/2017 6.6     Creatinine (mg/dL)   Date Value   05/25/2020 0.96   02/01/2020 0.83   01/31/2020 0.77   01/30/2020 0.77   11/14/2019 1.08 (H)   11/08/2019 0.81     ASSESSMENT:       ICD-10-CM    1. Plantar fasciitis of left foot  M72.2 XR Foot Bilateral G/E 3 Views   2. Metatarsalgia of both feet  M77.41 XR Foot Bilateral G/E 3 Views    M77.42    3. Type 1 diabetes mellitus without complication (H)  E10.9 XR Foot Bilateral G/E 3 Views       PLAN:  Reviewed patient's chart in Psychiatric and discussed etiology and treatment options.      Treatments:  9/24/2019  Obtained and interpreted radiographs.   Discontinue barefoot walking or unsupported walking in shoes without shank.  Dispensed written instructions to obtain appropriate shoe gear    Prescription for custom molded orthotics 9/24/2019  Follow up in 4-5 weeks     8/5/2020  She is having challenges with her vision but overall very pleased with her diabetes management.  She has no changes in her feet notes that her forefoot pain still bothers her from time to time but overall she is very satisfied with that as long as she wears her orthotics and good shoes but she likes to wear other shoes from time to time.  She has developed allergy to insulin and has a new product that is much thicker.  Continuous glucose monitor has changed her life making it much easier to manage diabetes.  Motivational interviewing regarding barriers to continued good control.  She is remaining active and further motivational interviewing today regarding weight management diet management carb control activity management.  All questions were answered.  Her shoe gear is in reasonable repair follow-up once yearly or as needed.      Ruben Talley DPM    Again, thank you for  allowing me to participate in the care of your patient.        Sincerely,        Ruben Talley DPM

## 2020-08-05 NOTE — TELEPHONE ENCOUNTER
PRIOR AUTHORIZATION DENIED    Medication: premarin-DENIED    Denial Date: 8/5/2020    Denial Rational: PATIENT MUST TRY/FAIL FORMULARY ALTERNATIVES - ESTRADIOL TAB 0.5MG, 1MG, 2MG, ESTRADIOL PATCH.        Appeal Information:  IF PATIENT IS UNABLE TO TRY/FAIL ALTERNATIVE(S) PLEASE SUPPLY PA TEAM WITH A LETTER OF MEDICAL NECESSITY WITH CLINICAL REASON.

## 2020-08-05 NOTE — PROGRESS NOTES
"Chief Complaint   Patient presents with     RECHECK     improvement with orthotics, no pain today - Left plantar fasciits and B/L metatarsalgia; XR B/L foot 8/5/2020; LOV 9/24/2019     Diabetes     DM type 1 exam       Weight management plan: Patient was referred to their PCP to discuss a diet and exercise plan.     HPI:  Maria M Marcus is a 54 year old female who is seen in consultation at the request of Marcus Salgado PA-C.    Pt presents for eval of:   (Onset, Location, L/R, Character, Treatments, Injury if yes)    XR Left and Right foot today 9/24/2019    DM Type 1     Ongoing, plantar arch and ball of Left and Right foot pain > Left that radiates to dorsal foot at times. gymnist in high school, Right great toe fracture 1994. Presents today with supportive athletic shoes and orthotics from 2012.  Constant, sharp, stabbing, throbbing, Intermittent, burning, tingling, pain 9  started after wearing \"terrible shoes\" is diabetic    Not working due to her vision loss.      ROS: 10 point ROS neg other than the symptoms noted above in the HPI.    Patient Active Problem List   Diagnosis     Hypothyroidism due to Hashimoto's thyroiditis     Other and unspecified disc disorder of lumbar region     Hyperlipidemia LDL goal <100     Cervical radiculopathy     Advanced directives, counseling/discussion     Frozen shoulder syndrome     Vitiligo     Chronic pain     Back pain     Hypertension goal BP (blood pressure) < 140/90     Type 1 diabetes mellitus without complication (H)     Overweight (BMI 25.0-29.9)     Retinopathy due to secondary diabetes (H)     Gastroesophageal reflux disease, esophagitis presence not specified     Hives     Colitis       PAST MEDICAL HISTORY:   Past Medical History:   Diagnosis Date     Cervical radiculopathy      Diabetic eye exam (H) 12/14/11     DISC DIS NEC/NOS-LUMBAR 4/7/2007     Frozen shoulder syndrome 6/16/2011     Hyperlipidemia LDL goal <100 10/31/2010     Hypertension 1/3/2011 " "    Hypothyroidism due to Hashimoto's thyroiditis      Type 1 diabetes, HbA1c goal < 7% (H) dx 1967     Unspecified hypothyroidism      PAST SURGICAL HISTORY:   Past Surgical History:   Procedure Laterality Date     C  DELIVERY ONLY      C-Sections x2     C NONSPECIFIC PROCEDURE      Hysterectomy - total (cervix removed but ovaries remain)     C STOMACH SURGERY PROCEDURE UNLISTED       C TOTAL ABDOM HYSTERECTOMY       COLONOSCOPY N/A 2016    Procedure: COLONOSCOPY;  Surgeon: Efren Perry MD;  Location:  GI     ENDOSCOPIC SINUS SURGERY Bilateral 2018    Procedure: Bilateral functional endoscopic maxillary antrostomies;  Surgeon: Woo Silva MD;  Location: [facility]     HC SACROPLASTY       LAMINECT/DISCECTOMY, CERVICAL  2007    C7-T1 hemilaminectomy, microdiscectomy, foraminotomy.     LASER YAG CAPSULOTOMY Right 10/23/2014    Procedure: LASER YAG CAPSULOTOMY;  Surgeon: Esteban Dorsey MD;  Location: [facility]     VITRECTOMY,STRIP EPIRETINAL MEMBRANE  09     MEDICATIONS:   Current Outpatient Medications:      APIDRA VIAL 100 UNIT/ML soln, INJECT 1 UNIT PER 20 GRAM CARB WITH MEALS. TOTAL DAILY DOSE 30 UNITS, Disp: 10 mL, Rfl: 0     BD VEO INSULIN SYRINGE U/F 31G X 15/64\" 0.3 ML, USE 1 SYRINGE 4 TO 5 TIMES DAILY. KEEP ON HAND IN CASE OF PUMP FAILURE, Disp: 100 each, Rfl: 8     blood glucose (TANA CONTOUR) test strip, Use to test blood sugar 6 times daily or as directed., Disp: 400 strip, Rfl: 2     blood glucose (CONTOUR NEXT TEST) test strip, Use to test blood sugar up to 6 times daily., Disp: 500 each, Rfl: 2     blood glucose (NO BRAND SPECIFIED) lancets standard, Use to test blood sugar 6 times daily or as directed. Use brand compatible with patients insurance and device., Disp: 400 each, Rfl: 2     blood glucose monitoring (SOFTCLIX) lancets, Use to test blood sugar 6 times daily or as directed., Disp: 400 each, Rfl: 2     Blood Glucose Monitoring Suppl (CONTOUR BLOOD " GLUCOSE SYSTEM) w/Device KIT, Use to test blood sugar daily., Disp: 1 kit, Rfl: 1     Blood Glucose Monitoring Suppl (CONTOUR NEXT ONE) KIT, 1 each daily Use to test blood glucose., Disp: 1 kit, Rfl: 1     DULoxetine (CYMBALTA) 30 MG capsule, Take 1 capsule by mouth once daily, Disp: 30 capsule, Rfl: 5     esomeprazole (NEXIUM) 20 MG DR capsule, Take 1 capsule (20 mg) by mouth every morning (before breakfast) Take 30-60 minutes before eating., Disp: 90 capsule, Rfl: 1     estrogen conj (PREMARIN) 0.625 MG tablet, Take 1 tablet (0.625 mg) by mouth daily, Disp: 90 tablet, Rfl: 3     insulin glargine (LANTUS VIAL) 100 UNIT/ML vial, INJECT UP TO 10 UNITS SUBCUTANEOUSLY DAILY, Disp: 10 mL, Rfl: 1     levothyroxine (SYNTHROID/LEVOTHROID) 75 MCG tablet, Take 1 tablet by mouth once daily, Disp: 90 tablet, Rfl: 1     lisinopril (PRINIVIL/ZESTRIL) 10 MG tablet, TAKE 1 TABLET BY MOUTH ONCE DAILY, Disp: 90 tablet, Rfl: 2     omeprazole (PRILOSEC) 20 MG DR capsule, Take 1 capsule by mouth once daily, Disp: 90 capsule, Rfl: 1     pravastatin (PRAVACHOL) 10 MG tablet, Take 1 tablet (10 mg) by mouth daily, Disp: 90 tablet, Rfl: 3     phenazopyridine (PYRIDIUM) 200 MG tablet, Take 1 tablet (200 mg) by mouth 3 times daily as needed for irritation, Disp: 9 tablet, Rfl: 0  ALLERGIES:    Allergies   Allergen Reactions     Novolog [Insulin Aspart] Swelling     Itching, rash, contact dermatitis     Humalog [Insulin Lispro] Rash     Contact dermatitis     Zyrtec [Cetirizine] Rash     SOCIAL HISTORY:   Social History     Socioeconomic History     Marital status:      Spouse name: Ra     Number of children: 2     Years of education: 12     Highest education level: Not on file   Occupational History     Occupation: receiving     Employer: [employer]   Social Needs     Financial resource strain: Not on file     Food insecurity     Worry: Not on file     Inability: Not on file     Transportation needs     Medical: Not on file      "Non-medical: Not on file   Tobacco Use     Smoking status: Never Smoker     Smokeless tobacco: Never Used     Tobacco comment: no exposure   Substance and Sexual Activity     Alcohol use: Yes     Comment: winex1-2/3x per yr.     Drug use: No     Sexual activity: Yes     Partners: Male     Birth control/protection: Surgical, Female Surgical     Comment: hysterectomy   Lifestyle     Physical activity     Days per week: Not on file     Minutes per session: Not on file     Stress: Not on file   Relationships     Social connections     Talks on phone: Not on file     Gets together: Not on file     Attends Jehovah's witness service: Not on file     Active member of club or organization: Not on file     Attends meetings of clubs or organizations: Not on file     Relationship status: Not on file     Intimate partner violence     Fear of current or ex partner: Not on file     Emotionally abused: Not on file     Physically abused: Not on file     Forced sexual activity: Not on file   Other Topics Concern     Parent/sibling w/ CABG, MI or angioplasty before 65F 55M? No   Social History Narrative     Not on file     FAMILY HISTORY:   Family History   Problem Relation Age of Onset     Hypertension Maternal Grandfather      EYE* Maternal Grandmother      Blood Disease Maternal Grandmother      Eye Disorder Maternal Grandmother         maccular degeneration     Osteoporosis Maternal Grandmother      Lipids Father      Gastrointestinal Disease Father         ulcers     Heart Disease Father      Cardiovascular Father         heart attack     Hypertension Paternal Grandmother      Breast Cancer Mother         dx age 73 - terminal - mother had first mammo at this age     Diabetes Paternal Uncle         Type I     EXAM:Vitals: /70 (BP Location: Left arm, Patient Position: Sitting, Cuff Size: Adult Regular)   Ht 1.473 m (4' 10\")   Wt 70.1 kg (154 lb 9.6 oz)   LMP  (LMP Unknown)   BMI 32.31 kg/m    BMI= Body mass index is 32.31 " kg/m .    General appearance: Patient is alert and fully cooperative with history & exam.  No sign of distress is noted during the visit.     Psychiatric: Affect is pleasant & appropriate.  Patient appears motivated to improve health.     Respiratory: Breathing is regular & unlabored while sitting.     HEENT: Hearing is intact to spoken word.  Speech is clear.  No gross evidence of visual impairment that would impact ambulation.     Vascular: DP & PT 2/4 & regular bilaterally.  No significant edema, rubor or varicosities noted.  CFT and skin temperature is normal to both lower extremities.       Neurologic: Lower extremity sensation is intact to light touch.  No evidence of weakness in the lower extremities.  Protective threshold intact bilateral.     Dermatologic: Skin is intact to both lower extremities without significant lesions, rash or abrasion.  Normal texture turgor and tone. No paronychia or evidence of soft tissue infection is noted.    Musculoskeletal: Patient is ambulatory without assistive device or brace. Pain is noted with firm palpation along the medial band of the plantar fascia bilateral foot most notably at the origination upon the calcaneus not through the arch.  No pain with compression of the calcaneus medial to lateral or with palpation of the achilles, peroneal or posterior tibial tendons.  Slightly more than 0  of ankle joint dorsiflexion without crepitus or pain throughout the ankle, subtalar or midtarsal joints.  No pain or limitations throughout manual muscle strength testing plus 5/5 to all four quadrants bilateral.  No palpable edema noted.      Some discomfort across the ball the foot generally.    Radiographs:  Diminished calcaneal inclination angle consistent with pes valgus.    Hemoglobin A1C (%)   Date Value   03/09/2020 5.8 (A)   11/08/2019 5.8 (A)   04/25/2019 6.3 (H)   04/12/2019 6.2 (A)   10/12/2018 6.3 (A)   07/09/2018 6.6 (H)   04/13/2018 6.7 (H)   12/08/2017 6.6      Creatinine (mg/dL)   Date Value   05/25/2020 0.96   02/01/2020 0.83   01/31/2020 0.77   01/30/2020 0.77   11/14/2019 1.08 (H)   11/08/2019 0.81     ASSESSMENT:       ICD-10-CM    1. Plantar fasciitis of left foot  M72.2 XR Foot Bilateral G/E 3 Views   2. Metatarsalgia of both feet  M77.41 XR Foot Bilateral G/E 3 Views    M77.42    3. Type 1 diabetes mellitus without complication (H)  E10.9 XR Foot Bilateral G/E 3 Views       PLAN:  Reviewed patient's chart in [facility] and discussed etiology and treatment options.      Treatments:  9/24/2019  Obtained and interpreted radiographs.   Discontinue barefoot walking or unsupported walking in shoes without shank.  Dispensed written instructions to obtain appropriate shoe gear    Prescription for custom molded orthotics 9/24/2019  Follow up in 4-5 weeks     8/5/2020  She is having challenges with her vision but overall very pleased with her diabetes management.  She has no changes in her feet notes that her forefoot pain still bothers her from time to time but overall she is very satisfied with that as long as she wears her orthotics and good shoes but she likes to wear other shoes from time to time.  She has developed allergy to insulin and has a new product that is much thicker.  Continuous glucose monitor has changed her life making it much easier to manage diabetes.  Motivational interviewing regarding barriers to continued good control.  She is remaining active and further motivational interviewing today regarding weight management diet management carb control activity management.  All questions were answered.  Her shoe gear is in reasonable repair follow-up once yearly or as needed.      Ruben Talley DPM

## 2020-08-10 NOTE — TELEPHONE ENCOUNTER
RN routing denied PA to provider to advise on possible alternative medication.    Allison Salgado RN on 8/10/2020 at 4:23 PM

## 2020-08-11 DIAGNOSIS — Z79.890 HORMONE REPLACEMENT THERAPY (HRT): Primary | ICD-10-CM

## 2020-08-11 RX ORDER — ESTRADIOL 1 MG/1
1 TABLET ORAL DAILY
Qty: 90 TABLET | Refills: 3 | Status: SHIPPED | OUTPATIENT
Start: 2020-08-11 | End: 2021-11-02

## 2020-08-11 NOTE — TELEPHONE ENCOUNTER
Gisel Burns, DO  You 11 minutes ago (11:51 AM)       Please call this pt and let her know that I sent a script or an alternative medication due to her insurance issue.      Message text      RN sent [employer] message with providers response.    Allison Salgado RN on 8/11/2020 at 12:04 PM

## 2020-08-13 ENCOUNTER — MYC MEDICAL ADVICE (OUTPATIENT)
Dept: ENDOCRINOLOGY | Facility: CLINIC | Age: 55
End: 2020-08-13

## 2020-08-13 ENCOUNTER — DOCUMENTATION ONLY (OUTPATIENT)
Dept: ENDOCRINOLOGY | Facility: CLINIC | Age: 55
End: 2020-08-13

## 2020-08-13 NOTE — PROGRESS NOTES
Insulin Treated Diabetes Report form received and emailed to provider for signing.    Nandini Jenkins LPN  Diabetes Clinic Coordinator   Adult Endocrinology and Pediatric Specialty Clinics  [facility]

## 2020-08-31 NOTE — TELEPHONE ENCOUNTER
Levothyroxine and Omeprazole    Sent 8/28/2018 with 6 month supply. Refill not appropriate at this time.     Sophia Celeste, RN, BSN       [id number], Ultrasound limited retroperitoneum    Focused ED ultrasound of kidneys and bladder    Indication: r/o hydronephrosis  bladder nondistended  bilateral renal ultrasound:  no hydronephrosis.  small left renal cyst  impression: No evidence of hydronephrosis  small left renal cyst    Procedure note and images placed in chart

## 2020-09-01 ENCOUNTER — MYC REFILL (OUTPATIENT)
Dept: ENDOCRINOLOGY | Facility: CLINIC | Age: 55
End: 2020-09-01

## 2020-09-01 DIAGNOSIS — E10.9 DIABETES MELLITUS TYPE 1 (H): ICD-10-CM

## 2020-09-01 RX ORDER — SYRING-NEEDL,DISP,INSUL,0.3 ML 31GX15/64"
SYRINGE, EMPTY DISPOSABLE MISCELLANEOUS
Qty: 200 EACH | Refills: 6 | Status: SHIPPED | OUTPATIENT
Start: 2020-09-01 | End: 2020-09-08

## 2020-09-01 NOTE — TELEPHONE ENCOUNTER
Will forward to AMY Escobar to review.    Nandini Jenkins LPN  Diabetes Clinic Coordinator   Adult Endocrinology and Pediatric Specialty Clinics  [facility]

## 2020-09-03 DIAGNOSIS — E03.9 HYPOTHYROIDISM, UNSPECIFIED TYPE: ICD-10-CM

## 2020-09-03 DIAGNOSIS — E10.9 DIABETES MELLITUS TYPE I (H): Primary | ICD-10-CM

## 2020-09-04 ENCOUNTER — MYC MEDICAL ADVICE (OUTPATIENT)
Dept: ENDOCRINOLOGY | Facility: CLINIC | Age: 55
End: 2020-09-04

## 2020-09-04 DIAGNOSIS — E10.9 TYPE 1 DIABETES MELLITUS WITHOUT COMPLICATION (H): Chronic | ICD-10-CM

## 2020-09-04 RX ORDER — SYRINGE,INSUL U-500,NDL,0.5ML 31GX15/64"
SYRINGE, EMPTY DISPOSABLE MISCELLANEOUS
Qty: 200 EACH | Refills: 6 | Status: SHIPPED | OUTPATIENT
Start: 2020-09-04 | End: 2021-03-08 | Stop reason: ALTCHOICE

## 2020-09-04 RX ORDER — LEVOTHYROXINE SODIUM 75 UG/1
TABLET ORAL
Qty: 90 TABLET | Refills: 0 | Status: SHIPPED | OUTPATIENT
Start: 2020-09-04 | End: 2020-12-27

## 2020-09-04 NOTE — TELEPHONE ENCOUNTER
insulin syringe/needle U-500 (BD INSULIN SYRINGE U-500) 31G X 6MM 0.5 ML   Last Written Prescription Date:  9/1/2020  Last Fill Quantity: 200,   # refills: 6  Last Office Visit :3/9/2020  Future Office visit:  9/14/2020  200 each 6 Refills sent to pharm 9/4/2020      Nirali Palacio RN  Central Triage Red Flags/Med Refills

## 2020-09-08 ENCOUNTER — MYC MEDICAL ADVICE (OUTPATIENT)
Dept: ENDOCRINOLOGY | Facility: CLINIC | Age: 55
End: 2020-09-08

## 2020-09-08 DIAGNOSIS — E10.9 DIABETES MELLITUS TYPE 1 (H): ICD-10-CM

## 2020-09-08 RX ORDER — SYRING-NEEDL,DISP,INSUL,0.3 ML 31GX15/64"
SYRINGE, EMPTY DISPOSABLE MISCELLANEOUS
Qty: 200 EACH | Refills: 6 | Status: SHIPPED | OUTPATIENT
Start: 2020-09-08 | End: 2021-03-08 | Stop reason: ALTCHOICE

## 2020-09-08 RX ORDER — INSULIN GLULISINE 100 [IU]/ML
INJECTION, SOLUTION SUBCUTANEOUS
Qty: 20 ML | Refills: 6 | Status: SHIPPED | OUTPATIENT
Start: 2020-09-08 | End: 2021-03-08 | Stop reason: ALTCHOICE

## 2020-09-08 NOTE — TELEPHONE ENCOUNTER
insulin glulisine (APIDRA VIAL) 100 UNIT/ML injection   Last Written Prescription Date:  ?  Last Fill Quantity: ?,   # refills: ?  Last Office Visit : 3/9/2020  Future Office visit:  9/14/2020    Routing refill request to provider for review/approval because:  Glulisine Insulin not on updated med list.      Nirali Palacio RN  Central Triage Red Flags/Med Refills

## 2020-09-09 ENCOUNTER — TELEPHONE (OUTPATIENT)
Dept: FAMILY MEDICINE | Facility: OTHER | Age: 55
End: 2020-09-09

## 2020-09-09 NOTE — TELEPHONE ENCOUNTER
Central Prior Authorization Team   Phone: [phone]      PA Initiation    Medication: esomeprazole (NEXIUM) 20 MG DR capsule  Insurance Company: [facility] - Phone [phone] Fax [phone]  Pharmacy Filling the Rx: [facility] [id number] [facility] [facility] [address] [facility]  Filling Pharmacy Phone: [phone]  Filling Pharmacy Fax:    Start Date: 9/9/2020

## 2020-09-11 ENCOUNTER — TRANSFERRED RECORDS (OUTPATIENT)
Dept: HEALTH INFORMATION MANAGEMENT | Facility: CLINIC | Age: 55
End: 2020-09-11

## 2020-09-11 NOTE — TELEPHONE ENCOUNTER
Prior Authorization Approval    Authorization Effective Date: 6/11/2020  Authorization Expiration Date: 9/11/2021  Medication: esomeprazole (NEXIUM) 20 MG DR capsule  Approved Dose/Quantity:    Reference #:     Insurance Company: [employer] [place] - Phone [phone] Fax [phone]  Expected CoPay:       CoPay Card Available:      Foundation Assistance Needed:    Which Pharmacy is filling the prescription (Not needed for infusion/clinic administered): [facility] [address] [place] [id number] [facility]  Pharmacy Notified: Yes  Patient Notified: Yes **Instructed pharmacy to notify patient when script is ready to /ship.**

## 2020-09-14 ENCOUNTER — VIRTUAL VISIT (OUTPATIENT)
Dept: ENDOCRINOLOGY | Facility: CLINIC | Age: 55
End: 2020-09-14
Payer: COMMERCIAL

## 2020-09-14 DIAGNOSIS — E10.9 TYPE 1 DIABETES MELLITUS WITHOUT COMPLICATION (H): ICD-10-CM

## 2020-09-14 DIAGNOSIS — E10.649 TYPE 1 DIABETES MELLITUS WITH HYPOGLYCEMIA AND WITHOUT COMA (H): Primary | ICD-10-CM

## 2020-09-14 DIAGNOSIS — E06.3 HYPOTHYROIDISM DUE TO HASHIMOTO'S THYROIDITIS: ICD-10-CM

## 2020-09-14 DIAGNOSIS — E10.9 TYPE 1 DIABETES MELLITUS WITHOUT COMPLICATION (H): Chronic | ICD-10-CM

## 2020-09-14 LAB
BUN SERPL-MCNC: 14 MG/DL (ref 7–30)
CHOLEST SERPL-MCNC: 190 MG/DL
CREAT SERPL-MCNC: 0.76 MG/DL (ref 0.52–1.04)
CREAT UR-MCNC: 116 MG/DL
GFR SERPL CREATININE-BSD FRML MDRD: 88 ML/MIN/{1.73_M2}
HBA1C MFR BLD: 6.2 % (ref 0–5.6)
HDLC SERPL-MCNC: 74 MG/DL
LDLC SERPL CALC-MCNC: 102 MG/DL
MICROALBUMIN UR-MCNC: 8 MG/L
MICROALBUMIN/CREAT UR: 7.01 MG/G CR (ref 0–25)
NONHDLC SERPL-MCNC: 116 MG/DL
POTASSIUM SERPL-SCNC: 4.3 MMOL/L (ref 3.4–5.3)
TRIGL SERPL-MCNC: 69 MG/DL
TSH SERPL DL<=0.005 MIU/L-ACNC: 0.12 MU/L (ref 0.4–4)

## 2020-09-14 PROCEDURE — 36415 COLL VENOUS BLD VENIPUNCTURE: CPT | Performed by: INTERNAL MEDICINE

## 2020-09-14 PROCEDURE — 83516 IMMUNOASSAY NONANTIBODY: CPT | Performed by: INTERNAL MEDICINE

## 2020-09-14 PROCEDURE — 80061 LIPID PANEL: CPT | Performed by: INTERNAL MEDICINE

## 2020-09-14 PROCEDURE — 99214 OFFICE O/P EST MOD 30 MIN: CPT | Mod: 95 | Performed by: INTERNAL MEDICINE

## 2020-09-14 PROCEDURE — 84520 ASSAY OF UREA NITROGEN: CPT | Performed by: INTERNAL MEDICINE

## 2020-09-14 PROCEDURE — 82043 UR ALBUMIN QUANTITATIVE: CPT | Performed by: INTERNAL MEDICINE

## 2020-09-14 PROCEDURE — 82565 ASSAY OF CREATININE: CPT | Performed by: INTERNAL MEDICINE

## 2020-09-14 PROCEDURE — 83036 HEMOGLOBIN GLYCOSYLATED A1C: CPT | Performed by: INTERNAL MEDICINE

## 2020-09-14 PROCEDURE — 84132 ASSAY OF SERUM POTASSIUM: CPT | Performed by: INTERNAL MEDICINE

## 2020-09-14 PROCEDURE — 84443 ASSAY THYROID STIM HORMONE: CPT | Performed by: INTERNAL MEDICINE

## 2020-09-14 RX ORDER — INSULIN GLARGINE 100 [IU]/ML
INJECTION, SOLUTION SUBCUTANEOUS
Qty: 10 ML | Refills: 1 | Status: SHIPPED | OUTPATIENT
Start: 2020-09-14 | End: 2021-01-18

## 2020-09-14 NOTE — PATIENT INSTRUCTIONS
Decrease Lantus to 3 units and 7 units  Please let us know if you still have problems with low blood sugars, we would probably decrease the Lantus further  Also let us know if you have blood problems his blood sugars when you are exercising    Follow-up with me in 6 months

## 2020-09-14 NOTE — PROGRESS NOTES
"Dexcom linked  Maria M Marcus is a 55 year old female who is being evaluated via a billable video visit.      The patient has been notified of following:     \"This video visit will be conducted via a call between you and your physician/provider. We have found that certain health care needs can be provided without the need for an in-person physical exam.  This service lets us provide the care you need with a video conversation.  If a prescription is necessary we can send it directly to your pharmacy.  If lab work is needed we can place an order for that and you can then stop by our lab to have the test done at a later time.    Video visits are billed at different rates depending on your insurance coverage.  Please reach out to your insurance provider with any questions.    If during the course of the call the physician/provider feels a video visit is not appropriate, you will not be charged for this service.\"    Patient has given verbal consent for Video visit? Yes  How would you like to obtain your AVS? MyChart  If you are dropped from the video visit, the video invite should be resent to: Other e-mail: my chart  Will anyone else be joining your video visit? No         Video-Visit Details    Type of service:  Video Visit    Video Start Time: 11:05am  Video End Time: 11:32am    Originating Location (pt. Location): Home    Distant Location (provider location):  [facility]     Platform used for Video Visit: [facility]      Endocrinology and Diabetes Clinic       Interval history:  Maria M Marcus aged 55 year old years old woman with type 1 diabetes with retinopathy with comorbidity of vitiligo, hypothyroidism and obesity for 6 months follow-up  Patient recently had a retinal hemorrhage  Otherwise has been doing quite welHas notedl  Hypoglycemia  At night including midnightDE  Medications Lantus 4 units a.m. 8 units p.m. Apidra 2 to 3 units per meal carb; she calculates carb ratio 1 unit for each " 20 g of carbohydrates, and then substrates 1 unit per meal, correction 1 unit for 100 mg of above 100; this is decreased from before  Patient notes some nausea however relates this to GERD, no vomiting, no anorexia, weight gain, not weight loss, not more physically active than before hand also she plans to, no muscle weakness, no dizziness  Checks blood pressure with cuff at home, typically below 130/85  Menopause started Estradiol      Assessment:  (E10.649) Type 1 diabetes mellitus with hypoglycemia and without coma (H)  (primary encounter diagnosis)  Middle-aged woman with longstanding type 1 diabetes with recent retinal hemorrhage and recent decrease in insulin needs and accompanying hypoglycemia.  Decrease and her already low insulin needs is unclear, she does not really present with symptoms of adrenal insufficiency however will add on cortisol, await TSH result  In the meantime adjust Lantus to reflect lower mealtime needs    Plan:   Cortisol, Hemoglobin A1c  Decrease Lantus to 3 units in the morning 7 units in the evening  Continue Apidra 2 to 3 units with each meal  Contact us if hypoglycemia persists  Follow-up with me in 6 months    Addendum:    Pt uses DEXCOM consistently to add for treatment decision for insulin dosing.  Review of her sensor download: average  mg/dl, standard deviation 51 67% in range, 23% high 2% very high 6 % low 2% very low; pattern hyperglyemia trend 150-low 200s 2-4am      Orders Placed This Encounter   Procedures     Cortisol     Hemoglobin A1c     This was a 25 min visit of which more than 23 minutes were spent in counseling in regards to diagnosis, clinical consequences and treatment indications and options of blood glucose control    Lena Bradford MD  Endocrinology and Diabetes  [facility]  [address] [facility] [address]  Pager [phone]  [facility] [address]  Tel [phone]    Medications:   Current Outpatient Medications   Medication Sig Dispense Refill  "    blood glucose (NO BRAND SPECIFIED) lancets standard Use to test blood sugar 6 times daily or as directed. Use brand compatible with patients insurance and device. 400 each 2     esomeprazole (NEXIUM) 20 MG DR capsule Take 1 capsule (20 mg) by mouth every morning (before breakfast) Take 30-60 minutes before eating. 90 capsule 1     estradiol (ESTRACE) 1 MG tablet Take 1 tablet (1 mg) by mouth daily 90 tablet 3     EUTHYROX 75 MCG tablet Take 1 tablet by mouth once daily 90 tablet 0     insulin glargine (LANTUS VIAL) 100 UNIT/ML vial INJECT UP TO 3 UNITS am and 7 units pm SUBCUTANEOUSLY DAILY 10 mL 1     insulin glulisine (APIDRA VIAL) 100 UNIT/ML injection Inject 1 unit per 20 grams of carb with meals, total daily dose is 30 units. 20 mL 6     insulin syringe-needle U-100 (BD VEO INSULIN SYRINGE U/F) 31G X 15/64\" 0.3 ML Use 5-6 syringes daily or as directed. 200 each 6     insulin syringe/needle U-500 (BD INSULIN SYRINGE U-500) 31G X 6MM 0.5 ML Use 1 syringe 4-5 times daily or as directed. 200 each 6     lisinopril (PRINIVIL/ZESTRIL) 10 MG tablet TAKE 1 TABLET BY MOUTH ONCE DAILY 90 tablet 2     pravastatin (PRAVACHOL) 10 MG tablet Take 1 tablet (10 mg) by mouth daily 90 tablet 3     blood glucose (CONTOUR NEXT TEST) test strip Use to test blood sugar up to 6 times daily. (Patient not taking: Reported on 9/14/2020) 500 each 2     blood glucose monitoring (SOFTCLIX) lancets Use to test blood sugar 6 times daily or as directed. (Patient not taking: Reported on 9/14/2020) 400 each 2     Blood Glucose Monitoring Suppl (CONTOUR NEXT ONE) KIT 1 each daily Use to test blood glucose. (Patient not taking: Reported on 9/14/2020) 1 kit 1     DULoxetine (CYMBALTA) 30 MG capsule Take 1 capsule by mouth once daily 30 capsule 5     omeprazole (PRILOSEC) 20 MG DR capsule Take 1 capsule by mouth once daily (Patient not taking: Reported on 9/14/2020) 90 capsule 1       Social History:  Social History     Tobacco Use     Smoking " status: Never Smoker     Smokeless tobacco: Never Used     Tobacco comment: no exposure   Substance Use Topics     Alcohol use: Yes     Comment: winex1-2/3x per yr.       Family History  FAMILY HISTORY      Review of Systems: ins addition to stated above  GENERAL: Negative  SKIN: Negative  HENT: Negative   EYE: Negative  HEART: Negative  RESPIRATORY: Negative   GI: Negative  : Negative  MSK: Negative  BLOOD/LYMPH: Negative  NEUROLOGIC: Negative   PSYCH: Negative    Physical Examination:  not currently breastfeeding.  There is no height or weight on file to calculate BMI.    Wt Readings from Last 4 Encounters:   08/05/20 70.1 kg (154 lb 9.6 oz)   08/03/20 70.1 kg (154 lb 9.6 oz)   06/24/20 70.2 kg (154 lb 12.8 oz)   05/25/20 69.9 kg (154 lb)        Reported vitals:  There were no vitals taken for this visit.   healthy, alert and no distress  PSYCH: Alert and oriented times 3; coherent speech, normal   rate and volume, able to articulate logical thoughts, able   to abstract reason, no tangential thoughts, no hallucinations   or delusions  Her affect is normal and pleasant  RESP: No cough, no audible wheezing, able to talk in full sentences  Remainder of exam unable to be completed due to telephone visits         Labs and Studies:   Lab Results   Component Value Date     05/25/2020    CHLORIDE 106 05/25/2020    CO2 27 05/25/2020     (H) 05/25/2020    CR 0.76 09/14/2020    CR 0.96 05/25/2020    CR 0.83 02/01/2020    CR 0.77 01/31/2020    CR 0.77 01/30/2020    DORIS 8.4 (L) 05/25/2020    ALBUMIN 4.0 05/25/2020    ALKPHOS 92 05/25/2020     (H) 09/14/2020    HDL 74 09/14/2020    TRIG 69 09/14/2020     Lab Results   Component Value Date    MICROL 8 09/14/2020    MICROL <5 11/08/2019    MICROL <5 04/25/2019    MICROL <5 04/13/2018    MICROL <5 07/28/2017     Lab Results   Component Value Date    A1C 6.2 (H) 09/14/2020    A1C 5.8 (A) 03/09/2020    A1C 5.8 (A) 11/08/2019    A1C 6.3 (H) 04/25/2019    A1C 6.2  (A) 04/12/2019       Lab Results   Component Value Date    HGB 12.6 05/25/2020         Complications  1. Retinopathy: yes - hemorrhage 9/2020  2. Nephropathy: no  3. Neuropathy: no  4. Hypoglycemia: yes   5. Macrovascular:  No  .

## 2020-09-14 NOTE — LETTER
"    9/14/2020         RE: Maria M Marcus  [address] [place] [address]        Dear Colleague,    Thank you for referring your patient, Maria M Marcus, to the [facility]. Please see a copy of my visit note below.    Dexcom linked  Maria M Marcus is a 55 year old female who is being evaluated via a billable video visit.      The patient has been notified of following:     \"This video visit will be conducted via a call between you and your physician/provider. We have found that certain health care needs can be provided without the need for an in-person physical exam.  This service lets us provide the care you need with a video conversation.  If a prescription is necessary we can send it directly to your pharmacy.  If lab work is needed we can place an order for that and you can then stop by our lab to have the test done at a later time.    Video visits are billed at different rates depending on your insurance coverage.  Please reach out to your insurance provider with any questions.    If during the course of the call the physician/provider feels a video visit is not appropriate, you will not be charged for this service.\"    Patient has given verbal consent for Video visit? Yes  How would you like to obtain your AVS? MyChart  If you are dropped from the video visit, the video invite should be resent to: Other e-mail: my chart  Will anyone else be joining your video visit? No         Video-Visit Details    Type of service:  Video Visit    Video Start Time: 11:05am  Video End Time: 11:32am    Originating Location (pt. Location): Home    Distant Location (provider location):  [facility]     Platform used for Video Visit: [facility]      Endocrinology and Diabetes Clinic       Interval history:  Maria M Marcus aged 55 year old years old woman with type 1 diabetes with retinopathy with comorbidity of vitiligo, hypothyroidism and obesity for 6 months " follow-up  Patient recently had a retinal hemorrhage  Otherwise has been doing quite welHas notedl  Hypoglycemia  At night including midnight  Medications Lantus 4 units a.m. 8 units p.m. Apidra 2 to 3 units per meal carb; she calculates carb ratio 1 unit for each 20 g of carbohydrates, and then substrates 1 unit per meal, correction 1 unit for 100 mg of above 100; this is decreased from before  Patient notes some nausea however relates this to GERD, no vomiting, no anorexia, weight gain, not weight loss, not more physically active than before hand also she plans to, no muscle weakness, no dizziness  Checks blood pressure with cuff at home, typically below 130/85  Menopause started Estradiol      Assessment:  (E10.649) Type 1 diabetes mellitus with hypoglycemia and without coma (H)  (primary encounter diagnosis)  Middle-aged woman with longstanding type 1 diabetes with recent retinal hemorrhage and recent decrease in insulin needs and accompanying hypoglycemia.  Decrease and her already low insulin needs is unclear, she does not really present with symptoms of adrenal insufficiency however will add on cortisol, await TSH result  In the meantime adjust Lantus to reflect lower mealtime needs    Plan:   Cortisol, Hemoglobin A1c  Decrease Lantus to 3 units in the morning 7 units in the evening  Continue Apidra 2 to 3 units with each meal  Contact us if hypoglycemia persists  Follow-up with me in 6 months    Orders Placed This Encounter   Procedures     Cortisol     Hemoglobin A1c     This was a 25 min visit of which more than 23 minutes were spent in counseling in regards to diagnosis, clinical consequences and treatment indications and options of blood glucose control    Lena Bradford MD  Endocrinology and Diabetes  [facility]  [address] [facility] [address]  Pager [phone]  [facility] [address]  Tel [phone]    Medications:   Current Outpatient Medications   Medication Sig Dispense Refill     blood  "glucose (NO BRAND SPECIFIED) lancets standard Use to test blood sugar 6 times daily or as directed. Use brand compatible with patients insurance and device. 400 each 2     esomeprazole (NEXIUM) 20 MG DR capsule Take 1 capsule (20 mg) by mouth every morning (before breakfast) Take 30-60 minutes before eating. 90 capsule 1     estradiol (ESTRACE) 1 MG tablet Take 1 tablet (1 mg) by mouth daily 90 tablet 3     EUTHYROX 75 MCG tablet Take 1 tablet by mouth once daily 90 tablet 0     insulin glargine (LANTUS VIAL) 100 UNIT/ML vial INJECT UP TO 3 UNITS am and 7 units pm SUBCUTANEOUSLY DAILY 10 mL 1     insulin glulisine (APIDRA VIAL) 100 UNIT/ML injection Inject 1 unit per 20 grams of carb with meals, total daily dose is 30 units. 20 mL 6     insulin syringe-needle U-100 (BD VEO INSULIN SYRINGE U/F) 31G X 15/64\" 0.3 ML Use 5-6 syringes daily or as directed. 200 each 6     insulin syringe/needle U-500 (BD INSULIN SYRINGE U-500) 31G X 6MM 0.5 ML Use 1 syringe 4-5 times daily or as directed. 200 each 6     lisinopril (PRINIVIL/ZESTRIL) 10 MG tablet TAKE 1 TABLET BY MOUTH ONCE DAILY 90 tablet 2     pravastatin (PRAVACHOL) 10 MG tablet Take 1 tablet (10 mg) by mouth daily 90 tablet 3     blood glucose (CONTOUR NEXT TEST) test strip Use to test blood sugar up to 6 times daily. (Patient not taking: Reported on 9/14/2020) 500 each 2     blood glucose monitoring (SOFTCLIX) lancets Use to test blood sugar 6 times daily or as directed. (Patient not taking: Reported on 9/14/2020) 400 each 2     Blood Glucose Monitoring Suppl (CONTOUR NEXT ONE) KIT 1 each daily Use to test blood glucose. (Patient not taking: Reported on 9/14/2020) 1 kit 1     DULoxetine (CYMBALTA) 30 MG capsule Take 1 capsule by mouth once daily 30 capsule 5     omeprazole (PRILOSEC) 20 MG DR capsule Take 1 capsule by mouth once daily (Patient not taking: Reported on 9/14/2020) 90 capsule 1       Social History:  Social History     Tobacco Use     Smoking status: " Never Smoker     Smokeless tobacco: Never Used     Tobacco comment: no exposure   Substance Use Topics     Alcohol use: Yes     Comment: winex1-2/3x per yr.       Family History  FAMILY HISTORY      Review of Systems: ins addition to stated above  GENERAL: Negative  SKIN: Negative  HENT: Negative   EYE: Negative  HEART: Negative  RESPIRATORY: Negative   GI: Negative  : Negative  MSK: Negative  BLOOD/LYMPH: Negative  NEUROLOGIC: Negative   PSYCH: Negative    Physical Examination:  not currently breastfeeding.  There is no height or weight on file to calculate BMI.    Wt Readings from Last 4 Encounters:   08/05/20 70.1 kg (154 lb 9.6 oz)   08/03/20 70.1 kg (154 lb 9.6 oz)   06/24/20 70.2 kg (154 lb 12.8 oz)   05/25/20 69.9 kg (154 lb)        Reported vitals:  There were no vitals taken for this visit.   healthy, alert and no distress  PSYCH: Alert and oriented times 3; coherent speech, normal   rate and volume, able to articulate logical thoughts, able   to abstract reason, no tangential thoughts, no hallucinations   or delusions  Her affect is normal and pleasant  RESP: No cough, no audible wheezing, able to talk in full sentences  Remainder of exam unable to be completed due to telephone visits         Labs and Studies:   Lab Results   Component Value Date     05/25/2020    CHLORIDE 106 05/25/2020    CO2 27 05/25/2020     (H) 05/25/2020    CR 0.76 09/14/2020    CR 0.96 05/25/2020    CR 0.83 02/01/2020    CR 0.77 01/31/2020    CR 0.77 01/30/2020    DORIS 8.4 (L) 05/25/2020    ALBUMIN 4.0 05/25/2020    ALKPHOS 92 05/25/2020     (H) 09/14/2020    HDL 74 09/14/2020    TRIG 69 09/14/2020     Lab Results   Component Value Date    MICROL 8 09/14/2020    MICROL <5 11/08/2019    MICROL <5 04/25/2019    MICROL <5 04/13/2018    MICROL <5 07/28/2017     Lab Results   Component Value Date    A1C 6.2 (H) 09/14/2020    A1C 5.8 (A) 03/09/2020    A1C 5.8 (A) 11/08/2019    A1C 6.3 (H) 04/25/2019    A1C 6.2 (A)  04/12/2019       Lab Results   Component Value Date    HGB 12.6 05/25/2020         Complications  1. Retinopathy: yes - hemorrhage 9/2020  2. Nephropathy: no  3. Neuropathy: no  4. Hypoglycemia: yes   5. Macrovascular:  No  .        Again, thank you for allowing me to participate in the care of your patient.        Sincerely,        Lena Bradford MD

## 2020-09-15 LAB
TTG IGA SER-ACNC: <1 U/ML
TTG IGG SER-ACNC: <1 U/ML

## 2020-11-02 DIAGNOSIS — E10.9 DIABETES MELLITUS TYPE 1 (H): ICD-10-CM

## 2020-11-02 DIAGNOSIS — E10.9 TYPE 1 DIABETES MELLITUS WITHOUT COMPLICATION (H): ICD-10-CM

## 2020-11-02 DIAGNOSIS — I10 HYPERTENSION GOAL BP (BLOOD PRESSURE) < 140/90: ICD-10-CM

## 2020-11-02 RX ORDER — LISINOPRIL 10 MG/1
TABLET ORAL
Qty: 90 TABLET | Refills: 0 | Status: SHIPPED | OUTPATIENT
Start: 2020-11-02 | End: 2021-02-01

## 2020-11-03 NOTE — TELEPHONE ENCOUNTER
Prescription approved per [facility] Refill Protocol.  Georgia Gibbons RN

## 2020-11-05 RX ORDER — SYRING-NEEDL,DISP,INSUL,0.3 ML 31GX15/64"
SYRINGE, EMPTY DISPOSABLE MISCELLANEOUS
Qty: 100 EACH | Refills: 0 | OUTPATIENT
Start: 2020-11-05

## 2020-12-14 ENCOUNTER — MYC REFILL (OUTPATIENT)
Dept: FAMILY MEDICINE | Facility: OTHER | Age: 55
End: 2020-12-14

## 2020-12-14 DIAGNOSIS — G89.4 CHRONIC PAIN SYNDROME: ICD-10-CM

## 2020-12-14 DIAGNOSIS — M54.12 CERVICAL RADICULOPATHY: ICD-10-CM

## 2020-12-16 RX ORDER — DULOXETIN HYDROCHLORIDE 30 MG/1
CAPSULE, DELAYED RELEASE ORAL
Qty: 30 CAPSULE | Refills: 3 | Status: SHIPPED | OUTPATIENT
Start: 2020-12-16 | End: 2021-04-13

## 2020-12-16 RX ORDER — DULOXETIN HYDROCHLORIDE 30 MG/1
CAPSULE, DELAYED RELEASE ORAL
Qty: 30 CAPSULE | Refills: 5 | OUTPATIENT
Start: 2020-12-16

## 2020-12-24 DIAGNOSIS — E03.9 HYPOTHYROIDISM, UNSPECIFIED TYPE: ICD-10-CM

## 2020-12-24 NOTE — TELEPHONE ENCOUNTER
Pending Prescriptions:                       Disp   Refills    levothyroxine (SYNTHROID/LEVOTHROID) 75 MC*90 tab*0        Sig: Take 1 tablet by mouth once daily        Routing refill request to provider for review/approval because:  Drug not active on patient's medication list  Last Seen: 8 months ago.     Tr Ortiz RN  December 24, 2020           Medications/EKG/Imaging Studies/Labs

## 2020-12-27 RX ORDER — LEVOTHYROXINE SODIUM 75 UG/1
TABLET ORAL
Qty: 90 TABLET | Refills: 0 | Status: SHIPPED | OUTPATIENT
Start: 2020-12-27 | End: 2020-12-31 | Stop reason: DRUGHIGH

## 2020-12-28 NOTE — TELEPHONE ENCOUNTER
Called and spoke with patient regarding message below.  Patient verbalized understanding.  Jennifer Prince CMA ([facility])

## 2020-12-30 ENCOUNTER — MYC MEDICAL ADVICE (OUTPATIENT)
Dept: FAMILY MEDICINE | Facility: OTHER | Age: 55
End: 2020-12-30

## 2020-12-30 DIAGNOSIS — E03.9 HYPOTHYROIDISM, UNSPECIFIED TYPE: ICD-10-CM

## 2020-12-30 LAB
T4 FREE SERPL-MCNC: 1.2 NG/DL (ref 0.76–1.46)
TSH SERPL DL<=0.005 MIU/L-ACNC: 0.05 MU/L (ref 0.4–4)

## 2020-12-30 PROCEDURE — 84443 ASSAY THYROID STIM HORMONE: CPT | Performed by: PHYSICIAN ASSISTANT

## 2020-12-30 PROCEDURE — 36415 COLL VENOUS BLD VENIPUNCTURE: CPT | Performed by: PHYSICIAN ASSISTANT

## 2020-12-30 PROCEDURE — 84439 ASSAY OF FREE THYROXINE: CPT | Performed by: PHYSICIAN ASSISTANT

## 2020-12-31 DIAGNOSIS — E03.9 HYPOTHYROIDISM, UNSPECIFIED TYPE: Primary | ICD-10-CM

## 2020-12-31 RX ORDER — LEVOTHYROXINE SODIUM 50 UG/1
50 TABLET ORAL DAILY
Qty: 90 TABLET | Refills: 1 | Status: SHIPPED | OUTPATIENT
Start: 2020-12-31 | End: 2021-06-23

## 2020-12-31 NOTE — TELEPHONE ENCOUNTER
Called and spoke with patient. Informed of result note sent by Fer Salgado via [employer]. Thyroid hormone is still over replaced. Levothyroxine decreased to 50 mcg. Rx sent to [place]. Recheck in 8 weeks.   Liv Chapman MA

## 2021-01-07 ENCOUNTER — TELEPHONE (OUTPATIENT)
Dept: ENDOCRINOLOGY | Facility: CLINIC | Age: 56
End: 2021-01-07

## 2021-01-07 NOTE — TELEPHONE ENCOUNTER
Received call from Marcus at [employer] noting they received documentation but it does not note if patient is still using the CGM supplies. Marcus notes that they will send over a fax and it needs to be updated if patient is still using CGM supplies.       Will update CDE, Jessica Demarco.      Batsheva Gonzalez, RN  Endocrine Care Coordinator  [facility]

## 2021-01-09 ENCOUNTER — HEALTH MAINTENANCE LETTER (OUTPATIENT)
Age: 56
End: 2021-01-09

## 2021-01-14 NOTE — TELEPHONE ENCOUNTER
Fer calling again because they need documentation stating that the patient is using the continuous glucose monitoring supplies.   Fax [phone]

## 2021-01-15 NOTE — TELEPHONE ENCOUNTER
Dr Bradford addended visit note per per request. Notes faxed to Fer @ [phone].    Jessica Demarco RN, BSN, CDE   [facility]

## 2021-01-17 DIAGNOSIS — E10.9 TYPE 1 DIABETES MELLITUS WITHOUT COMPLICATION (H): Chronic | ICD-10-CM

## 2021-01-17 NOTE — TELEPHONE ENCOUNTER
insulin glargine (LANTUS VIAL) 100 UNIT/ML vial        Last Written Prescription Date:  09/14/20  Last Fill Quantity: 10mL,   # refills: 1  Last Office Visit : 09/14/20  Future Office visit:  03/15/21    Routing refill request to provider for review/approval because:  Insulin - refilled per clinic

## 2021-01-18 RX ORDER — INSULIN GLARGINE 100 [IU]/ML
INJECTION, SOLUTION SUBCUTANEOUS
Qty: 10 ML | Refills: 1 | Status: SHIPPED | OUTPATIENT
Start: 2021-01-18 | End: 2021-03-08 | Stop reason: ALTCHOICE

## 2021-01-30 DIAGNOSIS — E10.9 TYPE 1 DIABETES MELLITUS WITHOUT COMPLICATION (H): ICD-10-CM

## 2021-01-30 DIAGNOSIS — I10 HYPERTENSION GOAL BP (BLOOD PRESSURE) < 140/90: ICD-10-CM

## 2021-02-01 RX ORDER — LISINOPRIL 10 MG/1
TABLET ORAL
Qty: 90 TABLET | Refills: 0 | Status: SHIPPED | OUTPATIENT
Start: 2021-02-01 | End: 2021-03-12

## 2021-02-01 NOTE — TELEPHONE ENCOUNTER
Prescription approved per [facility] Refill Protocol.  Tr Ortiz RN, BSN  [place]  February 1, 2021

## 2021-02-10 ENCOUNTER — TRANSFERRED RECORDS (OUTPATIENT)
Dept: HEALTH INFORMATION MANAGEMENT | Facility: CLINIC | Age: 56
End: 2021-02-10

## 2021-02-10 LAB — RETINOPATHY: POSITIVE

## 2021-02-15 ENCOUNTER — MYC MEDICAL ADVICE (OUTPATIENT)
Dept: FAMILY MEDICINE | Facility: OTHER | Age: 56
End: 2021-02-15

## 2021-02-23 DIAGNOSIS — E06.3 HYPOTHYROIDISM DUE TO HASHIMOTO'S THYROIDITIS: Primary | ICD-10-CM

## 2021-02-25 DIAGNOSIS — E06.3 HYPOTHYROIDISM DUE TO HASHIMOTO'S THYROIDITIS: ICD-10-CM

## 2021-02-25 LAB
T4 FREE SERPL-MCNC: 0.9 NG/DL (ref 0.76–1.46)
TSH SERPL DL<=0.005 MIU/L-ACNC: 4.53 MU/L (ref 0.4–4)

## 2021-02-25 PROCEDURE — 84443 ASSAY THYROID STIM HORMONE: CPT | Performed by: INTERNAL MEDICINE

## 2021-02-25 PROCEDURE — 36415 COLL VENOUS BLD VENIPUNCTURE: CPT | Performed by: INTERNAL MEDICINE

## 2021-02-25 PROCEDURE — 84439 ASSAY OF FREE THYROXINE: CPT | Performed by: INTERNAL MEDICINE

## 2021-03-01 DIAGNOSIS — E10.9 TYPE 1 DIABETES MELLITUS WITHOUT COMPLICATION (H): ICD-10-CM

## 2021-03-02 DIAGNOSIS — E10.9 TYPE 1 DIABETES MELLITUS WITHOUT COMPLICATION (H): ICD-10-CM

## 2021-03-03 DIAGNOSIS — K21.9 GASTROESOPHAGEAL REFLUX DISEASE: ICD-10-CM

## 2021-03-03 RX ORDER — LANCETS
EACH MISCELLANEOUS
Qty: 600 EACH | Refills: 1 | Status: SHIPPED | OUTPATIENT
Start: 2021-03-03 | End: 2021-10-27

## 2021-03-03 NOTE — TELEPHONE ENCOUNTER
Prescription approved per [facility] Refill Protocol.  Tr Ortiz RN, BSN  [place]  March 3, 2021

## 2021-03-05 ENCOUNTER — OFFICE VISIT (OUTPATIENT)
Dept: ENDOCRINOLOGY | Facility: CLINIC | Age: 56
End: 2021-03-05
Payer: COMMERCIAL

## 2021-03-05 ENCOUNTER — MYC MEDICAL ADVICE (OUTPATIENT)
Dept: ENDOCRINOLOGY | Facility: CLINIC | Age: 56
End: 2021-03-05

## 2021-03-05 VITALS
OXYGEN SATURATION: 99 % | DIASTOLIC BLOOD PRESSURE: 77 MMHG | BODY MASS INDEX: 33.75 KG/M2 | SYSTOLIC BLOOD PRESSURE: 140 MMHG | HEART RATE: 99 BPM | WEIGHT: 161.5 LBS

## 2021-03-05 DIAGNOSIS — E10.9 TYPE 1 DIABETES MELLITUS WITHOUT COMPLICATION (H): ICD-10-CM

## 2021-03-05 LAB — HBA1C MFR BLD: 6.1 % (ref 0–5.6)

## 2021-03-05 PROCEDURE — 83036 HEMOGLOBIN GLYCOSYLATED A1C: CPT | Performed by: INTERNAL MEDICINE

## 2021-03-05 PROCEDURE — 99215 OFFICE O/P EST HI 40 MIN: CPT | Performed by: INTERNAL MEDICINE

## 2021-03-05 RX ORDER — LEVOTHYROXINE SODIUM 75 UG/1
75 TABLET ORAL DAILY
COMMUNITY
Start: 2020-12-27 | End: 2021-06-25 | Stop reason: ALTCHOICE

## 2021-03-05 NOTE — LETTER
3/5/2021         RE: Maria M Marcus  [address] [place] [address]        Dear Colleague,    Thank you for referring your patient, Maria M Marcus, to the [facility]. Please see a copy of my visit note below.      Endocrinology and Diabetes Clinic       Interval history:  Maria M Marcus aged 55 year old years old woman with type 1 diabetes with retinopathy with comorbidity of vitiligo, hypothyroidism and obesity for 6 months follow-up  Since last visit patient sustained 2 more retinal bleeds, followed in the retina clinic, decrease in vision  Patient therefore would like to switch back to insulin pens instead of syringes which are hard to see for her  She would like to use half units, however is allergic to NovoLog and Humalog and has been using Apidra  She cannot use an insulin pump because of Apidra clogging the tubes as she has such low needs  She notes fasting hyperglycemia, gets up at 2 AM to take 1 to 2 units of Apidra  Has been using Dexcom sensor  Download reveals average blood sugar 142, standard deviation 52, 65% in range, 5% low 2% very low 25% high pattern blood sugars are highest between 5:57 AM, somewhat after dinner    Hypoglycemia-usually during the day particularly after correcting for morning hypoglycemia  Medications Lantus 3 units a.m. 6 units p.m. Apidra 2 to 3 units per meal carb; she calculates carb ratio 1 unit for each 20 g of carbohydrates, and then substrates 1 unit per meal, correction 1 unit for 100 mg of above 100; this is decreased from before      Patient notes some nausea however relates this to GERD, no vomiting, no anorexia, weight gain, not weight loss, not more physically active than before hand also she plans to, no muscle weakness, no dizziness  Checks blood pressure with cuff at home, typically below 130/85  Menopause started Estradiol      Assessment:  (E10.649) Type 1 diabetes mellitus with hypoglycemia and without coma (H)   (primary encounter diagnosis)  Middle-aged woman with longstanding type 1 diabetes recent decrease in insulin needs.  She has a strong dagmar phenomenon which she currently corrects by getting up around 3 AM and taking Apidra.  We will try to use NPH insulin at bedtime 2 units.  Due to decrease in vision we switch her to insulin pens.  Unfortunately she only tolerates Apidra which is not available in half units.    Hypothyroidism TSH levels have been variable, patient is struggling to take it consistently on empty stomach in the morning because of her variable blood sugars in the morning.  Recommend to take in the evening instead    Plan:   Decrease LAntus to 3 units am and 4 units pm     Take NPH insulin at 10 pm 2 units    Apidra the same except should not be necessary in the early morning hours    Contact us if hypoglycemia persists    Levothyroxine try to take 3-4 hours after not eating after dinner, then don't eat for 30 min after taking Levothyroxine        Follow-up with me in 6 months    40 minutes spent on the date of the encounter doing chart review, review of test results, interpretation of tests, patient visit and documentation      Lena Bradford MD  Endocrinology and Diabetes  [address]  Pager [phone]  [facility] [address]  Tel [phone]    Medications:   Current Outpatient Medications   Medication Sig Dispense Refill     blood glucose (CONTOUR NEXT TEST) test strip Use to test blood sugar up to 6 times daily or as directed.   Dispense CONTOUR NEXT or use brand compatible with patients insurance and device. 600 strip 1     blood glucose (NO BRAND SPECIFIED) lancets standard Use to test blood sugar 6 times daily or as directed. Use brand compatible with patients insurance and device. 400 each 2     blood glucose monitoring (SOFTCLIX) lancets Use to test blood sugar 6 times daily or as directed.   Dispense ACCU-CHEK SOFTCLIX or use brand compatible with  "patients insurance and device. 600 each 1     Blood Glucose Monitoring Suppl (CONTOUR NEXT ONE) KIT 1 each daily Use to test blood glucose. (Patient not taking: Reported on 9/14/2020) 1 kit 1     DULoxetine (CYMBALTA) 30 MG capsule Take 1 capsule by mouth once daily 30 capsule 3     esomeprazole (NEXIUM) 20 MG DR capsule TAKE 1 CAPSULE BY MOUTH IN THE MORNING 30-60  MINUTES  BEFORE  BREAKFAST 90 capsule 0     estradiol (ESTRACE) 1 MG tablet Take 1 tablet (1 mg) by mouth daily 90 tablet 3     insulin glargine (LANTUS VIAL) 100 UNIT/ML vial INJECT UP TO 3 UNITS am and 7 units pm SUBCUTANEOUSLY DAILY 10 mL 1     insulin glulisine (APIDRA VIAL) 100 UNIT/ML injection Inject 1 unit per 20 grams of carb with meals, total daily dose is 30 units. 20 mL 6     insulin syringe-needle U-100 (BD VEO INSULIN SYRINGE U/F) 31G X 15/64\" 0.3 ML Use 5-6 syringes daily or as directed. 200 each 6     insulin syringe/needle U-500 (BD INSULIN SYRINGE U-500) 31G X 6MM 0.5 ML Use 1 syringe 4-5 times daily or as directed. 200 each 6     levothyroxine (SYNTHROID/LEVOTHROID) 50 MCG tablet Take 1 tablet (50 mcg) by mouth daily 90 tablet 1     lisinopril (ZESTRIL) 10 MG tablet Take 1 tablet by mouth once daily 90 tablet 0     omeprazole (PRILOSEC) 20 MG DR capsule Take 1 capsule by mouth once daily (Patient not taking: Reported on 9/14/2020) 90 capsule 1     pravastatin (PRAVACHOL) 10 MG tablet Take 1 tablet (10 mg) by mouth daily 90 tablet 3       Social History:  Social History     Tobacco Use     Smoking status: Never Smoker     Smokeless tobacco: Never Used     Tobacco comment: no exposure   Substance Use Topics     Alcohol use: Yes     Comment: winex1-2/3x per yr.       Family History  FAMILY HISTORY      Physical Examination:  LMP  (LMP Unknown)     Wt Readings from Last 4 Encounters:   08/05/20 70.1 kg (154 lb 9.6 oz)   08/03/20 70.1 kg (154 lb 9.6 oz)   06/24/20 70.2 kg (154 lb 12.8 oz)   05/25/20 69.9 kg (154 lb)        Reported vitals:  " There were no vitals taken for this visit.   healthy, alert and no distress  PSYCH: Alert and oriented times 3; coherent speech, normal   rate and volume, able to articulate logical thoughts, able   to abstract reason, no tangential thoughts, no hallucinations   or delusions  Her affect is normal and pleasant  RESP: No cough, no audible wheezing, able to talk in full sentences  Remainder of exam unable to be completed due to telephone visits       Labs and Studies:   Lab Results   Component Value Date     05/25/2020    CHLORIDE 106 05/25/2020    CO2 27 05/25/2020     (H) 05/25/2020    CR 0.76 09/14/2020    CR 0.96 05/25/2020    CR 0.83 02/01/2020    CR 0.77 01/31/2020    CR 0.77 01/30/2020    DORIS 8.4 (L) 05/25/2020    ALBUMIN 4.0 05/25/2020    ALKPHOS 92 05/25/2020     (H) 09/14/2020    HDL 74 09/14/2020    TRIG 69 09/14/2020     Lab Results   Component Value Date    MICROL 8 09/14/2020    MICROL <5 11/08/2019    MICROL <5 04/25/2019    MICROL <5 04/13/2018    MICROL <5 07/28/2017     Lab Results   Component Value Date    A1C 6.1 (A) 03/05/2021    A1C 6.2 (H) 09/14/2020    A1C 5.8 (A) 03/09/2020    A1C 5.8 (A) 11/08/2019    A1C 6.3 (H) 04/25/2019       Lab Results   Component Value Date    HGB 12.6 05/25/2020     Lab Results   Component Value Date    TSH 4.53 (H) 02/25/2021    TSH 0.05 (L) 12/30/2020    TSH 0.12 (L) 09/14/2020    TSH 0.51 12/03/2019    TSH 0.32 (L) 11/08/2019           Complications  1. Retinopathy: yes - hemorrhage 9/2020  2. Nephropathy: no  3. Neuropathy: no  4. Hypoglycemia: yes   5. Macrovascular:  No  .        Again, thank you for allowing me to participate in the care of your patient.        Sincerely,        Lena Bradford MD

## 2021-03-05 NOTE — PROGRESS NOTES
Endocrinology and Diabetes Clinic       Interval history:  Maria M Marcus aged 55 year old years old woman with type 1 diabetes with retinopathy with comorbidity of vitiligo, hypothyroidism and obesity for 6 months follow-up  Since last visit patient sustained 2 more retinal bleeds, followed in the retina clinic, decrease in vision  Patient therefore would like to switch back to insulin pens instead of syringes which are hard to see for her  She would like to use half units, however is allergic to NovoLog and Humalog and has been using Apidra  She cannot use an insulin pump because of Apidra clogging the tubes as she has such low needs  She notes fasting hyperglycemia, gets up at 2 AM to take 1 to 2 units of Apidra  Has been using Dexcom sensor  Download reveals average blood sugar 142, standard deviation 52, 65% in range, 5% low 2% very low 25% high pattern blood sugars are highest between 5:57 AM, somewhat after dinner    Hypoglycemia-usually during the day particularly after correcting for morning hypoglycemia  Medications Lantus 3 units a.m. 6 units p.m. Apidra 2 to 3 units per meal carb; she calculates carb ratio 1 unit for each 20 g of carbohydrates, and then substrates 1 unit per meal, correction 1 unit for 100 mg of above 100; this is decreased from before      Patient notes some nausea however relates this to GERD, no vomiting, no anorexia, weight gain, not weight loss, not more physically active than before hand also she plans to, no muscle weakness, no dizziness  Checks blood pressure with cuff at home, typically below 130/85  Menopause started Estradiol      Assessment:  (E10.649) Type 1 diabetes mellitus with hypoglycemia and without coma (H)  (primary encounter diagnosis)  Middle-aged woman with longstanding type 1 diabetes recent decrease in insulin needs.  She has a strong dagmar phenomenon which she currently corrects by getting up around 3 AM and taking Apidra.  We will try to use NPH insulin  at bedtime 2 units.  Due to decrease in vision we switch her to insulin pens.  Unfortunately she only tolerates Apidra which is not available in half units.    Hypothyroidism TSH levels have been variable, patient is struggling to take it consistently on empty stomach in the morning because of her variable blood sugars in the morning.  Recommend to take in the evening instead    Plan:   Decrease LAntus to 3 units am and 4 units pm     Take NPH insulin at 10 pm 2 units    Apidra the same except should not be necessary in the early morning hours    Contact us if hypoglycemia persists    Levothyroxine try to take 3-4 hours after not eating after dinner, then don't eat for 30 min after taking Levothyroxine        Follow-up with me in 6 months    40 minutes spent on the date of the encounter doing chart review, review of test results, interpretation of tests, patient visit and documentation      Lena Bradford MD  Endocrinology and Diabetes  [facility]  [address]  Pager [phone]  [facility] [address]  Tel [phone]    Medications:   Current Outpatient Medications   Medication Sig Dispense Refill     blood glucose (CONTOUR NEXT TEST) test strip Use to test blood sugar up to 6 times daily or as directed.   Dispense CONTOUR NEXT or use brand compatible with patients insurance and device. 600 strip 1     blood glucose (NO BRAND SPECIFIED) lancets standard Use to test blood sugar 6 times daily or as directed. Use brand compatible with patients insurance and device. 400 each 2     blood glucose monitoring (SOFTCLIX) lancets Use to test blood sugar 6 times daily or as directed.   Dispense ACCU-CHEK SOFTCLIX or use brand compatible with patients insurance and device. 600 each 1     Blood Glucose Monitoring Suppl (CONTOUR NEXT ONE) KIT 1 each daily Use to test blood glucose. (Patient not taking: Reported on 9/14/2020) 1 kit 1     DULoxetine (CYMBALTA) 30 MG capsule Take 1 capsule by mouth once  "daily 30 capsule 3     esomeprazole (NEXIUM) 20 MG DR capsule TAKE 1 CAPSULE BY MOUTH IN THE MORNING 30-60  MINUTES  BEFORE  BREAKFAST 90 capsule 0     estradiol (ESTRACE) 1 MG tablet Take 1 tablet (1 mg) by mouth daily 90 tablet 3     insulin glargine (LANTUS VIAL) 100 UNIT/ML vial INJECT UP TO 3 UNITS am and 7 units pm SUBCUTANEOUSLY DAILY 10 mL 1     insulin glulisine (APIDRA VIAL) 100 UNIT/ML injection Inject 1 unit per 20 grams of carb with meals, total daily dose is 30 units. 20 mL 6     insulin syringe-needle U-100 (BD VEO INSULIN SYRINGE U/F) 31G X 15/64\" 0.3 ML Use 5-6 syringes daily or as directed. 200 each 6     insulin syringe/needle U-500 (BD INSULIN SYRINGE U-500) 31G X 6MM 0.5 ML Use 1 syringe 4-5 times daily or as directed. 200 each 6     levothyroxine (SYNTHROID/LEVOTHROID) 50 MCG tablet Take 1 tablet (50 mcg) by mouth daily 90 tablet 1     lisinopril (ZESTRIL) 10 MG tablet Take 1 tablet by mouth once daily 90 tablet 0     omeprazole (PRILOSEC) 20 MG DR capsule Take 1 capsule by mouth once daily (Patient not taking: Reported on 9/14/2020) 90 capsule 1     pravastatin (PRAVACHOL) 10 MG tablet Take 1 tablet (10 mg) by mouth daily 90 tablet 3       Social History:  Social History     Tobacco Use     Smoking status: Never Smoker     Smokeless tobacco: Never Used     Tobacco comment: no exposure   Substance Use Topics     Alcohol use: Yes     Comment: winex1-2/3x per yr.       Family History  FAMILY HISTORY      Physical Examination:  LMP  (LMP Unknown)     Wt Readings from Last 4 Encounters:   08/05/20 70.1 kg (154 lb 9.6 oz)   08/03/20 70.1 kg (154 lb 9.6 oz)   06/24/20 70.2 kg (154 lb 12.8 oz)   05/25/20 69.9 kg (154 lb)        Reported vitals:  There were no vitals taken for this visit.   healthy, alert and no distress  PSYCH: Alert and oriented times 3; coherent speech, normal   rate and volume, able to articulate logical thoughts, able   to abstract reason, no tangential thoughts, no hallucinations "   or delusions  Her affect is normal and pleasant  RESP: No cough, no audible wheezing, able to talk in full sentences  Remainder of exam unable to be completed due to telephone visits       Labs and Studies:   Lab Results   Component Value Date     05/25/2020    CHLORIDE 106 05/25/2020    CO2 27 05/25/2020     (H) 05/25/2020    CR 0.76 09/14/2020    CR 0.96 05/25/2020    CR 0.83 02/01/2020    CR 0.77 01/31/2020    CR 0.77 01/30/2020    DORIS 8.4 (L) 05/25/2020    ALBUMIN 4.0 05/25/2020    ALKPHOS 92 05/25/2020     (H) 09/14/2020    HDL 74 09/14/2020    TRIG 69 09/14/2020     Lab Results   Component Value Date    MICROL 8 09/14/2020    MICROL <5 11/08/2019    MICROL <5 04/25/2019    MICROL <5 04/13/2018    MICROL <5 07/28/2017     Lab Results   Component Value Date    A1C 6.1 (A) 03/05/2021    A1C 6.2 (H) 09/14/2020    A1C 5.8 (A) 03/09/2020    A1C 5.8 (A) 11/08/2019    A1C 6.3 (H) 04/25/2019       Lab Results   Component Value Date    HGB 12.6 05/25/2020     Lab Results   Component Value Date    TSH 4.53 (H) 02/25/2021    TSH 0.05 (L) 12/30/2020    TSH 0.12 (L) 09/14/2020    TSH 0.51 12/03/2019    TSH 0.32 (L) 11/08/2019           Complications  1. Retinopathy: yes - hemorrhage 9/2020  2. Nephropathy: no  3. Neuropathy: no  4. Hypoglycemia: yes   5. Macrovascular:  No  .

## 2021-03-05 NOTE — NURSING NOTE
Maria M Marcus's goals for this visit include:   Chief Complaint   Patient presents with     Diabetes     She requests these members of her care team be copied on today's visit information: Yes    PCP: Marcus Salgado    Referring Provider:  Marcus Salgado PA-C  [address]  [place] [address]    BP (!) 140/77 (BP Location: Left arm, Patient Position: Sitting, Cuff Size: Adult Large)   Pulse 99   Wt 73.3 kg (161 lb 8 oz)   LMP  (LMP Unknown)   SpO2 99%   BMI 33.75 kg/m      Do you need any medication refills at today's visit? No

## 2021-03-05 NOTE — PATIENT INSTRUCTIONS
Decrease LAntus to 3 units am and 4 units pm     Take NPH insulin at 10 pm 2 units    Apidra the same    Levothyroxine try to take 3-4 hours after not eating after dinner, then don't eat for 30 min after taking Levothyroxine    Repeat labs prior to the next visti, does not need to be fasting      [facility]  Jessica Demarco RN, Diabetes Educator: [phone]  Clinic Line:[phone]  Clinic Fax: [phone]  On-Call Endocrine Provider at the [facility] (after hours/weekends): [phone] option 4  Scheduling Line: [phone]    Appointment Reminders:  * Please bring meter and/or CGM device with for staff to download  * If you are due ONLY for an A1C, it is scheduled with the nurse and will be done in clinic. You do not need to schedule a lab appointment. Fasting is not required for an A1C.  * Refill request should be submitted to your pharmacy. They will contact clinic for approval.

## 2021-03-08 DIAGNOSIS — E10.9 TYPE 1 DIABETES MELLITUS (H): Primary | ICD-10-CM

## 2021-03-08 RX ORDER — HUMAN INSULIN 100 [IU]/ML
INJECTION, SUSPENSION SUBCUTANEOUS
Qty: 10 ML | Refills: 6 | Status: SHIPPED | OUTPATIENT
Start: 2021-03-08 | End: 2021-11-02

## 2021-03-08 NOTE — TELEPHONE ENCOUNTER
Notification received from covermymeds. Humulin N not covered by patient insurance.    Covered insulins:  Novolog Flexpen  Humalog Kwikpen  Novolog Mix 70/30    PA information: Key [id number]    Will forward to AMY Escobar to review.    Nandini Jenkins LPN  Adult Endocrinology   [facility]

## 2021-03-10 ENCOUNTER — MYC MEDICAL ADVICE (OUTPATIENT)
Dept: ENDOCRINOLOGY | Facility: CLINIC | Age: 56
End: 2021-03-10

## 2021-03-10 DIAGNOSIS — E10.9 TYPE 1 DIABETES MELLITUS WITHOUT COMPLICATION (H): ICD-10-CM

## 2021-03-11 DIAGNOSIS — E10.9 TYPE 1 DIABETES MELLITUS WITHOUT COMPLICATION (H): ICD-10-CM

## 2021-03-12 ENCOUNTER — OFFICE VISIT (OUTPATIENT)
Dept: FAMILY MEDICINE | Facility: OTHER | Age: 56
End: 2021-03-12
Payer: COMMERCIAL

## 2021-03-12 VITALS
SYSTOLIC BLOOD PRESSURE: 136 MMHG | HEART RATE: 94 BPM | TEMPERATURE: 97.2 F | BODY MASS INDEX: 33.44 KG/M2 | DIASTOLIC BLOOD PRESSURE: 66 MMHG | WEIGHT: 160 LBS | OXYGEN SATURATION: 99 %

## 2021-03-12 DIAGNOSIS — B37.31 VAGINAL YEAST INFECTION: Primary | ICD-10-CM

## 2021-03-12 DIAGNOSIS — I10 HYPERTENSION GOAL BP (BLOOD PRESSURE) < 140/90: ICD-10-CM

## 2021-03-12 DIAGNOSIS — E10.9 TYPE 1 DIABETES MELLITUS WITHOUT COMPLICATION (H): ICD-10-CM

## 2021-03-12 LAB
ALBUMIN UR-MCNC: NEGATIVE MG/DL
APPEARANCE UR: CLEAR
BILIRUB UR QL STRIP: NEGATIVE
COLOR UR AUTO: YELLOW
GLUCOSE UR STRIP-MCNC: NEGATIVE MG/DL
HGB UR QL STRIP: NEGATIVE
KETONES UR STRIP-MCNC: NEGATIVE MG/DL
LEUKOCYTE ESTERASE UR QL STRIP: NEGATIVE
NITRATE UR QL: NEGATIVE
PH UR STRIP: 6 PH (ref 5–7)
SOURCE: NORMAL
SP GR UR STRIP: 1.01 (ref 1–1.03)
SPECIMEN SOURCE: ABNORMAL
UROBILINOGEN UR STRIP-ACNC: 0.2 EU/DL (ref 0.2–1)
WET PREP SPEC: ABNORMAL

## 2021-03-12 PROCEDURE — 87210 SMEAR WET MOUNT SALINE/INK: CPT | Performed by: PHYSICIAN ASSISTANT

## 2021-03-12 PROCEDURE — 99214 OFFICE O/P EST MOD 30 MIN: CPT | Performed by: PHYSICIAN ASSISTANT

## 2021-03-12 RX ORDER — FLUCONAZOLE 150 MG/1
150 TABLET ORAL ONCE
Qty: 2 TABLET | Refills: 3 | Status: SHIPPED | OUTPATIENT
Start: 2021-03-12 | End: 2021-03-12

## 2021-03-12 RX ORDER — LISINOPRIL 20 MG/1
20 TABLET ORAL DAILY
Qty: 90 TABLET | Refills: 3 | Status: SHIPPED | OUTPATIENT
Start: 2021-03-12 | End: 2022-03-08

## 2021-03-12 NOTE — PATIENT INSTRUCTIONS
Will treat the yeast with Diflucan as directed.    Will increase lisinopril to 20 mg daily.  Will recheck blood pressure in 2 weeks in clinic.

## 2021-03-12 NOTE — PROGRESS NOTES
Assessment & Plan       ICD-10-CM    1. Vaginal yeast infection  B37.3 Wet prep     *UA reflex to Microscopic and Culture ([facility] (except [place] and [place])     *UA reflex to Microscopic and Culture ([facility] (except [place] and [place])     Wet prep     fluconazole (DIFLUCAN) 150 MG tablet   2. Hypertension goal BP (blood pressure) < 140/90  I10 lisinopril (ZESTRIL) 20 MG tablet   3. Type 1 diabetes mellitus without complication (H)  E10.9 lisinopril (ZESTRIL) 20 MG tablet       1. Symptoms and wet prep findings consistent with vaginal yeast infection. Will treat with oral Diflucan to take as directed. Refills to be placed on file for recurrences.    2. Systolic BP remains elevated at 140+ so will increase lisinopril to 20 mg daily. She will continue to monitor home pressures closely. Will plan on nursing BP recheck in 2 weeks.    3. She continues to follow closely with endocrinology and recent addition of NPH insulin has really helped with her nighttime readings.       Marcus Salgado PA-C  [facility] CASI Franz is a 55 year old who presents for the following health issues     HPI       Vaginal Symptoms  Onset/Duration: 3 days  Description:  Vaginal Discharge: white   Itching (Pruritis): YES  Burning sensation:  YES  Odor: no  Accompanying Signs & Symptoms:  Urinary symptoms: no  Abdominal pain: YES, LLQ  Fever: no  History:   Sexually active: YES  New Partner: no  Possibility of Pregnancy:  no  Recent antibiotic use: no  Previous vaginitis issues: YES  Therapies tried and outcome: Monistat with little relief    No dysuria, hematuria, or urinary frequency.    Her blood pressure has been running in the 140's systolic recently at home and during clinic visit. She remains on lisinopril 10 mg daily.    She continues to follow closely with endocrinology and recently started NPH insulin which is controlling her nighttime and morning  readings much better.    Review of Systems   Constitutional, HEENT, cardiovascular, pulmonary, gi and gu systems are negative, except as otherwise noted.      Objective    /66   Pulse 94   Temp 97.2  F (36.2  C) (Temporal)   Wt 72.6 kg (160 lb)   LMP  (LMP Unknown)   SpO2 99%   BMI 33.44 kg/m    Body mass index is 33.44 kg/m .  Physical Exam   GENERAL: healthy, alert and no distress  RESP: lungs clear to auscultation - no rales, rhonchi or wheezes  CV: regular rate and rhythm, normal S1 S2, no S3 or S4, no murmur, click or rub, no peripheral edema g  ABDOMEN: soft, nontender, no hepatosplenomegaly, no masses and bowel sounds normal  NEURO: Normal strength and tone, mentation intact and speech normal. Gait is stable.  PSYCH: mentation appears normal, affect normal/bright    Results for orders placed or performed in visit on 03/12/21   *UA reflex to Microscopic and Culture ([facility] (except [place] and [place])     Status: None    Specimen: Midstream Urine   Result Value Ref Range    Color Urine Yellow     Appearance Urine Clear     Glucose Urine Negative NEG^Negative mg/dL    Bilirubin Urine Negative NEG^Negative    Ketones Urine Negative NEG^Negative mg/dL    Specific Gravity Urine 1.010 1.003 - 1.035    Blood Urine Negative NEG^Negative    pH Urine 6.0 5.0 - 7.0 pH    Protein Albumin Urine Negative NEG^Negative mg/dL    Urobilinogen Urine 0.2 0.2 - 1.0 EU/dL    Nitrite Urine Negative NEG^Negative    Leukocyte Esterase Urine Negative NEG^Negative    Source Midstream Urine    Wet prep     Status: Abnormal    Specimen: Vagina   Result Value Ref Range    Specimen Description Vagina     Wet Prep No Trichomonas seen     Wet Prep No clue cells seen     Wet Prep Few  Yeast seen   (A)     Wet Prep Moderate  WBC'S seen

## 2021-03-20 ENCOUNTER — MYC MEDICAL ADVICE (OUTPATIENT)
Dept: FAMILY MEDICINE | Facility: OTHER | Age: 56
End: 2021-03-20

## 2021-03-20 DIAGNOSIS — B37.31 VAGINAL YEAST INFECTION: Primary | ICD-10-CM

## 2021-03-22 RX ORDER — CLOTRIMAZOLE 1 %
1 CREAM WITH APPLICATOR VAGINAL AT BEDTIME
Qty: 1 G | Refills: 0 | Status: SHIPPED | OUTPATIENT
Start: 2021-03-22 | End: 2021-03-29

## 2021-03-29 ENCOUNTER — ALLIED HEALTH/NURSE VISIT (OUTPATIENT)
Dept: FAMILY MEDICINE | Facility: OTHER | Age: 56
End: 2021-03-29
Payer: COMMERCIAL

## 2021-03-29 VITALS — DIASTOLIC BLOOD PRESSURE: 81 MMHG | SYSTOLIC BLOOD PRESSURE: 137 MMHG

## 2021-03-29 DIAGNOSIS — I10 HYPERTENSION GOAL BP (BLOOD PRESSURE) < 140/90: Primary | ICD-10-CM

## 2021-03-29 PROCEDURE — 99207 PR NO CHARGE NURSE ONLY: CPT

## 2021-04-12 DIAGNOSIS — G89.4 CHRONIC PAIN SYNDROME: ICD-10-CM

## 2021-04-12 DIAGNOSIS — M54.12 CERVICAL RADICULOPATHY: ICD-10-CM

## 2021-04-13 RX ORDER — DULOXETIN HYDROCHLORIDE 30 MG/1
CAPSULE, DELAYED RELEASE ORAL
Qty: 90 CAPSULE | Refills: 1 | Status: SHIPPED | OUTPATIENT
Start: 2021-04-13 | End: 2021-06-23

## 2021-04-13 NOTE — TELEPHONE ENCOUNTER
Prescription approved per [facility] Refill Protocol.    Marilia Zimmer RN on 4/13/2021 at 12:39 PM

## 2021-04-14 ENCOUNTER — TRANSFERRED RECORDS (OUTPATIENT)
Dept: HEALTH INFORMATION MANAGEMENT | Facility: CLINIC | Age: 56
End: 2021-04-14

## 2021-04-20 ENCOUNTER — MEDICAL CORRESPONDENCE (OUTPATIENT)
Dept: HEALTH INFORMATION MANAGEMENT | Facility: CLINIC | Age: 56
End: 2021-04-20

## 2021-05-10 ENCOUNTER — TRANSFERRED RECORDS (OUTPATIENT)
Dept: HEALTH INFORMATION MANAGEMENT | Facility: CLINIC | Age: 56
End: 2021-05-10

## 2021-06-03 DIAGNOSIS — K21.9 GASTROESOPHAGEAL REFLUX DISEASE: ICD-10-CM

## 2021-06-04 NOTE — TELEPHONE ENCOUNTER
Pending Prescriptions:                       Disp   Refills    esomeprazole (NEXIUM) 20 MG DR capsule [P*90 cap*0            Sig: TAKE 1 CAPSULE BY MOUTH IN THE MORNING 30-60            MINUTES  BEFORE  BREAKFAST    Medication is being filled for 1 time nii refill only due to:  Patient is due for follow-up around 7/5/21    Please call and help schedule.  Thank you!

## 2021-06-23 ENCOUNTER — OFFICE VISIT (OUTPATIENT)
Dept: FAMILY MEDICINE | Facility: OTHER | Age: 56
End: 2021-06-23
Payer: COMMERCIAL

## 2021-06-23 VITALS
TEMPERATURE: 98.6 F | SYSTOLIC BLOOD PRESSURE: 130 MMHG | RESPIRATION RATE: 12 BRPM | DIASTOLIC BLOOD PRESSURE: 68 MMHG | WEIGHT: 158 LBS | BODY MASS INDEX: 33.02 KG/M2 | HEART RATE: 108 BPM | OXYGEN SATURATION: 94 %

## 2021-06-23 DIAGNOSIS — I10 HYPERTENSION GOAL BP (BLOOD PRESSURE) < 140/90: ICD-10-CM

## 2021-06-23 DIAGNOSIS — M54.12 CERVICAL RADICULOPATHY: ICD-10-CM

## 2021-06-23 DIAGNOSIS — K21.9 GASTROESOPHAGEAL REFLUX DISEASE, UNSPECIFIED WHETHER ESOPHAGITIS PRESENT: Primary | ICD-10-CM

## 2021-06-23 DIAGNOSIS — Z80.3 FH: BREAST CANCER: ICD-10-CM

## 2021-06-23 DIAGNOSIS — G89.4 CHRONIC PAIN SYNDROME: ICD-10-CM

## 2021-06-23 DIAGNOSIS — Z12.31 ENCOUNTER FOR SCREENING MAMMOGRAM FOR BREAST CANCER: ICD-10-CM

## 2021-06-23 DIAGNOSIS — R79.81 ELEVATED CARBON DIOXIDE LEVEL: ICD-10-CM

## 2021-06-23 DIAGNOSIS — E10.9 TYPE 1 DIABETES MELLITUS WITHOUT COMPLICATION (H): ICD-10-CM

## 2021-06-23 DIAGNOSIS — E78.5 HYPERLIPIDEMIA LDL GOAL <100: ICD-10-CM

## 2021-06-23 DIAGNOSIS — G56.03 BILATERAL CARPAL TUNNEL SYNDROME: ICD-10-CM

## 2021-06-23 LAB
ALBUMIN SERPL-MCNC: 4.2 G/DL (ref 3.4–5)
ALP SERPL-CCNC: 64 U/L (ref 40–150)
ALT SERPL W P-5'-P-CCNC: 21 U/L (ref 0–50)
ANION GAP SERPL CALCULATED.3IONS-SCNC: <1 MMOL/L (ref 3–14)
AST SERPL W P-5'-P-CCNC: 16 U/L (ref 0–45)
BILIRUB SERPL-MCNC: 0.5 MG/DL (ref 0.2–1.3)
BUN SERPL-MCNC: 15 MG/DL (ref 7–30)
CALCIUM SERPL-MCNC: 8.9 MG/DL (ref 8.5–10.1)
CHLORIDE SERPL-SCNC: 105 MMOL/L (ref 94–109)
CO2 SERPL-SCNC: 35 MMOL/L (ref 20–32)
CREAT SERPL-MCNC: 1 MG/DL (ref 0.52–1.04)
GFR SERPL CREATININE-BSD FRML MDRD: 63 ML/MIN/{1.73_M2}
GLUCOSE SERPL-MCNC: 96 MG/DL (ref 70–99)
POTASSIUM SERPL-SCNC: 4.9 MMOL/L (ref 3.4–5.3)
PROT SERPL-MCNC: 7.4 G/DL (ref 6.8–8.8)
SODIUM SERPL-SCNC: 139 MMOL/L (ref 133–144)

## 2021-06-23 PROCEDURE — 36415 COLL VENOUS BLD VENIPUNCTURE: CPT | Performed by: PHYSICIAN ASSISTANT

## 2021-06-23 PROCEDURE — 80053 COMPREHEN METABOLIC PANEL: CPT | Performed by: PHYSICIAN ASSISTANT

## 2021-06-23 PROCEDURE — 90471 IMMUNIZATION ADMIN: CPT | Performed by: PHYSICIAN ASSISTANT

## 2021-06-23 PROCEDURE — 99207 PR FOOT EXAM NO CHARGE: CPT | Mod: 25 | Performed by: PHYSICIAN ASSISTANT

## 2021-06-23 PROCEDURE — 90715 TDAP VACCINE 7 YRS/> IM: CPT | Performed by: PHYSICIAN ASSISTANT

## 2021-06-23 PROCEDURE — 99215 OFFICE O/P EST HI 40 MIN: CPT | Mod: 25 | Performed by: PHYSICIAN ASSISTANT

## 2021-06-23 RX ORDER — PRAVASTATIN SODIUM 10 MG
10 TABLET ORAL DAILY
Qty: 90 TABLET | Refills: 3 | Status: SHIPPED | OUTPATIENT
Start: 2021-06-23 | End: 2022-01-24 | Stop reason: ALTCHOICE

## 2021-06-23 RX ORDER — DULOXETIN HYDROCHLORIDE 60 MG/1
60 CAPSULE, DELAYED RELEASE ORAL DAILY
Qty: 30 CAPSULE | Refills: 5 | Status: SHIPPED | OUTPATIENT
Start: 2021-06-23 | End: 2022-01-21

## 2021-06-23 ASSESSMENT — PAIN SCALES - GENERAL: PAINLEVEL: NO PAIN (0)

## 2021-06-23 NOTE — PROGRESS NOTES
Assessment & Plan       ICD-10-CM    1. Gastroesophageal reflux disease, unspecified whether esophagitis present  K21.9 omeprazole (PRILOSEC) 20 MG DR capsule   2. Hypertension goal BP (blood pressure) < 140/90  I10 Comprehensive metabolic panel (BMP + Alb, Alk Phos, ALT, AST, Total. Bili, TP)   3. Type 1 diabetes mellitus without complication (H)  E10.9 Comprehensive metabolic panel (BMP + Alb, Alk Phos, ALT, AST, Total. Bili, TP)     FOOT EXAM   4. Cervical radiculopathy  M54.12 EMG     DULoxetine (CYMBALTA) 60 MG capsule   5. Bilateral carpal tunnel syndrome  G56.03 EMG   6. Chronic pain syndrome  G89.4 DULoxetine (CYMBALTA) 60 MG capsule   7. Hyperlipidemia LDL goal <100  E78.5 pravastatin (PRAVACHOL) 10 MG tablet   8. Encounter for screening mammogram for breast cancer  Z12.31 *MA Screening Digital Bilateral   9. FH: breast cancer  Z80.3 *MA Screening Digital Bilateral   10. Elevated carbon dioxide level  R79.81 Basic metabolic panel  (Ca, Cl, CO2, Creat, Gluc, K, Na, BUN)       1. GERD symptoms remain well controlled on omeprazole and she understands the potential long-term risks of PPIs so will continue current dose.     2, 10. BP is stable. Continue current medications. Updated CMP ordered and is mostly normal besides a slightly elevated CO2 with decreased anion gap which is a non-specific finding. O2 is slightly lower than normal but otherwise vitals are stable with a normal exam. Will recheck labs in 1 week.    3. She continues to follow closely with endocrinology with most recent A1c of 6.1 in March. Normal foot exam today.    4-6. Worsening left hand pain with chronic bilateral hand pain and paraesthesias secondary to previous cervical stenosis but there are also symptoms of carpal tunnel. I recommend a bilateral upper extremity EMG for further evaluation and she states she already has an appointment with neurology. Will increase dose of Cymbalta to 60 mg daily for better pain control.    7. Continue  current dose of pravastatin.    8-9. Updated screening mammogram ordered as she is overdue.    Follow up in 6 months for a recheck.     A total of 45 minutes spent on reviewing patient history, completing exam, discussing plan and completing note.         Marcus Salgado PA-C  [facility] CASI Franz is a 56 year old who presents for the following health issues     History of Present Illness       Hypothyroidism:     Since last visit, patient describes the following symptoms::  None    She eats 4 or more servings of fruits and vegetables daily.She consumes 2 sweetened beverage(s) daily.She exercises with enough effort to increase her heart rate 20 to 29 minutes per day.  She exercises with enough effort to increase her heart rate 4 days per week.   She is taking medications regularly.       Medication Followup of omeprazole    Taking Medication as prescribed: yes    Side Effects:  None    Medication Helping Symptoms:  yes       Omeprazole continues to work well for her acid reflux. She has tried and failed other PPIs and H2 blockers. She understand the potential long-term risks of PPIs.    She continues to experience chronic bilateral arm and hand pain s/p C7-T1 cervical laminectomy and microdiscectomy in 2007 due to cervical spinal stenosis causing permanent nerve damage. She has tried both gabapentin and amitriptyline but did not tolerate gabapentin and was on amitriptyline for 1.5 years but started to notice insomnia while taking it so this was discontinued as well. She is currently taking Cymbalta 30 mg daily and is unsure if this is providing any benefit. Her hand and arm pain seems to be worsening, especially on the left side. She states she saw a  recently to discuss long-term disability and he had recommended she see a neurologist so she has an appointment outside of [place] within the next month.    She continues to follow closely with endocrinology for management  of her diabetes and hypothyroidism. Ever since starting NPH insulin, her glucose readings have been improved. She continues to eat a high protein snack before bed to avoid nighttime hypoglycemia.     Review of Systems   Constitutional, HEENT, cardiovascular, pulmonary, musculoskeletal, neuro, gi and gu systems are negative, except as otherwise noted.      Objective    /68   Pulse 108   Temp 98.6  F (37  C) (Temporal)   Resp 12   Wt 71.7 kg (158 lb)   LMP  (LMP Unknown)   SpO2 94%   BMI 33.02 kg/m    Body mass index is 33.02 kg/m .  Physical Exam   GENERAL: healthy, alert and no distress  RESP: lungs clear to auscultation - no rales, rhonchi or wheezes  CV: regular rate and rhythm, normal S1 S2, no S3 or S4, no murmur, click or rub, no peripheral edema and peripheral pulses strong  MS: no gross musculoskeletal defects noted, no edema  NEURO: Normal strength and tone, mentation intact and speech normal. Cranial nerves II-XII are grossly intact. DTRs are 2+/4 throughout and symmetric. Positive Tinel and Phalen testing of the left hand. Mild left flexural wrist tenderness over the carpal tunnel. Gait is stable.   PSYCH: mentation appears normal, affect normal/bright  Diabetic foot exam: normal DP and PT pulses, no trophic changes or ulcerative lesions, normal sensory exam and normal monofilament exam    Lab Results   Component Value Date    A1C 6.1 03/05/2021    A1C 6.2 09/14/2020    A1C 5.8 03/09/2020    A1C 5.8 11/08/2019    A1C 6.3 04/25/2019       Results for orders placed or performed in visit on 06/23/21   Comprehensive metabolic panel (BMP + Alb, Alk Phos, ALT, AST, Total. Bili, TP)     Status: Abnormal   Result Value Ref Range    Sodium 139 133 - 144 mmol/L    Potassium 4.9 3.4 - 5.3 mmol/L    Chloride 105 94 - 109 mmol/L    Carbon Dioxide 35 (H) 20 - 32 mmol/L    Anion Gap <1 (L) 3 - 14 mmol/L    Glucose 96 70 - 99 mg/dL    Urea Nitrogen 15 7 - 30 mg/dL    Creatinine 1.00 0.52 - 1.04 mg/dL    GFR  Estimate 63 >60 mL/min/[1.73_m2]    GFR Estimate If Black 73 >60 mL/min/[1.73_m2]    Calcium 8.9 8.5 - 10.1 mg/dL    Bilirubin Total 0.5 0.2 - 1.3 mg/dL    Albumin 4.2 3.4 - 5.0 g/dL    Protein Total 7.4 6.8 - 8.8 g/dL    Alkaline Phosphatase 64 40 - 150 U/L    ALT 21 0 - 50 U/L    AST 16 0 - 45 U/L

## 2021-06-23 NOTE — PATIENT INSTRUCTIONS
Continue current medications.    Will increase Cymbalta to 60 mg daily to help with the chronic pain.    Will order an EMG to further evaluate the nerves in your neck and arms.    Follow up in 6 months.

## 2021-06-24 ENCOUNTER — MYC MEDICAL ADVICE (OUTPATIENT)
Dept: ENDOCRINOLOGY | Facility: CLINIC | Age: 56
End: 2021-06-24

## 2021-06-24 DIAGNOSIS — E10.9 TYPE 1 DIABETES MELLITUS WITHOUT COMPLICATION (H): Primary | ICD-10-CM

## 2021-06-25 ENCOUNTER — HOSPITAL ENCOUNTER (OUTPATIENT)
Dept: MAMMOGRAPHY | Facility: CLINIC | Age: 56
Discharge: HOME | End: 2021-06-25
Attending: PHYSICIAN ASSISTANT | Admitting: PHYSICIAN ASSISTANT
Payer: COMMERCIAL

## 2021-06-25 DIAGNOSIS — Z80.3 FH: BREAST CANCER: ICD-10-CM

## 2021-06-25 DIAGNOSIS — E06.3 HYPOTHYROIDISM DUE TO HASHIMOTO'S THYROIDITIS: Primary | ICD-10-CM

## 2021-06-25 DIAGNOSIS — Z12.31 ENCOUNTER FOR SCREENING MAMMOGRAM FOR BREAST CANCER: ICD-10-CM

## 2021-06-25 PROCEDURE — 77067 SCR MAMMO BI INCL CAD: CPT

## 2021-06-25 RX ORDER — LEVOTHYROXINE SODIUM 50 UG/1
50 TABLET ORAL DAILY
Qty: 90 TABLET | Refills: 3 | Status: SHIPPED | OUTPATIENT
Start: 2021-06-25 | End: 2022-04-20

## 2021-06-28 ENCOUNTER — TELEPHONE (OUTPATIENT)
Dept: ENDOCRINOLOGY | Facility: CLINIC | Age: 56
End: 2021-06-28

## 2021-06-28 NOTE — TELEPHONE ENCOUNTER
Prior Authorization Retail Medication Request    Medication/Dose: Apidra  ICD code (if different than what is on RX):     Previously Tried and Failed:     Rationale:       Covermymeds.com  Key ID:  [id number]       Pharmacy Information (if different than what is on RX)  Name:  [facility] [address] [place] [address]  Phone:  [phone]    Marjan Almazan CMA  Adult Endocrinology  [facility]

## 2021-06-29 NOTE — TELEPHONE ENCOUNTER
Prior Authorization Approval    Authorization Effective Date: 3/29/2021  Authorization Expiration Date: 6/29/2022  Medication: Apidra-PA approved  Approved Dose/Quantity:   Reference #:     Insurance Company: [employer] - Phone [phone] Fax [phone]  Expected CoPay:       CoPay Card Available:      Foundation Assistance Needed:    Which Pharmacy is filling the prescription (Not needed for infusion/clinic administered): [facility] [address] [place] [id number] [facility]  Pharmacy Notified: Yes  Patient Notified: No-pharmacy will contact

## 2021-06-29 NOTE — TELEPHONE ENCOUNTER
Central Prior Authorization Team   Phone: [phone]      PA Initiation    Medication: Apidra-PA initiated  Insurance Company: [employer] [facility] - Phone [phone] Fax [phone]  Pharmacy Filling the Rx: [facility] [address] [place] [id number] [facility]  Filling Pharmacy Phone: [phone]  Filling Pharmacy Fax:    Start Date: 6/29/2021

## 2021-06-29 NOTE — TELEPHONE ENCOUNTER
Pharmacy will inform patient of when medication will be ready.    Malissa SHAFFER MA   Adult Endocrine   [facility]

## 2021-06-30 DIAGNOSIS — R79.81 ELEVATED CARBON DIOXIDE LEVEL: ICD-10-CM

## 2021-06-30 LAB
ANION GAP SERPL CALCULATED.3IONS-SCNC: 4 MMOL/L (ref 3–14)
BUN SERPL-MCNC: 10 MG/DL (ref 7–30)
CALCIUM SERPL-MCNC: 8.5 MG/DL (ref 8.5–10.1)
CHLORIDE SERPL-SCNC: 101 MMOL/L (ref 94–109)
CO2 SERPL-SCNC: 30 MMOL/L (ref 20–32)
CREAT SERPL-MCNC: 0.93 MG/DL (ref 0.52–1.04)
GFR SERPL CREATININE-BSD FRML MDRD: 68 ML/MIN/{1.73_M2}
GLUCOSE SERPL-MCNC: 147 MG/DL (ref 70–99)
POTASSIUM SERPL-SCNC: 4.4 MMOL/L (ref 3.4–5.3)
SODIUM SERPL-SCNC: 135 MMOL/L (ref 133–144)

## 2021-06-30 PROCEDURE — 80048 BASIC METABOLIC PNL TOTAL CA: CPT | Performed by: PHYSICIAN ASSISTANT

## 2021-06-30 PROCEDURE — 36415 COLL VENOUS BLD VENIPUNCTURE: CPT | Performed by: PHYSICIAN ASSISTANT

## 2021-07-07 DIAGNOSIS — E06.3 HYPOTHYROIDISM DUE TO HASHIMOTO'S THYROIDITIS: ICD-10-CM

## 2021-07-07 LAB
HBA1C MFR BLD: 6.1 % (ref 0–5.6)
T4 FREE SERPL-MCNC: 0.87 NG/DL (ref 0.76–1.46)
TSH SERPL DL<=0.005 MIU/L-ACNC: 16.78 MU/L (ref 0.4–4)

## 2021-07-07 PROCEDURE — 84443 ASSAY THYROID STIM HORMONE: CPT | Performed by: INTERNAL MEDICINE

## 2021-07-07 PROCEDURE — 83036 HEMOGLOBIN GLYCOSYLATED A1C: CPT | Performed by: INTERNAL MEDICINE

## 2021-07-07 PROCEDURE — 84439 ASSAY OF FREE THYROXINE: CPT | Performed by: INTERNAL MEDICINE

## 2021-07-07 PROCEDURE — 36415 COLL VENOUS BLD VENIPUNCTURE: CPT | Performed by: INTERNAL MEDICINE

## 2021-07-12 ENCOUNTER — OFFICE VISIT (OUTPATIENT)
Dept: ENDOCRINOLOGY | Facility: CLINIC | Age: 56
End: 2021-07-12
Payer: COMMERCIAL

## 2021-07-12 VITALS
DIASTOLIC BLOOD PRESSURE: 81 MMHG | WEIGHT: 164 LBS | HEART RATE: 85 BPM | SYSTOLIC BLOOD PRESSURE: 134 MMHG | BODY MASS INDEX: 34.28 KG/M2 | OXYGEN SATURATION: 100 %

## 2021-07-12 DIAGNOSIS — E10.9 TYPE 1 DIABETES MELLITUS WITHOUT COMPLICATION (H): Primary | ICD-10-CM

## 2021-07-12 PROCEDURE — 99214 OFFICE O/P EST MOD 30 MIN: CPT | Performed by: INTERNAL MEDICINE

## 2021-07-12 NOTE — LETTER
7/12/2021         RE: Maria M Marcus  [address] [place] [address]        Dear Colleague,    Thank you for referring your patient, Maria M Marcus, to the [facility]. Please see a copy of my visit note below.      Endocrinology and Diabetes Clinic       Interval history:  Maria M Marcus aged 56 year old years old woman with type 1 diabetes with retinopathy with comorbidity of vitiligo, hypothyroidism and obesity for 4 months follow-up    Insulin  Started NPH 3 units at 22:00 for down phenomenon  Decreased Lantus from 6-> 4 units pm, 3 units pm Apidra 2 to 3 units per meal carb; she calculates carb ratio 1 unit for each 20 g of carbohydrates, and then substrates 1 unit per meal, correction 1 unit for 100 mg of above 100; this is decreased from before    CGM  Has been using Dexcom sensor  Download reveals average blood sugar 138, standard deviation 56, 70% in range, 6% low 2%     Hypoglycemia- nocturnal related to NPH dosing; notes that she requires less assitance, finds CGM very helpful    Patient notes some nausea however relates this to GERD, no vomiting, no anorexia, weight gain, not weight loss, not more physically active than before hand also she plans to, no muscle weakness, no dizziness  Checks blood pressure with cuff at home, typically below 130/85  Menopause started Estradiol      Assessment:  (E10.649) Type 1 diabetes mellitus with hypoglycemia and without coma (H)  (primary encounter diagnosis)  Middle-aged woman with longstanding type 1 diabetes recent decrease in insulin needs.  She has a strong dagmar phenomenon which she started NPH for.   Hypoglycemia, discussed better timing of NPH and further decrease of Lantus    Due to decrease in vision we switch her to insulin pens.  Unfortunately she only tolerates Apidra which is not available in half units.    Hypothyroidism is taking in AM, sometimes difficult to be taken on empty stomach as pt has to eat  for low BGs    Plan:   Decrease Lantus to 3 units am and 3 units pm     Take NPH insulin at 11 pm 3 units    Apidra the same except should not be necessary in the early morning hours    Contact us if hypoglycemia persists    Levothyroxine continue in AM current dose    Follow-up with me in 6 months    40 minutes spent on the date of the encounter doing chart review, review of test results, interpretation of tests, patient visit and documentation      Lena Bradford MD  Endocrinology and Diabetes  [address]  Pager [phone]  [facility] [address]  Tel [phone]    Medications:   Current Outpatient Medications   Medication Sig Dispense Refill     blood glucose (CONTOUR NEXT TEST) test strip Use to test blood sugar up to 6 times daily or as directed.   Dispense CONTOUR NEXT or use brand compatible with patients insurance and device. 600 strip 1     blood glucose monitoring (NO BRAND SPECIFIED) meter device kit Use to test blood sugar 6  times daily or as directed. Uses Contour Next Meter. Needs replacement meter. 1 kit 0     blood glucose monitoring (SOFTCLIX) lancets Use to test blood sugar 6 times daily or as directed.   Dispense ACCU-CHEK SOFTCLIX or use brand compatible with patients insurance and device. 600 each 1     Blood Glucose Monitoring Suppl (CONTOUR NEXT ONE) KIT 1 each daily Use to test blood glucose. 1 kit 1     DULoxetine (CYMBALTA) 60 MG capsule Take 1 capsule (60 mg) by mouth daily 30 capsule 5     estradiol (ESTRACE) 1 MG tablet Take 1 tablet (1 mg) by mouth daily 90 tablet 3     insulin glargine (LANTUS SOLOSTAR) 100 UNIT/ML pen 3 units am and 4 units pm 15 mL 3     insulin glargine (LANTUS SOLOSTAR) 100 UNIT/ML pen 3 units am 6 units pm units at bedtime 15 mL 3     insulin glulisine (APIDRA PEN) 100 UNIT/ML soln Inject 2 Units Subcutaneous 3 times daily (before meals) 9 mL 3     insulin glulisine (APIDRA PEN) 100 UNIT/ML soln Inject 1-5 Units  Subcutaneous 3 times daily (before meals) 15 mL 3     insulin isophane human (HUMULIN N PEN) 100 UNIT/ML injection Inject 3 Units Subcutaneous At Bedtime 15 mL 3     insulin NPH (NOVOLIN N VIAL) 100 UNIT/ML vial Take 3 units daily at bedtime. 10 mL 6     insulin pen needle (31G X 5 MM) 31G X 5 MM miscellaneous Use 6-7 pen needles daily or as directed. 300 each 8     levothyroxine (SYNTHROID/LEVOTHROID) 50 MCG tablet Take 1 tablet (50 mcg) by mouth daily 90 tablet 3     lisinopril (ZESTRIL) 20 MG tablet Take 1 tablet (20 mg) by mouth daily 90 tablet 3     omeprazole (PRILOSEC) 20 MG DR capsule Take 1 capsule by mouth once daily 90 capsule 3     pravastatin (PRAVACHOL) 10 MG tablet Take 1 tablet (10 mg) by mouth daily 90 tablet 3       Social History:  Social History     Tobacco Use     Smoking status: Never Smoker     Smokeless tobacco: Never Used     Tobacco comment: no exposure   Substance Use Topics     Alcohol use: Yes     Comment: winex1-2/3x per yr.       Family History  FAMILY HISTORY      Physical Examination:General: Well appearing woman in no distress.   Psych: good eye contact, no pressured speech    Diabetic foot exam:  Inspection normal  Sensation to monofilament intact  Sensation to fibration intact  Skin is intact   Callus formation is ont present  Gait is normal     Labs and Studies:   Lab Results   Component Value Date     06/30/2021    CHLORIDE 101 06/30/2021    CO2 30 06/30/2021     (H) 06/30/2021    CR 0.93 06/30/2021    CR 1.00 06/23/2021    CR 0.76 09/14/2020    CR 0.96 05/25/2020    CR 0.83 02/01/2020    DORIS 8.5 06/30/2021    ALBUMIN 4.2 06/23/2021    ALKPHOS 64 06/23/2021     (H) 09/14/2020    HDL 74 09/14/2020    TRIG 69 09/14/2020     Lab Results   Component Value Date    MICROL 8 09/14/2020    MICROL <5 11/08/2019    MICROL <5 04/25/2019    MICROL <5 04/13/2018    MICROL <5 07/28/2017     Lab Results   Component Value Date    A1C 6.1 (H) 07/07/2021    A1C 6.1 (A)  03/05/2021    A1C 6.2 (H) 09/14/2020    A1C 5.8 (A) 03/09/2020    A1C 5.8 (A) 11/08/2019       Lab Results   Component Value Date    HGB 12.6 05/25/2020     Lab Results   Component Value Date    TSH 16.78 (H) 07/07/2021    TSH 4.53 (H) 02/25/2021    TSH 0.05 (L) 12/30/2020    TSH 0.12 (L) 09/14/2020    TSH 0.51 12/03/2019           Complications  1. Retinopathy: yes - hemorrhage 9/2020  2. Nephropathy: no  3. Neuropathy: no  4. Hypoglycemia: yes   5. Macrovascular:  No  .     She would like to use half units, however is allergic to NovoLog and Humalog and has been using Apidra  She cannot use an insulin pump because of Apidra clogging the tubes as she has such low needs        Again, thank you for allowing me to participate in the care of your patient.        Sincerely,        Lena Bradford MD

## 2021-07-12 NOTE — PATIENT INSTRUCTIONS
NPH 3 units take it at 11pm or [place]  Lantus 3 untis twice a day, for several days, then re-evaluate and if needed increase to 3 units and 3.5 units  Novolog the same     Levothyroxine in M    FOLLOW UP IN 6 MONTHS

## 2021-07-12 NOTE — PROGRESS NOTES
Endocrinology and Diabetes Clinic       Interval history:  Maria M Marcus aged 56 year old years old woman with type 1 diabetes with retinopathy with comorbidity of vitiligo, hypothyroidism and obesity for 4 months follow-up    Insulin  Started NPH 3 units at 22:00 for down phenomenon  Decreased Lantus from 6-> 4 units pm, 3 units pm Apidra 2 to 3 units per meal carb; she calculates carb ratio 1 unit for each 20 g of carbohydrates, and then substrates 1 unit per meal, correction 1 unit for 100 mg of above 100; this is decreased from before    CGM  Has been using Dexcom sensor  Download reveals average blood sugar 138, standard deviation 56, 70% in range, 6% low 2%     Hypoglycemia- nocturnal related to NPH dosing; notes that she requires less assitance, finds CGM very helpful    Patient notes some nausea however relates this to GERD, no vomiting, no anorexia, weight gain, not weight loss, not more physically active than before hand also she plans to, no muscle weakness, no dizziness  Checks blood pressure with cuff at home, typically below 130/85  Menopause started Estradiol      Assessment:  (E10.649) Type 1 diabetes mellitus with hypoglycemia and without coma (H)  (primary encounter diagnosis)  Middle-aged woman with longstanding type 1 diabetes recent decrease in insulin needs.  She has a strong dagmar phenomenon which she started NPH for.   Hypoglycemia, discussed better timing of NPH and further decrease of Lantus    Due to decrease in vision we switch her to insulin pens.  Unfortunately she only tolerates Apidra which is not available in half units.    Hypothyroidism is taking in AM, sometimes difficult to be taken on empty stomach as pt has to eat for low BGs    Plan:   Decrease Lantus to 3 units am and 3 units pm     Take NPH insulin at 11 pm 3 units    Apidra the same except should not be necessary in the early morning hours    Contact us if hypoglycemia persists    Levothyroxine continue in AM current  dose    Follow-up with me in 6 months    40 minutes spent on the date of the encounter doing chart review, review of test results, interpretation of tests, patient visit and documentation      Lena Bradford MD  Endocrinology and Diabetes  [facility]  [address] [facility] [address]  Pager [phone]  [facility] [address]  Tel [phone]    Medications:   Current Outpatient Medications   Medication Sig Dispense Refill     blood glucose (CONTOUR NEXT TEST) test strip Use to test blood sugar up to 6 times daily or as directed.   Dispense CONTOUR NEXT or use brand compatible with patients insurance and device. 600 strip 1     blood glucose monitoring (NO BRAND SPECIFIED) meter device kit Use to test blood sugar 6  times daily or as directed. Uses Contour Next Meter. Needs replacement meter. 1 kit 0     blood glucose monitoring (SOFTCLIX) lancets Use to test blood sugar 6 times daily or as directed.   Dispense ACCU-CHEK SOFTCLIX or use brand compatible with patients insurance and device. 600 each 1     Blood Glucose Monitoring Suppl (CONTOUR NEXT ONE) KIT 1 each daily Use to test blood glucose. 1 kit 1     DULoxetine (CYMBALTA) 60 MG capsule Take 1 capsule (60 mg) by mouth daily 30 capsule 5     estradiol (ESTRACE) 1 MG tablet Take 1 tablet (1 mg) by mouth daily 90 tablet 3     insulin glargine (LANTUS SOLOSTAR) 100 UNIT/ML pen 3 units am and 4 units pm 15 mL 3     insulin glargine (LANTUS SOLOSTAR) 100 UNIT/ML pen 3 units am 6 units pm units at bedtime 15 mL 3     insulin glulisine (APIDRA PEN) 100 UNIT/ML soln Inject 2 Units Subcutaneous 3 times daily (before meals) 9 mL 3     insulin glulisine (APIDRA PEN) 100 UNIT/ML soln Inject 1-5 Units Subcutaneous 3 times daily (before meals) 15 mL 3     insulin isophane human (HUMULIN N PEN) 100 UNIT/ML injection Inject 3 Units Subcutaneous At Bedtime 15 mL 3     insulin NPH (NOVOLIN N VIAL) 100 UNIT/ML vial Take 3 units daily at bedtime. 10 mL 6     insulin pen  needle (31G X 5 MM) 31G X 5 MM miscellaneous Use 6-7 pen needles daily or as directed. 300 each 8     levothyroxine (SYNTHROID/LEVOTHROID) 50 MCG tablet Take 1 tablet (50 mcg) by mouth daily 90 tablet 3     lisinopril (ZESTRIL) 20 MG tablet Take 1 tablet (20 mg) by mouth daily 90 tablet 3     omeprazole (PRILOSEC) 20 MG DR capsule Take 1 capsule by mouth once daily 90 capsule 3     pravastatin (PRAVACHOL) 10 MG tablet Take 1 tablet (10 mg) by mouth daily 90 tablet 3       Social History:  Social History     Tobacco Use     Smoking status: Never Smoker     Smokeless tobacco: Never Used     Tobacco comment: no exposure   Substance Use Topics     Alcohol use: Yes     Comment: winex1-2/3x per yr.       Family History  FAMILY HISTORY      Physical Examination:General: Well appearing woman in no distress.   Psych: good eye contact, no pressured speech    Diabetic foot exam:  Inspection normal  Sensation to monofilament intact  Sensation to fibration intact  Skin is intact   Callus formation is ont present  Gait is normal     Labs and Studies:   Lab Results   Component Value Date     06/30/2021    CHLORIDE 101 06/30/2021    CO2 30 06/30/2021     (H) 06/30/2021    CR 0.93 06/30/2021    CR 1.00 06/23/2021    CR 0.76 09/14/2020    CR 0.96 05/25/2020    CR 0.83 02/01/2020    DORIS 8.5 06/30/2021    ALBUMIN 4.2 06/23/2021    ALKPHOS 64 06/23/2021     (H) 09/14/2020    HDL 74 09/14/2020    TRIG 69 09/14/2020     Lab Results   Component Value Date    MICROL 8 09/14/2020    MICROL <5 11/08/2019    MICROL <5 04/25/2019    MICROL <5 04/13/2018    MICROL <5 07/28/2017     Lab Results   Component Value Date    A1C 6.1 (H) 07/07/2021    A1C 6.1 (A) 03/05/2021    A1C 6.2 (H) 09/14/2020    A1C 5.8 (A) 03/09/2020    A1C 5.8 (A) 11/08/2019       Lab Results   Component Value Date    HGB 12.6 05/25/2020     Lab Results   Component Value Date    TSH 16.78 (H) 07/07/2021    TSH 4.53 (H) 02/25/2021    TSH 0.05 (L) 12/30/2020     TSH 0.12 (L) 09/14/2020    TSH 0.51 12/03/2019           Complications  1. Retinopathy: yes - hemorrhage 9/2020  2. Nephropathy: no  3. Neuropathy: no  4. Hypoglycemia: yes   5. Macrovascular:  No  .     She would like to use half units, however is allergic to NovoLog and Humalog and has been using Apidra  She cannot use an insulin pump because of Apidra clogging the tubes as she has such low needs

## 2021-07-12 NOTE — NURSING NOTE
Maria M Marcus's goals for this visit include:   Chief Complaint   Patient presents with     Diabetes     blood sugar concerns with fluctuation discuss new plan       She requests these members of her care team be copied on today's visit information: Yes    PCP: Marcus Salgado    Referring Provider:  Self    /81 (BP Location: Left arm, Patient Position: Sitting, Cuff Size: Adult Large)   Pulse 85   Wt 74.4 kg (164 lb)   LMP  (LMP Unknown)   SpO2 100%   BMI 34.28 kg/m      Do you need any medication refills at today's visit? No

## 2021-07-13 DIAGNOSIS — Z79.890 HORMONE REPLACEMENT THERAPY: Primary | ICD-10-CM

## 2021-07-13 RX ORDER — ESTRADIOL 2 MG/1
2 TABLET ORAL DAILY
Qty: 90 TABLET | Refills: 0 | Status: SHIPPED | OUTPATIENT
Start: 2021-07-13 | End: 2021-11-02

## 2021-07-27 NOTE — TELEPHONE ENCOUNTER
Patient called to reschedule postpartum appointment. Rescheduled to 8/6/21 at 3; verbalized understanding through . Responded via Viral Celeste, MSN, RN

## 2021-08-04 ENCOUNTER — OFFICE VISIT (OUTPATIENT)
Dept: OBGYN | Facility: CLINIC | Age: 56
End: 2021-08-04
Payer: COMMERCIAL

## 2021-08-04 VITALS
DIASTOLIC BLOOD PRESSURE: 74 MMHG | WEIGHT: 162.5 LBS | OXYGEN SATURATION: 99 % | SYSTOLIC BLOOD PRESSURE: 151 MMHG | BODY MASS INDEX: 34.11 KG/M2 | HEART RATE: 101 BPM | HEIGHT: 58 IN

## 2021-08-04 DIAGNOSIS — Z00.00 ANNUAL PHYSICAL EXAM: Primary | ICD-10-CM

## 2021-08-04 DIAGNOSIS — N89.8 VAGINAL ITCHING: ICD-10-CM

## 2021-08-04 DIAGNOSIS — Z00.00 ROUTINE GENERAL MEDICAL EXAMINATION AT A HEALTH CARE FACILITY: ICD-10-CM

## 2021-08-04 DIAGNOSIS — Z79.890 HORMONE REPLACEMENT THERAPY (HRT): ICD-10-CM

## 2021-08-04 LAB
CLUE CELLS: ABNORMAL
TRICHOMONAS, WET PREP: ABNORMAL
WBC'S/HIGH POWER FIELD, WET PREP: ABNORMAL
YEAST, WET PREP: ABNORMAL

## 2021-08-04 PROCEDURE — 87210 SMEAR WET MOUNT SALINE/INK: CPT | Performed by: OBSTETRICS & GYNECOLOGY

## 2021-08-04 PROCEDURE — 99396 PREV VISIT EST AGE 40-64: CPT | Performed by: OBSTETRICS & GYNECOLOGY

## 2021-08-04 PROCEDURE — 87102 FUNGUS ISOLATION CULTURE: CPT | Performed by: OBSTETRICS & GYNECOLOGY

## 2021-08-04 RX ORDER — ESTRADIOL 2 MG/1
2 TABLET ORAL DAILY
Qty: 90 TABLET | Refills: 3 | Status: SHIPPED | OUTPATIENT
Start: 2021-08-04 | End: 2023-01-02

## 2021-08-04 ASSESSMENT — PAIN SCALES - GENERAL: PAINLEVEL: NO PAIN (0)

## 2021-08-04 ASSESSMENT — MIFFLIN-ST. JEOR: SCORE: 1224.23

## 2021-08-04 NOTE — PATIENT INSTRUCTIONS
If you have any questions regarding your visit, Please contact your care team.    [facility]: [phone]      Women s Health CLINIC HOURS TELEPHONE NUMBER   Gisel Burns DO.    Nirali -  Loren -     AYAKA Morales RN     Monday, Wednesday, [facility] and [facility]  8:30a.m-5:00 p.m   [facility]  [address] [place] [address]  [phone] ask for [facility]    Imaging [phone]       Urgent Care locations:    [facility] Saturday and Sunday   9 am - 5 pm    Monday-Friday   11 pm - 7 pm  Saturday and Sunday   9 am - 5 pm   [phone]     [facility] Labor and Delivery:  [phone]    If you need a medication refill, please contact your pharmacy. Please allow 3 business days for your refill to be completed.  As always, Thank you for trusting us with your healthcare needs!    Preventive Health Recommendations  Female Ages 50 - 64    Yearly exam: See your health care provider every year in order to  o Review health changes.   o Discuss preventive care.    o Review your medicines if your doctor has prescribed any.      Get a Pap test every three years (unless you have an abnormal result and your provider advises testing more often).    If you get Pap tests with HPV test, you only need to test every 5 years, unless you have an abnormal result.     You do not need a Pap test if your uterus was removed (hysterectomy) and you have not had cancer.    You should be tested each year for STDs (sexually transmitted diseases) if you're at risk.     Have a mammogram every 1 to 2 years.    Have a colonoscopy at age 50, or have a yearly FIT test (stool test). These exams screen for colon cancer.      Have a cholesterol test every 5 years, or more often if advised.    Have a diabetes test (fasting glucose) every three  years. If you are at risk for diabetes, you should have this test more often.     If you are at risk for osteoporosis (brittle bone disease), think about having a bone density scan (DEXA).    Shots: Get a flu shot each year. Get a tetanus shot every 10 years.    Nutrition:     Eat at least 5 servings of fruits and vegetables each day.    Eat whole-grain bread, whole-wheat pasta and brown rice instead of white grains and rice.    Get adequate Calcium and Vitamin D.     Lifestyle    Exercise at least 150 minutes a week (30 minutes a day, 5 days a week). This will help you control your weight and prevent disease.    Limit alcohol to one drink per day.    No smoking.     Wear sunscreen to prevent skin cancer.     See your dentist every six months for an exam and cleaning.    See your eye doctor every 1 to 2 years.

## 2021-08-04 NOTE — PROGRESS NOTES
Maria M is a 56 year old female, , who is here for her annual exam and Estrace refill.  She feels that this medication and dose are working well for her and she would like to continue.  She is s/p AMARILIS with removal of cervix but not the ovaries in  for benign pathology.  Therefore, she does not need a pap smear today.  She does c/o vaginal itching which began yesterday so will check for a possible yeast infection.  She is at increased risk due to her hx of type 1 DM.  Her mammogram is up-to-date and her mother was dx with breast cancer.  The pt states that she is already scheduled for a DEXA scan so this order was not placed today.    ROS: Ten point review of systems was reviewed and negative except the above.    Health Maintenance   Topic Date Due     URINE DRUG SCREEN  Never done     COVID-19 Vaccine (1) Never done     HEPATITIS B IMMUNIZATION (3 of 3 - Risk 3-dose series) 1998     ZOSTER IMMUNIZATION (1 of 2) Never done     ADVANCE CARE PLANNING  2016     PREVENTIVE CARE VISIT  2021     INFLUENZA VACCINE (1) 2021     LIPID  2021     MICROALBUMIN  2021     A1C  2022     EYE EXAM  02/10/2022     CMP  2022     DIABETIC FOOT EXAM  2022     BMP  2022     TSH W/FREE T4 REFLEX  2022     MAMMO SCREENING  2023     COLORECTAL CANCER SCREENING  2026     Pneumococcal Vaccine: Pediatrics (0 to 5 Years) and At-Risk Patients (6 to 64 Years) (2 of 2 - PPSV23) 2030     DTAP/TDAP/TD IMMUNIZATION (3 - Td or Tdap) 2031     HEPATITIS C SCREENING  Completed     HIV SCREENING  Completed     PHQ-2  Completed     IPV IMMUNIZATION  Aged Out     MENINGITIS IMMUNIZATION  Aged Out     PAP  Discontinued      Last pap: , not due  Last Mammogram: due in 2022  Last Dexa: scheduled  Last Colonoscopy: not due  Lab Results   Component Value Date    CHOL 190 2020     Lab Results   Component Value Date    HDL 74 2020     Lab Results  "  Component Value Date     2020     Lab Results   Component Value Date    TRIG 69 2020     Lab Results   Component Value Date    CHOLHDLRATIO 3.6 2015         OBHX:      PSH:   Past Surgical History:   Procedure Laterality Date     C  DELIVERY ONLY      C-Sections x2     C STOMACH SURGERY PROCEDURE UNLISTED       C TOTAL ABDOM HYSTERECTOMY       COLONOSCOPY N/A 2016    Procedure: COLONOSCOPY;  Surgeon: Efren Perry MD;  Location: [facility]     ENDOSCOPIC SINUS SURGERY Bilateral 2018    Procedure: Bilateral functional endoscopic maxillary antrostomies;  Surgeon: Woo Silva MD;  Location: [facility] OR     HC SACROPLASTY       LAMINECT/DISCECTOMY, CERVICAL  2007    C7-T1 hemilaminectomy, microdiscectomy, foraminotomy.     LASER YAG CAPSULOTOMY Right 10/23/2014    Procedure: LASER YAG CAPSULOTOMY;  Surgeon: Esteban Dorsey MD;  Location: [facility] OR     VITRECTOMY,STRIP EPIRETINAL MEMBRANE  09     ZZC NONSPECIFIC PROCEDURE      Hysterectomy - total (cervix removed but ovaries remain)         PMH: Her past medical, surgical, and obstetric histories were reviewed and are documented in their appropriate chart areas.    ALL/Meds: Her medication and allergy histories were reviewed and are documented in their appropriate chart areas.    SH/FMH: Her social and family history was reviewed and documented in its appropriate chart area.    PE: BP (!) 151/74 (BP Location: Right arm, Patient Position: Chair, Cuff Size: Adult Regular)   Pulse 101   Ht 1.485 m (4' 10.47\")   Wt 73.7 kg (162 lb 8 oz)   LMP  (LMP Unknown)   SpO2 99%   Breastfeeding No   BMI 33.42 kg/m    Body mass index is 33.42 kg/m .    General Appearance:  healthy, alert, active, no distress  Cardiovascular:  Regular rate and Rhythm without murmur  Neck: Supple, no adenopathy and thyroid normal  Lungs:  Clear, without wheeze, rale or rhonchi  Breast: normal breast exam  Abdomen: Benign, Soft, flat, " non-tender, No masses, organomegaly, No inguinal nodes and Bowel sounds normoactive.   Pelvic:       - Ext: Vulva and perineum are normal without lesion, mass or discharge        - Urethra: normal without discharge        - Urethral Meatus: normal appearance       - Bladder: no tenderness, no masses       - Vagina: Normal mucosa, small amount of white frothy vaginal discharge so a wet prep was obtained and submitted along with a yeast culture       - Cervix: Surgically absent       - Uterus:Surgically absent        - Adnexa: Non palpable       - Rectal: sphincter tone normal and no mass    A/P:  Well Woman Exam, HRT Refill, Vaginal Itching, Hypertension     -  I discussed the new pap recommendations regarding screening.  Explained the rationale for increased intervals between paps.  Questions asked and answered.  She does agree to this regiment.  Pap was not collected since she no longer has a cervix (surgically removed in 1992 with the hysterectomy)   -  BC: not applicable   -  Submit the wet prep and yeast culture and treat if positive   -  Refill of Estrace 2 mg po daily sent to her listed pharmacy with instructions reviewed   -  Follow up with her PCP and endocrinologist for type 1 DM, hypothyroidism, hypertension, and hyperlipidemia   -  Encouraged self-breast exam   -  Encouraged low fat diet, regular exercise, and adequate calcium intake.    Gisel Burns, DO  FACOG, FACS

## 2021-08-06 ENCOUNTER — TELEPHONE (OUTPATIENT)
Dept: ENDOCRINOLOGY | Facility: CLINIC | Age: 56
End: 2021-08-06

## 2021-08-06 DIAGNOSIS — E10.9 TYPE 1 DIABETES MELLITUS WITHOUT COMPLICATION (H): ICD-10-CM

## 2021-08-06 DIAGNOSIS — E10.9 TYPE 1 DIABETES MELLITUS WITHOUT COMPLICATION (H): Primary | ICD-10-CM

## 2021-08-06 NOTE — TELEPHONE ENCOUNTER
Good Queta Bradford,    A bone density scan was ordered for this patient on 7/12/2021. The patient's insurance is requiring supporting documentation showing the exam reason for this order to be submitted to them for further review of medical necessity/coverage.     We've submitted the OV note from 7/12/2021 to the insurance back on 7/30/2021 but they are stating the note does not address the need for this test. Are you able to either addend your OV note from 7/12 or create a detailed of letter of medical necessity? Once that has been done, please notify me so I can send the updated information back to the insurance.     FC's once update is complete, please forward information to Namita GONZALES RN at [phone].     Note: Addiational info has to be sent back to [facility] by 8/9/2021 otherwise the case will be denied.    Thank you for your time and attention on this,    Jessica Hawk  Senior Intake Financial Counselor  [facility]  [address] [address] [place] [id number]  Ph: [phone]  Fax: [phone]

## 2021-08-06 NOTE — TELEPHONE ENCOUNTER
[facility] Call Center    Phone Message    May a detailed message be left on voicemail: yes     Reason for Call: Namita from [employer] is requesting a call back to let her know what the status is for bone density test.  Thanks.    Action Taken: Message routed to:  Adult Clinics: Endocrinology p [phone]    Travel Screening: Not Applicable

## 2021-08-07 ENCOUNTER — MYC MEDICAL ADVICE (OUTPATIENT)
Dept: FAMILY MEDICINE | Facility: OTHER | Age: 56
End: 2021-08-07

## 2021-08-07 DIAGNOSIS — E78.5 HYPERLIPIDEMIA LDL GOAL <100: ICD-10-CM

## 2021-08-09 ENCOUNTER — TELEPHONE (OUTPATIENT)
Dept: ENDOCRINOLOGY | Facility: CLINIC | Age: 56
End: 2021-08-09

## 2021-08-09 LAB — BACTERIA VAG AEROBE CULT: NORMAL

## 2021-08-09 RX ORDER — PRAVASTATIN SODIUM 10 MG
TABLET ORAL
Qty: 90 TABLET | Refills: 0 | OUTPATIENT
Start: 2021-08-09

## 2021-08-09 NOTE — TELEPHONE ENCOUNTER
Prescription was sent 6/23/21 for #90 with 3 refills.  Pharmacy notified via E-Prescribe refusal.     Marilia Zimmer RN on 8/9/2021 at 1:14 PM

## 2021-08-09 NOTE — TELEPHONE ENCOUNTER
Hello.     If insurance does not receive the addended note today, this dexa scan will be denied for this patient. please faxe the addended note to:  Yaz huffman [phone]

## 2021-08-09 NOTE — TELEPHONE ENCOUNTER
Pt wanting to restart [employer] pump. Has Basal IQ version of pump. States, thus far, she has not had a reaction to Novolog insulin. Would like to restart pump this evening. Pt advised to program pump with the following rates of delivery based on current insulin doses:     Basal: 12a-12a - 0.3u/hr  Bolus: 12a-12a: 1u:20  CF: 12a-12a: 100  Target Ba-12a: 110    Discussed programming pump settings first, then programming sensor settings. Reminded of need to sync pump and sensor and this is done by entering sensor code into pump. Reminded to turn on the Basal IQ feature and to make sure time and date are correct. Encouraged to call [employer] Customer Service for guidance if issues arise.     Pt asked to call Cde this Thursday to report how things have been going. Cde also dicussed transitioning to Control IQ after about 2-3 weeks if things go well.      Jessica Demarco RN, BSN, CDE   [facility]

## 2021-08-10 DIAGNOSIS — E10.9 TYPE 1 DIABETES MELLITUS WITHOUT COMPLICATION (H): ICD-10-CM

## 2021-08-10 DIAGNOSIS — E10.9 DIABETES MELLITUS TYPE I (H): ICD-10-CM

## 2021-08-10 NOTE — TELEPHONE ENCOUNTER
If Dr. Bradford feels this test is necessary, she can call and complete a peer to peer review to try and overturn the denial:

## 2021-08-11 RX ORDER — SYRINGE,INSUL U-500,NDL,0.5ML 31GX15/64"
SYRINGE, EMPTY DISPOSABLE MISCELLANEOUS
Qty: 100 EACH | Refills: 0 | Status: SHIPPED | OUTPATIENT
Start: 2021-08-11 | End: 2021-08-16 | Stop reason: ALTCHOICE

## 2021-08-11 NOTE — TELEPHONE ENCOUNTER
Insulin syringe    7/12/2021  [facility] Lena Connelly MD  Endocrinology, Diabetes, and Metabolism     Routed  Because: please confirm qty. dont see it  on med list

## 2021-08-13 ENCOUNTER — TELEPHONE (OUTPATIENT)
Dept: ENDOCRINOLOGY | Facility: CLINIC | Age: 56
End: 2021-08-13

## 2021-08-13 ENCOUNTER — MEDICAL CORRESPONDENCE (OUTPATIENT)
Dept: HEALTH INFORMATION MANAGEMENT | Facility: CLINIC | Age: 56
End: 2021-08-13

## 2021-08-13 NOTE — TELEPHONE ENCOUNTER
Rx to upgrade to Control IQ faxed to [employer] @ [phone].    Jessica Demarco, RN, BSN, CDE   [facility]

## 2021-08-16 ENCOUNTER — ALLIED HEALTH/NURSE VISIT (OUTPATIENT)
Dept: EDUCATION SERVICES | Facility: CLINIC | Age: 56
End: 2021-08-16
Payer: COMMERCIAL

## 2021-08-16 DIAGNOSIS — E11.9 DIABETES MELLITUS WITHOUT COMPLICATION (H): Primary | ICD-10-CM

## 2021-08-16 DIAGNOSIS — Z78.0 MENOPAUSE: ICD-10-CM

## 2021-08-16 DIAGNOSIS — E10.65 TYPE 1 DIABETES MELLITUS WITH HYPERGLYCEMIA (H): Primary | ICD-10-CM

## 2021-08-16 PROCEDURE — G0108 DIAB MANAGE TRN  PER INDIV: HCPCS

## 2021-08-16 RX ORDER — CALCIUM CARB/VITAMIN D3/VIT K1 500-100-40
1 TABLET,CHEWABLE ORAL DAILY
Qty: 90 EACH | Refills: 3 | Status: SHIPPED | OUTPATIENT
Start: 2021-08-16 | End: 2022-02-14

## 2021-08-16 NOTE — PROGRESS NOTES
Diabetes Self Management Training: Individual Review Visit    Maria M Marcus presents today for education and evaluation of glucose control related to Type 1 diabetes. Pt re-started self on pump 08/09/21.     She is accompanied by self    Patient's diabetes management related comments/concerns: None at this time.     Patient's emotional response to diabetes: expresses readiness to learn    Patient would like this visit to be focused around the following diabetes-related behaviors and goals: Taking Medication    ASSESSMENT:  Patient presents to clinic per Cde request for bg, pump and sensor review. Pt re-started self on pump 08/09/21. Had been doing mdi with the Dexcom G6 sensor for 2+ years due to developing an allergy to Novolog and Humalog insulin which caused itching and rash at infusion and/or injection site. Pt has been taking Apidra rapid acting insulin, via injection, since then with no side effects. Unfortunately, Apidra is not approved with pump use due to causing possible clogging of insulin in tubing.     Pt states she had been taking Novolog insulin for the last several days without reation and decided to see if she could re-start insulin pump.  Cde spoke with pt on 08/09 to discuss this and also reviewed pump settings.      Current Diabetes Management per Patient:  Taking diabetes medications? yes: see below. Stopped injections on 08/09/21 and resumed pump use.     Diabetes Medication(s)     Insulin       insulin aspart (NOVOLOG VIAL) 100 UNITS/ML vial    Use as directed via insulin pump. Total daily dose approx 40 units. 90 day supply.     insulin glargine (LANTUS SOLOSTAR) 100 UNIT/ML pen    3 units am and 4 units pm     insulin glulisine (APIDRA PEN) 100 UNIT/ML soln    Inject 2 Units Subcutaneous 3 times daily (before meals)     insulin NPH (NOVOLIN N VIAL) 100 UNIT/ML vial    Take 3 units daily at bedtime.          Do you have any difficulty affording your medications or glucose monitoring  "supplies?  No       Patient glucose self monitoring as follows: Uses Dexcom sensor.   BG RESULTS: Per Dexcom Report -  Aug 3-8: (7 days) injection therapy   Aug 9-16  (8 days) pump therapy    August 3-8: Injection therapy + Dexcom sensor  Report shows variability in bg with a total of 33 episodes of hypoglycemia per Dexcom sensor.     August 9-16: Pump therapy + Dexcom sensor  Report shows bg variability with 27 episodes of hypoglycemia.     Overall: Bg Ave: 158, which corresponds to a GMI of 7.1%. 61% readings in target. 37% above target. 2% below target.      BG values are: In goal  Patient's most recent   Lab Results   Component Value Date    A1C 6.1 07/07/2021    is meeting goal of <7.0    Nutrition:  Not discussed today.     Physical Activity:    Not discussed today.     Relevant co-morbidities and related health problems:  Significant for:  Obesity  Mild retinopathy  Hypothyroidism  Vitiligo    Diabetes Complications:  Not discussed today.    Recent health service and resource utilization related to diabetes (hyperglycemia, hypoglycemia, etc):   None    Vitals:  LMP  (LMP Unknown)   Estimated body mass index is 33.42 kg/m  as calculated from the following:    Height as of 8/4/21: 1.485 m (4' 10.47\").    Weight as of 8/4/21: 73.7 kg (162 lb 8 oz).   Last 3 BP:   BP Readings from Last 3 Encounters:   08/04/21 (!) 151/74   07/12/21 134/81   06/23/21 130/68       History   Smoking Status     Never Smoker   Smokeless Tobacco     Never Used     Comment: no exposure       Labs:  Lab Results   Component Value Date    A1C 6.1 07/07/2021     Lab Results   Component Value Date     06/30/2021     Lab Results   Component Value Date     09/14/2020     HDL Cholesterol   Date Value Ref Range Status   09/14/2020 74 >49 mg/dL Final   ]  GFR Estimate   Date Value Ref Range Status   06/30/2021 68 >60 mL/min/[1.73_m2] Final     Comment:     Non  GFR Calc  Starting 12/18/2018, serum creatinine based " "estimated GFR (eGFR) will be   calculated using the Chronic Kidney Disease Epidemiology Collaboration   (CKD-EPI) equation.       GFR Estimate If Black   Date Value Ref Range Status   06/30/2021 79 >60 mL/min/[1.73_m2] Final     Comment:      GFR Calc  Starting 12/18/2018, serum creatinine based estimated GFR (eGFR) will be   calculated using the Chronic Kidney Disease Epidemiology Collaboration   (CKD-EPI) equation.       Lab Results   Component Value Date    CR 0.93 06/30/2021       Socio/Economic/Cultural Considerations:    Support system: spouse (Ra)    Cultural Influences/Ethnic Background:  Choose not to answer      Health Literacy/Numeracy:  \"With diabetes, it's helpful to use forms and log books to write down blood sugars and what you're eating at times to help understand how foods affect your blood sugars. With this in mind how confident are you at filling out medical forms, such as these, by yourself?  Not Assessed    Health Beliefs and Attitudes:   Patient Activation Measure Survey Score:  LAVON Score (Last Two) 1/3/2011   LAVON Raw Score 50   Activation Score 86.3   LAVON Level 4       Barriers to Learning: None      INTERVENTION:   Education provided today on:  Taking Medication: Pt made changes to insulin pump settings prior to visit today. Initial settings as of 08/09:  Basal: 0.3u/hr all day  Ratio: 1:20 all day  CF: 100 all day  Target: 110 all day    Pt changed rates to:  Basal: 12am: 0.3u/hr, 2;30am: 0.5, 2pm: 0.450, 6pm: 0.6, 9pm: 0.4.   Ratio: no changes made  CF: changed 9pm from 100 to 55 (resulted in multiple episodes of hypoglycemia)  Target: 150 all day    Attempted to sync pump and sensor. Unable to do this as pump would not accept transmitter serial number.     Attempted to sync phone and pump. Unable to do this as pump would not allow bluetooth connection    Pt downloaded T-Connect richard. Unable to fully set up in clinic due to internet issues. Cde suggested pt continue to set " up richard at home.     Pt states she plans to change sensor and transmitter on Aug 23rd. Cde showed pt how to enter transmitter serial number and sensor code into pump so sensor and pump can sync. Once sensor and pump are synced, the bluetooth connection can be made with phone and pump. Pt reminded to turn off Dexcom  before she syncs pump and sensor and pump and phone.     OF NOTE: pt is beginning to experience a mild reaction to Novolog insulin in the form of a rash at infusion site. This is not an allergic reaction to adhesive as the pt does not have this type of reaction to the Dexcom sensor. Pt denies itching at infusion site.     Opportunities for ongoing education and support in diabetes-self management were discussed.    Pt verbalized understanding of concepts discussed and recommendations provided today.       PLAN:  Pump Setting changes made:   CF: 1:80 all day  Target B all day    Complete setting up phone richard.    Do Control IQ upgrade tutorial.     Continue to monitor rash at infusion site. If rash worsens and/or significant itching begins, please call Cde.     FOLLOW-UP:  Ongoing plan for education and support: Once Control IQ upgrade is complete, pt will schedule f/u with Cde.     Time Spent: 60 minutes  Encounter Type: Individual    Any diabetes medication dose changes were made via the CDE Protocol and Collaborative Practice Agreement with the patient's endocrinology provider. A copy of this encounter was shared with the provider.    Maria M Marcus comes into clinic today at the request of Jessica Demarco RN, [facility], Ordering Provider for Pt Teaching .    This service provided today was under the supervising provider of the day Dr Guaman, who was available if needed.    Jessica Demarco RN, BSN, CDE   [facility]

## 2021-08-17 NOTE — TELEPHONE ENCOUNTER
Provider informed. No further action at this time.    Manjeet Hawk [facility]  Adult Endocrinology  [facility]

## 2021-08-25 ENCOUNTER — TRANSFERRED RECORDS (OUTPATIENT)
Dept: HEALTH INFORMATION MANAGEMENT | Facility: CLINIC | Age: 56
End: 2021-08-25

## 2021-08-30 ENCOUNTER — MYC MEDICAL ADVICE (OUTPATIENT)
Dept: ENDOCRINOLOGY | Facility: CLINIC | Age: 56
End: 2021-08-30

## 2021-08-30 DIAGNOSIS — E10.65 TYPE 1 DIABETES MELLITUS WITH HYPERGLYCEMIA (H): Primary | ICD-10-CM

## 2021-08-31 ENCOUNTER — TELEPHONE (OUTPATIENT)
Dept: ENDOCRINOLOGY | Facility: CLINIC | Age: 56
End: 2021-08-31
Payer: COMMERCIAL

## 2021-08-31 ENCOUNTER — TELEPHONE (OUTPATIENT)
Dept: ENDOCRINOLOGY | Facility: CLINIC | Age: 56
End: 2021-08-31

## 2021-08-31 RX ORDER — HUMAN INSULIN 100 [IU]/ML
INJECTION, SUSPENSION SUBCUTANEOUS
Qty: 15 ML | Refills: 3 | Status: SHIPPED | OUTPATIENT
Start: 2021-08-31 | End: 2023-11-20

## 2021-08-31 NOTE — TELEPHONE ENCOUNTER
PA Initiation    Medication: Novolin N Flexpen  Insurance Company:    Pharmacy Filling the Rx:    Filling Pharmacy Phone:    Filling Pharmacy Fax:    Start Date: 8/31/2021

## 2021-08-31 NOTE — TELEPHONE ENCOUNTER
Novolin N Flexpen in not covered under patients insurance, preferred meds are .. Novolog Flexpen, Humalog Kwikpen, Lantus Solostar and Levemir Flexpen. Would you like to change to a preferred med or start PA on Novolin N Flexpen?

## 2021-09-07 NOTE — TELEPHONE ENCOUNTER
Prior Authorization Approval    Authorization Effective Date: 5/31/2021  Authorization Expiration Date: 8/31/2022  Medication: Novolin N Flexpen - PA Approved  Approved Dose/Quantity: 45 ml / 30 ds  Reference #: [id number]   Insurance Company: [employer] - Phone [phone] Fax [phone]  Expected CoPay:       CoPay Card Available:      Foundation Assistance Needed:    Which Pharmacy is filling the prescription (Not needed for infusion/clinic administered): [facility] [address] [place] [id number] [facility]  Pharmacy Notified:    Patient Notified:

## 2021-09-17 ENCOUNTER — TRANSFERRED RECORDS (OUTPATIENT)
Dept: HEALTH INFORMATION MANAGEMENT | Facility: CLINIC | Age: 56
End: 2021-09-17

## 2021-09-23 ENCOUNTER — OFFICE VISIT (OUTPATIENT)
Dept: FAMILY MEDICINE | Facility: OTHER | Age: 56
End: 2021-09-23
Payer: COMMERCIAL

## 2021-09-23 ENCOUNTER — APPOINTMENT (OUTPATIENT)
Dept: GENERAL RADIOLOGY | Facility: CLINIC | Age: 56
End: 2021-09-23
Attending: FAMILY MEDICINE
Payer: COMMERCIAL

## 2021-09-23 ENCOUNTER — HOSPITAL ENCOUNTER (EMERGENCY)
Facility: CLINIC | Age: 56
Discharge: HOME | End: 2021-09-23
Attending: FAMILY MEDICINE | Admitting: FAMILY MEDICINE
Payer: COMMERCIAL

## 2021-09-23 VITALS
BODY MASS INDEX: 33.69 KG/M2 | HEART RATE: 84 BPM | TEMPERATURE: 98 F | SYSTOLIC BLOOD PRESSURE: 146 MMHG | RESPIRATION RATE: 20 BRPM | WEIGHT: 163.8 LBS | DIASTOLIC BLOOD PRESSURE: 70 MMHG | OXYGEN SATURATION: 100 %

## 2021-09-23 VITALS
BODY MASS INDEX: 33.32 KG/M2 | DIASTOLIC BLOOD PRESSURE: 72 MMHG | WEIGHT: 162 LBS | SYSTOLIC BLOOD PRESSURE: 138 MMHG | TEMPERATURE: 97.6 F | HEART RATE: 103 BPM | OXYGEN SATURATION: 98 % | RESPIRATION RATE: 16 BRPM

## 2021-09-23 DIAGNOSIS — S63.502A WRIST SPRAIN, LEFT, INITIAL ENCOUNTER: ICD-10-CM

## 2021-09-23 DIAGNOSIS — M54.12 CERVICAL RADICULOPATHY: ICD-10-CM

## 2021-09-23 DIAGNOSIS — G56.03 BILATERAL CARPAL TUNNEL SYNDROME: ICD-10-CM

## 2021-09-23 DIAGNOSIS — S29.012A UPPER BACK STRAIN, INITIAL ENCOUNTER: Primary | ICD-10-CM

## 2021-09-23 PROCEDURE — 99213 OFFICE O/P EST LOW 20 MIN: CPT | Performed by: PHYSICIAN ASSISTANT

## 2021-09-23 PROCEDURE — 73110 X-RAY EXAM OF WRIST: CPT | Mod: LT

## 2021-09-23 PROCEDURE — 99283 EMERGENCY DEPT VISIT LOW MDM: CPT

## 2021-09-23 PROCEDURE — 99282 EMERGENCY DEPT VISIT SF MDM: CPT | Performed by: FAMILY MEDICINE

## 2021-09-23 RX ORDER — CYCLOBENZAPRINE HCL 5 MG
5-10 TABLET ORAL 3 TIMES DAILY PRN
Qty: 30 TABLET | Refills: 0 | Status: SHIPPED | OUTPATIENT
Start: 2021-09-23 | End: 2022-01-20

## 2021-09-23 NOTE — PROGRESS NOTES
Assessment & Plan       ICD-10-CM    1. Upper back strain, initial encounter  S29.012A cyclobenzaprine (FLEXERIL) 5 MG tablet   2. Cervical radiculopathy  M54.12    3. Bilateral carpal tunnel syndrome  G56.03        1. Will prescribe Flexeril to use as needed for muscle spasms. Continue with heat and home stretching.    2-3. She is following with neurology and had a recent EMG so will send results to our clinic. She asked about completing disability paperwork but figures I probably cannot do this. I cannot, in fact, complete any paperwork for permanent disability. She voiced understanding.       Marcus Salgado PA-C  [facility] CASI Franz is a 56 year old who presents for the following health issues     HPI     She has a knot/spams in her left trapezius area she would like to discuss. She would also like to briefly discuss disability. She has filed for disability, mostly related to her poor vision due to diabetic retinopathy and now glaucoma and macular degeneration but also her chronic neck pain and left hand numbness, tingling and weakness. She has a  and goes before the  next week. She had a recent upper extremity EMG and will be undergoing a cervical MRI in the near future.     Review of Systems   Constitutional, cardiovascular, pulmonary, musculoskeletal and neuro systems are negative, except as otherwise noted.      Objective    /72   Pulse 103   Temp 97.6  F (36.4  C) (Temporal)   Resp 16   Wt 73.5 kg (162 lb)   LMP  (LMP Unknown)   SpO2 98%   BMI 33.32 kg/m    Body mass index is 33.32 kg/m .  Physical Exam   GENERAL: healthy, alert and no distress  RESP: lungs clear to auscultation - no rales, rhonchi or wheezes  CV: regular rate and rhythm, normal S1 S2, no S3 or S4, no murmur, click or rub  MS: Left trapezius muscle tightness and tenderness.   NEURO: Normal strength and tone, mentation intact and speech normal. Gait is stable.   PSYCH:  mentation appears normal, affect normal/bright

## 2021-09-24 DIAGNOSIS — S29.012A UPPER BACK STRAIN, INITIAL ENCOUNTER: ICD-10-CM

## 2021-09-24 RX ORDER — CYCLOBENZAPRINE HCL 5 MG
TABLET ORAL
Qty: 30 TABLET | Refills: 0 | OUTPATIENT
Start: 2021-09-24

## 2021-09-24 NOTE — ED TRIAGE NOTES
Pt reports she was carrying some totes and fell up the stairs, she hit her left shin on the step, she bent the left wrist back on the step trying to catch herself and then hit her shoulder, pain to all sites but complaint of wrist pain being the worst

## 2021-09-24 NOTE — ED PROVIDER NOTES
History     Chief Complaint   Patient presents with     Wrist Pain     Shoulder Pain     Leg Pain     HPI  Maria M Marcus is a 56 year old female who presents to the ER with concerns about injury to her left wrist.  Patient states that she was carrying some totes and fell going up the stairs using her left hand to break her fall.  She states that she thinks the wrist been somewhat backwards causing pain to the area.  She is worried about possible fracture.  She admitted to some abrasions to her left shin area but otherwise denied injury associated with the fall except that to her wrist.      Allergies:  Allergies   Allergen Reactions     Novolog [Insulin Aspart] Swelling     Itching, rash, contact dermatitis     Humalog [Insulin Lispro] Rash     Contact dermatitis     Zyrtec [Cetirizine] Rash       Problem List:    Patient Active Problem List    Diagnosis Date Noted     Colitis 01/30/2020     Priority: Medium     Hives 12/03/2019     Priority: Medium     Gastroesophageal reflux disease, esophagitis presence not specified 09/08/2017     Priority: Medium     IMO Regulatory Load OCT 2020       Retinopathy due to secondary diabetes (H) 10/29/2016     Priority: Medium     Type 1 diabetes mellitus without complication (H) 11/01/2015     Priority: Medium     Overweight (BMI 25.0-29.9) 11/01/2015     Priority: Medium     Hypertension goal BP (blood pressure) < 140/90 09/29/2014     Priority: Medium     Back pain 03/23/2013     Priority: Medium     Chronic pain 11/09/2011     Priority: Medium     Related to frozen shoulder per ortho will be two years of intermittent pain, agreed to #45 percocet for 90 day supply, only get narcotics from me.         Vitiligo 07/15/2011     Priority: Medium     Frozen shoulder syndrome 06/16/2011     Priority: Medium     Advanced directives, counseling/discussion 06/13/2011     Priority: Medium     Advance Directive Problem List Overview:   Name Relationship Phone    Primary Health Care  Agent            Alternative Health Care Agent        11  Patient presents for [employer] Informational meeting. Patient given Honoring Choices folder. Patient will complete Advance Care Directive and bring to clinic...Mary Lee RN              Cervical radiculopathy      Priority: Medium     Hyperlipidemia LDL goal <100 10/31/2010     Priority: Medium     [place] 10-year CHD Risk Score: 20% (Diabetic)   Values used to calculate score:     Age: 46 years -- Points: 3     Total Cholesterol: 174 mg/dL -- Points: 3     HDL Cholesterol: 50 mg/dL -- Points: 0     Systolic BP (treated): 128 mmHg -- Points: 3    The patient is not a smoker. -- Points: 0     The patient has a diagnosis of diabetes. -- Points: 20% Risk    The patient does not have a family history of CHD. -- Points:0     [place]      LDL goal  >= 20%  <100         Other and unspecified disc disorder of lumbar region 2007     Priority: Medium     Hypothyroidism due to Hashimoto's thyroiditis 10/05/2004     Priority: Medium     Problem list name updated by automated process. Provider to review          Past Medical History:    Past Medical History:   Diagnosis Date     Cervical radiculopathy      Diabetic eye exam (H) 11     DISC DIS NEC/NOS-LUMBAR 2007     Frozen shoulder syndrome 2011     Hyperlipidemia LDL goal <100 10/31/2010     Hypertension 1/3/2011     Hypothyroidism due to Hashimoto's thyroiditis      Type 1 diabetes, HbA1c goal < 7% (H) dx 1967     Unspecified hypothyroidism        Past Surgical History:    Past Surgical History:   Procedure Laterality Date     C  DELIVERY ONLY      C-Sections x2     C STOMACH SURGERY PROCEDURE UNLISTED       C TOTAL ABDOM HYSTERECTOMY       COLONOSCOPY N/A 2016    Procedure: COLONOSCOPY;  Surgeon: Efren Perry MD;  Location:  GI     ENDOSCOPIC SINUS SURGERY Bilateral 2018    Procedure: Bilateral functional endoscopic maxillary antrostomies;  Surgeon:  Woo Silva MD;  Location: [facility] OR     HC SACROPLASTY       LAMINECT/DISCECTOMY, CERVICAL  06/19/2007    C7-T1 hemilaminectomy, microdiscectomy, foraminotomy.     LASER YAG CAPSULOTOMY Right 10/23/2014    Procedure: LASER YAG CAPSULOTOMY;  Surgeon: Esteban Dorsey MD;  Location: [facility]     VITRECTOMY,STRIP EPIRETINAL MEMBRANE  06/24/09     ZZC NONSPECIFIC PROCEDURE      Hysterectomy - total (cervix removed but ovaries remain)       Family History:    Family History   Problem Relation Age of Onset     Hypertension Maternal Grandfather      EYE* Maternal Grandmother      Blood Disease Maternal Grandmother      Eye Disorder Maternal Grandmother         maccular degeneration     Osteoporosis Maternal Grandmother      Lipids Father      Gastrointestinal Disease Father         ulcers     Heart Disease Father      Cardiovascular Father         heart attack     Hypertension Paternal Grandmother      Breast Cancer Mother         dx age 73 - terminal - mother had first mammo at this age     Diabetes Paternal Uncle         Type I       Social History:  Marital Status:   [2]  Social History     Tobacco Use     Smoking status: Never Smoker     Smokeless tobacco: Never Used     Tobacco comment: no exposure   Vaping Use     Vaping Use: Never used   Substance Use Topics     Alcohol use: Yes     Comment: winex1-2/3x per yr.     Drug use: No        Medications:    blood glucose (CONTOUR NEXT TEST) test strip  blood glucose monitoring (NO BRAND SPECIFIED) meter device kit  blood glucose monitoring (SOFTCLIX) lancets  Blood Glucose Monitoring Suppl (CONTOUR NEXT ONE) KIT  cyclobenzaprine (FLEXERIL) 5 MG tablet  DULoxetine (CYMBALTA) 60 MG capsule  estradiol (ESTRACE) 1 MG tablet  estradiol (ESTRACE) 2 MG tablet  estradiol (ESTRACE) 2 MG tablet  insulin aspart (NOVOLOG VIAL) 100 UNITS/ML vial  insulin glargine (LANTUS SOLOSTAR) 100 UNIT/ML pen  insulin glulisine (APIDRA PEN) 100 UNIT/ML soln  insulin NPH (NOVOLIN N  "FLEXPEN) 100 UNIT/ML injection  insulin NPH (NOVOLIN N VIAL) 100 UNIT/ML vial  insulin pen needle (31G X 5 MM) 31G X 5 MM miscellaneous  insulin syringe 31G X 5/16\" 0.3 ML MISC  levothyroxine (SYNTHROID/LEVOTHROID) 50 MCG tablet  lisinopril (ZESTRIL) 20 MG tablet  omeprazole (PRILOSEC) 20 MG DR capsule  pravastatin (PRAVACHOL) 10 MG tablet          Review of Systems   All other systems reviewed and are negative.      Physical Exam   BP: (!) 176/75  Pulse: 88  Temp: 98  F (36.7  C)  Resp: 18  Weight: 74.3 kg (163 lb 12.8 oz)  SpO2: 100 %      Physical Exam  Vitals and nursing note reviewed.   Constitutional:       General: She is in acute distress (Mild secondary to left wrist pain.).   Musculoskeletal:         General: Tenderness (Left wrist area pain.) and signs of injury present. No swelling or deformity.      Left wrist: Tenderness and bony tenderness present. No deformity, lacerations, snuff box tenderness or crepitus. Normal pulse.   Neurological:      Mental Status: She is alert.         ED Course        Procedures              Critical Care time:  none               Results for orders placed or performed during the hospital encounter of 09/23/21 (from the past 24 hour(s))   XR Wrist Left G/E 3 Views    Narrative    EXAM: XR WRIST LEFT G/E 3 VIEWS  LOCATION: [facility]  DATE/TIME: 9/23/2021 9:07 PM    INDICATION: Fall, pain  COMPARISON: None.      Impression    IMPRESSION: Mild osteoarthrosis of the STT joint. Moderate osteoarthrosis of the first CMC joint. Arterial calcifications. There is no evidence of fracture.           Assessments & Plan (with Medical Decision Making)  56-year-old to the ER with concerns of injury to her left wrist area after fall.  Exam findings suggestive of strain without evidence of acute fracture.  Patient has carpal tunnel splints that she uses and she can use this as needed for support of the wrist.  A handout was sent home with her describing " things that she can do for her wrist sprain issues.  To return for increase or worsening symptoms if needed.  To follow-up in her clinic if not improved in the next 7 to 10 days as additional x-rays may be needed for possible occult fracture at that point if not improved.     I have reviewed the nursing notes.    I have reviewed the findings, diagnosis, plan and need for follow up with the patient.       Discharge Medication List as of 9/23/2021  9:42 PM          Final diagnoses:   Wrist sprain, left, initial encounter       9/23/2021   [facility] EMERGENCY DEPT     Alberto Summers, DO  09/24/21 1236

## 2021-09-24 NOTE — TELEPHONE ENCOUNTER
Pending Prescriptions:                       Disp   Refills    cyclobenzaprine (FLEXERIL) 5 MG tablet [Ph*30 tab*0        Sig: TAKE 1 TO 2 TABLETS BY MOUTH THREE TIMES DAILY AS           NEEDED FOR MUSCLE SPASM    Routing refill request to provider for review/approval because:  Drug not on the [facility] refill protocol

## 2021-09-29 ENCOUNTER — TRANSFERRED RECORDS (OUTPATIENT)
Dept: HEALTH INFORMATION MANAGEMENT | Facility: CLINIC | Age: 56
End: 2021-09-29

## 2021-10-14 ENCOUNTER — TRANSFERRED RECORDS (OUTPATIENT)
Dept: HEALTH INFORMATION MANAGEMENT | Facility: CLINIC | Age: 56
End: 2021-10-14

## 2021-10-23 ENCOUNTER — HEALTH MAINTENANCE LETTER (OUTPATIENT)
Age: 56
End: 2021-10-23

## 2021-10-27 DIAGNOSIS — E10.9 TYPE 1 DIABETES MELLITUS WITHOUT COMPLICATION (H): ICD-10-CM

## 2021-10-27 RX ORDER — LANCETS
EACH MISCELLANEOUS
Qty: 600 EACH | Refills: 1 | Status: CANCELLED | OUTPATIENT
Start: 2021-10-27

## 2021-11-02 ENCOUNTER — E-VISIT (OUTPATIENT)
Dept: FAMILY MEDICINE | Facility: CLINIC | Age: 56
End: 2021-11-02
Payer: COMMERCIAL

## 2021-11-02 DIAGNOSIS — Z20.822 SUSPECTED COVID-19 VIRUS INFECTION: Primary | ICD-10-CM

## 2021-11-02 PROCEDURE — 99421 OL DIG E/M SVC 5-10 MIN: CPT | Performed by: NURSE PRACTITIONER

## 2021-11-02 NOTE — PATIENT INSTRUCTIONS
Dear Maria M Marcus,    Your symptoms show that you may have coronavirus (COVID-19). This illness can cause fever, cough and trouble breathing. Many people get a mild case and get better on their own. Some people can get very sick.    Will I be tested for COVID-19?  We would like to test you for Covid-19 virus. I have placed orders for this test.     To schedule: go to your [facility] home page and scroll down to the section that says  You have an appointment that needs to be scheduled  and click the large green button that says  Schedule Now  and follow the steps to find the next available openings.    If you are unable to complete these [facility] scheduling steps, please call [phone] to schedule your testing.     Return to work/school/ guidance:  Please let your workplace manager and staffing office know when your quarantine ends     We can t give you an exact date as it depends on the above. You can calculate this on your own or work with your manager/staffing office to calculate this. (For example if you were exposed on 10/4, you would have to quarantine for 14 full days. That would be through 10/18. You could return on 10/19.)      If you receive a positive COVID-19 test result, follow the guidance of the those who are giving you the results. Usually the return to work is 10 (or in some cases 20 days from symptom onset.) If you work at [employer], you must also be cleared by Employee Occupational Health and Safety to return to work.        If you receive a negative COVID-19 test result and did not have a high risk exposure to someone with a known positive COVID-19 test, you can return to work once you're free of fever for 24 hours without fever-reducing medication and your symptoms are improving or resolved.      If you receive a negative COVID-19 test and If you had a high risk exposure to someone who has tested positive for COVID-19 then you can return to work 14 days after your last  contact with the positive individual    Note: If you have ongoing exposure to the covid positive person, this quarantine period may be more than 14 days. (For example, if you are continued to be exposed to your child who tested positive and cannot isolate from them, then the quarantine of 7-14 days can't start until your child is no longer contagious. This is typically 10 days from onset of the child's symptoms. So the total duration may be 17-24 days in this case.)    Sign up for [employer].   We know it's scary to hear that you might have COVID-19. We want to track your symptoms to make sure you're okay over the next 2 weeks. Please look for an email from [employer]--this is a free, online program that we'll use to keep in touch. To sign up, follow the link in the email you will receive. Learn more at [email or web address]    How can I take care of myself?    Get lots of rest. Drink extra fluids (unless a doctor has told you not to)    Take Tylenol (acetaminophen) or ibuprofen for fever or pain. If you have liver or kidney problems, ask your family doctor if it's okay to take Tylenol o ibuprofen    If you have other health problems (like cancer, heart failure, an organ transplant or severe kidney disease): Call your specialty clinic if you don't feel better in the next 2 days.    Know when to call 911. Emergency warning signs include:  o Trouble breathing or shortness of breath  o Pain or pressure in the chest that doesn't go away  o Feeling confused like you haven't felt before, or not being able to wake up  o Bluish-colored lips or face    Where can I get more information?  [facility] - About COVID-19:   [email or web address]    CDC - What to Do If You're Sick:   www.cdc.gov/coronavirus/2019-ncov/about/steps-when-sick.html

## 2021-11-04 ENCOUNTER — LAB (OUTPATIENT)
Dept: FAMILY MEDICINE | Facility: CLINIC | Age: 56
End: 2021-11-04
Attending: NURSE PRACTITIONER
Payer: COMMERCIAL

## 2021-11-04 DIAGNOSIS — Z20.822 SUSPECTED COVID-19 VIRUS INFECTION: ICD-10-CM

## 2021-11-04 PROCEDURE — U0003 INFECTIOUS AGENT DETECTION BY NUCLEIC ACID (DNA OR RNA); SEVERE ACUTE RESPIRATORY SYNDROME CORONAVIRUS 2 (SARS-COV-2) (CORONAVIRUS DISEASE [COVID-19]), AMPLIFIED PROBE TECHNIQUE, MAKING USE OF HIGH THROUGHPUT TECHNOLOGIES AS DESCRIBED BY CMS-2020-01-R: HCPCS

## 2021-11-04 PROCEDURE — U0005 INFEC AGEN DETEC AMPLI PROBE: HCPCS

## 2021-11-05 ENCOUNTER — NURSE TRIAGE (OUTPATIENT)
Dept: NURSING | Facility: CLINIC | Age: 56
End: 2021-11-05

## 2021-11-05 ENCOUNTER — MYC MEDICAL ADVICE (OUTPATIENT)
Dept: FAMILY MEDICINE | Facility: OTHER | Age: 56
End: 2021-11-05
Payer: COMMERCIAL

## 2021-11-05 LAB — SARS-COV-2 RNA RESP QL NAA+PROBE: POSITIVE

## 2021-11-06 NOTE — TELEPHONE ENCOUNTER
"-Coronavirus (COVID-19) Notification       Caller Name (Patient, parent, daughter/son, grandparent, etc)  patient    Reason for call  Notify of Positive Coronavirus (COVID-19) lab results, assess symptoms,  review  [facility] recommendations    Lab Result    Lab test:  2019-nCoV rRt-PCR or SARS-CoV-2 PCR    Oropharyngeal AND/OR nasopharyngeal swabs is POSITIVE for 2019-nCoV RNA/SARS-COV-2 PCR (COVID-19 virus)    RN Recommendations/Instructions per [facility] Coronavirus COVID-19 recommendations    Brief introduction script  Introduce self then review script:  \"I am calling on behalf of [email or web address].  We were notified that your Coronavirus test (COVID-19) for was POSITIVE for the virus.  I have some information to relay to you but first I wanted to mention that the [place] Dept of Health will be contacting you shortly [it's possible MD already called Patient] to talk to you more about how you are feeling and other people you have had contact with who might now also have the virus.  Also,  [facility] is Partnering with the [facility] for Covid-19 research, you may be contacted directly by research staff.\"    Assessment (Inquire about Patient's current symptoms)   Assessment   Current Symptoms at time of phone call: (if no symptoms, document No symptoms] Body aches, fatigue, congestion    Symptoms onset (if applicable) \"2 weeks ago\"     If at time of call, Patients symptoms hare worsened, the Patient should contact 911 or have someone drive them to Emergency Dept promptly:      If Patient calling 911, inform 911 personal that you have tested positive for the Coronavirus (COVID-19).  Place mask on and await 911 to arrive.    If Emergency Dept, If possible, please have another adult drive you to the Emergency Dept but you need to wear mask when in contact with other people.      Monoclonal Antibody Administration    You may be eligible to receive a new treatment with a monoclonal antibody for " "preventing hospitalization in patients at high risk for complications from COVID-19.   This medication is still experimental and available on a limited basis; it is given through an IV and must be given at an infusion center. Please note that not all people who are eligible will receive the medication since it is in limited supply.     Are you interested in being considered for this medication?  No.   Does the patient fit the criteria: No    If patient qualifies based on above criteria:  \"You will be contacted if you are selected to receive this treatment in the next 1-2 business days.   This is time sensitive and if you are not selected in the next 1-2 business days, you will not receive the medication.  If you do not receive a call to schedule, you have not been selected.\"      Review information with Patient    Your result was positive. This means you have COVID-19 (coronavirus).  We have sent you a letter that reviews the information that I'll be reviewing with you now.    How can I protect others?    If you have symptoms: stay home and away from others (self-isolate) until:    You've had no fever--and no medicine that reduces fever--for 1 full day (24 hours). And       Your other symptoms have gotten better. For example, your cough or breathing has improved. And     At least 10 days have passed since your symptoms started. (If you've been told by a doctor that you have a weak immune system, wait 20 days.)     If you don't have symptoms: Stay home and away from others (self-isolate) until at least 10 days have passed since your first positive COVID-19 test. (Date test collected)    During this time:  has covid as well.   *. No visitors.     Don't go to work, school or anywhere else.     Clean  high touch  surfaces often (doorknobs, counters, handles, etc.). Use a household cleaning spray or wipes. You'll find a full list on the EPA website at " www.epa.gov/pesticide-registration/list-n-disinfectants-use-against-sars-cov-2.     Cover your mouth and nose with a mask, tissue or other face covering to avoid spreading germs.    Wash your hands and face often with soap and water.    Make a list of people you have been in close contact with recently, even if either of you wore a face covering.   ; Start your list from 2 days before you became ill or had a positive test.  ; Include anyone that was within 6 feet of you for a cumulative total of 15 minutes or more in 24 hours. (Example: if you sat next to Ra for 5 minutes in the morning and 10 minutes in the afternoon, then you were in close contact for 15 minutes total that day. Ra would be added to your list.)    A public health worker will call or text you. It is important that you answer. They will ask you questions about possible exposures to COVID-19, such as people you have been in direct contact with and places you have visited.    Tell the people on your list that you have COVID-19; they should stay away from others for 14 days starting from the last time they were in contact with you (unless you are told something different from a public health worker).     Caregivers in these groups are at risk for severe illness due to COVID-19:  o People 65 years and older  o People who live in a nursing home or long-term care facility  o People with chronic disease (lung, heart, cancer, diabetes, kidney, liver, immunologic)  o People who have a weakened immune system, including those who:  - Are in cancer treatment  - Take medicine that weakens the immune system, such as corticosteroids  - Had a bone marrow or organ transplant  - Have an immune deficiency  - Have poorly controlled HIV or AIDS  - Are obese (body mass index of 40 or higher)  - Smoke regularly    Caregivers should wear gloves while washing dishes, handling laundry and cleaning bedrooms and bathrooms.    Wash and dry laundry with special caution. Don't  shake dirty laundry, and use the warmest water setting you can.    If you have a weakened immune system, ask your doctor about other actions you should take.    For more tips, go to www.cdc.gov/coronavirus/2019-ncov/downloads/10Things.pdf.    You should not go back to work until you meet the guidelines above for ending your home isolation. You don't need to be retested for COVID-19 before going back to work--studies show that you won't spread the virus if it's been at least 10 days since your symptoms started (or 20 days, if you have a weak immune system).    Employers: This document serves as formal notice of your employee's medical guidelines for going back to work. They must meet the above guidelines before going back to work in person.    How can I take care of myself?    1. Get lots of rest. Drink extra fluids (unless a doctor has told you not to).    2. Take Tylenol (acetaminophen) for fever or pain. If you have liver or kidney problems, ask your family doctor if it's okay to take Tylenol.     Take either:     650 mg (two 325 mg pills) every 4 to 6 hours, or     1,000 mg (two 500 mg pills) every 8 hours as needed.     Note: Don't take more than 3,000 mg in one day. Acetaminophen is found in many medicines (both prescribed and over-the-counter medicines). Read all labels to be sure you don't take too much.    For children, check the Tylenol bottle for the right dose (based on their age or weight).    3. If you have other health problems (like cancer, heart failure, an organ transplant or severe kidney disease): Call your specialty clinic if you don't feel better in the next 2 days.    4. Know when to call 911: Emergency warning signs include:    Trouble breathing or shortness of breath    Pain or pressure in the chest that doesn't go away    Feeling confused like you haven't felt before, or not being able to wake up    Bluish-colored lips or face    5. Sign up for [employer]. We know it's scary to hear that  you have COVID-19. We want to track your symptoms to make sure you're okay over the next 2 weeks. Please look for an email from [facility]--this is a free, online program that we'll use to keep in touch. To sign up, follow the link in the email. Learn more at [email or web address].    Where can I get more information?    [facility]: [email or web address]    Coronavirus Basics: www.[email or web address].[email or web address]./diseases/coronavirus/basics.html    What to Do If You're Sick: www.cdc.gov/coronavirus/2019-ncov/about/steps-when-sick.html    Ending Home Isolation: www.cdc.gov/coronavirus/2019-ncov/hcp/disposition-in-home-patients.html     Caring for Someone with COVID-19: www.cdc.gov/coronavirus/2019-ncov/if-you-are-sick/care-for-someone.html     [facility] clinical trials (COVID-19 research studies): [email or web address]-clinical-trials     A Positive COVID-19 letter will be sent via [employer] or the mail. (Exception, no letters sent to Presurgerical/Preprocedure Patients)    Amanda Reed RN

## 2021-11-06 NOTE — TELEPHONE ENCOUNTER
Please see 11/5 [facility] Triage note. Pt just tested positive and has questions about quarantine, etc. Answered all pt's questions per [facility] guidelines.

## 2021-12-02 NOTE — PROGRESS NOTES
Maria M is a 56 year old who is being evaluated via a billable telephone visit.      What phone number would you like to be contacted at? [phone]   How would you like to obtain your AVS? MyChart    Assessment & Plan     Sore throat  Had exposure to strep - son and daughter in law.  Rapid strep is negative.  Discussed we are waiting on secondary test, if positive will treat with Amoxicillin.   Discussed OTC therapies for sore throat in the meantime.   - Streptococcus A Rapid Scr w Reflx to PCR - Lab Collect; Future    Cough  Lower suspicion for strep given she is coughing and wheezing.  Could be concurrent illnesses.  Discussed concern for bronchitis at this point in her illness, if symptoms were more prolonged would be more concerned for bacterial origin.  Consider treating with antibiotics if symptoms are persistent over the weekend and she will notify us if they are.  Started on Prednisone and albuterol inhaler which she has used both in the past, she will monitor her sugars closely.  Discussed steroids may help if laryngitis is also concerning as she is so hoarse and raspy on the phone.    - albuterol (PROAIR HFA/PROVENTIL HFA/VENTOLIN HFA) 108 (90 Base) MCG/ACT inhaler; Inhale 2 puffs into the lungs every 6 hours  - predniSONE (DELTASONE) 20 MG tablet; Take 1 tablet (20 mg) by mouth daily for 5 days    Did not repeat COVID testing as she has been positive in < 90 days.     Return in about 3 days (around 12/6/2021) for If not improving, sooner if worse or new concerns.    Options for treatment and follow-up care were reviewed with the patient and/or guardian. Patient and/or guardian engaged in the decision making process and verbalized understanding of the options discussed and agreed with the final plan.    Sam Miranda PA-C  [facility]   Maria M is a 56 year old who presents for the following health issues     HPI     Acute Illness  Acute illness concerns: sore  throat and ears  Onset/Duration: Monday night   Symptoms:  Fever: no  Chills/Sweats: no  Headache (location?): YES  Sinus Pressure: YES  Conjunctivitis:  no  Ear Pain: YES: bilateral  Rhinorrhea: YES  Congestion: YES  Sore Throat: YES  Cough: YES-non-productive  Wheeze: YES  Decreased Appetite: no  Nausea: no  Vomiting: no  Diarrhea: no  Dysuria/Freq.: no  Dysuria or Hematuria: no  Fatigue/Achiness: YES  Sick/Strep Exposure: YES- son   Therapies tried and outcome: None    Had a head cold over the weekend and then on Monday is when it got worse.  The cough has increased in the last day or so.  Has also been wheezing slightly.   Had COVID last month and this isn't fatigue like that.    Son and daughter in law went into clinic on Tuesday they were diagnosed with strep.   The back of the throat is red.        Review of Systems   Constitutional, HEENT, cardiovascular, pulmonary, gi and gu systems are negative, except as otherwise noted.      Objective    Vitals - Patient Reported  Pain Score: Severe Pain (7)  Pain Loc: Ear      Vitals:  No vitals were obtained today due to virtual visit.    Physical Exam   alert and no distress, sounds raspy  PSYCH: Alert and oriented times 3; coherent speech, normal   rate and volume, able to articulate logical thoughts, able   to abstract reason, no tangential thoughts, no hallucinations   or delusions  Her affect is normal  RESP: No cough, no audible wheezing, able to talk in full sentences  Remainder of exam unable to be completed due to telephone visits    Phone call duration: 7:32 minutes

## 2021-12-03 ENCOUNTER — VIRTUAL VISIT (OUTPATIENT)
Dept: FAMILY MEDICINE | Facility: OTHER | Age: 56
End: 2021-12-03
Payer: COMMERCIAL

## 2021-12-03 ENCOUNTER — TELEPHONE (OUTPATIENT)
Dept: FAMILY MEDICINE | Facility: OTHER | Age: 56
End: 2021-12-03

## 2021-12-03 ENCOUNTER — ALLIED HEALTH/NURSE VISIT (OUTPATIENT)
Dept: FAMILY MEDICINE | Facility: OTHER | Age: 56
End: 2021-12-03
Payer: COMMERCIAL

## 2021-12-03 DIAGNOSIS — J02.9 SORE THROAT: Primary | ICD-10-CM

## 2021-12-03 DIAGNOSIS — M81.8 OTHER OSTEOPOROSIS WITHOUT CURRENT PATHOLOGICAL FRACTURE: Primary | ICD-10-CM

## 2021-12-03 DIAGNOSIS — J02.9 SORE THROAT: ICD-10-CM

## 2021-12-03 DIAGNOSIS — R05.9 COUGH: ICD-10-CM

## 2021-12-03 DIAGNOSIS — E06.3 HYPOTHYROIDISM DUE TO HASHIMOTO'S THYROIDITIS: ICD-10-CM

## 2021-12-03 LAB
DEPRECATED S PYO AG THROAT QL EIA: NEGATIVE
GROUP A STREP BY PCR: NOT DETECTED

## 2021-12-03 PROCEDURE — 87651 STREP A DNA AMP PROBE: CPT

## 2021-12-03 PROCEDURE — 99207 PR NO CHARGE NURSE ONLY: CPT

## 2021-12-03 PROCEDURE — 99213 OFFICE O/P EST LOW 20 MIN: CPT | Mod: 95 | Performed by: PHYSICIAN ASSISTANT

## 2021-12-03 RX ORDER — ALBUTEROL SULFATE 90 UG/1
2 AEROSOL, METERED RESPIRATORY (INHALATION) EVERY 6 HOURS
Qty: 18 G | Refills: 0 | Status: SHIPPED | OUTPATIENT
Start: 2021-12-03 | End: 2022-01-24

## 2021-12-03 RX ORDER — PREDNISONE 20 MG/1
20 TABLET ORAL DAILY
Qty: 5 TABLET | Refills: 0 | Status: SHIPPED | OUTPATIENT
Start: 2021-12-03 | End: 2021-12-08

## 2021-12-03 ASSESSMENT — PAIN SCALES - GENERAL: PAINLEVEL: SEVERE PAIN (7)

## 2021-12-03 NOTE — TELEPHONE ENCOUNTER
Central Prior Authorization Team   Phone: [phone]      Prior Authorization Not Needed per Insurance    12/03/2021  Medication: albuterol (PROAIR HFA/PROVENTIL HFA/VENTOLIN HFA) 108 (90 Base) MCG/ACT inhaler - NOT NEEDED  Insurance Company: Blue Plus PMA - Phone [phone] Fax [phone]  Expected CoPay:      Pharmacy Filling the Rx: [facility] [address] [place] [id number] [facility]  Pharmacy Notified: Yes  Patient Notified: Yes    Pharmacy received a paid claim for VENTOLIN HFA and patient picked up.

## 2021-12-03 NOTE — TELEPHONE ENCOUNTER
Prior Authorization Retail Medication Request    Medication/Dose: albuterol (PROAIR HFA/PROVENTIL HFA/VENTOLIN HFA) 108 (90 Base) MCG/ACT inhaler  ICD code (if different than what is on RX):    Previously Tried and Failed:   Rationale:     Insurance Name:   Insurance ID:       Pharmacy Information (if different than what is on RX)  Name:   Phone:

## 2021-12-07 ENCOUNTER — NURSE TRIAGE (OUTPATIENT)
Dept: FAMILY MEDICINE | Facility: OTHER | Age: 56
End: 2021-12-07
Payer: COMMERCIAL

## 2021-12-07 DIAGNOSIS — J01.90 ACUTE NON-RECURRENT SINUSITIS, UNSPECIFIED LOCATION: Primary | ICD-10-CM

## 2021-12-07 NOTE — TELEPHONE ENCOUNTER
Rx sent to pharmacy for Augmentin.  If not improving by end of the week needs to be seen, if getting worse should be seen immediately.     EASTON OlivaC

## 2021-12-07 NOTE — TELEPHONE ENCOUNTER
Called patient and gave her message below. Patient will go ahead and pick that up tonight so she can start a dose right away.     Instructed to call if sx are not improving by Thursday or Friday this week and if worsening symptoms than she needs to be seen immediately at UC or ED. Patient states understanding.     AYAKA García/[place] [employer]  December 7, 2021

## 2021-12-07 NOTE — TELEPHONE ENCOUNTER
"Nurse Triage SBAR  Is this a 2nd Level Triage? YES, LICENSED PRACTITIONER REVIEW IS REQUIRED    SITUATION:                                                    (Clearly and briefly define the situation)  Maria M Marcus is a 56 year old female     Ear pressure, pain in ears, ST, congested-dark colored, feels \"winded\" when exerting, headache, sinus pressure. Going on 8 days with Sx.     BACKGROUND:                                                    (Provide clear, relevant background information that relates to the situation)  Current Medication: Prednisone and Albuterol inhaler  Hx: Seen on 12/3 for virtual visit. History of Type 1 DM.   Last seen: 12/3    Patient is drinking plenty of fluids. Urinating ok.     Denies any SOB or chest pain.     Per virtual visit with ES on 12/1:    Return in about 3 days (around 12/6/2021) for If not improving, sooner if worse or new concerns.    Patient is not improving on medications, and having non-stop coughing and ear pain.       NURSE ASSESSMENT:                                                    (A statement of your professional conclusion)    Per Protocol, patient should be seen today or tomorrow in clinic.     Routing to Sam Miranda to see about work-in        (See information below for more triage details.)  RECOMMENDATION(S) and PLAN:                                                    (What do you need from this individual?)  Protocol Recommended Disposition: See in 24-48 hours -   Will comply with recommendation: yes      Routing to provider for recommendation on YES.    Encourage to return call clinic triage nurse if further questions/concerns that may come up or if symptoms do not improve, worsen, or new symptoms develop.    NOTES: Disposition was determined by the first positive assessment question, therefore all previous assessment questions were negative.  Guideline used:Cough  Electronic protocol    AYAKA García/[employer] [place]  December 7, 2021        Reason for Disposition    Continuous (nonstop) coughing interferes with work or school and no improvement using cough treatment per Care Advice    Additional Information    Negative: Bluish (or gray) lips or face    Negative: Severe difficulty breathing (e.g., struggling for each breath, speaks in single words)    Negative: Rapid onset of cough and has hives    Negative: Coughing started suddenly after medicine, an allergic food or bee sting    Negative: Difficulty breathing after exposure to flames, smoke, or fumes    Negative: Sounds like a life-threatening emergency to the triager    Negative: Previous asthma attacks and this feels like asthma attack    Negative: Chest pain present when not coughing    Negative: Difficulty breathing    Negative: Passed out (i.e., fainted, collapsed and was not responding)    Negative: Patient sounds very sick or weak to the triager    Negative: Coughed up > 1 tablespoon (15 ml) blood (Exception: blood-tinged sputum)    Negative: Fever > 103 F (39.4 C)    Negative: Fever > 101 F (38.3 C) and over 60 years of age    Negative: Fever > 100.0 F (37.8 C) and has diabetes mellitus or a weak immune system (e.g., HIV positive, cancer chemotherapy, organ transplant, splenectomy, chronic steroids)    Negative: Fever > 100.0 F (37.8 C) and bedridden (e.g., nursing home patient, stroke, chronic illness, recovering from surgery)    Negative: Increasing ankle swelling    Negative: Wheezing is present    Negative: SEVERE coughing spells (e.g., whooping sound after coughing, vomiting after coughing)    Negative: Coughing up demetria-colored (reddish-brown) or blood-tinged sputum    Negative: Fever present > 3 days (72 hours)    Negative: Fever returns after gone for over 24 hours and symptoms worse or not improved    Negative: Using nasal washes and pain medicine > 24 hours and sinus pain persists    Negative: Known COPD or other severe lung disease (i.e.,  bronchiectasis, cystic fibrosis, lung surgery) and worsening symptoms (i.e., increased sputum purulence or amount, increased breathing difficulty)    Protocols used: COUGH-A-OH

## 2021-12-12 ENCOUNTER — HOSPITAL ENCOUNTER (EMERGENCY)
Facility: CLINIC | Age: 56
Discharge: HOME | End: 2021-12-12
Attending: FAMILY MEDICINE | Admitting: FAMILY MEDICINE
Payer: COMMERCIAL

## 2021-12-12 VITALS
RESPIRATION RATE: 16 BRPM | WEIGHT: 163 LBS | DIASTOLIC BLOOD PRESSURE: 77 MMHG | HEART RATE: 88 BPM | BODY MASS INDEX: 33.53 KG/M2 | OXYGEN SATURATION: 98 % | SYSTOLIC BLOOD PRESSURE: 145 MMHG

## 2021-12-12 DIAGNOSIS — H69.92 DYSFUNCTION OF LEFT EUSTACHIAN TUBE: ICD-10-CM

## 2021-12-12 PROCEDURE — 99282 EMERGENCY DEPT VISIT SF MDM: CPT | Performed by: FAMILY MEDICINE

## 2021-12-12 RX ORDER — PSEUDOEPHEDRINE HCL 30 MG
30 TABLET ORAL EVERY 4 HOURS PRN
Qty: 60 TABLET | Refills: 0 | Status: SHIPPED | OUTPATIENT
Start: 2021-12-12 | End: 2021-12-12

## 2021-12-12 RX ORDER — PSEUDOEPHEDRINE HCL 30 MG
30 TABLET ORAL EVERY 4 HOURS PRN
Qty: 60 TABLET | Refills: 0 | Status: SHIPPED | OUTPATIENT
Start: 2021-12-12 | End: 2022-02-14

## 2021-12-12 NOTE — TELEPHONE ENCOUNTER
Pt calling back for advice, states she started Augmentin for a sinus infection on 12/07 and is not getting better.  She felt better the past 2 days but then woke up today feeling worse again.      RN reviewed clinic notes below and advised pt to seek care in  today d/t worsening symptoms.  Patient verbalized understanding and had no further questions.  She plans to go to [facility] which is close to her home.      COVID 19 Nurse Triage Plan/Patient Instructions    Please be aware that novel coronavirus (COVID-19) may be circulating in the community. If you develop symptoms such as fever, cough, or SOB or if you have concerns about the presence of another infection including coronavirus (COVID-19), please contact your health care provider or visit [email or web address].     Disposition/Instructions    In-Person Visit with provider recommended. Reference Visit Selection Guide.    Thank you for taking steps to prevent the spread of this virus.  o Limit your contact with others.  o Wear a simple mask to cover your cough.  o Wash your hands well and often.    Resources    [facility]: About COVID-19: [email or web address]    CDC: What to Do If You're Sick: www.cdc.gov/coronavirus/2019-ncov/about/steps-when-sick.html    CDC: Ending Home Isolation: www.cdc.gov/coronavirus/2019-ncov/hcp/disposition-in-home-patients.html     CDC: Caring for Someone: www.cdc.gov/coronavirus/2019-ncov/if-you-are-sick/care-for-someone.html     [facility]: Interim Guidance for Hospital Discharge to Home: www.health.[facility].[place]/diseases/coronavirus/hcp/hospdischarge.pdf    [facility] clinical trials (COVID-19 research studies): clinicalaffairs.[facility]/umn-clinical-trials     Below are the COVID-19 hotlines at the [place] Department of Health ([facility]). Interpreters are available.   o For health questions: Call 022-276-4371 or 1-815.305.1371 (7 a.m. to 7 p.m.)  o For questions about schools and childcare: Call  [phone] or 1-420.770.2876 (7 a.m. to 7 p.m.)             Lisa Clark RN  [facility] Nurse Advisor

## 2021-12-13 NOTE — ED PROVIDER NOTES
History     Chief Complaint   Patient presents with     Otalgia     HPI  Maria M Marcus is a 56 year old female who presents with left ear pain this been going on for the past 3 weeks.  Patient states she did a virtual visit and they started the patient initially on prednisone and inhaler and then with the pain getting worse she had amoxicillin added 5 days ago.  Patient states the pain is much worse here today.  Patient has been having nasal congestion and sinus pressure.  There has been no vomiting or diarrhea noted.  Nothing makes his symptoms better or worse.  There is been no drainage coming from the ears.    Allergies:  Allergies   Allergen Reactions     Novolog [Insulin Aspart] Swelling     Itching, rash, contact dermatitis     Humalog [Insulin Lispro] Rash     Contact dermatitis     Zyrtec [Cetirizine] Rash       Problem List:    Patient Active Problem List    Diagnosis Date Noted     Colitis 01/30/2020     Priority: Medium     Hives 12/03/2019     Priority: Medium     Gastroesophageal reflux disease, esophagitis presence not specified 09/08/2017     Priority: Medium     IMO Regulatory Load OCT 2020       Retinopathy due to secondary diabetes (H) 10/29/2016     Priority: Medium     Type 1 diabetes mellitus without complication (H) 11/01/2015     Priority: Medium     Overweight (BMI 25.0-29.9) 11/01/2015     Priority: Medium     Hypertension goal BP (blood pressure) < 140/90 09/29/2014     Priority: Medium     Back pain 03/23/2013     Priority: Medium     Chronic pain 11/09/2011     Priority: Medium     Related to frozen shoulder per ortho will be two years of intermittent pain, agreed to #45 percocet for 90 day supply, only get narcotics from me.         Vitiligo 07/15/2011     Priority: Medium     Frozen shoulder syndrome 06/16/2011     Priority: Medium     Advanced directives, counseling/discussion 06/13/2011     Priority: Medium     Advance Directive Problem List Overview:   Name Relationship Phone     Primary Health Care Agent            Alternative Health Care Agent        11  Patient presents for [employer] Informational meeting. Patient given lmbanging Soft[employer]k folder. Patient will complete Advance Care Directive and bring to clinic...Mary Lee RN              Cervical radiculopathy      Priority: Medium     Hyperlipidemia LDL goal <100 10/31/2010     Priority: Medium     [place] 10-year CHD Risk Score: 20% (Diabetic)   Values used to calculate score:     Age: 46 years -- Points: 3     Total Cholesterol: 174 mg/dL -- Points: 3     HDL Cholesterol: 50 mg/dL -- Points: 0     Systolic BP (treated): 128 mmHg -- Points: 3    The patient is not a smoker. -- Points: 0     The patient has a diagnosis of diabetes. -- Points: 20% Risk    The patient does not have a family history of CHD. -- Points:0     [place]      LDL goal  >= 20%  <100         Other and unspecified disc disorder of lumbar region 2007     Priority: Medium     Hypothyroidism due to Hashimoto's thyroiditis 10/05/2004     Priority: Medium     Problem list name updated by automated process. Provider to review          Past Medical History:    Past Medical History:   Diagnosis Date     Cervical radiculopathy      Diabetic eye exam (H) 11     DISC DIS NEC/NOS-LUMBAR 2007     Frozen shoulder syndrome 2011     Hyperlipidemia LDL goal <100 10/31/2010     Hypertension 1/3/2011     Hypothyroidism due to Hashimoto's thyroiditis      Type 1 diabetes, HbA1c goal < 7% (H) dx 1967     Unspecified hypothyroidism        Past Surgical History:    Past Surgical History:   Procedure Laterality Date     C  DELIVERY ONLY      C-Sections x2     C STOMACH SURGERY PROCEDURE UNLISTED       C TOTAL ABDOM HYSTERECTOMY       COLONOSCOPY N/A 2016    Procedure: COLONOSCOPY;  Surgeon: Efren Perry MD;  Location:  GI     ENDOSCOPIC SINUS SURGERY Bilateral 2018    Procedure: Bilateral functional endoscopic maxillary  antrostomies;  Surgeon: Woo Silva MD;  Location: [facility] OR     HC SACROPLASTY       LAMINECT/DISCECTOMY, CERVICAL  06/19/2007    C7-T1 hemilaminectomy, microdiscectomy, foraminotomy.     LASER YAG CAPSULOTOMY Right 10/23/2014    Procedure: LASER YAG CAPSULOTOMY;  Surgeon: Esteban Dorsey MD;  Location: [facility] OR     VITRECTOMY,STRIP EPIRETINAL MEMBRANE  06/24/09     ZZC NONSPECIFIC PROCEDURE      Hysterectomy - total (cervix removed but ovaries remain)       Family History:    Family History   Problem Relation Age of Onset     Hypertension Maternal Grandfather      EYE* Maternal Grandmother      Blood Disease Maternal Grandmother      Eye Disorder Maternal Grandmother         maccular degeneration     Osteoporosis Maternal Grandmother      Lipids Father      Gastrointestinal Disease Father         ulcers     Heart Disease Father      Cardiovascular Father         heart attack     Hypertension Paternal Grandmother      Breast Cancer Mother         dx age 73 - terminal - mother had first mammo at this age     Diabetes Paternal Uncle         Type I       Social History:  Marital Status:   [2]  Social History     Tobacco Use     Smoking status: Never Smoker     Smokeless tobacco: Never Used     Tobacco comment: no exposure   Vaping Use     Vaping Use: Never used   Substance Use Topics     Alcohol use: Yes     Comment: winex1-2/3x per yr.     Drug use: No        Medications:    albuterol (PROAIR HFA/PROVENTIL HFA/VENTOLIN HFA) 108 (90 Base) MCG/ACT inhaler  amoxicillin-clavulanate (AUGMENTIN) 875-125 MG tablet  blood glucose (CONTOUR NEXT TEST) test strip  blood glucose monitoring (SOFTCLIX) lancets  Blood Glucose Monitoring Suppl (CONTOUR NEXT ONE) KIT  cyclobenzaprine (FLEXERIL) 5 MG tablet  DULoxetine (CYMBALTA) 60 MG capsule  estradiol (ESTRACE) 2 MG tablet  insulin aspart (NOVOLOG VIAL) 100 UNITS/ML vial  insulin glargine (LANTUS SOLOSTAR) 100 UNIT/ML pen  insulin glulisine (APIDRA PEN) 100 UNIT/ML  "soln  insulin NPH (NOVOLIN N FLEXPEN) 100 UNIT/ML injection  insulin pen needle (31G X 5 MM) 31G X 5 MM miscellaneous  insulin syringe 31G X 5/16\" 0.3 ML MISC  levothyroxine (SYNTHROID/LEVOTHROID) 50 MCG tablet  lisinopril (ZESTRIL) 20 MG tablet  omeprazole (PRILOSEC) 20 MG DR capsule  pravastatin (PRAVACHOL) 10 MG tablet          Review of Systems   All other systems reviewed and are negative.      Physical Exam   BP: (!) 165/83  Pulse: 101  Resp: 18  Weight: 73.9 kg (163 lb)  SpO2: 100 %      Physical Exam  Vitals and nursing note reviewed.   Constitutional:       General: She is not in acute distress.     Appearance: Normal appearance. She is not ill-appearing.   HENT:      Head: Normocephalic and atraumatic.      Right Ear: Tympanic membrane normal.      Left Ear: Swelling present. Tympanic membrane is bulging. Tympanic membrane is not injected, perforated or erythematous.      Nose: Nose normal.   Eyes:      Conjunctiva/sclera: Conjunctivae normal.   Cardiovascular:      Rate and Rhythm: Normal rate and regular rhythm.      Pulses: Normal pulses.      Heart sounds: Normal heart sounds.   Pulmonary:      Effort: Pulmonary effort is normal. No respiratory distress.      Breath sounds: Normal breath sounds. No stridor.   Abdominal:      General: Abdomen is flat. There is no distension.      Palpations: Abdomen is soft. There is no mass.   Musculoskeletal:      Cervical back: Normal range of motion.   Neurological:      Mental Status: She is alert.         ED Course       No results found. However, due to the size of the patient record, not all encounters were searched. Please check Results Review for a complete set of results.    Medications - No data to display     Exam is consistent with eustachian tube dysfunction.  I think this is was causing the patient's pain.  There is no obvious signs of infection.  Patient is on amoxicillin which we will have her continue but I Litzy have her add Flonase and add Sudafed " for the next few days.  Patient will follow up with ear nose and throat if things or not improving over the next few days    Assessments & Plan (with Medical Decision Making)  Eustachian tube dysfunction     I have reviewed the nursing notes.    I have reviewed the findings, diagnosis, plan and need for follow up with the patient.              12/12/2021   [facility] EMERGENCY DEPT     Antonio Harden MD  12/12/21 1935

## 2021-12-30 ENCOUNTER — DOCUMENTATION ONLY (OUTPATIENT)
Dept: ENDOCRINOLOGY | Facility: CLINIC | Age: 56
End: 2021-12-30
Payer: COMMERCIAL

## 2021-12-30 NOTE — PROGRESS NOTES
Fax received from [facility] medical records dept requesting supporting records on the product patient is requesting for diabetes medical supplies.    1. Encounter date within 6 months with date and signature - last office visit 7/12/2021  2. Missing a diabetes Dx code and or description - Type 1 or Type 2 diabetes diagnosis  3. Missing adherence to the CGM regimen and diabetes treatment plan (Needs to state the patient is CURRENTLY using the CGM)  4. Missing a statement showing patient is injection insulin at least 3x/day or currently on a insulin pump    Manjeet Hawk [facility]  Adult Endocrinology  [facility]

## 2021-12-31 NOTE — PROGRESS NOTES
7/12/21 encounter faxed to [facility] at [phone]. With note to call the clinic as all the requested information was included in the encounter.    Marjan Almazan, ALEJANDRO  Adult Endocrinology  [facility]

## 2022-01-10 ENCOUNTER — TELEPHONE (OUTPATIENT)
Dept: OBGYN | Facility: CLINIC | Age: 57
End: 2022-01-10

## 2022-01-10 NOTE — TELEPHONE ENCOUNTER
Message received from pharmacy to please initiate PA or advise to alternate.    Routing to PA pool.    Josi Rojas, CMA

## 2022-01-12 NOTE — TELEPHONE ENCOUNTER
Central Prior Authorization Team   Phone: [phone]      PA Initiation    Medication: Estradiol-Initiated  Insurance Company: [employer] Plus [employer]AP - Phone [phone] Fax [phone]  Pharmacy Filling the Rx: [facility] [place], [place] - [address] [facility]  Filling Pharmacy Phone: [phone]  Filling Pharmacy Fax:    Start Date: 1/12/2022

## 2022-01-12 NOTE — TELEPHONE ENCOUNTER
Prior Authorization Not Needed per Insurance    Medication: Estradiol-WRONG INSURANCE  Insurance Company: [employer]AP - Phone [phone] Fax [phone]  Expected CoPay:      Pharmacy Filling the Rx: [facility] [facility] [address] [address]  Pharmacy Notified: Yes  Patient Notified: No    Called pharmacy and they only have this insurance for the patient.  They will reach out to see if she has Medicare insurance and get that information.

## 2022-01-17 ENCOUNTER — MEDICAL CORRESPONDENCE (OUTPATIENT)
Dept: HEALTH INFORMATION MANAGEMENT | Facility: CLINIC | Age: 57
End: 2022-01-17
Payer: COMMERCIAL

## 2022-01-17 DIAGNOSIS — E10.9 TYPE 1 DIABETES MELLITUS WITHOUT COMPLICATION (H): ICD-10-CM

## 2022-01-17 RX ORDER — LANCETS
EACH MISCELLANEOUS
Qty: 500 EACH | Refills: 1 | Status: SHIPPED | OUTPATIENT
Start: 2022-01-17 | End: 2022-07-24

## 2022-01-17 NOTE — PROGRESS NOTES
Outcome for 01/17/22 2:39 PM :Patient is sharing data via clinic device website and patient BG data up to date-JASMIN Hawk [facility]  Adult Endocrinology  [facility]      Endocrinology and Diabetes Clinic       Interval history:  Maria M Marcus aged 56 year old years old woman with type 1 diabetes with retinopathy with comorbidity of vitiligo, hypothyroidism and obesity for follow-up    Pt has stumped her foot left when getting up at night for hypoglycemia  Has had multiple episodes of hypoglycemia, mostly at night, some during the day  Has been remodeling the house, more active  Has tried to decrease NPH from 3 ot 2 units, low remained      Insulin  NPH 3 units at 22:00 for down phenomenon  Decreased Lantus from 6-> 4units pm, 4 units am Apidra 2 to 3 units per meal carb; she calculates carb ratio 1 unit for each 20 g of carbohydrates, and then substrates 1 unit per meal, correction 1 unit for 100 mg of above 100; this is decreased from before    CGM  Has been using Dexcom sensor          Hypoglycemia- nocturnal related to NPH dosing; notes that she requires less assitance, finds CGM very helpful    Patient notes some nausea however relates this to GERD, no vomiting, no anorexia, weight gain, not weight loss, not more physically active than before hand also she plans to, no muscle weakness, no dizziness  Checks blood pressure with cuff at home, typically below 130/85  Menopause started Estradiol      Assessment:  (E10.649) Type 1 diabetes mellitus with hypoglycemia and without coma (H)  (primary encounter diagnosis)  Middle-aged woman with longstanding type 1 diabetes, low insulin requirements  Continue problems with hypoglycemia  Discussed for night to decrease Lantus 4-> 3 units  Take less Apidra with dinner  Also when physically active take less apidra with meals    Hypothyroidism is taking in AM, sometimes difficult to be taken on empty stomach as pt has to eat for low BGs;  recheck today, might have to increase the dose    Dyslipidemia intolerant to Atorvastatin with muscle cramps, tolerates Pravastatin, will try Simvastatin    Post menopausal DXA scan    Plan:   Lantus to 4 units am and 3 units pm     Take NPH insulin at 11 pm 3 units    Apidra one less unit in the evening and when active    Contact us if hypoglycemia persists    Levothyroxine continue in AM current dose    Follow-up with me in 4 months    40 minutes spent on the date of the encounter doing chart review, review of test results, interpretation of tests, patient visit and documentation      Lena Bradford MD  Endocrinology and Diabetes  [facility]  [address]  Pager [phone]  [facility] [address]  Tel [phone]        Medications:   Current Outpatient Medications   Medication Sig Dispense Refill     albuterol (PROAIR HFA/PROVENTIL HFA/VENTOLIN HFA) 108 (90 Base) MCG/ACT inhaler Inhale 2 puffs into the lungs every 6 hours 18 g 0     blood glucose (CONTOUR NEXT TEST) test strip Use to test blood sugar up to 6 times daily or as directed.   Dispense CONTOUR NEXT or use brand compatible with patients insurance and device. 600 strip 1     blood glucose monitoring (SOFTCLIX) lancets Use to test blood sugar 6 times daily or as directed.   Dispense ACCU-CHEK SOFTCLIX 500 each 1     Blood Glucose Monitoring Suppl (CONTOUR NEXT ONE) KIT 1 each daily Use to test blood glucose. 1 kit 1     cyclobenzaprine (FLEXERIL) 5 MG tablet Take 1-2 tablets (5-10 mg) by mouth 3 times daily as needed for muscle spasms 30 tablet 0     DULoxetine (CYMBALTA) 60 MG capsule Take 1 capsule (60 mg) by mouth daily 30 capsule 5     estradiol (ESTRACE) 2 MG tablet Take 1 tablet (2 mg) by mouth daily 90 tablet 3     insulin aspart (NOVOLOG VIAL) 100 UNITS/ML vial Use as directed via insulin pump. Total daily dose approx 40 units. 90 day supply. (Patient not taking: Reported on 12/3/2021) 40 mL 1     insulin glargine  "(LANTUS SOLOSTAR) 100 UNIT/ML pen 3 units am and 4 units pm 15 mL 3     insulin glulisine (APIDRA PEN) 100 UNIT/ML soln Inject 2 Units Subcutaneous 3 times daily (before meals) 9 mL 3     insulin NPH (NOVOLIN N FLEXPEN) 100 UNIT/ML injection Take 3 units daily at bedtime. 15 mL 3     insulin pen needle (31G X 5 MM) 31G X 5 MM miscellaneous Use 6-7 pen needles daily or as directed. 300 each 8     insulin syringe 31G X 5/16\" 0.3 ML MISC 1 each daily 90 each 3     levothyroxine (SYNTHROID/LEVOTHROID) 50 MCG tablet Take 1 tablet (50 mcg) by mouth daily 90 tablet 3     lisinopril (ZESTRIL) 20 MG tablet Take 1 tablet (20 mg) by mouth daily 90 tablet 3     omeprazole (PRILOSEC) 20 MG DR capsule Take 1 capsule by mouth once daily 90 capsule 3     pravastatin (PRAVACHOL) 10 MG tablet Take 1 tablet (10 mg) by mouth daily 90 tablet 3     pseudoePHEDrine (SUDAFED) 30 MG tablet Take 1 tablet (30 mg) by mouth every 4 hours as needed for congestion 60 tablet 0       Social History:  Social History     Tobacco Use     Smoking status: Never Smoker     Smokeless tobacco: Never Used     Tobacco comment: no exposure   Substance Use Topics     Alcohol use: Yes     Comment: winex1-2/3x per yr.       Family History  FAMILY HISTORY      Physical Examination:General: Well appearing woman in no distress.   Psych: good eye contact, no pressured speech    Diabetic foot exam:  Inspection normal  Sensation to monofilament intact  Sensation to fibration intact  Skin is intact   Callus formation is ont present  Gait is normal     Labs and Studies:   Lab Results   Component Value Date     06/30/2021    CHLORIDE 101 06/30/2021    CO2 30 06/30/2021     (H) 06/30/2021    CR 0.93 06/30/2021    CR 1.00 06/23/2021    CR 0.76 09/14/2020    CR 0.96 05/25/2020    CR 0.83 02/01/2020    DORIS 8.5 06/30/2021    ALBUMIN 4.2 06/23/2021    ALKPHOS 64 06/23/2021     (H) 09/14/2020    HDL 74 09/14/2020    TRIG 69 09/14/2020     Lab Results "   Component Value Date    MICROL 8 09/14/2020    MICROL <5 11/08/2019    MICROL <5 04/25/2019    MICROL <5 04/13/2018    MICROL <5 07/28/2017     Lab Results   Component Value Date    A1C 6.1 (H) 07/07/2021    A1C 6.1 (A) 03/05/2021    A1C 6.2 (H) 09/14/2020    A1C 5.8 (A) 03/09/2020    A1C 5.8 (A) 11/08/2019       Lab Results   Component Value Date    HGB 12.6 05/25/2020     Lab Results   Component Value Date    TSH 16.78 (H) 07/07/2021    TSH 4.53 (H) 02/25/2021    TSH 0.05 (L) 12/30/2020    TSH 0.12 (L) 09/14/2020    TSH 0.51 12/03/2019       Complications  1. Retinopathy: yes - hemorrhage 9/2020  2. Nephropathy: no  3. Neuropathy: no  4. Hypoglycemia: yes   5. Macrovascular:  No  .     She would like to use half units, however is allergic to NovoLog and Humalog and has been using Apidra  She cannot use an insulin pump because of Apidra clogging the tubes as she has such low needs

## 2022-01-20 ENCOUNTER — MYC REFILL (OUTPATIENT)
Dept: FAMILY MEDICINE | Facility: OTHER | Age: 57
End: 2022-01-20
Payer: COMMERCIAL

## 2022-01-20 DIAGNOSIS — M54.12 CERVICAL RADICULOPATHY: ICD-10-CM

## 2022-01-20 DIAGNOSIS — S29.012A UPPER BACK STRAIN, INITIAL ENCOUNTER: ICD-10-CM

## 2022-01-20 DIAGNOSIS — G89.4 CHRONIC PAIN SYNDROME: ICD-10-CM

## 2022-01-20 RX ORDER — CYCLOBENZAPRINE HCL 5 MG
5-10 TABLET ORAL 3 TIMES DAILY PRN
Qty: 30 TABLET | Refills: 2 | Status: SHIPPED | OUTPATIENT
Start: 2022-01-20 | End: 2022-04-06

## 2022-01-20 NOTE — TELEPHONE ENCOUNTER
Pending Prescriptions:                       Disp   Refills    cyclobenzaprine (FLEXERIL) 5 MG tablet     30 tab*0        Sig: Take 1-2 tablets (5-10 mg) by mouth 3 times daily as           needed for muscle spasms        Routing refill request to provider for review/approval because:  Drug not on the G refill protocol   TERESA PalmerN, RN, PHN  [place]  January 20, 2022

## 2022-01-21 RX ORDER — DULOXETIN HYDROCHLORIDE 60 MG/1
CAPSULE, DELAYED RELEASE ORAL
Qty: 30 CAPSULE | Refills: 2 | Status: SHIPPED | OUTPATIENT
Start: 2022-01-21 | End: 2022-04-04

## 2022-01-21 NOTE — TELEPHONE ENCOUNTER
Pending Prescriptions:                       Disp   Refills    DULoxetine (CYMBALTA) 60 MG capsule [Pharm*30 cap*0        Sig: Take 1 capsule by mouth once daily    Routing refill request to provider for review/approval because:  Labs out of range:    BP Readings from Last 3 Encounters:   12/12/21 (!) 145/77   09/23/21 (!) 146/70   09/23/21 138/72      DULoxetine (CYMBALTA) 60 MG capsule 30 capsule 5 6/23/2021  --   Sig - Route: Take 1 capsule (60 mg) by mouth daily - Oral   Sent to pharmacy as: DULoxetine HCl 60 MG Oral Capsule Delayed Release Particles (CYMBALTA)   Class: E-Prescribe   Order: [id number]   E-Prescribing Status: Receipt confirmed by pharmacy (6/23/2021  1:44 PM CDT)     Teresa Velasco RN on 1/21/2022 at 10:24 AM

## 2022-01-24 ENCOUNTER — OFFICE VISIT (OUTPATIENT)
Dept: ENDOCRINOLOGY | Facility: CLINIC | Age: 57
End: 2022-01-24
Payer: MEDICARE

## 2022-01-24 VITALS
DIASTOLIC BLOOD PRESSURE: 79 MMHG | BODY MASS INDEX: 32.29 KG/M2 | SYSTOLIC BLOOD PRESSURE: 138 MMHG | OXYGEN SATURATION: 99 % | WEIGHT: 157 LBS | HEART RATE: 103 BPM

## 2022-01-24 DIAGNOSIS — M48.55XA COLLAPSED VERTEBRA, NOT ELSEWHERE CLASSIFIED, THORACOLUMBAR REGION, INITIAL ENCOUNTER FOR FRACTURE (H): ICD-10-CM

## 2022-01-24 DIAGNOSIS — Z23 NEED FOR PROPHYLACTIC VACCINATION AND INOCULATION AGAINST INFLUENZA: Primary | ICD-10-CM

## 2022-01-24 DIAGNOSIS — E10.9 TYPE 1 DIABETES MELLITUS WITHOUT COMPLICATION (H): ICD-10-CM

## 2022-01-24 LAB — HBA1C MFR BLD: 6.3 % (ref 0–5.7)

## 2022-01-24 PROCEDURE — 83036 HEMOGLOBIN GLYCOSYLATED A1C: CPT | Performed by: INTERNAL MEDICINE

## 2022-01-24 PROCEDURE — 99215 OFFICE O/P EST HI 40 MIN: CPT | Mod: 25 | Performed by: INTERNAL MEDICINE

## 2022-01-24 PROCEDURE — 90682 RIV4 VACC RECOMBINANT DNA IM: CPT | Performed by: INTERNAL MEDICINE

## 2022-01-24 PROCEDURE — G0008 ADMIN INFLUENZA VIRUS VAC: HCPCS | Performed by: INTERNAL MEDICINE

## 2022-01-24 RX ORDER — SIMVASTATIN 20 MG
20 TABLET ORAL AT BEDTIME
Qty: 90 TABLET | Refills: 3 | Status: SHIPPED | OUTPATIENT
Start: 2022-01-24 | End: 2023-01-20

## 2022-01-24 NOTE — LETTER
1/24/2022         RE: Maria M Marcus  [address] [place] [address]        Dear Colleague,    Thank you for referring your patient, Maria M Marcus, to the [facility]. Please see a copy of my visit note below.    Outcome for 01/17/22 2:39 PM :Patient is sharing data via clinic device website and patient BG data up to date-DEXCOM  Manjeet Hawk [facility]  Adult Endocrinology  [facility]      Endocrinology and Diabetes Clinic       Interval history:  Maria M Marcus aged 56 year old years old woman with type 1 diabetes with retinopathy with comorbidity of vitiligo, hypothyroidism and obesity for follow-up    Pt has stumped her foot left when getting up at night for hypoglycemia  Has had multiple episodes of hypoglycemia, mostly at night, some during the day  Has been remodeling the house, more active  Has tried to decrease NPH from 3 ot 2 units, low remained      Insulin  NPH 3 units at 22:00 for down phenomenon  Decreased Lantus from 6-> 4units pm, 4 units am Apidra 2 to 3 units per meal carb; she calculates carb ratio 1 unit for each 20 g of carbohydrates, and then substrates 1 unit per meal, correction 1 unit for 100 mg of above 100; this is decreased from before    CGM  Has been using Dexcom sensor          Hypoglycemia- nocturnal related to NPH dosing; notes that she requires less assitance, finds CGM very helpful    Patient notes some nausea however relates this to GERD, no vomiting, no anorexia, weight gain, not weight loss, not more physically active than before hand also she plans to, no muscle weakness, no dizziness  Checks blood pressure with cuff at home, typically below 130/85  Menopause started Estradiol      Assessment:  (E10.649) Type 1 diabetes mellitus with hypoglycemia and without coma (H)  (primary encounter diagnosis)  Middle-aged woman with longstanding type 1 diabetes, low insulin requirements  Continue problems with  hypoglycemia  Discussed for night to decrease Lantus 4-> 3 units  Take less Apidra with dinner  Also when physically active take less apidra with meals    Hypothyroidism is taking in AM, sometimes difficult to be taken on empty stomach as pt has to eat for low BGs; recheck today, might have to increase the dose    Dyslipidemia intolerant to Atorvastatin with muscle cramps, tolerates Pravastatin, will try Simvastatin    Post menopausal DXA scan    Plan:   Lantus to 4 units am and 3 units pm     Take NPH insulin at 11 pm 3 units    Apidra one less unit in the evening and when active    Contact us if hypoglycemia persists    Levothyroxine continue in AM current dose    Follow-up with me in 4 months    40 minutes spent on the date of the encounter doing chart review, review of test results, interpretation of tests, patient visit and documentation      Lena Bradford MD  Endocrinology and Diabetes  [address]  Pager [phone]  [facility] [address]  Tel [phone]        Medications:   Current Outpatient Medications   Medication Sig Dispense Refill     albuterol (PROAIR HFA/PROVENTIL HFA/VENTOLIN HFA) 108 (90 Base) MCG/ACT inhaler Inhale 2 puffs into the lungs every 6 hours 18 g 0     blood glucose (CONTOUR NEXT TEST) test strip Use to test blood sugar up to 6 times daily or as directed.   Dispense CONTOUR NEXT or use brand compatible with patients insurance and device. 600 strip 1     blood glucose monitoring (SOFTCLIX) lancets Use to test blood sugar 6 times daily or as directed.   Dispense ACCU-CHEK SOFTCLIX 500 each 1     Blood Glucose Monitoring Suppl (CONTOUR NEXT ONE) KIT 1 each daily Use to test blood glucose. 1 kit 1     cyclobenzaprine (FLEXERIL) 5 MG tablet Take 1-2 tablets (5-10 mg) by mouth 3 times daily as needed for muscle spasms 30 tablet 0     DULoxetine (CYMBALTA) 60 MG capsule Take 1 capsule (60 mg) by mouth daily 30 capsule 5     estradiol (ESTRACE) 2 MG  "tablet Take 1 tablet (2 mg) by mouth daily 90 tablet 3     insulin aspart (NOVOLOG VIAL) 100 UNITS/ML vial Use as directed via insulin pump. Total daily dose approx 40 units. 90 day supply. (Patient not taking: Reported on 12/3/2021) 40 mL 1     insulin glargine (LANTUS SOLOSTAR) 100 UNIT/ML pen 3 units am and 4 units pm 15 mL 3     insulin glulisine (APIDRA PEN) 100 UNIT/ML soln Inject 2 Units Subcutaneous 3 times daily (before meals) 9 mL 3     insulin NPH (NOVOLIN N FLEXPEN) 100 UNIT/ML injection Take 3 units daily at bedtime. 15 mL 3     insulin pen needle (31G X 5 MM) 31G X 5 MM miscellaneous Use 6-7 pen needles daily or as directed. 300 each 8     insulin syringe 31G X 5/16\" 0.3 ML MISC 1 each daily 90 each 3     levothyroxine (SYNTHROID/LEVOTHROID) 50 MCG tablet Take 1 tablet (50 mcg) by mouth daily 90 tablet 3     lisinopril (ZESTRIL) 20 MG tablet Take 1 tablet (20 mg) by mouth daily 90 tablet 3     omeprazole (PRILOSEC) 20 MG DR capsule Take 1 capsule by mouth once daily 90 capsule 3     pravastatin (PRAVACHOL) 10 MG tablet Take 1 tablet (10 mg) by mouth daily 90 tablet 3     pseudoePHEDrine (SUDAFED) 30 MG tablet Take 1 tablet (30 mg) by mouth every 4 hours as needed for congestion 60 tablet 0       Social History:  Social History     Tobacco Use     Smoking status: Never Smoker     Smokeless tobacco: Never Used     Tobacco comment: no exposure   Substance Use Topics     Alcohol use: Yes     Comment: winex1-2/3x per yr.       Family History  FAMILY HISTORY      Physical Examination:General: Well appearing woman in no distress.   Psych: good eye contact, no pressured speech    Diabetic foot exam:  Inspection normal  Sensation to monofilament intact  Sensation to fibration intact  Skin is intact   Callus formation is ont present  Gait is normal     Labs and Studies:   Lab Results   Component Value Date     06/30/2021    CHLORIDE 101 06/30/2021    CO2 30 06/30/2021     (H) 06/30/2021    CR 0.93 " 06/30/2021    CR 1.00 06/23/2021    CR 0.76 09/14/2020    CR 0.96 05/25/2020    CR 0.83 02/01/2020    DORIS 8.5 06/30/2021    ALBUMIN 4.2 06/23/2021    ALKPHOS 64 06/23/2021     (H) 09/14/2020    HDL 74 09/14/2020    TRIG 69 09/14/2020     Lab Results   Component Value Date    MICROL 8 09/14/2020    MICROL <5 11/08/2019    MICROL <5 04/25/2019    MICROL <5 04/13/2018    MICROL <5 07/28/2017     Lab Results   Component Value Date    A1C 6.1 (H) 07/07/2021    A1C 6.1 (A) 03/05/2021    A1C 6.2 (H) 09/14/2020    A1C 5.8 (A) 03/09/2020    A1C 5.8 (A) 11/08/2019       Lab Results   Component Value Date    HGB 12.6 05/25/2020     Lab Results   Component Value Date    TSH 16.78 (H) 07/07/2021    TSH 4.53 (H) 02/25/2021    TSH 0.05 (L) 12/30/2020    TSH 0.12 (L) 09/14/2020    TSH 0.51 12/03/2019       Complications  1. Retinopathy: yes - hemorrhage 9/2020  2. Nephropathy: no  3. Neuropathy: no  4. Hypoglycemia: yes   5. Macrovascular:  No  .     She would like to use half units, however is allergic to NovoLog and Humalog and has been using Apidra  She cannot use an insulin pump because of Apidra clogging the tubes as she has such low needs          Again, thank you for allowing me to participate in the care of your patient.        Sincerely,        Lena Bradford MD

## 2022-01-24 NOTE — PATIENT INSTRUCTIONS
Decrease Lantus in the evening to 3 units    [facility]-Department of Endocrinology  Jessica Demarco RN, Diabetes Educator: [phone]  Clinic Nurses AYAKA Herman; CMA's: Malissa Phillip Yang Misty   Clinic Fax: [phone]  On-Call Endocrine at the [place] (after hours/weekends): [phone] option 4  Scheduling Line: [phone]     Appointment Reminders:  * Please bring meter with for staff to download  * If you are due ONLY for an A1C, it is scheduled with the nurse and will be done in clinic. You do not need to schedule a lab appointment. Fasting is not required for an A1C.  * Refill request should be submitted to your pharmacy. They will contact clinic for approval.        Preventive Care:    Diabetic Eye Exam Screening: During our visit today, we discussed that it is recommended you receive diabetic eye exam screening. Please call or make an appointment with your primary care provider to discuss this with them. You may also call the [facility] scheduling line ([phone]) to set up an eye exam at one of the [facility] Clinics.

## 2022-01-24 NOTE — NURSING NOTE
Maria M Marcus's goals for this visit include:   Chief Complaint   Patient presents with     Diabetes     follow up     She requests these members of her care team be copied on today's visit information: Yes    PCP: Marcus Salgado    Referring Provider:  Referred Self, MD  No address on file    /79 (BP Location: Left arm, Patient Position: Sitting, Cuff Size: Adult Large)   Pulse 103   Wt 71.2 kg (157 lb)   LMP  (LMP Unknown)   SpO2 99%   BMI 32.29 kg/m      Do you need any medication refills at today's visit? No    Majneet Hawk [facility]  Adult Endocrinology  [facility]

## 2022-02-09 ENCOUNTER — LAB (OUTPATIENT)
Dept: LAB | Facility: CLINIC | Age: 57
End: 2022-02-09
Payer: MEDICARE

## 2022-02-09 DIAGNOSIS — E10.9 TYPE 1 DIABETES MELLITUS WITHOUT COMPLICATION (H): ICD-10-CM

## 2022-02-09 LAB
BUN SERPL-MCNC: 16 MG/DL (ref 7–30)
CREAT SERPL-MCNC: 0.98 MG/DL (ref 0.52–1.04)
CREAT UR-MCNC: 186 MG/DL
FASTING STATUS PATIENT QL REPORTED: YES
GFR SERPL CREATININE-BSD FRML MDRD: 67 ML/MIN/1.73M2
GLUCOSE BLD-MCNC: 146 MG/DL (ref 70–99)
MICROALBUMIN UR-MCNC: 12 MG/L
MICROALBUMIN/CREAT UR: 6.45 MG/G CR (ref 0–25)
POTASSIUM BLD-SCNC: 4.9 MMOL/L (ref 3.4–5.3)
T4 FREE SERPL-MCNC: 0.83 NG/DL (ref 0.76–1.46)
TSH SERPL DL<=0.005 MIU/L-ACNC: 38.05 MU/L (ref 0.4–4)

## 2022-02-09 PROCEDURE — 82947 ASSAY GLUCOSE BLOOD QUANT: CPT

## 2022-02-09 PROCEDURE — 84520 ASSAY OF UREA NITROGEN: CPT

## 2022-02-09 PROCEDURE — 82043 UR ALBUMIN QUANTITATIVE: CPT

## 2022-02-09 PROCEDURE — 84439 ASSAY OF FREE THYROXINE: CPT

## 2022-02-09 PROCEDURE — 84443 ASSAY THYROID STIM HORMONE: CPT

## 2022-02-09 PROCEDURE — 36415 COLL VENOUS BLD VENIPUNCTURE: CPT

## 2022-02-09 PROCEDURE — 82565 ASSAY OF CREATININE: CPT

## 2022-02-09 PROCEDURE — 84132 ASSAY OF SERUM POTASSIUM: CPT

## 2022-02-10 ENCOUNTER — TELEPHONE (OUTPATIENT)
Dept: ENDOCRINOLOGY | Facility: CLINIC | Age: 57
End: 2022-02-10
Payer: MEDICARE

## 2022-02-10 DIAGNOSIS — E06.3 HYPOTHYROIDISM DUE TO HASHIMOTO'S THYROIDITIS: ICD-10-CM

## 2022-02-10 DIAGNOSIS — Z78.0 MENOPAUSE: Primary | ICD-10-CM

## 2022-02-17 ENCOUNTER — TELEPHONE (OUTPATIENT)
Dept: ENDOCRINOLOGY | Facility: CLINIC | Age: 57
End: 2022-02-17
Payer: MEDICARE

## 2022-02-17 DIAGNOSIS — E10.9 TYPE 1 DIABETES MELLITUS WITHOUT COMPLICATION (H): ICD-10-CM

## 2022-02-17 PROBLEM — K21.9 GASTROESOPHAGEAL REFLUX DISEASE: Status: ACTIVE | Noted: 2017-09-08

## 2022-02-17 NOTE — TELEPHONE ENCOUNTER
Writer attempted to contact patient. No answer. Left patient voicemail letting her know message will be sent to CDE, Jessica Demarco, for review. Writer advised patient if she needs to speak to someone today to please call back and ask for writer.       Batsheva Gonzalez RN  Endocrine Care Coordinator  [facility]

## 2022-02-17 NOTE — TELEPHONE ENCOUNTER
[facility] Call Center    Phone Message    May a detailed message be left on voicemail: yes     Reason for Call: Medication Question or concern regarding medication   Prescription Clarification    Name of Medication:   * insulin glargine (LANTUS SOLOSTAR) 100 UNIT/ML pen  * insulin glulisine (APIDRA PEN) 100 UNIT/ML soln    Prescribing Provider: Lena Bradford,     Pharmacy: [facility] [address] [place], [place] [id number] [facility]     What on the order needs clarification? Per Patient is having trouble finding an insurance that will cover medication and then the doctor. Patient is wanting to get a call back to further discuss on possibly trying a different medication that would be covered. Please advise      Action Taken: Message routed to:  [facility] Endo    Travel Screening: Not Applicable

## 2022-02-17 NOTE — TELEPHONE ENCOUNTER
"02/18/22:  Writer spoke with pt. Pt states she is now on disability and is in the process of applying for Medicare. Currently having trouble finding insurance that has our clinic as\"in-network\" providers and covering insulin brands at the same time. It is noted in the pt's chart that she has had allergic reactions to Humalog and Novolog and has been on Apridra, rapid-acting, insulin for several years. Pt advised most important thing to do is find insurance that lists our clinic (providers) as in-network and not to worry about coverage for Apidra. Advised clinic can always to a PA request for insulin as pt has noted documentation that she is allergic to Humalog and Novolog. Pt verbalized understanding.     Jessica Demarco RN, BSN, CDE   [facility]    ------------------------------------------------------------------------------------------------------------------------------------------------------------------------    Pt is calling to follow up about questions pertaining to medications.  Please call pt back ASAP.    "

## 2022-02-18 RX ORDER — LANCETS
EACH MISCELLANEOUS
Qty: 500 EACH | Refills: 1 | OUTPATIENT
Start: 2022-02-18

## 2022-02-18 NOTE — TELEPHONE ENCOUNTER
Lancets    1/24/2022  [facility] DriscollLena Connelly MD    Endocrinology, Diabetes, and Metabolism

## 2022-03-06 DIAGNOSIS — I10 HYPERTENSION GOAL BP (BLOOD PRESSURE) < 140/90: ICD-10-CM

## 2022-03-06 DIAGNOSIS — E10.9 TYPE 1 DIABETES MELLITUS WITHOUT COMPLICATION (H): ICD-10-CM

## 2022-03-07 ENCOUNTER — TELEPHONE (OUTPATIENT)
Dept: ENDOCRINOLOGY | Facility: CLINIC | Age: 57
End: 2022-03-07

## 2022-03-07 ENCOUNTER — LAB (OUTPATIENT)
Dept: LAB | Facility: OTHER | Age: 57
End: 2022-03-07
Payer: COMMERCIAL

## 2022-03-07 DIAGNOSIS — E06.3 HYPOTHYROIDISM DUE TO HASHIMOTO'S THYROIDITIS: ICD-10-CM

## 2022-03-07 LAB
T4 FREE SERPL-MCNC: 0.75 NG/DL (ref 0.76–1.46)
TSH SERPL DL<=0.005 MIU/L-ACNC: 75.67 MU/L (ref 0.4–4)

## 2022-03-07 PROCEDURE — 84443 ASSAY THYROID STIM HORMONE: CPT

## 2022-03-07 PROCEDURE — 36415 COLL VENOUS BLD VENIPUNCTURE: CPT

## 2022-03-07 PROCEDURE — 84439 ASSAY OF FREE THYROXINE: CPT

## 2022-03-07 NOTE — TELEPHONE ENCOUNTER
Prior Authorization Retail Medication Request    Medication/Dose: APIDRA SOLOSTAR 100 UNIT/ML      Insurance Name:  [employer]    -Letter received today in clinic stating that patient's prescription above needs PA.

## 2022-03-08 RX ORDER — LISINOPRIL 20 MG/1
TABLET ORAL
Qty: 90 TABLET | Refills: 1 | Status: SHIPPED | OUTPATIENT
Start: 2022-03-08 | End: 2022-04-12

## 2022-03-09 ENCOUNTER — ANCILLARY PROCEDURE (OUTPATIENT)
Dept: BONE DENSITY | Facility: CLINIC | Age: 57
End: 2022-03-09
Payer: COMMERCIAL

## 2022-03-09 DIAGNOSIS — E10.9 TYPE 1 DIABETES MELLITUS WITHOUT COMPLICATION (H): ICD-10-CM

## 2022-03-09 DIAGNOSIS — M48.55XA COLLAPSED VERTEBRA, NOT ELSEWHERE CLASSIFIED, THORACOLUMBAR REGION, INITIAL ENCOUNTER FOR FRACTURE (H): ICD-10-CM

## 2022-03-09 PROCEDURE — 77080 DXA BONE DENSITY AXIAL: CPT | Performed by: RADIOLOGY

## 2022-03-25 DIAGNOSIS — E10.9 TYPE 1 DIABETES MELLITUS WITHOUT COMPLICATION (H): ICD-10-CM

## 2022-03-26 NOTE — TELEPHONE ENCOUNTER
Pen needle    1/24/2022  [facility] Lena Hein MD    Endocrinology, Diabetes, and Metabolism

## 2022-03-29 ENCOUNTER — TRANSFERRED RECORDS (OUTPATIENT)
Dept: HEALTH INFORMATION MANAGEMENT | Facility: CLINIC | Age: 57
End: 2022-03-29
Payer: COMMERCIAL

## 2022-03-29 LAB — RETINOPATHY: POSITIVE

## 2022-04-03 DIAGNOSIS — G89.4 CHRONIC PAIN SYNDROME: ICD-10-CM

## 2022-04-03 DIAGNOSIS — M54.12 CERVICAL RADICULOPATHY: ICD-10-CM

## 2022-04-04 RX ORDER — DULOXETIN HYDROCHLORIDE 60 MG/1
CAPSULE, DELAYED RELEASE ORAL
Qty: 30 CAPSULE | Refills: 1 | Status: SHIPPED | OUTPATIENT
Start: 2022-04-04 | End: 2022-04-06

## 2022-04-05 ENCOUNTER — MYC MEDICAL ADVICE (OUTPATIENT)
Dept: ENDOCRINOLOGY | Facility: CLINIC | Age: 57
End: 2022-04-05
Payer: COMMERCIAL

## 2022-04-05 DIAGNOSIS — E10.9 TYPE 1 DIABETES MELLITUS WITHOUT COMPLICATION (H): Primary | ICD-10-CM

## 2022-04-05 RX ORDER — BLOOD-GLUCOSE METER
1 EACH MISCELLANEOUS DAILY
Qty: 1 KIT | Refills: 1 | Status: SHIPPED | OUTPATIENT
Start: 2022-04-05 | End: 2022-12-21

## 2022-04-05 RX ORDER — LANCETS
EACH MISCELLANEOUS
Qty: 200 EACH | Refills: 9 | Status: SHIPPED | OUTPATIENT
Start: 2022-04-05 | End: 2023-05-02

## 2022-04-05 RX ORDER — BLOOD SUGAR DIAGNOSTIC
STRIP MISCELLANEOUS
Qty: 200 STRIP | Refills: 9 | Status: SHIPPED | OUTPATIENT
Start: 2022-04-05 | End: 2022-12-21

## 2022-04-06 ENCOUNTER — VIRTUAL VISIT (OUTPATIENT)
Dept: FAMILY MEDICINE | Facility: OTHER | Age: 57
End: 2022-04-06
Payer: COMMERCIAL

## 2022-04-06 DIAGNOSIS — M81.6 LOCALIZED OSTEOPOROSIS WITHOUT CURRENT PATHOLOGICAL FRACTURE: ICD-10-CM

## 2022-04-06 DIAGNOSIS — M54.12 CERVICAL RADICULOPATHY: Primary | ICD-10-CM

## 2022-04-06 DIAGNOSIS — G89.4 CHRONIC PAIN SYNDROME: ICD-10-CM

## 2022-04-06 DIAGNOSIS — E10.9 TYPE 1 DIABETES MELLITUS WITHOUT COMPLICATION (H): ICD-10-CM

## 2022-04-06 DIAGNOSIS — I10 HYPERTENSION GOAL BP (BLOOD PRESSURE) < 140/90: ICD-10-CM

## 2022-04-06 DIAGNOSIS — K21.9 GASTROESOPHAGEAL REFLUX DISEASE, UNSPECIFIED WHETHER ESOPHAGITIS PRESENT: ICD-10-CM

## 2022-04-06 PROCEDURE — 99442 PR PHYSICIAN TELEPHONE EVALUATION 11-20 MIN: CPT | Performed by: PHYSICIAN ASSISTANT

## 2022-04-06 RX ORDER — DULOXETIN HYDROCHLORIDE 60 MG/1
CAPSULE, DELAYED RELEASE ORAL
Qty: 90 CAPSULE | Refills: 1 | Status: SHIPPED | OUTPATIENT
Start: 2022-04-06 | End: 2022-11-21

## 2022-04-06 RX ORDER — SODIUM PHOSPHATE,MONO-DIBASIC 19G-7G/118
1 ENEMA (ML) RECTAL DAILY
COMMUNITY

## 2022-04-06 RX ORDER — CYCLOBENZAPRINE HCL 5 MG
5-10 TABLET ORAL 3 TIMES DAILY PRN
Qty: 60 TABLET | Refills: 5 | Status: SHIPPED | OUTPATIENT
Start: 2022-04-06 | End: 2022-07-24

## 2022-04-06 RX ORDER — ZINC GLUCONATE 50 MG
50 TABLET ORAL DAILY
COMMUNITY
End: 2022-07-24

## 2022-04-06 RX ORDER — ALENDRONATE SODIUM 70 MG/1
70 TABLET ORAL
Qty: 12 TABLET | Refills: 3 | Status: SHIPPED | OUTPATIENT
Start: 2022-04-06 | End: 2023-02-20

## 2022-04-06 RX ORDER — MULTIVIT-MIN/IRON/FOLIC ACID/K 18-600-40
CAPSULE ORAL
COMMUNITY

## 2022-04-06 NOTE — PROGRESS NOTES
Maria M is a 56 year old who is being evaluated via a billable telephone visit.      What phone number would you like to be contacted at? [phone]  How would you like to obtain your AVS? MyChart    Assessment & Plan       ICD-10-CM    1. Cervical radiculopathy  M54.12 DULoxetine (CYMBALTA) 60 MG capsule     Pain Management Referral   2. Chronic pain syndrome  G89.4 DULoxetine (CYMBALTA) 60 MG capsule     Pain Management Referral   3. Localized osteoporosis without current pathological fracture  M81.6 alendronate (FOSAMAX) 70 MG tablet   4. Type 1 diabetes mellitus without complication (H)  E10.9    5. Hypertension goal BP (blood pressure) < 140/90  I10    6. Gastroesophageal reflux disease, unspecified whether esophagitis present  K21.9 omeprazole (PRILOSEC) 20 MG DR capsule       1-2. Continues with chronic upper back/neck pain with previously C7-T1 laminectomy and microdiscectomy in 2007. The duloxetine and Flexeril provide modest relief. Pain specialist referral placed for further evaluation and she will also follow up with the neurologist regarding the EMG and cervical spine MRI she completed in 2020. We currently do not have these records.. She will continue current medications and continue with chiropractic manipulations, with modifications given recent DEXA scan results.     3. DEXA scan results on 3/9/22 showed osteoporosis of the right femoral neck and osteopenia of the lumbar spine. Will start alendronate and calcium in addition to the vitamin D she is already taking. Discussed alendronate considerations/common side effects, including taking it first thing in the morning, 30 minutes prior to breakfast and remaining upright 30 minutes after.     4. Follows with endocrinology, they are working on adjusting her insulin and addressing the insulin lag and subsequent hyperglycemic episodes she experiences in the mornings.     5. Stable, will re-check blood pressure at next clinic visit.    6.  Stable,  discussed risk of esophageal irritation with alendronate. Continue omeprazole and follow-up if heart burn symptoms worsen.        Patient seen in conjunction with NAVARRO Carson PA-C [facility] CASI Franz is a 56 year old who presents for the following health issues     History of Present Illness       Reason for visit:  Need appointment for refill  Symptom onset:  More than a month  Symptoms include:  Upper back discomfort  Symptom intensity:  Moderate  Symptom progression:  Staying the same  Had these symptoms before:  Yes  Has tried/received treatment for these symptoms:  Yes  Previous treatment was successful:  Yes  Prior treatment description:  Best treatment was opioids but clics say cannot take any longer  What makes it worse:  This mych movement or any lifting  What makes it better:  No but had Dexascan and have Osteoporosis. See MyChart    She eats 4 or more servings of fruits and vegetables daily.She consumes 3 sweetened beverage(s) daily.She exercises with enough effort to increase her heart rate 20 to 29 minutes per day.  She exercises with enough effort to increase her heart rate 4 days per week.   She is taking medications regularly.       Continues to have pain in her upper back that radiates to her shoulders, neck, and down her arm. She saw a neurologist in 2020 who completed an EMG which she states confirmed the symptoms that she has been experiencing. She never followed up with neurology, but plans to do so this May ([facility]). She is currently taking duloxetine and Flexeril and finds improvement in her pain with these, however not as much as when she was on opioid medications years ago. She is not asking for opiates today, just making an observation. She also works with a chiropractor to help manage her pain. She has not seen a pain specialist, but would be interested in doing so.     She also recently completed a DEXA scan  which showed right hip osteoporosis and osteopenia in her lumbar spine. She is currently taking vitamin D. She is not on calcium.      She continues to follow closely with endocrinology for diabetes management.    Review of Systems   Constitutional, HEENT, cardiovascular, pulmonary, neuro, psych, gi and gu systems are negative, except as otherwise noted.      Objective         Vitals:  No vitals were obtained today due to virtual visit.    Physical Exam   healthy, alert and no distress  PSYCH: Alert and oriented times 3; coherent speech, normal   rate and volume, able to articulate logical thoughts, able   to abstract reason, no tangential thoughts, no hallucinations   or delusions  Her affect is normal and pleasant  RESP: No cough, no audible wheezing, able to talk in full sentences  Remainder of exam unable to be completed due to telephone visits    DEXA Scan 3/9/22:   Lumbar spine T-score in region of L1, L4 = -1.2     HIPS:  Mean total hip T-score: -1     Left femoral neck T-score = -2  Right femoral neck T-score= -2.7      Radius 33% T-score = NA     FRAX:  10 year probability of major osteoporotic fracture: 19.5%  10 year probability of hip fracture: 2.5%    Phone call duration: 14 minutes 15 seconds

## 2022-04-06 NOTE — PATIENT INSTRUCTIONS
Continue current medications.    Will add alendronate weekly for the osteoporosis. Take 30 minutes before breakfast with a large glass of water and stay upright for 30 minutes.  Add 1200 mg of calcium along with your vitamin D.    Will place a pain management referral.    Follow up in 6 months.

## 2022-04-11 DIAGNOSIS — E10.9 TYPE 1 DIABETES MELLITUS WITHOUT COMPLICATION (H): ICD-10-CM

## 2022-04-11 DIAGNOSIS — I10 HYPERTENSION GOAL BP (BLOOD PRESSURE) < 140/90: ICD-10-CM

## 2022-04-12 RX ORDER — LISINOPRIL 20 MG/1
TABLET ORAL
Qty: 90 TABLET | Refills: 3 | Status: SHIPPED | OUTPATIENT
Start: 2022-04-12 | End: 2023-05-15

## 2022-04-19 ENCOUNTER — LAB (OUTPATIENT)
Dept: LAB | Facility: OTHER | Age: 57
End: 2022-04-19
Payer: COMMERCIAL

## 2022-04-19 DIAGNOSIS — M81.8 OTHER OSTEOPOROSIS WITHOUT CURRENT PATHOLOGICAL FRACTURE: ICD-10-CM

## 2022-04-19 DIAGNOSIS — E06.3 HYPOTHYROIDISM DUE TO HASHIMOTO'S THYROIDITIS: ICD-10-CM

## 2022-04-19 DIAGNOSIS — Z13.220 SCREENING FOR HYPERLIPIDEMIA: Primary | ICD-10-CM

## 2022-04-19 LAB
CA-I BLD-MCNC: 4.6 MG/DL (ref 4.4–5.2)
CHOLEST SERPL-MCNC: 187 MG/DL
FASTING STATUS PATIENT QL REPORTED: YES
HDLC SERPL-MCNC: 73 MG/DL
LDLC SERPL CALC-MCNC: 97 MG/DL
NONHDLC SERPL-MCNC: 114 MG/DL
PTH-INTACT SERPL-MCNC: 43 PG/ML (ref 18–80)
T4 FREE SERPL-MCNC: 0.97 NG/DL (ref 0.76–1.46)
TRIGL SERPL-MCNC: 85 MG/DL
TSH SERPL DL<=0.005 MIU/L-ACNC: 46.82 MU/L (ref 0.4–4)

## 2022-04-19 PROCEDURE — 83970 ASSAY OF PARATHORMONE: CPT

## 2022-04-19 PROCEDURE — 36415 COLL VENOUS BLD VENIPUNCTURE: CPT

## 2022-04-19 PROCEDURE — 80061 LIPID PANEL: CPT

## 2022-04-19 PROCEDURE — 82306 VITAMIN D 25 HYDROXY: CPT

## 2022-04-19 PROCEDURE — 82330 ASSAY OF CALCIUM: CPT

## 2022-04-19 PROCEDURE — 84443 ASSAY THYROID STIM HORMONE: CPT

## 2022-04-19 PROCEDURE — 84439 ASSAY OF FREE THYROXINE: CPT

## 2022-04-20 DIAGNOSIS — E06.3 HYPOTHYROIDISM DUE TO HASHIMOTO'S THYROIDITIS: Primary | ICD-10-CM

## 2022-04-20 LAB — DEPRECATED CALCIDIOL+CALCIFEROL SERPL-MC: 41 UG/L (ref 20–75)

## 2022-04-20 RX ORDER — LEVOTHYROXINE SODIUM 75 UG/1
75 TABLET ORAL DAILY
Qty: 90 TABLET | Refills: 1 | Status: SHIPPED | OUTPATIENT
Start: 2022-04-20 | End: 2022-05-23 | Stop reason: ALTCHOICE

## 2022-05-13 DIAGNOSIS — E10.9 TYPE 1 DIABETES MELLITUS WITHOUT COMPLICATION (H): Primary | ICD-10-CM

## 2022-05-16 NOTE — PROGRESS NOTES
Outcome for 05/16/22 1:10 PM: Data uploaded on Dexcom  Manjeet Hawk [facility]      Endocrinology and Diabetes Clinic       Interval history:  Maria M Marcus aged 57 year old years old woman with type 1 diabetes with retinopathy with comorbidity of vitiligo, hypothyroidism and osteoporosis for follow-up    Insulin  NPH 3 units at 22:00 for down phenomenon  Lantus 3 units am 4 units pm Apidra 2 to 3 units per meal carb; she calculates carb ratio 1 unit for each 20 g of carbohydrates, and then substrates 1 unit per meal, correction 1 unit for 100 mg of above 100; this is decreased from before    CGM  Has been using Dexcom sensor          Hypoglycemia- nocturnal related to NPH dosing; notes that she requires less assitance, finds CGM very helpful    Checks blood pressure with cuff at home, typically below 130/85  Menopause started Estradiol    Osteoporosis  Bone density from March 2022 reveals osteoporosis with T score of -2.7 at the right femur neck.  Patient is on alendronate.  Reasonable calcium intake from diet.  Vitamin D levels have been normal.    Assessment:  (E10.649) Type 1 diabetes mellitus with hypoglycemia and without coma (H)  (primary encounter diagnosis)  Middle-aged woman with longstanding type 1 diabetes, low insulin requirements  Continue problems with hypoglycemia  Discussed for night to decrease Lantus 4-> 3 units  Take less Apidra with dinner  Also when physically active take less apidra with meals    Hypothyroidism is taking in AM, TSH levels had been quite elevated, also resume to poor L T4 absorption and she usually has to take a snack during the night, however TSH improved with recent increase in L T4.  We will increase further to 88 mcg daily    Dyslipidemia intolerant to Atorvastatin , doing ok on Simvastatin 20 mg daily, cont      Plan:   NPH continue 3 units  Lantus 3 units am , in pm 4 -> 3 units  Apidra CR 1:20 all day, in the evening substract 1 units for dinner     Eat bedtime snack      Increase levothyroxine from acting from 75 to 88 mcg for elevated TSH    Continue alendronate 70 mg once a week    Follow-up with me in 4 months    40 minutes spent on the date of the encounter doing chart review, review of test results, interpretation of tests, patient visit and documentation      Lena Bradford MD  Endocrinology and Diabetes  [facility]  [address]  Pager [phone]  [facility] [address]  Tel [phone]        Medications:   Current Outpatient Medications   Medication Sig Dispense Refill     alendronate (FOSAMAX) 70 MG tablet Take 1 tablet (70 mg) by mouth every 7 days 12 tablet 3     blood glucose (ACCU-CHEK GUIDE) test strip Use to test blood sugar 4-5 times daily or as directed. 200 strip 9     blood glucose monitoring (SOFTCLIX) lancets Use to test blood sugar 4-5 times daily or as directed. 200 each 9     blood glucose monitoring (SOFTCLIX) lancets Use to test blood sugar 6 times daily or as directed.   Dispense ACCU-CHEK SOFTCLIX 500 each 1     Blood Glucose Monitoring Suppl (ACCU-CHEK GUIDE ME) w/Device KIT 1 each daily To use 4-5 times daily to check blood glucose. 1 kit 1     Blood Glucose Monitoring Suppl (CONTOUR NEXT ONE) KIT 1 each daily Use to test blood glucose. 1 kit 1     cyclobenzaprine (FLEXERIL) 5 MG tablet Take 1-2 tablets (5-10 mg) by mouth 3 times daily as needed for muscle spasms 60 tablet 5     DULoxetine (CYMBALTA) 60 MG capsule Take 1 capsule by mouth once daily 90 capsule 1     estradiol (ESTRACE) 2 MG tablet Take 1 tablet (2 mg) by mouth daily 90 tablet 3     glucosamine-chondroitin 500-400 MG CAPS per capsule Take 1 capsule by mouth daily       insulin glargine (LANTUS SOLOSTAR) 100 UNIT/ML pen 3 units am and 4 units pm 15 mL 3     insulin glulisine (APIDRA PEN) 100 UNIT/ML soln Inject 2 Units Subcutaneous 3 times daily (before meals) 9 mL 3     insulin NPH (NOVOLIN N FLEXPEN) 100 UNIT/ML injection Take 3 units daily at bedtime. 15  mL 3     insulin pen needle (31G X 5 MM) 31G X 5 MM miscellaneous Use 6-7 pen needles daily or as directed. 300 each 8     levothyroxine (SYNTHROID/LEVOTHROID) 75 MCG tablet Take 1 tablet (75 mcg) by mouth daily 90 tablet 1     lisinopril (ZESTRIL) 20 MG tablet Take 1 tablet by mouth once daily 90 tablet 3     omeprazole (PRILOSEC) 20 MG DR capsule Take 1 capsule by mouth once daily 90 capsule 3     simvastatin (ZOCOR) 20 MG tablet Take 1 tablet (20 mg) by mouth At Bedtime 90 tablet 3     Vitamin D, Cholecalciferol, 25 MCG (1000 UT) TABS        zinc gluconate 50 MG tablet Take 50 mg by mouth daily         Social History:  Social History     Tobacco Use     Smoking status: Never Smoker     Smokeless tobacco: Never Used     Tobacco comment: no exposure   Substance Use Topics     Alcohol use: Yes     Comment: winex1-2/3x per yr.       Family History  FAMILY HISTORY      Physical Examination:General: Well appearing woman in no distress.   Psych: good eye contact, no pressured speech    Diabetic foot exam:  Inspection normal  Sensation to monofilament intact  Sensation to fibration intact  Skin is intact   Callus formation is ont present  Gait is normal     Labs and Studies:   Lab Results   Component Value Date     06/30/2021    CHLORIDE 101 06/30/2021    CO2 30 06/30/2021     (H) 02/09/2022    CR 0.98 02/09/2022    CR 0.93 06/30/2021    CR 1.00 06/23/2021    CR 0.76 09/14/2020    CR 0.96 05/25/2020    DORIS 8.5 06/30/2021    ALBUMIN 4.2 06/23/2021    ALKPHOS 64 06/23/2021    LDL 93 05/18/2022    HDL 67 05/18/2022    TRIG 95 05/18/2022     Lab Results   Component Value Date    MICROL 12 02/09/2022    MICROL 8 09/14/2020    MICROL <5 11/08/2019    MICROL <5 04/25/2019    MICROL <5 04/13/2018     Lab Results   Component Value Date    A1C 6.3 (H) 05/18/2022    A1C 6.3 (A) 01/24/2022    A1C 6.1 (H) 07/07/2021    A1C 6.1 (A) 03/05/2021    A1C 6.2 (H) 09/14/2020       Lab Results   Component Value Date    HGB 12.6  05/25/2020     Lab Results   Component Value Date    TSH 17.86 (H) 05/18/2022    TSH 46.82 (H) 04/19/2022    TSH 75.67 (H) 03/07/2022    TSH 38.05 (H) 02/09/2022    TSH 16.78 (H) 07/07/2021     Lab Results   Component Value Date    DORIS 8.5 06/30/2021    DORIS 8.9 06/23/2021    DORIS 8.4 (L) 05/25/2020    DORIS 8.5 02/01/2020    ALBUMIN 4.2 06/23/2021    ALT 21 06/23/2021    PHOS 3.3 11/08/2019    PTHI 43 04/19/2022    AST 16 06/23/2021    BILITOTAL 0.5 06/23/2021    CR 0.98 02/09/2022    CR 0.93 06/30/2021    CR 1.00 06/23/2021     06/30/2021    TSH 17.86 (H) 05/18/2022    ALKPHOS 64 06/23/2021    HGB 12.6 05/25/2020     25OHVitD 41 4/19/22        Complications  1. Retinopathy: yes - hemorrhage 9/2020  2. Nephropathy: no  3. Neuropathy: no  4. Hypoglycemia: yes   5. Macrovascular:  No  .     She would like to use half units, however is allergic to NovoLog and Humalog and has been using Apidra  She cannot use an insulin pump because of Apidra clogging the tubes as she has such low needs    Results for orders placed in visit on 03/09/22    Dexa hip/pelvis/spine    Narrative  HISTORY: Type 1 diabetes mellitus without complication (H); Collapsed  vertebra, not elsewhere classified, thoracolumbar region, initial  encounter for fracture (H)    COMPARISON:   none    [age]  years[age]  Height: 59 inches  Weight: 157 pounds  Sex: Female  Ethnicity: White    Image quality: Adequate    Lumbar spine T-score in region of L1, L4 = -1.2    HIPS:  Mean total hip T-score: -1    Left femoral neck T-score = -2  Right femoral neck T-score= -2.7    Radius 33% T-score = NA    FRAX:  10 year probability of major osteoporotic fracture: 19.5%  10 year probability of hip fracture: 2.5%  The 10 year probability of fracture may be lower than reported if the  patient has received treatment. FRAX data should be disregarded in  patient's taking bisphosphonates.    World Health Organization definition of osteoporosis and osteopenia  for   women:  Normal: T-score at or above -1.0  Low Bone Mass (Osteopenia): T-score between -1.0 and -2.5.  Osteoporosis: T-score at or below -2.5  T-scores are reported for postmenopausal women and men over 50 years  of age.    Impression  IMPRESSION:  Osteoporosis. Follow up recommended.    TOREY GARAY MD      SYSTEM ID:  [id number]        Answers for HPI/ROS submitted by the patient on 5/18/2022  General Symptoms: No  Skin Symptoms: No  HENT Symptoms: No  EYE SYMPTOMS: No  HEART SYMPTOMS: No  LUNG SYMPTOMS: No  INTESTINAL SYMPTOMS: No  URINARY SYMPTOMS: No  GYNECOLOGIC SYMPTOMS: No  BREAST SYMPTOMS: No  SKELETAL SYMPTOMS: No  BLOOD SYMPTOMS: Yes  NERVOUS SYSTEM SYMPTOMS: No  MENTAL HEALTH SYMPTOMS: No  Anemia: No  Swollen glands: No  Easy bleeding or bruising: Yes  Edema or swelling: No

## 2022-05-18 ENCOUNTER — LAB (OUTPATIENT)
Dept: LAB | Facility: CLINIC | Age: 57
End: 2022-05-18
Payer: COMMERCIAL

## 2022-05-18 DIAGNOSIS — Z13.220 SCREENING FOR HYPERLIPIDEMIA: ICD-10-CM

## 2022-05-18 DIAGNOSIS — E10.9 TYPE 1 DIABETES MELLITUS WITHOUT COMPLICATION (H): ICD-10-CM

## 2022-05-18 DIAGNOSIS — E06.3 HYPOTHYROIDISM DUE TO HASHIMOTO'S THYROIDITIS: ICD-10-CM

## 2022-05-18 LAB
CHOLEST SERPL-MCNC: 179 MG/DL
FASTING STATUS PATIENT QL REPORTED: YES
HBA1C MFR BLD: 6.3 % (ref 0–5.6)
HDLC SERPL-MCNC: 67 MG/DL
LDLC SERPL CALC-MCNC: 93 MG/DL
NONHDLC SERPL-MCNC: 112 MG/DL
T4 FREE SERPL-MCNC: 1.24 NG/DL (ref 0.76–1.46)
TRIGL SERPL-MCNC: 95 MG/DL
TSH SERPL DL<=0.005 MIU/L-ACNC: 17.86 MU/L (ref 0.4–4)

## 2022-05-18 PROCEDURE — 36415 COLL VENOUS BLD VENIPUNCTURE: CPT

## 2022-05-18 PROCEDURE — 84439 ASSAY OF FREE THYROXINE: CPT

## 2022-05-18 PROCEDURE — 84443 ASSAY THYROID STIM HORMONE: CPT

## 2022-05-18 PROCEDURE — 80061 LIPID PANEL: CPT

## 2022-05-18 PROCEDURE — 83036 HEMOGLOBIN GLYCOSYLATED A1C: CPT

## 2022-05-18 ASSESSMENT — ENCOUNTER SYMPTOMS
SWOLLEN GLANDS: 0
BRUISES/BLEEDS EASILY: 1

## 2022-05-23 ENCOUNTER — OFFICE VISIT (OUTPATIENT)
Dept: ENDOCRINOLOGY | Facility: CLINIC | Age: 57
End: 2022-05-23
Payer: COMMERCIAL

## 2022-05-23 VITALS
HEART RATE: 96 BPM | SYSTOLIC BLOOD PRESSURE: 143 MMHG | HEIGHT: 58 IN | DIASTOLIC BLOOD PRESSURE: 79 MMHG | WEIGHT: 153 LBS | BODY MASS INDEX: 32.12 KG/M2 | OXYGEN SATURATION: 98 %

## 2022-05-23 DIAGNOSIS — E06.3 HYPOTHYROIDISM DUE TO HASHIMOTO'S THYROIDITIS: Primary | ICD-10-CM

## 2022-05-23 PROCEDURE — 99215 OFFICE O/P EST HI 40 MIN: CPT | Performed by: INTERNAL MEDICINE

## 2022-05-23 RX ORDER — LEVOTHYROXINE SODIUM 88 UG/1
88 TABLET ORAL DAILY
Qty: 90 TABLET | Refills: 3 | Status: SHIPPED | OUTPATIENT
Start: 2022-05-23 | End: 2023-04-24

## 2022-05-23 NOTE — LETTER
5/23/2022         RE: Maria M Marcus  [address] [place] [address]        Dear Colleague,    Thank you for referring your patient, Maria M Marcus, to the [facility]. Please see a copy of my visit note below.    Outcome for 05/16/22 1:10 PM: Data uploaded on Dexcom  Manjeet Hawk [facility]      Endocrinology and Diabetes Clinic       Interval history:  Maria M Marcus aged 57 year old years old woman with type 1 diabetes with retinopathy with comorbidity of vitiligo, hypothyroidism and osteoporosis for follow-up    Insulin  NPH 3 units at 22:00 for down phenomenon  Lantus 3 units am 4 units pm Apidra 2 to 3 units per meal carb; she calculates carb ratio 1 unit for each 20 g of carbohydrates, and then substrates 1 unit per meal, correction 1 unit for 100 mg of above 100; this is decreased from before    CGM  Has been using Dexcom sensor          Hypoglycemia- nocturnal related to NPH dosing; notes that she requires less assitance, finds CGM very helpful    Checks blood pressure with cuff at home, typically below 130/85  Menopause started Estradiol    Osteoporosis  Bone density from March 2022 reveals osteoporosis with T score of -2.7 at the right femur neck.  Patient is on alendronate.  Reasonable calcium intake from diet.  Vitamin D levels have been normal.    Assessment:  (E10.649) Type 1 diabetes mellitus with hypoglycemia and without coma (H)  (primary encounter diagnosis)  Middle-aged woman with longstanding type 1 diabetes, low insulin requirements  Continue problems with hypoglycemia  Discussed for night to decrease Lantus 4-> 3 units  Take less Apidra with dinner  Also when physically active take less apidra with meals    Hypothyroidism is taking in AM, TSH levels had been quite elevated, also resume to poor L T4 absorption and she usually has to take a snack during the night, however TSH improved with recent increase in L T4.  We will increase further to 88 mcg  daily    Dyslipidemia intolerant to Atorvastatin , doing ok on Simvastatin 20 mg daily, cont      Plan:   NPH continue 3 units  Lantus 3 units am , in pm 4 -> 3 units  Apidra CR 1:20 all day, in the evening substract 1 units for dinner     Eat bedtime snack     Increase levothyroxine from acting from 75 to 88 mcg for elevated TSH    Continue alendronate 70 mg once a week    Follow-up with me in 4 months    40 minutes spent on the date of the encounter doing chart review, review of test results, interpretation of tests, patient visit and documentation      Lena Bradford MD  Endocrinology and Diabetes  [facility]  [address]  Pager [phone]  [place] [address]  Tel [phone]        Medications:   Current Outpatient Medications   Medication Sig Dispense Refill     alendronate (FOSAMAX) 70 MG tablet Take 1 tablet (70 mg) by mouth every 7 days 12 tablet 3     blood glucose (ACCU-CHEK GUIDE) test strip Use to test blood sugar 4-5 times daily or as directed. 200 strip 9     blood glucose monitoring (SOFTCLIX) lancets Use to test blood sugar 4-5 times daily or as directed. 200 each 9     blood glucose monitoring (SOFTCLIX) lancets Use to test blood sugar 6 times daily or as directed.   Dispense ACCU-CHEK SOFTCLIX 500 each 1     Blood Glucose Monitoring Suppl (ACCU-CHEK GUIDE ME) w/Device KIT 1 each daily To use 4-5 times daily to check blood glucose. 1 kit 1     Blood Glucose Monitoring Suppl (CONTOUR NEXT ONE) KIT 1 each daily Use to test blood glucose. 1 kit 1     cyclobenzaprine (FLEXERIL) 5 MG tablet Take 1-2 tablets (5-10 mg) by mouth 3 times daily as needed for muscle spasms 60 tablet 5     DULoxetine (CYMBALTA) 60 MG capsule Take 1 capsule by mouth once daily 90 capsule 1     estradiol (ESTRACE) 2 MG tablet Take 1 tablet (2 mg) by mouth daily 90 tablet 3     glucosamine-chondroitin 500-400 MG CAPS per capsule Take 1 capsule by mouth daily       insulin glargine (LANTUS SOLOSTAR)  100 UNIT/ML pen 3 units am and 4 units pm 15 mL 3     insulin glulisine (APIDRA PEN) 100 UNIT/ML soln Inject 2 Units Subcutaneous 3 times daily (before meals) 9 mL 3     insulin NPH (NOVOLIN N FLEXPEN) 100 UNIT/ML injection Take 3 units daily at bedtime. 15 mL 3     insulin pen needle (31G X 5 MM) 31G X 5 MM miscellaneous Use 6-7 pen needles daily or as directed. 300 each 8     levothyroxine (SYNTHROID/LEVOTHROID) 75 MCG tablet Take 1 tablet (75 mcg) by mouth daily 90 tablet 1     lisinopril (ZESTRIL) 20 MG tablet Take 1 tablet by mouth once daily 90 tablet 3     omeprazole (PRILOSEC) 20 MG DR capsule Take 1 capsule by mouth once daily 90 capsule 3     simvastatin (ZOCOR) 20 MG tablet Take 1 tablet (20 mg) by mouth At Bedtime 90 tablet 3     Vitamin D, Cholecalciferol, 25 MCG (1000 UT) TABS        zinc gluconate 50 MG tablet Take 50 mg by mouth daily         Social History:  Social History     Tobacco Use     Smoking status: Never Smoker     Smokeless tobacco: Never Used     Tobacco comment: no exposure   Substance Use Topics     Alcohol use: Yes     Comment: winex1-2/3x per yr.       Family History  FAMILY HISTORY      Physical Examination:General: Well appearing woman in no distress.   Psych: good eye contact, no pressured speech    Diabetic foot exam:  Inspection normal  Sensation to monofilament intact  Sensation to fibration intact  Skin is intact   Callus formation is ont present  Gait is normal     Labs and Studies:   Lab Results   Component Value Date     06/30/2021    CHLORIDE 101 06/30/2021    CO2 30 06/30/2021     (H) 02/09/2022    CR 0.98 02/09/2022    CR 0.93 06/30/2021    CR 1.00 06/23/2021    CR 0.76 09/14/2020    CR 0.96 05/25/2020    DORIS 8.5 06/30/2021    ALBUMIN 4.2 06/23/2021    ALKPHOS 64 06/23/2021    LDL 93 05/18/2022    HDL 67 05/18/2022    TRIG 95 05/18/2022     Lab Results   Component Value Date    MICROL 12 02/09/2022    MICROL 8 09/14/2020    MICROL <5 11/08/2019    MICROL <5  04/25/2019    MICROL <5 04/13/2018     Lab Results   Component Value Date    A1C 6.3 (H) 05/18/2022    A1C 6.3 (A) 01/24/2022    A1C 6.1 (H) 07/07/2021    A1C 6.1 (A) 03/05/2021    A1C 6.2 (H) 09/14/2020       Lab Results   Component Value Date    HGB 12.6 05/25/2020     Lab Results   Component Value Date    TSH 17.86 (H) 05/18/2022    TSH 46.82 (H) 04/19/2022    TSH 75.67 (H) 03/07/2022    TSH 38.05 (H) 02/09/2022    TSH 16.78 (H) 07/07/2021     Lab Results   Component Value Date    DORIS 8.5 06/30/2021    DORIS 8.9 06/23/2021    DORIS 8.4 (L) 05/25/2020    DORIS 8.5 02/01/2020    ALBUMIN 4.2 06/23/2021    ALT 21 06/23/2021    PHOS 3.3 11/08/2019    PTHI 43 04/19/2022    AST 16 06/23/2021    BILITOTAL 0.5 06/23/2021    CR 0.98 02/09/2022    CR 0.93 06/30/2021    CR 1.00 06/23/2021     06/30/2021    TSH 17.86 (H) 05/18/2022    ALKPHOS 64 06/23/2021    HGB 12.6 05/25/2020     25OHVitD 41 4/19/22        Complications  1. Retinopathy: yes - hemorrhage 9/2020  2. Nephropathy: no  3. Neuropathy: no  4. Hypoglycemia: yes   5. Macrovascular:  No  .     She would like to use half units, however is allergic to NovoLog and Humalog and has been using Apidra  She cannot use an insulin pump because of Apidra clogging the tubes as she has such low needs    Results for orders placed in visit on 03/09/22    Dexa hip/pelvis/spine    Narrative  HISTORY: Type 1 diabetes mellitus without complication (H); Collapsed  vertebra, not elsewhere classified, thoracolumbar region, initial  encounter for fracture (H)    COMPARISON:   none    [age]  years[age]  Height: 59 inches  Weight: 157 pounds  Sex: Female  Ethnicity: White    Image quality: Adequate    Lumbar spine T-score in region of L1, L4 = -1.2    HIPS:  Mean total hip T-score: -1    Left femoral neck T-score = -2  Right femoral neck T-score= -2.7    Radius 33% T-score = NA    FRAX:  10 year probability of major osteoporotic fracture: 19.5%  10 year probability of hip fracture:  2.5%  The 10 year probability of fracture may be lower than reported if the  patient has received treatment. FRAX data should be disregarded in  patient's taking bisphosphonates.    World Health Organization definition of osteoporosis and osteopenia  for  women:  Normal: T-score at or above -1.0  Low Bone Mass (Osteopenia): T-score between -1.0 and -2.5.  Osteoporosis: T-score at or below -2.5  T-scores are reported for postmenopausal women and men over 50 years  of age.    Impression  IMPRESSION:  Osteoporosis. Follow up recommended.    TOREY GARAY MD      SYSTEM ID:  [id number]        Answers for HPI/ROS submitted by the patient on 5/18/2022  General Symptoms: No  Skin Symptoms: No  HENT Symptoms: No  EYE SYMPTOMS: No  HEART SYMPTOMS: No  LUNG SYMPTOMS: No  INTESTINAL SYMPTOMS: No  URINARY SYMPTOMS: No  GYNECOLOGIC SYMPTOMS: No  BREAST SYMPTOMS: No  SKELETAL SYMPTOMS: No  BLOOD SYMPTOMS: Yes  NERVOUS SYSTEM SYMPTOMS: No  MENTAL HEALTH SYMPTOMS: No  Anemia: No  Swollen glands: No  Easy bleeding or bruising: Yes  Edema or swelling: No          Again, thank you for allowing me to participate in the care of your patient.        Sincerely,        Lena Bradford MD

## 2022-05-23 NOTE — NURSING NOTE
"Maria M Marcus's goals for this visit include:   Chief Complaint   Patient presents with     RECHECK     Osteoporosis, diabetes and thyroid follow up     She requests these members of her care team be copied on today's visit information: Yes    PCP: Marcus Salgado    Referring Provider:  No referring provider defined for this encounter.    BP (!) 143/79 (BP Location: Left arm, Patient Position: Sitting, Cuff Size: Adult Large)   Pulse 96   Ht 1.48 m (4' 10.25\")   Wt 69.4 kg (153 lb)   LMP  (LMP Unknown)   SpO2 98%   BMI 31.70 kg/m      Do you need any medication refills at today's visit? No    Manjeet Hawk [facility]  Adult Endocrinology  [facility]    "

## 2022-05-23 NOTE — PATIENT INSTRUCTIONS
NPH continue 3 units  Lantus 3 units am , in pm 4 -> 3 units  Apidra CR 1:20 all day, in the evening substract 1 units for dinner    Eat bedtime snack    Levothyroxine increase from 75 mcg to 88 mcg daily

## 2022-06-16 DIAGNOSIS — E10.9 TYPE 1 DIABETES MELLITUS WITHOUT COMPLICATION (H): Primary | ICD-10-CM

## 2022-06-16 RX ORDER — INSULIN HUMAN 100 [IU]/ML
INJECTION, SUSPENSION SUBCUTANEOUS
Qty: 15 ML | Refills: 3 | Status: SHIPPED | OUTPATIENT
Start: 2022-06-16 | End: 2022-07-19

## 2022-06-20 NOTE — TELEPHONE ENCOUNTER
Last Clinic Visit: 11/8/19, [place] 3/9/20   Detail Level: Detailed Plan: There is no EMR template for this procedure: #11 blade nick incision, Keratin and sac extrused with extractor, sac trimmed with forceps and scissors, minimal drysol with final result being a small erosion.

## 2022-06-21 NOTE — PROGRESS NOTES
[facility] Consultation    Date of visit: 6/22/2022    Reason for consultation:    Maria M Marcus is a 57 year old female who is seen in consultation today at the request of her provider, Marcus Salgado PA-C (Primary Care Provider)  re: patient's chronic neck pain with pain into the shoulders and arms, history of cervical spinal stenosis with C7-T1 laminectomy in 2007.       Primary Care Provider is Marcus Salgado.  Pain medications are being prescribed by:  Review of [place]  shows no controlled substances in previous 12 months    Please see the [facility] health questionnaire which the patient completed and reviewed with me in detail.    Chief Complaint:  Neck pain, shoulder girdle pain, bilateral arm pain extending into the hands (typically bilateral thumbs and 4th and 5th digits, has known trigger thumbs and carpal tunnel syndrome), right lateral hip pain and bilateral foot pain  Chief Complaint   Patient presents with     Pain       Pain history:  Maria M Marcus is a 57 year old female who first started having problems with pain as follows:    Pain history collected 6/22/2022 at initial consultation:  Her neck and bilateral arm pain is constant. She gets headaches that start in the back of her head. She does not feel that these are migraine headaches. Pain is in the neck and extends down both arms to the hands. Bilateral thumb pain (trigger thumb) and bilateral 4th and 5 digit pain. Rates pain 9/10 most of the time. She feels that about 80% of her pain is in the neck and about 20% is in her arms.   No weakness but has numbness and tingling in the fingers as noted above. She also has known carpal tunnel syndrome and wears braces at night every night to keep this under control. Left arm/hand is always more bothersome than the right.     Right lateral hip pain, bothers her if she is walking and turns just right it will bother her. She does not have  "issues with laying on the right side.     Bilateral foot pain, has shoe inserts. No diabetic peripheral neuropathy. Has had type I DM since age 2. Former gymnast.       Pain rating: intensity ranges from 7/10 to 10/10, and Averages 8-9/10 on a 0-10 scale.  Describes pain as \"aching with sharp shooting numbing pain in both hands, arms and upper back.\"  Pain is constant.      Home self care includes: shoulder rolls help, tennis ball to pressure points a couple of times per day, stretching all areas    Aggravating factors include: lifting and carrying items    Relieving factors include: laying down on my back for a bit but then need to rotate to a different position    Any bowel or bladder incontinence: none      Current pain-related medication treatments include:  -cyclobenzaprine 5-10mg TID PRN muscle spasms (somewhat helpful but it makes her sleepy, usually uses 1 tab)  -Duloxetine 60mg every day (somewhat helpful)  -glucosamine-chondroitin 500/400mg take every day   -Vitamin D every day   -Calcium supplement  -CBD from hemp (helpful)    Other pertinent medications:  -none        Previous medication treatments included:  OPIATES: hydrocodone (helpful), oxycodone (helpful), hydromorphone (helpful)  NSAIDS: ketorolac (unsure),   MUSCLE RELAXANTS: cyclobenzaprine (helpful but causes drowsiness, can only use 5mg dosage), methocarbamol (unsure), tizanidine (helpful)  ANTI-MIGRAINE MEDS: none  ANTI-DEPRESSANTS: amitriptyline (25mg not helpful), Duloxetine (somewhat helpful for mood and pain)  SLEEP AIDS: none  ANTI-CONVULSANTS: gabapentin (100mg and 300mg had memory issues)  TOPICALS: Biofreeze and peppermint oil (helpful)  ANXIOLYTICS: none  MEDICAL CANNABIS: not tried  Other meds: methylprednisolone (helpful), prednisone (helpful), Acetaminophen (can't use due to continuous glucose monitoring      Other treatments have included:  Maria M Marcus has not been seen at a pain clinic in the past.    PT: tried in 2007 " without benefit  Chiropractic care: sees one time per month, helps for a short time  Acupuncture: tried, helped some  TENs Unit: tried, helpful    Injections:   Has had injections for trigger finger issues    Surgery:  Previous cervical surgery in  (C7-T1 laminectomy for radicular pain)    Past Medical History:  Past Medical History:   Diagnosis Date     Cervical radiculopathy      Diabetic eye exam (H) 11     DISC DIS NEC/NOS-LUMBAR 2007     Frozen shoulder syndrome 2011     Hyperlipidemia LDL goal <100 10/31/2010     Hypertension 1/3/2011     Hypothyroidism due to Hashimoto's thyroiditis      Localized osteoporosis without current pathological fracture 2022     Type 1 diabetes, HbA1c goal < 7% (H) dx 1967     Unspecified hypothyroidism      Past Surgical History:  Past Surgical History:   Procedure Laterality Date     COLONOSCOPY N/A 2016    Procedure: COLONOSCOPY;  Surgeon: Efren Perry MD;  Location:  GI     ENDOSCOPIC SINUS SURGERY Bilateral 2018    Procedure: Bilateral functional endoscopic maxillary antrostomies;  Surgeon: Woo Silva MD;  Location:  OR     LAMINECT/DISCECTOMY, CERVICAL  2007    C7-T1 hemilaminectomy, microdiscectomy, foraminotomy.     LASER YAG CAPSULOTOMY Right 10/23/2014    Procedure: LASER YAG CAPSULOTOMY;  Surgeon: Esteban Dorsey MD;  Location:  OR     SC SPINE SURGERY PROCEDURE UNLISTED       VITRECTOMY,STRIP EPIRETINAL MEMBRANE  09     [facility]  DELIVERY ONLY      C-Sections x2     [facility] NONSPECIFIC PROCEDURE      Hysterectomy - total (cervix removed but ovaries remain)     [facility] STOMACH SURGERY PROCEDURE UNLISTED       [facility] TOTAL ABDOM HYSTERECTOMY       Medications:  Current Outpatient Medications   Medication Sig Dispense Refill     alendronate (FOSAMAX) 70 MG tablet Take 1 tablet (70 mg) by mouth every 7 days 12 tablet 3     Calcium Carb-Cholecalciferol (TGT CALCIUM DIETARY SUPPLEMENT PO)        cyclobenzaprine  (FLEXERIL) 5 MG tablet Take 1-2 tablets (5-10 mg) by mouth 3 times daily as needed for muscle spasms 60 tablet 5     DULoxetine (CYMBALTA) 60 MG capsule Take 1 capsule by mouth once daily 90 capsule 1     estradiol (ESTRACE) 2 MG tablet Take 1 tablet (2 mg) by mouth daily 90 tablet 3     glucosamine-chondroitin 500-400 MG CAPS per capsule Take 1 capsule by mouth daily       insulin glargine (LANTUS SOLOSTAR) 100 UNIT/ML pen 3 units am and 4 units pm 15 mL 3     insulin glulisine (APIDRA PEN) 100 UNIT/ML soln Inject 2 Units Subcutaneous 3 times daily (before meals) 9 mL 3     insulin NPH (HUMULIN N KWIKPEN) 100 UNIT/ML injection Take 3 units daily at bedtime. 15 mL 3     insulin NPH (NOVOLIN N FLEXPEN) 100 UNIT/ML injection Take 3 units daily at bedtime. 15 mL 3     levothyroxine (SYNTHROID/LEVOTHROID) 88 MCG tablet Take 1 tablet (88 mcg) by mouth daily 90 tablet 3     lisinopril (ZESTRIL) 20 MG tablet Take 1 tablet by mouth once daily 90 tablet 3     omeprazole (PRILOSEC) 20 MG DR capsule Take 1 capsule by mouth once daily 90 capsule 3     simvastatin (ZOCOR) 20 MG tablet Take 1 tablet (20 mg) by mouth At Bedtime 90 tablet 3     Vitamin D, Cholecalciferol, 25 MCG (1000 UT) TABS        zinc gluconate 50 MG tablet Take 50 mg by mouth daily       blood glucose (ACCU-CHEK GUIDE) test strip Use to test blood sugar 4-5 times daily or as directed. 200 strip 9     blood glucose monitoring (SOFTCLIX) lancets Use to test blood sugar 4-5 times daily or as directed. 200 each 9     blood glucose monitoring (SOFTCLIX) lancets Use to test blood sugar 6 times daily or as directed.   Dispense ACCU-CHEK SOFTCLIX 500 each 1     Blood Glucose Monitoring Suppl (ACCU-CHEK GUIDE ME) w/Device KIT 1 each daily To use 4-5 times daily to check blood glucose. 1 kit 1     Blood Glucose Monitoring Suppl (CONTOUR NEXT ONE) KIT 1 each daily Use to test blood glucose. 1 kit 1     insulin pen needle (31G X 5 MM) 31G X 5 MM miscellaneous Use 6-7 pen  needles daily or as directed. 300 each 8     Allergies:     Allergies   Allergen Reactions     Novolog [Insulin Aspart] Swelling     Itching, rash, contact dermatitis     Humalog [Insulin Lispro] Rash     Contact dermatitis     Zyrtec [Cetirizine] Rash     Social History:  Home situation: lives with spouse, Ra. 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 working  Tobacco use: none  Alcohol use: uses about weekly or less  Drug use: none  History of chemical dependency treatment: none  Caffeine: near daily use, coffee     Family history:  Family History   Problem Relation Age of Onset     Hypertension Maternal Grandfather      EYE* Maternal Grandmother      Blood Disease Maternal Grandmother      Eye Disorder Maternal Grandmother         maccular degeneration     Osteoporosis Maternal Grandmother      Lipids Father      Gastrointestinal Disease Father         ulcers     Heart Disease Father      Cardiovascular Father         heart attack     Hypertension Paternal Grandmother      Breast Cancer Mother         2011 is in bones now but remission     Diabetes Paternal Uncle         Type I     Family history of headaches: none    Review of Systems:  The 14 system ROS was reviewed with the patient and is positive for: positives are in bold  Constitutional: fever/chills, fatigue, weight gain, weight loss  Eyes/Head: headache, dizziness  ENT: ringing in ears  Allergy/Immune: allergies  Skin: itching, rash, hives  Hematologic: easy bruising  Respiratory: cough, wheezing, shortness of breath  Cardiovascular: swelling in feet, fainting, palpitations, chest pain  GI: abdominal pain, nausea, vomiting, diarrhea, constipation  Endocrine: steroid use  Musculoskeletal:  joint pain, arthritis, stiffness, gout, back pain, neck pain  Urinary: frequency, urgency, incontinence, hesitancy  Neurologic: weakness, numbness/tingling, seizure, stroke, memory loss  Mental health:  depression, anxiety, stress, suicidal ideation      Physical Exam:  Vitals:    06/22/22 0908   BP: 130/76   Pulse: 94     Exam:  Constitutional: healthy, alert and no distress  Head: normocephalic. Atraumatic.   Eyes: no redness or jaundice noted   ENT: oropharnx normal.  MMM.   Cardiovascular: RRR no m/g/r   Respiratory: clear to auscultation A/P. Respirations easy and unlabored. Able to speak in full sentences without SOB or cough noted.    Gastrointestinal: soft, non-tender   : deferred  Skin: no suspicious lesions or rashes  Psychiatric: mentation appears normal and affect normal/bright    Musculoskeletal exam:  Gait/Station/Posture: somewhat forward head and rounded shoulders. Normal gait. Able to rise onto toes and heels. Able to perform tandem gait.     Cervical spine:    Flex:  20 degrees   Ext: 20 degrees, painful   Rotation to right: 80 degrees   Rotation to left: 80 degrees   Ext/rotation to the right painful    Ext/rotation to the left painful     Thoracic spine:  Normal     Lumbar spine:    Flex:  70 degrees   Ext: 20 degrees   Rotation/ext to right: pain free   Rotation/ext to left: pain free   SI joints: Non-tender bilaterally   Piriformis: Non-tender bilaterally   Greater trochanters: Non-tender bilaterally      Myofascial tenderness:  Upper shoulder girdle  Straight leg exam: negative  All's maneuver: negative    Neurologic exam:  CN:  Cranial nerves 2-12 are  Grossly normal  Motor:  5/5 UE and LE strength  Reflexes:     Biceps:     R:  2/4 L: 2/4   Brachioradialis   R:  2/4 L: 2/4      Patella:  R:  2/4 L: 2/4   Achilles:  R:  2/4 L: 2/4  Other reflexes:  Toes downgoing   Kumar's negative  Sensory:  (upper and lower extremities):   Light touch: normal    Vibration: normal    Pin prick: normal    Allodynia: absent    Dysethesia: absent    Hyperalgesia: absent     Diagnostic tests:  None recent    Other testing (labs, diagnostics):  2/9/2022  Cr. 0.98  Est GFR 67      Screening tools:      DIRE Score for ongoing opioid management is calculated as follows:    Diagnosis = 2    Intractability = 2    Risk: Psych = 2  Chem Hlth = 2  Reliability = 2  Social = 2    Efficacy = 2    Total DIRE Score = 14 (14 or higher predicts good candidate for ongoing opioid management; 13 or lower predicts poor candidate for opioid management)         Assessment:  1. Cervical facet joint pain  2. Spondylosis of cervical region without myelopathy or radiculopathy  3. Cervicogenic headache  4. Cervical degenerative disc disease  5. Cervical radiculopathy  6. Muscle spasm  7. Myofascial pain  8. Chronic pain syndrome  9. Family history of substance abuse (alcohol)  10. PMHx includes: cervical radiculopathy, diabetic eye exam (), frozen shoulder syndrome, hyperlipidemia, HTN, hypothyroidism due to Hashimoto's thyroiditis, localized osteoporosis without current pathological fracture (2022), type I DM (diagnosed in ),   11. PSHx includes: colonoscopy (), bilateral endoscopic sinus surgery (), cervical laminectomy/discectomy (2007 C7-T1), laser YAG capsulotomy right (), vitrectomy (),  x 2, total hysterectomy (cervix removed but ovaries remain)        Plan:  Diagnosis reviewed, treatment option addressed, and risk/benefits discussed.  Self-care instructions given.  I am recommending a multidisciplinary treatment plan to help this patient better manage her pain.      1. Physical Therapy: ordered  2. Clinical Health Psychologist to address issues of relaxation, behavioral change, coping style, and other factors important to improvement: none needed  3. Diagnostic Studies: none  4. Medication Management:   1. Stop cyclobenzaprine  2. START tizanidine 2-4 mg three times daily as needed for muscle spasms.   3. Will consider possible future use of buprenorphine (likely in patch form called Butrans) if we need it   5. Further procedures recommended: schedule cervical MBBs proceeding to  cervical RFA as indicated, our office will contact you  6. Acupuncture: none  7. Urine toxicology screen today: none   8. Recommendations/follow-up for PCP:  See above  9. Release of information: signed KAT to get clinic notes and imaging reports from [facility] for cervical MRI done last fall  10. Follow up: 10-12 weeks     Face to face time: 75 minutes      Anisha NICHOLAS RN CNP, FNP  [facility]  [facility]

## 2022-06-22 ENCOUNTER — OFFICE VISIT (OUTPATIENT)
Dept: PALLIATIVE MEDICINE | Facility: CLINIC | Age: 57
End: 2022-06-22
Attending: PHYSICIAN ASSISTANT
Payer: COMMERCIAL

## 2022-06-22 VITALS — DIASTOLIC BLOOD PRESSURE: 76 MMHG | HEART RATE: 94 BPM | SYSTOLIC BLOOD PRESSURE: 130 MMHG

## 2022-06-22 DIAGNOSIS — M62.838 MUSCLE SPASM: ICD-10-CM

## 2022-06-22 DIAGNOSIS — M47.812 SPONDYLOSIS OF CERVICAL REGION WITHOUT MYELOPATHY OR RADICULOPATHY: ICD-10-CM

## 2022-06-22 DIAGNOSIS — G89.4 CHRONIC PAIN SYNDROME: ICD-10-CM

## 2022-06-22 DIAGNOSIS — G44.86 CERVICOGENIC HEADACHE: ICD-10-CM

## 2022-06-22 DIAGNOSIS — M50.30 DDD (DEGENERATIVE DISC DISEASE), CERVICAL: ICD-10-CM

## 2022-06-22 DIAGNOSIS — M54.12 CERVICAL RADICULOPATHY: ICD-10-CM

## 2022-06-22 DIAGNOSIS — M54.2 PAIN OF CERVICAL FACET JOINT: Primary | ICD-10-CM

## 2022-06-22 DIAGNOSIS — M79.18 MYOFASCIAL PAIN: ICD-10-CM

## 2022-06-22 PROCEDURE — 99205 OFFICE O/P NEW HI 60 MIN: CPT | Performed by: NURSE PRACTITIONER

## 2022-06-22 RX ORDER — TIZANIDINE 2 MG/1
2-4 TABLET ORAL 3 TIMES DAILY PRN
Qty: 60 TABLET | Refills: 1 | Status: SHIPPED | OUTPATIENT
Start: 2022-06-22 | End: 2022-07-21

## 2022-06-22 ASSESSMENT — PAIN SCALES - GENERAL: PAINLEVEL: EXTREME PAIN (9)

## 2022-06-22 NOTE — PATIENT INSTRUCTIONS
PLAN:  Physical Therapy: ordered  Clinical Health Psychologist to address issues of relaxation, behavioral change, coping style, and other factors important to improvement: none needed  Diagnostic Studies: none  Medication Management:   Stop cyclobenzaprine  START tizanidine 2-4 mg three times daily as needed for muscle spasms.   Will consider possible future use of buprenorphine (likely in patch form called Butrans) if we need it   Further procedures recommended: schedule cervical MBBs proceeding to cervical RFA as indicated, our office will contact you  Acupuncture: none  Urine toxicology screen today: none   Recommendations/follow-up for PCP:  See above  Release of information: signed KAT to get clinic notes and imaging reports from [facility] for cervical MRI done last fall  Follow up: 10-12 weeks     ----------------------------------------------------------------  Clinic Number:  [phone]   Call with any questions about your care and for scheduling assistance.   Calls are returned Monday through Friday between 8 AM and 4:30 PM. We usually get back to you within 2 business days depending on the issue/request.    If we are prescribing your medications:  For opioid medication refills, call the clinic or send a [employer] message 7 days in advance.  Please include:  Name of requested medication  Name of the pharmacy.  For non-opioid medications, call your pharmacy directly to request a refill. Please allow 3-4 days to be processed.   Per [place] State Law:  All controlled substance prescriptions must be filled within 30 days of being written.    For those controlled substances allowing refills, pickup must occur within 30 days of last fill.      We believe regular attendance is key to your success in our program!    Any time you are unable to keep your appointment we ask that you call us at least 24 hours in advance to cancel.This will allow us to offer the appointment time to another patient.   Multiple missed  appointments may lead to dismissal from the clinic.

## 2022-06-23 ENCOUNTER — TELEPHONE (OUTPATIENT)
Dept: PALLIATIVE MEDICINE | Facility: CLINIC | Age: 57
End: 2022-06-23

## 2022-06-23 NOTE — TELEPHONE ENCOUNTER
Screening questions for MBB Injections:    Injection to be done at which interventional clinic site? [facility]     Instruct patient to arrive as directed prior to the scheduled appointment time:    [place] and [place]: 30 minutes before      Procedure ordered by : Anisha Dodge     Procedure ordered? Cervical Medial Branch Block    What insurance would patient like us to bill for this procedure? MEDICARE and [facility]       MEDICA: REQUIRES A PA FOR BOTH MBB     Worker's comp- Any injection DO NOT SCHEDULE and route to Oksana Hall.      [employer] insurance - If scheduling an SI joint injection DO NOT SCHEDULE and route to Claudette Ruby.       MBBs must be scheduled with elapsed time interval of at least 2 weeks and not more than 6 months between the First MBB and the Second MBB for insurance purposes       [employer] - Any injection besides hip/shoulder/knee joint DO NOT SCHEDULE and route to Claudette Ruby. She will obtain PA and call pt back to schedule procedure or notify pt of denial.       [employer]- PA required for all MBB's      **BCBS- ALL need to be routed to Claudette for review if a PA is needed**    IF SCHEDULING IN [place] AND NEEDS A PA, IT IS OKAY TO SCHEDULE. [place] HANDLES THEIR OWN PA'S AFTER THE PATIENT IS SCHEDULED. PLEASE SCHEDULE AT LEAST 1 WEEK OUT SO A PA CAN BE OBTAINED.    Any chance of pregnancy? NO   If YES, do NOT schedule and route to RN pool    Is an  needed? No     Patient has a drive home? (mandatory) Yes     Is patient taking any blood thinners (plavix, coumadin, jantoven, warfarin, heparin, pradaxa or dabigatran )? No    If hold needed, do NOT schedule, route to RN pool     Is patient taking any aspirin products? No     If more than 325mg/day, OK to schedule; Instruct pt to decrease to less than 325 mg for 7 days AND route to RN pool    For CERVICAL procedures, hold all aspirin products for 6 days.     Tell pt that if aspirin  product is not held for 6 days, the procedure WILL BE cancelled.      Does the patient have a bleeding or clotting disorder? No    If YES, okay to schedule AND route to RN nurse pool    **For any patients with platelet count <100, must be forwarded to provider**    Is patient diabetic? YES, Type 1  If YES, have them bring their glucometer.    Does patient have an active infection or treated for one within the past week? No    Is patient currently taking any antibiotics?  No    For patients on chronic, preventative, or prophylactic antibiotics, procedures may be scheduled.     For patients on antibiotics for active or recent infection:antibiotic course must have been completed for 4 days    Is patient currently taking any steroid medications? (i.e. Prednisone, Medrol)  No     For patients on steroid medications, course must have been completed for 4 days    Is patient actively being treated for cancer or immunocompromised? No   If YES, do NOT schedule and route to RN    Are you able to get on and off an exam table with minimal or no assistance? Yes   If NO, do NOT schedule and route to RN    Are you able to roll over and lay on your stomach with minimal or no assistance? Yes   If NO, do NOT schedule and route to RN    Any allergies to contrast dye, iodine, shellfish, or numbing and steroid medications? No  (If so, inform nursing and note in scheduling comments.)    Allergies: Novolog [insulin aspart], Humalog [insulin lispro], and Zyrtec [cetirizine]     Does patient have an MRI/CT?  YES: 9/21/21  Check Procedure Scheduling Grid to see if required.      Was the MRI done within the last 3 years?  Yes    If yes, where was the MRI done i.e.[facility], [facility], [facility], [facility] etc? [place]     If no, do not schedule and route to nursing    If MRI was not done at [facility], [facility] or [facility] do NOT schedule and route to nursing.      If pt has an imaging disc, the injection MAY be scheduled but pt has to  bring disc to appt.     If they show up without the disc the injection cannot be done      Medial Branch Block Pre-Procedure Instructions    It is okay to take long acting pain medications (if you are on them) the day of the procedure but try not to take any short acting medications unless absolutely necessary.    YES: - Informed    Long acting meds would include: Gabapentin (Neurontin), MS Contin, Oxycontin        Short acting meds would include:  Percocet, Oxycodone, Vicodin, Ibuprofen     The day of the procedure, you should try to do things that provoke your pain, since the injection is being done to see if it will relieve your pain . YES: Informed      If your pain level is a 4 out of 10 or less on the day of the procedu re, please call [phone] to reschedule.  YES: Informed      Reminders:      If you are started on any steroids or antibiotics between now and your appointment, you must contact us because it may affect our ability to perform your procedure       Instructed pt to arrive 30 minutes early for IV start if required. (Check Procedure Scheduling Grid) Yes      If this is for a cervical MBB aspirin needs to be held for 6 days.  YES: Informed        Do not schedule procedures requiring IV placement in the first appointment of the day or first appointment after lunch. Do NOT schedule at 0745, 0815 or 1245.        For patients 85 or older we recommend having an adult stay w/ them for the remainder of the day.      Does the patient have any questions? No       Cristal Cunningham      [facility]  Pain Management

## 2022-07-11 NOTE — TELEPHONE ENCOUNTER
Reason for call:  Other   Patient called regarding (reason for call): call back  Additional comments: Patient would like a call back from Dr. Vega or his nurse to ask if its ok for her to continue to have a chiropractic appointment prior to her procedure. Her Chiro appt is on Friday 7/15/22 and  The procedure is scheduled for 7/19/22. Pt would like to get a call before Friday. Please call pt back for advice.    Phone number to reach patient:  Home number on file [phone] (home)    Best Time:  Anytime before 7/15/22    Can we leave a detailed message on this number?  YES    Cristal Cunningham      [facility]  Pain Management

## 2022-07-12 NOTE — TELEPHONE ENCOUNTER
Called pt and left message infoming her that she can go to her chiropractor on Friday.    AYAKA Mccrary-BSN  [facility]

## 2022-07-19 ENCOUNTER — RADIOLOGY INJECTION OFFICE VISIT (OUTPATIENT)
Dept: PALLIATIVE MEDICINE | Facility: CLINIC | Age: 57
End: 2022-07-19
Attending: NURSE PRACTITIONER
Payer: COMMERCIAL

## 2022-07-19 VITALS — SYSTOLIC BLOOD PRESSURE: 152 MMHG | HEART RATE: 69 BPM | DIASTOLIC BLOOD PRESSURE: 79 MMHG

## 2022-07-19 DIAGNOSIS — G44.86 CERVICOGENIC HEADACHE: ICD-10-CM

## 2022-07-19 DIAGNOSIS — M50.30 DDD (DEGENERATIVE DISC DISEASE), CERVICAL: ICD-10-CM

## 2022-07-19 DIAGNOSIS — M54.2 PAIN OF CERVICAL FACET JOINT: ICD-10-CM

## 2022-07-19 DIAGNOSIS — M47.812 SPONDYLOSIS OF CERVICAL REGION WITHOUT MYELOPATHY OR RADICULOPATHY: ICD-10-CM

## 2022-07-19 DIAGNOSIS — M47.812 SPONDYLOSIS WITHOUT MYELOPATHY OR RADICULOPATHY, CERVICAL REGION: ICD-10-CM

## 2022-07-19 PROCEDURE — 64491 INJ PARAVERT F JNT C/T 2 LEV: CPT | Mod: LT | Performed by: PAIN MEDICINE

## 2022-07-19 PROCEDURE — 64490 INJ PARAVERT F JNT C/T 1 LEV: CPT | Mod: LT | Performed by: PAIN MEDICINE

## 2022-07-19 ASSESSMENT — PAIN SCALES - GENERAL
PAINLEVEL: EXTREME PAIN (9)
PAINLEVEL: MODERATE PAIN (5)

## 2022-07-19 NOTE — PROGRESS NOTES
Pre procedure Diagnosis: Cervical spondylosis   Post procedure Diagnosis: Same  Procedure performed: cervical medial branch block Left c5,6,7  Anesthesia: none  Complications: none  Operators: Corona Vega MD    Indications:   Maria M Marcus is a 57 year old female. The patient has a history of axial neck pain l>R.  Exam shows pain with extension/rotation  and they have tried conservative treatment including meds/pt.    Options/alternatives, benefits and risks were discussed with the patient including but not limited to bleeding, infection, tissue trauma, exposure to radiation, reaction to medications, spinal cord injury, weakness, numbness and paralysis.  Questions were answered to her satisfaction and she wishes to proceed. Voluntary informed consent was obtained and signed.     Vitals were reviewed: Yes  Allergies were reviewed:  Yes   Medications were reviewed:  Yes   Pre-procedure pain score: 9/10    Procedure:  After obtaining signed, informed consent, the patient was taken to the procedure room and placed in the prone position on the [facility] frame.  A Pause for the Cause was completed prior to procedure start.  The patient was prepped and draped in the usual sterile fashion.    The areas of interest were identified with fluoroscopy.  The skin and subcutaneous tissue were anesthetized by injecting Lidocaine 1% 1 ml at each injection site utilizing a 27 gauge 1.25 inch needle.  Then,  25 gauge 3.5 inch spinal needles with slightly curved tips were advanced tangential to the medial branch nerves, abutting the articular pillars at left C5,6,7 utilizing AP and Lateral fluoroscopic guidance.  Aspiration for blood and CSF was negative at all the levels.    Then, after repeated negative aspiration, each level was injected with 0.5 ml of 0.25% bupivacaine and the needles were restyletted and withdrawn.    The injection sites were cleaned and sterile dressings were applied where indicated.    The patient  tolerated the procedure well without complications and was taken to the recovery area for continued observation.  Fluoroscopic images were saved to PACS.    The patient was re-evaluated following the procedure and they noted decreased pain and improved range of motion.    Post-procedure pain:  5/10    The patient was given a pain diary and instructed to document their pain throughout the day.  The patient was asked to return the pain diary the next day in person or by fax at which time it will be reviewed and the decision made whether or not to proceed .    Follow-up:  Pending results    Corona Vega MD  [facility]

## 2022-07-19 NOTE — NURSING NOTE
Discharge Information    IV Discontiued Time:  NA    Amount of Fluid Infused:  NA    Discharge Criteria = When patient returns to baseline or as per MD order    Consciousness:  Pt is fully awake    Circulation:  BP +/- 20% of pre-procedure level    Respiration:  Patient is able to breathe deeply    O2 Sat:  Patient is able to maintain O2 Sat >92% on room air    Activity:  Moves 4 extremities on command    Ambulation:  Patient is able to stand and walk or stand and pivot into wheelchair    Dressing:  Clean/dry or No Dressing    Notes:   Discharge instructions and AVS given to patient    Patient meets criteria for discharge?  YES    Admitted to PCU?  No    Responsible adult present to accompany patient home?  Yes    Signature/Title:    Najma Gordon RN  RN Care Coordinator  [facility]

## 2022-07-19 NOTE — PATIENT INSTRUCTIONS
[facility]   Medial Branch Block Discharge Instructions  CMBB    Your procedure was performed by: Dr. Corona Vega       Medications used include:  Lidocaine  Bupivicaine  Omnipaque            You will need to complete the Pain Scale Log form and return it to us as soon as possible.  Once we have received the form, we will review it and call you to determine the next steps.     The form can be faxed to [phone] or mailed to:   [facility]   [id number]   [place] [address]    You may resume your regular medications  If you were holding your blood thinning medication, please restart taking it: N/A  You may resume your regular activities  Be cautious with walking as numbness and/or weakness in the lower extremities may occur for up to 6-8 hours due to the effects of the anesthetic.  Avoid driving for 6 hours. The local anesthetic could slow your reflexes.   You may shower, however no swimming or tub baths or hot tubs for 24 hours following your procedure.  Your pain will return after the numbing medications have worn off.  You may use your current pain medications as needed.  Unless you have been directed to avoid the use of anti-inflammatory medications (NSAIDS), you may use medications such as ibuprofen, Aleve or Tylenol for pain control if needed.  Some people find it helpful to alternate ibuprofen and Tylenol every 3 hours for a couple of days.  You may use ice packs 10-15 minutes three to four times a day at the injection site for comfort.   Do not use heat to painful areas for 6 to 8 hours. This will give the local anesthetic time to wear off and prevent you from accidentally burning your skin.   If you experience any of the following, call the Pain Clinic during work hours (Monday through Friday 8 am-4:30 pm) at [phone] or the Provider Line after hours at [phone]:  -Fever over 100 degree F  -Swelling, bleeding, redness, drainage,  warmth at the injection site  -Progressive weakness or numbness in your arms  -If lumbar, call if you have a loss of bowel or bladder function  -If cervical, call if you have any unusual headache that is not relieved by Tylenol  -Unusual new onset of pain that is not improving

## 2022-07-19 NOTE — NURSING NOTE
Pre-procedure Intake  If YES to any questions or NO to having a   Please complete laminated checklist and leave on the computer keyboard for Provider, verbally inform provider if able.    For SCS Trial, RFA's or any sedation procedure:  Have you been fasting? NA    If yes, for how long? NA    Are you taking any any blood thinners such as Coumadin, Warfarin, Jantoven, Pradaxa Xarelto, Eliquis, Edoxaban, Enoxaparin, Lovenox, Heparin, Arixtra, Fondaparinux, or Fragmin? OR Antiplatelet medication such as Plavix, Brilinta, or Effient?   No     If yes, when did you take your last dose? NA    Do you take aspirin?  No    If cervical procedure, have you held aspirin for 6 days?   NA    Do you have any allergies to contrast dye, iodine, steroid and/or numbing medications?  NO    Are you currently taking antibiotics or have an active infection?  NO    Have you had a fever/elevated temperature within the past week? NO    Are you currently taking oral steroids? NO    Do you have a ? Yes    Are you pregnant or breastfeeding?  NO    Have you received the COVID-19 vaccine? NA    If yes, was it your 1st, 2nd or only dose needed? NA    Date of most recent vaccine: NA    Notify provider and RNs if systolic BP >170, diastolic BP >100, P >100 or O2 sats < 90%      Morenita Stark CMA (AAMA)

## 2022-07-20 ENCOUNTER — OFFICE VISIT (OUTPATIENT)
Dept: FAMILY MEDICINE | Facility: CLINIC | Age: 57
End: 2022-07-20
Payer: COMMERCIAL

## 2022-07-20 VITALS
SYSTOLIC BLOOD PRESSURE: 126 MMHG | BODY MASS INDEX: 31.33 KG/M2 | WEIGHT: 151.2 LBS | RESPIRATION RATE: 14 BRPM | HEART RATE: 79 BPM | OXYGEN SATURATION: 99 % | DIASTOLIC BLOOD PRESSURE: 64 MMHG | TEMPERATURE: 98.1 F

## 2022-07-20 DIAGNOSIS — S90.852A FOREIGN BODY IN LEFT FOOT, INITIAL ENCOUNTER: Primary | ICD-10-CM

## 2022-07-20 DIAGNOSIS — E10.9 TYPE 1 DIABETES MELLITUS WITHOUT COMPLICATION (H): ICD-10-CM

## 2022-07-20 PROCEDURE — 99207 PR FOOT EXAM NO CHARGE: CPT | Performed by: FAMILY MEDICINE

## 2022-07-20 PROCEDURE — 99214 OFFICE O/P EST MOD 30 MIN: CPT | Performed by: FAMILY MEDICINE

## 2022-07-20 ASSESSMENT — ANXIETY QUESTIONNAIRES
GAD7 TOTAL SCORE: 0
6. BECOMING EASILY ANNOYED OR IRRITABLE: NOT AT ALL
IF YOU CHECKED OFF ANY PROBLEMS ON THIS QUESTIONNAIRE, HOW DIFFICULT HAVE THESE PROBLEMS MADE IT FOR YOU TO DO YOUR WORK, TAKE CARE OF THINGS AT HOME, OR GET ALONG WITH OTHER PEOPLE: NOT DIFFICULT AT ALL
GAD7 TOTAL SCORE: 0
7. FEELING AFRAID AS IF SOMETHING AWFUL MIGHT HAPPEN: NOT AT ALL
5. BEING SO RESTLESS THAT IT IS HARD TO SIT STILL: NOT AT ALL
1. FEELING NERVOUS, ANXIOUS, OR ON EDGE: NOT AT ALL
2. NOT BEING ABLE TO STOP OR CONTROL WORRYING: NOT AT ALL
3. WORRYING TOO MUCH ABOUT DIFFERENT THINGS: NOT AT ALL

## 2022-07-20 ASSESSMENT — PATIENT HEALTH QUESTIONNAIRE - PHQ9
SUM OF ALL RESPONSES TO PHQ QUESTIONS 1-9: 0
5. POOR APPETITE OR OVEREATING: NOT AT ALL

## 2022-07-20 ASSESSMENT — PAIN SCALES - GENERAL: PAINLEVEL: MODERATE PAIN (4)

## 2022-07-20 NOTE — PROGRESS NOTES
Assessment & Plan     Foreign body in left foot, initial encounter  No foreign body noted on her foot and Foot X-rays  Reassurance given  Follow up with podiatry or return to clinic to if symptoms worsen or persist.  - XR Foot Left G/E 3 Views; Future    Type 1 diabetes mellitus without complication (H)  PCP/Endo managing.  - FOOT EXAM        No follow-ups on file.    Renata Valencia MD  [facility] WOLFGANG Franz is a 57 year old accompanied by her Self, presenting for the following health issues:  Recheck Medication  Pleasant Type 1 diabetic here with concerns for a possible foreign body at the lateral aspect of her dorsal left foot ( proximal foot). She denies trauma, wounds, feels uncomfortable when she walks.    Review of Systems   Constitutional, HEENT, cardiovascular, pulmonary, GI, , musculoskeletal, neuro, skin, endocrine and psych systems are negative, except as otherwise noted.      Objective    /64   Pulse 79   Temp 98.1  F (36.7  C) (Temporal)   Resp 14   Wt 68.6 kg (151 lb 3.2 oz)   LMP  (LMP Unknown)   SpO2 99%   BMI 31.33 kg/m    Body mass index is 31.33 kg/m .  Physical Exam   GENERAL: healthy, alert and no distress  MS: no gross musculoskeletal defects noted, no edema  Diabetic foot exam: normal DP and PT pulses, no trophic changes or ulcerative lesions and normal sensory exam  Foot inspected, no wart, no obvious foreign body observed or palpated. No point of trauma or obvious openings.    Results for orders placed or performed in visit on 07/20/22   XR Foot Left G/E 3 Views     Status: None    Narrative    FOOT LEFT THREE OR MORE VIEWS  DATE/TIME: 7/20/2022 8:59 AM     INDICATION: Left foot pain. Possible foreign body.   COMPARISON: 8/5/2020.      Impression    IMPRESSION:  1.  Mild first MTP degenerative arthrosis, stable.  2.  No fracture or joint malalignment.  3.  Calcaneus enthesophytes.  4.  No radiodense foreign body.  5.  Mild soft tissue  swelling at the lateral margin of the fifth MTP  joint.    UVALDO PIERCE MD         SYSTEM ID:  [id number]             .  ..

## 2022-07-21 DIAGNOSIS — M62.838 MUSCLE SPASM: ICD-10-CM

## 2022-07-21 DIAGNOSIS — M79.18 MYOFASCIAL PAIN: ICD-10-CM

## 2022-07-21 RX ORDER — TIZANIDINE 2 MG/1
2-4 TABLET ORAL 3 TIMES DAILY PRN
Qty: 60 TABLET | Refills: 1 | Status: SHIPPED | OUTPATIENT
Start: 2022-07-21 | End: 2022-10-16

## 2022-07-21 NOTE — TELEPHONE ENCOUNTER
Signed Prescriptions:                        Disp   Refills    tiZANidine (ZANAFLEX) 2 MG tablet          60 tab*1        Sig: Take 1-2 tablets (2-4 mg) by mouth 3 times daily as           needed for muscle spasms Caution sedation. Stop           cyclobenzaprine. Don't drive until you know how           you feel.  Authorizing Provider: ROXANA VARNER, RN CNP, FNP  [facility]  [facility]

## 2022-07-21 NOTE — TELEPHONE ENCOUNTER
Received fax request from [facility] pharmacy requesting refill(s) for tiZANidine (ZANAFLEX) 2 MG tablet    Last refilled on 06/30/22    Pt last seen on 06/22/22  Next appt scheduled for 09/14/22    Will facilitate refill.

## 2022-07-22 ENCOUNTER — THERAPY VISIT (OUTPATIENT)
Dept: PHYSICAL THERAPY | Facility: CLINIC | Age: 57
End: 2022-07-22
Attending: NURSE PRACTITIONER
Payer: COMMERCIAL

## 2022-07-22 DIAGNOSIS — G44.86 CERVICOGENIC HEADACHE: ICD-10-CM

## 2022-07-22 DIAGNOSIS — M47.812 SPONDYLOSIS OF CERVICAL REGION WITHOUT MYELOPATHY OR RADICULOPATHY: ICD-10-CM

## 2022-07-22 DIAGNOSIS — M54.2 NECK PAIN: ICD-10-CM

## 2022-07-22 DIAGNOSIS — M50.30 DDD (DEGENERATIVE DISC DISEASE), CERVICAL: ICD-10-CM

## 2022-07-22 DIAGNOSIS — M54.2 PAIN OF CERVICAL FACET JOINT: ICD-10-CM

## 2022-07-22 PROCEDURE — 97110 THERAPEUTIC EXERCISES: CPT | Mod: GP | Performed by: PHYSICAL THERAPIST

## 2022-07-22 PROCEDURE — 97112 NEUROMUSCULAR REEDUCATION: CPT | Mod: GP | Performed by: PHYSICAL THERAPIST

## 2022-07-22 PROCEDURE — 97161 PT EVAL LOW COMPLEX 20 MIN: CPT | Mod: GP | Performed by: PHYSICAL THERAPIST

## 2022-07-22 NOTE — PROGRESS NOTES
"Physical Therapy Initial Evaluation  Subjective:  The history is provided by the patient. No  was used.   Patient Health History  Maria M Marcus being seen for Neck pain.     Problem began: 6/22/2022.   Problem occurred: insidious   Pain is reported as 6/10 (range 4-10/10) on pain scale.  General health as reported by patient is good.  Health conditions: DM 1, retinopathy related to DM, Colitis, frozen shoulder, vitiligo, HAs.   Red flags:  None as reported by patient.  Medical allergies: Zyrtec.   Surgeries: ortho spine surgery per pt.        Current occupation is not currently working.                     Therapist Generated HPI Evaluation  Problem details: Maria M Marcus is a 57 year old female who first started having problems with pain as follows:  Her neck and bilateral arm pain is constant. She gets headaches that start in the back of her head. She does not feel that these are migraine headaches. Pain is in the neck and extends down both arms to the hands. Bilateral thumb pain (trigger thumb) and bilateral 4th and 5 digit pain. Rates pain 9/10 most of the time. She feels that about 80% of her pain is in the neck and about 20% is in her arms.   No weakness but has numbness and tingling in the fingers as noted above. She also has known carpal tunnel syndrome and wears braces at night every night to keep this under control. Left arm/hand is always more bothersome than the right.      Right lateral hip pain, bothers her if she is walking and turns just right it will bother her. She does not have issues with laying on the right side.      Bilateral foot pain, has shoe inserts. No diabetic peripheral neuropathy. Has had type I DM since age 2. Former gymnast.         Pain rating: intensity ranges from 7/10 to 10/10, and Averages 8-9/10 on a 0-10 scale.  Describes pain as \"aching with sharp shooting numbing pain in both hands, arms and upper back.\"  Pain is constant.     Home self care " "includes: shoulder rolls help, tennis ball to pressure points a couple of times per day, stretching all areas     Aggravating factors include: lifting and carrying items.         Type of problem:  Cervical spine.    This is a chronic condition.                   Previous treatment includes physical therapy. There was none improvement following previous treatment.  Barriers include:  None as reported by patient.                        Objective:  Standing Alignment:    Cervical/Thoracic:  Forward head and thoracic kyphosis increased  Shoulder/UE:  Rounded shoulders                  Flexibility/Screens:     Upper Extremity:    Decreased left upper extremity flexibility at:  Pectoralis Minor      Spine:  Decreased left spine flexibility:  Upper Trap and Levator                    Cervical/Thoracic Evaluation    AROM:  AROM Cervical:    Flexion:            75%  Extension:       25%  Rotation:         Left: 50%     Right: 50%  Side Bend:      Left: 25%     Right:  25%                               Shoulder Evaluation:  ROM:  AROM:  : R shldr is WFL, L shldr elevation 130, ext/IR L5.                                                                                 General Evaluation:      Gross Strength:              Lower Extremity:   Significant findings:  Able to heel walk, toe walk, SLS 4\" R and L                                                               ROS    Assessment/Plan:    Patient is a 57 year old female with cervical complaints.    Patient has the following significant findings with corresponding treatment plan.                Diagnosis 1:  Neck pain  Pain -  self management, education and home program  Decreased ROM/flexibility - manual therapy, therapeutic exercise and home program  Decreased strength - therapeutic exercise, therapeutic activities and home program  Impaired muscle performance - neuro re-education and home program  Decreased function - therapeutic activities and home program  Impaired " posture - neuro re-education and home program    Therapy Evaluation Codes:     Cumulative Therapy Evaluation is: Low complexity.    Previous and current functional limitations:  (See Goal Flow Sheet for this information)    Short term and Long term goals: (See Goal Flow Sheet for this information)     Communication ability:  Patient appears to be able to clearly communicate and understand verbal and written communication and follow directions correctly.  Treatment Explanation - The following has been discussed with the patient:   RX ordered/plan of care  Anticipated outcomes  Possible risks and side effects  This patient would benefit from PT intervention to resume normal activities.   Rehab potential is good.    Frequency:  1 X week, once daily  Duration:  for 4 weeks tapering to 2 X a month over 2 months  Discharge Plan:  Achieve all LTG.  Independent in home treatment program.  Reach maximal therapeutic benefit.    Please refer to the daily flowsheet for treatment today, total treatment time and time spent performing 1:1 timed codes.

## 2022-07-22 NOTE — PROGRESS NOTES
[facility]    OUTPATIENT Physical Therapy ORTHOPEDIC EVALUATION  PLAN OF TREATMENT FOR OUTPATIENT REHABILITATION  (COMPLETE FOR INITIAL CLAIMS ONLY)  Patient's Last Name, First Name, M.I.  YOB: 1965  Maria M Marcus    Provider s Name:  [facility]   Medical Record No.  [id number]   Start of Care Date:  07/22/22   Onset Date:   06/22/22 (date PT order)   Type:     _X__PT   ___OT Medical Diagnosis:    Encounter Diagnoses   Name Primary?    Pain of cervical facet joint     Spondylosis of cervical region without myelopathy or radiculopathy     DDD (degenerative disc disease), cervical     Cervicogenic headache     Neck pain         Treatment Diagnosis:  Neck pain        Goals:     07/22/22 0500   Body Part   Goals listed below are for neck   Goal #1   Goal #1 sitting   Previous Functional Level No restrictions   Current Functional Level Minutes patient can sit   Performance level 5, pain up to 9/10   STG Target Performance Minutes patient will be able to sit   Performance level 10-15, pain 7/10   Rationale for community transportation;to allow rest from standing;for personal hygiene   Due date 08/12/22   LTG Target Performance Minutes patient will be able to sit   Performance Level 30, pain 5/10   Rationale for personal hygiene;to allow rest from standing;for community transportation   Due date 10/22/22   Goal #2   Goal #2 headaches   Previous Functional Level No headaches   Current Functional Level Headache frequency per week is   Performance Level 4x, pain 10/10   STG Target Performance Reduce frequency of headaches in a week to   Performance Level 3x, pain 9/10   Rationale for full and safe concentration;to establish restorative sleep pattern;to improve quality of life and resume normal social activities   Due Date 08/12/22   LTG Target Performance Reduce  frequency of headaches in a week to   Performance Level 2x, pain 8/10   Rationale to improve quality of life and resume normal social activities;to establish restorative sleep pattern;for full and safe concentration   Due Date 10/22/22       Therapy Frequency:  1x/wk x 4 wks, 2x/month x 2 months  Predicted Duration of Therapy Intervention:  3 months    Alberto Mckeon, PT                 I CERTIFY THE NEED FOR THESE SERVICES FURNISHED UNDER        THIS PLAN OF TREATMENT AND WHILE UNDER MY CARE     (Physician attestation of this document indicates review and certification of the therapy plan).                     Certification Date From:  07/22/22   Certification Date To:  10/19/22    Referring Provider:  Anisha Dodge    Initial Assessment        See Epic Evaluation SOC Date: 07/22/22

## 2022-07-28 ENCOUNTER — MYC MEDICAL ADVICE (OUTPATIENT)
Dept: PALLIATIVE MEDICINE | Facility: CLINIC | Age: 57
End: 2022-07-28

## 2022-07-28 NOTE — TELEPHONE ENCOUNTER
Pt had a leftCervical  medial branch block # 1 on 7/19/22.  The post medial branch block form was received.      According to pain diary pt would like to proceed with medial branch block #2.    Max relief from block is:    Pt had significant on the day of the block. (80 to 100%)    Physical therapy:      Last done in?:  current.     How many sessions?:  1.  With 4 more scheduled    Where was it  done?  [facility] .      Routed to Fine to review. Does pt had enough relief insurance-wise to proceed to medial branch block #2?  If so please schedule.      Demetra Hendricks, RN-BSN  [facility]

## 2022-08-02 NOTE — TELEPHONE ENCOUNTER
[facility] Medical Policy- No PA required    Coverage Guidance-No PA required    Coverage Indications, Limitations, and/or Medical Necessity    Facet Joint Interventions generally consist of four types of procedures: Intraarticular (IA) Facet Joint Injections, Medial Branch Blocks (MBB), Radiofrequency Ablations (RFA) and Facet cyst rupture/aspiration:  Facet Joint Interventions are considered medically reasonable and necessary for the diagnosis and treatment of chronic pain in patients who meet ALL the following criteria:  1. Moderate to severe chronic neck or low back pain, predominantly axial, that causes functional deficit measured on pain or disability scale*  2. Pain present for minimum of 3 months with documented failure to respond to noninvasive conservative management (as tolerated)  3. Absence of untreated radiculopathy or neurogenic claudication (except for radiculopathy caused by facet joint synovial cyst)  4. There is no non-facet pathology per clinical assessment or radiology studies that could explain the source of the patient's pain, including but not limited to fracture, tumor, infection, or significant deformity.  ? Pain assessment must be performed and documented at baseline, after each diagnostic procedure using the same pain scale for each assessment. A disability scale must also be obtained at baseline to be used for functional assessment (if patient qualifies for treatment).    A. Diagnostic Facet Joint Procedures (IA or MBB):    The primary indication of a diagnostic facet joint procedure is to diagnose whether the patient has facet syndrome. Intraarticular (IA) facet block(s) are considered reasonable and necessary as a diagnostic test only if medial branch blocks (MMB) cannot be performed due to specific documented anatomic restrictions or there is an indication to proceed with therapeutic intraarticular injections. These restrictions must be clearly documented in the medical record  and made available upon request.  Diagnostic procedures should be performed with the intent that if successful, radiofrequency ablation (RFA) procedure would be considered the primary treatment goal at the diagnosed level(s).  A second diagnostic facet procedure is considered medically necessary to confirm validity of the initial diagnostic facet procedure when administered at the same level. The second diagnostic procedure may only be performed a minimum of 2 weeks after the initial diagnostic procedure. Clinical circumstances that necessitate an exception to the two-week duration may be considered on an individual basis and must be clearly documented in the medical record.  For the first diagnostic facet joint procedure:    a) For the first diagnostic facet joint procedure to be considered medically reasonable and necessary, the patient must meet the criteria outlined under indications for facet joint interventions.  b) A second confirmatory diagnostic facet joint procedure is considered medically reasonable and necessary in patients who meet ALL the following criteria:  i) The patient meets the criteria for the first diagnostic procedure; AND  ii) After the first diagnostic facet joint procedure, there must be a consistent positive response of at least 80% relief of primary (index) pain (with the duration of relief being consistent with the agent used)  Frequency limitation: For each covered spinal region no more than four (4) diagnostic joint sessions will be reimbursed per rolling 12 months, in recognition that the pain generator cannot always be identified with the initial and confirmatory diagnostic procedure.         Okay to schedule CMBB #2        Claudette WETZEL    [facility]

## 2022-08-03 ENCOUNTER — THERAPY VISIT (OUTPATIENT)
Dept: PHYSICAL THERAPY | Facility: CLINIC | Age: 57
End: 2022-08-03
Payer: COMMERCIAL

## 2022-08-03 DIAGNOSIS — M54.2 NECK PAIN: Primary | ICD-10-CM

## 2022-08-03 PROCEDURE — 97112 NEUROMUSCULAR REEDUCATION: CPT | Mod: GP | Performed by: PHYSICAL THERAPIST

## 2022-08-03 PROCEDURE — 97110 THERAPEUTIC EXERCISES: CPT | Mod: GP | Performed by: PHYSICAL THERAPIST

## 2022-08-03 NOTE — TELEPHONE ENCOUNTER
Screening questions for MBB Injections:    Injection to be done at which interventional clinic site? [facility]     Instruct patient to arrive as directed prior to the scheduled appointment time:    [place] and [place]: 30 minutes before      Procedure ordered by Dr. Vega    Procedure ordered? Cervical Medial Branch Block    What insurance would patient like us to bill for this procedure? [facility]      MEDICA: REQUIRES A PA FOR BOTH MBB     Worker's comp- Any injection DO NOT SCHEDULE and route to Oksana Hall.      [employer] insurance - If scheduling an SI joint injection DO NOT SCHEDULE and route to Claudette Ruby.       MBBs must be scheduled with elapsed time interval of at least 2 weeks and not more than 6 months between the First MBB and the Second MBB for insurance purposes       [employer] - Any injection besides hip/shoulder/knee joint DO NOT SCHEDULE and route to Claudette Ruby. She will obtain PA and call pt back to schedule procedure or notify pt of denial.       [employer]- PA required for all MBB's      **BCBS- ALL need to be routed to Claudette for review if a PA is needed**    IF SCHEDULING IN [place] AND NEEDS A PA, IT IS OKAY TO SCHEDULE. [place] HANDLES THEIR OWN PA'S AFTER THE PATIENT IS SCHEDULED. PLEASE SCHEDULE AT LEAST 1 WEEK OUT SO A PA CAN BE OBTAINED.    Any chance of pregnancy? NO   If YES, do NOT schedule and route to RN pool    Is an  needed? No     Patient has a drive home? (mandatory) Yes     Is patient taking any blood thinners (plavix, coumadin, jantoven, warfarin, heparin, pradaxa or dabigatran )? No    If hold needed, do NOT schedule, route to RN pool     Is patient taking any aspirin products? No     If more than 325mg/day, OK to schedule; Instruct pt to decrease to less than 325 mg for 7 days AND route to RN pool    For CERVICAL procedures, hold all aspirin products for 6 days.     Tell pt that if aspirin product is not held for 6 days, the  procedure WILL BE cancelled.      Does the patient have a bleeding or clotting disorder? No    If YES, okay to schedule AND route to RN nurse pool    **For any patients with platelet count <100, must be forwarded to provider**    Is patient diabetic? YES If YES, have them bring their glucometer.    Does patient have an active infection or treated for one within the past week? No    Is patient currently taking any antibiotics?  No    For patients on chronic, preventative, or prophylactic antibiotics, procedures may be scheduled.     For patients on antibiotics for active or recent infection:antibiotic course must have been completed for 4 days    Is patient currently taking any steroid medications? (i.e. Prednisone, Medrol)  No     For patients on steroid medications, course must have been completed for 4 days    Is patient actively being treated for cancer or immunocompromised? No   If YES, do NOT schedule and route to RN    Are you able to get on and off an exam table with minimal or no assistance? Yes   If NO, do NOT schedule and route to RN    Are you able to roll over and lay on your stomach with minimal or no assistance? Yes   If NO, do NOT schedule and route to RN    Any allergies to contrast dye, iodine, shellfish, or numbing and steroid medications? No  (If so, inform nursing and note in scheduling comments.)    Allergies: Novolog [insulin aspart], Humalog [insulin lispro], and Zyrtec [cetirizine]     Does patient have an MRI/CT?  Not Applicable  Check Procedure Scheduling Grid to see if required.      Was the MRI done within the last 3 years?  NA    If yes, [facility], [facility], [facility] etc?       If no, do not schedule and route to nursing    If MRI was not done at [facility], [facility] or [facility] do NOT schedule and route to nursing.      If pt has an imaging disc, the injection MAY be scheduled but pt has to bring disc to appt.     If they show up without  the disc the injection cannot be done      Medial Branch Block Pre-Procedure Instructions    It is okay to take long acting pain medications (if you are on them) the day of the procedure but try not to take any short acting medications unless absolutely necessary.    YES: ok   Long acting meds would include: Gabapentin (Neurontin), MS Contin, Oxycontin        Short acting meds would include:  Percocet, Oxycodone, Vicodin, Ibuprofen     The day of the procedure, you should try to do things that provoke your pain, since the injection is being done to see if it will relieve your pain . YES: ok     If your pain level is a 4 out of 10 or less on the day of the procedu re, please call [phone] to reschedule.  YES: ok     Reminders:      If you are started on any steroids or antibiotics between now and your appointment, you must contact us because it may affect our ability to perform your procedure ok      Instructed pt to arrive 30 minutes early for IV start if required. (Check Procedure Scheduling Grid) IYES: ok       If this is for a cervical MBB aspirin needs to be held for 6 days.  NO       Do not schedule procedures requiring IV placement in the first appointment of the day or first appointment after lunch. Do NOT schedule at 0745, 0815 or 1245.  ok      For patients 85 or older we recommend having an adult stay w/ them for the remainder of the day.      Does the patient have any questions? no

## 2022-08-03 NOTE — TELEPHONE ENCOUNTER
LVM to schedule Okay to schedule CMBB #2      Oksana Hall    [facility] [place]

## 2022-08-10 ENCOUNTER — LAB (OUTPATIENT)
Dept: LAB | Facility: OTHER | Age: 57
End: 2022-08-10
Payer: COMMERCIAL

## 2022-08-10 DIAGNOSIS — I10 HYPERTENSION GOAL BP (BLOOD PRESSURE) < 140/90: ICD-10-CM

## 2022-08-10 DIAGNOSIS — E06.3 HYPOTHYROIDISM DUE TO HASHIMOTO'S THYROIDITIS: ICD-10-CM

## 2022-08-10 LAB
ALBUMIN SERPL-MCNC: 3.4 G/DL (ref 3.4–5)
ALP SERPL-CCNC: 51 U/L (ref 40–150)
ALT SERPL W P-5'-P-CCNC: 14 U/L (ref 0–50)
ANION GAP SERPL CALCULATED.3IONS-SCNC: 3 MMOL/L (ref 3–14)
AST SERPL W P-5'-P-CCNC: 14 U/L (ref 0–45)
BILIRUB SERPL-MCNC: 0.4 MG/DL (ref 0.2–1.3)
BUN SERPL-MCNC: 13 MG/DL (ref 7–30)
CALCIUM SERPL-MCNC: 8.6 MG/DL (ref 8.5–10.1)
CHLORIDE BLD-SCNC: 107 MMOL/L (ref 94–109)
CO2 SERPL-SCNC: 28 MMOL/L (ref 20–32)
CREAT SERPL-MCNC: 0.79 MG/DL (ref 0.52–1.04)
GFR SERPL CREATININE-BSD FRML MDRD: 87 ML/MIN/1.73M2
GLUCOSE BLD-MCNC: 98 MG/DL (ref 70–99)
POTASSIUM BLD-SCNC: 3.9 MMOL/L (ref 3.4–5.3)
PROT SERPL-MCNC: 6.4 G/DL (ref 6.8–8.8)
SODIUM SERPL-SCNC: 138 MMOL/L (ref 133–144)
T4 FREE SERPL-MCNC: 1.38 NG/DL (ref 0.76–1.46)
TSH SERPL DL<=0.005 MIU/L-ACNC: 0.11 MU/L (ref 0.4–4)

## 2022-08-10 PROCEDURE — 80053 COMPREHEN METABOLIC PANEL: CPT

## 2022-08-10 PROCEDURE — 36415 COLL VENOUS BLD VENIPUNCTURE: CPT

## 2022-08-10 PROCEDURE — 84439 ASSAY OF FREE THYROXINE: CPT

## 2022-08-10 PROCEDURE — 84443 ASSAY THYROID STIM HORMONE: CPT

## 2022-08-17 ENCOUNTER — THERAPY VISIT (OUTPATIENT)
Dept: PHYSICAL THERAPY | Facility: CLINIC | Age: 57
End: 2022-08-17
Payer: COMMERCIAL

## 2022-08-17 DIAGNOSIS — M54.2 NECK PAIN: Primary | ICD-10-CM

## 2022-08-17 PROCEDURE — 97112 NEUROMUSCULAR REEDUCATION: CPT | Mod: GP | Performed by: PHYSICAL THERAPIST

## 2022-08-17 PROCEDURE — 97530 THERAPEUTIC ACTIVITIES: CPT | Mod: GP | Performed by: PHYSICAL THERAPIST

## 2022-08-17 PROCEDURE — 97110 THERAPEUTIC EXERCISES: CPT | Mod: GP | Performed by: PHYSICAL THERAPIST

## 2022-08-30 ENCOUNTER — RADIOLOGY INJECTION OFFICE VISIT (OUTPATIENT)
Dept: PALLIATIVE MEDICINE | Facility: CLINIC | Age: 57
End: 2022-08-30
Payer: COMMERCIAL

## 2022-08-30 VITALS
DIASTOLIC BLOOD PRESSURE: 79 MMHG | SYSTOLIC BLOOD PRESSURE: 159 MMHG | HEART RATE: 74 BPM | RESPIRATION RATE: 16 BRPM | OXYGEN SATURATION: 99 %

## 2022-08-30 DIAGNOSIS — M47.812 SPONDYLOSIS OF CERVICAL REGION WITHOUT MYELOPATHY OR RADICULOPATHY: ICD-10-CM

## 2022-08-30 PROCEDURE — 64491 INJ PARAVERT F JNT C/T 2 LEV: CPT | Mod: LT | Performed by: PAIN MEDICINE

## 2022-08-30 PROCEDURE — 64490 INJ PARAVERT F JNT C/T 1 LEV: CPT | Mod: LT | Performed by: PAIN MEDICINE

## 2022-08-30 ASSESSMENT — PAIN SCALES - GENERAL: PAINLEVEL: EXTREME PAIN (9)

## 2022-08-30 NOTE — NURSING NOTE
Discharge Information    IV Discontiued Time:  NA    Amount of Fluid Infused:  NA    Discharge Criteria = When patient returns to baseline or as per MD order    Consciousness:  Pt is fully awake    Circulation:  BP +/- 20% of pre-procedure level    Respiration:  Patient is able to breathe deeply    O2 Sat:  Patient is able to maintain O2 Sat >92% on room air    Activity:  Moves 4 extremities on command    Ambulation:  Patient is able to stand and walk or stand and pivot into wheelchair    Dressing:  Clean/dry or No Dressing    Notes:   Discharge instructions and AVS given to patient    Patient meets criteria for discharge?  YES    Admitted to PCU?  No    Responsible adult present to accompany patient home?  Yes    Signature/Title:    Ruben Lui RN  RN Care Coordinator  [facility] [place]

## 2022-08-30 NOTE — NURSING NOTE
Pre-procedure Intake  If YES to any questions or NO to having a   Please complete laminated checklist and leave on the computer keyboard for Provider, verbally inform provider if able.    For SCS Trial, RFA's or any sedation procedure:  Have you been fasting? NA    If yes, for how long?     Are you taking any any blood thinners such as Coumadin, Warfarin, Jantoven, Pradaxa Xarelto, Eliquis, Edoxaban, Enoxaparin, Lovenox, Heparin, Arixtra, Fondaparinux, or Fragmin? OR Antiplatelet medication such as Plavix, Brilinta, or Effient?   No     If yes, when did you take your last dose?     Do you take aspirin?  No    If cervical procedure, have you held aspirin for 6 days?       Do you have any allergies to contrast dye, iodine, steroid and/or numbing medications?  NO    Are you currently taking antibiotics or have an active infection?  NO    Have you had a fever/elevated temperature within the past week? NO    Are you currently taking oral steroids? NO    Do you have a ? Yes    Are you pregnant or breastfeeding?  NO    Have you received the COVID-19 vaccine? No     If yes, was it your 1st, 2nd or only dose needed?     Date of most recent vaccine:     Notify provider and RNs if systolic BP >170, diastolic BP >100, P >100 or O2 sats < 90%

## 2022-08-30 NOTE — PATIENT INSTRUCTIONS
[facility]   Medial Branch Block Discharge Instructions      Your procedure was performed by: Dr. Corona Vega       Medications used include:  Lidocaine  Bupivicaine      You will need to complete the Pain Scale Log form and return it to us as soon as possible.  Once we have received the form, we will review it and call you to determine the next steps.     The form can be faxed to [phone] or mailed to:   [facility]   [address]   [place] [address]    You may resume your regular medications  If you were holding your blood thinning medication, please restart taking it: N/A  You may resume your regular activities  Be cautious with walking as numbness and/or weakness in the lower extremities may occur for up to 6-8 hours due to the effects of the anesthetic.  Avoid driving for 6 hours. The local anesthetic could slow your reflexes.   You may shower, however no swimming or tub baths or hot tubs for 24 hours following your procedure.  Your pain will return after the numbing medications have worn off.  You may use your current pain medications as needed.  Unless you have been directed to avoid the use of anti-inflammatory medications (NSAIDS), you may use medications such as ibuprofen, Aleve or Tylenol for pain control if needed.  Some people find it helpful to alternate ibuprofen and Tylenol every 3 hours for a couple of days.  You may use ice packs 10-15 minutes three to four times a day at the injection site for comfort.   Do not use heat to painful areas for 6 to 8 hours. This will give the local anesthetic time to wear off and prevent you from accidentally burning your skin.   If you experience any of the following, call the Pain Clinic during work hours (Monday through Friday 8 am-4:30 pm) at [phone] or the Provider Line after hours at [phone]:  -Fever over 100 degree F  -Swelling, bleeding, redness, drainage, warmth at the  injection site  -Progressive weakness or numbness in your legs or arms  -If lumbar, call if you have a loss of bowel or bladder function  -If cervical, call if you have any unusual headache that is not relieved by Tylenol  -Unusual new onset of pain that is not improving

## 2022-08-31 ENCOUNTER — THERAPY VISIT (OUTPATIENT)
Dept: PHYSICAL THERAPY | Facility: CLINIC | Age: 57
End: 2022-08-31
Payer: COMMERCIAL

## 2022-08-31 DIAGNOSIS — M54.2 NECK PAIN: Primary | ICD-10-CM

## 2022-08-31 PROCEDURE — 97110 THERAPEUTIC EXERCISES: CPT | Mod: GP | Performed by: PHYSICAL THERAPIST

## 2022-08-31 PROCEDURE — 97112 NEUROMUSCULAR REEDUCATION: CPT | Mod: GP | Performed by: PHYSICAL THERAPIST

## 2022-08-31 NOTE — PROGRESS NOTES
Pre procedure Diagnosis: Cervical spondylosis   Post procedure Diagnosis: Same  Procedure performed: cervical medial branch block Left c5,6,7  Anesthesia: none  Complications: none  Operators: Corona Vega MD    Indications:   Maria M Marcus is a 57 year old female. The patient has a history of axial neck pain l>R.  Exam shows pain with extension/rotation  and they have tried conservative treatment including meds/pt.    Options/alternatives, benefits and risks were discussed with the patient including but not limited to bleeding, infection, tissue trauma, exposure to radiation, reaction to medications, spinal cord injury, weakness, numbness and paralysis.  Questions were answered to her satisfaction and she wishes to proceed. Voluntary informed consent was obtained and signed.     Vitals were reviewed: Yes  Allergies were reviewed:  Yes   Medications were reviewed:  Yes   Pre-procedure pain score: 9/10    Procedure:  After obtaining signed, informed consent, the patient was taken to the procedure room and placed in the prone position on the [facility] frame.  A Pause for the Cause was completed prior to procedure start.  The patient was prepped and draped in the usual sterile fashion.    The areas of interest were identified with fluoroscopy.  The skin and subcutaneous tissue were anesthetized by injecting Lidocaine 1% 1 ml at each injection site utilizing a 27 gauge 1.25 inch needle.  Then,  25 gauge 3.5 inch spinal needles with slightly curved tips were advanced tangential to the medial branch nerves, abutting the articular pillars at left C5,6,7 utilizing AP and Lateral fluoroscopic guidance.  Aspiration for blood and CSF was negative at all the levels.    Then, after repeated negative aspiration, each level was injected with 0.5 ml of 0.25% bupivacaine and the needles were restyletted and withdrawn.    The injection sites were cleaned and sterile dressings were applied where indicated.    The patient  tolerated the procedure well without complications and was taken to the recovery area for continued observation.  Fluoroscopic images were saved to PACS.    The patient was re-evaluated following the procedure and they noted decreased pain and improved range of motion.    Post-procedure pain:  7/10    The patient was given a pain diary and instructed to document their pain throughout the day.  The patient was asked to return the pain diary the next day in person or by fax at which time it will be reviewed and the decision made whether or not to proceed .    Follow-up:  Pending results    Corona Vega MD  [facility]

## 2022-09-02 ENCOUNTER — MYC MEDICAL ADVICE (OUTPATIENT)
Dept: PALLIATIVE MEDICINE | Facility: CLINIC | Age: 57
End: 2022-09-02

## 2022-09-02 DIAGNOSIS — Z20.822 ENCOUNTER FOR LABORATORY TESTING FOR COVID-19 VIRUS: Primary | ICD-10-CM

## 2022-09-02 NOTE — TELEPHONE ENCOUNTER
Pt had a left Cervical  medial branch block # 2 on 8/30/22.  The post medial branch block form was  received.      Max % of pain relief from medial branch block #1:           Pt had significant on the day of the block. (80 to 100%)    Max relief from block #2 is:     Max relief from the block: per pt via DropMatt she had 90% relief on procedure day    Physical therapy:      Last done in?:  8/2022.     How many sessions?:  4.      Where was it done?  [facility]     Fax the pain diary to Claudette.   not done The pain diary was not sent to Claudette.  Will use pt's verbal note that she received 90% relief. The diary was placed in the bin to be scanned into epic.  _________________________________    Matchalarmhart sent to pt:  From: Demetra Hendricks RN      Created: 9/2/2022 9:46 AM        Kris Franz,  Your pain diary from the block was received.  Just want to double check before submitting to your insurance.       What is the max % of relief you had on the day of the block?             AYAKA Mccrary-BSN  [facility]

## 2022-09-06 ENCOUNTER — DOCUMENTATION ONLY (OUTPATIENT)
Dept: FAMILY MEDICINE | Facility: OTHER | Age: 57
End: 2022-09-06

## 2022-09-06 ENCOUNTER — LAB (OUTPATIENT)
Dept: LAB | Facility: OTHER | Age: 57
End: 2022-09-06
Payer: COMMERCIAL

## 2022-09-06 DIAGNOSIS — E06.3 HYPOTHYROIDISM DUE TO HASHIMOTO'S THYROIDITIS: ICD-10-CM

## 2022-09-06 DIAGNOSIS — E10.9 TYPE 1 DIABETES MELLITUS WITHOUT COMPLICATION (H): ICD-10-CM

## 2022-09-06 DIAGNOSIS — E10.9 TYPE 1 DIABETES MELLITUS WITHOUT COMPLICATION (H): Primary | ICD-10-CM

## 2022-09-06 DIAGNOSIS — I10 HYPERTENSION GOAL BP (BLOOD PRESSURE) < 140/90: ICD-10-CM

## 2022-09-06 LAB
ANION GAP SERPL CALCULATED.3IONS-SCNC: 3 MMOL/L (ref 3–14)
BUN SERPL-MCNC: 14 MG/DL (ref 7–30)
CALCIUM SERPL-MCNC: 8.5 MG/DL (ref 8.5–10.1)
CHLORIDE BLD-SCNC: 106 MMOL/L (ref 94–109)
CO2 SERPL-SCNC: 28 MMOL/L (ref 20–32)
CREAT SERPL-MCNC: 0.94 MG/DL (ref 0.52–1.04)
GFR SERPL CREATININE-BSD FRML MDRD: 70 ML/MIN/1.73M2
GLUCOSE BLD-MCNC: 170 MG/DL (ref 70–99)
HBA1C MFR BLD: 6 % (ref 0–5.6)
POTASSIUM BLD-SCNC: 4.5 MMOL/L (ref 3.4–5.3)
SODIUM SERPL-SCNC: 137 MMOL/L (ref 133–144)
T4 FREE SERPL-MCNC: 1.36 NG/DL (ref 0.76–1.46)
TSH SERPL DL<=0.005 MIU/L-ACNC: 0.27 MU/L (ref 0.4–4)

## 2022-09-06 PROCEDURE — 83036 HEMOGLOBIN GLYCOSYLATED A1C: CPT

## 2022-09-06 PROCEDURE — 36415 COLL VENOUS BLD VENIPUNCTURE: CPT

## 2022-09-06 PROCEDURE — 84443 ASSAY THYROID STIM HORMONE: CPT

## 2022-09-06 PROCEDURE — 80048 BASIC METABOLIC PNL TOTAL CA: CPT

## 2022-09-06 PROCEDURE — 84439 ASSAY OF FREE THYROXINE: CPT

## 2022-09-06 NOTE — PROGRESS NOTES
Patient is coming in for labs and would also lke her a1c checked. If you would like this test done please place future orders.

## 2022-09-07 ENCOUNTER — THERAPY VISIT (OUTPATIENT)
Dept: PHYSICAL THERAPY | Facility: CLINIC | Age: 57
End: 2022-09-07
Payer: COMMERCIAL

## 2022-09-07 DIAGNOSIS — M54.2 NECK PAIN: Primary | ICD-10-CM

## 2022-09-07 PROCEDURE — 97110 THERAPEUTIC EXERCISES: CPT | Mod: GP | Performed by: PHYSICAL THERAPIST

## 2022-09-07 PROCEDURE — 97112 NEUROMUSCULAR REEDUCATION: CPT | Mod: GP | Performed by: PHYSICAL THERAPIST

## 2022-09-07 NOTE — TELEPHONE ENCOUNTER
Pt reported 90% relief on the day of block #2.  Please check insurance.      Demetra RN-BSN  [facility]

## 2022-09-08 DIAGNOSIS — Z79.890 HORMONE REPLACEMENT THERAPY (HRT): ICD-10-CM

## 2022-09-08 NOTE — TELEPHONE ENCOUNTER
Decision ID #:[id number]    [place] [facility] Policy- No PA required      Coverage Guidance-No PA required        C. Facet Joint Denervation:    The thermal radiofrequency destruction of cervical, thoracic, or lumbar paravertebral facet joint (medial branch) nerves are considered medically reasonable and necessary for patients who meet ALL the following criteria:  a. Initial thermal RFA:  i. After the patient has had at least two (2) medically reasonable and necessary diagnostic MBBs, with each one providing a consistent minimum of 80% sustained relief of primary (index) pain (with the duration of relief being consistent with the agent used)       OKAY TO SCHEDULE      Claudette WETZEL    [facility]

## 2022-09-08 NOTE — TELEPHONE ENCOUNTER
COVID test ordered.    Demetra Hendricks, RN-BSN  [facility]

## 2022-09-08 NOTE — TELEPHONE ENCOUNTER
Screening Questions for RFA Procedure      Procedure ordered? L CRFA    What insurance are we billing for this procedure?  [employer]  IF SCHEDULING IN [place], IT IS OKAY TO SCHEDULE. [place] HANDLES THEIR OWN PA'S AFTER THE PATIENT IS SCHEDULED. PLEASE SCHEDULE AT LEAST 1 WEEK OUT SO A PA CAN BE OBTAINED.    Has patient had this injection before? No  This procedure requires that a COVID-19 lab test be done; Either a PCR test from [facility] or a Rapid Home Antigen test.  What test would Pt like to do?  Rapid Home Antigen Test    If PCR test from [facility] - Pt must have within 4 days of procedure, let patient know that someone will call them to schedule the COVID-19 test and that they will only receive a call back if the result is positive. Route to nursing to enter order.     If Rapid Home Antigen Test - Pt must have within 2 days of procedure and bring a picture of the negative test with them to the appointment.  Any chance of pregnancy? NO   If YES, do NOT schedule and route to RN pool     Is  Needed?: No  Will patient have a ?  Yes   If pt is given sedation meds, no driving for 24 hours.  Is pt taking a cab or transportation service? NO        If so will need to be accompanied by an adult too (friend/family member) in order for IV sedation to be given.      Per [place] Policy:  Outpatients are to have responsible adult or family member to accompany them at discharge and drive them home. A service providing medically trained drivers or attendants would be acceptable. Public transportation would not be acceptable unless the patient is accompanied by a responsible adult or family member.  Is patient taking any blood thinners (i.e. plavix, coumadin, jantoven, warfarin, heparin, pradaxa or dabigatran, etc)? No   If YES, do NOT schedule, and route to RN pool    Is patient taking any aspirin products? No     If more than 325mg/day, OK to schedule; Instruct pt to decrease to less than 325 mg  for 7 days AND route to RN pool    For CERVICAL procedures, hold all aspirin products for 6 days.     Tell pt that if aspirin product is not held for 6 days, the procedure WILL BE cancelled.      Does the patient have a bleeding or clotting disorder? No     If YES, it it OKAY to schedule AND route to RN pool    **For any patients with platelet count <100, must be forwarded to provider**    Is patient diabetic? Yes If YES, have them bring their glucometer.    Does patient have an active infection or treated for one within the past week? No   If YES, do NOT schedule and route to RN nurse pool     Is patient currently taking any antibiotics?  No    For patients on chronic, preventative, or prophylactic antibiotics, procedures may be scheduled.     For patients on antibiotics for active or recent infection:antibiotic course must have been completed for 4 days    Is patient currently taking any steroid medications? (i.e. Prednisone, Medrol)  No     For patients on steroid medications, course must have been completed for 4 days    Is patient actively being treated for cancer or immunocompromised, including the spleen having been removed? No  If YES, do NOT schedule and route to RN pool     Any history of complications with sedation medications?  NO   If YES, OK to schedule AND route to RN pool     Any history of sleep apnea?  YES   If YES, OK to schedule AND route to RN pool     Any cardiac history?  NO   If YES, OK to schedule AND route to RN pool     Do you have an implanted pacemaker, ICD (implanted cardiac device) or AICD (automatic implanted cardiac device)?  NO    If YES, do NOT schedule AND route to RN pool.     Obtain name of device :       Obtain name of cardiologist:       Do you have an implanted stimulator?  NO    If YES, OK to schedule AND route to nursing.     Instruct patient to bring in the remote to the appointment and it will need to be turned off.  reviewed      Does patient have an allergy  to contrast dye, iodine or shellfish?  No   If YES, OK to schedule. Route to RN pool AND add allergy information to appointment notes    Are you able to get on and off an exam table with minimal or no assistance? Yes   If NO, do NOT schedule and route to RN pool    Are you able to roll over and lay on your stomach with minimal or no assistance? Yes   If NO, do NOT schedule and route to RN pool    Reminders:    If you are started on any steroids or antibiotics between now and your appointment, you must contact us because it may affect our ability to perform your procedure.  Yes    Informed patient that s/he needs to fast for 6 hours before procedure?  YES    Informed patient that it is OK to take normal medications with sips of water, especially blood pressure medications, before the procedure and must hold blood thinners as instructed.  Yes    Informed patient to arrive 30 minutes before procedure time to have an IV inserted.  reviewed   Do NOT schedule at 0745, 0815 or 1245.  reviewed     All radiofrequency ablations are in a 90 minute time slot.  reviewed

## 2022-09-09 RX ORDER — ESTRADIOL 2 MG/1
2 TABLET ORAL DAILY
Qty: 90 TABLET | Refills: 3 | OUTPATIENT
Start: 2022-09-09

## 2022-09-09 NOTE — TELEPHONE ENCOUNTER
"Requested Prescriptions   Pending Prescriptions Disp Refills     estradiol (ESTRACE) 2 MG tablet 90 tablet 3     Sig: Take 1 tablet (2 mg) by mouth daily       Hormone Replacement Therapy Failed - 9/8/2022  5:26 PM        Failed - Blood pressure under 140/90 in past 12 months     BP Readings from Last 3 Encounters:   08/30/22 (!) 159/79   07/20/22 126/64   07/19/22 (!) 152/79                 Passed - Recent (12 mo) or future (30 days) visit within the authorizing provider's specialty     Patient has had an office visit with the authorizing provider or a provider within the authorizing providers department within the previous 12 mos or has a future within next 30 days. See \"Patient Info\" tab in inbasket, or \"Choose Columns\" in Meds & Orders section of the refill encounter.              Passed - Patient has mammogram in past 2 years on file if age 50-75        Passed - Medication is active on med list        Passed - Patient is 18 years of age or older        Passed - No active pregnancy on record        Passed - No positive pregnancy test on record in past 12 months           Pt is scheduled for a yearly exam with Dr. Burns on 9/21.    However, due to pt's last BP out of range I am unable to approve a refill to get her to that appointment.  Routing to Dr. Burns to approve if appropriate.    Carmen Merchant RN    "

## 2022-09-09 NOTE — PROGRESS NOTES
Outcome for 09/09/22 12:43 PM: Dexcom reports printed  Marjan Almazan, [facility]  Adult Endocrinology  [facility], [place]    Outcome for 05/16/22 1:10 PM: Data uploaded on Dexcom  Manjeet Hawk [facility]      Endocrinology and Diabetes Clinic    Follow up for T1DM, hypothyroidism     Interval history:  Maria M Marcus aged 57 year old years old woman with type 1 diabetes with retinopathy with comorbidity of vitiligo, hypothyroidism and osteoporosis for follow-up    Insulin  NPH continue 3 units  Lantus 3 units am , and pm   Apidra CR 1:20 all day, in the evening substract 1 units for dinner, or use carb ratio of 25     Uses CR of 20 otherwise during the day    List this hypoglycemia now is resolved.  Patient feels much better with energy.  Also on thyroid hormone levels are now in the normal range and almost over replaced.  This is due to being able to take the medication on empty stomach as she does not need to eat to treat her blood sugars.    Now some early morning hperglycemia       CGM  Has been using Dexcom sensor                  Hypoglycemia- nocturnal related to NPH dosing; notes that she requires less assitance, finds CGM very helpful    Checks blood pressure with cuff at home, typically below 130/85  Menopause started Estradiol    Osteoporosis  Bone density from March 2022 reveals osteoporosis with T score of -2.7 at the right femur neck.  Patient is on alendronate.  Reasonable calcium intake from diet.  Vitamin D levels have been normal.    Assessment:  (E10.649) Type 1 diabetes mellitus with hypoglycemia and without coma (H)  (primary encounter diagnosis)  Middle-aged woman with longstanding type 1 diabetes, low insulin requirements  Much improved glucose control particularly hypoglycemia is resolved    Continue Lantus 3 units daily  Trial increase NPH from 3 to 3.5 units at bedtime  Continue Apidra carb ratio 20 throughout the day and 25 at dinnertime    Hypothyroidism continue with 88 mcg of L T4, recheck, if  TSH still low would decrease to 75 MCG     Dyslipidemia intolerant to Atorvastatin , doing ok on Simvastatin 20 mg daily, cont    Osteoporosis Cont Alendronate, started 3/2022      Continue alendronate 70 mg once a week    Follow-up with me in 4 months    30 minutes spent on the date of the encounter doing chart review, review of test results, interpretation of tests, patient visit and documentation      Lena Bradford MD  Endocrinology and Diabetes  [address]  Pager [phone]  [facility] [address]  Tel [phone]        Medications:   Current Outpatient Medications   Medication Sig Dispense Refill     alendronate (FOSAMAX) 70 MG tablet Take 1 tablet (70 mg) by mouth every 7 days 12 tablet 3     blood glucose (ACCU-CHEK GUIDE) test strip Use to test blood sugar 4-5 times daily or as directed. 200 strip 9     blood glucose monitoring (SOFTCLIX) lancets Use to test blood sugar 4-5 times daily or as directed. 200 each 9     Blood Glucose Monitoring Suppl (ACCU-CHEK GUIDE ME) w/Device KIT 1 each daily To use 4-5 times daily to check blood glucose. 1 kit 1     Calcium Carb-Cholecalciferol (TGT CALCIUM DIETARY SUPPLEMENT PO)        DULoxetine (CYMBALTA) 60 MG capsule Take 1 capsule by mouth once daily 90 capsule 1     estradiol (ESTRACE) 2 MG tablet Take 1 tablet (2 mg) by mouth daily 90 tablet 3     glucosamine-chondroitin 500-400 MG CAPS per capsule Take 1 capsule by mouth daily       HEMP OIL OR EXTRACT OR OTHER CBD CANNABINOID, NOT MEDICAL CANNABIS,        insulin glargine (LANTUS SOLOSTAR) 100 UNIT/ML pen 3 units am and 4 units pm 15 mL 3     insulin glulisine (APIDRA PEN) 100 UNIT/ML soln Inject 2 Units Subcutaneous 3 times daily (before meals) 9 mL 3     insulin NPH (NOVOLIN N FLEXPEN) 100 UNIT/ML injection Take 3 units daily at bedtime. 15 mL 3     insulin pen needle (31G X 5 MM) 31G X 5 MM miscellaneous Use 6-7 pen needles daily or as directed. 300 each 8      levothyroxine (SYNTHROID/LEVOTHROID) 88 MCG tablet Take 1 tablet (88 mcg) by mouth daily 90 tablet 3     lisinopril (ZESTRIL) 20 MG tablet Take 1 tablet by mouth once daily 90 tablet 3     omeprazole (PRILOSEC) 20 MG DR capsule Take 1 capsule by mouth once daily 90 capsule 3     simvastatin (ZOCOR) 20 MG tablet Take 1 tablet (20 mg) by mouth At Bedtime 90 tablet 3     tiZANidine (ZANAFLEX) 2 MG tablet Take 1-2 tablets (2-4 mg) by mouth 3 times daily as needed for muscle spasms Caution sedation. Stop cyclobenzaprine. Don't drive until you know how you feel. 60 tablet 1     Vitamin D, Cholecalciferol, 25 MCG (1000 UT) TABS          Social History:  Social History     Tobacco Use     Smoking status: Never Smoker     Smokeless tobacco: Never Used     Tobacco comment: no exposure   Substance Use Topics     Alcohol use: Yes     Comment: winex1-2/3x per yr.       Family History  FAMILY HISTORY        Physical Examination:    General: Well appearing woman in no distress.   Psych: good eye contact, no pressured speech    Diabetic foot exam:  Inspection normal  Sensation to monofilament intact  Sensation to fibration intact  Skin is intact   Callus formation is ont present  Gait is normal     BP (!) 151/80 (BP Location: Left arm, Patient Position: Sitting, Cuff Size: Adult Regular)   Pulse 73   Wt 68.5 kg (151 lb)   LMP  (LMP Unknown)   SpO2 100%   BMI 31.29 kg/m      Wt Readings from Last 4 Encounters:   09/12/22 68.5 kg (151 lb)   07/20/22 68.6 kg (151 lb 3.2 oz)   05/23/22 69.4 kg (153 lb)   01/24/22 71.2 kg (157 lb)     Labs and Studies:   Lab Results   Component Value Date     09/06/2022    CHLORIDE 106 09/06/2022    CO2 28 09/06/2022     (H) 09/06/2022    CR 0.94 09/06/2022    CR 0.79 08/10/2022    CR 0.98 02/09/2022    CR 0.93 06/30/2021    CR 1.00 06/23/2021    DORIS 8.5 09/06/2022    ALBUMIN 3.4 08/10/2022    ALKPHOS 51 08/10/2022    LDL 93 05/18/2022    HDL 67 05/18/2022    TRIG 95 05/18/2022     Lab  Results   Component Value Date    MICROL 12 02/09/2022    MICROL 8 09/14/2020    MICROL <5 11/08/2019    MICROL <5 04/25/2019    MICROL <5 04/13/2018     Lab Results   Component Value Date    A1C 6.0 (H) 09/06/2022    A1C 6.3 (H) 05/18/2022    A1C 6.3 (A) 01/24/2022    A1C 6.1 (H) 07/07/2021    A1C 6.1 (A) 03/05/2021       Lab Results   Component Value Date    HGB 12.6 05/25/2020     Lab Results   Component Value Date    TSH 0.27 (L) 09/06/2022    TSH 0.11 (L) 08/10/2022    TSH 17.86 (H) 05/18/2022    TSH 46.82 (H) 04/19/2022    TSH 75.67 (H) 03/07/2022     Lab Results   Component Value Date    DORIS 8.5 09/06/2022    DORIS 8.6 08/10/2022    DORIS 8.5 06/30/2021    DORIS 8.9 06/23/2021    ALBUMIN 3.4 08/10/2022    ALT 14 08/10/2022    PHOS 3.3 11/08/2019    PTHI 43 04/19/2022    AST 14 08/10/2022    BILITOTAL 0.4 08/10/2022    CR 0.94 09/06/2022    CR 0.79 08/10/2022    CR 0.98 02/09/2022     09/06/2022    TSH 0.27 (L) 09/06/2022    ALKPHOS 51 08/10/2022    HGB 12.6 05/25/2020     25OHVitD 41 4/19/22        Complications  1. Retinopathy: yes - hemorrhage 9/2020  2. Nephropathy: no  3. Neuropathy: no  4. Hypoglycemia: yes   5. Macrovascular:  No  .     She would like to use half units, however is allergic to NovoLog and Humalog and has been using Apidra  She cannot use an insulin pump because of Apidra clogging the tubes as she has such low needs    Results for orders placed in visit on 03/09/22    Dexa hip/pelvis/spine    Narrative  HISTORY: Type 1 diabetes mellitus without complication (H); Collapsed  vertebra, not elsewhere classified, thoracolumbar region, initial  encounter for fracture (H)    COMPARISON:   none    [age]  years[age]  Height: 59 inches  Weight: 157 pounds  Sex: Female  Ethnicity: White    Image quality: Adequate    Lumbar spine T-score in region of L1, L4 = -1.2    HIPS:  Mean total hip T-score: -1    Left femoral neck T-score = -2  Right femoral neck T-score= -2.7    Radius 33% T-score =  NA    FRAX:  10 year probability of major osteoporotic fracture: 19.5%  10 year probability of hip fracture: 2.5%  The 10 year probability of fracture may be lower than reported if the  patient has received treatment. FRAX data should be disregarded in  patient's taking bisphosphonates.    World Health Organization definition of osteoporosis and osteopenia  for  women:  Normal: T-score at or above -1.0  Low Bone Mass (Osteopenia): T-score between -1.0 and -2.5.  Osteoporosis: T-score at or below -2.5  T-scores are reported for postmenopausal women and men over 50 years  of age.    Impression  IMPRESSION:  Osteoporosis. Follow up recommended.    TOREY GARAY MD      SYSTEM ID:  [id number]

## 2022-09-12 ENCOUNTER — OFFICE VISIT (OUTPATIENT)
Dept: ENDOCRINOLOGY | Facility: CLINIC | Age: 57
End: 2022-09-12
Payer: COMMERCIAL

## 2022-09-12 VITALS
DIASTOLIC BLOOD PRESSURE: 80 MMHG | HEART RATE: 73 BPM | BODY MASS INDEX: 31.29 KG/M2 | SYSTOLIC BLOOD PRESSURE: 151 MMHG | WEIGHT: 151 LBS | OXYGEN SATURATION: 100 %

## 2022-09-12 DIAGNOSIS — E10.9 TYPE 1 DIABETES MELLITUS WITHOUT COMPLICATION (H): ICD-10-CM

## 2022-09-12 DIAGNOSIS — E10.65 TYPE 1 DIABETES MELLITUS WITH HYPERGLYCEMIA (H): ICD-10-CM

## 2022-09-12 PROCEDURE — 99214 OFFICE O/P EST MOD 30 MIN: CPT | Performed by: INTERNAL MEDICINE

## 2022-09-12 RX ORDER — INSULIN GLARGINE 100 [IU]/ML
INJECTION, SOLUTION SUBCUTANEOUS
Qty: 15 ML | Refills: 3 | Status: CANCELLED | OUTPATIENT
Start: 2022-09-12

## 2022-09-12 RX ORDER — HUMAN INSULIN 100 [IU]/ML
INJECTION, SUSPENSION SUBCUTANEOUS
Qty: 15 ML | Refills: 3 | Status: CANCELLED | OUTPATIENT
Start: 2022-09-12

## 2022-09-12 NOTE — LETTER
9/12/2022         RE: Maria M Marcus  [address] [place] [address]        Dear Colleague,    Thank you for referring your patient, Maria M Marcus, to the [facility]. Please see a copy of my visit note below.    Outcome for 09/09/22 12:43 PM: Dexcom reports printed  Marjan Almazan [facility]  Adult Endocrinology  [facility], [place]    Outcome for 05/16/22 1:10 PM: Data uploaded on Dexcom  Manjeet Hawk [facility]      Endocrinology and Diabetes Clinic    Follow up for T1DM, hypothyroidism     Interval history:  Maria M Marcus aged 57 year old years old woman with type 1 diabetes with retinopathy with comorbidity of vitiligo, hypothyroidism and osteoporosis for follow-up    Insulin  NPH continue 3 units  Lantus 3 units am , and pm   Apidra CR 1:20 all day, in the evening substract 1 units for dinner, or use carb ratio of 25     Uses CR of 20 otherwise during the day    List this hypoglycemia now is resolved.  Patient feels much better with energy.  Also on thyroid hormone levels are now in the normal range and almost over replaced.  This is due to being able to take the medication on empty stomach as she does not need to eat to treat her blood sugars.    Now some early morning hperglycemia       CGM  Has been using Dexcom sensor                  Hypoglycemia- nocturnal related to NPH dosing; notes that she requires less assitance, finds CGM very helpful    Checks blood pressure with cuff at home, typically below 130/85  Menopause started Estradiol    Osteoporosis  Bone density from March 2022 reveals osteoporosis with T score of -2.7 at the right femur neck.  Patient is on alendronate.  Reasonable calcium intake from diet.  Vitamin D levels have been normal.    Assessment:  (E10.649) Type 1 diabetes mellitus with hypoglycemia and without coma (H)  (primary encounter diagnosis)  Middle-aged woman with longstanding type 1 diabetes, low insulin requirements  Much improved glucose  control particularly hypoglycemia is resolved    Continue Lantus 3 units daily  Trial increase NPH from 3 to 3.5 units at bedtime  Continue Apidra carb ratio 20 throughout the day and 25 at dinnertime    Hypothyroidism continue with 88 mcg of L T4, recheck, if TSH still low would decrease to 75 MCG     Dyslipidemia intolerant to Atorvastatin , doing ok on Simvastatin 20 mg daily, cont    Osteoporosis Cont Alendronate, started 3/2022      Continue alendronate 70 mg once a week    Follow-up with me in 4 months    30 minutes spent on the date of the encounter doing chart review, review of test results, interpretation of tests, patient visit and documentation      Lena Bradford MD  Endocrinology and Diabetes  [facility]  [address]  Pager [phone]  [facility] [address]  Tel [phone]        Medications:   Current Outpatient Medications   Medication Sig Dispense Refill     alendronate (FOSAMAX) 70 MG tablet Take 1 tablet (70 mg) by mouth every 7 days 12 tablet 3     blood glucose (ACCU-CHEK GUIDE) test strip Use to test blood sugar 4-5 times daily or as directed. 200 strip 9     blood glucose monitoring (SOFTCLIX) lancets Use to test blood sugar 4-5 times daily or as directed. 200 each 9     Blood Glucose Monitoring Suppl (ACCU-CHEK GUIDE ME) w/Device KIT 1 each daily To use 4-5 times daily to check blood glucose. 1 kit 1     Calcium Carb-Cholecalciferol (TGT CALCIUM DIETARY SUPPLEMENT PO)        DULoxetine (CYMBALTA) 60 MG capsule Take 1 capsule by mouth once daily 90 capsule 1     estradiol (ESTRACE) 2 MG tablet Take 1 tablet (2 mg) by mouth daily 90 tablet 3     glucosamine-chondroitin 500-400 MG CAPS per capsule Take 1 capsule by mouth daily       HEMP OIL OR EXTRACT OR OTHER CBD CANNABINOID, NOT MEDICAL CANNABIS,        insulin glargine (LANTUS SOLOSTAR) 100 UNIT/ML pen 3 units am and 4 units pm 15 mL 3     insulin glulisine (APIDRA PEN) 100 UNIT/ML soln Inject 2 Units  Subcutaneous 3 times daily (before meals) 9 mL 3     insulin NPH (NOVOLIN N FLEXPEN) 100 UNIT/ML injection Take 3 units daily at bedtime. 15 mL 3     insulin pen needle (31G X 5 MM) 31G X 5 MM miscellaneous Use 6-7 pen needles daily or as directed. 300 each 8     levothyroxine (SYNTHROID/LEVOTHROID) 88 MCG tablet Take 1 tablet (88 mcg) by mouth daily 90 tablet 3     lisinopril (ZESTRIL) 20 MG tablet Take 1 tablet by mouth once daily 90 tablet 3     omeprazole (PRILOSEC) 20 MG DR capsule Take 1 capsule by mouth once daily 90 capsule 3     simvastatin (ZOCOR) 20 MG tablet Take 1 tablet (20 mg) by mouth At Bedtime 90 tablet 3     tiZANidine (ZANAFLEX) 2 MG tablet Take 1-2 tablets (2-4 mg) by mouth 3 times daily as needed for muscle spasms Caution sedation. Stop cyclobenzaprine. Don't drive until you know how you feel. 60 tablet 1     Vitamin D, Cholecalciferol, 25 MCG (1000 UT) TABS          Social History:  Social History     Tobacco Use     Smoking status: Never Smoker     Smokeless tobacco: Never Used     Tobacco comment: no exposure   Substance Use Topics     Alcohol use: Yes     Comment: winex1-2/3x per yr.       Family History  FAMILY HISTORY        Physical Examination:    General: Well appearing woman in no distress.   Psych: good eye contact, no pressured speech    Diabetic foot exam:  Inspection normal  Sensation to monofilament intact  Sensation to fibration intact  Skin is intact   Callus formation is ont present  Gait is normal     BP (!) 151/80 (BP Location: Left arm, Patient Position: Sitting, Cuff Size: Adult Regular)   Pulse 73   Wt 68.5 kg (151 lb)   LMP  (LMP Unknown)   SpO2 100%   BMI 31.29 kg/m      Wt Readings from Last 4 Encounters:   09/12/22 68.5 kg (151 lb)   07/20/22 68.6 kg (151 lb 3.2 oz)   05/23/22 69.4 kg (153 lb)   01/24/22 71.2 kg (157 lb)     Labs and Studies:   Lab Results   Component Value Date     09/06/2022    CHLORIDE 106 09/06/2022    CO2 28 09/06/2022     (H)  09/06/2022    CR 0.94 09/06/2022    CR 0.79 08/10/2022    CR 0.98 02/09/2022    CR 0.93 06/30/2021    CR 1.00 06/23/2021    DORIS 8.5 09/06/2022    ALBUMIN 3.4 08/10/2022    ALKPHOS 51 08/10/2022    LDL 93 05/18/2022    HDL 67 05/18/2022    TRIG 95 05/18/2022     Lab Results   Component Value Date    MICROL 12 02/09/2022    MICROL 8 09/14/2020    MICROL <5 11/08/2019    MICROL <5 04/25/2019    MICROL <5 04/13/2018     Lab Results   Component Value Date    A1C 6.0 (H) 09/06/2022    A1C 6.3 (H) 05/18/2022    A1C 6.3 (A) 01/24/2022    A1C 6.1 (H) 07/07/2021    A1C 6.1 (A) 03/05/2021       Lab Results   Component Value Date    HGB 12.6 05/25/2020     Lab Results   Component Value Date    TSH 0.27 (L) 09/06/2022    TSH 0.11 (L) 08/10/2022    TSH 17.86 (H) 05/18/2022    TSH 46.82 (H) 04/19/2022    TSH 75.67 (H) 03/07/2022     Lab Results   Component Value Date    DORIS 8.5 09/06/2022    DORIS 8.6 08/10/2022    DORIS 8.5 06/30/2021    DORIS 8.9 06/23/2021    ALBUMIN 3.4 08/10/2022    ALT 14 08/10/2022    PHOS 3.3 11/08/2019    PTHI 43 04/19/2022    AST 14 08/10/2022    BILITOTAL 0.4 08/10/2022    CR 0.94 09/06/2022    CR 0.79 08/10/2022    CR 0.98 02/09/2022     09/06/2022    TSH 0.27 (L) 09/06/2022    ALKPHOS 51 08/10/2022    HGB 12.6 05/25/2020     25OHVitD 41 4/19/22        Complications  1. Retinopathy: yes - hemorrhage 9/2020  2. Nephropathy: no  3. Neuropathy: no  4. Hypoglycemia: yes   5. Macrovascular:  No  .     She would like to use half units, however is allergic to NovoLog and Humalog and has been using Apidra  She cannot use an insulin pump because of Apidra clogging the tubes as she has such low needs    Results for orders placed in visit on 03/09/22    Dexa hip/pelvis/spine    Narrative  HISTORY: Type 1 diabetes mellitus without complication (H); Collapsed  vertebra, not elsewhere classified, thoracolumbar region, initial  encounter for fracture (H)    COMPARISON:   none    [age]  years[age]  Height: 59  inches  Weight: 157 pounds  Sex: Female  Ethnicity: White    Image quality: Adequate    Lumbar spine T-score in region of L1, L4 = -1.2    HIPS:  Mean total hip T-score: -1    Left femoral neck T-score = -2  Right femoral neck T-score= -2.7    Radius 33% T-score = NA    FRAX:  10 year probability of major osteoporotic fracture: 19.5%  10 year probability of hip fracture: 2.5%  The 10 year probability of fracture may be lower than reported if the  patient has received treatment. FRAX data should be disregarded in  patient's taking bisphosphonates.    World Health Organization definition of osteoporosis and osteopenia  for  women:  Normal: T-score at or above -1.0  Low Bone Mass (Osteopenia): T-score between -1.0 and -2.5.  Osteoporosis: T-score at or below -2.5  T-scores are reported for postmenopausal women and men over 50 years  of age.    Impression  IMPRESSION:  Osteoporosis. Follow up recommended.    TOREY GARAY MD      SYSTEM ID:  [id number]              Again, thank you for allowing me to participate in the care of your patient.        Sincerely,        Lena Bradford MD

## 2022-09-12 NOTE — TELEPHONE ENCOUNTER
Pt calling concerned about fasting for so long w/ her blood sugars being really low at times; she is DM type 1.     Pt advised on the following fasting instructions:      Restrict what you eat and drink before procedure as follows:      No solid food, milk/milk products or chewing tobacco for 6 hours prior to procedure    You may have clear liquids until 3 hours before procedure    And if after the 3 hour time, she finds her blood sugars are low she can do juice but to let us know.     Dr. Vega was informed and okay w/ the above plan.      AYAKA Mccrary-BSN  [facility]

## 2022-09-12 NOTE — PATIENT INSTRUCTIONS
[facility]  Diabetes Educators:   Jessica Demarco, RN and Dolores Martinez RN  Clinic Nurse: AYAKA Herman  CMA's: Smaaria   Scheduling/Clinic phone number : [phone]   Clinic Fax: [phone]  On-Call Endocrine at [facility] (after hours/weekends): [phone] option 4    Please let us know when you need an NPH insulin vial    Try to increase NPH in the evening form 3 to 3.5 units daily      Please call the number below to schedule your labs.      Appointment Reminders:  * Please bring meter with for staff to download  * If you are due ONLY for an A1C, it is scheduled with the nurse and will be done in clinic. You do not need to schedule a lab appointment. Fasting is not required for an A1C.  * Refill request should be submitted to your pharmacy. They will contact clinic for approval.

## 2022-09-12 NOTE — NURSING NOTE
Maria M Marcus's goals for this visit include: refillsYes  She requests these members of her care team be copied on today's visit information: Yes    PCP: Marcus Salgado    Referring Provider:  No referring provider defined for this encounter.    BP (!) 151/80 (BP Location: Left arm, Patient Position: Sitting, Cuff Size: Adult Regular)   Pulse 73   Wt 68.5 kg (151 lb)   LMP  (LMP Unknown)   SpO2 100%   BMI 31.29 kg/m      Do you need any medication refills at today's visit? Yes      Manjeet Hawk [facility]  Adult Endocrinology  [facility]

## 2022-09-13 NOTE — PROGRESS NOTES
[facility]    2022    Chief complaint:   Continued neck pain and headaches. 2 sets of CMBBs have been helpful and she has cervical RFA scheduled    Interval history:  Maria M Marcus is a 57 year old female is known to me for:  Cervical facet joint pain  Spondylosis of cervical region without myelopathy or radiculopathy  Cervicogenic headache  Cervical degenerative disc disease  Cervical radiculopathy  Muscle spasm  Myofascial pain  Chronic pain syndrome  Family history of substance abuse (alcohol)  PMHx includes: cervical radiculopathy, diabetic eye exam (), frozen shoulder syndrome, hyperlipidemia, HTN, hypothyroidism due to Hashimoto's thyroiditis, localized osteoporosis without current pathological fracture (2022), type I DM (diagnosed in ),   PSHx includes: colonoscopy (), bilateral endoscopic sinus surgery (), cervical laminectomy/discectomy (2007 C7-T1), laser YAG capsulotomy right (), vitrectomy (),  x 2, total hysterectomy (cervix removed but ovaries remain)        Recommendations/plan at the last visit on 2022 included:  1. Physical Therapy: ordered  2. Clinical Health Psychologist to address issues of relaxation, behavioral change, coping style, and other factors important to improvement: none needed  3. Diagnostic Studies: none  4. Medication Management:   1. Stop cyclobenzaprine  2. START tizanidine 2-4 mg three times daily as needed for muscle spasms.   3. Will consider possible future use of buprenorphine (likely in patch form called Butrans) if we need it   5. Further procedures recommended: schedule cervical MBBs proceeding to cervical RFA as indicated, our office will contact you  6. Acupuncture: none  7. Urine toxicology screen today: none   8. Recommendations/follow-up for PCP:  See above  9. Release of information: signed KAT to get clinic notes and imaging reports from [facility] for cervical MRI done last  "fall  10. Follow up: 10-12 weeks           Since her last visit, Maria M JANSEN Amrit reports:     Interval history September 14, 2022  -posterior neck pain remains, better after therapy and has had 2 sets of CMBBs that were very helpful.   -she has cervical RFA scheduled later this month, discussed what to expect after RFA  -tizanidine is helpful for muscle spasms      Pain history collected 6/22/2022 at initial consultation:  Her neck and bilateral arm pain is constant. She gets headaches that start in the back of her head. She does not feel that these are migraine headaches. Pain is in the neck and extends down both arms to the hands. Bilateral thumb pain (trigger thumb) and bilateral 4th and 5 digit pain. Rates pain 9/10 most of the time. She feels that about 80% of her pain is in the neck and about 20% is in her arms.   No weakness but has numbness and tingling in the fingers as noted above. She also has known carpal tunnel syndrome and wears braces at night every night to keep this under control. Left arm/hand is always more bothersome than the right.      Right lateral hip pain, bothers her if she is walking and turns just right it will bother her. She does not have issues with laying on the right side.      Bilateral foot pain, has shoe inserts. No diabetic peripheral neuropathy. Has had type I DM since age 2. Former gymnast.      At this point, the patient's participation with our multidisciplinary team includes:  The patient has been compliant with the program.  PT - has seen Alberto cjraven, last visit on 8/31/2022  Health Psych - not ordered      Pain scores:  Pain intensity on average is 7 on a scale of 0-10.    Range is 2-10/10.   Pain right now is 4/10.   Pain is described as \"aching with sharp shooting numbing pain in both hands, arms and upper back but less than before.\"    Pain is constant in nature        Current pain relevant medications:   -cyclobenzaprine 5-10mg TID PRN muscle spasms (somewhat " helpful but it makes her sleepy, usually uses 1 tab)  -Duloxetine 60mg every day (somewhat helpful)  -glucosamine-chondroitin 500/400mg take every day   -Vitamin D every day   -Calcium supplement  -CBD from hemp (helpful)     Other pertinent medications:  -none           Previous medication treatments included:  OPIATES: hydrocodone (helpful), oxycodone (helpful), hydromorphone (helpful)  NSAIDS: ketorolac (unsure),   MUSCLE RELAXANTS: cyclobenzaprine (helpful but causes drowsiness, can only use 5mg dosage), methocarbamol (unsure), tizanidine (helpful)  ANTI-MIGRAINE MEDS: none  ANTI-DEPRESSANTS: amitriptyline (25mg not helpful), Duloxetine (somewhat helpful for mood and pain)  SLEEP AIDS: none  ANTI-CONVULSANTS: gabapentin (100mg and 300mg had memory issues)  TOPICALS: Biofreeze and peppermint oil (helpful)  ANXIOLYTICS: none  MEDICAL CANNABIS: not tried  Other meds: methylprednisolone (helpful), prednisone (helpful), Acetaminophen (can't use due to continuous glucose monitoring        Other treatments have included:  Maria M Marcus has not been seen at a pain clinic in the past.    PT: tried in 2007 without benefit  Chiropractic care: sees one time per month, helps for a short time  Acupuncture: tried, helped some  TENs Unit: tried, helpful       Injections:   Has had injections for trigger finger issues  -7/19/2022 left cervical medial branch block at C5, C6 and C7 with Dr. Corona Vega (very helpful)  -8/30/2022 left cervical medial branch block C5, C6 and C7 with Dr. Corona Vega (very helpful)     Surgery:  Previous cervical surgery in 2007 (C7-T1 laminectomy for radicular pain)          Side Effects: no side effect  Patient is using the medication as prescribed: YES    Medications:  Current Outpatient Medications   Medication Sig Dispense Refill     alendronate (FOSAMAX) 70 MG tablet Take 1 tablet (70 mg) by mouth every 7 days 12 tablet 3     blood glucose (ACCU-CHEK GUIDE) test strip Use to test  blood sugar 4-5 times daily or as directed. 200 strip 9     blood glucose monitoring (SOFTCLIX) lancets Use to test blood sugar 4-5 times daily or as directed. 200 each 9     Blood Glucose Monitoring Suppl (ACCU-CHEK GUIDE ME) w/Device KIT 1 each daily To use 4-5 times daily to check blood glucose. 1 kit 1     Calcium Carb-Cholecalciferol (TGT CALCIUM DIETARY SUPPLEMENT PO)        DULoxetine (CYMBALTA) 60 MG capsule Take 1 capsule by mouth once daily 90 capsule 1     estradiol (ESTRACE) 2 MG tablet Take 1 tablet (2 mg) by mouth daily 90 tablet 3     glucosamine-chondroitin 500-400 MG CAPS per capsule Take 1 capsule by mouth daily       HEMP OIL OR EXTRACT OR OTHER CBD CANNABINOID, NOT MEDICAL CANNABIS,        insulin glargine (LANTUS SOLOSTAR) 100 UNIT/ML pen 3 units am and 4 units pm 15 mL 3     insulin glulisine (APIDRA PEN) 100 UNIT/ML soln Inject 2 Units Subcutaneous 3 times daily (before meals) 9 mL 3     insulin NPH (NOVOLIN N FLEXPEN) 100 UNIT/ML injection Take 3 units daily at bedtime. 15 mL 3     insulin pen needle (31G X 5 MM) 31G X 5 MM miscellaneous Use 6-7 pen needles daily or as directed. 300 each 8     levothyroxine (SYNTHROID/LEVOTHROID) 88 MCG tablet Take 1 tablet (88 mcg) by mouth daily 90 tablet 3     lisinopril (ZESTRIL) 20 MG tablet Take 1 tablet by mouth once daily 90 tablet 3     omeprazole (PRILOSEC) 20 MG DR capsule Take 1 capsule by mouth once daily 90 capsule 3     simvastatin (ZOCOR) 20 MG tablet Take 1 tablet (20 mg) by mouth At Bedtime 90 tablet 3     tiZANidine (ZANAFLEX) 2 MG tablet Take 1-2 tablets (2-4 mg) by mouth 3 times daily as needed for muscle spasms Caution sedation. Stop cyclobenzaprine. Don't drive until you know how you feel. 60 tablet 1     Vitamin D, Cholecalciferol, 25 MCG (1000 UT) TABS        Continuous Blood Gluc Transmit (DEXCOM G6 TRANSMITTER) MISC Per patient - has DEXCOM       estradiol (ESTRACE) 2 MG tablet Take 1 tablet (2 mg) by mouth daily 90 tablet 3      oxyCODONE (ROXICODONE) 5 MG tablet Take 1 tablet (5 mg) by mouth every 12 hours as needed for severe pain 6 tablet 0       Medical History: any changes in medical history since they were last seen? No    Social History:   Home situation: lives with spouse, Ra. 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 working  Tobacco use: none  Alcohol use: uses about weekly or less  Drug use: none  History of chemical dependency treatment: none  Caffeine: near daily use, coffee         Is patient a current smoker or tobacco user?  no  If yes, was cessation counseling offered?  no          Physical Exam:  Vital signs: Blood pressure (!) 146/72, pulse 77, not currently breastfeeding.    Behavioral observations:  Awake, alert and cooperative    Gait:  normal    Musculoskeletal exam:  Strength grossly equal throughout    Neuro exam:  deferred    Skin/vascular/autonomic:  No suspicious lesions on exposed skin.     Other:  na    Is the patient hypertensive today? Yes, mild  Hypertensive on recheck of BP?   Not rechecked  If yes, was patient recommended to see Primary Care Provider in follow up for management of HTN?  yes        Diagnostic tests:  None recent     Other testing (labs, diagnostics):  2/9/2022  Cr. 0.98  Est GFR 67        Screening tools:      DIRE Score for ongoing opioid management is calculated as follows:     Diagnosis = 2     Intractability = 2     Risk: Psych = 2  Chem Hlth = 2  Reliability = 2  Social = 2     Efficacy = 2     Total DIRE Score = 14 (14 or higher predicts good candidate for ongoing opioid management; 13 or lower predicts poor candidate for opioid management)         [place] Prescription Monitoring Program:  Reviewed [facility] September 14, 2022- no concerning fills.  Ansiha NICHOLAS, RN CNP, FNP  [facility]  [place] location          Assessment:   1. Cervical facet joint pain  2. Spondylosis of cervical  region without myelopathy or radiculopathy  3. Cervicogenic headache  4. Cervical degenerative disc disease  5. Muscle spasm  6. Myofascial pain  7. Chronic pain syndrome  8. Family history of substance abuse (alcohol)  9. PMHx includes: cervical radiculopathy, diabetic eye exam (), frozen shoulder syndrome, hyperlipidemia, HTN, hypothyroidism due to Hashimoto's thyroiditis, localized osteoporosis without current pathological fracture (2022), type I DM (diagnosed in ),   10. PSHx includes: colonoscopy (), bilateral endoscopic sinus surgery (), cervical laminectomy/discectomy (2007 C7-T1), laser YAG capsulotomy right (), vitrectomy (),  x 2, total hysterectomy (cervix removed but ovaries remain)           Plan:  1. Physical Therapy: return after Cervical RFA  2. Clinical Health Psychologist to address issues of relaxation, behavioral change, coping style, and other factors important to improvement: none needed  3. Diagnostic Studies: none  4. Medication Management:   1. continue tizanidine 2-4 mg three times daily as needed for muscle spasms.   5. Further procedures recommended: cervical left sided RFA as scheduled  1. Let's see how you do after left sided cervical RFA. If your right side continues to bother, then we will purse right sided CMBBs to RFA  6. Urine toxicology screen today: none   7. Recommendations/follow-up for PCP:  See above  8. Release of information: none  9. Follow up: 8-10 weeks after the cervical RFA         Face to face time: 22 minutes              Anisha NICHOLAS RN CNP, FNP  [facility]

## 2022-09-14 ENCOUNTER — OFFICE VISIT (OUTPATIENT)
Dept: PALLIATIVE MEDICINE | Facility: CLINIC | Age: 57
End: 2022-09-14
Payer: COMMERCIAL

## 2022-09-14 VITALS — SYSTOLIC BLOOD PRESSURE: 146 MMHG | DIASTOLIC BLOOD PRESSURE: 72 MMHG | HEART RATE: 77 BPM

## 2022-09-14 DIAGNOSIS — G89.4 CHRONIC PAIN SYNDROME: ICD-10-CM

## 2022-09-14 DIAGNOSIS — M79.18 MYOFASCIAL PAIN: ICD-10-CM

## 2022-09-14 DIAGNOSIS — M54.2 PAIN OF CERVICAL FACET JOINT: Primary | ICD-10-CM

## 2022-09-14 DIAGNOSIS — M50.30 DDD (DEGENERATIVE DISC DISEASE), CERVICAL: ICD-10-CM

## 2022-09-14 DIAGNOSIS — M47.812 SPONDYLOSIS OF CERVICAL REGION WITHOUT MYELOPATHY OR RADICULOPATHY: ICD-10-CM

## 2022-09-14 DIAGNOSIS — M62.838 MUSCLE SPASM: ICD-10-CM

## 2022-09-14 DIAGNOSIS — G44.86 CERVICOGENIC HEADACHE: ICD-10-CM

## 2022-09-14 PROCEDURE — 99213 OFFICE O/P EST LOW 20 MIN: CPT | Performed by: NURSE PRACTITIONER

## 2022-09-14 ASSESSMENT — PAIN SCALES - GENERAL: PAINLEVEL: MODERATE PAIN (4)

## 2022-09-14 NOTE — PATIENT INSTRUCTIONS
Plan:  Physical Therapy: return after Cervical RFA  Clinical Health Psychologist to address issues of relaxation, behavioral change, coping style, and other factors important to improvement: none needed  Diagnostic Studies: none  Medication Management:   continue tizanidine 2-4 mg three times daily as needed for muscle spasms.   Further procedures recommended: cervical left sided RFA as scheduled  Let's see how you do after left sided cervical RFA. If your right side continues to bother, then we will purse right sided CMBBs to RFA  Urine toxicology screen today: none   Recommendations/follow-up for PCP:  See above  Release of information: none  Follow up: 8-10 weeks after the cervical RFA   ----------------------------------------------------------------  Clinic Number:  [phone]   Call with any questions about your care and for scheduling assistance.   Calls are returned Monday through Friday between 8 AM and 4:30 PM. We usually get back to you within 2 business days depending on the issue/request.    If we are prescribing your medications:  For opioid medication refills, call the clinic or send a [employer] message 7 days in advance.  Please include:  Name of requested medication  Name of the pharmacy.  For non-opioid medications, call your pharmacy directly to request a refill. Please allow 3-4 days to be processed.   Per [place] State Law:  All controlled substance prescriptions must be filled within 30 days of being written.    For those controlled substances allowing refills, pickup must occur within 30 days of last fill.      We believe regular attendance is key to your success in our program!    Any time you are unable to keep your appointment we ask that you call us at least 24 hours in advance to cancel.This will allow us to offer the appointment time to another patient.   Multiple missed appointments may lead to dismissal from the clinic.

## 2022-09-21 ENCOUNTER — ANCILLARY PROCEDURE (OUTPATIENT)
Dept: MAMMOGRAPHY | Facility: CLINIC | Age: 57
End: 2022-09-21
Attending: PHYSICIAN ASSISTANT
Payer: COMMERCIAL

## 2022-09-21 ENCOUNTER — OFFICE VISIT (OUTPATIENT)
Dept: OBGYN | Facility: CLINIC | Age: 57
End: 2022-09-21
Payer: COMMERCIAL

## 2022-09-21 VITALS
OXYGEN SATURATION: 100 % | HEIGHT: 58 IN | HEART RATE: 76 BPM | WEIGHT: 153 LBS | DIASTOLIC BLOOD PRESSURE: 77 MMHG | SYSTOLIC BLOOD PRESSURE: 136 MMHG | BODY MASS INDEX: 32.12 KG/M2

## 2022-09-21 DIAGNOSIS — Z00.00 ANNUAL PHYSICAL EXAM: ICD-10-CM

## 2022-09-21 DIAGNOSIS — Z12.31 VISIT FOR SCREENING MAMMOGRAM: ICD-10-CM

## 2022-09-21 DIAGNOSIS — B37.31 YEAST INFECTION OF THE VAGINA: ICD-10-CM

## 2022-09-21 DIAGNOSIS — N89.8 VAGINAL ITCHING: Primary | ICD-10-CM

## 2022-09-21 DIAGNOSIS — Z79.890 HORMONE REPLACEMENT THERAPY (HRT): ICD-10-CM

## 2022-09-21 LAB
CLUE CELLS: ABNORMAL
TRICHOMONAS, WET PREP: ABNORMAL
WBC'S/HIGH POWER FIELD, WET PREP: ABNORMAL
YEAST, WET PREP: PRESENT

## 2022-09-21 PROCEDURE — 87210 SMEAR WET MOUNT SALINE/INK: CPT | Performed by: OBSTETRICS & GYNECOLOGY

## 2022-09-21 PROCEDURE — 77067 SCR MAMMO BI INCL CAD: CPT

## 2022-09-21 PROCEDURE — 99213 OFFICE O/P EST LOW 20 MIN: CPT | Performed by: OBSTETRICS & GYNECOLOGY

## 2022-09-21 PROCEDURE — 87106 FUNGI IDENTIFICATION YEAST: CPT | Performed by: OBSTETRICS & GYNECOLOGY

## 2022-09-21 PROCEDURE — 87102 FUNGUS ISOLATION CULTURE: CPT | Performed by: OBSTETRICS & GYNECOLOGY

## 2022-09-21 RX ORDER — ESTRADIOL 2 MG/1
2 TABLET ORAL DAILY
Qty: 90 TABLET | Refills: 3 | Status: SHIPPED | OUTPATIENT
Start: 2022-09-21 | End: 2022-11-21

## 2022-09-21 RX ORDER — PROCHLORPERAZINE 25 MG/1
SUPPOSITORY RECTAL
COMMUNITY
End: 2023-10-30

## 2022-09-21 RX ORDER — FLUCONAZOLE 150 MG/1
150 TABLET ORAL ONCE
Qty: 1 TABLET | Refills: 0 | Status: SHIPPED | OUTPATIENT
Start: 2022-09-21 | End: 2022-09-21

## 2022-09-21 NOTE — PATIENT INSTRUCTIONS
If you have any questions regarding your visit, Please contact your care team.    [facility]: [phone]    To Schedule an Appointment 24/7  Call: [phone] HOU[facility] TELEPHONE NUMBER   Gisel Burns DO.    CATHY Ruby -Surgery Scheduler  Loren - Surgery Scheduler    Carmen, AYAKA Jorgensen, RN  Allison, AYAKA   [place]  Wednesday and Friday  8:30 a.m-5:00 p.m    [place]-Temporary  Monday 8:30 a.m-5:00 p.m  Typical Surgery day:  Tuesday [facility]  [address] [place] [address] [phone] Phone  [phone] Fax    Imaging Scheduling-All Locations [phone]    [facility]  [address] [place] [address]     Urgent Care locations:  [facility] Monday-Friday   10 am - 8 pm  Saturday and Sunday   9 am - 5 pm [phone]   **Surgeries** Our Surgery Schedulers will contact you to schedule. If you do not receive a call within 3 business days, please call [phone].    [facility] Labor and Delivery:  [phone]    If you need a medication refill, please contact your pharmacy. Please allow 3 business days for your refill to be completed.  As always, Thank you for trusting us with your healthcare needs!  see additional instructions from your care team below

## 2022-09-21 NOTE — PROGRESS NOTES
"This 58 y/o female,  (C/S x 2), s/p AMARILIS but retains both ovaries but no cx, presents for just the pelvic portion of her annual exam today.  She just completed today's mammogram so declines a breast exam.  She requests a refill of Estrace and feels better on HRT at this dose.  She does c/o vaginal itching so will check for a possible yeast infection, given her hx of type 1 DM.  Her mammogram, DEXA, and colonoscopy are all up-to-date.  /77 (BP Location: Right arm, Patient Position: Sitting, Cuff Size: Adult Regular)   Pulse 76   Ht 1.473 m (4' 10\")   Wt 69.4 kg (153 lb)   LMP  (LMP Unknown)   SpO2 100%   BMI 31.98 kg/m    ROS:  10 systems were reviewed and the positives were listed under problems.  She declined a breast exam today.  A pelvic exam was performed and normal external female genitalia were noted.  A speculum was placed and a wet prep and yeast culture were collected and submitted.  A small amount of white vaginal discharge was noted but no lesions or growths were found.  She no longer has a cervix so a pap was not obtained.  No uterus is present since surgically absent.  No adnexal mass or tenderness was noted on exam.  Her rectal exam demonstrated a normal rectal sphincter and was negative for palpable mass.  Assessment - Limited annual exam to pelvic only, HRT refill, vaginal itching, osteoporosis, and Type 1 DM  Plan - She is to f/u with Endocrine for ongoing evaluation and treatment of osteoporosis and type 1 DM.  She requests a refill of HRT and prefers the 2 mg daily dose of Estrace since she feels \"best\" on this.  A wet prep was collected and submitted.  Will treat if +.  She declines a flu vaccine today.  Her next colonoscopy is due in 2026.  Of note, her MGM  of complications from osteoporosis.  20 minutes were spent today in chart review, the patient visit, review of tests, and documentation in regard to the issues noted above.  "

## 2022-09-24 LAB — BACTERIA VAG AEROBE CULT: ABNORMAL

## 2022-09-27 ENCOUNTER — RADIOLOGY INJECTION OFFICE VISIT (OUTPATIENT)
Dept: PALLIATIVE MEDICINE | Facility: CLINIC | Age: 57
End: 2022-09-27
Payer: COMMERCIAL

## 2022-09-27 VITALS
RESPIRATION RATE: 16 BRPM | HEART RATE: 59 BPM | SYSTOLIC BLOOD PRESSURE: 148 MMHG | DIASTOLIC BLOOD PRESSURE: 60 MMHG | OXYGEN SATURATION: 100 %

## 2022-09-27 DIAGNOSIS — M47.812 SPONDYLOSIS OF CERVICAL REGION WITHOUT MYELOPATHY OR RADICULOPATHY: ICD-10-CM

## 2022-09-27 PROCEDURE — 64634 DESTROY C/TH FACET JNT ADDL: CPT | Mod: LT | Performed by: PAIN MEDICINE

## 2022-09-27 PROCEDURE — 99152 MOD SED SAME PHYS/QHP 5/>YRS: CPT | Performed by: PAIN MEDICINE

## 2022-09-27 PROCEDURE — 64633 DESTROY CERV/THOR FACET JNT: CPT | Mod: LT | Performed by: PAIN MEDICINE

## 2022-09-27 RX ORDER — OXYCODONE HYDROCHLORIDE 5 MG/1
5 TABLET ORAL EVERY 12 HOURS PRN
Qty: 6 TABLET | Refills: 0 | Status: SHIPPED | OUTPATIENT
Start: 2022-09-27 | End: 2022-12-21

## 2022-09-27 RX ORDER — FENTANYL CITRATE 50 UG/ML
12.5-75 INJECTION, SOLUTION INTRAMUSCULAR; INTRAVENOUS EVERY 5 MIN PRN
Status: DISCONTINUED | OUTPATIENT
Start: 2022-09-27 | End: 2023-12-20

## 2022-09-27 RX ADMIN — FENTANYL CITRATE 25 MCG: 50 INJECTION, SOLUTION INTRAMUSCULAR; INTRAVENOUS at 10:24

## 2022-09-27 RX ADMIN — FENTANYL CITRATE 25 MCG: 50 INJECTION, SOLUTION INTRAMUSCULAR; INTRAVENOUS at 10:47

## 2022-09-27 RX ADMIN — FENTANYL CITRATE 25 MCG: 50 INJECTION, SOLUTION INTRAMUSCULAR; INTRAVENOUS at 10:26

## 2022-09-27 RX ADMIN — FENTANYL CITRATE 25 MCG: 50 INJECTION, SOLUTION INTRAMUSCULAR; INTRAVENOUS at 10:43

## 2022-09-27 ASSESSMENT — PAIN SCALES - GENERAL
PAINLEVEL: WORST PAIN (10)
PAINLEVEL: WORST PAIN (10)

## 2022-09-27 NOTE — NURSING NOTE
22 gauge Peripheral IV inserted into left anticubital - attempts: 1    Ruben Lui, RN  Patient Care Supervisor   [facility]

## 2022-09-27 NOTE — NURSING NOTE
Pre-procedure Intake  If YES to any questions or NO to having a   Please complete laminated checklist and leave on the computer keyboard for Provider, verbally inform provider if able.    For SCS Trial, RFA's or any sedation procedure:  Have you been fasting? Yes    If yes, for how long? 6 hrs     Are you taking any any blood thinners such as Coumadin, Warfarin, Jantoven, Pradaxa Xarelto, Eliquis, Edoxaban, Enoxaparin, Lovenox, Heparin, Arixtra, Fondaparinux, or Fragmin? OR Antiplatelet medication such as Plavix, Brilinta, or Effient?   No     If yes, when did you take your last dose?     Do you take aspirin?  No    If cervical procedure, have you held aspirin for 6 days?      Do you have any allergies to contrast dye, iodine, steroid and/or numbing medications?  NO    Are you currently taking antibiotics or have an active infection?  NO    Have you had a fever/elevated temperature within the past week? NO    Are you currently taking oral steroids? NO    Do you have a ? Yes    Are you pregnant or breastfeeding?  NO    Have you received the COVID-19 vaccine? No     If yes, was it your 1st, 2nd or only dose needed?     Date of most recent vaccine:       Pt's home Covid 19 Rapid Antigen Test was negative.  Pt's test was taken on 9/27/22     Pt brought picture of test for confirmation. Yes

## 2022-09-27 NOTE — NURSING NOTE
MD Time IN: 1022   Sedation start time:  1023  Sedation end time:  1040    Medications given: fentanyl 100 mcg IV; versed 2 mg IV; toradol na mg IV  Intravenous fluids were administered, normal saline 200 cc's.  Sedation Level Achieved:  Moderate (conscious) sedation    Ruben Lui, RN  Patient Care Supervisor   [facility] [place]

## 2022-09-27 NOTE — NURSING NOTE
Discharge Information    IV Discontiued Time:  1105    Amount of Fluid Infused:  200    Discharge Criteria = When patient returns to baseline or as per MD order    Consciousness:  Pt is fully awake    Circulation:  BP +/- 20% of pre-procedure level    Respiration:  Patient is able to breathe deeply    O2 Sat:  Patient is able to maintain O2 Sat >92% on room air    Activity:  Moves 4 extremities on command    Ambulation:  Patient is able to stand and walk or stand and pivot into wheelchair    Dressing:  Clean/dry or No Dressing    Notes:   Discharge instructions and AVS given to patient    Patient meets criteria for discharge?  YES    Admitted to PCU?  No    Responsible adult present to accompany patient home?  Yes    Signature/Title:    Ruben Lui RN  RN Care Coordinator  [facility] [place]

## 2022-09-27 NOTE — PROGRESS NOTES
Pre procedure Diagnosis: , Cervical spondylosis   Post procedure Diagnosis: Same  Procedure performed: cervical medial branch radiofrequency ablation left at C5-7, fluoroscopically guided  Anesthesia: Minimal sedation withMD Time IN: 1022   Sedation start time:  1023  Sedation end time:  1040     Medications given: fentanyl 100 mcg IV; versed 2 mg IV; toradol na mg IV  Complications: none  Operators: Corona Vega MD     Indications:   Maria M Marcus is a 57 year old female was sent for cervical facet procedures.  The patient has a history of chronic left neck pain without radiation.  Exam shows increased pain with extension and lateral rotation of the cervical spine and they have tried conservative treatment including physical therapy,medications, and previous interventional procedures. @ had a positive diagnostic cervical medial branch block with reduction in her pain with improved cervical range of motion.     Options/alternatives, benefits and risks were discussed with the patient including but not limited to bleeding, infection, tissue trauma, exposure to radiation, reaction to medications, spinal cord injury, weakness, numbness and paralysis.  Questions were answered to her satisfaction and she wishes to proceed. Voluntary informed consent was obtained and signed.      Vitals were reviewed: Yes  BP (!) 154/96   Pulse 84   Resp 16   LMP 09/15/2015 (Exact Date)   SpO2 99%   Allergies were reviewed:  Yes   Medications were reviewed:  Yes   Pre-procedure pain score: 10/10     Procedure:  After obtaining signed, informed consent, the patient was taken to the procedure room and placed in the prone position on the [facility] frame.  A Pause for the Cause was completed prior to procedure start.  The patient was prepped and draped in the usual sterile fashion.   The areas of interest were identified with fluoroscopy on the left side.  The skin and subcutaneous tissues were anesthetized by injecting Lidocaine  1% 1 ml at each injection site utilizing a 30 gauge 1 inch needle.  Then,  20 gauge 100 mm curved tip RF needles with 10 mm active tips were advanced tangential to the medial branch nerves, abutting the articular pillars of C5, C6, and C7  utilizing AP and Lateral fluoroscopy.  Aspiration was negative at all the levels.    Each level was then tested for motor and sensory stimulation, and positioned so that stimulation was negative for stimuli outside the immediate area of the desired lesion.  Sensory stimulation was completed at 50 Hz, with max stimulation up to 0.5V.  Motor stimulation was completed at 2Hz, up to 1.5V and was negative except for multifidus movement.       Each level was then injected with 0.5 ml of bupivacaine 0.25 %, and after allowing the local anesthetic to set up a 90 second, 80 degree Centigrade lesion was generated.  The electrodes were withdrawn slightly and the needles were rotated 180 degrees to widen the lesion area.  The electrodes were replaced and a second 80 degree 90 second lesion was generated.     Needles were then flushed with Lidocaine as they were removed and  hemostasis was achieved.    Post-procedure pain:  10/10    The patient was given a prescription for oxycodone 5 1 tabs Q12H prn post procedure pain, # 6.     Follow-up includes:  - post procedure nurse call in one week  - follow up with PCP and in the pain clinic as scheduled     Corona Vega MD  [facility]

## 2022-09-27 NOTE — PATIENT INSTRUCTIONS
[facility]   Radiofrequency Ablation (RFA) Discharge Instructions     Today you saw:  Dr. Corona Vega    You should anticipate procedural pain for up to 2 weeks.   You may receive a prescription for pain medication and you should take this as directed.   It may take up to 8 weeks to receive relief from the RFA   Follow up with your provider in 6 weeks.   If you received sedation before, during or after your procedure, for the next 24 hours you shall NOT:    -Drive    -Operate machinery    -Drink alcohol    -Sign any legal documents   You may resume your normal diet   You may resume your regular medications after the procedure   If you were holding your blood thinning medication, please restart taking it: N/A  Be cautious Numbness and/or weakness may occur for up to 6-8 hours due to effect of local anesthetic  Avoid strenuous activity for the first 24 hours   You may resume your regular activities after 24 hours   You may shower, however no swimming, tub baths or hot tubs for 24 hours following your procedure   You may use ice packs 10-15 minutes three to four times a day at the injection site for comfort   Do not use heat to painful areas for 6 to 8 hours. This will give the local anesthetic time to wear off and prevent you from accidentally burning your skin.   Unless you have been directed to avoid the use of anti-inflammatory medications (NSAIDS), you may use medications such as ibuprofen, Aleve or Tylenol for pain control if needed.   If you experience any of the following, call the Pain Clinic during work hours (Monday through Friday 8 am-4:30 pm) at [phone] or the Provider Line after hours at [phone]:   -Fever over 100 degree F    -Swelling, bleeding, redness, drainage, warmth at the injection site    -Progressive weakness or numbness in your arms    -Unusual headache that is not relieved by Tylenol    -Unusual new onset of pain that is not improving

## 2022-10-09 ENCOUNTER — HEALTH MAINTENANCE LETTER (OUTPATIENT)
Age: 57
End: 2022-10-09

## 2022-10-11 ENCOUNTER — TRANSFERRED RECORDS (OUTPATIENT)
Dept: HEALTH INFORMATION MANAGEMENT | Facility: CLINIC | Age: 57
End: 2022-10-11

## 2022-10-11 LAB — RETINOPATHY: POSITIVE

## 2022-10-14 DIAGNOSIS — M79.18 MYOFASCIAL PAIN: ICD-10-CM

## 2022-10-14 DIAGNOSIS — M62.838 MUSCLE SPASM: ICD-10-CM

## 2022-10-14 NOTE — TELEPHONE ENCOUNTER
Pending Prescriptions:                       Disp   Refills    tiZANidine (ZANAFLEX) 2 MG tablet         60 tab*1            Sig: Take 1-2 tablets (2-4 mg) by mouth 3 times daily           as needed for muscle spasms Caution sedation.           Stop cyclobenzaprine. Don't drive until you know           how you feel.    Last refill 09/27/22  Last office visit 9/14/22  Next appointment none

## 2022-10-16 RX ORDER — TIZANIDINE 2 MG/1
2-4 TABLET ORAL 3 TIMES DAILY PRN
Qty: 60 TABLET | Refills: 1 | Status: SHIPPED | OUTPATIENT
Start: 2022-10-16 | End: 2023-01-22

## 2022-10-17 NOTE — TELEPHONE ENCOUNTER
Signed Prescriptions:                        Disp   Refills    tiZANidine (ZANAFLEX) 2 MG tablet          60 tab*1        Sig: Take 1-2 tablets (2-4 mg) by mouth 3 times daily as           needed for muscle spasms Caution sedation. Stop           cyclobenzaprine. Don't drive until you know how           you feel.  Authorizing Provider: ROXANA VARNER, RN CNP, FNP  [facility]  [facility] [place]

## 2022-11-04 ENCOUNTER — NURSE TRIAGE (OUTPATIENT)
Dept: FAMILY MEDICINE | Facility: OTHER | Age: 57
End: 2022-11-04

## 2022-11-04 NOTE — TELEPHONE ENCOUNTER
Patient calling in regards to symptoms. Started 3 days ago.     Cough-mild but when she coughs, it's a lot and can't stop (2-3 times a day), left ear pain, runny nose, heavy/pressure in chest, headache, sore throat.     No fevers. No trouble breathing.     COVID test times twice has been negative.    SEE IN OFFICE TODAY:   * You need to be examined today. Let me give you an appointment.  * IF NO AVAILABLE APPOINTMENTS:  You need to be seen in the Urgent Care Center. Go to the one at nearest location. Go there today. A nearby Urgent Care Center is often a good source of care. Another choice is to go to the Emergency Department.    Advised patient to be seen in ED to evaluate symptoms, especially with heaviness in chest. Patient agrees and will go into [place] ED.    ESTHER García, RN  [facility]  November 4, 2022         Reason for Disposition    Earache    Additional Information    Negative: SEVERE difficulty breathing (e.g., struggling for each breath, speaks in single words)    Negative: Very weak (can't stand)    Negative: Sounds like a life-threatening emergency to the triager    Negative: Difficulty breathing and not from stuffy nose (e.g., not relieved by cleaning out the nose)    Negative: Runny nose is caused by pollen or other allergies    Negative: Cough is main symptom    Negative: Sore throat is main symptom    Negative: Patient sounds very sick or weak to the triager    Negative: Fever > 103 F (39.4 C)    Negative: Fever > 101 F (38.3 C) and over 60 years of age    Negative: Fever > 100.0 F (37.8 C) and has diabetes mellitus or a weak immune system (e.g., HIV positive, cancer chemotherapy, organ transplant, splenectomy, chronic steroids)    Negative: Fever > 100.0 F (37.8 C) and bedridden (e.g., nursing home patient, stroke, chronic illness, recovering from surgery)    Negative: Fever present > 3 days (72 hours)    Negative: Fever returns after gone for over 24 hours  and symptoms worse or not improved    Negative: Sinus pain (not just congestion) and fever    Protocols used: COMMON COLD-A-OH

## 2022-11-09 NOTE — TELEPHONE ENCOUNTER
[facility] Call Center    Phone Message    May a detailed message be left on voicemail: yes    Reason for Call: Patient called wanting to speak with Jessica and had some follow up questions. Requesting a call back. Thank you    Action Taken: Message routed to:  Adult Clinics: Endocrinology p [phone]   There are no Wet Read(s) to document.

## 2022-11-14 ENCOUNTER — TELEPHONE (OUTPATIENT)
Dept: FAMILY MEDICINE | Facility: OTHER | Age: 57
End: 2022-11-14

## 2022-11-14 DIAGNOSIS — E10.9 TYPE 1 DIABETES MELLITUS WITHOUT COMPLICATION (H): Primary | ICD-10-CM

## 2022-11-14 DIAGNOSIS — E06.3 HYPOTHYROIDISM DUE TO HASHIMOTO'S THYROIDITIS: ICD-10-CM

## 2022-11-14 NOTE — TELEPHONE ENCOUNTER
Scheduled new to medicare appt. She is asking for orders for lab to be placed ahead of time so she can complete prior to appt.

## 2022-11-20 ENCOUNTER — NURSE TRIAGE (OUTPATIENT)
Dept: NURSING | Facility: CLINIC | Age: 57
End: 2022-11-20

## 2022-11-20 NOTE — TELEPHONE ENCOUNTER
Nurse Triage SBAR    Is this a 2nd Level Triage? NO    Situation: Patient has been ill for 5 weeks    Background: Type 1 Diabetic    Assessment: She has had headache, cough, sore throat, fatigue.  She is Covid negative.  No fever.  Patient is short of breath with exertion.  Patient has had elevated blood sugars.    Protocol Recommended Disposition:   Go To Office Now    Recommendation: Per protocol patient advised to go to the urgent care. Patient would prefer to be seen by her provider.    Please advise if she can be worked in?     Routed to provider    Marilia Zimmer RN on 11/20/2022 at 12:33 PM      Reason for Disposition    MILD difficulty breathing (e.g., minimal/no SOB at rest, SOB with walking, pulse <100) and still present when not coughing    Additional Information    Negative: Bluish (or gray) lips or face    Negative: SEVERE difficulty breathing (e.g., struggling for each breath, speaks in single words)    Negative: Rapid onset of cough and has hives    Negative: Coughing started suddenly after medicine, an allergic food or bee sting    Negative: Difficulty breathing after exposure to flames, smoke, or fumes    Negative: Sounds like a life-threatening emergency to the triager    Negative: Previous asthma attacks and this feels like asthma attack    Negative: Dry cough (non-productive; no sputum or minimal clear sputum) and within 14 days of COVID-19 Exposure    Negative: MODERATE difficulty breathing (e.g., speaks in phrases, SOB even at rest, pulse 100-120) and still present when not coughing    Negative: Chest pain present when not coughing    Negative: Passed out (i.e., fainted, collapsed and was not responding)    Negative: Patient sounds very sick or weak to the triager    Protocols used: COUGH-A-OH

## 2022-11-21 ENCOUNTER — OFFICE VISIT (OUTPATIENT)
Dept: URGENT CARE | Facility: URGENT CARE | Age: 57
End: 2022-11-21
Payer: COMMERCIAL

## 2022-11-21 VITALS
WEIGHT: 152.4 LBS | BODY MASS INDEX: 31.85 KG/M2 | TEMPERATURE: 97.9 F | HEART RATE: 80 BPM | SYSTOLIC BLOOD PRESSURE: 156 MMHG | DIASTOLIC BLOOD PRESSURE: 74 MMHG | OXYGEN SATURATION: 96 %

## 2022-11-21 DIAGNOSIS — J01.90 ACUTE NON-RECURRENT SINUSITIS, UNSPECIFIED LOCATION: Primary | ICD-10-CM

## 2022-11-21 PROCEDURE — 99213 OFFICE O/P EST LOW 20 MIN: CPT | Performed by: PHYSICIAN ASSISTANT

## 2022-11-21 RX ORDER — FLUCONAZOLE 150 MG/1
150 TABLET ORAL
Qty: 3 TABLET | Refills: 0 | Status: SHIPPED | OUTPATIENT
Start: 2022-11-21 | End: 2022-11-28

## 2022-11-21 RX ORDER — AZITHROMYCIN 250 MG/1
TABLET, FILM COATED ORAL
Qty: 6 TABLET | Refills: 0 | Status: SHIPPED | OUTPATIENT
Start: 2022-11-21 | End: 2022-11-29

## 2022-11-21 ASSESSMENT — ENCOUNTER SYMPTOMS
COUGH: 1
SORE THROAT: 1

## 2022-11-21 NOTE — PROGRESS NOTES
SUBJECTIVE:   Maria M Marcus is a 57 year old female presenting with a chief complaint of   Chief Complaint   Patient presents with     Sick     Sx started around Halloween. Cold Sx, sore throat, burning in ears, congestion, headache, body aches, coughing. Went to urgent care once 11/6/22-gave her amox but didn't get any better. Did several covid tests-negative.       She is an established patient of [facility].  Patient presents with complaints of bilateral ear, ST, HA and URI symptoms since Halloween.  Two covid tests negative.  No fevers.  Was treated by 11/6 and given amoxicillin -no help.  Productive cough - green and brown.  BS from 150-250.  Vaginal discharge and itching.      Treatment:  Mucinex, honey, juice.      Review of Systems   HENT: Positive for ear pain and sore throat.    Respiratory: Positive for cough.    Genitourinary: Positive for vaginal discharge.   All other systems reviewed and are negative.      Past Medical History:   Diagnosis Date     Cervical radiculopathy      Diabetic eye exam (H) 12/14/11     DISC DIS NEC/NOS-LUMBAR 4/7/2007     Frozen shoulder syndrome 6/16/2011     Hyperlipidemia LDL goal <100 10/31/2010     Hypertension 1/3/2011     Hypothyroidism due to Hashimoto's thyroiditis      Localized osteoporosis without current pathological fracture 4/6/2022     Type 1 diabetes, HbA1c goal < 7% (H) dx 1967     Unspecified hypothyroidism      Family History   Problem Relation Age of Onset     Hypertension Maternal Grandfather      EYE* Maternal Grandmother      Blood Disease Maternal Grandmother      Eye Disorder Maternal Grandmother         maccular degeneration     Osteoporosis Maternal Grandmother      Lipids Father      Gastrointestinal Disease Father         ulcers     Heart Disease Father      Cardiovascular Father         heart attack     Hypertension Paternal Grandmother      Breast Cancer Mother         2011 is in bones now but remission     Diabetes Paternal Uncle          Type I     Current Outpatient Medications   Medication Sig Dispense Refill     alendronate (FOSAMAX) 70 MG tablet Take 1 tablet (70 mg) by mouth every 7 days 12 tablet 3     Calcium Carb-Cholecalciferol (TGT CALCIUM DIETARY SUPPLEMENT PO)        Continuous Blood Gluc Transmit (DEXCOM G6 TRANSMITTER) MISC Per patient - has DEXCOM       estradiol (ESTRACE) 2 MG tablet Take 1 tablet (2 mg) by mouth daily 90 tablet 3     glucosamine-chondroitin 500-400 MG CAPS per capsule Take 1 capsule by mouth daily       HEMP OIL OR EXTRACT OR OTHER CBD CANNABINOID, NOT MEDICAL CANNABIS,        insulin glargine (LANTUS SOLOSTAR) 100 UNIT/ML pen 3 units am and 4 units pm 15 mL 3     insulin glulisine (APIDRA PEN) 100 UNIT/ML soln Inject 2 Units Subcutaneous 3 times daily (before meals) 9 mL 3     insulin NPH (NOVOLIN N FLEXPEN) 100 UNIT/ML injection Take 3 units daily at bedtime. 15 mL 3     insulin pen needle (31G X 5 MM) 31G X 5 MM miscellaneous Use 6-7 pen needles daily or as directed. 300 each 8     levothyroxine (SYNTHROID/LEVOTHROID) 88 MCG tablet Take 1 tablet (88 mcg) by mouth daily 90 tablet 3     lisinopril (ZESTRIL) 20 MG tablet Take 1 tablet by mouth once daily 90 tablet 3     omeprazole (PRILOSEC) 20 MG DR capsule Take 1 capsule by mouth once daily 90 capsule 3     simvastatin (ZOCOR) 20 MG tablet Take 1 tablet (20 mg) by mouth At Bedtime 90 tablet 3     tiZANidine (ZANAFLEX) 2 MG tablet Take 1-2 tablets (2-4 mg) by mouth 3 times daily as needed for muscle spasms Caution sedation. Stop cyclobenzaprine. Don't drive until you know how you feel. 60 tablet 1     Vitamin D, Cholecalciferol, 25 MCG (1000 UT) TABS        blood glucose (ACCU-CHEK GUIDE) test strip Use to test blood sugar 4-5 times daily or as directed. (Patient not taking: Reported on 11/21/2022) 200 strip 9     blood glucose monitoring (SOFTCLIX) lancets Use to test blood sugar 4-5 times daily or as directed. (Patient not taking: Reported on 11/21/2022) 200 each  9     Blood Glucose Monitoring Suppl (ACCU-CHEK GUIDE ME) w/Device KIT 1 each daily To use 4-5 times daily to check blood glucose. (Patient not taking: Reported on 11/21/2022) 1 kit 1     oxyCODONE (ROXICODONE) 5 MG tablet Take 1 tablet (5 mg) by mouth every 12 hours as needed for severe pain (Patient not taking: Reported on 11/21/2022) 6 tablet 0     Social History     Tobacco Use     Smoking status: Never     Smokeless tobacco: Never     Tobacco comments:     no exposure   Substance Use Topics     Alcohol use: Yes     Comment: social       OBJECTIVE  BP (!) 156/74   Pulse 80   Temp 97.9  F (36.6  C) (Tympanic)   Wt 69.1 kg (152 lb 6.4 oz)   LMP  (LMP Unknown)   SpO2 96%   BMI 31.85 kg/m      Physical Exam  Vitals and nursing note reviewed.   Constitutional:       Appearance: Normal appearance. She is normal weight.   HENT:      Head: Normocephalic and atraumatic.      Right Ear: Tympanic membrane, ear canal and external ear normal.      Left Ear: Tympanic membrane, ear canal and external ear normal.      Ears:      Comments: White fluid behind TM - no erythema     Nose: Nose normal.      Mouth/Throat:      Mouth: Mucous membranes are moist.      Pharynx: Oropharynx is clear.   Eyes:      Extraocular Movements: Extraocular movements intact.      Conjunctiva/sclera: Conjunctivae normal.   Cardiovascular:      Rate and Rhythm: Normal rate and regular rhythm.      Pulses: Normal pulses.      Heart sounds: Normal heart sounds.   Pulmonary:      Effort: Pulmonary effort is normal.      Breath sounds: Normal breath sounds.   Musculoskeletal:      Cervical back: Normal range of motion.   Skin:     General: Skin is warm and dry.      Findings: No rash.   Neurological:      General: No focal deficit present.      Mental Status: She is alert.   Psychiatric:         Mood and Affect: Mood normal.         Labs:  No results found. However, due to the size of the patient record, not all encounters were searched. Please  check Results Review for a complete set of results.    X-Ray was not done.    ASSESSMENT:    No diagnosis found.     Medical Decision Making:    Differential Diagnosis:  URI Adult/Peds:  Sinusitis    Serious Comorbid Conditions:  Adult:  reviewed    PLAN:    Rx for azithromax.  If no improvement will need a proper workup.  Patient gets yeast infections as a result of antibiotics.  Rx for diflucan.  Drink plenty of water.      Followup:    If not improving or if condition worsens, follow up with your Primary Care Provider, If not improving or if conditions worsens over the next 12-24 hours, go to the Emergency Department    There are no Patient Instructions on file for this visit.

## 2022-11-21 NOTE — TELEPHONE ENCOUNTER
I am out of the office until at least Wednesday so recommend urgent care.    Marcus Salgado PA-C

## 2022-11-29 ENCOUNTER — E-VISIT (OUTPATIENT)
Dept: FAMILY MEDICINE | Facility: OTHER | Age: 57
End: 2022-11-29
Payer: COMMERCIAL

## 2022-11-29 ENCOUNTER — TELEPHONE (OUTPATIENT)
Dept: FAMILY MEDICINE | Facility: OTHER | Age: 57
End: 2022-11-29

## 2022-11-29 ENCOUNTER — VIRTUAL VISIT (OUTPATIENT)
Dept: URGENT CARE | Facility: CLINIC | Age: 57
End: 2022-11-29
Payer: COMMERCIAL

## 2022-11-29 DIAGNOSIS — J01.90 ACUTE NON-RECURRENT SINUSITIS, UNSPECIFIED LOCATION: ICD-10-CM

## 2022-11-29 DIAGNOSIS — R05.9 COUGH, UNSPECIFIED TYPE: Primary | ICD-10-CM

## 2022-11-29 PROCEDURE — 99421 OL DIG E/M SVC 5-10 MIN: CPT | Performed by: PHYSICIAN ASSISTANT

## 2022-11-29 PROCEDURE — 99207 PR NO CHARGE NURSE ONLY: CPT

## 2022-11-29 RX ORDER — AZITHROMYCIN 250 MG/1
TABLET, FILM COATED ORAL
Qty: 6 TABLET | Refills: 0 | Status: SHIPPED | OUTPATIENT
Start: 2022-11-29 | End: 2022-12-04

## 2022-11-29 NOTE — TELEPHONE ENCOUNTER
Patient  was treated in urgent care 11/21/22 for ongoing cough/sinus symptoms.  Given Zpak.  She states was feeling 90% better.  Off med since Saturday and now symptoms worsening again.  No fever.  No shortness of breath.  C/o of cough, URI symptoms.   has had for at least 5 weeks.  Will be leaving for out of town this week.  Feels needing a second round of zpak.  Virtual appointment made.  China KPanfilo MARLEY

## 2022-12-13 ENCOUNTER — HOSPITAL ENCOUNTER (EMERGENCY)
Facility: CLINIC | Age: 57
Discharge: HOME | End: 2022-12-13
Attending: PHYSICIAN ASSISTANT | Admitting: PHYSICIAN ASSISTANT
Payer: COMMERCIAL

## 2022-12-13 VITALS
BODY MASS INDEX: 32.33 KG/M2 | WEIGHT: 154.7 LBS | RESPIRATION RATE: 18 BRPM | SYSTOLIC BLOOD PRESSURE: 173 MMHG | TEMPERATURE: 98 F | DIASTOLIC BLOOD PRESSURE: 66 MMHG | HEART RATE: 86 BPM | OXYGEN SATURATION: 100 %

## 2022-12-13 DIAGNOSIS — L25.9 CONTACT DERMATITIS: ICD-10-CM

## 2022-12-13 PROCEDURE — 99283 EMERGENCY DEPT VISIT LOW MDM: CPT | Performed by: PHYSICIAN ASSISTANT

## 2022-12-13 PROCEDURE — 99284 EMERGENCY DEPT VISIT MOD MDM: CPT | Performed by: PHYSICIAN ASSISTANT

## 2022-12-13 RX ORDER — PREDNISONE 10 MG/1
TABLET ORAL
Qty: 31 TABLET | Refills: 0 | Status: SHIPPED | OUTPATIENT
Start: 2022-12-13 | End: 2023-01-05

## 2022-12-13 NOTE — DISCHARGE INSTRUCTIONS
It was a pleasure working with you today!  I hope your condition improves rapidly!     Please start the prednisone.  This is a taper.  The dose changes every 2 days.  Monitor your blood sugars closely and adjust your insulin as indicated.  It is okay to use Zyrtec 5 mg twice daily as needed for itch.  This is a nondrowsy antihistamine.  He can also use Benadryl 25 mg every 4 hours on top of this.  Try an oatmeal bath to soothe the skin.

## 2022-12-13 NOTE — ED TRIAGE NOTES
Pt was on a trip to [place] and now has a rash on her lower legs bilaterally, she has concern for sand flees or bed bugs or something, she does have a few spots on hands and on neck but no where else, she reports very itchy

## 2022-12-14 ASSESSMENT — ENCOUNTER SYMPTOMS
ARTHRALGIAS: 1
FEVER: 0
COUGH: 0
CONSTIPATION: 0
FREQUENCY: 0
DYSURIA: 0
PARESTHESIAS: 0
HEADACHES: 1
WEAKNESS: 0
SORE THROAT: 0
NERVOUS/ANXIOUS: 0
PALPITATIONS: 0
BREAST MASS: 0
JOINT SWELLING: 0
DIZZINESS: 0
HEARTBURN: 0
SHORTNESS OF BREATH: 0
HEMATOCHEZIA: 0
NAUSEA: 0
DIARRHEA: 0
HEMATURIA: 0
ABDOMINAL PAIN: 0
MYALGIAS: 1
CHILLS: 0
EYE PAIN: 0

## 2022-12-14 ASSESSMENT — ACTIVITIES OF DAILY LIVING (ADL)
CURRENT_FUNCTION: SHOPPING REQUIRES ASSISTANCE
CURRENT_FUNCTION: PREPARING MEALS REQUIRES ASSISTANCE
CURRENT_FUNCTION: TRANSPORTATION REQUIRES ASSISTANCE

## 2022-12-16 ENCOUNTER — LAB (OUTPATIENT)
Dept: LAB | Facility: OTHER | Age: 57
End: 2022-12-16
Payer: COMMERCIAL

## 2022-12-16 DIAGNOSIS — E10.9 TYPE 1 DIABETES MELLITUS WITHOUT COMPLICATION (H): ICD-10-CM

## 2022-12-16 DIAGNOSIS — E06.3 HYPOTHYROIDISM DUE TO HASHIMOTO'S THYROIDITIS: ICD-10-CM

## 2022-12-16 LAB
ANION GAP SERPL CALCULATED.3IONS-SCNC: 5 MMOL/L (ref 3–14)
BUN SERPL-MCNC: 21 MG/DL (ref 7–30)
CALCIUM SERPL-MCNC: 8.7 MG/DL (ref 8.5–10.1)
CHLORIDE BLD-SCNC: 108 MMOL/L (ref 94–109)
CO2 SERPL-SCNC: 27 MMOL/L (ref 20–32)
CREAT SERPL-MCNC: 0.97 MG/DL (ref 0.52–1.04)
GFR SERPL CREATININE-BSD FRML MDRD: 68 ML/MIN/1.73M2
GLUCOSE BLD-MCNC: 155 MG/DL (ref 70–99)
POTASSIUM BLD-SCNC: 3.6 MMOL/L (ref 3.4–5.3)
SODIUM SERPL-SCNC: 140 MMOL/L (ref 133–144)
T4 FREE SERPL-MCNC: 1.28 NG/DL (ref 0.76–1.46)
TSH SERPL DL<=0.005 MIU/L-ACNC: 0.19 MU/L (ref 0.4–4)

## 2022-12-16 PROCEDURE — 36415 COLL VENOUS BLD VENIPUNCTURE: CPT

## 2022-12-16 PROCEDURE — 80048 BASIC METABOLIC PNL TOTAL CA: CPT

## 2022-12-16 PROCEDURE — 84443 ASSAY THYROID STIM HORMONE: CPT

## 2022-12-16 PROCEDURE — 84439 ASSAY OF FREE THYROXINE: CPT

## 2022-12-21 ENCOUNTER — OFFICE VISIT (OUTPATIENT)
Dept: FAMILY MEDICINE | Facility: OTHER | Age: 57
End: 2022-12-21
Payer: COMMERCIAL

## 2022-12-21 VITALS
HEIGHT: 58 IN | OXYGEN SATURATION: 97 % | BODY MASS INDEX: 31.91 KG/M2 | WEIGHT: 152 LBS | SYSTOLIC BLOOD PRESSURE: 130 MMHG | DIASTOLIC BLOOD PRESSURE: 62 MMHG | HEART RATE: 85 BPM | RESPIRATION RATE: 17 BRPM | TEMPERATURE: 98.3 F

## 2022-12-21 DIAGNOSIS — E10.9 TYPE 1 DIABETES MELLITUS WITHOUT COMPLICATION (H): ICD-10-CM

## 2022-12-21 DIAGNOSIS — E06.3 HYPOTHYROIDISM DUE TO HASHIMOTO'S THYROIDITIS: ICD-10-CM

## 2022-12-21 DIAGNOSIS — M72.2 PLANTAR FASCIITIS: ICD-10-CM

## 2022-12-21 DIAGNOSIS — I10 HYPERTENSION GOAL BP (BLOOD PRESSURE) < 140/90: ICD-10-CM

## 2022-12-21 DIAGNOSIS — Z00.00 WELCOME TO MEDICARE PREVENTIVE VISIT: Primary | ICD-10-CM

## 2022-12-21 DIAGNOSIS — Z23 NEED FOR PNEUMOCOCCAL VACCINE: ICD-10-CM

## 2022-12-21 PROCEDURE — 99213 OFFICE O/P EST LOW 20 MIN: CPT | Mod: 25 | Performed by: PHYSICIAN ASSISTANT

## 2022-12-21 PROCEDURE — 90677 PCV20 VACCINE IM: CPT | Performed by: PHYSICIAN ASSISTANT

## 2022-12-21 PROCEDURE — G0009 ADMIN PNEUMOCOCCAL VACCINE: HCPCS | Performed by: PHYSICIAN ASSISTANT

## 2022-12-21 PROCEDURE — G0402 INITIAL PREVENTIVE EXAM: HCPCS | Performed by: PHYSICIAN ASSISTANT

## 2022-12-21 ASSESSMENT — ENCOUNTER SYMPTOMS
DYSURIA: 0
MYALGIAS: 1
SHORTNESS OF BREATH: 0
NAUSEA: 0
SORE THROAT: 0
HEMATURIA: 0
BREAST MASS: 0
COUGH: 0
CHILLS: 0
EYE PAIN: 0
JOINT SWELLING: 0
FEVER: 0
CONSTIPATION: 0
NERVOUS/ANXIOUS: 0
DIZZINESS: 0
FREQUENCY: 0
PALPITATIONS: 0
PARESTHESIAS: 0
HEARTBURN: 0
WEAKNESS: 0
HEMATOCHEZIA: 0
HEADACHES: 1
ABDOMINAL PAIN: 0
DIARRHEA: 0
ARTHRALGIAS: 1

## 2022-12-21 ASSESSMENT — ACTIVITIES OF DAILY LIVING (ADL)
CURRENT_FUNCTION: TRANSPORTATION REQUIRES ASSISTANCE
CURRENT_FUNCTION: PREPARING MEALS REQUIRES ASSISTANCE
CURRENT_FUNCTION: SHOPPING REQUIRES ASSISTANCE

## 2022-12-21 ASSESSMENT — PAIN SCALES - GENERAL: PAINLEVEL: MODERATE PAIN (4)

## 2022-12-21 NOTE — PROGRESS NOTES
"SUBJECTIVE:   Maria M is a 57 year old who presents for Preventive Visit.  Patient has been advised of split billing requirements and indicates understanding: Yes  Are you in the first 12 months of your Medicare coverage?  Yes,  Visual Acuity: Right Eye: 20/125   Left Eye: 20/80  Both Eyes: 20/63    Healthy Habits:     In general, how would you rate your overall health?  Good    Frequency of exercise:  2-3 days/week    Duration of exercise:  30-45 minutes    Do you usually eat at least 4 servings of fruit and vegetables a day, include whole grains    & fiber and avoid regularly eating high fat or \"junk\" foods?  Yes    Taking medications regularly:  Yes    Medication side effects:  None    Ability to successfully perform activities of daily living:  Transportation requires assistance, shopping requires assistance and preparing meals requires assistance    Home Safety:  No safety concerns identified    Hearing Impairment:  No hearing concerns    In the past 6 months, have you been bothered by leaking of urine?  No    In general, how would you rate your overall mental or emotional health?  Good      PHQ-2 Total Score: 0    Additional concerns today:  Yes    Pain History:  When did you first notice your pain? - Acute Pain  Have you seen anyone else for your pain? No  Where in your body do you have pain? Musculoskeletal problem/pain  Onset/Duration: 6-7 months  Description  Location: heel - left  Joint Swelling: No  Redness: No  Pain: YES- sharp  Warmth: No  Intensity:  8/10  Progression of Symptoms:  worsening and intermittent  Accompanying signs and symptoms:   Fevers: No  Numbness/tingling/weakness: No  History  Trauma to the area: No  Recent illness:  No  Previous similar problem: No  Previous evaluation:  No  Precipitating or alleviating factors:  Aggravating factors include: standing and walking  Therapies tried and outcome: heat, ice and exercises     Is following with pain management and had a recent cervical RFA " which went well.    She is noticing some left heel pain over the past few months, worse with prolonged walking. She does have orthotics and tries to avoid walking barefoot.    Have you ever done Advance Care Planning? (For example, a Health Directive, POLST, or a discussion with a medical provider or your loved ones about your wishes): Yes, patient states has an Advance Care Planning document and will bring a copy to the clinic.       Fall risk  Fallen 2 or more times in the past year?: No  Any fall with injury in the past year?: No    Cognitive Screening   1) Repeat 3 items (Leader, Season, Table)    2) Clock draw: NORMAL  3) 3 item recall: Recalls 1 object   Results: NORMAL clock, 1-2 items recalled: COGNITIVE IMPAIRMENT LESS LIKELY    Mini-CogTM Copyright OKSANA Mcknight. Licensed by the author for use in [facility]; reprinted with permission (peyman@[facility]). All rights reserved.      Do you have sleep apnea, excessive snoring or daytime drowsiness?: yes    Reviewed and updated as needed this visit by clinical staff   Tobacco  Allergies  Meds  Problems  Med Hx  Surg Hx  Fam Hx          Reviewed and updated as needed this visit by Provider   Tobacco  Allergies  Meds  Problems  Med Hx  Surg Hx  Fam Hx         Social History     Tobacco Use     Smoking status: Never     Smokeless tobacco: Never     Tobacco comments:     no exposure   Substance Use Topics     Alcohol use: Yes     Comment: social     If you drink alcohol do you typically have >3 drinks per day or >7 drinks per week? Not applicable    Alcohol Use 12/21/2022   Prescreen: >3 drinks/day or >7 drinks/week? -   Prescreen: >3 drinks/day or >7 drinks/week? Not Applicable       Current providers sharing in care for this patient include:   Patient Care Team:  Marcus Salgado PA-C as PCP - General (Physician Assistant)  Marcus Salgado PA-C as Assigned PCP  Gisel Burns DO as Assigned OBGYN Provider  Lena Bradford MD as  Assigned Endocrinology Provider  Anisha Dodge APRN CNP as Assigned Neuroscience Provider    The following health maintenance items are reviewed in Epic and correct as of today:  Health Maintenance   Topic Date Due     URINE DRUG SCREEN  Never done     COVID-19 Vaccine (1) Never done     HEPATITIS B IMMUNIZATION (3 of 3 - 19+ 3-dose series) 05/04/1998     ZOSTER IMMUNIZATION (1 of 2) Never done     MEDICARE ANNUAL WELLNESS VISIT  08/04/2022     MICROALBUMIN  02/09/2023     A1C  03/06/2023     LIPID  05/18/2023     DIABETIC FOOT EXAM  07/20/2023     WILLIE ASSESSMENT  07/20/2023     PHQ-9  07/20/2023     CMP  08/10/2023     EYE EXAM  10/11/2023     BMP  12/16/2023     TSH W/FREE T4 REFLEX  12/16/2023     ANNUAL REVIEW OF HM ORDERS  12/21/2023     MAMMO SCREENING  09/21/2024     COLORECTAL CANCER SCREENING  09/21/2026     ADVANCE CARE PLANNING  12/21/2027     DTAP/TDAP/TD IMMUNIZATION (3 - Td or Tdap) 06/23/2031     HEPATITIS C SCREENING  Completed     HIV SCREENING  Completed     PHQ-2 (once per calendar year)  Completed     INFLUENZA VACCINE  Completed     Pneumococcal Vaccine: Pediatrics (0 to 5 Years) and At-Risk Patients (6 to 64 Years)  Completed     IPV IMMUNIZATION  Aged Out     MENINGITIS IMMUNIZATION  Aged Out     PAP  Discontinued     FHS-7:   Breast CA Risk Assessment (FHS-7) 9/21/2022 12/14/2022   Did any of your first-degree relatives have breast or ovarian cancer? Yes Yes   Did any of your relatives have bilateral breast cancer? No Unknown   Did any man in your family have breast cancer? No No   Did any woman in your family have breast and ovarian cancer? No Yes   Did any woman in your family have breast cancer before age 50 y? No No   Do you have 2 or more relatives with breast and/or ovarian cancer? No No   Do you have 2 or more relatives with breast and/or bowel cancer? No No     Pertinent mammograms are reviewed under the imaging tab.    Review of Systems   Constitutional: Negative for chills  "and fever.   HENT: Negative for congestion, ear pain, hearing loss and sore throat.    Eyes: Positive for visual disturbance. Negative for pain.   Respiratory: Negative for cough and shortness of breath.    Cardiovascular: Negative for chest pain, palpitations and peripheral edema.   Gastrointestinal: Negative for abdominal pain, constipation, diarrhea, heartburn, hematochezia and nausea.   Breasts:  Negative for tenderness, breast mass and discharge.   Genitourinary: Negative for dysuria, frequency, genital sores, hematuria, pelvic pain, urgency, vaginal bleeding and vaginal discharge.   Musculoskeletal: Positive for arthralgias and myalgias. Negative for joint swelling.   Skin: Positive for rash.   Neurological: Positive for headaches. Negative for dizziness, weakness and paresthesias.   Psychiatric/Behavioral: Negative for mood changes. The patient is not nervous/anxious.        OBJECTIVE:   /62   Pulse 85   Temp 98.3  F (36.8  C) (Oral)   Resp 17   Ht 1.485 m (4' 10.47\")   Wt 68.9 kg (152 lb)   LMP  (LMP Unknown)   SpO2 97%   BMI 31.26 kg/m   Estimated body mass index is 31.26 kg/m  as calculated from the following:    Height as of this encounter: 1.485 m (4' 10.47\").    Weight as of this encounter: 68.9 kg (152 lb).  Physical Exam  GENERAL: healthy, alert and no distress  EYES: Eyes grossly normal to inspection, PERRL and conjunctivae and sclerae normal  HENT: ear canals and TM's normal, nose and mouth without ulcers or lesions  NECK: no adenopathy, no asymmetry, masses, or scars and thyroid normal to palpation  RESP: lungs clear to auscultation - no rales, rhonchi or wheezes  CV: regular rate and rhythm, normal S1 S2, no S3 or S4, no murmur, click or rub, no peripheral edema and peripheral pulses strong  ABDOMEN: soft, nontender, no hepatosplenomegaly, no masses and bowel sounds normal  MS: no gross musculoskeletal defects noted, no edema. FROM to all extremities. No spinal tenderness. Tenderness " "over the left heel.   SKIN: no suspicious lesions or rashes  NEURO: Normal strength and tone, mentation intact and speech normal. Cranial nerves II-XII are grossly intact. DTRs are 2+/4 throughout and symmetric. Gait is stable.   PSYCH: mentation appears normal, affect normal/bright    ASSESSMENT / PLAN:       ICD-10-CM    1. Welcome to Medicare preventive visit  Z00.00       2. Type 1 diabetes mellitus without complication (H)  E10.9       3. Hypothyroidism due to Hashimoto's thyroiditis  E03.8     E06.3       4. Plantar fasciitis  M72.2       5. Hypertension goal BP (blood pressure) < 140/90  I10       6. Need for pneumococcal vaccine  Z23 Pneumococcal 20 Valent Conjugate (Prevnar 20)        1. Welcome to Medicare visit completed.    2-3. She is followed closely by endocrinology.    4. I recommend home stretching, massage and avoidance of barefoot walking. She will follow-up with podiatry in January.     5. BP is stable on current medications.    6. Vaccine administered by MA.    Follow-up annually. Will refill medications when need.     Patient has been advised of split billing requirements and indicates understanding: Yes      COUNSELING:  Reviewed preventive health counseling, as reflected in patient instructions       Regular exercise       Healthy diet/nutrition      BMI:   Estimated body mass index is 31.26 kg/m  as calculated from the following:    Height as of this encounter: 1.485 m (4' 10.47\").    Weight as of this encounter: 68.9 kg (152 lb).         She reports that she has never smoked. She has never used smokeless tobacco.      Appropriate preventive services were discussed with this patient, including applicable screening as appropriate for cardiovascular disease, diabetes, osteopenia/osteoporosis, and glaucoma.  As appropriate for age/gender, discussed screening for colorectal cancer, prostate cancer, breast cancer, and cervical cancer. Checklist reviewing preventive services available has been " given to the patient.    Reviewed patients plan of care and provided an AVS. The Basic Care Plan (routine screening as documented in Health Maintenance) for Crystal meets the Care Plan requirement. This Care Plan has been established and reviewed with the Patient.    Marcus Salgado PA-C  [facility]    Identified Health Risks:

## 2022-12-21 NOTE — PATIENT INSTRUCTIONS
I recommend home stretching and massage for the foot/heel pain. Avoid walking barefoot.  Follow-up with podiatry.    Follow-up annually.

## 2023-01-02 ENCOUNTER — DOCUMENTATION ONLY (OUTPATIENT)
Dept: ENDOCRINOLOGY | Facility: CLINIC | Age: 58
End: 2023-01-02

## 2023-01-19 DIAGNOSIS — E10.9 TYPE 1 DIABETES MELLITUS WITHOUT COMPLICATION (H): ICD-10-CM

## 2023-01-20 DIAGNOSIS — M79.18 MYOFASCIAL PAIN: ICD-10-CM

## 2023-01-20 DIAGNOSIS — M62.838 MUSCLE SPASM: ICD-10-CM

## 2023-01-20 RX ORDER — SIMVASTATIN 20 MG
TABLET ORAL
Qty: 90 TABLET | Refills: 0 | Status: SHIPPED | OUTPATIENT
Start: 2023-01-20 | End: 2023-05-05

## 2023-01-20 NOTE — TELEPHONE ENCOUNTER
Received fax request from [facility] pharmacy requesting refill(s) for tiZANidine (ZANAFLEX) 2 MG tablet    Last refilled on 11/12/22    Pt last seen on 09/14/22  Next appt scheduled for : none    Will facilitate refill.

## 2023-01-22 RX ORDER — TIZANIDINE 2 MG/1
2-4 TABLET ORAL 3 TIMES DAILY PRN
Qty: 60 TABLET | Refills: 1 | Status: SHIPPED | OUTPATIENT
Start: 2023-01-22 | End: 2023-04-07

## 2023-01-22 NOTE — TELEPHONE ENCOUNTER
Signed Prescriptions:                        Disp   Refills    tiZANidine (ZANAFLEX) 2 MG tablet          60 tab*1        Sig: Take 1-2 tablets (2-4 mg) by mouth 3 times daily as           needed for muscle spasms Caution sedation. Stop           cyclobenzaprine. Don't drive until you know how           you feel.  Authorizing Provider: ROXANA VARNER, RN CNP, FNP  [facility]  [facility]

## 2023-01-25 ENCOUNTER — MYC MEDICAL ADVICE (OUTPATIENT)
Dept: ENDOCRINOLOGY | Facility: CLINIC | Age: 58
End: 2023-01-25
Payer: COMMERCIAL

## 2023-01-25 DIAGNOSIS — E10.9 TYPE 1 DIABETES MELLITUS WITHOUT COMPLICATION (H): Primary | ICD-10-CM

## 2023-01-25 NOTE — TELEPHONE ENCOUNTER
Pended.  Please fill in script for what you deem appropriate. Thanks!    Lia Sheriff on 1/25/2023 at 3:55 PM

## 2023-02-05 ENCOUNTER — NURSE TRIAGE (OUTPATIENT)
Dept: NURSING | Facility: CLINIC | Age: 58
End: 2023-02-05
Payer: COMMERCIAL

## 2023-02-06 ENCOUNTER — VIRTUAL VISIT (OUTPATIENT)
Dept: FAMILY MEDICINE | Facility: OTHER | Age: 58
End: 2023-02-06
Payer: COMMERCIAL

## 2023-02-06 DIAGNOSIS — E10.65 TYPE 1 DIABETES MELLITUS WITH HYPERGLYCEMIA (H): ICD-10-CM

## 2023-02-06 DIAGNOSIS — J01.01 ACUTE RECURRENT MAXILLARY SINUSITIS: Primary | ICD-10-CM

## 2023-02-06 DIAGNOSIS — K08.89 PAIN, DENTAL: ICD-10-CM

## 2023-02-06 PROCEDURE — 99213 OFFICE O/P EST LOW 20 MIN: CPT | Mod: 95 | Performed by: PHYSICIAN ASSISTANT

## 2023-02-06 NOTE — TELEPHONE ENCOUNTER
Back in Oct and was sick through Dec.  Patient got better.  Patient went to dentist and they found an infection.  Since patient was on an antibiotic they said that will treat this infection also.  Patient states she finally felt better.    Now her symptoms are back: sinus pressure, sneezing, coughing, headache, swelling tenderness around eyes, ear pressure.  The exact symptoms as she had before.    Patient has an appointment with her dentis but not til next week.  She is wondering if she is not feeling well due to infection in her mouth not being gone?    Patient denies any breathing issues, has been taking the OTC medications and not helping, no fever, slight headache, continuous coughing.    Care advise: OTC medication, push fluids, nasal washes.  Call back if difficulty breathing.  Sent to scheduling to make a virtual visit.    Namita Singleton RN   02/05/23 9:44 PM  [facility] Nurse Advisor    Reason for Disposition    Lots of coughing    Additional Information    Negative: SEVERE difficulty breathing (e.g., struggling for each breath, speaks in single words)    Negative: Sounds like a life-threatening emergency to the triager    Negative: [1] Sinus infection AND [2] taking an antibiotic AND [3] symptoms continue    Negative: [1] Difficulty breathing AND [2] not from stuffy nose (e.g., not relieved by cleaning out the nose)    Negative: [1] SEVERE headache AND [2] fever    Negative: [1] Redness or swelling on the cheek, forehead or around the eye AND [2] fever    Negative: Fever > 104 F (40 C)    Negative: Patient sounds very sick or weak to the triager    Negative: [1] SEVERE pain AND [2] not improved 2 hours after pain medicine    Negative: [1] Redness or swelling on the cheek, forehead or around the eye AND [2] no fever    Negative: [1] Fever > 101 F (38.3 C) AND [2] age > 60 years    Negative: [1] Fever > 100.0 F (37.8 C) AND [2] bedridden (e.g., nursing home patient, CVA, chronic illness, recovering  from surgery)    Negative: [1] Fever > 100.0 F (37.8 C) AND [2] diabetes mellitus or weak immune system (e.g., HIV positive, cancer chemo, splenectomy, organ transplant, chronic steroids)    Negative: Fever present > 3 days (72 hours)    Negative: [1] Fever returns after gone for over 24 hours AND [2] symptoms worse or not improved    Negative: [1] Sinus pain (not just congestion) AND [2] fever    Negative: Earache    Negative: [1] Sinus congestion (pressure, fullness) AND [2] present > 10 days    Negative: [1] Nasal discharge AND [2] present > 10 days    Negative: [1] Using nasal washes and pain medicine > 24 hours AND [2] sinus pain (around cheekbone or eye) persists    Protocols used: SINUS PAIN OR CONGESTION-A-AH

## 2023-02-06 NOTE — PROGRESS NOTES
Maria M is a 57 year old who is being evaluated via a billable video visit.      How would you like to obtain your AVS? MyChart  If the video visit is dropped, the invitation should be resent by: Text to cell phone: [phone]  Will anyone else be joining your video visit? No      Assessment & Plan       ICD-10-CM    1. Acute recurrent maxillary sinusitis  J01.01 amoxicillin-clavulanate (AUGMENTIN) 875-125 MG tablet      2. Pain, dental  K08.89 amoxicillin-clavulanate (AUGMENTIN) 875-125 MG tablet      3. Type 1 diabetes mellitus with hyperglycemia (H)  E10.65           1-2. History of recurrent sinusitis with on and off symptoms for a few months with worsening sinus congestion since last night and tooth pain over the past week. Will treat with Augmentin to take as directed and she has a dental follow-up next week. She is already on a probiotic. I recommend she test for COVID as well given her  is immunocompromised and given her diabetes. I recommend OTC decongestants and Flonase nasal spray. If not improving, she should be seen in clinic.     3. Follows with endocrinology.    Marcus Salgado PA-C  [facility]   Maria M is a 57 year old female, presenting for the following health issues:  No chief complaint on file.      HPI     She has struggled with on and off sinus congestion for the past few months. She also has noticed tooth pain in her upper mouth and had a left tooth pulled a few weeks ago due to an infection with subsequent root canal and crown placement. She was already on doxycycline at the time for a sinus infection so did not receive any additional antibiotics from the dentist. She now has pain in two of her right upper teeth/molars, similar to the previous left sided tooth pain so is worried about another infection. No mouth swelling or drainage. She also has worsening pressure in her maxillary and frontal sinuses lately, especially since last night as she  now has increased nasal congestion and a cough. She denies any shortness of breath, fevers or chills.    Review of Systems   Constitutional, HEENT, cardiovascular, pulmonary, gi and gu systems are negative, except as otherwise noted.      Objective           Vitals:  No vitals were obtained today due to virtual visit.    Physical Exam   GENERAL: Healthy, alert and no distress. She sounds congested.  EYES: Eyes grossly normal to inspection.  No discharge or erythema, or obvious scleral/conjunctival abnormalities.  RESP: No audible wheeze, cough, or visible cyanosis.  No visible retractions or increased work of breathing.    SKIN: Visible skin clear. No significant rash, abnormal pigmentation or lesions.  NEURO: Cranial nerves grossly intact.  Mentation and speech appropriate for age.  PSYCH: Mentation appears normal, affect normal/bright, judgement and insight intact, normal speech and appearance well-groomed.          Video-Visit Details    Type of service:  Video Visit   Video Start Time: 2:52 pm  Video End Time: 2:59 pm    Originating Location (pt. Location): Home  Distant Location (provider location):  Off-site  Platform used for Video Visit: Emmanuel

## 2023-02-09 ENCOUNTER — TELEPHONE (OUTPATIENT)
Dept: EDUCATION SERVICES | Facility: CLINIC | Age: 58
End: 2023-02-09
Payer: COMMERCIAL

## 2023-02-09 RX ORDER — PROCHLORPERAZINE 25 MG/1
SUPPOSITORY RECTAL
Qty: 1 EACH | Status: CANCELLED | OUTPATIENT
Start: 2023-02-09

## 2023-02-09 NOTE — TELEPHONE ENCOUNTER
General Call      Reason for Call:      What are your questions or concerns:  Patient states the she needs a new Continuous Blood Gluc Transmit (DEXCOM G6 TRANSMITTER) MISC and does not know how to go about it. Patient states that she would like to speak with Jessica before an order is sent to discuss         Could we send this information to you in [facility] or would you prefer to receive a phone call?:   Patient would prefer a phone call   Okay to leave a detailed message?: Yes at Cell number on file:    Telephone Information:   Mobile [phone]

## 2023-02-10 ENCOUNTER — VIRTUAL VISIT (OUTPATIENT)
Dept: FAMILY MEDICINE | Facility: OTHER | Age: 58
End: 2023-02-10
Payer: COMMERCIAL

## 2023-02-10 ENCOUNTER — NURSE TRIAGE (OUTPATIENT)
Dept: FAMILY MEDICINE | Facility: OTHER | Age: 58
End: 2023-02-10

## 2023-02-10 ENCOUNTER — MYC MEDICAL ADVICE (OUTPATIENT)
Dept: FAMILY MEDICINE | Facility: OTHER | Age: 58
End: 2023-02-10

## 2023-02-10 DIAGNOSIS — U07.1 INFECTION DUE TO 2019 NOVEL CORONAVIRUS: Primary | ICD-10-CM

## 2023-02-10 PROCEDURE — 99441 PR PHYSICIAN TELEPHONE EVALUATION 5-10 MIN: CPT | Mod: CS | Performed by: PHYSICIAN ASSISTANT

## 2023-02-10 NOTE — TELEPHONE ENCOUNTER
Okay to add on to my schedule for a virtual [facility] this afternoon and I will get to her when I am able.    Marcus Salgado PA-C

## 2023-02-10 NOTE — TELEPHONE ENCOUNTER
"Contacted pt. She denies difficulty breathing. She does state that her body aches and her symptoms today are worse than yesterday. She reports that she is able to take a deep breath but she does feel like she has some \"pressure\". She believes that it is due to coughing so much and it is muscular.     She has questions about taking ivermectin? If she should continue her other treatment from ?     Due to pt's questions and chest pressure, recommended a provider visit. No openings in virtual urgent care and pt would like to see PCP.     PCP-are you able to work pt in for covid treatment visit?      Teresa Velasco, TERESAN, RN, PHN  Registered Nurse-Clinic Triage  [facility]  2/10/2023 at 12:58 PM    Reason for Disposition    Chest pain or pressure  (Exception: MILD central chest pain, present only when coughing)    Additional Information    Negative: SEVERE difficulty breathing (e.g., struggling for each breath, speaks in single words)    Negative: Difficult to awaken or acting confused (e.g., disoriented, slurred speech)    Negative: Bluish (or gray) lips or face now    Negative: Shock suspected (e.g., cold/pale/clammy skin, too weak to stand, low BP, rapid pulse)    Negative: Sounds like a life-threatening emergency to the triager    Negative: [1] Diagnosed or suspected COVID-19 AND [2] symptoms lasting 3 or more weeks    Negative: [1] COVID-19 exposure AND [2] no symptoms    Negative: COVID-19 vaccine reaction suspected (e.g., fever, headache, muscle aches) occurring 1 to 3 days after getting vaccine    Negative: COVID-19 vaccine, questions about    Negative: [1] Lives with someone known to have influenza (flu test positive) AND [2] flu-like symptoms (e.g., cough, runny nose, sore throat, SOB; with or without fever)    Negative: [1] Adult with possible COVID-19 symptoms AND [2] triager concerned about severity of symptoms or other causes    Negative: COVID-19 and breastfeeding, questions " about    Negative: SEVERE or constant chest pain or pressure  (Exception: Mild central chest pain, present only when coughing.)    Negative: MODERATE difficulty breathing (e.g., speaks in phrases, SOB even at rest, pulse 100-120)    Negative: Headache and stiff neck (can't touch chin to chest)    Negative: Oxygen level (e.g., pulse oximetry) 90 percent or lower    Protocols used: CORONAVIRUS (COVID-19) DIAGNOSED OR WGESVCTUM-F-FJ

## 2023-02-10 NOTE — TELEPHONE ENCOUNTER
Contacted pt. Advised her of provider's message. Appointment scheduled.     Appointments in Next Year    Feb 10, 2023  2:30 PM  (Arrive by 2:20 PM)  Covid Treatment Visit with Marcus Salgado PA-C  [facility] ([facility] ) [phone]     Teresa Velasco, TERESAN, RN, PHN  Registered Nurse-Clinic Triage  [facility]  2/10/2023 at 1:50 PM

## 2023-02-10 NOTE — PROGRESS NOTES
Maria M is a 57 year old who is being evaluated via a billable telephone visit.      What phone number would you like to be contacted at?   How would you like to obtain your AVS? MyChart  Distant Location (provider location):  On-site    Assessment & Plan       ICD-10-CM    1. Infection due to 2019 novel coronavirus  U07.1 nirmatrelvir and ritonavir (PAXLOVID) therapy pack          Given her symptoms and risk factors, I recommend treatment with Paxlovid. Common side effects discussed including the risk of rebound COVID.  She will hold simvastatin for 10 days. I recommend she stay well hydrated and try to rest, take Tylenol/ibuprofen as needed along with vitamin C, vitamin D and zinc. If symptoms are not improving or are worsening, she should contact the clinic or be seen right away. Quarantine guidelines discussed.     Marcus Salgado PA-C  [facility]   Maria M is a 57 year old female, presenting for the following health issues:  Covid Concern      HPI       COVID-19 Symptom Review  How many days ago did these symptoms start? Sunday, tested positive today on home test    Are any of the following symptoms significant for you?  New or worsening difficulty breathing? Yes    Please describe what kind of difficulty you are having breathing:No dyspnea, or dyspnea at patients normal baseline    Worsening cough? Yes, it's a dry cough. Occasionally coughing up mucous     Fever or chills? Yes, I felt feverish or had chills.    Headache: YES    Sore throat: YES    Chest pain: YES- chest heaviness    Diarrhea: No    Body aches? YES    What treatments has patient tried? Decongestant - oral   Does patient live in a nursing home, group home, or shelter? No  Does patient have a way to get food/medications during quarantined? Yes, I have a friend or family member who can help me.    She was treated for a sinus infection on 2/6 but started to have new symptoms with increased fatigue, cough and  body aches last night and had a positive COVID test this morning. Her  also tested positive for COVID. She his mild chest heaviness but denies significant shortness of breath.      Review of Systems   Constitutional, HEENT, cardiovascular, pulmonary, gi and gu systems are negative, except as otherwise noted.      Objective    Vitals - Patient Reported  Pain Score: Extreme Pain (8)  Pain Loc: Head        Physical Exam   healthy, alert and no distress  PSYCH: Alert and oriented times 3; coherent speech, normal   rate and volume, able to articulate logical thoughts, able   to abstract reason, no tangential thoughts, no hallucinations   or delusions  Her affect is normal  RESP: No cough, no audible wheezing, able to talk in full sentences  Remainder of exam unable to be completed due to telephone visits        Phone call duration: 8 minutes

## 2023-02-10 NOTE — TELEPHONE ENCOUNTER
COVID Positive/Requesting COVID treatment    Patient is positive for COVID and requesting treatment options.    Date of positive COVID test (PCR or at home)? 2/10/2023 at home.  Current COVID symptoms: cough, fatigue and muscle or body aches  Date COVID symptoms began: 2/9/2023- also was in UC and had positive test on 2/9. Started on meds.    Message should be routed to clinic RN pool. Best phone number to use for call back: [phone]

## 2023-02-10 NOTE — PROGRESS NOTES
"Maria M is a 57 year old who is being evaluated via a billable video visit.      How would you like to obtain your AVS? MyChart     If the video visit is dropped, the invitation should be resent by: Text to cell phone: [phone]     Will anyone else be joining your video visit? No  {If patient encounters technical issues they should call [phone] :692540}        {PROVIDER CHARTING PREFERENCE:145980}    Subjective   Maria M is a 57 year old, presenting for the following health issues:  Covid Concern      HPI       COVID-19 Symptom Review  How many days ago did these symptoms start? Sunday, tested positive today on home test    Are any of the following symptoms significant for you?  New or worsening difficulty breathing? Yes    Please describe what kind of difficulty you are having breathing:No dyspnea, or dyspnea at patients normal baseline    Worsening cough? Yes, it's a dry cough. Occasionally coughing up mucous     Fever or chills? Yes, I felt feverish or had chills.    Headache: YES    Sore throat: YES    Chest pain: YES- chest heaviness    Diarrhea: No    Body aches? YES    What treatments has patient tried? Decongestant - oral   Does patient live in a nursing home, group home, or shelter? No  Does patient have a way to get food/medications during quarantined? Yes, I have a friend or family member who can help me.    {Provider  Link to COVID SmartSet :023613}          {additonal problems for provider to add (Optional):840727}    Review of Systems   {ROS COMP (Optional):768846}      Objective    Vitals - Patient Reported  Pain Score: Extreme Pain (8)  Pain Loc: Head      Vitals:  No vitals were obtained today due to virtual visit.    Physical Exam   {video visit exam brief selected:422170::\"GENERAL: Healthy, alert and no distress\",\"EYES: Eyes grossly normal to inspection.  No discharge or erythema, or obvious scleral/conjunctival abnormalities.\",\"RESP: No audible wheeze, cough, or visible cyanosis.  No " "visible retractions or increased work of breathing.  \",\"SKIN: Visible skin clear. No significant rash, abnormal pigmentation or lesions.\",\"NEURO: Cranial nerves grossly intact.  Mentation and speech appropriate for age.\",\"PSYCH: Mentation appears normal, affect normal/bright, judgement and insight intact, normal speech and appearance well-groomed.\"}    {Diagnostic Test Results (Optional):404525}    {AMBULATORY ATTESTATION (Optional):441247}        Video-Visit Details    Type of service:  Video Visit   Video Start Time: {video visit start/end time for provider to select:103860}  Video End Time:{video visit start/end time for provider to select:347852}    Originating Location (pt. Location): {video visit patient location:396802::\"Home\"}  {PROVIDER LOCATION On-site should be selected for visits conducted from your clinic location or adjoining [facility] hospital, academic office, or other nearby [facility] building. Off-site should be selected for all other provider locations, including home:342633}  Distant Location (provider location):  {virtual location provider:957829}  Platform used for Video Visit: {Virtual Visit Platforms:162308::\"Lifestyle Air\"}    "

## 2023-02-10 NOTE — PATIENT INSTRUCTIONS
Will treat with Paxlovid.  Hold simvastatin for 10 days  COVID-19 Outpatient Treatments  Your care team can help you find the best treatments for COVID-19. Talk to a health care provider or refer to the FDA medicine fact sheets below.  Important: You can't have Paxlovid or molnupiravir if you're starting the medicine more than 5 days after your symptoms have started.  Paxlovid: https://www.fda.gov/media/870985/download  Molnupiravir (Lagevrio): https://www.fda.gov/media/038340/download  Paxlovid (nimatrelvir and ritonavir)  How it works  Two medicines (nirmatrelvir and ritonavir) are taken together. They stop the virus from growing. Less amount of virus is easier for your body to fight.  Benefits  Lowers risk of a hospital stay or death from COVID-19.  How to take  Medicine comes in a daily container with both medicine tablets. Take by mouth twice daily (once in the morning, once at night) for 5 days.  The number of tablets to take varies by patient.  Don't chew or break capsules. Swallow whole.  When to take  Take as soon as possible after positive COVID-19 test result, and within 5 days of your first symptoms.  Who can take it  Patients must be 12 years or older, weigh at least 88 pounds, and have tested positive for COVID-19. Paxlovid is the preferred treatment for pregnant patients.  Possible side effects  Can cause altered sense of taste, diarrhea (loose, watery stools), high blood pressure, muscle aches.  Medicine conflicts  Some medicines may conflict with Paxlovid and may cause serious side effects.  Tell your care team about all the medicines you take, including prescription and over-the-counter medicines, vitamins, and herbal supplements.  Your care team will review your medicines to make sure that you can safely take Paxlovid.  Cautions  Paxlovid is not advised for patients with severe kidney or liver disease. If you have kidney or liver problems, the dose may need to be changed.  If you're pregnant or  breastfeeding, talk to your care team about your options.  If you take hormonal birth control (such as the Pill), then you or your partner should also use a non-hormonal form of birth control (such as a condom). Keep doing this for 1 menstrual cycle after your last dose of Paxlovid.  Molnupiravir (Lagevrio)  How it works  Stops the virus from growing. Less amount of virus is easier for your body to fight.  Benefits  Lowers risk of a hospital stay or death from COVID-19.  How to take  Take 4 capsules by mouth every 12 hours (4 in the morning and 4 at night) for 5 days. Don't chew or break capsules. Swallow whole.  When to take  Take as soon as possible after positive COVID-19 test result, and within 5 days of your first symptoms.  Who can take it  Patients must be 18 years or older and have tested positive for COVID-19.  Possible side effects  Diarrhea (loose, watery stools), nausea (feeling sick to your stomach), dizziness, headaches.  Medicine conflicts  Right now, there are no known conflicts with other drugs. But tell your care team about all medicines you take.  Cautions  This medicine is not advised for patients who are pregnant.  If you are someone who could become pregnant, use trusted birth control until 4 days after your last dose of molnupiravir.  If your partner could become pregnant, you should use trusted birth control until 3 months after your last dose of molnupiravir.  For informational purposes only. Not to replace the advice of your health care provider. Copyright   2022 [facility]. All rights reserved. Clinically reviewed by Oneida Baig, PharmD, BCACP. Mem[employer]ane 627916 - REV 12/22.    Instructions for Patients      What are the symptoms of COVID-19?  Symptoms can include fever, cough, shortness of breath, chills, headache, muscle pain sore throat, fatigue, runny or stuffy nose, and loss of taste and smell. Other less common symptoms include nausea, vomiting, or diarrhea (watery  stools).    Know when to call 911. Emergency warning signs include:  Trouble breathing or shortness of breath  Pain or pressure in the chest that doesn't go away  Feeling confused like you haven't felt before, or not being able to wake up  Bluish-colored lips or face    How can I take care of myself?  Get lots of rest. Drink extra fluids (unless a doctor has told you not to).  Take Tylenol (acetaminophen) for fever or pain. If you have liver or kidney problems, ask your family doctor if it's okay to take Tylenol   Adults can take either:   650 mg (two 325 mg pills or tablets) every 4 to 6 hours, or...   1,000 mg (two 500 mg pills) every 8 hours as needed.  Note: Don't take more than 3,000 mg in one day. Acetaminophen is found in many medicines (both prescribed and over the counter). Read all labels to be sure you don't take too much.  For children, check the Tylenol bottle for the right dose. The dose is based on the child's age or weight.  Take over the counter medicines for your symptoms as needed. Talk to your pharmacist.  If you have other health problems (like cancer, heart failure, an organ transplant, or severe kidney disease): Call your specialty clinic if you don't feel better in the next 2 days.    Where can I get more information?  [facility] COVID-19 Resource Hub: www.[facility].org/covid19/   CDC Quarantine & Isolation: https://www.cdc.gov/coronavirus/2019-ncov/your-health/quarantine-isolation.html   CDC - What to Do If You're Sick: https://www.cdc.gov/coronavirus/2019-ncov/if-you-are-sick/index.html  Learn about the ACTIV-6 Clinical Trial: activ6.[facility].edu or call (641)-[phone]

## 2023-02-11 ENCOUNTER — MYC MEDICAL ADVICE (OUTPATIENT)
Dept: ENDOCRINOLOGY | Facility: CLINIC | Age: 58
End: 2023-02-11
Payer: COMMERCIAL

## 2023-02-13 ENCOUNTER — E-VISIT (OUTPATIENT)
Dept: FAMILY MEDICINE | Facility: OTHER | Age: 58
End: 2023-02-13
Payer: COMMERCIAL

## 2023-02-13 DIAGNOSIS — B37.31 YEAST INFECTION OF THE VAGINA: Primary | ICD-10-CM

## 2023-02-13 PROCEDURE — 99421 OL DIG E/M SVC 5-10 MIN: CPT | Performed by: PHYSICIAN ASSISTANT

## 2023-02-13 RX ORDER — FLUCONAZOLE 150 MG/1
150 TABLET ORAL ONCE
Qty: 2 TABLET | Refills: 0 | Status: SHIPPED | OUTPATIENT
Start: 2023-02-13 | End: 2023-02-13

## 2023-02-20 ENCOUNTER — MYC MEDICAL ADVICE (OUTPATIENT)
Dept: ENDOCRINOLOGY | Facility: CLINIC | Age: 58
End: 2023-02-20
Payer: COMMERCIAL

## 2023-02-20 DIAGNOSIS — M81.6 LOCALIZED OSTEOPOROSIS WITHOUT CURRENT PATHOLOGICAL FRACTURE: ICD-10-CM

## 2023-02-20 DIAGNOSIS — E10.9 TYPE 1 DIABETES MELLITUS WITHOUT COMPLICATION (H): ICD-10-CM

## 2023-02-20 RX ORDER — SYRINGE-NEEDLE,INSULIN,0.5 ML 27GX1/2"
SYRINGE, EMPTY DISPOSABLE MISCELLANEOUS
Qty: 200 EACH | Refills: 1 | Status: SHIPPED | OUTPATIENT
Start: 2023-02-20 | End: 2023-05-18

## 2023-02-20 RX ORDER — ALENDRONATE SODIUM 70 MG/1
TABLET ORAL
Qty: 12 TABLET | Refills: 2 | Status: SHIPPED | OUTPATIENT
Start: 2023-02-20 | End: 2023-10-23

## 2023-02-20 RX ORDER — INSULIN GLARGINE 100 [IU]/ML
7 INJECTION, SOLUTION SUBCUTANEOUS DAILY
OUTPATIENT
Start: 2023-02-20

## 2023-02-20 NOTE — TELEPHONE ENCOUNTER
See [employer] message regarding vial and syringes.  I called her and she is going on vacation and wants to go back to vial and syringes for her Lantus and Apidra.  She said she does not feel she gets the insulin so she draws out of the pens with a syringe to ensure she can see the insulin.  Reviewed technique and using an air shot or priming when she places a pen needle on the pen.  She did not want to try different pen needles. She is going on vacation and just wants to ensure she does not have problems.  She sees Dr.Susanne Bradford in April so for now she wants to use syringes and draw out of her pens. She has enough pens until she sees Dr.Susanne Bradford.  She is on such tiny doses so I reinforced being very accurate when drawing up her insulin.    Dolores Martinez RN, [facility]

## 2023-02-20 NOTE — TELEPHONE ENCOUNTER
Prescription approved per [facility] Refill Protocol.     abnormal bleeding or bruising, blood clots, jaundice or swollen lymph nodes. Endocrine:   No malaise/lethargy, palpitations, polydipsia/polyuria, temperature intolerance or unexpected weight changes  Skin:  No rashes or non-healing ulcers. Physical Exam:Blood pressure 120/88, pulse 58, weight 200 lb 6.4 oz (90.9 kg). General:  Alert and oriented. No acute distress. Appears comfortable. HEENT:  Normocephalic. No trauma. EOMI. Neck:  Supple, no JVD  Cardiovascular:  RRR  Pulses: 2+ right femoral   Lungs:  Clear to auscultation. No rales, wheezes, or rhonchi. Abd:  Soft, non-tender, non-distended. No peritoneal signs. Ext:  No clubbing, cyanosis, or edema. Right groin soft, no hematoma. Neuro:  CN's 2-12 grossly in tact. Gait normal.  Motor and sensory exams grossly normal.  Skin:  No rashes or skin breakdown.     CBC:   Lab Results   Component Value Date    WBC 6.8 2018    HGB 14.4 2018    HCT 41.4 2018    MCV 92.8 2018     2018     BMP:  Lab Results   Component Value Date    CREATININE 0.9 2018    BUN 17 2018     2018    K 4.3 2018     2018    CO2 26 2018     Mag:   Lab Results   Component Value Date    MG 2.20 2018     LIVER PROFILE:   Lab Results   Component Value Date    ALT 32 2017    AST 26 2017    ALKPHOS 68 2017    BILITOT 0.4 2017     PT/INR:   Lab Results   Component Value Date    INR 1.06 2018    PROTIME 12.1 2018     BNP:  No results found for: BNP  LIPIDS:  No components found for: CHLPL  Lab Results   Component Value Date    TRIG 458 (H) 2017     Lab Results   Component Value Date    HDL 33 (L) 2017     Lab Results   Component Value Date    LDLCALC see below 2017     Lab Results   Component Value Date    LABVLDL see below 2017     TSH:  Lab Results   Component Value Date    TSH 2.18 2017       IMAGIN2018 ECG:

## 2023-03-14 ENCOUNTER — TRANSFERRED RECORDS (OUTPATIENT)
Dept: HEALTH INFORMATION MANAGEMENT | Facility: CLINIC | Age: 58
End: 2023-03-14

## 2023-03-14 LAB — RETINOPATHY: POSITIVE

## 2023-03-16 ENCOUNTER — MYC MEDICAL ADVICE (OUTPATIENT)
Dept: ENDOCRINOLOGY | Facility: CLINIC | Age: 58
End: 2023-03-16

## 2023-03-16 DIAGNOSIS — E10.9 TYPE 1 DIABETES MELLITUS WITHOUT COMPLICATION (H): Primary | ICD-10-CM

## 2023-03-17 RX ORDER — CALCIUM CARB/VITAMIN D3/VIT K1 500-100-40
TABLET,CHEWABLE ORAL
Qty: 100 EACH | Refills: 3 | Status: SHIPPED | OUTPATIENT
Start: 2023-03-17 | End: 2023-04-24

## 2023-03-25 ENCOUNTER — HEALTH MAINTENANCE LETTER (OUTPATIENT)
Age: 58
End: 2023-03-25

## 2023-04-06 DIAGNOSIS — M79.18 MYOFASCIAL PAIN: ICD-10-CM

## 2023-04-06 DIAGNOSIS — M62.838 MUSCLE SPASM: ICD-10-CM

## 2023-04-06 NOTE — TELEPHONE ENCOUNTER
Received fax request from [facility] pharmacy requesting refill(s) for tiZANidine (ZANAFLEX) 2 MG tablet     Last refilled on 2/20/23     Pt last seen on 9/14/22  Next appt scheduled for none     Will facilitate refill.

## 2023-04-07 RX ORDER — TIZANIDINE 2 MG/1
2-4 TABLET ORAL 3 TIMES DAILY PRN
Qty: 60 TABLET | Refills: 1 | Status: SHIPPED | OUTPATIENT
Start: 2023-04-07 | End: 2023-06-09

## 2023-04-07 NOTE — TELEPHONE ENCOUNTER
Signed Prescriptions:                        Disp   Refills    tiZANidine (ZANAFLEX) 2 MG tablet          60 tab*1        Sig: Take 1-2 tablets (2-4 mg) by mouth 3 times daily as           needed for muscle spasms Caution sedation. Stop           cyclobenzaprine. Don't drive until you know how           you feel.  Authorizing Provider: ROXANA VARNER, RN CNP, FNP  [facility]  [facility]

## 2023-04-21 ENCOUNTER — LAB (OUTPATIENT)
Dept: LAB | Facility: OTHER | Age: 58
End: 2023-04-21
Payer: COMMERCIAL

## 2023-04-21 DIAGNOSIS — E13.319 RETINOPATHY DUE TO SECONDARY DIABETES (H): Primary | ICD-10-CM

## 2023-04-21 DIAGNOSIS — E10.9 TYPE 1 DIABETES MELLITUS WITHOUT COMPLICATION (H): ICD-10-CM

## 2023-04-21 LAB
BUN SERPL-MCNC: 12.4 MG/DL (ref 6–20)
CHOLEST SERPL-MCNC: 199 MG/DL
CREAT SERPL-MCNC: 0.98 MG/DL (ref 0.51–0.95)
CREAT UR-MCNC: 72.7 MG/DL
FASTING STATUS PATIENT QL REPORTED: YES
GFR SERPL CREATININE-BSD FRML MDRD: 67 ML/MIN/1.73M2
GLUCOSE SERPL-MCNC: 173 MG/DL (ref 70–99)
HBA1C MFR BLD: 5.8 % (ref 0–5.6)
HDLC SERPL-MCNC: 66 MG/DL
LDLC SERPL CALC-MCNC: 111 MG/DL
MICROALBUMIN UR-MCNC: <12 MG/L
MICROALBUMIN/CREAT UR: NORMAL MG/G{CREAT}
NONHDLC SERPL-MCNC: 133 MG/DL
POTASSIUM SERPL-SCNC: 4.8 MMOL/L (ref 3.4–5.3)
TRIGL SERPL-MCNC: 111 MG/DL
TSH SERPL DL<=0.005 MIU/L-ACNC: 3.83 UIU/ML (ref 0.3–4.2)

## 2023-04-21 PROCEDURE — 83036 HEMOGLOBIN GLYCOSYLATED A1C: CPT

## 2023-04-21 PROCEDURE — 82565 ASSAY OF CREATININE: CPT

## 2023-04-21 PROCEDURE — 82043 UR ALBUMIN QUANTITATIVE: CPT

## 2023-04-21 PROCEDURE — 82570 ASSAY OF URINE CREATININE: CPT

## 2023-04-21 PROCEDURE — 84520 ASSAY OF UREA NITROGEN: CPT

## 2023-04-21 PROCEDURE — 36415 COLL VENOUS BLD VENIPUNCTURE: CPT

## 2023-04-21 PROCEDURE — 80061 LIPID PANEL: CPT

## 2023-04-21 PROCEDURE — 84443 ASSAY THYROID STIM HORMONE: CPT

## 2023-04-21 PROCEDURE — 84132 ASSAY OF SERUM POTASSIUM: CPT

## 2023-04-21 PROCEDURE — 82947 ASSAY GLUCOSE BLOOD QUANT: CPT

## 2023-04-24 ENCOUNTER — TELEPHONE (OUTPATIENT)
Dept: ENDOCRINOLOGY | Facility: CLINIC | Age: 58
End: 2023-04-24

## 2023-04-24 ENCOUNTER — OFFICE VISIT (OUTPATIENT)
Dept: ENDOCRINOLOGY | Facility: CLINIC | Age: 58
End: 2023-04-24
Payer: COMMERCIAL

## 2023-04-24 VITALS
BODY MASS INDEX: 33.63 KG/M2 | RESPIRATION RATE: 20 BRPM | OXYGEN SATURATION: 100 % | DIASTOLIC BLOOD PRESSURE: 79 MMHG | SYSTOLIC BLOOD PRESSURE: 142 MMHG | HEIGHT: 58 IN | HEART RATE: 94 BPM | WEIGHT: 160.2 LBS

## 2023-04-24 DIAGNOSIS — E10.9 TYPE 1 DIABETES MELLITUS WITHOUT COMPLICATION (H): ICD-10-CM

## 2023-04-24 DIAGNOSIS — E06.3 HYPOTHYROIDISM DUE TO HASHIMOTO'S THYROIDITIS: ICD-10-CM

## 2023-04-24 PROCEDURE — 99214 OFFICE O/P EST MOD 30 MIN: CPT | Performed by: INTERNAL MEDICINE

## 2023-04-24 RX ORDER — INSULIN GLARGINE 100 [IU]/ML
3 INJECTION, SOLUTION SUBCUTANEOUS 2 TIMES DAILY
Qty: 30 ML | Refills: 3 | Status: SHIPPED | OUTPATIENT
Start: 2023-04-24 | End: 2023-11-20

## 2023-04-24 RX ORDER — CALCIUM CARB/VITAMIN D3/VIT K1 500-100-40
TABLET,CHEWABLE ORAL
Qty: 650 EACH | Refills: 3 | Status: SHIPPED | OUTPATIENT
Start: 2023-04-24 | End: 2023-06-28

## 2023-04-24 RX ORDER — LEVOTHYROXINE SODIUM 88 UG/1
88 TABLET ORAL DAILY
Qty: 90 TABLET | Refills: 3 | Status: SHIPPED | OUTPATIENT
Start: 2023-04-24 | End: 2024-04-15

## 2023-04-24 NOTE — TELEPHONE ENCOUNTER
PA Initiation    Medication: Apidra - PA Pending  Insurance Company: [employer] Part D - Phone [phone] Fax [phone]  Pharmacy Filling the Rx:    Filling Pharmacy Phone:    Filling Pharmacy Fax:    Start Date: 4/24/2023

## 2023-04-24 NOTE — NURSING NOTE
"Maria M Marcus's goals for this visit include: NONE  She requests these members of her care team be copied on today's visit information: YES    PCP: Marcus Salgado    Referring Provider:  No referring provider defined for this encounter.    BP (!) 142/79 (BP Location: Left arm, Patient Position: Sitting, Cuff Size: Adult Large)   Pulse 94   Resp 20   Ht 1.485 m (4' 10.47\")   Wt 72.7 kg (160 lb 3.2 oz)   LMP  (LMP Unknown)   SpO2 100%   BMI 32.95 kg/m      Do you need any medication refills at today's visit? NONE    Hugo Birmingham, EMT      "

## 2023-04-24 NOTE — PROGRESS NOTES
Endocrinology and Diabetes Clinic      Follow up for T1DM, hypothyroidism, osteoporosis       Assessment:  (E10.649) Type 1 diabetes mellitus with hypoglycemia and without coma (H)  (primary encounter diagnosis)  Middle-aged woman with longstanding type 1 diabetes, low insulin requirements  Much improved glucose control particularly hypoglycemia is resolved    Continue Lantus 3 units bid  NPH from 3.5 units at bedtime  Continue Apidra carb ratio 20 throughout the day and 25 at dinnertime    Hypothyroidism continue with 88 mcg of L T4,    Dyslipidemia intolerant to Atorvastatin , doing ok on Simvastatin 20 mg daily, cont    Osteoporosis Cont Alendronate, started 3/2022    Follow-up with me in 6 months       Lena Bradford MD  Endocrinology and Diabetes  [facility]  [address]  Pager [phone]  [facility] [address]  Tel [phone]      Interval history:  6m follow up  Maria M Marcus aged 57 year old years old woman with type 1 diabetes with retinopathy with comorbidity of vitiligo, hypothyroidism and osteoporosis for follow-up    Insulin  NPH continue 3.5 units  Lantus 3 units am , and pm   Apidra CR 1:20 all day, in the evening substract 1 units for dinner, or use carb ratio of 25     Uses CR of 20 otherwise during the day    Patient feels much better with energy.     Hypoglycemia  Now some early morning hperglycemia, pt sets alarm and checks      CGM  Has been using Dexcom sensor: GI 6.8% avaerage 144, stadnard dev 51 mg/dl, 65% in range, 4% low,     Hypoglycemia- nocturnal related to NPH dosing; notes that she requires less assitance, finds CGM very helpful    Checks blood pressure with cuff at home, typically below 130/85  Menopause started Estradiol    Complications  1. Retinopathy: yes - hemorrhage 9/2020  2. Nephropathy: no  3. Neuropathy: no  4. Hypoglycemia: yes   5. Macrovascular:  No    Osteoporosis  Bone density from March 2022 reveals osteoporosis with T score of  "-2.7 at the right femur neck.  Patient is on alendronate.  Reasonable calcium intake from diet.  Vitamin D levels have been normal.    Medications:   Current Outpatient Medications   Medication Sig Dispense Refill     alendronate (FOSAMAX) 70 MG tablet Take 1 tablet by mouth once a week 12 tablet 2     amoxicillin-clavulanate (AUGMENTIN) 875-125 MG tablet Take 1 tablet by mouth 2 times daily (Patient not taking: Reported on 2/10/2023) 14 tablet 0     blood glucose (NO BRAND SPECIFIED) test strip Use to test blood sugar 5 times daily or as directed. AccuChek Guide test strips. 150 strip 3     blood glucose monitoring (SOFTCLIX) lancets Use to test blood sugar 4-5 times daily or as directed. 200 each 9     Calcium Carb-Cholecalciferol (TGT CALCIUM DIETARY SUPPLEMENT PO)        Continuous Blood Gluc Transmit (DEXCOM G6 TRANSMITTER) MISC Per patient - has DEXCOM       estradiol (ESTRACE) 2 MG tablet Take 1 tablet (2 mg) by mouth daily 90 tablet 3     glucosamine-chondroitin 500-400 MG CAPS per capsule Take 1 capsule by mouth daily       insulin glargine (LANTUS SOLOSTAR) 100 UNIT/ML pen 3 units am and 4 units pm 15 mL 3     insulin glulisine (APIDRA PEN) 100 UNIT/ML soln Inject 2 Units Subcutaneous 3 times daily (before meals) 9 mL 3     insulin NPH (NOVOLIN N FLEXPEN) 100 UNIT/ML injection Take 3 units daily at bedtime. 15 mL 3     insulin pen needle (31G X 5 MM) 31G X 5 MM miscellaneous Use 6-7 pen needles daily or as directed. 300 each 8     insulin syringe 31G X 5/16\" 0.3 ML MISC Using up to 4 syringes/day with insulin. 100 each 3     insulin syringe-needle U-100 (31G X 5/16\" 0.5 ML) 31G X 5/16\" 0.5 ML miscellaneous Use 6 syringes daily or as directed. 200 each 1     levothyroxine (SYNTHROID/LEVOTHROID) 88 MCG tablet Take 1 tablet (88 mcg) by mouth daily 90 tablet 3     lisinopril (ZESTRIL) 20 MG tablet Take 1 tablet by mouth once daily 90 tablet 3     omeprazole (PRILOSEC) 20 MG DR capsule Take 1 capsule by mouth " once daily 90 capsule 3     simvastatin (ZOCOR) 20 MG tablet TAKE 1 TABLET BY MOUTH AT BEDTIME 90 tablet 0     tiZANidine (ZANAFLEX) 2 MG tablet Take 1-2 tablets (2-4 mg) by mouth 3 times daily as needed for muscle spasms Caution sedation. Stop cyclobenzaprine. Don't drive until you know how you feel. 60 tablet 1     Vitamin D, Cholecalciferol, 25 MCG (1000 UT) TABS          Social History:  Social History     Tobacco Use     Smoking status: Never     Passive exposure: Never     Smokeless tobacco: Never     Tobacco comments:     no exposure   Vaping Use     Vaping status: Never Used   Substance Use Topics     Alcohol use: Yes     Comment: social         Physical Examination:  General: Well appearing woman in no distress.   Psych: good eye contact, no pressured speech    BP (!) 151/80 (BP Location: Left arm, Patient Position: Sitting, Cuff Size: Adult Regular)   Pulse 73   Wt 68.5 kg (151 lb)   LMP  (LMP Unknown)   SpO2 100%   BMI 31.29 kg/m        Wt Readings from Last 4 Encounters:   04/24/23 72.7 kg (160 lb 3.2 oz)   12/21/22 68.9 kg (152 lb)   12/13/22 70.2 kg (154 lb 11.2 oz)   11/21/22 69.1 kg (152 lb 6.4 oz)       Labs and Studies:   Lab Results   Component Value Date     12/16/2022    CHLORIDE 108 12/16/2022    CO2 27 12/16/2022     (H) 04/21/2023    CR 0.98 (H) 04/21/2023    CR 0.97 12/16/2022    CR 0.94 09/06/2022    CR 0.79 08/10/2022    CR 0.98 02/09/2022    DORIS 8.7 12/16/2022    ALBUMIN 3.4 08/10/2022    ALKPHOS 51 08/10/2022     (H) 04/21/2023    HDL 66 04/21/2023    TRIG 111 04/21/2023     Lab Results   Component Value Date    MICROL <12.0 04/21/2023    MICROL 12 02/09/2022    MICROL 8 09/14/2020    MICROL <5 11/08/2019    MICROL <5 04/25/2019     Lab Results   Component Value Date    A1C 5.8 (H) 04/21/2023    A1C 6.0 (H) 09/06/2022    A1C 6.3 (H) 05/18/2022    A1C 6.3 (A) 01/24/2022    A1C 6.1 (H) 07/07/2021       Lab Results   Component Value Date    HGB 12.6 05/25/2020      Lab Results   Component Value Date    TSH 3.83 04/21/2023    TSH 0.19 (L) 12/16/2022    TSH 0.27 (L) 09/06/2022    TSH 0.11 (L) 08/10/2022    TSH 17.86 (H) 05/18/2022     Lab Results   Component Value Date    DORIS 8.7 12/16/2022    DORIS 8.5 09/06/2022    DORIS 8.6 08/10/2022    DORIS 8.5 06/30/2021    ALBUMIN 3.4 08/10/2022    ALT 14 08/10/2022    PHOS 3.3 11/08/2019    PTHI 43 04/19/2022    AST 14 08/10/2022    BILITOTAL 0.4 08/10/2022    CR 0.98 (H) 04/21/2023    CR 0.97 12/16/2022    CR 0.94 09/06/2022     12/16/2022    TSH 3.83 04/21/2023    ALKPHOS 51 08/10/2022    HGB 12.6 05/25/2020     25OHVitD 41 4/19/22    .     She would like to use half units, however is allergic to NovoLog and Humalog and has been using Apidra  She cannot use an insulin pump because of Apidra clogging the tubes as she has such low needs    Results for orders placed in visit on 03/09/22    Dexa hip/pelvis/spine    Narrative  HISTORY: Type 1 diabetes mellitus without complication (H); Collapsed  vertebra, not elsewhere classified, thoracolumbar region, initial  encounter for fracture (H)    COMPARISON:   none    [age]  years.  Height: 59 inches  Weight: 157 pounds  Sex: Female  Ethnicity: White    Image quality: Adequate    Lumbar spine T-score in region of L1, L4 = -1.2    HIPS:  Mean total hip T-score: -1    Left femoral neck T-score = -2  Right femoral neck T-score= -2.7    Radius 33% T-score = NA    FRAX:  10 year probability of major osteoporotic fracture: 19.5%  10 year probability of hip fracture: 2.5%  The 10 year probability of fracture may be lower than reported if the  patient has received treatment. FRAX data should be disregarded in  patient's taking bisphosphonates.    World Health Organization definition of osteoporosis and osteopenia  for  women:  Normal: T-score at or above -1.0  Low Bone Mass (Osteopenia): T-score between -1.0 and -2.5.  Osteoporosis: T-score at or below -2.5  T-scores are reported for  postmenopausal women and men over 50 years  of age.    Impression  IMPRESSION:  Osteoporosis. Follow up recommended.    TOREY GARAY MD      SYSTEM ID:  [id number]        Answers for HPI/ROS submitted by the patient on 4/17/2023  General Symptoms: No  Skin Symptoms: No  HENT Symptoms: No  EYE SYMPTOMS: No  HEART SYMPTOMS: No  LUNG SYMPTOMS: No  INTESTINAL SYMPTOMS: No  URINARY SYMPTOMS: No  GYNECOLOGIC SYMPTOMS: No  BREAST SYMPTOMS: No  SKELETAL SYMPTOMS: No  BLOOD SYMPTOMS: No  NERVOUS SYSTEM SYMPTOMS: No  MENTAL HEALTH SYMPTOMS: No

## 2023-04-24 NOTE — LETTER
2023            RE: Maria M Marcus  [address] [place] [address]  : 1965  MRN: [id number]        To Whom It May Concern,    I am writing on behalf of my patient, Maria M Marcus,  to document the medical necessity of Apidra insulin treatment for the treatment of Type 1 diabets    The patient has longstanding T1DM for over 10 years treated with Lantus and Apidra insulin. The patient is allergic to aspart insulin and lispro insulin, with both of them causing hives.  The patient has been on Apidra insulin with excellent blood glucose control for over 4 years. I have been following the patient for comprehensive diabetes care and blood glucose management for T1DM since 2020.    Please call my office at [phone] if I can provide you with any additional information to approve my request. I look forward to receiving your timely response and approval of this request.    Sincerely,    MD Lena Ivory MD  Endocrinology and Diabetes  Telephone contact:  [facility] [phone]

## 2023-04-24 NOTE — LETTER
4/24/2023         RE: Maria M Marcus  [address] [place] [address]        Dear Colleague,    Thank you for referring your patient, Maria M Marcus, to the [facility]. Please see a copy of my visit note below.    Endocrinology and Diabetes Clinic      Follow up for T1DM, hypothyroidism, osteoporosis       Assessment:  (E10.649) Type 1 diabetes mellitus with hypoglycemia and without coma (H)  (primary encounter diagnosis)  Middle-aged woman with longstanding type 1 diabetes, low insulin requirements  Much improved glucose control particularly hypoglycemia is resolved    Continue Lantus 3 units bid  NPH from 3.5 units at bedtime  Continue Apidra carb ratio 20 throughout the day and 25 at dinnertime    Hypothyroidism continue with 88 mcg of L T4,    Dyslipidemia intolerant to Atorvastatin , doing ok on Simvastatin 20 mg daily, cont    Osteoporosis Cont Alendronate, started 3/2022    Follow-up with me in 6 months       Lena Bradford MD  Endocrinology and Diabetes  [address]  Pager [phone]  [facility] [address]  Tel [phone]      Interval history:  6m follow up  Maria M Marcus aged 57 year old years old woman with type 1 diabetes with retinopathy with comorbidity of vitiligo, hypothyroidism and osteoporosis for follow-up    Insulin  NPH continue 3.5 units  Lantus 3 units am , and pm   Apidra CR 1:20 all day, in the evening substract 1 units for dinner, or use carb ratio of 25     Uses CR of 20 otherwise during the day    Patient feels much better with energy.     Hypoglycemia  Now some early morning hperglycemia, pt sets alarm and checks      CGM  Has been using Dexcom sensor: GI 6.8% avaerage 144, stadnard dev 51 mg/dl, 65% in range, 4% low,     Hypoglycemia- nocturnal related to NPH dosing; notes that she requires less assitance, finds CGM very helpful    Checks blood pressure with cuff at home, typically below  "130/85  Menopause started Estradiol    Complications  1. Retinopathy: yes - hemorrhage 9/2020  2. Nephropathy: no  3. Neuropathy: no  4. Hypoglycemia: yes   5. Macrovascular:  No    Osteoporosis  Bone density from March 2022 reveals osteoporosis with T score of -2.7 at the right femur neck.  Patient is on alendronate.  Reasonable calcium intake from diet.  Vitamin D levels have been normal.    Medications:   Current Outpatient Medications   Medication Sig Dispense Refill     alendronate (FOSAMAX) 70 MG tablet Take 1 tablet by mouth once a week 12 tablet 2     amoxicillin-clavulanate (AUGMENTIN) 875-125 MG tablet Take 1 tablet by mouth 2 times daily (Patient not taking: Reported on 2/10/2023) 14 tablet 0     blood glucose (NO BRAND SPECIFIED) test strip Use to test blood sugar 5 times daily or as directed. AccuChek Guide test strips. 150 strip 3     blood glucose monitoring (SOFTCLIX) lancets Use to test blood sugar 4-5 times daily or as directed. 200 each 9     Calcium Carb-Cholecalciferol (TGT CALCIUM DIETARY SUPPLEMENT PO)        Continuous Blood Gluc Transmit (DEXCOM G6 TRANSMITTER) MISC Per patient - has DEXCOM       estradiol (ESTRACE) 2 MG tablet Take 1 tablet (2 mg) by mouth daily 90 tablet 3     glucosamine-chondroitin 500-400 MG CAPS per capsule Take 1 capsule by mouth daily       insulin glargine (LANTUS SOLOSTAR) 100 UNIT/ML pen 3 units am and 4 units pm 15 mL 3     insulin glulisine (APIDRA PEN) 100 UNIT/ML soln Inject 2 Units Subcutaneous 3 times daily (before meals) 9 mL 3     insulin NPH (NOVOLIN N FLEXPEN) 100 UNIT/ML injection Take 3 units daily at bedtime. 15 mL 3     insulin pen needle (31G X 5 MM) 31G X 5 MM miscellaneous Use 6-7 pen needles daily or as directed. 300 each 8     insulin syringe 31G X 5/16\" 0.3 ML MISC Using up to 4 syringes/day with insulin. 100 each 3     insulin syringe-needle U-100 (31G X 5/16\" 0.5 ML) 31G X 5/16\" 0.5 ML miscellaneous Use 6 syringes daily or as directed. 200 " each 1     levothyroxine (SYNTHROID/LEVOTHROID) 88 MCG tablet Take 1 tablet (88 mcg) by mouth daily 90 tablet 3     lisinopril (ZESTRIL) 20 MG tablet Take 1 tablet by mouth once daily 90 tablet 3     omeprazole (PRILOSEC) 20 MG DR capsule Take 1 capsule by mouth once daily 90 capsule 3     simvastatin (ZOCOR) 20 MG tablet TAKE 1 TABLET BY MOUTH AT BEDTIME 90 tablet 0     tiZANidine (ZANAFLEX) 2 MG tablet Take 1-2 tablets (2-4 mg) by mouth 3 times daily as needed for muscle spasms Caution sedation. Stop cyclobenzaprine. Don't drive until you know how you feel. 60 tablet 1     Vitamin D, Cholecalciferol, 25 MCG (1000 UT) TABS          Social History:  Social History     Tobacco Use     Smoking status: Never     Passive exposure: Never     Smokeless tobacco: Never     Tobacco comments:     no exposure   Vaping Use     Vaping status: Never Used   Substance Use Topics     Alcohol use: Yes     Comment: social         Physical Examination:  General: Well appearing woman in no distress.   Psych: good eye contact, no pressured speech    BP (!) 151/80 (BP Location: Left arm, Patient Position: Sitting, Cuff Size: Adult Regular)   Pulse 73   Wt 68.5 kg (151 lb)   LMP  (LMP Unknown)   SpO2 100%   BMI 31.29 kg/m        Wt Readings from Last 4 Encounters:   04/24/23 72.7 kg (160 lb 3.2 oz)   12/21/22 68.9 kg (152 lb)   12/13/22 70.2 kg (154 lb 11.2 oz)   11/21/22 69.1 kg (152 lb 6.4 oz)       Labs and Studies:   Lab Results   Component Value Date     12/16/2022    CHLORIDE 108 12/16/2022    CO2 27 12/16/2022     (H) 04/21/2023    CR 0.98 (H) 04/21/2023    CR 0.97 12/16/2022    CR 0.94 09/06/2022    CR 0.79 08/10/2022    CR 0.98 02/09/2022    DORIS 8.7 12/16/2022    ALBUMIN 3.4 08/10/2022    ALKPHOS 51 08/10/2022     (H) 04/21/2023    HDL 66 04/21/2023    TRIG 111 04/21/2023     Lab Results   Component Value Date    MICROL <12.0 04/21/2023    MICROL 12 02/09/2022    MICROL 8 09/14/2020    MICROL <5 11/08/2019     MICROL <5 04/25/2019     Lab Results   Component Value Date    A1C 5.8 (H) 04/21/2023    A1C 6.0 (H) 09/06/2022    A1C 6.3 (H) 05/18/2022    A1C 6.3 (A) 01/24/2022    A1C 6.1 (H) 07/07/2021       Lab Results   Component Value Date    HGB 12.6 05/25/2020     Lab Results   Component Value Date    TSH 3.83 04/21/2023    TSH 0.19 (L) 12/16/2022    TSH 0.27 (L) 09/06/2022    TSH 0.11 (L) 08/10/2022    TSH 17.86 (H) 05/18/2022     Lab Results   Component Value Date    DORIS 8.7 12/16/2022    DORIS 8.5 09/06/2022    DORIS 8.6 08/10/2022    DORIS 8.5 06/30/2021    ALBUMIN 3.4 08/10/2022    ALT 14 08/10/2022    PHOS 3.3 11/08/2019    PTHI 43 04/19/2022    AST 14 08/10/2022    BILITOTAL 0.4 08/10/2022    CR 0.98 (H) 04/21/2023    CR 0.97 12/16/2022    CR 0.94 09/06/2022     12/16/2022    TSH 3.83 04/21/2023    ALKPHOS 51 08/10/2022    HGB 12.6 05/25/2020     25OHVitD 41 4/19/22    .     She would like to use half units, however is allergic to NovoLog and Humalog and has been using Apidra  She cannot use an insulin pump because of Apidra clogging the tubes as she has such low needs    Results for orders placed in visit on 03/09/22    Dexa hip/pelvis/spine    Narrative  HISTORY: Type 1 diabetes mellitus without complication (H); Collapsed  vertebra, not elsewhere classified, thoracolumbar region, initial  encounter for fracture (H)    COMPARISON:   none    [age]  years[age]  Height: 59 inches  Weight: 157 pounds  Sex: Female  Ethnicity: White    Image quality: Adequate    Lumbar spine T-score in region of L1, L4 = -1.2    HIPS:  Mean total hip T-score: -1    Left femoral neck T-score = -2  Right femoral neck T-score= -2.7    Radius 33% T-score = NA    FRAX:  10 year probability of major osteoporotic fracture: 19.5%  10 year probability of hip fracture: 2.5%  The 10 year probability of fracture may be lower than reported if the  patient has received treatment. FRAX data should be disregarded in  patient's taking  bisphosphonates.    World Health Organization definition of osteoporosis and osteopenia  for  women:  Normal: T-score at or above -1.0  Low Bone Mass (Osteopenia): T-score between -1.0 and -2.5.  Osteoporosis: T-score at or below -2.5  T-scores are reported for postmenopausal women and men over 50 years  of age.    Impression  IMPRESSION:  Osteoporosis. Follow up recommended.    TOREY GARAY MD      SYSTEM ID:  [id number]        Answers for HPI/ROS submitted by the patient on 4/17/2023  General Symptoms: No  Skin Symptoms: No  HENT Symptoms: No  EYE SYMPTOMS: No  HEART SYMPTOMS: No  LUNG SYMPTOMS: No  INTESTINAL SYMPTOMS: No  URINARY SYMPTOMS: No  GYNECOLOGIC SYMPTOMS: No  BREAST SYMPTOMS: No  SKELETAL SYMPTOMS: No  BLOOD SYMPTOMS: No  NERVOUS SYSTEM SYMPTOMS: No  MENTAL HEALTH SYMPTOMS: No        Again, thank you for allowing me to participate in the care of your patient.        Sincerely,        Lena Bradford MD

## 2023-04-25 ENCOUNTER — TELEPHONE (OUTPATIENT)
Dept: ENDOCRINOLOGY | Facility: CLINIC | Age: 58
End: 2023-04-25
Payer: COMMERCIAL

## 2023-04-25 NOTE — TELEPHONE ENCOUNTER
Reviewed with Thomas Berman, Pharm D.    OK to try Lyumjev knowing there would be risk of itching. PA likely would be approved with documented allergy. Could try appealing if PA denied for Apidra.     Patient notified via voicemail.    Nandini Jenkins RN, Diabetes Educator  Diabetes Education Department  [facility] [place]  [phone]

## 2023-04-25 NOTE — TELEPHONE ENCOUNTER
Patient states she is returning a call from the nurse. Patient states the message that was left is alittle confusing and would like to get a call back. Please advise.

## 2023-04-25 NOTE — TELEPHONE ENCOUNTER
Writer checked in with pharmacy liaison team.     Received the following notice:    I went through all my faxes I do not have any PA denial. I did get a form from insurance requesting  additional info. I have completed form and sent to plan. See 4-24-24 encounter.     Rambo Camacho called patient and updated patient on the above details. Patient verbalizes understanding. Patient notes that if Lyumjev is covered, she has never tried this but is afraid to because when she looked it up it came up as family of Humalog and she cannot take that. Patient wanting to know if this is true.    Writer will send to diabetes education to review patient's questions in regards to Humalog vs Lyumjev. Patient is currently on Apidra.      Batsheva Gonzalez RN  Endocrine Care Coordinator  [facility]

## 2023-04-25 NOTE — TELEPHONE ENCOUNTER
Health Call Center    Phone Message    May a detailed message be left on voicemail: yes     Reason for Call: Other: Patient states her insulin was denied for a prior authorization. She can't take Novalog because she is allergic to it. She is asking for a PA for the insulin Vials for the Apidra and it was denied she states the ins needs more information to have this approved.  Please call patient.  Patient only has 6 days left of insulin    Action Taken: Other: Endo     Travel Screening: Not Applicable

## 2023-04-26 NOTE — TELEPHONE ENCOUNTER
Patient prefers to stay on Apidra. Appeal pending.    Nandini Jenkins, RN, Diabetes Educator  Diabetes Education Department  [facility] [place]  [phone]

## 2023-04-26 NOTE — TELEPHONE ENCOUNTER
PRIOR AUTHORIZATION DENIED    Medication: Apidra - PA Denied    Denial Date: 4/25/2023    Denial Rational:     Appeal Information:

## 2023-04-26 NOTE — TELEPHONE ENCOUNTER
Medication Appeal Initiation    We have initiated an appeal for the requested medication:  Medication: Apidra - Appeal Pending   Appeal Start Date:     Insurance Company:    Comments:

## 2023-05-01 DIAGNOSIS — E10.9 TYPE 1 DIABETES MELLITUS WITHOUT COMPLICATION (H): ICD-10-CM

## 2023-05-02 RX ORDER — LANCETS
EACH MISCELLANEOUS
Qty: 200 EACH | Refills: 3 | Status: SHIPPED | OUTPATIENT
Start: 2023-05-02 | End: 2024-06-18

## 2023-05-03 NOTE — TELEPHONE ENCOUNTER
Patient updated.      Batsheva Gonzalez, RN  Endocrine Care Coordinator  [facility]

## 2023-05-03 NOTE — TELEPHONE ENCOUNTER
MEDICATION APPEAL APPROVED    Medication: Apidra - Appeal Approved  Authorization Effective Date: 5/2/2023  Authorization Expiration Date: 5/2/2024  Approved Dose/Quantity: 1 month  Reference #: [id number] [id number]   Insurance Company:    Expected CoPay:       CoPay Card Available:      Foundation Assistance Needed:    Which Pharmacy is filling the prescription (Not needed for infusion/clinic administered):        Called insurance spoke to Reinier who was not able to help and transferred me to All ([phone]) who was also not able to help. He transferred me to Renetta who was able to see a letter that PA was over-turned on 5-2-2023. I was able to get paid claim. She is re-sending letter. Will update with approval letter once received.   Call reference number #[id number]    Order sent to pharmacy. Closing encounter

## 2023-05-04 DIAGNOSIS — E10.9 TYPE 1 DIABETES MELLITUS WITHOUT COMPLICATION (H): ICD-10-CM

## 2023-05-05 RX ORDER — SIMVASTATIN 20 MG
TABLET ORAL
Qty: 90 TABLET | Refills: 1 | Status: SHIPPED | OUTPATIENT
Start: 2023-05-05 | End: 2023-12-18

## 2023-05-13 DIAGNOSIS — I10 HYPERTENSION GOAL BP (BLOOD PRESSURE) < 140/90: ICD-10-CM

## 2023-05-13 DIAGNOSIS — E10.9 TYPE 1 DIABETES MELLITUS WITHOUT COMPLICATION (H): ICD-10-CM

## 2023-05-15 RX ORDER — LISINOPRIL 20 MG/1
TABLET ORAL
Qty: 90 TABLET | Refills: 3 | Status: SHIPPED | OUTPATIENT
Start: 2023-05-15 | End: 2023-12-20

## 2023-05-15 NOTE — TELEPHONE ENCOUNTER
Pending Prescriptions:                       Disp   Refills    lisinopril (ZESTRIL) 20 MG tablet [Pharmac*90 tab*0        Sig: Take 1 tablet by mouth once daily    Routing refill request to provider for review/approval because:  Labs out of range:    BP Readings from Last 3 Encounters:   04/24/23 (!) 142/79   12/21/22 130/62   12/13/22 (!) 173/66     Creatinine   Date Value Ref Range Status   04/21/2023 0.98 (H) 0.51 - 0.95 mg/dL Final   06/30/2021 0.93 0.52 - 1.04 mg/dL Final

## 2023-05-18 RX ORDER — CALCIUM CARB/VITAMIN D3/VIT K1 500-100-40
TABLET,CHEWABLE ORAL
Qty: 650 EACH | Refills: 3 | Status: SHIPPED | OUTPATIENT
Start: 2023-05-18 | End: 2023-11-20

## 2023-06-08 DIAGNOSIS — M62.838 MUSCLE SPASM: ICD-10-CM

## 2023-06-08 DIAGNOSIS — M79.18 MYOFASCIAL PAIN: ICD-10-CM

## 2023-06-08 NOTE — TELEPHONE ENCOUNTER
Received fax from pharmacy requesting refill(s) for tiZANidine (ZANAFLEX) 2 MG tablet     Last refilled on 05/11/23    Patient last seen on 09/14/22  Next appt scheduled for None    E-prescribe to:     [facility] [address] [facility] [address] [facility]     Will facilitate refill.      Corina Coy MA  [facility] [place]

## 2023-06-09 RX ORDER — TIZANIDINE 2 MG/1
2-4 TABLET ORAL 3 TIMES DAILY PRN
Qty: 60 TABLET | Refills: 1 | Status: SHIPPED | OUTPATIENT
Start: 2023-06-09 | End: 2023-10-30

## 2023-06-09 NOTE — TELEPHONE ENCOUNTER
Signed Prescriptions:                        Disp   Refills    tiZANidine (ZANAFLEX) 2 MG tablet          60 tab*1        Sig: Take 1-2 tablets (2-4 mg) by mouth 3 times daily as           needed for muscle spasms Caution sedation. Stop           cyclobenzaprine. Don't drive until you know how           you feel.  Authorizing Provider: ROXANA VARNER, RN CNP, FNP  [facility]  [facility]

## 2023-06-15 ENCOUNTER — MYC MEDICAL ADVICE (OUTPATIENT)
Dept: FAMILY MEDICINE | Facility: OTHER | Age: 58
End: 2023-06-15
Payer: COMMERCIAL

## 2023-06-15 NOTE — TELEPHONE ENCOUNTER
Already sent reference information. PCP-can you please answer patient's follow up question?    Teresa Velasco, TERESAN, RN, PHN  Registered Nurse-Clinic Triage  [facility]  6/15/2023 at 3:02 PM

## 2023-06-25 DIAGNOSIS — K21.9 GASTROESOPHAGEAL REFLUX DISEASE, UNSPECIFIED WHETHER ESOPHAGITIS PRESENT: ICD-10-CM

## 2023-06-26 NOTE — TELEPHONE ENCOUNTER
"Pending Prescriptions:                       Disp   Refills    omeprazole (PRILOSEC) 20 MG DR capsule [Ph*90 cap*0        Sig: Take 1 capsule by mouth once daily        Routing refill request to provider for review/approval because:    PPI Protocol Failed    Rerun Protocol (6/25/2023 9:24 AM)    No diagnosis of osteoporosis on record      Not on Clopidogrel (unless Pantoprazole ordered)      Recent (12 mo) or future (30 days) visit within the authorizing provider's specialty    Patient has had an office visit with the authorizing provider or a provider within the authorizing providers department within the previous 12 mos or has a future within next 30 days. See \"Patient Info\" tab in inbasket, or \"Choose Columns\" in Meds & Orders section of the refill encounter.         Medication is active on med list      Patient is age 18 or older      No active pregnacy on record      No positive pregnancy test in past 12 months        "

## 2023-06-28 ENCOUNTER — OFFICE VISIT (OUTPATIENT)
Dept: FAMILY MEDICINE | Facility: OTHER | Age: 58
End: 2023-06-28
Payer: COMMERCIAL

## 2023-06-28 VITALS
HEIGHT: 58 IN | OXYGEN SATURATION: 97 % | BODY MASS INDEX: 33.8 KG/M2 | SYSTOLIC BLOOD PRESSURE: 128 MMHG | DIASTOLIC BLOOD PRESSURE: 74 MMHG | RESPIRATION RATE: 18 BRPM | TEMPERATURE: 97.4 F | HEART RATE: 91 BPM | WEIGHT: 161 LBS

## 2023-06-28 DIAGNOSIS — L55.1 SUNBURN, SECOND DEGREE: Primary | ICD-10-CM

## 2023-06-28 PROCEDURE — 99213 OFFICE O/P EST LOW 20 MIN: CPT | Performed by: PHYSICIAN ASSISTANT

## 2023-06-28 RX ORDER — FLUOCINONIDE 0.5 MG/G
CREAM TOPICAL 2 TIMES DAILY
Qty: 60 G | Refills: 0 | Status: SHIPPED | OUTPATIENT
Start: 2023-06-28 | End: 2023-07-22

## 2023-06-28 ASSESSMENT — PATIENT HEALTH QUESTIONNAIRE - PHQ9
10. IF YOU CHECKED OFF ANY PROBLEMS, HOW DIFFICULT HAVE THESE PROBLEMS MADE IT FOR YOU TO DO YOUR WORK, TAKE CARE OF THINGS AT HOME, OR GET ALONG WITH OTHER PEOPLE: NOT DIFFICULT AT ALL
SUM OF ALL RESPONSES TO PHQ QUESTIONS 1-9: 0
SUM OF ALL RESPONSES TO PHQ QUESTIONS 1-9: 0

## 2023-06-28 ASSESSMENT — PAIN SCALES - GENERAL: PAINLEVEL: NO PAIN (0)

## 2023-06-28 NOTE — PATIENT INSTRUCTIONS
Will prescribe a steroid cream to use twice daily for up to 2 weeks at at time.    Don't forget sunscreen, even if you are only outdoors for a short period of time.    If not improving, let me know.

## 2023-06-28 NOTE — PROGRESS NOTES
Assessment & Plan       ICD-10-CM    1. Sunburn, second degree  L55.1 fluocinonide (LIDEX) 0.05 % external cream          She is healing from 2nd degree sunburn but still has a lot of itching. Will try a high potency steroid cream as studies have shown this can be somewhat effective. I recommend she avoid using longer than 2 weeks. If not improving, will consider Voltaren gel. Will avoid oral steroids given her diabetes. I recommend she wear daily sunscreen and reapply if she is outside for longer than 45 minutes.     RAUL Leigh [facility] CASI Franz is a 58 year old, presenting for the following health issues:  Sunburn        6/28/2023     1:54 PM   Additional Questions   Roomed by brooks   Accompanied by          6/28/2023     1:54 PM   Patient Reported Additional Medications   Patient reports taking the following new medications none     History of Present Illness       Reason for visit:  Itching on my upper back on right shoulder blade and some on the left side  Symptom onset:  3-4 weeks ago  Symptoms include:  Itching & burning  Symptom intensity:  Severe  Symptom progression:  Staying the same  Had these symptoms before:  No  What makes it worse:  No  What makes it better:  Creams relieve for short amounts of time    She eats 2-3 servings of fruits and vegetables daily.She consumes 3 sweetened beverage(s) daily.She exercises with enough effort to increase her heart rate 10 to 19 minutes per day.  She exercises with enough effort to increase her heart rate 4 days per week.   She is taking medications regularly.    Today's PHQ-9         PHQ-9 Total Score: 0    PHQ-9 Q9 Thoughts of better off dead/self-harm past 2 weeks :   Not at all    How difficult have these problems made it for you to do your work, take care of things at home, or get along with other people: Not difficult at all     She got a bad sunburn on 5/27 while outside during a parade. She had  "blisters that night and ended up peeling over her shoulders and upper back where her skin was exposed in her tank top. She has had a lot of itching over her shoulders and upper back where she was burnt since then. No issues in her neck or chest. She did get another minor sunburn a few weeks later. She has tried multiple creams, lotions, ice, cool compresses, etc without any long-term relief. There is no pain until she scratches and then there is a burning sensation.       Review of Systems   Constitutional, cardiovascular, pulmonary, gi and gu systems are negative, except as otherwise noted.      Objective    /74   Pulse 91   Temp 97.4  F (36.3  C) (Temporal)   Resp 18   Ht 1.484 m (4' 10.43\")   Wt 73 kg (161 lb)   LMP  (LMP Unknown)   SpO2 97%   BMI 33.16 kg/m    Body mass index is 33.16 kg/m .  Physical Exam   GENERAL: healthy, alert and no distress  SKIN: Erythema of bilateral shoulders and upper back with minimal skin peeling. No warmth or tenderness.               "

## 2023-07-22 ENCOUNTER — MYC REFILL (OUTPATIENT)
Dept: FAMILY MEDICINE | Facility: OTHER | Age: 58
End: 2023-07-22
Payer: COMMERCIAL

## 2023-07-22 DIAGNOSIS — L55.1 SUNBURN, SECOND DEGREE: ICD-10-CM

## 2023-07-24 RX ORDER — FLUOCINONIDE 0.5 MG/G
CREAM TOPICAL 2 TIMES DAILY
Qty: 60 G | Refills: 0 | Status: SHIPPED | OUTPATIENT
Start: 2023-07-24 | End: 2024-05-02

## 2023-07-24 NOTE — TELEPHONE ENCOUNTER
"Pending Prescriptions:                       Disp   Refills    fluocinonide (LIDEX) 0.05 % external cream 60 g   0        Sig: Apply topically 2 times daily    Routing refill request to provider for review/approval because:  Labs out of range:      PHQ-9 score:        6/28/2023     1:49 PM   PHQ   PHQ-9 Total Score 0   Q9: Thoughts of better off dead/self-harm past 2 weeks Not at all         Requested Prescriptions   Pending Prescriptions Disp Refills    fluocinonide (LIDEX) 0.05 % external cream 60 g 0     Sig: Apply topically 2 times daily       Topical Steroids and Nonsteroidals Protocol Failed - 7/22/2023 12:38 PM        Failed - High potency steroid not ordered        Passed - Patient is age 6 or older        Passed - Authorizing prescriber's most recent note related to this medication read.     If refill request is for ophthalmic use, please forward request to provider for approval.          Passed - Recent (12 mo) or future (30 days) visit within the authorizing provider's specialty     Patient has had an office visit with the authorizing provider or a provider within the authorizing providers department within the previous 12 mos or has a future within next 30 days. See \"Patient Info\" tab in inbasket, or \"Choose Columns\" in Meds & Orders section of the refill encounter.              Passed - Medication is active on med list                   "
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
## 2023-09-06 ENCOUNTER — TELEPHONE (OUTPATIENT)
Dept: ENDOCRINOLOGY | Facility: CLINIC | Age: 58
End: 2023-09-06
Payer: COMMERCIAL

## 2023-09-06 ENCOUNTER — MYC MEDICAL ADVICE (OUTPATIENT)
Dept: ENDOCRINOLOGY | Facility: CLINIC | Age: 58
End: 2023-09-06
Payer: COMMERCIAL

## 2023-09-06 NOTE — TELEPHONE ENCOUNTER
[facility] Call Center    Phone Message    May a detailed message be left on voicemail: yes     Reason for Call: Other: .     Per Patient is wanting to get a call back from Nurse Gallo. Patient state she has been talking with Nurse Gallo and that she knows more about what is going on. Patient states she is wanting to ensure everyone is on the same page in regards to patients medical device and needing special authorization. Please advise.     Action Taken: Message routed to:  [facility] ([facility] Endo    Travel Screening: Not Applicable

## 2023-09-06 NOTE — TELEPHONE ENCOUNTER
Please refer to 9/6 My Chart Encounter for additional details.    Nandini Jenkins, RN, Diabetes Educator  Diabetes Education Department  [facility], [facility] and [place]  [phone]

## 2023-10-05 ENCOUNTER — TRANSFERRED RECORDS (OUTPATIENT)
Dept: HEALTH INFORMATION MANAGEMENT | Facility: CLINIC | Age: 58
End: 2023-10-05
Payer: COMMERCIAL

## 2023-10-05 LAB — RETINOPATHY: POSITIVE

## 2023-10-11 ENCOUNTER — ALLIED HEALTH/NURSE VISIT (OUTPATIENT)
Dept: EDUCATION SERVICES | Facility: CLINIC | Age: 58
End: 2023-10-11
Payer: COMMERCIAL

## 2023-10-11 ENCOUNTER — MYC MEDICAL ADVICE (OUTPATIENT)
Dept: EDUCATION SERVICES | Facility: CLINIC | Age: 58
End: 2023-10-11

## 2023-10-11 DIAGNOSIS — E10.9 TYPE 1 DIABETES MELLITUS WITHOUT COMPLICATION (H): Primary | Chronic | ICD-10-CM

## 2023-10-11 PROCEDURE — G0108 DIAB MANAGE TRN  PER INDIV: HCPCS
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
## 2023-10-11 NOTE — PATIENT INSTRUCTIONS
PLAN:  *Continue current regimen  *The Omnipod 5 and Tandem control IQ pumps are integrated with Dexcom G6  *You can download the Omnipod and Tandem simulators for additional practice    Follow-up:   Pump start appointment made: PALLAVID- In person

## 2023-10-16 RX ORDER — PROCHLORPERAZINE 25 MG/1
SUPPOSITORY RECTAL
Qty: 3 EACH | Refills: 5 | Status: SHIPPED | OUTPATIENT
Start: 2023-10-16 | End: 2023-11-13

## 2023-10-16 RX ORDER — INSULIN PMP CART,AUT,G6/7,CNTR
1 EACH SUBCUTANEOUS
Qty: 30 EACH | Refills: 3 | Status: SHIPPED | OUTPATIENT
Start: 2023-10-16

## 2023-10-16 RX ORDER — PROCHLORPERAZINE 25 MG/1
SUPPOSITORY RECTAL
Qty: 1 EACH | Refills: 1 | Status: SHIPPED | OUTPATIENT
Start: 2023-10-16 | End: 2023-11-13

## 2023-10-16 RX ORDER — INSULIN PMP CART,AUT,G6/7,CNTR
1 EACH SUBCUTANEOUS CONTINUOUS
Qty: 1 KIT | Refills: 0 | Status: SHIPPED | OUTPATIENT
Start: 2023-10-16 | End: 2024-06-03

## 2023-10-20 DIAGNOSIS — M81.6 LOCALIZED OSTEOPOROSIS WITHOUT CURRENT PATHOLOGICAL FRACTURE: ICD-10-CM

## 2023-10-23 ENCOUNTER — MEDICAL CORRESPONDENCE (OUTPATIENT)
Dept: HEALTH INFORMATION MANAGEMENT | Facility: CLINIC | Age: 58
End: 2023-10-23
Payer: COMMERCIAL

## 2023-10-23 RX ORDER — ALENDRONATE SODIUM 70 MG/1
TABLET ORAL
Qty: 12 TABLET | Refills: 0 | Status: SHIPPED | OUTPATIENT
Start: 2023-10-23 | End: 2023-12-20

## 2023-10-23 NOTE — TELEPHONE ENCOUNTER
Pump orders completed and signed by Dr. Bradford.    Nandini Jenkins, RN, Diabetes Educator  Diabetes Education Department  [facility] [place]  [phone]

## 2023-10-23 NOTE — ADDENDUM NOTE
Addended by: DARRION JOHNS on: 10/23/2023 12:36 PM     Modules accepted: Orders     (0) indicator not present

## 2023-10-28 ENCOUNTER — HEALTH MAINTENANCE LETTER (OUTPATIENT)
Age: 58
End: 2023-10-28

## 2023-10-30 ENCOUNTER — TELEPHONE (OUTPATIENT)
Dept: ENDOCRINOLOGY | Facility: CLINIC | Age: 58
End: 2023-10-30
Payer: COMMERCIAL

## 2023-10-30 DIAGNOSIS — M79.18 MYOFASCIAL PAIN: ICD-10-CM

## 2023-10-30 DIAGNOSIS — M62.838 MUSCLE SPASM: ICD-10-CM

## 2023-10-30 DIAGNOSIS — E10.9 TYPE 1 DIABETES MELLITUS WITHOUT COMPLICATION (H): Primary | ICD-10-CM

## 2023-10-30 RX ORDER — TIZANIDINE 2 MG/1
2-4 TABLET ORAL 3 TIMES DAILY PRN
Qty: 60 TABLET | Refills: 1 | Status: SHIPPED | OUTPATIENT
Start: 2023-10-30 | End: 2024-04-17

## 2023-10-30 NOTE — TELEPHONE ENCOUNTER
Received fax request from [facility] pharmacy requesting refill(s) for     tiZANidine (ZANAFLEX) 2 MG tablet       Last refilled on 08/07/23    Pt last seen on 09/14/22  Next appt scheduled for : none    Will facilitate refill.

## 2023-10-31 NOTE — PROGRESS NOTES
Outcome for 10/31/23 11:24 AM: Data uploaded on Dexcom, will start Omnipod on 11/16/23.  Marjan Almazan CMA  Adult Endocrinology  [facility], [place]          Endocrinology and Diabetes Clinic         Follow up for T1DM, hypothyroidism, osteoporosis       Assessment:  (E10.649) Type 1 diabetes mellitus with hypoglycemia and without coma (H)  (primary encounter diagnosis)  Middle-aged woman with longstanding type 1 diabetes, low insulin requirements  Much improved glucose control particularly hypoglycemia is resolved    Continue Lantus 3 units bid  NPH from 3.5 units at bedtime  Continue Apidra carb ratio 20 throughout the day and 25 at dinnertime    Hypothyroidism continue with 88 mcg of L T4,    Dyslipidemia intolerant to Atorvastatin , doing ok on Simvastatin 20 mg daily, cont    Osteoporosis Cont Alendronate, started 3/2022    Repeat DXA in 3/2024    Follow-up with me in 6 months    Lena Bradford MD  Endocrinology and Diabetes  Telephone contact:  [facility] [phone]  [facility] [phone]      Interval history:  6m follow up  Maria M Marcus aged 57 year old years old woman with type 1 diabetes with retinopathy with comorbidity of vitiligo, hypothyroidism and osteoporosis for follow-up    Insulin  NPH continue 3.5 units  Lantus 3 units am , and pm   Apidra CR 1:20 all day, in the evening substract 1 units for dinner, or use carb ratio of 25     Uses CR of 20 otherwise during the day    Patient feels much better with energy.     Hypoglycemia  Now some early morning hperglycemia, pt sets alarm and checks    Planning to start Omnipod in 11/23    CGM  Has been using Dexcom sensor: GI 6.8% avaerage 144, stadnard dev 51 mg/dl, 65% in range, 4% low,     Hypoglycemia- nocturnal related to NPH dosing; notes that she requires less assitance, finds CGM very helpful    Checks blood pressure with cuff at home, typically below 130/85  Menopause started  "Estradiol    Complications  1. Retinopathy: yes - hemorrhage 9/2020  2. Nephropathy: no  3. Neuropathy: no  4. Hypoglycemia: yes   5. Macrovascular:  No    Osteoporosis  Bone density from March 2022 reveals osteoporosis with T score of -2.7 at the right femur neck.  Patient is on alendronate.  Reasonable calcium intake from diet.  Vitamin D levels have been normal.    Medications:   Current Outpatient Medications   Medication Sig Dispense Refill    ACCU-CHEK GUIDE test strip USE TO TEST BLOOD SUGAR 5 TIMES DAILY OR AS DIRECTED 450 strip 3    alendronate (FOSAMAX) 70 MG tablet Take 1 tablet by mouth once a week 12 tablet 0    blood glucose monitoring (SOFTCLIX) lancets USE 1 LANCET TO CHECK GLUCOSE 4-5 TIMES DAILY OR  AS  DIRECTED 200 each 3    Calcium Carb-Cholecalciferol (TGT CALCIUM DIETARY SUPPLEMENT PO)       Continuous Blood Gluc Sensor (DEXCOM G6 SENSOR) MISC Change every 10 days. 3 each 5    Continuous Blood Gluc Transmit (DEXCOM G6 TRANSMITTER) MISC Change every 3 months. 1 each 1    estradiol (ESTRACE) 2 MG tablet Take 1 tablet (2 mg) by mouth daily 90 tablet 3    fluocinonide (LIDEX) 0.05 % external cream Apply topically 2 times daily 60 g 0    glucosamine-chondroitin 500-400 MG CAPS per capsule Take 1 capsule by mouth daily      Insulin Disposable Pump (OMNIPOD 5 G6 INTRO, GEN 5,) KIT 1 kit continuous 1 kit 0    Insulin Disposable Pump (OMNIPOD 5 G6 POD, GEN 5,) MISC 1 each every 3 days 30 each 3    insulin glargine (LANTUS VIAL) 100 UNIT/ML vial Inject 3 Units Subcutaneous 2 times daily 30 mL 3    insulin glulisine (APIDRA) 100 UNIT/ML injection Uses up to 35 units per day in insulin pump for basal, bolus, and priming of insulin pump tubing. 30 mL 1    insulin glulisine (APIDRA) 100 UNIT/ML injection Sig 1-4 units three times daily 30 mL 3    insulin NPH (NOVOLIN N FLEXPEN) 100 UNIT/ML injection Take 3 units daily at bedtime. 15 mL 3    insulin syringe 31G X 5/16\" 0.3 ML MISC Use to inject insulin up to " 7 times daily 650 each 3    levothyroxine (SYNTHROID/LEVOTHROID) 88 MCG tablet Take 1 tablet (88 mcg) by mouth daily 90 tablet 3    lisinopril (ZESTRIL) 20 MG tablet Take 1 tablet by mouth once daily 90 tablet 3    omeprazole (PRILOSEC) 20 MG DR capsule Take 1 capsule by mouth once daily 90 capsule 1    simvastatin (ZOCOR) 20 MG tablet TAKE 1 TABLET BY MOUTH AT BEDTIME 90 tablet 1    tiZANidine (ZANAFLEX) 2 MG tablet Take 1-2 tablets (2-4 mg) by mouth 3 times daily as needed for muscle spasms Caution sedation. Stop cyclobenzaprine. Don't drive until you know how you feel. 60 tablet 1    Vitamin D, Cholecalciferol, 25 MCG (1000 UT) TABS          Social History:  Social History     Tobacco Use    Smoking status: Never     Passive exposure: Never    Smokeless tobacco: Never    Tobacco comments:     no exposure   Substance Use Topics    Alcohol use: Yes     Comment: social         Physical Examination:  BP (!) 157/78 (BP Location: Left arm, Patient Position: Sitting, Cuff Size: Adult Large)   Pulse 93   Resp 16   Wt 72.6 kg (160 lb)   LMP  (LMP Unknown)   SpO2 98%   BMI 32.96 kg/m    Wt Readings from Last 4 Encounters:   11/06/23 72.6 kg (160 lb)   06/28/23 73 kg (161 lb)   04/24/23 72.7 kg (160 lb 3.2 oz)   12/21/22 68.9 kg (152 lb)       General: Well appearing woman in no distress.  Psych: good eye contact, no pressured speech    Diabetic foot exam:  Inspection normal appearing  Sensation to monofilament intact bilaterally  Sensation to fibration intact bilaterally  Skin: intact  Callus formation is not present        Wt Readings from Last 4 Encounters:   06/28/23 73 kg (161 lb)   04/24/23 72.7 kg (160 lb 3.2 oz)   12/21/22 68.9 kg (152 lb)   12/13/22 70.2 kg (154 lb 11.2 oz)       Labs and Studies:   Lab Results   Component Value Date     11/02/2023    CHLORIDE 102 11/02/2023    CO2 26 11/02/2023     (H) 11/02/2023     (H) 11/02/2023    CR 0.93 11/02/2023    CR 0.98 (H) 04/21/2023    CR  0.97 12/16/2022    CR 0.94 09/06/2022    CR 0.79 08/10/2022    DORIS 9.4 11/02/2023    ALBUMIN 4.1 11/02/2023    ALKPHOS 56 11/02/2023    LDL 96 11/02/2023    HDL 68 11/02/2023    TRIG 127 11/02/2023     Lab Results   Component Value Date    MICROL <12.0 11/02/2023    MICROL <12.0 04/21/2023    MICROL 12 02/09/2022    MICROL 8 09/14/2020    MICROL <5 11/08/2019     Lab Results   Component Value Date    A1C 6.2 (H) 11/02/2023    A1C 5.8 (H) 04/21/2023    A1C 6.0 (H) 09/06/2022    A1C 6.3 (H) 05/18/2022    A1C 6.3 (A) 01/24/2022       Lab Results   Component Value Date    HGB 12.6 05/25/2020     Lab Results   Component Value Date    TSH 4.00 11/02/2023    TSH 3.83 04/21/2023    TSH 0.19 (L) 12/16/2022    TSH 0.27 (L) 09/06/2022    TSH 0.11 (L) 08/10/2022     Lab Results   Component Value Date    DORIS 9.4 11/02/2023    DORIS 8.7 12/16/2022    DORIS 8.5 09/06/2022    DORIS 8.6 08/10/2022    ALBUMIN 4.1 11/02/2023    ALT 9 11/02/2023    PHOS 3.3 11/08/2019    PTHI 43 04/19/2022    AST 17 11/02/2023    BILITOTAL 0.3 11/02/2023    CR 0.93 11/02/2023    CR 0.98 (H) 04/21/2023    CR 0.97 12/16/2022     11/02/2023    TSH 4.00 11/02/2023    ALKPHOS 56 11/02/2023    HGB 12.6 05/25/2020     25OHVitD 41 4/19/22      She would like to use half units, however is allergic to NovoLog and Humalog and has been using Apidra  She cannot use an insulin pump because of Apidra clogging the tubes as she has such low needs    Results for orders placed in visit on 03/09/22    Dexa hip/pelvis/spine    Narrative  HISTORY: Type 1 diabetes mellitus without complication (H); Collapsed  vertebra, not elsewhere classified, thoracolumbar region, initial  encounter for fracture (H)    COMPARISON:   none    [age]  years[age]  Height: 59 inches  Weight: 157 pounds  Sex: Female  Ethnicity: White    Image quality: Adequate    Lumbar spine T-score in region of L1, L4 = -1.2    HIPS:  Mean total hip T-score: -1    Left femoral neck T-score = -2  Right femoral  neck T-score= -2.7    Radius 33% T-score = NA    FRAX:  10 year probability of major osteoporotic fracture: 19.5%  10 year probability of hip fracture: 2.5%  The 10 year probability of fracture may be lower than reported if the  patient has received treatment. FRAX data should be disregarded in  patient's taking bisphosphonates.    World Health Organization definition of osteoporosis and osteopenia  for  women:  Normal: T-score at or above -1.0  Low Bone Mass (Osteopenia): T-score between -1.0 and -2.5.  Osteoporosis: T-score at or below -2.5  T-scores are reported for postmenopausal women and men over 50 years  of age.    Impression  IMPRESSION:  Osteoporosis. Follow up recommended.    TOREY GARAY MD      SYSTEM ID:  [id number]

## 2023-11-02 ENCOUNTER — LAB (OUTPATIENT)
Dept: LAB | Facility: CLINIC | Age: 58
End: 2023-11-02
Payer: COMMERCIAL

## 2023-11-02 DIAGNOSIS — E10.9 TYPE 1 DIABETES MELLITUS WITHOUT COMPLICATION (H): ICD-10-CM

## 2023-11-02 DIAGNOSIS — I10 HYPERTENSION GOAL BP (BLOOD PRESSURE) < 140/90: Primary | ICD-10-CM

## 2023-11-02 DIAGNOSIS — E13.319 RETINOPATHY DUE TO SECONDARY DIABETES (H): ICD-10-CM

## 2023-11-02 LAB
ALBUMIN SERPL BCG-MCNC: 4.1 G/DL (ref 3.5–5.2)
ALP SERPL-CCNC: 56 U/L (ref 35–104)
ALT SERPL W P-5'-P-CCNC: 9 U/L (ref 0–50)
ANION GAP SERPL CALCULATED.3IONS-SCNC: 10 MMOL/L (ref 7–15)
AST SERPL W P-5'-P-CCNC: 17 U/L (ref 0–45)
BILIRUB SERPL-MCNC: 0.3 MG/DL
BUN SERPL-MCNC: 8.9 MG/DL (ref 6–20)
CALCIUM SERPL-MCNC: 9.4 MG/DL (ref 8.6–10)
CHLORIDE SERPL-SCNC: 102 MMOL/L (ref 98–107)
CHOLEST SERPL-MCNC: 189 MG/DL
CREAT SERPL-MCNC: 0.93 MG/DL (ref 0.51–0.95)
CREAT UR-MCNC: 39.5 MG/DL
DEPRECATED HCO3 PLAS-SCNC: 26 MMOL/L (ref 22–29)
EGFRCR SERPLBLD CKD-EPI 2021: 71 ML/MIN/1.73M2
FASTING STATUS PATIENT QL REPORTED: YES
GLUCOSE SERPL-MCNC: 117 MG/DL (ref 70–99)
GLUCOSE SERPL-MCNC: 117 MG/DL (ref 70–99)
HBA1C MFR BLD: 6.2 % (ref 0–5.6)
HDLC SERPL-MCNC: 68 MG/DL
LDLC SERPL CALC-MCNC: 96 MG/DL
MICROALBUMIN UR-MCNC: <12 MG/L
MICROALBUMIN/CREAT UR: NORMAL MG/G{CREAT}
NONHDLC SERPL-MCNC: 121 MG/DL
POTASSIUM SERPL-SCNC: 3.9 MMOL/L (ref 3.4–5.3)
PROT SERPL-MCNC: 6.8 G/DL (ref 6.4–8.3)
SODIUM SERPL-SCNC: 138 MMOL/L (ref 135–145)
TRIGL SERPL-MCNC: 127 MG/DL
TSH SERPL DL<=0.005 MIU/L-ACNC: 4 UIU/ML (ref 0.3–4.2)

## 2023-11-02 PROCEDURE — 80053 COMPREHEN METABOLIC PANEL: CPT

## 2023-11-02 PROCEDURE — 36415 COLL VENOUS BLD VENIPUNCTURE: CPT

## 2023-11-02 PROCEDURE — 82043 UR ALBUMIN QUANTITATIVE: CPT

## 2023-11-02 PROCEDURE — 82570 ASSAY OF URINE CREATININE: CPT

## 2023-11-02 PROCEDURE — 80061 LIPID PANEL: CPT

## 2023-11-02 PROCEDURE — 84443 ASSAY THYROID STIM HORMONE: CPT

## 2023-11-02 PROCEDURE — 83036 HEMOGLOBIN GLYCOSYLATED A1C: CPT

## 2023-11-06 ENCOUNTER — OFFICE VISIT (OUTPATIENT)
Dept: ENDOCRINOLOGY | Facility: CLINIC | Age: 58
End: 2023-11-06
Payer: COMMERCIAL

## 2023-11-06 VITALS
DIASTOLIC BLOOD PRESSURE: 78 MMHG | RESPIRATION RATE: 16 BRPM | HEART RATE: 93 BPM | WEIGHT: 160 LBS | OXYGEN SATURATION: 98 % | SYSTOLIC BLOOD PRESSURE: 157 MMHG | BODY MASS INDEX: 32.96 KG/M2

## 2023-11-06 DIAGNOSIS — Z78.0 MENOPAUSE: Primary | ICD-10-CM

## 2023-11-06 PROCEDURE — 99214 OFFICE O/P EST MOD 30 MIN: CPT | Performed by: INTERNAL MEDICINE

## 2023-11-06 NOTE — PATIENT INSTRUCTIONS
[facility]-Department of Endocrinology  Diabetes Educators:   Nandini Jenkins, RN and Dolores Martinez RN  Clinic Nurse: AYAKA Herman  CMA's: Beatriz Phillip and Stanley   EMT: Hugo  Scheduling/Clinic phone number : [phone]   Clinic Fax: [phone]  On-Call Endocrine at the [place] (after hours/weekends): [phone] option 4    Start Insulin pump    Please call the number below to schedule your labs.  [facility] [phone]   [facility] [phone]   [place] [phone]   [facility]  [phone]   [facility] [phone]   [place] [phone]   [facility]) [phone]   [facility]   [place] [phone]   [place] [phone]   [place] [phone]   [place] [phone]     Please reach out to the following centers to schedule your imaging appointment:  Imaging (DEXA, CT, MRI, XRAY)    [facility] ([facility], [facility] [place]) [phone]   [facility] ([place], [place]) [phone]   [facility] ([facility]) [phone]   [facility] ([facility]) [phone]     Appointment Reminders:  * Please bring meter with for staff to download  * If you are due ONLY for an A1C, it is scheduled with the nurse and will be done in clinic. You do not need to schedule a lab appointment. Fasting is not required for an A1C.  * Refill request should be submitted to your pharmacy. They will contact clinic for approval.

## 2023-11-06 NOTE — LETTER
11/6/2023         RE: Maria M Marcus  [address] [place] [address]        Dear Colleague,    Thank you for referring your patient, Maria M Marcus, to the [facility]. Please see a copy of my visit note below.    Outcome for 10/31/23 11:24 AM: Data uploaded on Dexcom, will start Omnipod on 11/16/23.  Marjan Almazan [facility]  Adult Endocrinology  [facility], [place]          Endocrinology and Diabetes Clinic         Follow up for T1DM, hypothyroidism, osteoporosis       Assessment:  (E10.649) Type 1 diabetes mellitus with hypoglycemia and without coma (H)  (primary encounter diagnosis)  Middle-aged woman with longstanding type 1 diabetes, low insulin requirements  Much improved glucose control particularly hypoglycemia is resolved    Continue Lantus 3 units bid  NPH from 3.5 units at bedtime  Continue Apidra carb ratio 20 throughout the day and 25 at dinnertime    Hypothyroidism continue with 88 mcg of L T4,    Dyslipidemia intolerant to Atorvastatin , doing ok on Simvastatin 20 mg daily, cont    Osteoporosis Cont Alendronate, started 3/2022    Repeat DXA in 3/2024    Follow-up with me in 6 months    Lena Bradford MD  Endocrinology and Diabetes  Telephone contact:  [facility] [place] [phone]  [facility] [phone]      Interval history:  6m follow up  Maria M Marcus aged 57 year old years old woman with type 1 diabetes with retinopathy with comorbidity of vitiligo, hypothyroidism and osteoporosis for follow-up    Insulin  NPH continue 3.5 units  Lantus 3 units am , and pm   Apidra CR 1:20 all day, in the evening substract 1 units for dinner, or use carb ratio of 25     Uses CR of 20 otherwise during the day    Patient feels much better with energy.     Hypoglycemia  Now some early morning hperglycemia, pt sets alarm and checks    Planning to start Omnipod in 11/23    CGM  Has been using Dexcom sensor: GI 6.8%  avaerage 144, stadnard dev 51 mg/dl, 65% in range, 4% low,     Hypoglycemia- nocturnal related to NPH dosing; notes that she requires less assitance, finds CGM very helpful    Checks blood pressure with cuff at home, typically below 130/85  Menopause started Estradiol    Complications  1. Retinopathy: yes - hemorrhage 9/2020  2. Nephropathy: no  3. Neuropathy: no  4. Hypoglycemia: yes   5. Macrovascular:  No    Osteoporosis  Bone density from March 2022 reveals osteoporosis with T score of -2.7 at the right femur neck.  Patient is on alendronate.  Reasonable calcium intake from diet.  Vitamin D levels have been normal.    Medications:   Current Outpatient Medications   Medication Sig Dispense Refill     ACCU-CHEK GUIDE test strip USE TO TEST BLOOD SUGAR 5 TIMES DAILY OR AS DIRECTED 450 strip 3     alendronate (FOSAMAX) 70 MG tablet Take 1 tablet by mouth once a week 12 tablet 0     blood glucose monitoring (SOFTCLIX) lancets USE 1 LANCET TO CHECK GLUCOSE 4-5 TIMES DAILY OR  AS  DIRECTED 200 each 3     Calcium Carb-Cholecalciferol (TGT CALCIUM DIETARY SUPPLEMENT PO)        Continuous Blood Gluc Sensor (DEXCOM G6 SENSOR) MISC Change every 10 days. 3 each 5     Continuous Blood Gluc Transmit (DEXCOM G6 TRANSMITTER) MISC Change every 3 months. 1 each 1     estradiol (ESTRACE) 2 MG tablet Take 1 tablet (2 mg) by mouth daily 90 tablet 3     fluocinonide (LIDEX) 0.05 % external cream Apply topically 2 times daily 60 g 0     glucosamine-chondroitin 500-400 MG CAPS per capsule Take 1 capsule by mouth daily       Insulin Disposable Pump (OMNIPOD 5 G6 INTRO, GEN 5,) KIT 1 kit continuous 1 kit 0     Insulin Disposable Pump (OMNIPOD 5 G6 POD, GEN 5,) MISC 1 each every 3 days 30 each 3     insulin glargine (LANTUS VIAL) 100 UNIT/ML vial Inject 3 Units Subcutaneous 2 times daily 30 mL 3     insulin glulisine (APIDRA) 100 UNIT/ML injection Uses up to 35 units per day in insulin pump for basal, bolus, and priming of insulin pump  "tubing. 30 mL 1     insulin glulisine (APIDRA) 100 UNIT/ML injection Sig 1-4 units three times daily 30 mL 3     insulin NPH (NOVOLIN N FLEXPEN) 100 UNIT/ML injection Take 3 units daily at bedtime. 15 mL 3     insulin syringe 31G X 5/16\" 0.3 ML MISC Use to inject insulin up to 7 times daily 650 each 3     levothyroxine (SYNTHROID/LEVOTHROID) 88 MCG tablet Take 1 tablet (88 mcg) by mouth daily 90 tablet 3     lisinopril (ZESTRIL) 20 MG tablet Take 1 tablet by mouth once daily 90 tablet 3     omeprazole (PRILOSEC) 20 MG DR capsule Take 1 capsule by mouth once daily 90 capsule 1     simvastatin (ZOCOR) 20 MG tablet TAKE 1 TABLET BY MOUTH AT BEDTIME 90 tablet 1     tiZANidine (ZANAFLEX) 2 MG tablet Take 1-2 tablets (2-4 mg) by mouth 3 times daily as needed for muscle spasms Caution sedation. Stop cyclobenzaprine. Don't drive until you know how you feel. 60 tablet 1     Vitamin D, Cholecalciferol, 25 MCG (1000 UT) TABS          Social History:  Social History     Tobacco Use     Smoking status: Never     Passive exposure: Never     Smokeless tobacco: Never     Tobacco comments:     no exposure   Substance Use Topics     Alcohol use: Yes     Comment: social         Physical Examination:  BP (!) 157/78 (BP Location: Left arm, Patient Position: Sitting, Cuff Size: Adult Large)   Pulse 93   Resp 16   Wt 72.6 kg (160 lb)   LMP  (LMP Unknown)   SpO2 98%   BMI 32.96 kg/m    Wt Readings from Last 4 Encounters:   11/06/23 72.6 kg (160 lb)   06/28/23 73 kg (161 lb)   04/24/23 72.7 kg (160 lb 3.2 oz)   12/21/22 68.9 kg (152 lb)       General: Well appearing woman in no distress.  Psych: good eye contact, no pressured speech    Diabetic foot exam:  Inspection normal appearing  Sensation to monofilament intact bilaterally  Sensation to fibration intact bilaterally  Skin: intact  Callus formation is not present        Wt Readings from Last 4 Encounters:   06/28/23 73 kg (161 lb)   04/24/23 72.7 kg (160 lb 3.2 oz)   12/21/22 68.9 " kg (152 lb)   12/13/22 70.2 kg (154 lb 11.2 oz)       Labs and Studies:   Lab Results   Component Value Date     11/02/2023    CHLORIDE 102 11/02/2023    CO2 26 11/02/2023     (H) 11/02/2023     (H) 11/02/2023    CR 0.93 11/02/2023    CR 0.98 (H) 04/21/2023    CR 0.97 12/16/2022    CR 0.94 09/06/2022    CR 0.79 08/10/2022    DORIS 9.4 11/02/2023    ALBUMIN 4.1 11/02/2023    ALKPHOS 56 11/02/2023    LDL 96 11/02/2023    HDL 68 11/02/2023    TRIG 127 11/02/2023     Lab Results   Component Value Date    MICROL <12.0 11/02/2023    MICROL <12.0 04/21/2023    MICROL 12 02/09/2022    MICROL 8 09/14/2020    MICROL <5 11/08/2019     Lab Results   Component Value Date    A1C 6.2 (H) 11/02/2023    A1C 5.8 (H) 04/21/2023    A1C 6.0 (H) 09/06/2022    A1C 6.3 (H) 05/18/2022    A1C 6.3 (A) 01/24/2022       Lab Results   Component Value Date    HGB 12.6 05/25/2020     Lab Results   Component Value Date    TSH 4.00 11/02/2023    TSH 3.83 04/21/2023    TSH 0.19 (L) 12/16/2022    TSH 0.27 (L) 09/06/2022    TSH 0.11 (L) 08/10/2022     Lab Results   Component Value Date    DORIS 9.4 11/02/2023    DORIS 8.7 12/16/2022    DORIS 8.5 09/06/2022    DORIS 8.6 08/10/2022    ALBUMIN 4.1 11/02/2023    ALT 9 11/02/2023    PHOS 3.3 11/08/2019    PTHI 43 04/19/2022    AST 17 11/02/2023    BILITOTAL 0.3 11/02/2023    CR 0.93 11/02/2023    CR 0.98 (H) 04/21/2023    CR 0.97 12/16/2022     11/02/2023    TSH 4.00 11/02/2023    ALKPHOS 56 11/02/2023    HGB 12.6 05/25/2020     25OHVitD 41 4/19/22      She would like to use half units, however is allergic to NovoLog and Humalog and has been using Apidra  She cannot use an insulin pump because of Apidra clogging the tubes as she has such low needs    Results for orders placed in visit on 03/09/22    Dexa hip/pelvis/spine    Narrative  HISTORY: Type 1 diabetes mellitus without complication (H); Collapsed  vertebra, not elsewhere classified, thoracolumbar region, initial  encounter for  fracture (H)    COMPARISON:   none    [age]  years[age]  Height: 59 inches  Weight: 157 pounds  Sex: Female  Ethnicity: White    Image quality: Adequate    Lumbar spine T-score in region of L1, L4 = -1.2    HIPS:  Mean total hip T-score: -1    Left femoral neck T-score = -2  Right femoral neck T-score= -2.7    Radius 33% T-score = NA    FRAX:  10 year probability of major osteoporotic fracture: 19.5%  10 year probability of hip fracture: 2.5%  The 10 year probability of fracture may be lower than reported if the  patient has received treatment. FRAX data should be disregarded in  patient's taking bisphosphonates.    World Health Organization definition of osteoporosis and osteopenia  for  women:  Normal: T-score at or above -1.0  Low Bone Mass (Osteopenia): T-score between -1.0 and -2.5.  Osteoporosis: T-score at or below -2.5  T-scores are reported for postmenopausal women and men over 50 years  of age.    Impression  IMPRESSION:  Osteoporosis. Follow up recommended.    TOREY GARAY MD      SYSTEM ID:  [id number]        Again, thank you for allowing me to participate in the care of your patient.        Sincerely,        Lena Bradford MD

## 2023-11-06 NOTE — NURSING NOTE
Maria M Marcus's goals for this visit include:   Chief Complaint   Patient presents with    Follow Up     DM       She requests these members of her care team be copied on today's visit information: YES    PCP: Marcus Salgado    Referring Provider:  No referring provider defined for this encounter.    BP (!) 157/78 (BP Location: Left arm, Patient Position: Sitting, Cuff Size: Adult Large)   Pulse 93   Resp 16   Wt 72.6 kg (160 lb)   LMP  (LMP Unknown)   SpO2 98%   BMI 32.96 kg/m      Do you need any medication refills at today's visit? Yes    Hugo Birmingham, EMT

## 2023-11-13 ENCOUNTER — ALLIED HEALTH/NURSE VISIT (OUTPATIENT)
Dept: EDUCATION SERVICES | Facility: CLINIC | Age: 58
End: 2023-11-13
Payer: COMMERCIAL

## 2023-11-13 DIAGNOSIS — E10.9 TYPE 1 DIABETES MELLITUS WITHOUT COMPLICATION (H): Chronic | ICD-10-CM

## 2023-11-13 PROCEDURE — G0108 DIAB MANAGE TRN  PER INDIV: HCPCS | Mod: AE

## 2023-11-13 RX ORDER — PROCHLORPERAZINE 25 MG/1
SUPPOSITORY RECTAL
Qty: 1 EACH | Refills: 1 | Status: SHIPPED | OUTPATIENT
Start: 2023-11-13 | End: 2024-07-25

## 2023-11-13 RX ORDER — PROCHLORPERAZINE 25 MG/1
SUPPOSITORY RECTAL
Qty: 3 EACH | Refills: 5 | Status: SHIPPED | OUTPATIENT
Start: 2023-11-13 | End: 2024-04-11

## 2023-11-13 NOTE — PROGRESS NOTES
Diabetes Self Management Training: Insulin Pump Start    SUBJECTIVE:  Maria M Marcus presents for an insulin pump start. Previously on Tandem.  Pump Type: Omnipod 5 via controller  OBJECTIVE:  Vitals: There were no vitals taken for this visit.    Blood glucose before pump start: 159 mg/dL   Blood glucose after pump start:  138 mg/dL  Last basal insulin given at 11/12 at 9pm    Last bolus insulin given at today at 0700      ASSESSMENT:    Homework completed: Completed online training and Reviewed user guide  EDUCATION PROVIDED:  Training for the  Omnipod 5 done in accordance with the company training checklist.  Training for the Dexcom G 6 continuous glucose monitor (CGM) done in accordance with the company training manual  Reviewed the pump menus, icons, symbols.   Explained how the hybrid closed loop (HCL) function works:  Basal modulation, delivery of auto-corrections and suspension of insulin to keep glucose levels at/near target blood glucose. This feature is currently available in the Tandem CIQ and Medtronic pumps.  Linked CGM with the pump.       Problem Solving:   Unexplained high blood glucoses on an insulin pump  Managing hypoglycemia on an insulin pump  Alerts, alarms and when to call the company help line    when to order supplies  how to order supplies    PUMP SETTINGS:  In Control IQ, the Target BG is 110.  Start time Basal Rate U/hr Correction Factor Insulin to Carb Ratio Target BG    Midnight    120   8am    110   11pm    120                 Active Insulin Time: 3  (In Control IQ, Active Insulin Time is defaulted to 5 hours)  Maximum Bolus: 10  Reservoir Warning: 10 units    CGM SETTINGS:    High Alert Low Alert Rise Rate Fall Rate    OFF ON OFF OFF       PLAN AND FOLLOW-UP:  Patient instructed to contact educator the day after the pump start, and every 3 days, unless otherwise indicated until blood sugars are under optimal control.     Follow up 11/28/2023    Time Spent: 80 minutes  Visit Type:  Individual    Nanidni Jenkins, RN, Diabetes Educator  Diabetes Education Department  [facility] [place]  [phone]    Scanned documents: Insulin pump initiation settings with MD signature, Insulin pump start checklist.

## 2023-11-20 ENCOUNTER — OFFICE VISIT (OUTPATIENT)
Dept: FAMILY MEDICINE | Facility: OTHER | Age: 58
End: 2023-11-20
Payer: COMMERCIAL

## 2023-11-20 VITALS
DIASTOLIC BLOOD PRESSURE: 68 MMHG | WEIGHT: 165 LBS | OXYGEN SATURATION: 98 % | HEIGHT: 59 IN | TEMPERATURE: 97.5 F | HEART RATE: 81 BPM | RESPIRATION RATE: 14 BRPM | SYSTOLIC BLOOD PRESSURE: 142 MMHG | BODY MASS INDEX: 33.26 KG/M2

## 2023-11-20 DIAGNOSIS — I10 HYPERTENSION GOAL BP (BLOOD PRESSURE) < 140/90: ICD-10-CM

## 2023-11-20 DIAGNOSIS — E10.9 TYPE 1 DIABETES MELLITUS WITHOUT COMPLICATION (H): ICD-10-CM

## 2023-11-20 DIAGNOSIS — E13.319 RETINOPATHY DUE TO SECONDARY DIABETES (H): ICD-10-CM

## 2023-11-20 DIAGNOSIS — H26.9 CATARACT OF BOTH EYES, UNSPECIFIED CATARACT TYPE: ICD-10-CM

## 2023-11-20 DIAGNOSIS — Z01.818 PREOP GENERAL PHYSICAL EXAM: Primary | ICD-10-CM

## 2023-11-20 PROCEDURE — 99214 OFFICE O/P EST MOD 30 MIN: CPT | Mod: 25 | Performed by: PHYSICIAN ASSISTANT

## 2023-11-20 PROCEDURE — 90682 RIV4 VACC RECOMBINANT DNA IM: CPT | Performed by: PHYSICIAN ASSISTANT

## 2023-11-20 PROCEDURE — G0008 ADMIN INFLUENZA VIRUS VAC: HCPCS | Performed by: PHYSICIAN ASSISTANT

## 2023-11-20 RX ORDER — LISINOPRIL 30 MG/1
30 TABLET ORAL DAILY
Qty: 90 TABLET | Refills: 1 | Status: SHIPPED | OUTPATIENT
Start: 2023-11-20 | End: 2024-06-10

## 2023-11-20 ASSESSMENT — ANXIETY QUESTIONNAIRES
GAD7 TOTAL SCORE: 0
6. BECOMING EASILY ANNOYED OR IRRITABLE: NOT AT ALL
GAD7 TOTAL SCORE: 0
3. WORRYING TOO MUCH ABOUT DIFFERENT THINGS: NOT AT ALL
2. NOT BEING ABLE TO STOP OR CONTROL WORRYING: NOT AT ALL
IF YOU CHECKED OFF ANY PROBLEMS ON THIS QUESTIONNAIRE, HOW DIFFICULT HAVE THESE PROBLEMS MADE IT FOR YOU TO DO YOUR WORK, TAKE CARE OF THINGS AT HOME, OR GET ALONG WITH OTHER PEOPLE: NOT DIFFICULT AT ALL
4. TROUBLE RELAXING: NOT AT ALL
1. FEELING NERVOUS, ANXIOUS, OR ON EDGE: NOT AT ALL
5. BEING SO RESTLESS THAT IT IS HARD TO SIT STILL: NOT AT ALL
7. FEELING AFRAID AS IF SOMETHING AWFUL MIGHT HAPPEN: NOT AT ALL

## 2023-11-20 ASSESSMENT — PAIN SCALES - GENERAL: PAINLEVEL: MODERATE PAIN (5)

## 2023-11-20 NOTE — PROGRESS NOTES
[address]  [facility] [address]  Phone: [phone]  Primary Provider: Marcus Salgado  Pre-op Performing Provider: MARCUS SALGADO    PREOPERATIVE EVALUATION:  Today's date: 11/20/2023    Maria M is a 58 year old, presenting for the following:  Pre-Op Exam        11/20/2023    10:00 AM   Additional Questions   Roomed by Chikis   Accompanied by Self         11/20/2023    10:00 AM   Patient Reported Additional Medications   Patient reports taking the following new medications none       Surgical Information:  Surgery/Procedure:   PHACOEMULSIFICATION WITH A STANDARD INTRAOCULAR LENS IMPLANT Left     Surgery Location: [facility]  Surgeon: Laureano Doyle MD   Surgery Date: 12/07/2023  Time of Surgery: 8:05 am  Where patient plans to recover: At home with family  Fax number for surgical facility: Note does not need to be faxed, will be available electronically in Epic.    Assessment & Plan     The proposed surgical procedure is considered LOW risk.      ICD-10-CM    1. Preop general physical exam  Z01.818       2. Cataract of both eyes, unspecified cataract type  H26.9       3. Retinopathy due to secondary diabetes (H)  E13.319       4. Type 1 diabetes mellitus without complication (H)  E10.9       5. Hypertension goal BP (blood pressure) < 140/90  I10 lisinopril (ZESTRIL) 30 MG tablet          Cleared for surgery.    Blood pressure has been elevated fairly persistently the past few months so will increase lisinopril to 30 mg. She will continue to monitor home pressures and if consistently above 140/90, she will let me know.    Possible Sleep Apnea: previous diagnosis but lost weight and improved      - No identified additional risk factors other than previously addressed    Antiplatelet or Anticoagulation Medication Instructions:   - Patient is on no antiplatelet or anticoagulation medications.    Additional  Medication Instructions:  Decrease insulin in pump per specialist while NPO    RECOMMENDATION:  APPROVAL GIVEN to proceed with proposed procedure, without further diagnostic evaluation.    Subjective       HPI related to upcoming procedure: She has bilateral cataracts and will be undergoing the above procedure.        11/14/2023    11:09 AM   Preop Questions   1. Have you ever had a heart attack or stroke? No   2. Have you ever had surgery on your heart or blood vessels, such as a stent placement, a coronary artery bypass, or surgery on an artery in your head, neck, heart, or legs? No   3. Do you have chest pain with activity? No   4. Do you have a history of  heart failure? No   5. Do you currently have a cold, bronchitis or symptoms of other infection? No   6. Do you have a cough, shortness of breath, or wheezing? No   7. Do you or anyone in your family have previous history of blood clots? No   8. Do you or does anyone in your family have a serious bleeding problem such as prolonged bleeding following surgeries or cuts? No   9. Have you ever had problems with anemia or been told to take iron pills? No   10. Have you had any abnormal blood loss such as black, tarry or bloody stools, or abnormal vaginal bleeding? No   11. Have you ever had a blood transfusion? No   12. Are you willing to have a blood transfusion if it is medically needed before, during, or after your surgery? Yes   13. Have you or any of your relatives ever had problems with anesthesia? No   14. Do you have sleep apnea, excessive snoring or daytime drowsiness? YES - snoring and mild sleep apnea   14a. Do you have a CPAP machine? No   15. Do you have any artifical heart valves or other implanted medical devices like a pacemaker, defibrillator, or continuous glucose monitor? YES - DEXCOM   15a. What type of device do you have? Dexcom G6 & Omnipod 5 insulun pump   15b. Name of the clinic that manages your device:  [facility]   16.  Do you have artificial joints? No   17. Are you allergic to latex? No   18. Is there any chance that you may be pregnant? No       Health Care Directive:  Patient does not have a Health Care Directive or Living Will: Patient states has Advance Directive and will bring in a copy to clinic.      Status of Chronic Conditions:  See problem list for active medical problems.  Problems all longstanding and stable, except as noted/documented.  See ROS for pertinent symptoms related to these conditions.    Review of Systems  CONSTITUTIONAL: NEGATIVE for fever, chills, change in weight  INTEGUMENTARY/SKIN: NEGATIVE for worrisome rashes, moles or lesions  EYES: NEGATIVE for vision changes or irritation  ENT/MOUTH: NEGATIVE for ear, mouth and throat problems  RESP: NEGATIVE for significant cough or SOB  CV: NEGATIVE for chest pain, palpitations or peripheral edema  GI: NEGATIVE for nausea, abdominal pain, heartburn, or change in bowel habits  : NEGATIVE for frequency, dysuria, or hematuria  MUSCULOSKELETAL: NEGATIVE for significant arthralgias or myalgia  NEURO: NEGATIVE for weakness, dizziness or paresthesias  ENDOCRINE: NEGATIVE for temperature intolerance, skin/hair changes  HEME: NEGATIVE for bleeding problems  PSYCHIATRIC: NEGATIVE for changes in mood or affect    Patient Active Problem List    Diagnosis Date Noted    Neck pain 07/22/2022     Priority: Medium    Localized osteoporosis without current pathological fracture 04/06/2022     Priority: Medium    Colitis 01/30/2020     Priority: Medium    Hives 12/03/2019     Priority: Medium    Gastroesophageal reflux disease, esophagitis presence not specified 09/08/2017     Priority: Medium     IMO Regulatory Load OCT 2020      Retinopathy due to secondary diabetes (H) 10/29/2016     Priority: Medium    Type 1 diabetes mellitus without complication (H) 11/01/2015     Priority: Medium    Overweight (BMI 25.0-29.9) 11/01/2015     Priority: Medium    Hypertension goal BP (blood  pressure) < 140/90 09/29/2014     Priority: Medium    Back pain 03/23/2013     Priority: Medium    Chronic pain 11/09/2011     Priority: Medium     Related to frozen shoulder per ortho will be two years of intermittent pain, agreed to #45 percocet for 90 day supply, only get narcotics from me.        Vitiligo 07/15/2011     Priority: Medium    Frozen shoulder syndrome 06/16/2011     Priority: Medium    Advanced directives, counseling/discussion 06/13/2011     Priority: Medium     Advance Directive Problem List Overview:   Name Relationship Phone    Primary Health Care Agent            Alternative Health Care Agent        9/14/11  Patient presents for [employer] Informational meeting. Patient given [employer] folder. Patient will complete Advance Care Directive and bring to clinic...Mary Lee RN             Cervical radiculopathy      Priority: Medium    Hyperlipidemia LDL goal <100 10/31/2010     Priority: Medium     [place] 10-year CHD Risk Score: 20% (Diabetic)   Values used to calculate score:     Age: 46 years -- Points: 3     Total Cholesterol: 174 mg/dL -- Points: 3     HDL Cholesterol: 50 mg/dL -- Points: 0     Systolic BP (treated): 128 mmHg -- Points: 3    The patient is not a smoker. -- Points: 0     The patient has a diagnosis of diabetes. -- Points: 20% Risk    The patient does not have a family history of CHD. -- Points:0     [place]      LDL goal  >= 20%  <100        Other and unspecified disc disorder of lumbar region 04/07/2007     Priority: Medium    Hypothyroidism due to Hashimoto's thyroiditis 10/05/2004     Priority: Medium     Problem list name updated by automated process. Provider to review        Past Medical History:   Diagnosis Date    Arthritis ?    Nit sure how long ago, but several years    Cervical radiculopathy     Diabetic eye exam (H) 12/14/2011    DISC DIS NEC/NOS-LUMBAR 04/07/2007    Frozen shoulder syndrome 06/16/2011    Hyperlipidemia LDL goal <100 10/31/2010     Hypertension 2011    Hypothyroidism due to Hashimoto's thyroiditis     Localized osteoporosis without current pathological fracture 2022    Type 1 diabetes, HbA1c goal < 7% (H) dx 1967    Unspecified hypothyroidism      Past Surgical History:   Procedure Laterality Date    BIOPSY  ?    See MyChart    COLONOSCOPY N/A 2016    Procedure: COLONOSCOPY;  Surgeon: Efren Perry MD;  Location:  GI    ENDOSCOPIC SINUS SURGERY Bilateral 2018    Procedure: Bilateral functional endoscopic maxillary antrostomies;  Surgeon: Woo Silva MD;  Location:  OR    ENT SURGERY  ?    See [facility] sinus surgery    LAMINECT/DISCECTOMY, CERVICAL  2007    C7-T1 hemilaminectomy, microdiscectomy, foraminotomy.    LASER YAG CAPSULOTOMY Right 10/23/2014    Procedure: LASER YAG CAPSULOTOMY;  Surgeon: Esteban Dorsey MD;  Location:  OR    NC SPINE SURGERY PROCEDURE UNLISTED      VITRECTOMY,STRIP EPIRETINAL MEMBRANE  2009    Z  DELIVERY ONLY      C-Sections x2    ZC NONSPECIFIC PROCEDURE      Hysterectomy - total (cervix removed but ovaries remain)    Z STOMACH SURGERY PROCEDURE UNLISTED      ZC TOTAL ABDOM HYSTERECTOMY       Current Outpatient Medications   Medication Sig Dispense Refill    ACCU-CHEK GUIDE test strip USE TO TEST BLOOD SUGAR 5 TIMES DAILY OR AS DIRECTED 450 strip 3    alendronate (FOSAMAX) 70 MG tablet Take 1 tablet by mouth once a week 12 tablet 0    blood glucose monitoring (SOFTCLIX) lancets USE 1 LANCET TO CHECK GLUCOSE 4-5 TIMES DAILY OR  AS  DIRECTED 200 each 3    Calcium Carb-Cholecalciferol (TGT CALCIUM DIETARY SUPPLEMENT PO)       Continuous Blood Gluc Sensor (DEXCOM G6 SENSOR) MISC Change every 10 days. 3 each 5    Continuous Blood Gluc Transmit (DEXCOM G6 TRANSMITTER) MISC Change every 3 months. 1 each 1    estradiol (ESTRACE) 2 MG tablet Take 1 tablet (2 mg) by mouth daily 90 tablet 3    glucosamine-chondroitin 500-400 MG CAPS per capsule Take 1  "capsule by mouth daily      Insulin Disposable Pump (OMNIPOD 5 G6 INTRO, GEN 5,) KIT 1 kit continuous 1 kit 0    Insulin Disposable Pump (OMNIPOD 5 G6 POD, GEN 5,) MISC 1 each every 3 days 30 each 3    insulin glulisine (APIDRA) 100 UNIT/ML injection Uses up to 35 units per day in insulin pump for basal, bolus, and priming of insulin pump tubing. 30 mL 1    levothyroxine (SYNTHROID/LEVOTHROID) 88 MCG tablet Take 1 tablet (88 mcg) by mouth daily 90 tablet 3    lisinopril (ZESTRIL) 20 MG tablet Take 1 tablet by mouth once daily 90 tablet 3    lisinopril (ZESTRIL) 30 MG tablet Take 1 tablet (30 mg) by mouth daily 90 tablet 1    omeprazole (PRILOSEC) 20 MG DR capsule Take 1 capsule by mouth once daily 90 capsule 1    simvastatin (ZOCOR) 20 MG tablet TAKE 1 TABLET BY MOUTH AT BEDTIME 90 tablet 1    tiZANidine (ZANAFLEX) 2 MG tablet Take 1-2 tablets (2-4 mg) by mouth 3 times daily as needed for muscle spasms Caution sedation. Stop cyclobenzaprine. Don't drive until you know how you feel. 60 tablet 1    Vitamin D, Cholecalciferol, 25 MCG (1000 UT) TABS       fluocinonide (LIDEX) 0.05 % external cream Apply topically 2 times daily (Patient not taking: Reported on 11/20/2023) 60 g 0       Allergies   Allergen Reactions    Novolog [Insulin Aspart] Swelling     Itching, rash, contact dermatitis    Humalog [Insulin Lispro] Rash     Contact dermatitis    Zyrtec [Cetirizine] Rash        Social History     Tobacco Use    Smoking status: Never     Passive exposure: Never    Smokeless tobacco: Never    Tobacco comments:     no exposure   Substance Use Topics    Alcohol use: Yes     Comment: Extremely social     History   Drug Use No         Objective     BP (!) 142/68   Pulse 81   Temp 97.5  F (36.4  C) (Temporal)   Resp 14   Ht 1.508 m (4' 11.37\")   Wt 74.8 kg (165 lb)   LMP  (LMP Unknown)   SpO2 98%   Breastfeeding No   BMI 32.91 kg/m      Physical Exam    GENERAL APPEARANCE: healthy, alert and no distress     EYES: EOMI, "  PERRL     HENT: ear canals and TM's normal and nose and mouth without ulcers or lesions     NECK: no adenopathy, no asymmetry, masses, or scars and thyroid normal to palpation     RESP: lungs clear to auscultation - no rales, rhonchi or wheezes     CV: regular rate and rhythm, normal S1 S2, no S3 or S4 and no murmur, click or rub     ABDOMEN:  soft, nontender, no HSM or masses and bowel sounds normal     MS: extremities normal- no gross deformities noted, no evidence of inflammation in joints, FROM in all extremities.     SKIN: no suspicious lesions or rashes     NEURO: Normal strength and tone, sensory exam grossly normal, mentation intact and speech normal. Gait is stable.      PSYCH: mentation appears normal. and affect normal/bright     LYMPHATICS: No cervical adenopathy    Recent Labs   Lab Test 11/02/23  0841 04/21/23  0824 12/16/22  1130     --  140   POTASSIUM 3.9 4.8 3.6   CR 0.93 0.98* 0.97   A1C 6.2* 5.8*  --         Diagnostics:  No labs were ordered during this visit.   No EKG required for low risk surgery (cataract, skin procedure, breast biopsy, etc).    Revised Cardiac Risk Index (RCRI):  The patient has the following serious cardiovascular risks for perioperative complications:   - Diabetes Mellitus (on Insulin) = 1 point     RCRI Interpretation: 1 point: Class II (low risk - 0.9% complication rate)       Signed Electronically by: Marcus Salgado PA-C  Copy of this evaluation report is provided to requesting physician.

## 2023-11-20 NOTE — H&P (VIEW-ONLY)
[address]  [facility] [address]  Phone: [phone]  Primary Provider: Marcus Salgado  Pre-op Performing Provider: MARCUS SALGADO    PREOPERATIVE EVALUATION:  Today's date: 11/20/2023    Maria M is a 58 year old, presenting for the following:  Pre-Op Exam        11/20/2023    10:00 AM   Additional Questions   Roomed by Chikis   Accompanied by Self         11/20/2023    10:00 AM   Patient Reported Additional Medications   Patient reports taking the following new medications none       Surgical Information:  Surgery/Procedure:   PHACOEMULSIFICATION WITH A STANDARD INTRAOCULAR LENS IMPLANT Left     Surgery Location: [facility]  Surgeon: Laureano Doyle MD   Surgery Date: 12/07/2023  Time of Surgery: 8:05 am  Where patient plans to recover: At home with family  Fax number for surgical facility: Note does not need to be faxed, will be available electronically in Epic.    Assessment & Plan     The proposed surgical procedure is considered LOW risk.      ICD-10-CM    1. Preop general physical exam  Z01.818       2. Cataract of both eyes, unspecified cataract type  H26.9       3. Retinopathy due to secondary diabetes (H)  E13.319       4. Type 1 diabetes mellitus without complication (H)  E10.9       5. Hypertension goal BP (blood pressure) < 140/90  I10 lisinopril (ZESTRIL) 30 MG tablet          Cleared for surgery.    Blood pressure has been elevated fairly persistently the past few months so will increase lisinopril to 30 mg. She will continue to monitor home pressures and if consistently above 140/90, she will let me know.    Possible Sleep Apnea: previous diagnosis but lost weight and improved      - No identified additional risk factors other than previously addressed    Antiplatelet or Anticoagulation Medication Instructions:   - Patient is on no antiplatelet or anticoagulation medications.    Additional  Medication Instructions:  Decrease insulin in pump per specialist while NPO    RECOMMENDATION:  APPROVAL GIVEN to proceed with proposed procedure, without further diagnostic evaluation.    Subjective       HPI related to upcoming procedure: She has bilateral cataracts and will be undergoing the above procedure.        11/14/2023    11:09 AM   Preop Questions   1. Have you ever had a heart attack or stroke? No   2. Have you ever had surgery on your heart or blood vessels, such as a stent placement, a coronary artery bypass, or surgery on an artery in your head, neck, heart, or legs? No   3. Do you have chest pain with activity? No   4. Do you have a history of  heart failure? No   5. Do you currently have a cold, bronchitis or symptoms of other infection? No   6. Do you have a cough, shortness of breath, or wheezing? No   7. Do you or anyone in your family have previous history of blood clots? No   8. Do you or does anyone in your family have a serious bleeding problem such as prolonged bleeding following surgeries or cuts? No   9. Have you ever had problems with anemia or been told to take iron pills? No   10. Have you had any abnormal blood loss such as black, tarry or bloody stools, or abnormal vaginal bleeding? No   11. Have you ever had a blood transfusion? No   12. Are you willing to have a blood transfusion if it is medically needed before, during, or after your surgery? Yes   13. Have you or any of your relatives ever had problems with anesthesia? No   14. Do you have sleep apnea, excessive snoring or daytime drowsiness? YES - snoring and mild sleep apnea   14a. Do you have a CPAP machine? No   15. Do you have any artifical heart valves or other implanted medical devices like a pacemaker, defibrillator, or continuous glucose monitor? YES - DEXCOM   15a. What type of device do you have? Dexcom G6 & Omnipod 5 insulun pump   15b. Name of the clinic that manages your device:  [facility]   16.  Do you have artificial joints? No   17. Are you allergic to latex? No   18. Is there any chance that you may be pregnant? No       Health Care Directive:  Patient does not have a Health Care Directive or Living Will: Patient states has Advance Directive and will bring in a copy to clinic.      Status of Chronic Conditions:  See problem list for active medical problems.  Problems all longstanding and stable, except as noted/documented.  See ROS for pertinent symptoms related to these conditions.    Review of Systems  CONSTITUTIONAL: NEGATIVE for fever, chills, change in weight  INTEGUMENTARY/SKIN: NEGATIVE for worrisome rashes, moles or lesions  EYES: NEGATIVE for vision changes or irritation  ENT/MOUTH: NEGATIVE for ear, mouth and throat problems  RESP: NEGATIVE for significant cough or SOB  CV: NEGATIVE for chest pain, palpitations or peripheral edema  GI: NEGATIVE for nausea, abdominal pain, heartburn, or change in bowel habits  : NEGATIVE for frequency, dysuria, or hematuria  MUSCULOSKELETAL: NEGATIVE for significant arthralgias or myalgia  NEURO: NEGATIVE for weakness, dizziness or paresthesias  ENDOCRINE: NEGATIVE for temperature intolerance, skin/hair changes  HEME: NEGATIVE for bleeding problems  PSYCHIATRIC: NEGATIVE for changes in mood or affect    Patient Active Problem List    Diagnosis Date Noted    Neck pain 07/22/2022     Priority: Medium    Localized osteoporosis without current pathological fracture 04/06/2022     Priority: Medium    Colitis 01/30/2020     Priority: Medium    Hives 12/03/2019     Priority: Medium    Gastroesophageal reflux disease, esophagitis presence not specified 09/08/2017     Priority: Medium     IMO Regulatory Load OCT 2020      Retinopathy due to secondary diabetes (H) 10/29/2016     Priority: Medium    Type 1 diabetes mellitus without complication (H) 11/01/2015     Priority: Medium    Overweight (BMI 25.0-29.9) 11/01/2015     Priority: Medium    Hypertension goal BP (blood  pressure) < 140/90 09/29/2014     Priority: Medium    Back pain 03/23/2013     Priority: Medium    Chronic pain 11/09/2011     Priority: Medium     Related to frozen shoulder per ortho will be two years of intermittent pain, agreed to #45 percocet for 90 day supply, only get narcotics from me.        Vitiligo 07/15/2011     Priority: Medium    Frozen shoulder syndrome 06/16/2011     Priority: Medium    Advanced directives, counseling/discussion 06/13/2011     Priority: Medium     Advance Directive Problem List Overview:   Name Relationship Phone    Primary Health Care Agent            Alternative Health Care Agent        9/14/11  Patient presents for [employer] Informational meeting. Patient given [employer] folder. Patient will complete Advance Care Directive and bring to clinic...Mary Lee RN             Cervical radiculopathy      Priority: Medium    Hyperlipidemia LDL goal <100 10/31/2010     Priority: Medium     Otho 10-year CHD Risk Score: 20% (Diabetic)   Values used to calculate score:     Age: 46 years -- Points: 3     Total Cholesterol: 174 mg/dL -- Points: 3     HDL Cholesterol: 50 mg/dL -- Points: 0     Systolic BP (treated): 128 mmHg -- Points: 3    The patient is not a smoker. -- Points: 0     The patient has a diagnosis of diabetes. -- Points: 20% Risk    The patient does not have a family history of CHD. -- Points:0     Otho      LDL goal  >= 20%  <100        Other and unspecified disc disorder of lumbar region 04/07/2007     Priority: Medium    Hypothyroidism due to Hashimoto's thyroiditis 10/05/2004     Priority: Medium     Problem list name updated by automated process. Provider to review        Past Medical History:   Diagnosis Date    Arthritis ?    Nit sure how long ago, but several years    Cervical radiculopathy     Diabetic eye exam (H) 12/14/2011    DISC DIS NEC/NOS-LUMBAR 04/07/2007    Frozen shoulder syndrome 06/16/2011    Hyperlipidemia LDL goal <100 10/31/2010     Hypertension 2011    Hypothyroidism due to Hashimoto's thyroiditis     Localized osteoporosis without current pathological fracture 2022    Type 1 diabetes, HbA1c goal < 7% (H) dx 1967    Unspecified hypothyroidism      Past Surgical History:   Procedure Laterality Date    BIOPSY  ?    See MyChart    COLONOSCOPY N/A 2016    Procedure: COLONOSCOPY;  Surgeon: Efren Perry MD;  Location:  GI    ENDOSCOPIC SINUS SURGERY Bilateral 2018    Procedure: Bilateral functional endoscopic maxillary antrostomies;  Surgeon: Woo Silva MD;  Location:  OR    ENT SURGERY  ?    See MyChary sinus surgery    LAMINECT/DISCECTOMY, CERVICAL  2007    C7-T1 hemilaminectomy, microdiscectomy, foraminotomy.    LASER YAG CAPSULOTOMY Right 10/23/2014    Procedure: LASER YAG CAPSULOTOMY;  Surgeon: Esteban Dorsey MD;  Location:  OR    OH SPINE SURGERY PROCEDURE UNLISTED      VITRECTOMY,STRIP EPIRETINAL MEMBRANE  2009    Z  DELIVERY ONLY      C-Sections x2    ZC NONSPECIFIC PROCEDURE      Hysterectomy - total (cervix removed but ovaries remain)    Z STOMACH SURGERY PROCEDURE UNLISTED      ZC TOTAL ABDOM HYSTERECTOMY       Current Outpatient Medications   Medication Sig Dispense Refill    ACCU-CHEK GUIDE test strip USE TO TEST BLOOD SUGAR 5 TIMES DAILY OR AS DIRECTED 450 strip 3    alendronate (FOSAMAX) 70 MG tablet Take 1 tablet by mouth once a week 12 tablet 0    blood glucose monitoring (SOFTCLIX) lancets USE 1 LANCET TO CHECK GLUCOSE 4-5 TIMES DAILY OR  AS  DIRECTED 200 each 3    Calcium Carb-Cholecalciferol (TGT CALCIUM DIETARY SUPPLEMENT PO)       Continuous Blood Gluc Sensor (DEXCOM G6 SENSOR) MISC Change every 10 days. 3 each 5    Continuous Blood Gluc Transmit (DEXCOM G6 TRANSMITTER) MISC Change every 3 months. 1 each 1    estradiol (ESTRACE) 2 MG tablet Take 1 tablet (2 mg) by mouth daily 90 tablet 3    glucosamine-chondroitin 500-400 MG CAPS per capsule Take 1  "capsule by mouth daily      Insulin Disposable Pump (OMNIPOD 5 G6 INTRO, GEN 5,) KIT 1 kit continuous 1 kit 0    Insulin Disposable Pump (OMNIPOD 5 G6 POD, GEN 5,) MISC 1 each every 3 days 30 each 3    insulin glulisine (APIDRA) 100 UNIT/ML injection Uses up to 35 units per day in insulin pump for basal, bolus, and priming of insulin pump tubing. 30 mL 1    levothyroxine (SYNTHROID/LEVOTHROID) 88 MCG tablet Take 1 tablet (88 mcg) by mouth daily 90 tablet 3    lisinopril (ZESTRIL) 20 MG tablet Take 1 tablet by mouth once daily 90 tablet 3    lisinopril (ZESTRIL) 30 MG tablet Take 1 tablet (30 mg) by mouth daily 90 tablet 1    omeprazole (PRILOSEC) 20 MG DR capsule Take 1 capsule by mouth once daily 90 capsule 1    simvastatin (ZOCOR) 20 MG tablet TAKE 1 TABLET BY MOUTH AT BEDTIME 90 tablet 1    tiZANidine (ZANAFLEX) 2 MG tablet Take 1-2 tablets (2-4 mg) by mouth 3 times daily as needed for muscle spasms Caution sedation. Stop cyclobenzaprine. Don't drive until you know how you feel. 60 tablet 1    Vitamin D, Cholecalciferol, 25 MCG (1000 UT) TABS       fluocinonide (LIDEX) 0.05 % external cream Apply topically 2 times daily (Patient not taking: Reported on 11/20/2023) 60 g 0       Allergies   Allergen Reactions    Novolog [Insulin Aspart] Swelling     Itching, rash, contact dermatitis    Humalog [Insulin Lispro] Rash     Contact dermatitis    Zyrtec [Cetirizine] Rash        Social History     Tobacco Use    Smoking status: Never     Passive exposure: Never    Smokeless tobacco: Never    Tobacco comments:     no exposure   Substance Use Topics    Alcohol use: Yes     Comment: Extremely social     History   Drug Use No         Objective     BP (!) 142/68   Pulse 81   Temp 97.5  F (36.4  C) (Temporal)   Resp 14   Ht 1.508 m (4' 11.37\")   Wt 74.8 kg (165 lb)   LMP  (LMP Unknown)   SpO2 98%   Breastfeeding No   BMI 32.91 kg/m      Physical Exam    GENERAL APPEARANCE: healthy, alert and no distress     EYES: EOMI, "  PERRL     HENT: ear canals and TM's normal and nose and mouth without ulcers or lesions     NECK: no adenopathy, no asymmetry, masses, or scars and thyroid normal to palpation     RESP: lungs clear to auscultation - no rales, rhonchi or wheezes     CV: regular rate and rhythm, normal S1 S2, no S3 or S4 and no murmur, click or rub     ABDOMEN:  soft, nontender, no HSM or masses and bowel sounds normal     MS: extremities normal- no gross deformities noted, no evidence of inflammation in joints, FROM in all extremities.     SKIN: no suspicious lesions or rashes     NEURO: Normal strength and tone, sensory exam grossly normal, mentation intact and speech normal. Gait is stable.      PSYCH: mentation appears normal. and affect normal/bright     LYMPHATICS: No cervical adenopathy    Recent Labs   Lab Test 11/02/23  0841 04/21/23  0824 12/16/22  1130     --  140   POTASSIUM 3.9 4.8 3.6   CR 0.93 0.98* 0.97   A1C 6.2* 5.8*  --         Diagnostics:  No labs were ordered during this visit.   No EKG required for low risk surgery (cataract, skin procedure, breast biopsy, etc).    Revised Cardiac Risk Index (RCRI):  The patient has the following serious cardiovascular risks for perioperative complications:   - Diabetes Mellitus (on Insulin) = 1 point     RCRI Interpretation: 1 point: Class II (low risk - 0.9% complication rate)       Signed Electronically by: Marcus Salgado PA-C  Copy of this evaluation report is provided to requesting physician.

## 2023-11-27 ENCOUNTER — VIRTUAL VISIT (OUTPATIENT)
Dept: EDUCATION SERVICES | Facility: CLINIC | Age: 58
End: 2023-11-27
Payer: COMMERCIAL

## 2023-11-27 VITALS — WEIGHT: 165 LBS | BODY MASS INDEX: 32.91 KG/M2

## 2023-11-27 DIAGNOSIS — E10.9 TYPE 1 DIABETES MELLITUS WITHOUT COMPLICATION (H): Primary | Chronic | ICD-10-CM

## 2023-11-27 PROCEDURE — G0108 DIAB MANAGE TRN  PER INDIV: HCPCS | Mod: VID

## 2023-11-27 ASSESSMENT — PAIN SCALES - GENERAL: PAINLEVEL: NO PAIN (0)

## 2023-11-27 NOTE — LETTER
2023         RE: Maria M Marcus  [address] [place] [address]        Dear Colleague,    Thank you for referring your patient, Maria M Marcus, to the [facility]. Please see a copy of my visit note below.    Virtual Visit Details    Type of service:  Video Visit   Video Start Time: 1:53 PM  Video End Time: 2:32 PM    Originating Location (pt. Location): Home    Distant Location (provider location):  On-site  Platform used for Video Visit: [facility]    Diabetes Type 1:  Maria M Marcus transitioned from MDI to Omnipod 5 insulin pump on 2023 . She does use a continuous glucose monitor (CGM).    Maria M Marcus uses Apidra in her insulin pump.    Other diabetes medications patient is taking include:   Patient is not taking a daily asprin.  Patient is taking a statin.  Patient is taking an ACE/ARB.    Patient reports has had Hypoglycemic symptoms consist of warm, trouble concentrating.  Patient will usually treat lows with juice or coke. Patient does have glucagon available at home and it is not .  Family has been instructed on how to use in a hypoglycemic emergency.    Patient does report recent hyperglycemia symptoms, including headaches. Patient does follow insulin pump high blood sugar protocol and does have ketone testing strips available for use at home and understands when and how to use them.    Patient is up to date with their yearly eye exam.  Patient is up to date with their yearly foot exam.    Insulin pump download reveals:  Patient is changing infusion set an average of every 3 days.  Patient has an average of 5.7 manual boluses per day and 0 overrides per day.  Average suspend duration is 0 minutes/day.  Patient's average blood sugar is 142 +/- 42 mg/dL  Percentage of readings above target: 16%  Percentage of readings below target: 2%  Average daily carb intake: 166.4 grams  Patient's TDD is 16.3 units.  Patient's basal/bolus ratio is  48/52%    Discussion of behaviors related to insulin pump use reveals: entering CHO prior to meals, remembering to manually switch back to manual mode    See scanned in pump report(s) data below:        Active insulin time=3 hours    ASSESSMENT:  T1 seen to review data after transitioning from MDI to Omnipod 5 on 11/14. She is doing quite well, TIR 82%, with 2% lows. Does have overnight highs likely contributed to not entering enough CHO for bedtime snack. She has experienced communication issues when she changes a POD, resolves by powering down controller; she has reached out to Omnipod and will replaced controller if continues to be an issue. She is using Apidra in POD with no concerns. Reminded she can correct through bolus calculator. Has noticed is able to treat lows with less amount of CHO. She is hoping to return to gym this week and will utilize Activity Mode. Based on TDD basal rate would be 0.3; will leave current setting at 0.4. She is having cataract surgery on 12/7. Arrive time 6:30 AM, surgery at 8am; advised to set Activity Mode for 6 hours duration starting at 4:30 AM. No other changes to settings. Follow up with Diabetes Education as needed.    PLAN:  *No changes to settings  *Correct through the bolus calculator for elevated numbers to see if dose is recommended  *Enter 10 grams carbohydrates for bedtime snack  *Turn on Activity Mode at 4:30am day of procedure for 6 hours  *Back up plan  *Follow up with Diabetes Education as needed    Nandini Jenkins RN, Diabetes Educator  Diabetes Education Department  [facility]  [phone]    Time Spent: 33 minutes  Encounter Type: Individual      Any diabetes medication dose changes were made via the CDE Protocol Collaborative Practice Agreement with referring provider.  A copy of this encounter was provided to patient's referring provider.        Again, thank you for allowing me to participate in the care of your patient.         Sincerely,        Nandini Jenkins RN

## 2023-11-27 NOTE — PATIENT INSTRUCTIONS
PLAN:  *No changes to settings  *Correct through the bolus calculator for elevated numbers to see if dose is recommended  *Enter 10 grams carbohydrates for bedtime snack  *Turn on Activity Mode at 4:30am day of procedure for 6 hours  *Back up plan  *Follow up with Diabetes Education as needed    Nandini Jenkins RN, Diabetes Educator  Diabetes Education Department  [facility], [facility]  [phone]

## 2023-11-27 NOTE — PROGRESS NOTES
Virtual Visit Details    Type of service:  Video Visit   Video Start Time: 1:53 PM  Video End Time: 2:32 PM    Originating Location (pt. Location): Home    Distant Location (provider location):  On-site  Platform used for Video Visit: [facility]    Diabetes Type 1:  Maria M Marcus transitioned from MDI to Omnipod 5 insulin pump on 2023 . She does use a continuous glucose monitor (CGM).    Maria M Marcus uses Apidra in her insulin pump.    Other diabetes medications patient is taking include:   Patient is not taking a daily asprin.  Patient is taking a statin.  Patient is taking an ACE/ARB.    Patient reports has had Hypoglycemic symptoms consist of warm, trouble concentrating.  Patient will usually treat lows with juice or coke. Patient does have glucagon available at home and it is not .  Family has been instructed on how to use in a hypoglycemic emergency.    Patient does report recent hyperglycemia symptoms, including headaches. Patient does follow insulin pump high blood sugar protocol and does have ketone testing strips available for use at home and understands when and how to use them.    Patient is up to date with their yearly eye exam.  Patient is up to date with their yearly foot exam.    Insulin pump download reveals:  Patient is changing infusion set an average of every 3 days.  Patient has an average of 5.7 manual boluses per day and 0 overrides per day.  Average suspend duration is 0 minutes/day.  Patient's average blood sugar is 142 +/- 42 mg/dL  Percentage of readings above target: 16%  Percentage of readings below target: 2%  Average daily carb intake: 166.4 grams  Patient's TDD is 16.3 units.  Patient's basal/bolus ratio is 48/52%    Discussion of behaviors related to insulin pump use reveals: entering CHO prior to meals, remembering to manually switch back to manual mode    See scanned in pump report(s) data below:        Active insulin time=3 hours    ASSESSMENT:  T1 seen to  review data after transitioning from MDI to Omnipod 5 on 11/14. She is doing quite well, TIR 82%, with 2% lows. Does have overnight highs likely contributed to not entering enough CHO for bedtime snack. She has experienced communication issues when she changes a POD, resolves by powering down controller; she has reached out to Omnipod and will replaced controller if continues to be an issue. She is using Apidra in POD with no concerns. Reminded she can correct through bolus calculator. Has noticed is able to treat lows with less amount of CHO. She is hoping to return to gym this week and will utilize Activity Mode. Based on TDD basal rate would be 0.3; will leave current setting at 0.4. She is having cataract surgery on 12/7. Arrive time 6:30 AM, surgery at 8am; advised to set Activity Mode for 6 hours duration starting at 4:30 AM. No other changes to settings. Follow up with Diabetes Education as needed.    PLAN:  *No changes to settings  *Correct through the bolus calculator for elevated numbers to see if dose is recommended  *Enter 10 grams carbohydrates for bedtime snack  *Turn on Activity Mode at 4:30am day of procedure for 6 hours  *Back up plan  *Follow up with Diabetes Education as needed    Nandini Jenkins RN, Diabetes Educator  Diabetes Education Department  [facility], [place]  [phone]    Time Spent: 33 minutes  Encounter Type: Individual      Any diabetes medication dose changes were made via the CDE Protocol Collaborative Practice Agreement with referring provider.  A copy of this encounter was provided to patient's referring provider.

## 2023-11-27 NOTE — NURSING NOTE
Is the patient currently in the state of [place]? YES    Visit mode:VIDEO    If the visit is dropped, the patient can be reconnected by:  pt declined    Will anyone else be joining the visit? NO  (If patient encounters technical issues they should call [phone]    How would you like to obtain your AVS? MyChart    Are changes needed to the allergy or medication list? Pt stated no changes to allergies and Pt stated no med changes    Reason for visit: RENATO Tejada LPN

## 2023-11-28 ENCOUNTER — OFFICE VISIT (OUTPATIENT)
Dept: FAMILY MEDICINE | Facility: CLINIC | Age: 58
End: 2023-11-28
Payer: COMMERCIAL

## 2023-11-28 VITALS
HEART RATE: 72 BPM | SYSTOLIC BLOOD PRESSURE: 138 MMHG | HEIGHT: 59 IN | WEIGHT: 163.4 LBS | RESPIRATION RATE: 16 BRPM | TEMPERATURE: 96.2 F | DIASTOLIC BLOOD PRESSURE: 72 MMHG | OXYGEN SATURATION: 99 % | BODY MASS INDEX: 32.94 KG/M2

## 2023-11-28 DIAGNOSIS — E10.9 TYPE 1 DIABETES MELLITUS WITHOUT COMPLICATION (H): Chronic | ICD-10-CM

## 2023-11-28 DIAGNOSIS — I10 HYPERTENSION GOAL BP (BLOOD PRESSURE) < 140/90: ICD-10-CM

## 2023-11-28 DIAGNOSIS — E06.3 HYPOTHYROIDISM DUE TO HASHIMOTO'S THYROIDITIS: ICD-10-CM

## 2023-11-28 DIAGNOSIS — L98.9 SKIN LESION: Primary | ICD-10-CM

## 2023-11-28 DIAGNOSIS — E78.5 HYPERLIPIDEMIA LDL GOAL <100: ICD-10-CM

## 2023-11-28 PROCEDURE — 99213 OFFICE O/P EST LOW 20 MIN: CPT | Performed by: STUDENT IN AN ORGANIZED HEALTH CARE EDUCATION/TRAINING PROGRAM

## 2023-11-28 ASSESSMENT — PAIN SCALES - GENERAL: PAINLEVEL: MODERATE PAIN (4)

## 2023-11-28 NOTE — PROGRESS NOTES
"  Assessment & Plan   Problem List Items Addressed This Visit          Endocrine    Type 1 diabetes mellitus without complication (H) (Chronic)    Hypothyroidism due to Hashimoto's thyroiditis    Hyperlipidemia LDL goal <100       Circulatory    Hypertension goal BP (blood pressure) < 140/90     Other Visit Diagnoses       Skin lesion    -  Primary    Relevant Orders    Adult Dermatology  Referral             Longstanding type 1 diabetes well controlled.  No previous issues with nonhealing wounds but recent lesion on forehead was thought to be this which incited for mosquito bite.  We did discuss differential including potential pyogenic granuloma as lesion does appear vascular.  Also discussed potential for malignancy.  We did discuss treatment options including shave biopsy, curette or excisional biopsy.  Lesion has unchanged for several months.  Given patient's desire for cosmetic outcome we will have her follow-up with dermatology for removal.  Order placed today. Follow up sooner if new or changing lesion.      BMI:   Estimated body mass index is 33.57 kg/m  as calculated from the following:    Height as of this encounter: 1.486 m (4' 10.5\").    Weight as of this encounter: 74.1 kg (163 lb 6.4 oz).       Armando Abreu MD  [facility] 58 [facility], presenting for the following health issues:  Derm Problem (Check sore on her forehead)      11/28/2023    11:09 AM   Additional Questions   Roomed by Cookie MORGAN       History of Present Illness       Reason for visit:  Mosquito bite from late August not healed. Looks like a blood blister now.    She eats 4 or more servings of fruits and vegetables daily.She consumes 4 sweetened beverage(s) daily.She exercises with enough effort to increase her heart rate 10 to 19 minutes per day.  She exercises with enough effort to increase her heart rate 3 or less days per week.   She is taking medications " "regularly.     No other none healing wounds. No no changes in control of her diabetes.  Thought spot was incited by a mosquito bite in August and has since not healed.  Also bled often with vascular pedunculation.  Notes this is ruptured and reoccurred.  Somewhat itchy and uncomfortable.        Review of Systems   Constitutional, HEENT, cardiovascular, pulmonary, gi and gu systems are negative, except as otherwise noted.      Objective    /72 (BP Location: Left arm, Patient Position: Chair)   Pulse 72   Temp (!) 96.2  F (35.7  C) (Temporal)   Resp 16   Ht 1.486 m (4' 10.5\")   Wt 74.1 kg (163 lb 6.4 oz)   LMP  (LMP Unknown)   SpO2 99%   BMI 33.57 kg/m    Body mass index is 33.57 kg/m .  Physical Exam   GENERAL: healthy, alert and no distress  EYES: Eyes grossly normal to inspection, PERRL and conjunctivae and sclerae normal  NECK: no adenopathy, no asymmetry, masses, or scars and thyroid normal to palpation  RESP: lungs clear to auscultation - no rales, rhonchi or wheezes  CV: regular rate and rhythm, normal S1 S2, no S3 or S4, no murmur, click or rub, no peripheral edema and peripheral pulses strong  MS: no gross musculoskeletal defects noted, no edema  SKIN: Right forehead with pedunculated vascular appearing lesion without surrounding ulceration redness or discharge, unable to get picture to insert in chart today.  NEURO:  mentation intact and speech normal  PSYCH: mentation appears normal, affect normal/bright                  "

## 2023-12-07 ENCOUNTER — HOSPITAL ENCOUNTER (OUTPATIENT)
Facility: CLINIC | Age: 58
Discharge: HOME | End: 2023-12-07
Attending: STUDENT IN AN ORGANIZED HEALTH CARE EDUCATION/TRAINING PROGRAM | Admitting: STUDENT IN AN ORGANIZED HEALTH CARE EDUCATION/TRAINING PROGRAM
Payer: COMMERCIAL

## 2023-12-07 ENCOUNTER — ANESTHESIA EVENT (OUTPATIENT)
Dept: SURGERY | Facility: CLINIC | Age: 58
End: 2023-12-07
Payer: COMMERCIAL

## 2023-12-07 ENCOUNTER — ANESTHESIA (OUTPATIENT)
Dept: SURGERY | Facility: CLINIC | Age: 58
End: 2023-12-07
Payer: COMMERCIAL

## 2023-12-07 VITALS
HEIGHT: 59 IN | OXYGEN SATURATION: 96 % | TEMPERATURE: 98.5 F | BODY MASS INDEX: 32.86 KG/M2 | SYSTOLIC BLOOD PRESSURE: 141 MMHG | DIASTOLIC BLOOD PRESSURE: 63 MMHG | HEART RATE: 72 BPM | WEIGHT: 163 LBS | RESPIRATION RATE: 16 BRPM

## 2023-12-07 LAB — GLUCOSE BLDC GLUCOMTR-MCNC: 199 MG/DL (ref 70–99)

## 2023-12-07 PROCEDURE — 250N000011 HC RX IP 250 OP 636: Mod: JZ | Performed by: STUDENT IN AN ORGANIZED HEALTH CARE EDUCATION/TRAINING PROGRAM

## 2023-12-07 PROCEDURE — 761N000008 HC RECOVERY CATRACT PACKAGE: Performed by: STUDENT IN AN ORGANIZED HEALTH CARE EDUCATION/TRAINING PROGRAM

## 2023-12-07 PROCEDURE — 250N000009 HC RX 250: Performed by: STUDENT IN AN ORGANIZED HEALTH CARE EDUCATION/TRAINING PROGRAM

## 2023-12-07 PROCEDURE — 360N000007 HC CATARACT SURGICAL PACKAGE: Performed by: STUDENT IN AN ORGANIZED HEALTH CARE EDUCATION/TRAINING PROGRAM

## 2023-12-07 PROCEDURE — 82962 GLUCOSE BLOOD TEST: CPT

## 2023-12-07 PROCEDURE — V2632 POST CHMBR INTRAOCULAR LENS: HCPCS | Performed by: STUDENT IN AN ORGANIZED HEALTH CARE EDUCATION/TRAINING PROGRAM

## 2023-12-07 PROCEDURE — 250N000011 HC RX IP 250 OP 636: Performed by: NURSE ANESTHETIST, CERTIFIED REGISTERED

## 2023-12-07 PROCEDURE — 370N000004 HC ANESTHESIA CATARACT PACKAGE: Performed by: STUDENT IN AN ORGANIZED HEALTH CARE EDUCATION/TRAINING PROGRAM

## 2023-12-07 DEVICE — EYE IMP IOL AMO PCL TECNIS ZCB00 23.5: Type: IMPLANTABLE DEVICE | Site: EYE | Status: FUNCTIONAL

## 2023-12-07 RX ORDER — PHENYLEPHRINE HYDROCHLORIDE 25 MG/ML
1 SOLUTION/ DROPS OPHTHALMIC
Status: COMPLETED | OUTPATIENT
Start: 2023-12-07 | End: 2023-12-07

## 2023-12-07 RX ORDER — PREDNISOLONE ACETATE 10 MG/ML
SUSPENSION/ DROPS OPHTHALMIC PRN
Status: DISCONTINUED | OUTPATIENT
Start: 2023-12-07 | End: 2023-12-07 | Stop reason: HOSPADM

## 2023-12-07 RX ORDER — CYCLOPENTOLATE HYDROCHLORIDE 10 MG/ML
1 SOLUTION/ DROPS OPHTHALMIC
Status: COMPLETED | OUTPATIENT
Start: 2023-12-07 | End: 2023-12-07

## 2023-12-07 RX ORDER — FENTANYL CITRATE 50 UG/ML
25 INJECTION, SOLUTION INTRAMUSCULAR; INTRAVENOUS
Status: DISCONTINUED | OUTPATIENT
Start: 2023-12-07 | End: 2023-12-07 | Stop reason: HOSPADM

## 2023-12-07 RX ORDER — PROPARACAINE HYDROCHLORIDE 5 MG/ML
1 SOLUTION/ DROPS OPHTHALMIC ONCE
Status: DISCONTINUED | OUTPATIENT
Start: 2023-12-07 | End: 2023-12-07 | Stop reason: HOSPADM

## 2023-12-07 RX ORDER — PROPARACAINE HYDROCHLORIDE 5 MG/ML
1 SOLUTION/ DROPS OPHTHALMIC ONCE
Status: COMPLETED | OUTPATIENT
Start: 2023-12-07 | End: 2023-12-07

## 2023-12-07 RX ORDER — ONDANSETRON 2 MG/ML
4 INJECTION INTRAMUSCULAR; INTRAVENOUS EVERY 30 MIN PRN
Status: DISCONTINUED | OUTPATIENT
Start: 2023-12-07 | End: 2023-12-07 | Stop reason: HOSPADM

## 2023-12-07 RX ORDER — ONDANSETRON 4 MG/1
4 TABLET, ORALLY DISINTEGRATING ORAL EVERY 30 MIN PRN
Status: DISCONTINUED | OUTPATIENT
Start: 2023-12-07 | End: 2023-12-07 | Stop reason: HOSPADM

## 2023-12-07 RX ORDER — MOXIFLOXACIN 5 MG/ML
1 SOLUTION/ DROPS OPHTHALMIC
Status: COMPLETED | OUTPATIENT
Start: 2023-12-07 | End: 2023-12-07

## 2023-12-07 RX ORDER — FENTANYL CITRATE 50 UG/ML
INJECTION, SOLUTION INTRAMUSCULAR; INTRAVENOUS PRN
Status: DISCONTINUED | OUTPATIENT
Start: 2023-12-07 | End: 2023-12-07

## 2023-12-07 RX ORDER — DICLOFENAC SODIUM 1 MG/ML
1 SOLUTION/ DROPS OPHTHALMIC
Status: COMPLETED | OUTPATIENT
Start: 2023-12-07 | End: 2023-12-07

## 2023-12-07 RX ADMIN — FENTANYL CITRATE 50 MCG: 50 INJECTION INTRAMUSCULAR; INTRAVENOUS at 07:24

## 2023-12-07 RX ADMIN — DICLOFENAC SODIUM 1 DROP: 1 SOLUTION/ DROPS OPHTHALMIC at 06:28

## 2023-12-07 RX ADMIN — PROPARACAINE HYDROCHLORIDE 1 DROP: 5 SOLUTION/ DROPS OPHTHALMIC at 06:21

## 2023-12-07 RX ADMIN — CYCLOPENTOLATE HYDROCHLORIDE 1 DROP: 10 SOLUTION/ DROPS OPHTHALMIC at 06:28

## 2023-12-07 RX ADMIN — MIDAZOLAM 1 MG: 1 INJECTION INTRAMUSCULAR; INTRAVENOUS at 07:24

## 2023-12-07 RX ADMIN — MOXIFLOXACIN 1 DROP: 5 SOLUTION/ DROPS OPHTHALMIC at 06:22

## 2023-12-07 RX ADMIN — DICLOFENAC SODIUM 1 DROP: 1 SOLUTION/ DROPS OPHTHALMIC at 06:22

## 2023-12-07 RX ADMIN — DICLOFENAC SODIUM 1 DROP: 1 SOLUTION/ DROPS OPHTHALMIC at 06:35

## 2023-12-07 RX ADMIN — CYCLOPENTOLATE HYDROCHLORIDE 1 DROP: 10 SOLUTION/ DROPS OPHTHALMIC at 06:35

## 2023-12-07 RX ADMIN — MOXIFLOXACIN 1 DROP: 5 SOLUTION/ DROPS OPHTHALMIC at 06:28

## 2023-12-07 RX ADMIN — PHENYLEPHRINE HYDROCHLORIDE 1 DROP: 25 SOLUTION/ DROPS OPHTHALMIC at 06:28

## 2023-12-07 RX ADMIN — PHENYLEPHRINE HYDROCHLORIDE 1 DROP: 25 SOLUTION/ DROPS OPHTHALMIC at 06:35

## 2023-12-07 RX ADMIN — PHENYLEPHRINE HYDROCHLORIDE 1 DROP: 25 SOLUTION/ DROPS OPHTHALMIC at 06:22

## 2023-12-07 RX ADMIN — CYCLOPENTOLATE HYDROCHLORIDE 1 DROP: 10 SOLUTION/ DROPS OPHTHALMIC at 06:22

## 2023-12-07 RX ADMIN — MOXIFLOXACIN 1 DROP: 5 SOLUTION/ DROPS OPHTHALMIC at 06:35

## 2023-12-07 ASSESSMENT — ACTIVITIES OF DAILY LIVING (ADL): ADLS_ACUITY_SCORE: 35

## 2023-12-07 NOTE — ANESTHESIA POSTPROCEDURE EVALUATION
Patient: Maria M Marcus    Procedure: Procedure(s):  PHACOEMULSIFICATION WITH A STANDARD INTRAOCULAR LENS IMPLANT       Anesthesia Type:  MAC    Note:  Disposition: Outpatient   Postop Pain Control: Uneventful            Sign Out: Well controlled pain   PONV: No   Neuro/Psych: Uneventful            Sign Out: Acceptable/Baseline neuro status   Airway/Respiratory: Uneventful            Sign Out: Acceptable/Baseline resp. status   CV/Hemodynamics: Uneventful            Sign Out: Acceptable CV status   Other NRE: NONE   DID A NON-ROUTINE EVENT OCCUR? No    Event details/Postop Comments:  Pt was happy with anesthesia care.  No complications.  I will follow up with the pt if needed.           Last vitals:  Vitals Value Taken Time   /63 12/07/23 0820   Temp     Pulse 72 12/07/23 0820   Resp 16 12/07/23 0810   SpO2 97 % 12/07/23 0822   Vitals shown include unfiled device data.    Electronically Signed By: YU Wayne CRNA  December 7, 2023  8:48 AM

## 2023-12-07 NOTE — ANESTHESIA PREPROCEDURE EVALUATION
Anesthesia Pre-Procedure Evaluation    Patient: Maria M Marcus   MRN: [id number] : 1965        Procedure : Procedure(s):  PHACOEMULSIFICATION WITH A STANDARD INTRAOCULAR LENS IMPLANT          Past Medical History:   Diagnosis Date    Arthritis ?    Nit sure how long ago, but several years    Cervical radiculopathy     Diabetic eye exam (H) 2011    DISC DIS NEC/NOS-LUMBAR 2007    Frozen shoulder syndrome 2011    Hyperlipidemia LDL goal <100 10/31/2010    Hypertension 2011    Hypothyroidism due to Hashimoto's thyroiditis     Localized osteoporosis without current pathological fracture 2022    Type 1 diabetes, HbA1c goal < 7% (H) dx 1967    Unspecified hypothyroidism       Past Surgical History:   Procedure Laterality Date    BIOPSY  ?    See MyChart    COLONOSCOPY N/A 2016    Procedure: COLONOSCOPY;  Surgeon: Efren Perry MD;  Location:  GI    ENDOSCOPIC SINUS SURGERY Bilateral 2018    Procedure: Bilateral functional endoscopic maxillary antrostomies;  Surgeon: Woo Silva MD;  Location:  OR    ENT SURGERY  ?    See MyCh[facility] sinus surgery    LAMINECT/DISCECTOMY, CERVICAL  2007    C7-T1 hemilaminectomy, microdiscectomy, foraminotomy.    LASER YAG CAPSULOTOMY Right 10/23/2014    Procedure: LASER YAG CAPSULOTOMY;  Surgeon: Esteban Dorsey MD;  Location:  OR    ID SPINE SURGERY PROCEDURE UNLISTED      VITRECTOMY,STRIP EPIRETINAL MEMBRANE  2009    Z  DELIVERY ONLY      C-Sections x2    ZZC NONSPECIFIC PROCEDURE      Hysterectomy - total (cervix removed but ovaries remain)    Z STOMACH SURGERY PROCEDURE UNLISTED      [facility] TOTAL ABDOM HYSTERECTOMY        Allergies   Allergen Reactions    Novolog [Insulin Aspart] Swelling     Itching, rash, contact dermatitis    Humalog [Insulin Lispro] Rash     Contact dermatitis    Zyrtec [Cetirizine] Rash      Social History     Tobacco Use    Smoking status: Never     Passive exposure:  Never    Smokeless tobacco: Never    Tobacco comments:     no exposure   Substance Use Topics    Alcohol use: Yes     Comment: Extremely social      Wt Readings from Last 1 Encounters:   11/28/23 74.1 kg (163 lb 6.4 oz)        Anesthesia Evaluation   Pt has had prior anesthetic. Type: General.        ROS/MED HX  ENT/Pulmonary:       Neurologic:  - neg neurologic ROS     Cardiovascular:     (+) Dyslipidemia hypertension- -   -  - -                                      METS/Exercise Tolerance:     Hematologic:  - neg hematologic  ROS     Musculoskeletal:  - neg musculoskeletal ROS     GI/Hepatic:     (+) GERD, Asymptomatic on medication,                  Renal/Genitourinary:  - neg Renal ROS     Endo:     (+) type I DM,  Last HgA1c: 6.2,  Using insulin, - using insulin pump.                Psychiatric/Substance Use:  - neg psychiatric ROS     Infectious Disease:  - neg infectious disease ROS     Malignancy:  - neg malignancy ROS     Other:            Physical Exam    Airway  airway exam normal      Mallampati: II   TM distance: > 3 FB   Neck ROM: full   Mouth opening: > 3 cm    Respiratory Devices and Support         Dental           Cardiovascular   cardiovascular exam normal       Rhythm and rate: regular and normal     Pulmonary   pulmonary exam normal        breath sounds clear to auscultation           OUTSIDE LABS:  CBC:   Lab Results   Component Value Date    WBC 10.0 05/25/2020    WBC 7.0 02/01/2020    HGB 12.6 05/25/2020    HGB 11.5 (L) 02/01/2020    HCT 38.3 05/25/2020    HCT 35.8 02/01/2020     05/25/2020     02/01/2020     BMP:   Lab Results   Component Value Date     11/02/2023     12/16/2022    POTASSIUM 3.9 11/02/2023    POTASSIUM 4.8 04/21/2023    CHLORIDE 102 11/02/2023    CHLORIDE 108 12/16/2022    CO2 26 11/02/2023    CO2 27 12/16/2022    BUN 8.9 11/02/2023    BUN 12.4 04/21/2023    CR 0.93 11/02/2023    CR 0.98 (H) 04/21/2023     (H) 11/02/2023     (H)  "11/02/2023     COAGS: No results found for: \"PTT\", \"INR\", \"FIBR\"  POC:   Lab Results   Component Value Date     (H) 02/01/2020     HEPATIC:   Lab Results   Component Value Date    ALBUMIN 4.1 11/02/2023    PROTTOTAL 6.8 11/02/2023    ALT 9 11/02/2023    AST 17 11/02/2023    ALKPHOS 56 11/02/2023    BILITOTAL 0.3 11/02/2023     OTHER:   Lab Results   Component Value Date    PH 5.0 06/07/2002    LACT 1.0 05/25/2020    A1C 6.2 (H) 11/02/2023    DORIS 9.4 11/02/2023    PHOS 3.3 11/08/2019    TSH 4.00 11/02/2023    T4 1.28 12/16/2022       Anesthesia Plan    ASA Status:  2    NPO Status:  NPO Appropriate    Anesthesia Type: MAC.     - Reason for MAC: straight local not clinically adequate   Induction: Intravenous.   Maintenance: TIVA.        Consents    Anesthesia Plan(s) and associated risks, benefits, and realistic alternatives discussed. Questions answered and patient/representative(s) expressed understanding.     - Discussed:     - Discussed with:  Patient       Use of blood products discussed: No .     Postoperative Care            Comments:    Other Comments: The risks and benefits of anesthesia, and the alternatives where applicable, have been discussed with the patient, and they wish to proceed.              Asher Andrew, YU CRNA    I have reviewed the pertinent notes and labs in the chart from the past 30 days and (re)examined the patient.  Any updates or changes from those notes are reflected in this note.              # Obesity: Estimated body mass index is 33.57 kg/m  as calculated from the following:    Height as of 11/28/23: 1.486 m (4' 10.5\").    Weight as of 11/28/23: 74.1 kg (163 lb 6.4 oz).      "

## 2023-12-07 NOTE — ANESTHESIA CARE TRANSFER NOTE
Patient: Maria M Marcus    Procedure: Procedure(s):  PHACOEMULSIFICATION WITH A STANDARD INTRAOCULAR LENS IMPLANT       Diagnosis: Cataract [H26.9]  Diagnosis Additional Information: No value filed.    Anesthesia Type:   MAC     Note:    Oropharynx: oropharynx clear of all foreign objects and spontaneously breathing  Level of Consciousness: awake  Oxygen Supplementation: room air    Independent Airway: airway patency satisfactory and stable  Dentition: dentition unchanged  Vital Signs Stable: post-procedure vital signs reviewed and stable  Report to RN Given: handoff report given  Patient transferred to: Phase II    Handoff Report: Identifed the Patient, Identified the Reponsible Provider, Reviewed the pertinent medical history, Discussed the surgical course, Reviewed Intra-OP anesthesia mangement and issues during anesthesia, Set expectations for post-procedure period and Allowed opportunity for questions and acknowledgement of understanding      Vitals:  Vitals Value Taken Time   BP     Temp     Pulse     Resp     SpO2         Electronically Signed By: YU Wayne CRNA  December 7, 2023  7:58 AM

## 2023-12-07 NOTE — OP NOTE
[facility]  Ophthalmology Operative Note    PREOPERATIVE DIAGNOSIS: Cataract, Left eye.     POSTOPERATIVE DIAGNOSIS: Cataract, Left eye.     OPERATION: Cataract extraction with placement of posterior chamber intraocular lens in the Left eye.     ANESTHESIA: MAC combined with topical     INDICATIONS FOR PROCEDURE: Maria M Marcus was seen in the [facility] for decreased visual acuity in the Left eye. The patient was found to have a visually significant cataract in the Left eye. The risks, benefits, alternatives and goals of cataract extraction were discussed with the patient, and after adequate discussion the patient understood and agreed to these, and a signed informed consent was obtained prior to the procedure.     DESCRIPTION OF PROCEDURE: After proper patient identification, topical anesthesia was applied to the Left eye. The patient was brought to the operating room and the Left eye was prepped and draped in the usual sterile fashion for intraocular surgery. A lid speculum was placed in the Left eye. A paracentesis was then created and the anterior chamber was filled with 1% non-preserved lidocaine followed by Endocoat. A clear corneal incision was then created temporally using a 2.4mm keratome. A continuous curvilinear capsulorrhexis was then created using a cystotome and Utrata forceps. Hydrodissection was carried out with BSS on a Arriaga cannula and the lens rotated freely within the capsular bag. Phacoemulsification was then carried out using the divide and conquer technique. Residual cortical material was removed using the I&A handpiece. The capsular bag was then filled with Healon and a ZCB00 +23.5 diopter intraocular lens was then injected into the capsular bag. The lens showed good centration and stability. Residual viscoelastic was removed using the I&A handpiece. The wound was then hydrated and the anterior chamber reformed. Intracameral Moxifloxacin  was then injected into the anterior chamber. The wounds were then checked and found to be sealed. Topical Prednisolone drops were placed in the patient's Left eye followed by a Dixon shield over the top of this. The patient tolerated the procedure well without complications and was told to follow up in the clinic in the next postoperative day.     Implant Name Type Inv. Item Serial No.  Lot No. LRB No. Used Action   EYE IMP IOL LORI PCL TECNIS ZCB00 23.5 - [id number] Lens/Eye Implant EYE IMP IOL LORI PCL TECNIS ZCB00 23.5 [id number] [employer]  Left 1 Implanted        Laureano Doyle MD

## 2023-12-07 NOTE — INTERVAL H&P NOTE
"I have reviewed the surgical (or preoperative) H&P that is linked to this encounter, and examined the patient. There are no significant changes    Clinical Conditions Present on Arrival:  Clinically Significant Risk Factors Present on Admission                  # Obesity: Estimated body mass index is 33.57 kg/m  as calculated from the following:    Height as of 11/28/23: 1.486 m (4' 10.5\").    Weight as of 11/28/23: 74.1 kg (163 lb 6.4 oz).       "

## 2023-12-07 NOTE — BRIEF OP NOTE
[facility]    Brief Operative Note    Pre-operative diagnosis: Cataract, Left Eye  Post-operative diagnosis Same as pre-operative diagnosis    Procedure: PHACOEMULSIFICATION WITH A STANDARD INTRAOCULAR LENS IMPLANT, Left - Eye    Surgeon: Surgeon(s) and Role:     * Laureano Doyle MD - Primary  Anesthesia: MAC with Topical   Estimated Blood Loss: None    Drains: None  Specimens: * No specimens in log *  Findings:   None.  Complications: None.  Implants:   Implant Name Type Inv. Item Serial No.  Lot No. LRB No. Used Action   EYE IMP IOL LORI PCL TECNIS ZCB00 23.5 - [id number] Lens/Eye Implant EYE IMP IOL LORI PCL TECNIS ZCB00 23.5 [id number] [employer]  Left 1 Implanted

## 2023-12-07 NOTE — DISCHARGE INSTRUCTIONS
Eye Drops post cataract procedure    PREDNISOLONE ACETATE  3 times daily for 2 weeks,   Then 1 time daily for 1 week    MOXIFLOXACIN  3 times daily for 1 week    KETOROLAC  3 times daily for 2 weeks  Then stop

## 2023-12-12 ENCOUNTER — MYC REFILL (OUTPATIENT)
Dept: FAMILY MEDICINE | Facility: OTHER | Age: 58
End: 2023-12-12
Payer: COMMERCIAL

## 2023-12-12 DIAGNOSIS — I10 HYPERTENSION GOAL BP (BLOOD PRESSURE) < 140/90: ICD-10-CM

## 2023-12-12 DIAGNOSIS — E10.9 TYPE 1 DIABETES MELLITUS WITHOUT COMPLICATION (H): ICD-10-CM

## 2023-12-13 RX ORDER — LISINOPRIL 30 MG/1
30 TABLET ORAL DAILY
Qty: 90 TABLET | Refills: 1 | OUTPATIENT
Start: 2023-12-13

## 2023-12-13 ASSESSMENT — ENCOUNTER SYMPTOMS
HEMATURIA: 0
EYE PAIN: 0
FREQUENCY: 0
SHORTNESS OF BREATH: 0
DIARRHEA: 0
HEADACHES: 0
PALPITATIONS: 0
PARESTHESIAS: 0
HEMATOCHEZIA: 0
ARTHRALGIAS: 0
CONSTIPATION: 0
COUGH: 0
NERVOUS/ANXIOUS: 0
CHILLS: 0
ABDOMINAL PAIN: 0
DIZZINESS: 0
MYALGIAS: 0
FEVER: 0
HEARTBURN: 0
DYSURIA: 0
SORE THROAT: 0
BREAST MASS: 0
JOINT SWELLING: 0
NAUSEA: 0

## 2023-12-13 ASSESSMENT — ACTIVITIES OF DAILY LIVING (ADL): CURRENT_FUNCTION: TRANSPORTATION REQUIRES ASSISTANCE

## 2023-12-18 RX ORDER — SIMVASTATIN 20 MG
20 TABLET ORAL AT BEDTIME
Qty: 90 TABLET | Refills: 3 | Status: SHIPPED | OUTPATIENT
Start: 2023-12-18

## 2023-12-18 NOTE — TELEPHONE ENCOUNTER
simvastatin (ZOCOR) 20 MG tablet 90 tablet 1 5/5/2023       Last Office Visit: 11/6/23  Future Office visit:  6/3/24       Refill protocol passed  Khushi Keen RN

## 2023-12-20 ENCOUNTER — ANCILLARY PROCEDURE (OUTPATIENT)
Dept: GENERAL RADIOLOGY | Facility: OTHER | Age: 58
End: 2023-12-20
Attending: PHYSICIAN ASSISTANT
Payer: COMMERCIAL

## 2023-12-20 ENCOUNTER — OFFICE VISIT (OUTPATIENT)
Dept: FAMILY MEDICINE | Facility: OTHER | Age: 58
End: 2023-12-20
Payer: COMMERCIAL

## 2023-12-20 VITALS
BODY MASS INDEX: 33.9 KG/M2 | OXYGEN SATURATION: 99 % | DIASTOLIC BLOOD PRESSURE: 70 MMHG | TEMPERATURE: 98.1 F | HEIGHT: 58 IN | SYSTOLIC BLOOD PRESSURE: 136 MMHG | RESPIRATION RATE: 18 BRPM | WEIGHT: 161.5 LBS | HEART RATE: 92 BPM

## 2023-12-20 DIAGNOSIS — M25.551 HIP PAIN, RIGHT: ICD-10-CM

## 2023-12-20 DIAGNOSIS — E10.9 TYPE 1 DIABETES MELLITUS WITHOUT COMPLICATION (H): ICD-10-CM

## 2023-12-20 DIAGNOSIS — I10 HYPERTENSION GOAL BP (BLOOD PRESSURE) < 140/90: ICD-10-CM

## 2023-12-20 DIAGNOSIS — M81.6 LOCALIZED OSTEOPOROSIS WITHOUT CURRENT PATHOLOGICAL FRACTURE: ICD-10-CM

## 2023-12-20 DIAGNOSIS — Z00.00 ENCOUNTER FOR SUBSEQUENT ANNUAL WELLNESS VISIT (AWV) IN MEDICARE PATIENT: Primary | ICD-10-CM

## 2023-12-20 PROCEDURE — G0439 PPPS, SUBSEQ VISIT: HCPCS | Performed by: PHYSICIAN ASSISTANT

## 2023-12-20 PROCEDURE — 99214 OFFICE O/P EST MOD 30 MIN: CPT | Mod: 25 | Performed by: PHYSICIAN ASSISTANT

## 2023-12-20 PROCEDURE — 73502 X-RAY EXAM HIP UNI 2-3 VIEWS: CPT | Mod: TC | Performed by: RADIOLOGY

## 2023-12-20 RX ORDER — ALENDRONATE SODIUM 70 MG/1
70 TABLET ORAL WEEKLY
Qty: 12 TABLET | Refills: 3 | Status: SHIPPED | OUTPATIENT
Start: 2023-12-20

## 2023-12-20 ASSESSMENT — ANXIETY QUESTIONNAIRES
4. TROUBLE RELAXING: NOT AT ALL
5. BEING SO RESTLESS THAT IT IS HARD TO SIT STILL: NOT AT ALL
6. BECOMING EASILY ANNOYED OR IRRITABLE: NOT AT ALL
7. FEELING AFRAID AS IF SOMETHING AWFUL MIGHT HAPPEN: NOT AT ALL
IF YOU CHECKED OFF ANY PROBLEMS ON THIS QUESTIONNAIRE, HOW DIFFICULT HAVE THESE PROBLEMS MADE IT FOR YOU TO DO YOUR WORK, TAKE CARE OF THINGS AT HOME, OR GET ALONG WITH OTHER PEOPLE: NOT DIFFICULT AT ALL
3. WORRYING TOO MUCH ABOUT DIFFERENT THINGS: NOT AT ALL
GAD7 TOTAL SCORE: 0
2. NOT BEING ABLE TO STOP OR CONTROL WORRYING: NOT AT ALL
GAD7 TOTAL SCORE: 0
1. FEELING NERVOUS, ANXIOUS, OR ON EDGE: NOT AT ALL

## 2023-12-20 ASSESSMENT — ACTIVITIES OF DAILY LIVING (ADL): CURRENT_FUNCTION: NO ASSISTANCE NEEDED

## 2023-12-20 ASSESSMENT — PAIN SCALES - GENERAL: PAINLEVEL: MODERATE PAIN (5)

## 2023-12-20 NOTE — PROGRESS NOTES
"SUBJECTIVE:   Maria M is a 58 year old, presenting for the following:  Physical        12/20/2023     1:39 PM   Additional Questions   Roomed by Rosy   Accompanied by Self         12/20/2023     1:39 PM   Patient Reported Additional Medications   Patient reports taking the following new medications OTC allergy medication, OTC Gas Pill       Are you in the first 12 months of your Medicare coverage?  No    Healthy Habits:     In general, how would you rate your overall health?  Good    Frequency of exercise:  2-3 days/week    Duration of exercise:  30-45 minutes    Do you usually eat at least 4 servings of fruit and vegetables a day, include whole grains    & fiber and avoid regularly eating high fat or \"junk\" foods?  Yes    Taking medications regularly:  Yes    Barriers to taking medications:  None    Medication side effects:  None    Ability to successfully perform activities of daily living:  No assistance needed (with small things because of vision)    Home Safety:  No safety concerns identified    Hearing Impairment:  No hearing concerns    In the past 6 months, have you been bothered by leaking of urine?  No    In general, how would you rate your overall mental or emotional health?  Excellent    Additional concerns today:  Yes    Chronic/Recurring Back Pain Follow Up    Where is your back pain located? (Select all that apply) hip right  How would you describe your back pain?  sharp  Where does your back pain spread? the right buttock and the right  thigh  Since your last clinic visit for back pain, how has your pain changed? always present, but gets better and worse  Does your back pain interfere with your job? Not applicable  Since your last visit, have you tried any new treatment? No    Her right hip pain is chronic but has been worsening. She has pain in to the outer hip and groin area. Tylenol is of minimal benefit. Pain is worse at times with movement. It does not significantly disrupt her day but is a " nuisance.     Have you ever done Advance Care Planning? (For example, a Health Directive, POLST, or a discussion with a medical provider or your loved ones about your wishes): Yes, patient states has an Advance Care Planning document and will bring a copy to the clinic.       Fall risk  Fallen 2 or more times in the past year?: No  Any fall with injury in the past year?: No    Cognitive Screening   1) Repeat 3 items (Leader, Season, Table)    2) Clock draw: NORMAL  3) 3 item recall: Recalls 3 objects  Results: 3 items recalled: COGNITIVE IMPAIRMENT LESS LIKELY    Mini-CogTM Copyright S Roxanna. Licensed by the author for use in [facility]; reprinted with permission (soob@[facility]). All rights reserved.      Do you have sleep apnea, excessive snoring or daytime drowsiness? : no    Reviewed and updated as needed this visit by clinical staff   Tobacco  Allergies  Meds  Problems  Med Hx  Surg Hx  Fam Hx          Reviewed and updated as needed this visit by Provider   Tobacco  Allergies  Meds  Problems  Med Hx  Surg Hx  Fam Hx         Social History     Tobacco Use    Smoking status: Never     Passive exposure: Never    Smokeless tobacco: Never    Tobacco comments:     no exposure   Substance Use Topics    Alcohol use: Yes     Comment: Extremely social           12/13/2023    12:56 PM   Alcohol Use   Prescreen: >3 drinks/day or >7 drinks/week? No     Do you have a current opioid prescription? No  Do you use any other controlled substances or medications that are not prescribed by a provider? None      Current providers sharing in care for this patient include:   Patient Care Team:  Marcus Salgado PA-C as PCP - General (Physician Assistant)  Marcus Salgado PA-C as Assigned PCP  Gisel Burns DO as Assigned OBGYN Provider  Lena Bradford MD as Assigned Endocrinology Provider  Anisha Dodge APRN CNP as Assigned Neuroscience Provider  Nandini Jenkins RN as Diabetes  Educator (Diabetes Education)    The following health maintenance items are reviewed in Epic and correct as of today:  Health Maintenance   Topic Date Due    URINE DRUG SCREEN  Never done    COVID-19 Vaccine (1) Never done    HEPATITIS B IMMUNIZATION (3 of 3 - 19+ 3-dose series) 04/14/1998    ZOSTER IMMUNIZATION (1 of 2) Never done    DIABETIC FOOT EXAM  07/20/2023    MEDICARE ANNUAL WELLNESS VISIT  12/21/2023    A1C  05/02/2024    MAMMO SCREENING  09/21/2024    EYE EXAM  10/05/2024    BMP  11/02/2024    CMP  11/02/2024    LIPID  11/02/2024    MICROALBUMIN  11/02/2024    TSH W/FREE T4 REFLEX  11/02/2024    PHQ-9  11/20/2024    ANNUAL REVIEW OF HM ORDERS  11/28/2024    WILLIE ASSESSMENT  12/20/2024    COLORECTAL CANCER SCREENING  09/21/2026    ADVANCE CARE PLANNING  12/20/2028    DTAP/TDAP/TD IMMUNIZATION (3 - Td or Tdap) 06/23/2031    HEPATITIS C SCREENING  Completed    HIV SCREENING  Completed    PHQ-2 (once per calendar year)  Completed    INFLUENZA VACCINE  Completed    Pneumococcal Vaccine: Pediatrics (0 to 5 Years) and At-Risk Patients (6 to 64 Years)  Completed    IPV IMMUNIZATION  Aged Out    HPV IMMUNIZATION  Aged Out    MENINGITIS IMMUNIZATION  Aged Out    RSV MONOCLONAL ANTIBODY  Aged Out    PAP  Discontinued         FHS-7:       9/21/2022    12:58 PM 12/14/2022     2:14 PM 11/14/2023    11:12 AM 12/13/2023    12:58 PM   Breast CA Risk Assessment (FHS-7)   Did any of your first-degree relatives have breast or ovarian cancer? Yes Yes Yes Yes   Did any of your relatives have bilateral breast cancer? No Unknown Unknown Yes   Did any man in your family have breast cancer? No No No No   Did any woman in your family have breast and ovarian cancer? No Yes Yes Yes   Did any woman in your family have breast cancer before age 50 y? No No No No   Do you have 2 or more relatives with breast and/or ovarian cancer? No No No No   Do you have 2 or more relatives with breast and/or bowel cancer? No No No No       Mammogram  "Screening: Recommended mammography every 1-2 years with patient discussion and risk factor consideration  Pertinent mammograms are reviewed under the imaging tab.    Review of Systems  Constitutional, HEENT, cardiovascular, pulmonary, GI, , musculoskeletal, neuro, skin, endocrine and psych systems are negative, except as otherwise noted.    OBJECTIVE:   /70   Pulse 92   Temp 98.1  F (36.7  C) (Temporal)   Resp 18   Ht 1.48 m (4' 10.27\")   Wt 73.3 kg (161 lb 8 oz)   LMP  (LMP Unknown)   SpO2 99%   BMI 33.44 kg/m   Estimated body mass index is 33.44 kg/m  as calculated from the following:    Height as of this encounter: 1.48 m (4' 10.27\").    Weight as of this encounter: 73.3 kg (161 lb 8 oz).  Physical Exam  GENERAL APPEARANCE: healthy, alert and no distress  EYES: Eyes grossly normal to inspection, PERRL and conjunctivae and sclerae normal  HENT: ear canals and TM's normal, nose and mouth without ulcers or lesions, oropharynx clear and oral mucous membranes moist  NECK: no adenopathy, no asymmetry, masses, or scars and thyroid normal to palpation  RESP: lungs clear to auscultation - no rales, rhonchi or wheezes  CV: regular rate and rhythm, normal S1 S2, no S3 or S4, no murmur, click or rub, no peripheral edema and peripheral pulses strong  ABDOMEN: soft, nontender, no hepatosplenomegaly, no masses and bowel sounds normal  MS: no musculoskeletal defects are noted and gait is age appropriate without ataxia. Tenderness over the right lateral hip near the greater trochanter but mostly posterior and inferior to this area. Increased pain with right hip external rotation. Normal hip flexion, extension and internal rotation.   SKIN: no suspicious lesions or rashes  NEURO: Normal strength and tone, sensory exam grossly normal, mentation intact and speech normal. Gait is stable.   PSYCH: mentation appears normal and affect normal/bright    ASSESSMENT / PLAN:       ICD-10-CM    1. Encounter for subsequent " "annual wellness visit (AWV) in Medicare patient  Z00.00       2. Hip pain, right  M25.551 XR Hip Right 2-3 Views      3. Hypertension goal BP (blood pressure) < 140/90  I10       4. Type 1 diabetes mellitus without complication (H)  E10.9       5. Localized osteoporosis without current pathological fracture  M81.6 alendronate (FOSAMAX) 70 MG tablet          2. Chronic right hip pain that is not improving so will order x-rays for further evaluation. There is likely some arthritis along with trochanteric bursitis. I recommend she consider ice, heat, massage and Voltaren gel. Will also consider PT and/or orthopedics referral depending on x-ray results.    3. Blood pressure stable on current medications.    4. She follows closely with her endocrinologist and diabetes remains well controlled with her insulin pump.    5. Continue alendronate along with calcium + vitamin D.    Follow-up annually.      Patient has been advised of split billing requirements and indicates understanding: Yes      COUNSELING:  Reviewed preventive health counseling, as reflected in patient instructions       Regular exercise       Healthy diet/nutrition      BMI:   Estimated body mass index is 33.44 kg/m  as calculated from the following:    Height as of this encounter: 1.48 m (4' 10.27\").    Weight as of this encounter: 73.3 kg (161 lb 8 oz).   Weight management plan: Discussed healthy diet and exercise guidelines      She reports that she has never smoked. She has never been exposed to tobacco smoke. She has never used smokeless tobacco.      Appropriate preventive services were discussed with this patient, including applicable screening as appropriate for fall prevention, nutrition, physical activity, Tobacco-use cessation, weight loss and cognition.  Checklist reviewing preventive services available has been given to the patient.    Reviewed patients plan of care and provided an AVS. The Basic Care Plan (routine screening as documented in " Health Maintenance) for Crystal meets the Care Plan requirement. This Care Plan has been established and reviewed with the Patient.        RAUL Leigh [facility]

## 2023-12-20 NOTE — PATIENT INSTRUCTIONS

## 2024-01-02 ENCOUNTER — MYC MEDICAL ADVICE (OUTPATIENT)
Dept: FAMILY MEDICINE | Facility: OTHER | Age: 59
End: 2024-01-02
Payer: COMMERCIAL

## 2024-01-02 DIAGNOSIS — M25.551 HIP PAIN, RIGHT: Primary | ICD-10-CM

## 2024-01-02 DIAGNOSIS — M70.61 TROCHANTERIC BURSITIS OF RIGHT HIP: ICD-10-CM

## 2024-01-03 NOTE — TELEPHONE ENCOUNTER
"Last appointment on 12/20. Office note states, \"2. Chronic right hip pain that is not improving so will order x-rays for further evaluation. There is likely some arthritis along with trochanteric bursitis. I recommend she consider ice, heat, massage and Voltaren gel. Will also consider PT and/or orthopedics referral depending on x-ray results \"  "

## 2024-01-11 ENCOUNTER — OFFICE VISIT (OUTPATIENT)
Dept: FAMILY MEDICINE | Facility: CLINIC | Age: 59
End: 2024-01-11
Payer: COMMERCIAL

## 2024-01-11 DIAGNOSIS — D49.2 NEOPLASM OF UNSPECIFIED BEHAVIOR OF BONE, SOFT TISSUE, AND SKIN: ICD-10-CM

## 2024-01-11 PROCEDURE — 11102 TANGNTL BX SKIN SINGLE LES: CPT | Performed by: PHYSICIAN ASSISTANT

## 2024-01-11 PROCEDURE — 88305 TISSUE EXAM BY PATHOLOGIST: CPT | Performed by: DERMATOLOGY

## 2024-01-11 NOTE — LETTER
1/11/2024         RE: Maria M Marcus  [place] [address]        Dear Colleague,    Thank you for referring your patient, Maria M Marcus, to the [facility]. Please see a copy of my visit note below.    [facility] Dermatology Note  Encounter Date: Jan 11, 2024  Office Visit     Reviewed patients past medical history and pertinent chart review prior to patients visit today.     Dermatology Problem List:  NUB, right forehead, shave biopsy Jan 11, 2024    ____________________________________________    Assessment & Plan:     # Neoplasm of uncertain behavior:  right forehead  DDx includes pyogenic granuloma vs angiosarcoma. Shave biopsy today.    Procedure Note: Biopsy by shave technique  The risks and benefits of the procedure were described to the patient. These include but are not limited to bleeding, infection, scar, incomplete removal, and non-diagnostic biopsy. Verbal informed consent was obtained. The above site(s) was cleansed with an alcohol pad and injected with 1% lidocaine with epinephrine. Once anesthesia was obtained, a biopsy(ies) was performed with Gilette blade. The tissue(s) was placed in a labeled container(s) with formalin and sent to pathology. Hemostasis was achieved with electrocautery after obtaining patient consent. Vaseline and a bandage were applied to the wound(s). The patient tolerated the procedure well and was given post biopsy care instructions.    All risks, benefits and alternatives were discussed with patient.  Patient is in agreement and understands the assessment and plan.  All questions were answered.  Meaghan Bateman PA-C  [facility] Dermatology  _______________________________________    CC: Derm Problem (Red spot above R eyebrow present over the past 5 months)    HPI:  Ms. Maria M Marcus is a(n) 58 year old female who presents today as a new patient for a lesion on the right forehead. The  patient first noticed the lesion about 5 months ago. It has grown over time. The lesion is often itchy, it does not bleed or cause pain. No personal or family history of skin cancer. Patient is otherwise feeling well, without additional skin concerns.    Physical Exam:  SKIN: Focused examination of face was performed.  - The right forehead demonstrates a 0.8 x 0.9 cm red pedunculated nodule.     - No other lesions of concern on areas examined.     Medications:  Current Outpatient Medications   Medication     ACCU-CHEK GUIDE test strip     alendronate (FOSAMAX) 70 MG tablet     blood glucose monitoring (SOFTCLIX) lancets     Calcium Carb-Cholecalciferol (TGT CALCIUM DIETARY SUPPLEMENT PO)     Continuous Blood Gluc Sensor (DEXCOM G6 SENSOR) MISC     Continuous Blood Gluc Transmit (DEXCOM G6 TRANSMITTER) MISC     estradiol (ESTRACE) 2 MG tablet     fluocinonide (LIDEX) 0.05 % external cream     glucosamine-chondroitin 500-400 MG CAPS per capsule     Insulin Disposable Pump (OMNIPOD 5 G6 INTRO, GEN 5,) KIT     Insulin Disposable Pump (OMNIPOD 5 G6 POD, GEN 5,) MISC     insulin glulisine (APIDRA) 100 UNIT/ML injection     levothyroxine (SYNTHROID/LEVOTHROID) 88 MCG tablet     lisinopril (ZESTRIL) 30 MG tablet     omeprazole (PRILOSEC) 20 MG DR capsule     simvastatin (ZOCOR) 20 MG tablet     tiZANidine (ZANAFLEX) 2 MG tablet     Vitamin D, Cholecalciferol, 25 MCG (1000 UT) TABS     No current facility-administered medications for this visit.      Past Medical History:   Patient Active Problem List   Diagnosis     Hypothyroidism due to Hashimoto's thyroiditis     Other and unspecified disc disorder of lumbar region     Hyperlipidemia LDL goal <100     Cervical radiculopathy     Advanced directives, counseling/discussion     Frozen shoulder syndrome     Vitiligo     Chronic pain     Back pain     Hypertension goal BP (blood pressure) < 140/90     Type 1 diabetes mellitus without complication (H)     Overweight (BMI  25.0-29.9)     Retinopathy due to secondary diabetes (H)     Gastroesophageal reflux disease, esophagitis presence not specified     Hives     Colitis     Localized osteoporosis without current pathological fracture     Neck pain     Past Medical History:   Diagnosis Date     Arthritis ?    Nit sure how long ago, but several years     Cervical radiculopathy      Diabetic eye exam (H) 12/14/2011     DISC DIS NEC/NOS-LUMBAR 04/07/2007     Frozen shoulder syndrome 06/16/2011     Hyperlipidemia LDL goal <100 10/31/2010     Hypertension 01/03/2011     Hypothyroidism due to Hashimoto's thyroiditis      Localized osteoporosis without current pathological fracture 04/06/2022     Type 1 diabetes, HbA1c goal < 7% (H) dx 1967     Unspecified hypothyroidism        CC Armando Abreu MD  [address] [address] on close of this encounter.       Again, thank you for allowing me to participate in the care of your patient.        Sincerely,        Meaghan Bateman PA-C

## 2024-01-11 NOTE — PROGRESS NOTES
[facility] Dermatology Note  Encounter Date: Jan 11, 2024  Office Visit     Reviewed patients past medical history and pertinent chart review prior to patients visit today.     Dermatology Problem List:  NUB, right forehead, shave biopsy Jan 11, 2024    ____________________________________________    Assessment & Plan:     # Neoplasm of uncertain behavior:  right forehead  DDx includes pyogenic granuloma vs angiosarcoma. Shave biopsy today.    Procedure Note: Biopsy by shave technique  The risks and benefits of the procedure were described to the patient. These include but are not limited to bleeding, infection, scar, incomplete removal, and non-diagnostic biopsy. Verbal informed consent was obtained. The above site(s) was cleansed with an alcohol pad and injected with 1% lidocaine with epinephrine. Once anesthesia was obtained, a biopsy(ies) was performed with Gilette blade. The tissue(s) was placed in a labeled container(s) with formalin and sent to pathology. Hemostasis was achieved with electrocautery after obtaining patient consent. Vaseline and a bandage were applied to the wound(s). The patient tolerated the procedure well and was given post biopsy care instructions.    All risks, benefits and alternatives were discussed with patient.  Patient is in agreement and understands the assessment and plan.  All questions were answered.  Meaghan Bateman PA-C  [facility] Dermatology  _______________________________________    CC: Derm Problem (Red spot above R eyebrow present over the past 5 months)    HPI:  Ms. Maria M Marcus is a(n) 58 year old female who presents today as a new patient for a lesion on the right forehead. The patient first noticed the lesion about 5 months ago. It has grown over time. The lesion is often itchy, it does not bleed or cause pain. No personal or family history of skin cancer. Patient is otherwise feeling well, without additional skin concerns.    Physical  Exam:  SKIN: Focused examination of face was performed.  - The right forehead demonstrates a 0.8 x 0.9 cm red pedunculated nodule.     - No other lesions of concern on areas examined.     Medications:  Current Outpatient Medications   Medication    ACCU-CHEK GUIDE test strip    alendronate (FOSAMAX) 70 MG tablet    blood glucose monitoring (SOFTCLIX) lancets    Calcium Carb-Cholecalciferol (TGT CALCIUM DIETARY SUPPLEMENT PO)    Continuous Blood Gluc Sensor (DEXCOM G6 SENSOR) MISC    Continuous Blood Gluc Transmit (DEXCOM G6 TRANSMITTER) MISC    estradiol (ESTRACE) 2 MG tablet    fluocinonide (LIDEX) 0.05 % external cream    glucosamine-chondroitin 500-400 MG CAPS per capsule    Insulin Disposable Pump (OMNIPOD 5 G6 INTRO, GEN 5,) KIT    Insulin Disposable Pump (OMNIPOD 5 G6 POD, GEN 5,) MISC    insulin glulisine (APIDRA) 100 UNIT/ML injection    levothyroxine (SYNTHROID/LEVOTHROID) 88 MCG tablet    lisinopril (ZESTRIL) 30 MG tablet    omeprazole (PRILOSEC) 20 MG DR capsule    simvastatin (ZOCOR) 20 MG tablet    tiZANidine (ZANAFLEX) 2 MG tablet    Vitamin D, Cholecalciferol, 25 MCG (1000 UT) TABS     No current facility-administered medications for this visit.      Past Medical History:   Patient Active Problem List   Diagnosis    Hypothyroidism due to Hashimoto's thyroiditis    Other and unspecified disc disorder of lumbar region    Hyperlipidemia LDL goal <100    Cervical radiculopathy    Advanced directives, counseling/discussion    Frozen shoulder syndrome    Vitiligo    Chronic pain    Back pain    Hypertension goal BP (blood pressure) < 140/90    Type 1 diabetes mellitus without complication (H)    Overweight (BMI 25.0-29.9)    Retinopathy due to secondary diabetes (H)    Gastroesophageal reflux disease, esophagitis presence not specified    Hives    Colitis    Localized osteoporosis without current pathological fracture    Neck pain     Past Medical History:   Diagnosis Date    Arthritis ?    Nit sure how  long ago, but several years    Cervical radiculopathy     Diabetic eye exam (H) 12/14/2011    DISC DIS NEC/NOS-LUMBAR 04/07/2007    Frozen shoulder syndrome 06/16/2011    Hyperlipidemia LDL goal <100 10/31/2010    Hypertension 01/03/2011    Hypothyroidism due to Hashimoto's thyroiditis     Localized osteoporosis without current pathological fracture 04/06/2022    Type 1 diabetes, HbA1c goal < 7% (H) dx 1967    Unspecified hypothyroidism        CC Armando Abreu MD  [address] [place]  [place] [address] on close of this encounter.

## 2024-01-12 NOTE — PROGRESS NOTES
Maria M Marcus  :  1965  DOS: 2024  MRN: [id number]  PCP: Marcus Salgado    Sports Medicine Clinic Visit      HPI  Maria M Marcus is a 58 year old female who is seen in consultation at the request of  Marcus Salgado PA-C presenting with right hip pain.    - Mechanism of Injury:  No inciting injury.   - Prior evaluation:    - 2023 with Marcus Salgado PA-C: chronic right hip pain. Suspected hip osteoarthritis and greater trochanteric bursitis.  - XR R hip 2023 shows preserved joint spacing within the hip joint itself, enthesopathy is present at the greater trochanter, no acute fractures or dislocations.    - Pain Character:  Pain has been present for  the past few months .  Pain is well localized to the deep anterior right hip without significant radiation.   - Endorses:  pain, feeling of instability upon standing.  - Denies:  swelling, clicking, popping, grinding, mechanical locking symptoms, numbness, tingling, radicular shooting pain.   - Alleviating factors: Rest, activity modifications, limited walking distance  - Aggravating factors:  lying on right side, walking and turning to the right  - Treatments tried:   nothing tried to date    - Patient Goals:  get a formal diagnosis, discuss treatment options. Not interested in corticosteroid injections due to T2DM.   - Social History: Did gymnastics as a child. Likes to do light pilates. Currently not working.    - Pertinent PMH:  T2DM with last HbA1c 6.2 in November. Osteoporosis.       Review of Systems  Musculoskeletal: as above  Remainder of review of systems is negative including constitutional, CV, pulmonary, GI, Skin and Neurologic except as noted in HPI or medical history.    Past Medical History:   Diagnosis Date    Arthritis ?    Nit sure how long ago, but several years    Cervical radiculopathy     Diabetic eye exam (H) 2011    DISC DIS NEC/NOS-LUMBAR 2007    Frozen shoulder syndrome 2011     Hyperlipidemia LDL goal <100 10/31/2010    Hypertension 2011    Hypothyroidism due to Hashimoto's thyroiditis     Localized osteoporosis without current pathological fracture 2022    Type 1 diabetes, HbA1c goal < 7% (H) dx 1967    Unspecified hypothyroidism      Past Surgical History:   Procedure Laterality Date    BIOPSY  ?    See MyChart    COLONOSCOPY N/A 2016    Procedure: COLONOSCOPY;  Surgeon: Efren Perry MD;  [facility]: [facility]    ENDOSCOPIC SINUS SURGERY Bilateral 2018    Procedure: Bilateral functional endoscopic maxillary antrostomies;  Surgeon: Woo Silva MD;  Location: [facility]    ENT SURGERY  ?    See [facility] sinus surgery    LAMINECT/DISCECTOMY, CERVICAL  2007    C7-T1 hemilaminectomy, microdiscectomy, foraminotomy.    LASER YAG CAPSULOTOMY Right 10/23/2014    Procedure: LASER YAG CAPSULOTOMY;  Surgeon: Esteban Dorsey MD;  [facility]    PHACOEMULSIFICATION WITH STANDARD INTRAOCULAR LENS IMPLANT Left 2023    Procedure: PHACOEMULSIFICATION WITH A STANDARD INTRAOCULAR LENS IMPLANT LEFT;  Surgeon: Laureano Doyle MD;  Location: [facility]    [place] SPINE SURGERY PROCEDURE UNLISTED      VITRECTOMY,STRIP EPIRETINAL MEMBRANE  2009    ZZC  DELIVERY ONLY      C-Sections x2    ZZC NONSPECIFIC PROCEDURE      Hysterectomy - total (cervix removed but ovaries remain)    ZC STOMACH SURGERY PROCEDURE UNLISTED      ZZC TOTAL ABDOM HYSTERECTOMY       Family History   Problem Relation Age of Onset    Hypertension Maternal Grandfather     EYE* Maternal Grandmother     Blood Disease Maternal Grandmother     Eye Disorder Maternal Grandmother         maccular degeneration    Osteoporosis Maternal Grandmother     Lipids Father     Gastrointestinal Disease Father         ulcers    Heart Disease Father     Cardiovascular Father         heart attack    Hyperlipidemia Father         Heart attack and opert surgery    Hypertension Paternal Grandmother     Breast  Cancer Mother         2011 is in bones now but remission    Diabetes Paternal Uncle         Type I         Objective  /72   Wt 73.3 kg (161 lb 8 oz)   LMP  (LMP Unknown)   BMI 33.44 kg/m      General: healthy, alert and in no acute distress.    HEENT: no scleral icterus or conjunctival erythema.   Skin: no suspicious lesions or rash. No jaundice.   CV: regular rhythm by palpation, 2+ distal pulses.  Resp: normal respiratory effort without conversational dyspnea.   Psych: normal mood and affect.    Gait: nonantalgic, appropriate coordination and balance.     Neuro:        - Sensation to light touch:    - Intact throughout the BLE including all peripheral nerve distributions.        - MSR:      RLE  LLE  - Patella 2+ 2+  - Achilles 2+ 2+       - Special tests:   - Slump/SLR:  Neg     MSK - Hip:       - Inspection:    - No significant swelling, erythema, warmth, ecchymosis, lesion.        - ROM:    - Limited in hip internal rotation by mild groin pain.       - Palpation:    - TTP at the anterior hip/groin, GTB.  - NTTP elsewhere.        - Strength:  (*antalgic)  RLE LLE  - Hip Flexion  5 5    - Hip Abduction 5 5   - Hip Adduction 5 5  - Knee Flexion  5 5  - Knee Extension 5 5  - Dorsiflexion  5 5  - Plantarflexion 5 5  - Ext. Edgardo. Longus 5 5  - Inversion  5 5  - Eversion  5 5       - Special tests:   - Log roll: Neg   - Resisted SLR: Positive   - FADIR/Scour: Positive   - DAY: Neg      Radiology  I independently reviewed the available relevant imaging, with the following interpretation:   -XR with interpretation as above in HPI      EXAM: XR HIP RIGHT 2-3 VIEWS  DATE/TIME: 12/20/2023 2:56 PM     INDICATION: worsening right hip pain, lateral and groin area; Hip  pain, right  COMPARISON: None available.                                                                       IMPRESSION: Mild hypertrophic changes of the superior acetabulum.  Right hip joint space is otherwise preserved. No acute fracture.         ZAINAB CASTELLANO, DO       Assessment  1. Primary osteoarthritis of right hip    2. Trochanteric bursitis of right hip        Plan  Maria M Marcus is a 58 year old female that presents with chronic right hip pain that has both anterior hip/groin components and lateral hip components.  Pain is worse with weightbearing, walking distances.  Denies neurogenic symptoms, radicular symptoms. History and physical exam appear most consistent with multiple musculoskeletal contributions for pain including primary osteoarthritis of the hip and greater trochanteric pain syndrome.     Discussed the nature of the condition and treatment options and mutually agreed upon the following plan:    - Imaging:          - Reviewed relevant imaging in the chart.  - Reviewed results and images with patient.   - Medications:          - Discussed pharmacologic options for pain relief.   - May use NSAIDs (Ibuprofen, Naproxen) as needed for inflammation and pain control. Does not take Tylenol due to other meds.   - May also use topical medications such as lidocaine, IcyHot, BioFreeze, or Voltaren gel as needed for pain control.   - Injections:          - Discussed possible injection options and alternatives.    - Options include corticosteroid injection of the intra-articular right hip joint and/or greater trochanteric bursa.    - Deferred injections today and will consider them in the future as needed. Will not be interested in injections at all due to side effects and diabetes effects.   - Therapy:          - Discussed the benefits of physical therapy vs home exercise program for optimization of range of motion, flexibility, strength, stability and function.   - Preference is for physical therapy.   - Physical Therapy referral placed today and instructed to call [phone] to schedule appointments.   - Modalities:          - May use ice, heat, massage or other modalities as needed.   - Surgery:          - Discussed non-operative and  operative treatment options for the patient's condition.   -Goal is to continue conservative care for as long as possible before surgery would need to be considered.  - Activity:          - Encouraged to remain active and participate in regular activities as symptoms allow.  Avoid or modify exacerbating activities as needed.  - Follow up:          - In 6 to 8 weeks if needed for persistent symptoms, re-evaluation and update to treatment plan, or sooner for new/worsening symptoms.  - Patient has clinic contact information for questions or concerns.       Donovan Cobos DO, BENITEZ  [facility] Sports Medicine  [facility] - Department of Orthopedic Surgery       Disclaimer:  This note was prepared and written using Dragon Medical dictation software. As a result, there may be errors in the script that have gone undetected. Please consider this when interpreting the information in this note.

## 2024-01-15 LAB
PATH REPORT.COMMENTS IMP SPEC: NORMAL
PATH REPORT.COMMENTS IMP SPEC: NORMAL
PATH REPORT.FINAL DX SPEC: NORMAL
PATH REPORT.GROSS SPEC: NORMAL
PATH REPORT.MICROSCOPIC SPEC OTHER STN: NORMAL
PATH REPORT.RELEVANT HX SPEC: NORMAL

## 2024-01-16 ENCOUNTER — OFFICE VISIT (OUTPATIENT)
Dept: ORTHOPEDICS | Facility: OTHER | Age: 59
End: 2024-01-16
Attending: PHYSICIAN ASSISTANT
Payer: COMMERCIAL

## 2024-01-16 VITALS — WEIGHT: 161.5 LBS | DIASTOLIC BLOOD PRESSURE: 72 MMHG | BODY MASS INDEX: 33.44 KG/M2 | SYSTOLIC BLOOD PRESSURE: 128 MMHG

## 2024-01-16 DIAGNOSIS — M16.11 PRIMARY OSTEOARTHRITIS OF RIGHT HIP: Primary | ICD-10-CM

## 2024-01-16 DIAGNOSIS — M70.61 TROCHANTERIC BURSITIS OF RIGHT HIP: ICD-10-CM

## 2024-01-16 PROCEDURE — 99204 OFFICE O/P NEW MOD 45 MIN: CPT | Performed by: STUDENT IN AN ORGANIZED HEALTH CARE EDUCATION/TRAINING PROGRAM

## 2024-01-16 ASSESSMENT — PAIN SCALES - GENERAL: PAINLEVEL: MILD PAIN (3)

## 2024-01-16 NOTE — LETTER
2024         RE: Maria M Marcus  [address] [place] [address]        Dear Colleague,    Thank you for referring your patient, Maria M Marcus, to the [facility]. Please see a copy of my visit note below.    Maria M Marcus  :  1965  DOS: 2024  MRN: [id number]  PCP: Marcus Salgado    Sports Medicine Clinic Visit      HPI  Maria M Marcus is a 58 year old female who is seen in consultation at the request of  Marcus Salgado PA-C presenting with right hip pain.    - Mechanism of Injury:  No inciting injury.   - Prior evaluation:    - 2023 with Marcus Salgado PA-C: chronic right hip pain. Suspected hip osteoarthritis and greater trochanteric bursitis.  - XR R hip 2023 shows preserved joint spacing within the hip joint itself, enthesopathy is present at the greater trochanter, no acute fractures or dislocations.    - Pain Character:  Pain has been present for  the past few months .  Pain is well localized to the deep anterior right hip without significant radiation.   - Endorses:  pain, feeling of instability upon standing.  - Denies:  swelling, clicking, popping, grinding, mechanical locking symptoms, numbness, tingling, radicular shooting pain.   - Alleviating factors: Rest, activity modifications, limited walking distance  - Aggravating factors:  lying on right side, walking and turning to the right  - Treatments tried:   nothing tried to date    - Patient Goals:  get a formal diagnosis, discuss treatment options. Not interested in corticosteroid injections due to T2DM.   - Social History: Did gymnastics as a child. Likes to do light pilates. Currently not working.    - Pertinent PMH:  T2DM with last HbA1c 6.2 in November. Osteoporosis.       Review of Systems  Musculoskeletal: as above  Remainder of review of systems is negative including constitutional, CV, pulmonary, GI, Skin and Neurologic except as noted  in HPI or medical history.    Past Medical History:   Diagnosis Date     Arthritis ?    Nit sure how long ago, but several years     Cervical radiculopathy      Diabetic eye exam (H) 2011     DISC DIS NEC/NOS-LUMBAR 2007     Frozen shoulder syndrome 2011     Hyperlipidemia LDL goal <100 10/31/2010     Hypertension 2011     Hypothyroidism due to Hashimoto's thyroiditis      Localized osteoporosis without current pathological fracture 2022     Type 1 diabetes, HbA1c goal < 7% (H) dx 1967     Unspecified hypothyroidism      Past Surgical History:   Procedure Laterality Date     BIOPSY  ?    See MyChart     COLONOSCOPY N/A 2016    Procedure: COLONOSCOPY;  Surgeon: Efren Perry MD;  Location:  GI     ENDOSCOPIC SINUS SURGERY Bilateral 2018    Procedure: Bilateral functional endoscopic maxillary antrostomies;  Surgeon: Woo Silva MD;  [facility]     ENT SURGERY  ?    See [facility] sinus surgery     LAMINECT/DISCECTOMY, CERVICAL  2007    C7-T1 hemilaminectomy, microdiscectomy, foraminotomy.     LASER YAG CAPSULOTOMY Right 10/23/2014    Procedure: LASER YAG CAPSULOTOMY;  Surgeon: Esteban Doresy MD;  [facility]     PHACOEMULSIFICATION WITH STANDARD INTRAOCULAR LENS IMPLANT Left 2023    Procedure: PHACOEMULSIFICATION WITH A STANDARD INTRAOCULAR LENS IMPLANT LEFT;  Surgeon: Laureano Doyle MD;  [facility]     [place] SPINE SURGERY PROCEDURE UNLISTED       VITRECTOMY,STRIP EPIRETINAL MEMBRANE  2009     ZZC  DELIVERY ONLY      C-Sections x2     ZZC NONSPECIFIC PROCEDURE      Hysterectomy - total (cervix removed but ovaries remain)     ZC STOMACH SURGERY PROCEDURE UNLISTED       ZZC TOTAL ABDOM HYSTERECTOMY       Family History   Problem Relation Age of Onset     Hypertension Maternal Grandfather      EYE* Maternal Grandmother      Blood Disease Maternal Grandmother      Eye Disorder Maternal Grandmother         maccular  degeneration     Osteoporosis Maternal Grandmother      Lipids Father      Gastrointestinal Disease Father         ulcers     Heart Disease Father      Cardiovascular Father         heart attack     Hyperlipidemia Father         Heart attack and opert surgery     Hypertension Paternal Grandmother      Breast Cancer Mother         2011 is in bones now but remission     Diabetes Paternal Uncle         Type I         Objective  /72   Wt 73.3 kg (161 lb 8 oz)   LMP  (LMP Unknown)   BMI 33.44 kg/m      General: healthy, alert and in no acute distress.    HEENT: no scleral icterus or conjunctival erythema.   Skin: no suspicious lesions or rash. No jaundice.   CV: regular rhythm by palpation, 2+ distal pulses.  Resp: normal respiratory effort without conversational dyspnea.   Psych: normal mood and affect.    Gait: nonantalgic, appropriate coordination and balance.     Neuro:        - Sensation to light touch:    - Intact throughout the BLE including all peripheral nerve distributions.        - MSR:      RLE  LLE  - Patella 2+ 2+  - Achilles 2+ 2+       - Special tests:   - Slump/SLR:  Neg     MSK - Hip:       - Inspection:    - No significant swelling, erythema, warmth, ecchymosis, lesion.        - ROM:    - Limited in hip internal rotation by mild groin pain.       - Palpation:    - TTP at the anterior hip/groin, GTB.  - NTTP elsewhere.        - Strength:  (*antalgic)  RLE LLE  - Hip Flexion  5 5    - Hip Abduction 5 5   - Hip Adduction 5 5  - Knee Flexion  5 5  - Knee Extension 5 5  - Dorsiflexion  5 5  - Plantarflexion 5 5  - Ext. Edgardo. Longus 5 5  - Inversion  5 5  - Eversion  5 5       - Special tests:   - Log roll: Neg   - Resisted SLR: Positive   - FADIR/Scour: Positive   - DAY: Neg      Radiology  I independently reviewed the available relevant imaging, with the following interpretation:   -XR with interpretation as above in HPI      EXAM: XR HIP RIGHT 2-3 VIEWS  DATE/TIME: 12/20/2023 2:56 PM      INDICATION: worsening right hip pain, lateral and groin area; Hip  pain, right  COMPARISON: None available.                                                                       IMPRESSION: Mild hypertrophic changes of the superior acetabulum.  Right hip joint space is otherwise preserved. No acute fracture.        ZAINAB CASTELLANO,        Assessment  1. Primary osteoarthritis of right hip    2. Trochanteric bursitis of right hip        Plan  Maria M Marcus is a 58 year old female that presents with chronic right hip pain that has both anterior hip/groin components and lateral hip components.  Pain is worse with weightbearing, walking distances.  Denies neurogenic symptoms, radicular symptoms. History and physical exam appear most consistent with multiple musculoskeletal contributions for pain including primary osteoarthritis of the hip and greater trochanteric pain syndrome.     Discussed the nature of the condition and treatment options and mutually agreed upon the following plan:    - Imaging:          - Reviewed relevant imaging in the chart.  - Reviewed results and images with patient.   - Medications:          - Discussed pharmacologic options for pain relief.   - May use NSAIDs (Ibuprofen, Naproxen) as needed for inflammation and pain control. Does not take Tylenol due to other meds.   - May also use topical medications such as lidocaine, IcyHot, BioFreeze, or Voltaren gel as needed for pain control.   - Injections:          - Discussed possible injection options and alternatives.    - Options include corticosteroid injection of the intra-articular right hip joint and/or greater trochanteric bursa.    - Deferred injections today and will consider them in the future as needed. Will not be interested in injections at all due to side effects and diabetes effects.   - Therapy:          - Discussed the benefits of physical therapy vs home exercise program for optimization of range of motion, flexibility,  strength, stability and function.   - Preference is for physical therapy.   - Physical Therapy referral placed today and instructed to call [phone] to schedule appointments.   - Modalities:          - May use ice, heat, massage or other modalities as needed.   - Surgery:          - Discussed non-operative and operative treatment options for the patient's condition.   -Goal is to continue conservative care for as long as possible before surgery would need to be considered.  - Activity:          - Encouraged to remain active and participate in regular activities as symptoms allow.  Avoid or modify exacerbating activities as needed.  - Follow up:          - In 6 to 8 weeks if needed for persistent symptoms, re-evaluation and update to treatment plan, or sooner for new/worsening symptoms.  - Patient has clinic contact information for questions or concerns.       Donovan Cobos DO, CAQSM  [facility] - Sports Medicine  [facility] - Department of Orthopedic Surgery       Disclaimer:  This note was prepared and written using Dragon Medical dictation software. As a result, there may be errors in the script that have gone undetected. Please consider this when interpreting the information in this note.       Again, thank you for allowing me to participate in the care of your patient.        Sincerely,        Donovan Cobos DO

## 2024-01-24 ENCOUNTER — MYC REFILL (OUTPATIENT)
Dept: FAMILY MEDICINE | Facility: OTHER | Age: 59
End: 2024-01-24
Payer: COMMERCIAL

## 2024-01-24 DIAGNOSIS — K21.9 GASTROESOPHAGEAL REFLUX DISEASE, UNSPECIFIED WHETHER ESOPHAGITIS PRESENT: ICD-10-CM

## 2024-01-29 DIAGNOSIS — Z79.890 HORMONE REPLACEMENT THERAPY (HRT): ICD-10-CM

## 2024-01-29 ASSESSMENT — ACTIVITIES OF DAILY LIVING (ADL)
GOING_DOWN_1_FLIGHT_OF_STAIRS: SLIGHT DIFFICULTY
LIGHT_TO_MODERATE_WORK: NO DIFFICULTY AT ALL
SPORTS_COUNT: 9
SPORTS_HIGHEST_POTENTIAL_SCORE: 36
WALKING_INITIALLY: SLIGHT DIFFICULTY
WALKING_UP_STEEP_HILLS: SLIGHT DIFFICULTY
GETTING_INTO_AND_OUT_OF_A_BATHTUB: NO DIFFICULTY AT ALL
WALKING_UP_STEEP_HILLS: SLIGHT DIFFICULTY
ABILITY_TO_PERFORM_ACTIVITY_WITH_YOUR_NORMAL_TECHNIQUE: SLIGHT DIFFICULTY
LOW_IMPACT_ACTIVITIES_LIKE_FAST_WALKING: SLIGHT DIFFICULTY
HOS_ADL_ITEM_SCORE_TOTAL: 57
GOING DOWN 1 FLIGHT OF STAIRS: SLIGHT DIFFICULTY
WALKING_15_MINUTES_OR_GREATER: SLIGHT DIFFICULTY
WALKING_DOWN_STEEP_HILLS: SLIGHT DIFFICULTY
ADL_COUNT: 17
PUTTING_ON_SOCKS_AND_SHOES: NO DIFFICULTY AT ALL
RECREATIONAL_ACTIVITIES: SLIGHT DIFFICULTY
GOING UP 1 FLIGHT OF STAIRS: SLIGHT DIFFICULTY
HOS_ADL_SCORE(%): 83.82
SITTING_FOR_15_MINUTES: NO DIFFICULTY AT ALL
ADL_SCORE(%): 0
ADL_HIGHEST_POTENTIAL_SCORE: 68
HOW_WOULD_YOU_RATE_YOUR_CURRENT_LEVEL_OF_FUNCTION_DURING_YOUR_USUAL_ACTIVITIES_OF_DAILY_LIVING_FROM_0_TO_100_WITH_100_BEING_YOUR_LEVEL_OF_FUNCTION_PRIOR_TO_YOUR_HIP_PROBLEM_AND_0_BEING_THE_INABILITY_TO_PERFORM_ANY_OF_YOUR_USUAL_DAILY_ACTIVITIES?: 97
GETTING INTO AND OUT OF AN AVERAGE CAR: NO DIFFICULTY AT ALL
PUTTING ON SOCKS AND SHOES: NO DIFFICULTY AT ALL
LIGHT_TO_MODERATE_WORK: NO DIFFICULTY AT ALL
ABILITY_TO_PARTICIPATE_IN_YOUR_DESIRED_SPORT_AS_LONG_AS_YOU_WOULD_LIKE: SLIGHT DIFFICULTY
WALKING_FOR_APPROXIMATELY_10_MINUTES: SLIGHT DIFFICULTY
SPORTS_TOTAL_ITEM_SCORE: 0
STEPPING_UP_AND_DOWN_CURBS: SLIGHT DIFFICULTY
WALKING_DOWN_STEEP_HILLS: SLIGHT DIFFICULTY
TWISTING/PIVOTING ON INVOLVED LEG: NO DIFFICULTY AT ALL
STEPPING UP AND DOWN CURBS: SLIGHT DIFFICULTY
GETTING_INTO_AND_OUT_OF_AN_AVERAGE_CAR: NO DIFFICULTY AT ALL
HOS_ADL_HIGHEST_POTENTIAL_SCORE: 68
GETTING_INTO_AND_OUT_OF_A_BATHTUB: NO DIFFICULTY AT ALL
STANDING_FOR_15_MINUTES: NO DIFFICULTY AT ALL
WALKING_APPROXIMATELY_10_MINUTES: SLIGHT DIFFICULTY
ROLLING OVER IN BED: SLIGHT DIFFICULTY
ADL_TOTAL_ITEM_SCORE: 0
HOW_WOULD_YOU_RATE_YOUR_CURRENT_LEVEL_OF_FUNCTION?: ABNORMAL
HOW_WOULD_YOU_RATE_YOUR_CURRENT_LEVEL_OF_FUNCTION_DURING_YOUR_SPORTS_RELATED_ACTIVITIES_FROM_0_TO_100_WITH_100_BEING_YOUR_LEVEL_OF_FUNCTION_PRIOR_TO_YOUR_HIP_PROBLEM_AND_0_BEING_THE_INABILITY_TO_PERFORM_ANY_OF_YOUR_USUAL_DAILY_ACTIVITIES?: 97
TWISTING/PIVOTING_ON_INVOLVED_LEG: NO DIFFICULTY AT ALL
HOW_WOULD_YOU_RATE_YOUR_CURRENT_LEVEL_OF_FUNCTION_DURING_YOUR_USUAL_ACTIVITIES_OF_DAILY_LIVING_FROM_0_TO_100_WITH_100_BEING_YOUR_LEVEL_OF_FUNCTION_PRIOR_TO_YOUR_HIP_PROBLEM_AND_0_BEING_THE_INABILITY_TO_PERFORM_ANY_OF_YOUR_USUAL_DAILY_ACTIVITIES?: 97
DEEP_SQUATTING: NO DIFFICULTY AT ALL
SITTING FOR 15 MINUTES: NO DIFFICULTY AT ALL
SPORTS_SCORE(%): 0
GOING_UP_1_FLIGHT_OF_STAIRS: SLIGHT DIFFICULTY
STANDING FOR 15 MINUTES: NO DIFFICULTY AT ALL
RECREATIONAL ACTIVITIES: SLIGHT DIFFICULTY
WALKING_15_MINUTES_OR_GREATER: SLIGHT DIFFICULTY
PLEASE_INDICATE_YOR_PRIMARY_REASON_FOR_REFERRAL_TO_THERAPY:: HIP
HEAVY_WORK: SLIGHT DIFFICULTY
DEEP SQUATTING: NO DIFFICULTY AT ALL
WALKING_INITIALLY: SLIGHT DIFFICULTY
HEAVY_WORK: SLIGHT DIFFICULTY
ROLLING_OVER_IN_BED: SLIGHT DIFFICULTY

## 2024-01-29 NOTE — TELEPHONE ENCOUNTER
Requested Prescriptions   Pending Prescriptions Disp Refills    estradiol (ESTRACE) 2 MG tablet 90 tablet 0     Sig: Take 1 tablet (2 mg) by mouth daily       Hormone Replacement Therapy Failed - 1/29/2024  2:13 PM        Failed - Recent (12 mo) or future (30 days) visit within the authorizing provider's specialty     The patient must have completed an in-person or virtual visit within the past 12 months or has a future visit scheduled within the next 90 days with the authorizing provider s specialty.  Urgent care and e-visits do not quality as an office visit for this protocol.          Passed - Blood pressure under 140/90 in past 12 months     BP Readings from Last 3 Encounters:   01/16/24 128/72   12/20/23 136/70   12/07/23 (!) 141/63                 Passed - Patient has mammogram in past 2 years on file if age 50-75        Passed - Medication is active on med list        Passed - Patient is 18 years of age or older        Passed - No active pregnancy on record        Passed - No positive pregnancy test on record in past 12 months           Pt last seen 9/21/2022 for vaginal itching    Last prescribed 1/2/2023 for 90 tablets with 3 refills    RN sent [facility] reminder to schedule annual      Allison Salgado RN on 1/29/2024 at 2:14 PM

## 2024-01-30 NOTE — TELEPHONE ENCOUNTER
Pt did read the [employer] message sent to her yesterday to schedule an annual.  However, she has not scheduled one yet.      Will check back tomorrow.    Carmen Merchant RN

## 2024-02-02 NOTE — TELEPHONE ENCOUNTER
Patient scheduled 3/20/2024 for annual visit.  Routed refill request to provider for nii refill to get patient through to this appointment.     Batsheva ADAMS RN

## 2024-02-03 RX ORDER — ESTRADIOL 2 MG/1
2 TABLET ORAL DAILY
Qty: 90 TABLET | Refills: 0 | Status: SHIPPED | OUTPATIENT
Start: 2024-02-03 | End: 2024-06-01

## 2024-02-05 ENCOUNTER — THERAPY VISIT (OUTPATIENT)
Dept: PHYSICAL THERAPY | Facility: CLINIC | Age: 59
End: 2024-02-05
Attending: STUDENT IN AN ORGANIZED HEALTH CARE EDUCATION/TRAINING PROGRAM
Payer: COMMERCIAL

## 2024-02-05 DIAGNOSIS — M16.11 PRIMARY OSTEOARTHRITIS OF RIGHT HIP: ICD-10-CM

## 2024-02-05 DIAGNOSIS — M70.61 TROCHANTERIC BURSITIS OF RIGHT HIP: ICD-10-CM

## 2024-02-05 DIAGNOSIS — M25.551 HIP PAIN, RIGHT: Primary | ICD-10-CM

## 2024-02-05 PROCEDURE — 97161 PT EVAL LOW COMPLEX 20 MIN: CPT | Mod: GP | Performed by: PHYSICAL THERAPIST

## 2024-02-05 PROCEDURE — 97110 THERAPEUTIC EXERCISES: CPT | Mod: GP | Performed by: PHYSICAL THERAPIST

## 2024-02-05 NOTE — PROGRESS NOTES
PHYSICAL THERAPY EVALUATION  Type of Visit: Evaluation    See electronic medical record for Abuse and Falls Screening details.    Subjective       Presenting condition or subjective complaint: Right hip pain radiating into groun and outer hip region  Date of onset: 11/01/23    Relevant medical history: Arthritis; Diabetes; Migraines or headaches; Neck injury; Osteoporosis; Overweight; Thyroid problems   Dates & types of surgery: See my chart ti many    Prior diagnostic imaging/testing results: CT scan; X-ray     Prior therapy history for the same diagnosis, illness or injury: No      Prior Level of Function  Transfers: Independent  Ambulation: Independent  ADL: Independent      Living Environment  Social support: With a significant other or spouse   Type of home: House; 1 level   Stairs to enter the home: No       Ramp: No   Stairs inside the home: No       Help at home: None  Equipment owned: Straight Cane; Walker with wheels     Employment: No    Hobbies/Interests: Walking, light pilates, swimming, snorkeling    Patient goals for therapy: Walk more without as much pain    Pain assessment: See objective evaluation for additional pain details     Objective   HIP EVALUATION  PAIN: Pain Level at Rest: 0/10  Pain Level with Use: 9/10  Pain Location: hip  Pain Quality: Aching, Sharp, and Shooting  Pain Frequency: intermittent  Pain is Worst: activity dependent  Pain is Exacerbated By: walking, rise from sit/initial steps, uneven surfaces   Pain is Relieved By: rest  Pain Progression: Worsened    GAIT:   Weightbearing Status: WBAT  Assistive Device(s): None  Gait Deviations: WNL  Notes that she has plantar fasciitis on the L foot and notes that she can limp due to her foot, not the R hip    ROM:   (Degrees) Left AROM Left PROM  Right AROM Right PROM   Hip Flexion       Hip Extension       Hip Abduction       Hip Adduction       Hip Internal Rotation       Hip External Rotation       Knee Flexion       Knee Extension        Lumbar Side glide Pulling R groin, nil /min loss No effect, full motion   Lumbar Flexion EIS NE full motion   Lumbar Extension Nil loss NE   Pain:   End feel:   Leisure mechanical stresses: traveling, walking  Functional disability score: did not complete today    ADDITIONAL HISTORY:  Present symptoms: pain in the R groin, intermittent, with radiating symptoms into the R medial thigh and lateral hip  Pain quality: sharp, shooting, aching     Symptoms commenced as a result of: unknown   Condition occurred in the following environment: unknown   Paresthesia (yes/no):  no  Spinal history: yes   Cough/Sneeze (pos/neg):  negative    Constant symptoms: none  Intermittent symptoms: groin, medial thigh to knee and lateral hip      Continued use makes the pain (better/worse/no effect): worse        Pain at rest (yes/no):  no    Site (back/hip/knee/ankle/foot):  back/hip    Other questions (swelling/clicking/locking/giving way/falling):  none     Specific Questions:  General health (excellent/good/fair/poor):  diabetes since 2 yrs old  Medications (nil/NSAIDS/analg/steroids/anticoag/other):    Medical allergies:  Novolog, Humalog, Zyrtec  Imaging (none/Xray/MRI/other):  x-rays - IMPRESSION: Mild hypertrophic changes of the superior acetabulum. Right hip joint space is otherwise preserved. No acute fracture.     Recent or major surgery (yes/no):  no  Night pain (yes/no):  no  Accidents (yes/no):  no  Unexplained weight loss (yes/no):  no  Barriers at home: no  Other red flags: no    Sites for physical examination (back/hip/knee/ankle/foot/other): back, hip  Postural Observation:  Sitting: Kyphotic Change of posture:  No effect  Standing: Neutral    Neurological: NA    Baselines (pain and functional activity): Pain in the groin on the R with resisted hip flexion, IR and ER (ER more painful than IR) tested in sitting.  Limited hip flexion (firm end feel).  Empty end feel ER and IR.    Extremities:  Hip, PROM    Movement Loss  Serg Mod Min Nil Pain   Flexion  90   Hip pain firm end feel   Extension        Abduction    45 Hip pain soft/empty end feel   Adduction    10 Groin pain   Internal Rotation   40  Groin pain -Soft endfeel    External Rotation   45  Groin pain - Empty end feel   Dorsiflexion        Plantarflexion        Inversion        Eversion        Other:          Resisted Test Response (pain): strong throughout B hips - pain with resisted hip flexion, ER > IR, knee extension.  Did not feel pain with resisted hip abduction/adduction (all testing done in sitting)  Other Tests/Static Positioning:  Pain with FADIR (nil loss with firm end feel) and DAY (50 % limited as compared to full for the L hip)    Spine:  Movement loss: Nil loss EIS NE, Nil Loss FIS NE, Nil loss R SGIS NE,  min loss L SGIS with production of symptoms  Effect of repeated movements: EIS - NE during, NE after, with improved pain with PROM R hip and decreased pain with resisted hip IR and ER.  EIL - NE during, NE after; further decrease in symptoms with all testing.   Effect of static positioning: not assessed  Spine testing: Relevant    Baseline Symptoms: pain with PROM R hip flexion, ER and IR; pain with resisted hip flexion, IR and ER   Repeated Tests Symptom Response Mechanical Response   Active/Passive movement, resisted test, functional test During -  Produce, Abolish, Increase, Decrease, NE After -  Better, Worse, NB, NW, NE Effect -   ? or ? ROM, strength or key functional test No   Effect   Kneeling hip extension  Produce  Stretch R groin    No Worse    Decreased symptoms with PROM and MMT but pt thought best gains after EIS                         Effect of static positioning         Provisional Classification (Extremity/Spine):  Spine - Derangement - Asymmetrical, unilateral, symptoms above knee and Extremity - will assess lumbar spine for 4-5 days and if not fully responding to lumbar extension then will add kneeling hip ext      Potential Drivers of  Pain and/or Disability: Comorbidities    Principle of Management:   Education:  discussed starting with lumbar spine as previous history of low back pain (40 yr history).  Hip symptoms have been worsening since November thus, if not responding as expected then add the kneeling hip extension.  Patient to sit with lumbar roll at home and in the car.  Determine if rise from sitting is different with use of the lumbar roll.  Discussed that she may develop lower back symptoms with starting the exercises, but should notice this in response to lessening of hip symptoms  Equipment provided:  none  Exercise type:  lumbar extension in standing 10 reps or press ups 10 reps Frequency: 6-8 times a day.     Assessment & Plan   CLINICAL IMPRESSIONS  Medical Diagnosis: R hip bursitis    Treatment Diagnosis: R hip pain   Impression/Assessment: Patient is a 58 year old female with R hip complaints.  The following significant findings have been identified: Pain, Decreased ROM/flexibility, Decreased activity tolerance, and Impaired posture. These impairments interfere with their ability to perform self care tasks, recreational activities, household chores, household mobility, and community mobility as compared to previous level of function.     Clinical Decision Making (Complexity):  Clinical Presentation: Evolving/Changing  Clinical Presentation Rationale: based on medical and personal factors listed in PT evaluation  Clinical Decision Making (Complexity): Low complexity    PLAN OF CARE  Treatment Interventions:  Interventions: Manual Therapy, Neuromuscular Re-education, Therapeutic Activity, Therapeutic Exercise, Self-Care/Home Management    Long Term Goals     PT Goal 1  Goal Identifier: walk  Goal Description: patient will be able to walk for 30 minutes with hip/groin pain  Rationale: to maximize safety and independence with performance of ADLs and functional tasks;to maximize safety and independence within the home;to maximize  safety and independence within the community;to maximize safety and independence with self cares  Target Date: 04/01/24      Frequency of Treatment: 1 time a week for 4 weeks then every other week for 4 weeks  Duration of Treatment: 8 weeks    Recommended Referrals to Other Professionals: Physical Therapy  Education Assessment:   Learner/Method: (P) Patient;No Barriers to Learning  Education Comments: (P) printed HEP    Risks and benefits of evaluation/treatment have been explained.   Patient/Family/caregiver agrees with Plan of Care.     Evaluation Time:     PT Eval, Low Complexity Minutes (96889): (P) 25   Present: Not applicable     Signing Clinician: Cleo Sorenson, PT      [facility]                                                                                   OUTPATIENT PHYSICAL THERAPY      PLAN OF TREATMENT FOR OUTPATIENT REHABILITATION   Patient's Last Name, First Name, NICKO MarcusMaria M  JUSTYNA YOB: 1965   Provider's Name   [facility]   Medical Record No.  [id number]     Onset Date: 11/01/23  Start of Care Date: 02/05/24     Medical Diagnosis:  R hip bursitis      PT Treatment Diagnosis:  R hip pain Plan of Treatment  Frequency/Duration: 1 time a week for 4 weeks then every other week for 4 weeks/ 8 weeks    Certification date from 02/05/24 to 04/01/24         See note for plan of treatment details and functional goals     Cleo Sorenson, PT                         I CERTIFY THE NEED FOR THESE SERVICES FURNISHED UNDER        THIS PLAN OF TREATMENT AND WHILE UNDER MY CARE     (Physician attestation of this document indicates review and certification of the therapy plan).              Referring Provider:  Donovan Cobos    Initial Assessment  See Epic Evaluation- Start of Care Date: 02/05/24

## 2024-03-13 NOTE — TELEPHONE ENCOUNTER
Signed Prescriptions:                        Disp   Refills    tiZANidine (ZANAFLEX) 2 MG tablet          60 tab*1        Sig: Take 1-2 tablets (2-4 mg) by mouth 3 times daily as           needed for muscle spasms Caution sedation. Stop           cyclobenzaprine. Don't drive until you know how           you feel.  Authorizing Provider: ROXANA VARNER, RN CNP, FNP  [facility]  [facility]     The following room check was performed for environmental safety of the patient.       DOORWAY        Contraband/Damage?    None      BATHROOM       Contraband/Damage?       None      PAPER GARBAGE BAG     Contraband/Damage?    None      BEDS            Contraband/Damage?       None      WALLS       Contraband/Damage?              None      UNDER MATTRESSES       Contraband/Damage?             None      CLOTHING SHELVES     Contraband/Damage?              None      DRESSERS       Contraband/Damage?           None      CHAIRS       Contraband/Damage?         None      WINDOW CELL       Contraband/Damage?            None      FLOOR       Contraband/Damage?             None      If contraband found, incident report filed? N/A  If damage found, was charge nurse and manager notified? N/A  If damage found, incident report or work order placed? N/A

## 2024-04-01 ENCOUNTER — OFFICE VISIT (OUTPATIENT)
Dept: OBGYN | Facility: CLINIC | Age: 59
End: 2024-04-01
Payer: COMMERCIAL

## 2024-04-01 ENCOUNTER — ANCILLARY PROCEDURE (OUTPATIENT)
Dept: MAMMOGRAPHY | Facility: CLINIC | Age: 59
End: 2024-04-01
Attending: PHYSICIAN ASSISTANT
Payer: COMMERCIAL

## 2024-04-01 VITALS
WEIGHT: 159.7 LBS | HEART RATE: 75 BPM | DIASTOLIC BLOOD PRESSURE: 70 MMHG | SYSTOLIC BLOOD PRESSURE: 167 MMHG | HEIGHT: 58 IN | BODY MASS INDEX: 33.52 KG/M2 | OXYGEN SATURATION: 98 %

## 2024-04-01 DIAGNOSIS — Z12.31 VISIT FOR SCREENING MAMMOGRAM: ICD-10-CM

## 2024-04-01 DIAGNOSIS — Z79.890 HORMONE REPLACEMENT THERAPY (HRT): ICD-10-CM

## 2024-04-01 DIAGNOSIS — Z00.00 ANNUAL PHYSICAL EXAM: Primary | ICD-10-CM

## 2024-04-01 PROCEDURE — 77063 BREAST TOMOSYNTHESIS BI: CPT | Mod: GC

## 2024-04-01 PROCEDURE — 99396 PREV VISIT EST AGE 40-64: CPT | Performed by: OBSTETRICS & GYNECOLOGY

## 2024-04-01 PROCEDURE — 77067 SCR MAMMO BI INCL CAD: CPT | Mod: GC

## 2024-04-01 RX ORDER — ESTRADIOL 2 MG/1
2 TABLET ORAL DAILY
Qty: 90 TABLET | Refills: 3 | Status: SHIPPED | OUTPATIENT
Start: 2024-04-01

## 2024-04-01 NOTE — PROGRESS NOTES
Maria M is a 58 year old postmenopausal female, , who is here for her annual exam.  She had a AMARILIS including removal of her cervix in  for benign pathology and still retains both ovaries.  She requests a refill of Estrace and prefers the 2 mg daily po dose.  She has her mammogram scheduled for today and her next DEXA scan on 6/3/2024.  She prefers to have her PCP check her labwork.  She has a hx of osteoporosis as did her 89 y/o MGM.  Her mother was diagnosed with breast cancer but no other relatives have been diagnosed.  She has a hx of type 1 DM.    ROS: Ten point review of systems was reviewed and negative except the above.    Health Maintenance   Topic Date Due    URINE DRUG SCREEN  Never done    COVID-19 Vaccine (1) Never done    HEPATITIS B IMMUNIZATION (3 of 3 - 19+ 3-dose series) 1998    ZOSTER IMMUNIZATION (1 of 2) Never done    DIABETIC FOOT EXAM  2023    A1C  2024    MAMMO SCREENING  2024    EYE EXAM  10/05/2024    BMP  2024    CMP  2024    LIPID  2024    MICROALBUMIN  2024    TSH W/FREE T4 REFLEX  2024    PHQ-9  2024    MEDICARE ANNUAL WELLNESS VISIT  2024    WILLIE ASSESSMENT  2024    COLORECTAL CANCER SCREENING  2026    ADVANCE CARE PLANNING  2028    DTAP/TDAP/TD IMMUNIZATION (3 - Td or Tdap) 2031    HEPATITIS C SCREENING  Completed    HIV SCREENING  Completed    PHQ-2 (once per calendar year)  Completed    INFLUENZA VACCINE  Completed    Pneumococcal Vaccine: Pediatrics (0 to 5 Years) and At-Risk Patients (6 to 64 Years)  Completed    IPV IMMUNIZATION  Aged Out    HPV IMMUNIZATION  Aged Out    MENINGITIS IMMUNIZATION  Aged Out    RSV MONOCLONAL ANTIBODY  Aged Out    PAP  Discontinued      Last pap: not indicated  Last Mammogram: today  Last Dexa: scheduled on 6/3/2024  Last Colonoscopy: due in 2026    Lab Results   Component Value Date    CHOL 189 2023    CHOL 190 2020     Lab Results  "  Component Value Date    HDL 68 2023    HDL 74 2020     Lab Results   Component Value Date    LDL 96 2023     2020     Lab Results   Component Value Date    TRIG 127 2023    TRIG 69 2020     Lab Results   Component Value Date    CHOLHDLRATIO 3.6 2015     OBHX:      PSH:   Past Surgical History:   Procedure Laterality Date    BIOPSY  ?    See MyChart    COLONOSCOPY N/A 2016    Procedure: COLONOSCOPY;  Surgeon: Efren Perry MD;  Location: [facility]    ENDOSCOPIC SINUS SURGERY Bilateral 2018    Procedure: Bilateral functional endoscopic maxillary antrostomies;  Surgeon: Woo Silva MD;  Location: [facility]    ENT SURGERY  ?    See MyChary sinus surgery    LAMINECT/DISCECTOMY, CERVICAL  2007    C7-T1 hemilaminectomy, microdiscectomy, foraminotomy.    LASER YAG CAPSULOTOMY Right 10/23/2014    Procedure: LASER YAG CAPSULOTOMY;  Surgeon: Esteban Dorsey MD;  Location: [facility]    PHACOEMULSIFICATION WITH STANDARD INTRAOCULAR LENS IMPLANT Left 2023    Procedure: PHACOEMULSIFICATION WITH A STANDARD INTRAOCULAR LENS IMPLANT LEFT;  Surgeon: Laureano Doyle MD;  Location: [facility]    OR SPINE SURGERY PROCEDURE UNLISTED      VITRECTOMY,STRIP EPIRETINAL MEMBRANE  2009    [facility]  DELIVERY ONLY      C-Sections x2    [facility] NONSPECIFIC PROCEDURE      Hysterectomy - total (cervix removed but ovaries remain)    [facility] STOMACH SURGERY PROCEDURE UNLISTED      [facility] TOTAL ABDOM HYSTERECTOMY       PMH: Her past medical, surgical, and obstetric histories were reviewed and are documented in their appropriate chart areas.    ALL/Meds: Her medication and allergy histories were reviewed and are documented in their appropriate chart areas.    SH/FMH: Her social and family history was reviewed and documented in its appropriate chart area.    PE: BP (!) 167/70 (BP Location: Right arm, Cuff Size: Adult Large)   Pulse 75   Ht 1.481 m (4' 10.3\")   Wt 72.4 kg " (159 lb 11.2 oz)   LMP  (LMP Unknown)   SpO2 98%   BMI 33.03 kg/m    Body mass index is 33.03 kg/m .    General Appearance:  healthy, alert, active, no distress  Cardiovascular:  Regular rate and Rhythm without murmur  Neck: Supple, no adenopathy, and thyroid normal  Lungs:  Clear, without wheeze, rale or rhonchi  Breast: normal breast exam  Abdomen: Benign, Soft, flat, non-tender, No masses, organomegaly, No inguinal nodes, and Bowel sounds normoactive.   Pelvic:       - Ext: Vulva and perineum are normal without lesion, mass or discharge        - Urethra: normal without discharge        - Urethral Meatus: normal appearance       - Bladder: no tenderness, no masses       - Vagina: Normal mucosa, no discharge        - Cervix: Surgically absent       - Uterus:Normal shape, position and consistency        - Adnexa: Normal without masses or tenderness       - Rectal: deferred    A/P:  Well Woman Exam, HRT Replacement, Osteoporosis, Type 1 DM, Hypothyroidism     -  I discussed the new pap recommendations regarding screening.  Explained the rationale for increased intervals between paps.  Questions asked and answered.  She does agree to this regiment.  Pap was not collected since she no longer has a cervix s/p AMARILIS for benign disease   -  BC: not applicable s/p AMARILIS   -  No orders of the defined types were placed in this encounter.   -  Refill Estrace 2 mg po daily    -  Recheck mammogram yearly   -  Recheck DEXA scan - scheduled 6/3/2024   -  Encouraged self-breast exam   -  Encouraged low fat diet, regular exercise, and adequate calcium intake.    Gisel Burns,   FACOG, FACS

## 2024-04-09 PROBLEM — M25.551 HIP PAIN, RIGHT: Status: RESOLVED | Noted: 2024-02-05 | Resolved: 2024-04-09

## 2024-04-09 NOTE — PROGRESS NOTES
Patient seen for one time evaluation and treatment.  Patient cancelled follow up appointments as she felt she was doing well with the home program she was given at her evaluation.  Please see initial evaluation for further information.

## 2024-04-11 ENCOUNTER — MYC REFILL (OUTPATIENT)
Dept: EDUCATION SERVICES | Facility: CLINIC | Age: 59
End: 2024-04-11
Payer: COMMERCIAL

## 2024-04-11 DIAGNOSIS — E10.9 TYPE 1 DIABETES MELLITUS WITHOUT COMPLICATION (H): Chronic | ICD-10-CM

## 2024-04-11 RX ORDER — PROCHLORPERAZINE 25 MG/1
SUPPOSITORY RECTAL
Qty: 3 EACH | Refills: 5 | Status: SHIPPED | OUTPATIENT
Start: 2024-04-11 | End: 2024-07-25

## 2024-04-15 ENCOUNTER — TELEPHONE (OUTPATIENT)
Dept: FAMILY MEDICINE | Facility: OTHER | Age: 59
End: 2024-04-15
Payer: COMMERCIAL

## 2024-04-15 ENCOUNTER — TELEPHONE (OUTPATIENT)
Dept: EDUCATION SERVICES | Facility: CLINIC | Age: 59
End: 2024-04-15
Payer: COMMERCIAL

## 2024-04-15 DIAGNOSIS — E06.3 HYPOTHYROIDISM DUE TO HASHIMOTO'S THYROIDITIS: ICD-10-CM

## 2024-04-15 DIAGNOSIS — K21.9 GASTROESOPHAGEAL REFLUX DISEASE, UNSPECIFIED WHETHER ESOPHAGITIS PRESENT: ICD-10-CM

## 2024-04-15 DIAGNOSIS — E10.9 TYPE 1 DIABETES MELLITUS WITHOUT COMPLICATION (H): Chronic | ICD-10-CM

## 2024-04-15 RX ORDER — INSULIN GLULISINE 100 [IU]/ML
INJECTION, SOLUTION SUBCUTANEOUS
Qty: 30 ML | Refills: 0 | Status: SHIPPED | OUTPATIENT
Start: 2024-04-15 | End: 2024-05-01

## 2024-04-15 RX ORDER — LEVOTHYROXINE SODIUM 88 UG/1
88 TABLET ORAL DAILY
Qty: 90 TABLET | Refills: 1 | Status: SHIPPED | OUTPATIENT
Start: 2024-04-15 | End: 2024-06-03

## 2024-04-15 NOTE — TELEPHONE ENCOUNTER
Please initiate a prior authorization for apirda or tier exception.  Patient has documented reactions to the following.       Marjan Almazan CMA  Adult Endocrinology  [facility], [place]

## 2024-04-15 NOTE — TELEPHONE ENCOUNTER
Apidra 100 UNIT/ML Injection Solution       Last Written Prescription Date:  10-23-23  Last Fill Quantity: 30 ml,   # refills: 1  Last Office Visit : 11-6-23  Future Office visit:  6-3-24    Routing refill request to provider for review/approval because:  Insulin and insulin pump supplies - refilled per Endocrine clinic.         levothyroxine (SYNTHROID/LEVOTHROID) 88 MCG tablet       Last Written Prescription Date:  4-24-23  Last Fill Quantity: 90,   # refills: 3  RF 90t:1

## 2024-04-15 NOTE — TELEPHONE ENCOUNTER
Patient has new insurance and omeprazole is now a tier 2 medication. She is wondering if a prior authorization can be done to try and lower the cost for her. She is going to continue to use the medication regardless but if she can save some more money she would like that. She said she has been on prescription medications for her acid reflux for over 30 years and has tried them all and this is the only one that works for her.     She would like a call if this is an option or not.

## 2024-04-15 NOTE — TELEPHONE ENCOUNTER
Patient Returning Call    Reason for call:  Will like talk to provider to get  a different medication because of cost    Information relayed to patient:  n/a    Patient has additional questions:  Yes    What are your questions/concerns:  when call back    Who does the patient want to speak with:      Is an  needed?:  no      Could we send this information to you in [facility] or would you prefer to receive a phone call?:   Patient would prefer a phone call   Okay to leave a detailed message?: Yes at Cell number on file:    Telephone Information:   [employer] [phone]

## 2024-04-15 NOTE — TELEPHONE ENCOUNTER
[facility] Call Center    Phone Message    May a detailed message be left on voicemail: yes     Reason for Call: Medication Question or concern regarding medication   Prescription Clarification  Name of Medication: insulin glulisine (APIDRA) 100 UNIT/ML injection   Prescribing Provider: Sanchez     What on the order needs clarification? This is a tier 4 medication, can you please send in a prior authorization, then it will be cheaper for her , please call, thanks!        Action Taken: Message routed to:  [facility] Endo    Travel Screening: Not Applicable

## 2024-04-15 NOTE — TELEPHONE ENCOUNTER
Requested Renewals     Name from pharmacy: Apidra 100 UNIT/ML Injection Solution         Will file in chart as: insulin glulisine (APIDRA VIAL) 100 UNIT/ML injection    Sig: USE UP TO 35 UNITS PER DAY IN INSULIN PUMP FOR BASAL , BOLUS AND PRIMING OF INSULIN PUMP TUBING    Disp: 30 mL    Refills: 0    Start: 4/15/2024    Class: E-Prescribe    Non-formulary For: Type 1 diabetes mellitus without complication (H)    Last ordered: 5 months ago (10/23/2023) by Lena Bradford MD    Last refill: 1/24/2024    Rx #: [id number]       To be filled at: [facility] [address] [place], [place] [id number] [facility]            Last office visit with Dr. Bradford on 11/6/2023. Future visit on 6/3/2024.    No protocol available, will send to Dr. Bradford to review/sign.      Batsheva Gonzalez RN  Endocrine Care Coordinator  [facility]

## 2024-04-16 ENCOUNTER — TELEPHONE (OUTPATIENT)
Dept: PALLIATIVE MEDICINE | Facility: CLINIC | Age: 59
End: 2024-04-16
Payer: COMMERCIAL

## 2024-04-16 NOTE — TELEPHONE ENCOUNTER
I ran a test claim for Apidra Vial and it is $35 for 1 month supply. No PA is needed    Rambo     Could you see if we can get a teir exception? She states she is paying $100 for a 3 months supply and the insurance said she may be able to get it for half the price with a tier exception.    Marjan Almazan, [facility]  Adult Endocrinology  [facility], [place]

## 2024-04-16 NOTE — TELEPHONE ENCOUNTER
Received fax from pharmacy requesting refill(s) for     tiZANidine (ZANAFLEX) 2 MG tablet     Last refilled on 1/24/24    Pt last seen on 9/14/2022  Next appt scheduled for none    E-prescribe to:    [facility] - [place] - [address] [facility]    Will route to RN. Patient has not been seen since 2022

## 2024-04-17 ENCOUNTER — MYC MEDICAL ADVICE (OUTPATIENT)
Dept: FAMILY MEDICINE | Facility: OTHER | Age: 59
End: 2024-04-17
Payer: COMMERCIAL

## 2024-04-17 ENCOUNTER — MYC MEDICAL ADVICE (OUTPATIENT)
Dept: PHARMACY | Facility: CLINIC | Age: 59
End: 2024-04-17
Payer: COMMERCIAL

## 2024-04-17 DIAGNOSIS — M62.838 MUSCLE SPASM: ICD-10-CM

## 2024-04-17 DIAGNOSIS — M79.18 MYOFASCIAL PAIN: ICD-10-CM

## 2024-04-17 RX ORDER — TIZANIDINE 2 MG/1
2-4 TABLET ORAL 3 TIMES DAILY PRN
Qty: 60 TABLET | Refills: 2 | Status: SHIPPED | OUTPATIENT
Start: 2024-04-17

## 2024-04-17 NOTE — TELEPHONE ENCOUNTER
Spoke with patient and she will request this from her PCP.    TERESA SilvaN, RN  Care Coordinator  [facility] [place]

## 2024-04-17 NOTE — TELEPHONE ENCOUNTER
FREE DRUG APPLICATION INITIATED    Medication:  Apidra  Free Drug Program Name:   [facility]  Date Submitted:   Phone #: [phone]  Fax #: [phone]  Additional Information:        I have called patient and discussed the patient assistance process. She says her income in below guidelines. I have started patient portion of PAP and sent her a [employer] message with application to complete and send into program.    I have the provider portion attached to the media tab. Provider will need to complete the medication portion and sign. Once completed send back to Liaison (fax [phone]) or attach back into media tab and Liaison will send to program and call to check status

## 2024-04-17 NOTE — TELEPHONE ENCOUNTER
Patient last seen you 12/20/23.    She requested a refill on tiZANidine (ZANAFLEX) 2 MG tablet from the pain clinic yesterday. They advised her to ask PCP for a refill.     Will you fill this or do you want her to schedule an appointment?     Joslyn Campos, TERESAN, RN

## 2024-04-17 NOTE — TELEPHONE ENCOUNTER
I called insurance and spoke to Mohan she stated that there is no tier exception for Apidra. She stated if patient can not afford the $35 copay per month patient can reach out to her plan to discuss other options. (Call reference #[id number]) There is a patient assistance program for Apidra but guidelines are income must be below    1 person in household $60,240  2 person in household $81.760    Rambo     Called and informed the patient of this information.    Patient states that her house hold is below the 2 person of $81,000 and would like to go ahead with the patient assistance program. Will forward this message to Rambo for assistance with initiating the pap for Apidra.    Marjan Almazan, [facility]  Adult Endocrinology  [facility], [place]

## 2024-04-19 RX ORDER — PROCHLORPERAZINE 25 MG/1
SUPPOSITORY RECTAL
Refills: 5 | OUTPATIENT
Start: 2024-04-19

## 2024-04-19 NOTE — TELEPHONE ENCOUNTER
Continuous Blood Gluc Sensor (DEXCOM G6 SENSOR) MISC 3 each 5 4/11/2024   Addressed in a different encounter. Refilled 4/11/24

## 2024-04-23 NOTE — CONFIDENTIAL NOTE
Provider reviewed and signed [employer] Patient assistance form.  Faxed back to: [phone]  See image for Timestamp confirmation of successful transmission.         Pharmacy Liaison Rambo Kelly notified via Teams and fax copy to her @ [phone]          Placed in [employer] file.     Beatriz Zhong CMA  Adult Endocrinology   [facility]

## 2024-04-26 NOTE — TELEPHONE ENCOUNTER
PA Initiation    Medication: OMEPRAZOLE 20 MG PO CPDR  Insurance Company: [employer] Part D - Phone [phone] Fax [phone]  Pharmacy Filling the Rx: [facility] [address] [place] [id number] [facility]  Filling Pharmacy Phone: [phone]  Filling Pharmacy Fax: [phone]  Start Date: 4/26/2024    INITIATED PA FOR TIER EXCEPTION REQUEST OVER THE PHONE  Reference # for call: [id number]

## 2024-04-30 ENCOUNTER — MYC MEDICAL ADVICE (OUTPATIENT)
Dept: EDUCATION SERVICES | Facility: CLINIC | Age: 59
End: 2024-04-30
Payer: COMMERCIAL

## 2024-04-30 DIAGNOSIS — E10.9 TYPE 1 DIABETES MELLITUS WITHOUT COMPLICATION (H): Chronic | ICD-10-CM

## 2024-04-30 NOTE — TELEPHONE ENCOUNTER
Called [employer], application was received and is under benefit investigation. Should have an update by the end of the week.

## 2024-05-01 ENCOUNTER — MYC MEDICAL ADVICE (OUTPATIENT)
Dept: PHARMACY | Facility: CLINIC | Age: 59
End: 2024-05-01
Payer: COMMERCIAL

## 2024-05-01 RX ORDER — INSULIN LISPRO 100 [IU]/ML
INJECTION, SOLUTION INTRAVENOUS; SUBCUTANEOUS
Qty: 10 ML | Refills: 0 | Status: SHIPPED | OUTPATIENT
Start: 2024-05-01 | End: 2024-06-03

## 2024-05-01 RX ORDER — INSULIN GLULISINE 100 [IU]/ML
INJECTION, SOLUTION SUBCUTANEOUS
Qty: 10 ML | Refills: 0 | Status: SHIPPED | OUTPATIENT
Start: 2024-05-01

## 2024-05-01 RX ORDER — BLOOD SUGAR DIAGNOSTIC
STRIP MISCELLANEOUS
Qty: 20 EACH | Refills: 0 | Status: SHIPPED | OUTPATIENT
Start: 2024-05-01 | End: 2024-06-03

## 2024-05-01 NOTE — TELEPHONE ENCOUNTER
FREE DRUG APPLICATION APPROVED    Medication: APIDRA VIAL 100 UNIT/ML IJ CATRINA  Program Name:  [employer] Patient Connection  Effective Date: 4/30/2024  Expiration Date: 12/31/2024  Pharmacy Filling the Rx:    Patient Notified: [employer] message sent to patient  Additional Information:

## 2024-05-01 NOTE — TELEPHONE ENCOUNTER
Prior Authorization Approval    Medication: OMEPRAZOLE 20 MG PO CPDR  Authorization Effective Date: 4/26/2024  Authorization Expiration Date: 12/31/2024  Approved Dose/Quantity:       Reference #:     Insurance Company: [employer] Part D - Phone [phone] Fax [phone]  Expected CoPay: $    CoPay Card Available:      Financial Assistance Needed:   Which Pharmacy is filling the prescription: [facility] [address] [place] [id number] [facility]  Pharmacy Notified: YES  Patient Notified: Instructed pharmacy to notify patient once order is ready.

## 2024-05-01 NOTE — TELEPHONE ENCOUNTER
Patient has been approved for PAP for Apidra through 12-. I have sent a [employer] message to patient. Closing encounter

## 2024-05-01 NOTE — TELEPHONE ENCOUNTER
[place] does not have Apidra available. Pharmacy does not have Novolog/Humalog Pens. Patient requesting vial of Humalog-states injection won't cause as bad of reaction if not delivering through pump.     Lantus pen, needles, and syringes sent to pharmacy.    Nandini Jenkins RN, [facility]  Diabetes Education Department  [facility], [place]  Phone: [phone]  [phone]

## 2024-05-02 ENCOUNTER — MYC REFILL (OUTPATIENT)
Dept: FAMILY MEDICINE | Facility: OTHER | Age: 59
End: 2024-05-02
Payer: COMMERCIAL

## 2024-05-02 DIAGNOSIS — L55.1 SUNBURN, SECOND DEGREE: ICD-10-CM

## 2024-05-03 RX ORDER — FLUOCINONIDE 0.5 MG/G
CREAM TOPICAL 2 TIMES DAILY
Qty: 60 G | Refills: 0 | Status: SHIPPED | OUTPATIENT
Start: 2024-05-03

## 2024-05-08 NOTE — TELEPHONE ENCOUNTER
[employer] Patient assistance program medications arrived in clinic today 5/8/2024.   Writer spoke with Maria M who is currently out of State for a vacation. Plans on returning to [place] end of May around 5/27/2024. Pt is aware to stop in [facility] office to  medication from Adult Endo Fridge. M-F 8:00 to 4:30 pm.         Labeled and with brief note of delayed .   Placed in Adult Endo locked medication room with fridge.     Beatriz Zhong CMA  Adult Endocrinology   [facility]

## 2024-05-09 ENCOUNTER — TELEPHONE (OUTPATIENT)
Dept: FAMILY MEDICINE | Facility: OTHER | Age: 59
End: 2024-05-09
Payer: COMMERCIAL

## 2024-05-09 ENCOUNTER — NEW PATIENT (OUTPATIENT)
Dept: URBAN - METROPOLITAN AREA CLINIC 26 | Facility: CLINIC | Age: 59
End: 2024-05-09

## 2024-05-09 VITALS
SYSTOLIC BLOOD PRESSURE: 119 MMHG | HEIGHT: 58 IN | HEART RATE: 67 BPM | BODY MASS INDEX: 32.54 KG/M2 | WEIGHT: 155 LBS | DIASTOLIC BLOOD PRESSURE: 72 MMHG

## 2024-05-09 DIAGNOSIS — E10.3512: ICD-10-CM

## 2024-05-09 DIAGNOSIS — H26.492: ICD-10-CM

## 2024-05-09 DIAGNOSIS — H35.352: ICD-10-CM

## 2024-05-09 DIAGNOSIS — E10.3591: ICD-10-CM

## 2024-05-09 DIAGNOSIS — H04.122: ICD-10-CM

## 2024-05-09 PROCEDURE — J3490AVA AVASTIN *

## 2024-05-09 PROCEDURE — 92134 CPTRZ OPH DX IMG PST SGM RTA: CPT

## 2024-05-09 PROCEDURE — 67028 INJECTION EYE DRUG: CPT

## 2024-05-09 PROCEDURE — 92004 COMPRE OPH EXAM NEW PT 1/>: CPT | Mod: 25

## 2024-05-09 ASSESSMENT — VISUAL ACUITY
OD_CC: 20/60-1
OD_SC: 20/100
OD_PH: 20/50
OS_SC: 20/70
OS_CC: 20/60-1

## 2024-05-09 ASSESSMENT — TONOMETRY
OD_IOP_MMHG: 20
OS_IOP_MMHG: 19

## 2024-05-09 NOTE — TELEPHONE ENCOUNTER
Patient Quality Outreach    Patient is due for the following:   Hypertension -  Hypertension follow-up visit    Next Steps:   Schedule a office visit for Hypertension     Type of outreach:    Sent [employer] message.      Questions for provider review:    None           Maria Teresa Mayberry, CMA

## 2024-05-25 ENCOUNTER — HEALTH MAINTENANCE LETTER (OUTPATIENT)
Age: 59
End: 2024-05-25

## 2024-05-29 NOTE — PROGRESS NOTES
Outcome for 10/31/23 11:24 AM: Data uploaded on Dexcom, will start Omnipod on 11/16/23.  Marjan Almazan CMA  Adult Endocrinology  [facility], [place]          Endocrinology and Diabetes Clinic         Follow up for T1DM, hypothyroidism, osteoporosis       Assessment:  (E10.649) Type 1 diabetes mellitus with hypoglycemia and without coma (H)  (primary encounter diagnosis)  59 y old woman with longstanding type 1 diabetes, low insulin requirements    Much improved glucose control particularly hypoglycemia is resolved on Omnipod with DEXCOM and Apidra insulin   Hypoglycemia much iproved    Hypothyroidism: better controlled, TSH at 6, adjust LT4    Dyslipidemia intolerant to Atorvastatin , doing ok on Simvastatin 20 mg daily, cont    Osteoporosis Cont Alendronate, started 3/2022, DXA improved at hip and spine on  DXA today 6/3/24    Vitiligo referral to dermatology per patient request    Follow-up with me in 6 months     The Longitudinal plan of care for diabetes condition was addressed during this visit. Due to added complexity of care, we will continue to support Maria M , and the subsequent management of this condition(s) and with the ongoing continuity of care of this condition.      Lena Bradford MD  Endocrinology and Diabetes  Telephone contact:  [facility] [place] [phone]  [facility] [phone]      Interval history:  6m follow up  Maria M Marcus aged 57 year old years old woman with type 1 diabetes with retinopathy with comorbidity of vitiligo, hypothyroidism and osteoporosis for follow-up    Insulin  Patient is status OmniPod pump please repeat your insulin  Insulin pump settings 0.4 units/h, total 9.6 units/day basal, threshold midnight 140 8  11 , carbohydrate ratio 17  Patient uses 19.5 units basal and 11.2 units bolus per day on average, she covers 197 g of carbohydrates time in range 82% high 15% low 1%, pattern  "postprandial hyperglycemia after supper       Hypoglycemia  Much improved      Complications  1. Retinopathy: yes - hemorrhage 9/2020  2. Nephropathy: no  3. Neuropathy: no  4. Hypoglycemia: yes   5. Macrovascular:  No    Osteoporosis  Bone density from March 2024 improved at hip and spine    Medications:   Current Outpatient Medications   Medication Sig Dispense Refill    ACCU-CHEK GUIDE test strip USE TO TEST BLOOD SUGAR 5 TIMES DAILY OR AS DIRECTED 450 strip 3    alendronate (FOSAMAX) 70 MG tablet Take 1 tablet (70 mg) by mouth once a week 12 tablet 3    blood glucose monitoring (SOFTCLIX) lancets USE 1 LANCET TO CHECK GLUCOSE 4-5 TIMES DAILY OR  AS  DIRECTED 200 each 3    Calcium Carb-Cholecalciferol (TGT CALCIUM DIETARY SUPPLEMENT PO)       Continuous Blood Gluc Sensor (DEXCOM G6 SENSOR) MISC Change every 10 days. 3 each 5    Continuous Blood Gluc Transmit (DEXCOM G6 TRANSMITTER) MISC Change every 3 months. 1 each 1    estradiol (ESTRACE) 2 MG tablet Take 1 tablet (2 mg) by mouth daily 90 tablet 3    estradiol (ESTRACE) 2 MG tablet Take 1 tablet (2 mg) by mouth daily 90 tablet 0    fluocinonide (LIDEX) 0.05 % external cream Apply topically 2 times daily 60 g 0    glucosamine-chondroitin 500-400 MG CAPS per capsule Take 1 capsule by mouth daily      Insulin Disposable Pump (OMNIPOD 5 G6 INTRO, GEN 5,) KIT 1 kit continuous 1 kit 0    Insulin Disposable Pump (OMNIPOD 5 G6 POD, GEN 5,) MISC 1 each every 3 days 30 each 3    insulin glargine (LANTUS PEN) 100 UNIT/ML pen Inject 6 units twice daily 3 mL 0    insulin glulisine (APIDRA VIAL) 100 UNIT/ML injection USE UP TO 35 UNITS PER DAY IN INSULIN PUMP FOR BASAL , BOLUS AND PRIMING OF INSULIN PUMP TUBING 10 mL 0    insulin lispro (HUMALOG VIAL) 100 UNIT/ML vial Inject 5 units three times daily. TDD 20 units 10 mL 0    insulin pen needle (32G X 4 MM) 32G X 4 MM miscellaneous Use 2 pen needles daily or as directed. 30 each 0    insulin syringe-needle U-100 (31G X 5/16\" " "0.3 ML) 31G X 5/16\" 0.3 ML miscellaneous Use 3 syringes daily or as directed. 20 each 0    levothyroxine (SYNTHROID/LEVOTHROID) 88 MCG tablet Take 1 tablet by mouth once daily 90 tablet 1    lisinopril (ZESTRIL) 30 MG tablet Take 1 tablet (30 mg) by mouth daily 90 tablet 1    omeprazole (PRILOSEC) 20 MG DR capsule Take 1 capsule by mouth once daily 90 capsule 1    simvastatin (ZOCOR) 20 MG tablet Take 1 tablet (20 mg) by mouth at bedtime 90 tablet 3    tiZANidine (ZANAFLEX) 2 MG tablet Take 1-2 tablets (2-4 mg) by mouth 3 times daily as needed for muscle spasms 60 tablet 2    Vitamin D, Cholecalciferol, 25 MCG (1000 UT) TABS          Social History:  Social History     Tobacco Use    Smoking status: Never     Passive exposure: Never    Smokeless tobacco: Never    Tobacco comments:     no exposure   Substance Use Topics    Alcohol use: Yes     Comment: Extremely social         Physical Examination:  /58 (BP Location: Left arm, Patient Position: Sitting, Cuff Size: Adult Large)   Pulse 74   Wt 72.4 kg (159 lb 9.6 oz)   LMP  (LMP Unknown)   SpO2 99%   BMI 33.01 kg/m      LMP  (LMP Unknown)   Wt Readings from Last 4 Encounters:   04/01/24 72.4 kg (159 lb 11.2 oz)   01/16/24 73.3 kg (161 lb 8 oz)   12/20/23 73.3 kg (161 lb 8 oz)   12/07/23 73.9 kg (163 lb)       General: Well appearing woman in no distress.  Psych: good eye contact, no pressured speech    Diabetic foot exam:  Inspection normal appearing  Sensation to monofilament intact bilaterally  Sensation to fibration intact bilaterally  Skin: intact  Callus formation is not present          Labs and Studies:   Lab Results   Component Value Date     11/02/2023    CHLORIDE 102 11/02/2023    CO2 26 11/02/2023    GLC 98 05/31/2024    CR 0.99 (H) 05/31/2024    CR 0.93 11/02/2023    CR 0.98 (H) 04/21/2023    CR 0.97 12/16/2022    CR 0.94 09/06/2022    DORIS 9.4 11/02/2023    ALBUMIN 4.1 11/02/2023    ALKPHOS 56 11/02/2023     (H) 05/31/2024    HDL 65 " 05/31/2024    TRIG 113 05/31/2024     Lab Results   Component Value Date    MICROL <12.0 05/31/2024    MICROL <12.0 11/02/2023    MICROL <12.0 04/21/2023    MICROL 12 02/09/2022    MICROL 8 09/14/2020     Lab Results   Component Value Date    A1C 6.0 (H) 05/31/2024    A1C 6.2 (H) 11/02/2023    A1C 5.8 (H) 04/21/2023    A1C 6.0 (H) 09/06/2022    A1C 6.3 (H) 05/18/2022       Lab Results   Component Value Date    HGB 12.6 05/25/2020     Lab Results   Component Value Date    TSH 6.76 (H) 05/31/2024    TSH 4.00 11/02/2023    TSH 3.83 04/21/2023    TSH 0.19 (L) 12/16/2022    TSH 0.27 (L) 09/06/2022     Lab Results   Component Value Date    DORIS 9.4 11/02/2023    DORIS 8.7 12/16/2022    DORIS 8.5 09/06/2022    DORIS 8.6 08/10/2022    ALBUMIN 4.1 11/02/2023    ALT 9 11/02/2023    PHOS 3.3 11/08/2019    PTHI 43 04/19/2022    AST 17 11/02/2023    BILITOTAL 0.3 11/02/2023    CR 0.99 (H) 05/31/2024    CR 0.93 11/02/2023    CR 0.98 (H) 04/21/2023     11/02/2023    TSH 6.76 (H) 05/31/2024    ALKPHOS 56 11/02/2023    HGB 12.6 05/25/2020     25OHVitD 41 4/19/22      She would like to use half units, however is allergic to NovoLog and Humalog and has been using Apidra  She cannot use an insulin pump because of Apidra clogging the tubes as she has such low needs    Results for orders placed in visit on 03/09/22    Dexa hip/pelvis/spine    Narrative  HISTORY: Type 1 diabetes mellitus without complication (H); Collapsed  vertebra, not elsewhere classified, thoracolumbar region, initial  encounter for fracture (H)    COMPARISON:   none    [age]  years[age]  Height: 59 inches  Weight: 157 pounds  Sex: Female  Ethnicity: White    Image quality: Adequate    Lumbar spine T-score in region of L1, L4 = -1.2    HIPS:  Mean total hip T-score: -1    Left femoral neck T-score = -2  Right femoral neck T-score= -2.7    Radius 33% T-score = NA    FRAX:  10 year probability of major osteoporotic fracture: 19.5%  10 year probability of hip fracture:  2.5%  The 10 year probability of fracture may be lower than reported if the  patient has received treatment. FRAX data should be disregarded in  patient's taking bisphosphonates.    World Health Organization definition of osteoporosis and osteopenia  for  women:  Normal: T-score at or above -1.0  Low Bone Mass (Osteopenia): T-score between -1.0 and -2.5.  Osteoporosis: T-score at or below -2.5  T-scores are reported for postmenopausal women and men over 50 years  of age.    Impression  IMPRESSION:  Osteoporosis. Follow up recommended.    TOREY GARAY MD      SYSTEM ID:  [id number]

## 2024-05-30 ENCOUNTER — TELEPHONE (OUTPATIENT)
Dept: ENDOCRINOLOGY | Facility: CLINIC | Age: 59
End: 2024-05-30
Payer: COMMERCIAL

## 2024-05-30 ENCOUNTER — TELEPHONE (OUTPATIENT)
Dept: FAMILY MEDICINE | Facility: OTHER | Age: 59
End: 2024-05-30
Payer: COMMERCIAL

## 2024-05-30 DIAGNOSIS — E10.65 TYPE 1 DIABETES MELLITUS WITH HYPERGLYCEMIA (H): Primary | ICD-10-CM

## 2024-05-30 NOTE — TELEPHONE ENCOUNTER
Reason for Call:  Appointment Request    Patient requesting this type of appt:  Fasting lab    Requested provider: Marcus Salgado    Reason patient unable to be scheduled: Not within requested timeframe    When does patient want to be seen/preferred time: Same day    Comments: Pt is type 1 diabetic, needs fasting lab early in the morning before upcoming appt, no lab available. Pt needs to stay close to home bases on ride avail.    Could we send this information to you in [employer] or would you prefer to receive a phone call?:   Patient would like to be contacted via [employer]    Call taken on 5/30/2024 at 10:55 AM by Serina Orozco

## 2024-05-31 ENCOUNTER — LAB (OUTPATIENT)
Dept: LAB | Facility: OTHER | Age: 59
End: 2024-05-31
Payer: COMMERCIAL

## 2024-05-31 DIAGNOSIS — E10.65 TYPE 1 DIABETES MELLITUS WITH HYPERGLYCEMIA (H): ICD-10-CM

## 2024-05-31 LAB
BUN SERPL-MCNC: 11.3 MG/DL (ref 8–23)
CHOLEST SERPL-MCNC: 204 MG/DL
CREAT SERPL-MCNC: 0.99 MG/DL (ref 0.51–0.95)
CREAT UR-MCNC: 57.3 MG/DL
EGFRCR SERPLBLD CKD-EPI 2021: 65 ML/MIN/1.73M2
FASTING STATUS PATIENT QL REPORTED: YES
FASTING STATUS PATIENT QL REPORTED: YES
GLUCOSE SERPL-MCNC: 98 MG/DL (ref 70–99)
HBA1C MFR BLD: 6 % (ref 0–5.6)
HDLC SERPL-MCNC: 65 MG/DL
LDLC SERPL CALC-MCNC: 116 MG/DL
MICROALBUMIN UR-MCNC: <12 MG/L
MICROALBUMIN/CREAT UR: NORMAL MG/G{CREAT}
NONHDLC SERPL-MCNC: 139 MG/DL
POTASSIUM SERPL-SCNC: 4.2 MMOL/L (ref 3.4–5.3)
TRIGL SERPL-MCNC: 113 MG/DL
TSH SERPL DL<=0.005 MIU/L-ACNC: 6.76 UIU/ML (ref 0.3–4.2)

## 2024-05-31 PROCEDURE — 82043 UR ALBUMIN QUANTITATIVE: CPT

## 2024-05-31 PROCEDURE — 83036 HEMOGLOBIN GLYCOSYLATED A1C: CPT

## 2024-05-31 PROCEDURE — 80061 LIPID PANEL: CPT

## 2024-05-31 PROCEDURE — 84520 ASSAY OF UREA NITROGEN: CPT

## 2024-05-31 PROCEDURE — 84132 ASSAY OF SERUM POTASSIUM: CPT

## 2024-05-31 PROCEDURE — 82565 ASSAY OF CREATININE: CPT

## 2024-05-31 PROCEDURE — 36415 COLL VENOUS BLD VENIPUNCTURE: CPT

## 2024-05-31 PROCEDURE — 82947 ASSAY GLUCOSE BLOOD QUANT: CPT

## 2024-05-31 PROCEDURE — 84443 ASSAY THYROID STIM HORMONE: CPT

## 2024-05-31 PROCEDURE — 82570 ASSAY OF URINE CREATININE: CPT

## 2024-06-03 ENCOUNTER — ANCILLARY PROCEDURE (OUTPATIENT)
Dept: BONE DENSITY | Facility: CLINIC | Age: 59
End: 2024-06-03
Payer: COMMERCIAL

## 2024-06-03 ENCOUNTER — OFFICE VISIT (OUTPATIENT)
Dept: ENDOCRINOLOGY | Facility: CLINIC | Age: 59
End: 2024-06-03
Payer: COMMERCIAL

## 2024-06-03 VITALS
BODY MASS INDEX: 33.01 KG/M2 | SYSTOLIC BLOOD PRESSURE: 139 MMHG | OXYGEN SATURATION: 99 % | WEIGHT: 159.6 LBS | HEART RATE: 74 BPM | DIASTOLIC BLOOD PRESSURE: 58 MMHG

## 2024-06-03 DIAGNOSIS — E06.3 HYPOTHYROIDISM DUE TO HASHIMOTO'S THYROIDITIS: ICD-10-CM

## 2024-06-03 DIAGNOSIS — Z78.0 MENOPAUSE: ICD-10-CM

## 2024-06-03 DIAGNOSIS — E10.65 TYPE 1 DIABETES MELLITUS WITH HYPERGLYCEMIA (H): Primary | ICD-10-CM

## 2024-06-03 PROCEDURE — 99214 OFFICE O/P EST MOD 30 MIN: CPT | Performed by: INTERNAL MEDICINE

## 2024-06-03 PROCEDURE — G2211 COMPLEX E/M VISIT ADD ON: HCPCS | Performed by: INTERNAL MEDICINE

## 2024-06-03 PROCEDURE — 77080 DXA BONE DENSITY AXIAL: CPT | Performed by: RADIOLOGY

## 2024-06-03 RX ORDER — LEVOTHYROXINE SODIUM 100 UG/1
100 TABLET ORAL DAILY
Qty: 90 TABLET | Refills: 3 | Status: SHIPPED | OUTPATIENT
Start: 2024-06-03

## 2024-06-03 RX ORDER — ACYCLOVIR 400 MG/1
TABLET ORAL
Qty: 3 EACH | Refills: 5 | Status: SHIPPED | OUTPATIENT
Start: 2024-06-03

## 2024-06-03 NOTE — NURSING NOTE
Maria M Marcus's goals for this visit include:   Chief Complaint   Patient presents with    Diabetes     She requests these members of her care team be copied on today's visit information: No    PCP: Marcus Salgado    Referring Provider:  No referring provider defined for this encounter.    /58 (BP Location: Left arm, Patient Position: Sitting, Cuff Size: Adult Large)   Pulse 74   Wt 72.4 kg (159 lb 9.6 oz)   LMP  (LMP Unknown)   SpO2 99%   BMI 33.01 kg/m      Do you need any medication refills at today's visit? Yes

## 2024-06-03 NOTE — LETTER
6/3/2024         RE: Maria M Marcus  [address] [place] [address]        Dear Colleague,    Thank you for referring your patient, Maria M Marcus, to the [facility]. Please see a copy of my visit note below.                    Outcome for 10/31/23 11:24 AM: Data uploaded on Dexcom, will start Omnipod on 11/16/23.  Marjan Almazan, [facility]  Adult Endocrinology  [facility], [place]          Endocrinology and Diabetes Clinic         Follow up for T1DM, hypothyroidism, osteoporosis       Assessment:  (E10.649) Type 1 diabetes mellitus with hypoglycemia and without coma (H)  (primary encounter diagnosis)  59 y old woman with longstanding type 1 diabetes, low insulin requirements    Much improved glucose control particularly hypoglycemia is resolved on Omnipod with DEXCOM and Apidra insulin   Hypoglycemia much iproved    Hypothyroidism: better controlled, TSH at 6, adjust LT4    Dyslipidemia intolerant to Atorvastatin , doing ok on Simvastatin 20 mg daily, cont    Osteoporosis Cont Alendronate, started 3/2022, DXA improved at hip and spine on  DXA today 6/3/24    Vitiligo referral to dermatology per patient request    Follow-up with me in 6 months     The Longitudinal plan of care for diabetes condition was addressed during this visit. Due to added complexity of care, we will continue to support Maria M , and the subsequent management of this condition(s) and with the ongoing continuity of care of this condition.      Lena Bradford MD  Endocrinology and Diabetes  Telephone contact:  [facility] [place] [phone]  [facility] [phone]      Interval history:  6m follow up  Maria M Marcus aged 57 year old years old woman with type 1 diabetes with retinopathy with comorbidity of vitiligo, hypothyroidism and osteoporosis for follow-up    Insulin  Patient is status OmniPod pump please repeat your insulin  Insulin pump settings  0.4 units/h, total 9.6 units/day basal, threshold midnight 140 8  11 , carbohydrate ratio 17  Patient uses 19.5 units basal and 11.2 units bolus per day on average, she covers 197 g of carbohydrates time in range 82% high 15% low 1%, pattern postprandial hyperglycemia after supper       Hypoglycemia  Much improved      Complications  1. Retinopathy: yes - hemorrhage 9/2020  2. Nephropathy: no  3. Neuropathy: no  4. Hypoglycemia: yes   5. Macrovascular:  No    Osteoporosis  Bone density from March 2024 improved at hip and spine    Medications:   Current Outpatient Medications   Medication Sig Dispense Refill     ACCU-CHEK GUIDE test strip USE TO TEST BLOOD SUGAR 5 TIMES DAILY OR AS DIRECTED 450 strip 3     alendronate (FOSAMAX) 70 MG tablet Take 1 tablet (70 mg) by mouth once a week 12 tablet 3     blood glucose monitoring (SOFTCLIX) lancets USE 1 LANCET TO CHECK GLUCOSE 4-5 TIMES DAILY OR  AS  DIRECTED 200 each 3     Calcium Carb-Cholecalciferol (TGT CALCIUM DIETARY SUPPLEMENT PO)        Continuous Blood Gluc Sensor (DEXCOM G6 SENSOR) MISC Change every 10 days. 3 each 5     Continuous Blood Gluc Transmit (DEXCOM G6 TRANSMITTER) MISC Change every 3 months. 1 each 1     estradiol (ESTRACE) 2 MG tablet Take 1 tablet (2 mg) by mouth daily 90 tablet 3     estradiol (ESTRACE) 2 MG tablet Take 1 tablet (2 mg) by mouth daily 90 tablet 0     fluocinonide (LIDEX) 0.05 % external cream Apply topically 2 times daily 60 g 0     glucosamine-chondroitin 500-400 MG CAPS per capsule Take 1 capsule by mouth daily       Insulin Disposable Pump (OMNIPOD 5 G6 INTRO, GEN 5,) KIT 1 kit continuous 1 kit 0     Insulin Disposable Pump (OMNIPOD 5 G6 POD, GEN 5,) MISC 1 each every 3 days 30 each 3     insulin glargine (LANTUS PEN) 100 UNIT/ML pen Inject 6 units twice daily 3 mL 0     insulin glulisine (APIDRA VIAL) 100 UNIT/ML injection USE UP TO 35 UNITS PER DAY IN INSULIN PUMP FOR BASAL , BOLUS AND PRIMING OF INSULIN PUMP TUBING  "10 mL 0     insulin lispro (HUMALOG VIAL) 100 UNIT/ML vial Inject 5 units three times daily. TDD 20 units 10 mL 0     insulin pen needle (32G X 4 MM) 32G X 4 MM miscellaneous Use 2 pen needles daily or as directed. 30 each 0     insulin syringe-needle U-100 (31G X 5/16\" 0.3 ML) 31G X 5/16\" 0.3 ML miscellaneous Use 3 syringes daily or as directed. 20 each 0     levothyroxine (SYNTHROID/LEVOTHROID) 88 MCG tablet Take 1 tablet by mouth once daily 90 tablet 1     lisinopril (ZESTRIL) 30 MG tablet Take 1 tablet (30 mg) by mouth daily 90 tablet 1     omeprazole (PRILOSEC) 20 MG DR capsule Take 1 capsule by mouth once daily 90 capsule 1     simvastatin (ZOCOR) 20 MG tablet Take 1 tablet (20 mg) by mouth at bedtime 90 tablet 3     tiZANidine (ZANAFLEX) 2 MG tablet Take 1-2 tablets (2-4 mg) by mouth 3 times daily as needed for muscle spasms 60 tablet 2     Vitamin D, Cholecalciferol, 25 MCG (1000 UT) TABS          Social History:  Social History     Tobacco Use     Smoking status: Never     Passive exposure: Never     Smokeless tobacco: Never     Tobacco comments:     no exposure   Substance Use Topics     Alcohol use: Yes     Comment: Extremely social         Physical Examination:  /58 (BP Location: Left arm, Patient Position: Sitting, Cuff Size: Adult Large)   Pulse 74   Wt 72.4 kg (159 lb 9.6 oz)   LMP  (LMP Unknown)   SpO2 99%   BMI 33.01 kg/m      LMP  (LMP Unknown)   Wt Readings from Last 4 Encounters:   04/01/24 72.4 kg (159 lb 11.2 oz)   01/16/24 73.3 kg (161 lb 8 oz)   12/20/23 73.3 kg (161 lb 8 oz)   12/07/23 73.9 kg (163 lb)       General: Well appearing woman in no distress.  Psych: good eye contact, no pressured speech    Diabetic foot exam:  Inspection normal appearing  Sensation to monofilament intact bilaterally  Sensation to fibration intact bilaterally  Skin: intact  Callus formation is not present          Labs and Studies:   Lab Results   Component Value Date     11/02/2023    CHLORIDE " 102 11/02/2023    CO2 26 11/02/2023    GLC 98 05/31/2024    CR 0.99 (H) 05/31/2024    CR 0.93 11/02/2023    CR 0.98 (H) 04/21/2023    CR 0.97 12/16/2022    CR 0.94 09/06/2022    DORIS 9.4 11/02/2023    ALBUMIN 4.1 11/02/2023    ALKPHOS 56 11/02/2023     (H) 05/31/2024    HDL 65 05/31/2024    TRIG 113 05/31/2024     Lab Results   Component Value Date    MICROL <12.0 05/31/2024    MICROL <12.0 11/02/2023    MICROL <12.0 04/21/2023    MICROL 12 02/09/2022    MICROL 8 09/14/2020     Lab Results   Component Value Date    A1C 6.0 (H) 05/31/2024    A1C 6.2 (H) 11/02/2023    A1C 5.8 (H) 04/21/2023    A1C 6.0 (H) 09/06/2022    A1C 6.3 (H) 05/18/2022       Lab Results   Component Value Date    HGB 12.6 05/25/2020     Lab Results   Component Value Date    TSH 6.76 (H) 05/31/2024    TSH 4.00 11/02/2023    TSH 3.83 04/21/2023    TSH 0.19 (L) 12/16/2022    TSH 0.27 (L) 09/06/2022     Lab Results   Component Value Date    DORIS 9.4 11/02/2023    DORIS 8.7 12/16/2022    DORIS 8.5 09/06/2022    DORIS 8.6 08/10/2022    ALBUMIN 4.1 11/02/2023    ALT 9 11/02/2023    PHOS 3.3 11/08/2019    PTHI 43 04/19/2022    AST 17 11/02/2023    BILITOTAL 0.3 11/02/2023    CR 0.99 (H) 05/31/2024    CR 0.93 11/02/2023    CR 0.98 (H) 04/21/2023     11/02/2023    TSH 6.76 (H) 05/31/2024    ALKPHOS 56 11/02/2023    HGB 12.6 05/25/2020     25OHVitD 41 4/19/22      She would like to use half units, however is allergic to NovoLog and Humalog and has been using Apidra  She cannot use an insulin pump because of Apidra clogging the tubes as she has such low needs    Results for orders placed in visit on 03/09/22    Dexa hip/pelvis/spine    Narrative  HISTORY: Type 1 diabetes mellitus without complication (H); Collapsed  vertebra, not elsewhere classified, thoracolumbar region, initial  encounter for fracture (H)    COMPARISON:   none    [age]  years[age]  Height: 59 inches  Weight: 157 pounds  Sex: Female  Ethnicity: White    Image quality:  Adequate    Lumbar spine T-score in region of L1, L4 = -1.2    HIPS:  Mean total hip T-score: -1    Left femoral neck T-score = -2  Right femoral neck T-score= -2.7    Radius 33% T-score = NA    FRAX:  10 year probability of major osteoporotic fracture: 19.5%  10 year probability of hip fracture: 2.5%  The 10 year probability of fracture may be lower than reported if the  patient has received treatment. FRAX data should be disregarded in  patient's taking bisphosphonates.    World Health Organization definition of osteoporosis and osteopenia  for  women:  Normal: T-score at or above -1.0  Low Bone Mass (Osteopenia): T-score between -1.0 and -2.5.  Osteoporosis: T-score at or below -2.5  T-scores are reported for postmenopausal women and men over 50 years  of age.    Impression  IMPRESSION:  Osteoporosis. Follow up recommended.    TOREY GARAY MD      SYSTEM ID:  [id number]        Again, thank you for allowing me to participate in the care of your patient.        Sincerely,        Lena Bradford MD

## 2024-06-08 DIAGNOSIS — I10 HYPERTENSION GOAL BP (BLOOD PRESSURE) < 140/90: ICD-10-CM

## 2024-06-08 DIAGNOSIS — E10.9 TYPE 1 DIABETES MELLITUS WITHOUT COMPLICATION (H): ICD-10-CM

## 2024-06-10 RX ORDER — LISINOPRIL 30 MG/1
30 TABLET ORAL DAILY
Qty: 90 TABLET | Refills: 0 | Status: SHIPPED | OUTPATIENT
Start: 2024-06-10 | End: 2024-09-02

## 2024-06-18 ENCOUNTER — TRANSFERRED RECORDS (OUTPATIENT)
Dept: HEALTH INFORMATION MANAGEMENT | Facility: CLINIC | Age: 59
End: 2024-06-18
Payer: COMMERCIAL

## 2024-06-18 LAB — RETINOPATHY: POSITIVE

## 2024-06-18 RX ORDER — LANCETS
EACH MISCELLANEOUS
Qty: 600 EACH | Refills: 3 | Status: SHIPPED | OUTPATIENT
Start: 2024-06-18

## 2024-06-18 RX ORDER — BLOOD SUGAR DIAGNOSTIC
STRIP MISCELLANEOUS
Qty: 450 STRIP | Refills: 3 | Status: SHIPPED | OUTPATIENT
Start: 2024-06-18

## 2024-06-18 NOTE — TELEPHONE ENCOUNTER
ACCU-CHEK GUIDE test strip 450 strip 3 6/5/2023     blood glucose monitoring (SOFTCLIX) lancets 200 each 3 5/2/2023       Last Office Visit: 6/3/24  Future Office visit:   12/16/24    Refill protocol passed    Khushi Keen RN  P Red Flag Triage/MRT

## 2024-07-22 ENCOUNTER — TELEPHONE (OUTPATIENT)
Dept: ENDOCRINOLOGY | Facility: CLINIC | Age: 59
End: 2024-07-22
Payer: COMMERCIAL

## 2024-07-22 NOTE — TELEPHONE ENCOUNTER
Please call to gather more information on patient request so we can best determine who to send to for review.       Batsheva Gonzalez RN  Endocrine Care Coordinator  [facility]

## 2024-07-22 NOTE — TELEPHONE ENCOUNTER
[facility] Call Center    Phone Message    May a detailed message be left on voicemail: yes     Reason for Call: Other: Pt would like some advice on where she can get assistance with her omnipod and insulin payments. Please review and call pt to discuss.      Action Taken: Other: Endo    Travel Screening: Not Applicable     Date of Service:

## 2024-07-23 NOTE — TELEPHONE ENCOUNTER
Patient had questions on if Omnipod has a patient assistance program. Writer explained that the clinic is not aware of any patient assistance programs for omnipod. Patient did not have any other questions at this time.    Marjan Almazan CMA  Adult Endocrinology  [facility], [place]

## 2024-07-24 DIAGNOSIS — E10.9 TYPE 1 DIABETES MELLITUS WITHOUT COMPLICATION (H): Chronic | ICD-10-CM

## 2024-07-25 ENCOUNTER — MYC REFILL (OUTPATIENT)
Dept: EDUCATION SERVICES | Facility: CLINIC | Age: 59
End: 2024-07-25
Payer: COMMERCIAL

## 2024-07-25 DIAGNOSIS — E10.9 TYPE 1 DIABETES MELLITUS WITHOUT COMPLICATION (H): Chronic | ICD-10-CM

## 2024-07-25 RX ORDER — PROCHLORPERAZINE 25 MG/1
SUPPOSITORY RECTAL
Qty: 3 EACH | Refills: 5 | Status: SHIPPED | OUTPATIENT
Start: 2024-07-25

## 2024-07-25 RX ORDER — PROCHLORPERAZINE 25 MG/1
SUPPOSITORY RECTAL
Qty: 1 EACH | Refills: 1 | Status: SHIPPED | OUTPATIENT
Start: 2024-07-25

## 2024-07-26 NOTE — TELEPHONE ENCOUNTER
Medications from the Patient assistance program received in office 7/26/2024.   4 boxes of Apidra received.     Writer reached out and spoke with patient to notify that medication has arrived and ready.   Patient was given days/hours that she can come to clinic for .     Most likely will be next week 7/29/2024    Labeled and placed medication is secured medication room in Peds Spec/Adult Endo.       Beatriz Zhong, ALEJANDRO  Adult Endocrinology   [facility]

## 2024-07-30 RX ORDER — PROCHLORPERAZINE 25 MG/1
SUPPOSITORY RECTAL
Qty: 1 EACH | Refills: 1 | OUTPATIENT
Start: 2024-07-30

## 2024-08-01 ENCOUNTER — HOSPITAL ENCOUNTER (OUTPATIENT)
Facility: CLINIC | Age: 59
Discharge: HOME | End: 2024-08-01
Attending: STUDENT IN AN ORGANIZED HEALTH CARE EDUCATION/TRAINING PROGRAM | Admitting: STUDENT IN AN ORGANIZED HEALTH CARE EDUCATION/TRAINING PROGRAM
Payer: COMMERCIAL

## 2024-08-01 PROCEDURE — 250N000009 HC RX 250: Performed by: STUDENT IN AN ORGANIZED HEALTH CARE EDUCATION/TRAINING PROGRAM

## 2024-08-01 PROCEDURE — 66821 AFTER CATARACT LASER SURGERY: CPT | Mod: LT | Performed by: STUDENT IN AN ORGANIZED HEALTH CARE EDUCATION/TRAINING PROGRAM

## 2024-08-01 RX ORDER — PROPARACAINE HYDROCHLORIDE 5 MG/ML
1 SOLUTION/ DROPS OPHTHALMIC ONCE
Status: COMPLETED | OUTPATIENT
Start: 2024-08-01 | End: 2024-08-01

## 2024-08-01 RX ORDER — PHENYLEPHRINE HYDROCHLORIDE 25 MG/ML
1 SOLUTION/ DROPS OPHTHALMIC
Status: COMPLETED | OUTPATIENT
Start: 2024-08-01 | End: 2024-08-01

## 2024-08-01 RX ORDER — PROPARACAINE HYDROCHLORIDE 5 MG/ML
1 SOLUTION/ DROPS OPHTHALMIC ONCE
Status: DISCONTINUED | OUTPATIENT
Start: 2024-08-01 | End: 2024-08-01 | Stop reason: HOSPADM

## 2024-08-01 RX ORDER — TROPICAMIDE 10 MG/ML
1 SOLUTION/ DROPS OPHTHALMIC
Status: COMPLETED | OUTPATIENT
Start: 2024-08-01 | End: 2024-08-01

## 2024-08-01 RX ADMIN — PHENYLEPHRINE HYDROCHLORIDE 1 DROP: 25 SOLUTION/ DROPS OPHTHALMIC at 12:27

## 2024-08-01 RX ADMIN — TROPICAMIDE 1 DROP: 10 SOLUTION/ DROPS OPHTHALMIC at 12:23

## 2024-08-01 RX ADMIN — TROPICAMIDE 1 DROP: 10 SOLUTION/ DROPS OPHTHALMIC at 12:33

## 2024-08-01 RX ADMIN — PHENYLEPHRINE HYDROCHLORIDE 1 DROP: 25 SOLUTION/ DROPS OPHTHALMIC at 12:22

## 2024-08-01 RX ADMIN — PROPARACAINE HYDROCHLORIDE 1 DROP: 5 SOLUTION/ DROPS OPHTHALMIC at 12:22

## 2024-08-01 RX ADMIN — PHENYLEPHRINE HYDROCHLORIDE 1 DROP: 25 SOLUTION/ DROPS OPHTHALMIC at 12:33

## 2024-08-01 RX ADMIN — TROPICAMIDE 1 DROP: 10 SOLUTION/ DROPS OPHTHALMIC at 12:27

## 2024-08-01 ASSESSMENT — ACTIVITIES OF DAILY LIVING (ADL): ADLS_ACUITY_SCORE: 35

## 2024-08-01 NOTE — OP NOTE
[facility]  Ophthalmology Operative Note     PREOPERATIVE DIAGNOSIS: Posterior Capsular Opacity, LEFT EYE.      POSTOPERATIVE DIAGNOSIS: Posterior Capsular Opacity, LEFT EYE.      OPERATION: YAG Laser Capsulotomy, LEFT EYE     ANESTHESIA: Topical      INDICATIONS FOR PROCEDURE: Maria M Marcus was seen in the [facility] for decreased visual acuity in the left eye. The patient was found to have visually significant posterior capsular opacity in the left eye. The risks, benefits, alternatives and goals of YAG capsulotomy were discussed with the patient, and after adequate discussion the patient understood and agreed to these, and a signed informed consent was obtained prior to the procedure.      DESCRIPTION OF PROCEDURE: After proper patient identification, topical anesthesia was applied to the left eye. The patient was brought to the laser room. A total of 46 shots were placed at the opacified posterior capsule with power of 2.6 mJ. The patient tolerated the procedure well, there were no complications.      Laureano Doyle MD

## 2024-08-01 NOTE — BRIEF OP NOTE
[facility]    Brief Operative Note    Pre-operative diagnosis: Posterior Capsular Opacity, Left Eye  Post-operative diagnosis Same as pre-operative diagnosis    Procedure: Laser YAG capsulotomy, Left - Eye    Surgeon: Surgeons and Role:     * Laureano Doyle MD - Primary  Anesthesia: Topical   Estimated Blood Loss: None    Drains: None  Specimens: * No specimens in log *  Findings:   None.  Complications: None.  Implants: * No implants in log *

## 2024-08-13 NOTE — TELEPHONE ENCOUNTER
Patient came to clinic to  patient assistance medication 4 boxes of Apidra. First, last name and date of birth verify.    Marjan Almazan CMA  Adult Endocrinology  [facility], [place]

## 2024-09-02 DIAGNOSIS — I10 HYPERTENSION GOAL BP (BLOOD PRESSURE) < 140/90: ICD-10-CM

## 2024-09-02 RX ORDER — LISINOPRIL 30 MG/1
30 TABLET ORAL DAILY
Qty: 90 TABLET | Refills: 0 | Status: SHIPPED | OUTPATIENT
Start: 2024-09-02

## 2024-10-21 ENCOUNTER — MYC MEDICAL ADVICE (OUTPATIENT)
Dept: EDUCATION SERVICES | Facility: CLINIC | Age: 59
End: 2024-10-21
Payer: COMMERCIAL

## 2024-10-21 DIAGNOSIS — E10.9 TYPE 1 DIABETES MELLITUS WITHOUT COMPLICATION (H): Chronic | ICD-10-CM

## 2024-10-21 DIAGNOSIS — E10.9 TYPE 1 DIABETES MELLITUS WITHOUT COMPLICATION (H): Primary | Chronic | ICD-10-CM

## 2024-10-21 RX ORDER — ACYCLOVIR 400 MG/1
TABLET ORAL
Qty: 9 EACH | Refills: 2 | Status: SHIPPED | OUTPATIENT
Start: 2024-10-21

## 2024-10-21 RX ORDER — INSULIN PMP CART,AUT,G6/7,CNTR
1 EACH SUBCUTANEOUS
Qty: 30 EACH | Refills: 2 | Status: SHIPPED | OUTPATIENT
Start: 2024-10-21

## 2024-10-22 RX ORDER — INSULIN PMP CART,AUT,G6/7,CNTR
EACH SUBCUTANEOUS
Refills: 3 | OUTPATIENT
Start: 2024-10-22

## 2024-10-22 NOTE — TELEPHONE ENCOUNTER
Insulin Disposable Pump (OMNIPOD 5 G7 PODS, GEN 5,) MISC 30 each 2 10/21/2024     Addressed in a different encounter.

## 2024-10-29 ENCOUNTER — TELEPHONE (OUTPATIENT)
Dept: ENDOCRINOLOGY | Facility: CLINIC | Age: 59
End: 2024-10-29

## 2024-10-29 NOTE — TELEPHONE ENCOUNTER
FREE DRUG APPLICATION INITIATED    Medication:    Free Drug Program Name:  [facility] Patient Connection  Date Submitted: 10/29/2024  4:49 PM  Phone #: [phone]  Fax #: [phone]  Additional Information: [employer] message sent to patient to complete the patient portion of PAP form for 2025 re-enrollment. Once patient has verified she has completed her portion we can start the provider portion

## 2024-11-02 ENCOUNTER — NURSE TRIAGE (OUTPATIENT)
Dept: NURSING | Facility: CLINIC | Age: 59
End: 2024-11-02
Payer: COMMERCIAL

## 2024-11-02 DIAGNOSIS — I10 HYPERTENSION GOAL BP (BLOOD PRESSURE) < 140/90: ICD-10-CM

## 2024-11-02 DIAGNOSIS — I10 HYPERTENSION GOAL BP (BLOOD PRESSURE) < 140/90: Primary | ICD-10-CM

## 2024-11-02 RX ORDER — LISINOPRIL 30 MG/1
30 TABLET ORAL DAILY
Qty: 30 TABLET | Refills: 0 | Status: SHIPPED | OUTPATIENT
Start: 2024-11-02

## 2024-11-02 NOTE — TELEPHONE ENCOUNTER
Dropped her lisinopril tablets into a sink with water.  Asking for a few to get her through the weekend.    Provider consult indicated.     Reason for page: pt requesting additional lisinopril    Page sent to Dr. Otero by RN at 3:20 pm  Returned my call at 3:23 pm.     Dr Otero gave me a verbal order for 30 days of 30 mg lisinopril tablets to take once daily.    I'll route pcp a message. Patient has an appt 12/4/24 for preventative annual exam.     I called patient. Call went to voicemail without identifier. I left a generic message stated the medication issue has been addressed. Call with questions.      Felisha Boyce RN

## 2024-11-04 RX ORDER — LISINOPRIL 30 MG/1
30 TABLET ORAL DAILY
Qty: 90 TABLET | Refills: 0 | OUTPATIENT
Start: 2024-11-04

## 2024-11-07 NOTE — TELEPHONE ENCOUNTER
Spoke to patient she is sending in her portion of the Apidra PAP 2025 re-enrollment application    Provider will need to complete provider portion. It is attached to the media tab. Once completed can put back in media tab and let Liaison know so we can submit application and check status of enrollment

## 2024-11-08 NOTE — TELEPHONE ENCOUNTER
Writer printed form requested from Media tab.     Type of form:  [employer] Patient Assistance form  From:   [employer]   Fax:  [phone]  Supplies requested on form:  Aprida 100u/mL  Provider: Lena Bradford   Date provider will be in clinic to sign:  11/25/2024  Labeled form and completed to the best of this writers ability.    Placed in provider's file     Beatriz Zhong CMA  Adult Endocrinology   [facility]

## 2024-11-14 ENCOUNTER — MYC MEDICAL ADVICE (OUTPATIENT)
Dept: EDUCATION SERVICES | Facility: CLINIC | Age: 59
End: 2024-11-14
Payer: COMMERCIAL

## 2024-11-15 NOTE — TELEPHONE ENCOUNTER
Manual mode because wants to use up G6 PODS before changing. Lowest reading this past week=75. Not always entering CHO's due to fear of lows; does not like being below 110 and will treat with 15-18 grams. Reminded lows are <70. In automation, overrides and then goes low. States running higher today from back pain. Manual basal rate=0.4 units. By TDD would be 0.49.     PLAN:  *Agrees to wait until next week to reassess  *Reminded to at least enter 1/2 the CHO's if nervous for low  *Reminded to treat 80's with one sip or 2 pieces of candy   *Agrees to try and not override recommended dose    Nandini Jenkins RN, [facility]  Diabetes Education Department  [facility], [place]  Phone: [phone]  [phone]

## 2024-11-25 NOTE — TELEPHONE ENCOUNTER
Follow up. Was provider able to complete provider portion of PAP application?  Once Liaison confirms provider portion has been completed and sent into program we can call and check status of re-enrollment      Patient portion was sent into program 11-

## 2024-11-26 ENCOUNTER — TELEPHONE (OUTPATIENT)
Dept: FAMILY MEDICINE | Facility: OTHER | Age: 59
End: 2024-11-26
Payer: COMMERCIAL

## 2024-11-26 DIAGNOSIS — E78.5 HYPERLIPIDEMIA LDL GOAL <100: ICD-10-CM

## 2024-11-26 DIAGNOSIS — E10.9 TYPE 1 DIABETES MELLITUS WITHOUT COMPLICATION (H): Primary | ICD-10-CM

## 2024-11-26 DIAGNOSIS — E06.3 HYPOTHYROIDISM DUE TO HASHIMOTO'S THYROIDITIS: ICD-10-CM

## 2024-11-26 NOTE — TELEPHONE ENCOUNTER
Provider reviewed and signed. Provider has not been in office since 11/4/2024. 11/25/2024 was first day back in this office.    Faxed back to: [phone]  See image below for Timestamp confirmation of successful transmission.         Placed in [employer] PAP file      Beatriz Zhong CMA  Adult Endocrinology   [facility]

## 2024-11-26 NOTE — TELEPHONE ENCOUNTER
Patient was scheduled for physical but was too early. Spoke with patient and rescheduled for April of next year. Patient would like to schedule a lab appointment to have all her labs completed prior to that appointment. Would you be willing to order all labs you would do during physical for patient as future. Once lab orders are signed patient asked we send her a [employer] message and she will schedule her lab appointment.     Maria Teresa Ziegler MA

## 2024-11-29 ENCOUNTER — LAB (OUTPATIENT)
Dept: LAB | Facility: OTHER | Age: 59
End: 2024-11-29
Payer: COMMERCIAL

## 2024-11-29 DIAGNOSIS — I10 HYPERTENSION GOAL BP (BLOOD PRESSURE) < 140/90: Primary | ICD-10-CM

## 2024-11-29 NOTE — TELEPHONE ENCOUNTER
[phone]. Will call again next week to check status of enrollment

## 2024-12-03 NOTE — TELEPHONE ENCOUNTER
[facility][phone][facility] to check status of application and spoke to Lanesha was told the application has been received but not yet processed. Said to give them at least another week. Will check back again next week

## 2024-12-03 NOTE — PROGRESS NOTES
Outcome for 12/03/24 8:15 AM: Data uploaded on Dexcom and Jobyduo  Denise Carter MA  Outcome for 12/12/24 8:00 AM: Data obtained via Dexcom and Jobyduo website  Bobbilynn Grossaint, VF                                        Virtual Visit Details    Type of service:  Video Visit   Video Start Time: 2:58 PM  Video End Time:3:12 PM    Originating Location (pt. Location): Home    Distant Location (provider location):  Off-site  Platform used for Video Visit: [facility]                        Endocrinology and Diabetes Clinic         Follow up for T1DM, hypothyroidism, osteoporosis       Assessment:  (E10.649) Type 1 diabetes mellitus with hypoglycemia and without coma (H)  (primary encounter diagnosis)  59 y old woman with longstanding type 1 diabetes, low insulin requirements    Much improved glucose control on Omnipod with DEXCOM and Apidra insulin   Hypoglycemia in the afternoon  Adjust CR form 11am -4pm 17->20    Hypothyroidism: better controlled, TSH at 6, adjust LT4    Dyslipidemia intolerant to Atorvastatin , doing ok on Simvastatin 20 mg daily, cont    Osteoporosis Cont Alendronate, started 3/2022, last DXA 6/24    Follow-up with me in 6 months     The Longitudinal plan of care for diabetes condition was addressed during this visit. Due to added complexity of care, we will continue to support Maria M , and the subsequent management of this condition(s) and with the ongoing continuity of care of this condition.      Lena Bradford MD  Endocrinology and Diabetes  Telephone contact:  [facility] [phone]  [facility] [phone]      Interval history:  6m follow up  Maria M Marcus aged 59 year old years old woman with type 1 diabetes with retinopathy with comorbidity of vitiligo, hypothyroidism and osteoporosis for follow-up    Insulin  Patient is status OmniPod pump please repeat your insulin  Dexcom download time in range 79% low 2% average glucose  143 mg/dL GMI 6.7% CGM active 99.4% pattern overall excellent blood glucose control, mild postprandial hyperglycemia  Patient uses 19.5 units basal and 11.2 units bolus per day on average, she covers 197 g of carbohydrates time in range 82% high 15% low 1%, pattern postprandial hyperglycemia after supper     Hypoglycemia  Much improved      Complications  1. Retinopathy: yes - hemorrhage 9/2020  2. Nephropathy: no  3. Neuropathy: no - plantar fasciitis  4. Hypoglycemia: yes   5. Macrovascular:  No    Osteoporosis  Bone density from March 2024 improved at hip and spine    Medications:   Current Outpatient Medications   Medication Sig Dispense Refill    alendronate (FOSAMAX) 70 MG tablet Take 1 tablet (70 mg) by mouth once a week 12 tablet 3    blood glucose (ACCU-CHEK GUIDE) test strip USE TO CHECK BLOOD SUGAR 5 TIMES DAILY OR AS DIRECTED 450 strip 3    blood glucose monitoring (SOFTCLIX) lancets USE 1 LANCET TO CHECK GLUCOSE 4-5 TIMES DAILY OR  AS  DIRECTED 600 each 3    Calcium Carb-Cholecalciferol (TGT CALCIUM DIETARY SUPPLEMENT PO)       Continuous Glucose Sensor (DEXCOM G6 SENSOR) MISC Change every 10 days. 3 each 5    Continuous Glucose Sensor (DEXCOM G7 SENSOR) MISC Change every 10 days. 9 each 2    Continuous Glucose Sensor (DEXCOM G7 SENSOR) MISC Change every 10 days. 3 each 5    Continuous Glucose Transmitter (DEXCOM G6 TRANSMITTER) MISC Change every 3 months. 1 each 1    estradiol (ESTRACE) 2 MG tablet Take 1 tablet (2 mg) by mouth daily 90 tablet 3    fluocinonide (LIDEX) 0.05 % external cream Apply topically 2 times daily 60 g 0    glucosamine-chondroitin 500-400 MG CAPS per capsule Take 1 capsule by mouth daily      Insulin Disposable Pump (OMNIPOD 5 G6 POD, GEN 5,) MISC 1 each every 3 days 30 each 3    Insulin Disposable Pump (OMNIPOD 5 G7 PODS, GEN 5,) MISC 1 each every 3 days. 30 each 2    insulin glargine (LANTUS PEN) 100 UNIT/ML pen Inject 6 units twice daily (Patient not taking: Reported on  6/3/2024) 3 mL 0    insulin glulisine (APIDRA VIAL) 100 UNIT/ML injection USE UP TO 35 UNITS PER DAY IN INSULIN PUMP FOR BASAL , BOLUS AND PRIMING OF INSULIN PUMP TUBING 10 mL 0    levothyroxine (SYNTHROID/LEVOTHROID) 100 MCG tablet Take 1 tablet (100 mcg) by mouth daily 90 tablet 3    lisinopril (ZESTRIL) 30 MG tablet Take 1 tablet (30 mg) by mouth daily. 30 tablet 0    omeprazole (PRILOSEC) 20 MG DR capsule Take 1 capsule by mouth once daily 90 capsule 1    simvastatin (ZOCOR) 20 MG tablet Take 1 tablet (20 mg) by mouth at bedtime 90 tablet 3    tiZANidine (ZANAFLEX) 2 MG tablet Take 1-2 tablets (2-4 mg) by mouth 3 times daily as needed for muscle spasms 60 tablet 2    Vitamin D, Cholecalciferol, 25 MCG (1000 UT) TABS          Social History:  Social History     Tobacco Use    Smoking status: Never     Passive exposure: Never    Smokeless tobacco: Never    Tobacco comments:     no exposure   Substance Use Topics    Alcohol use: Yes     Comment: Extremely social         Physical Examination:    Wt Readings from Last 4 Encounters:   06/03/24 72.4 kg (159 lb 9.6 oz)   04/01/24 72.4 kg (159 lb 11.2 oz)   01/16/24 73.3 kg (161 lb 8 oz)   12/20/23 73.3 kg (161 lb 8 oz)       General: Well appearing woman in no distress.  Psych: good eye contact, no pressured speech        Labs and Studies:   Recent Labs   Lab Test 05/31/24  0902 11/02/23  0841 04/21/23  0824 12/16/22  1130 09/06/22  1319 08/10/22  1429 04/13/18  0750 07/28/17  1612   T4  --   --   --  1.28 1.36 1.38   < >  --    TSH 6.76* 4.00 3.83 0.19* 0.27* 0.11*   < >  --    ACTH  --   --   --   --   --   --   --  12   GODWIN  --   --   --   --   --   --   --  7.3    < > = values in this interval not displayed.       Lab Results   Component Value Date     11/02/2023    CHLORIDE 102 11/02/2023    CO2 26 11/02/2023    GLC 98 05/31/2024    CR 0.99 (H) 05/31/2024    CR 0.93 11/02/2023    CR 0.98 (H) 04/21/2023    CR 0.97 12/16/2022    CR 0.94 09/06/2022    DORIS 9.4  11/02/2023    ALBUMIN 4.1 11/02/2023    ALKPHOS 56 11/02/2023     (H) 05/31/2024    HDL 65 05/31/2024    TRIG 113 05/31/2024     Lab Results   Component Value Date    MICROL <12.0 05/31/2024    MICROL <12.0 11/02/2023    MICROL <12.0 04/21/2023    MICROL 12 02/09/2022    MICROL 8 09/14/2020     Lab Results   Component Value Date    A1C 6.0 (H) 05/31/2024    A1C 6.2 (H) 11/02/2023    A1C 5.8 (H) 04/21/2023    A1C 6.0 (H) 09/06/2022    A1C 6.3 (H) 05/18/2022       Lab Results   Component Value Date    HGB 12.6 05/25/2020     Lab Results   Component Value Date    TSH 6.76 (H) 05/31/2024    TSH 4.00 11/02/2023    TSH 3.83 04/21/2023    TSH 0.19 (L) 12/16/2022    TSH 0.27 (L) 09/06/2022     Lab Results   Component Value Date    DORIS 9.4 11/02/2023    DORIS 8.7 12/16/2022    DORIS 8.5 09/06/2022    DORIS 8.6 08/10/2022    ALBUMIN 4.1 11/02/2023    ALT 9 11/02/2023    PHOS 3.3 11/08/2019    PTHI 43 04/19/2022    AST 17 11/02/2023    BILITOTAL 0.3 11/02/2023    CR 0.99 (H) 05/31/2024    CR 0.93 11/02/2023    CR 0.98 (H) 04/21/2023     11/02/2023    TSH 6.76 (H) 05/31/2024    ALKPHOS 56 11/02/2023    HGB 12.6 05/25/2020       Results for orders placed in visit on 06/03/24    Dexa hip/pelvis/spine    Narrative  HISTORY: Menopause    COMPARISON: 3/9/2022    FINDINGS:  Image quality: Adequate    Lumbar spine T-score in region of L1-L4 (L3) = -0.2  Percent change: 6.1%    HIPS:  Mean total hip T-score: -0.3  Mean total hip percent change: 10.7%    Left femoral neck T-score = -1.6  Right femoral neck T-score = -2.3    FRAX:  10 year probability of major osteoporotic fracture: 18.6%  10 year probability of hip fracture: 1.6%  The 10 year probability of fracture may be lower than reported if the  patient has received treatment. FRAX data should be disregarded in  patient's taking bisphosphonates.    World Health Organization definition of osteoporosis and osteopenia  for  women:  Normal: T-score at or above -1.0  Low  Bone Mass (Osteopenia): T-score between -1.0 and -2.5.  Osteoporosis: T-score at or below -2.5  T-scores are reported for postmenopausal women and men over 50 years  of age.    Impression  IMPRESSION:  Osteopenia. Significant increase in bone mineral density from  3/9/2022.    KATYA MAGALLON MD      SYSTEM ID:  [id number]

## 2024-12-11 NOTE — TELEPHONE ENCOUNTER
Date: 12/11     Company: [employer]     Phone Number: [phone]     Rep: Alvin     Comments:  Spoke to rep.  Rep sttedthey are only currently working on applications received on 11/7 and will not work renewals until 1/2/2025

## 2024-12-16 ENCOUNTER — TELEPHONE (OUTPATIENT)
Dept: EDUCATION SERVICES | Facility: CLINIC | Age: 59
End: 2024-12-16

## 2024-12-16 ENCOUNTER — VIRTUAL VISIT (OUTPATIENT)
Dept: ENDOCRINOLOGY | Facility: CLINIC | Age: 59
End: 2024-12-16
Payer: COMMERCIAL

## 2024-12-16 DIAGNOSIS — E10.9 TYPE 1 DIABETES MELLITUS WITHOUT COMPLICATION (H): Primary | ICD-10-CM

## 2024-12-16 DIAGNOSIS — E06.3 HYPOTHYROIDISM DUE TO HASHIMOTO'S THYROIDITIS: ICD-10-CM

## 2024-12-16 DIAGNOSIS — E10.649 TYPE 1 DIABETES MELLITUS WITH HYPOGLYCEMIA AND WITHOUT COMA (H): ICD-10-CM

## 2024-12-16 PROCEDURE — 99214 OFFICE O/P EST MOD 30 MIN: CPT | Mod: 95 | Performed by: INTERNAL MEDICINE

## 2024-12-16 PROCEDURE — G2211 COMPLEX E/M VISIT ADD ON: HCPCS | Mod: 95 | Performed by: INTERNAL MEDICINE

## 2024-12-16 ASSESSMENT — PAIN SCALES - GENERAL: PAINLEVEL_OUTOF10: NO PAIN (0)

## 2024-12-16 NOTE — NURSING NOTE
Current patient location:  [place] [address]    Is the patient currently in the state of [place]? YES    Visit mode:VIDEO    If the visit is dropped, the patient can be reconnected by:VIDEO VISIT: Text to cell phone:   Telephone Information:   Mobile [phone]       Will anyone else be joining the visit? NO  (If patient encounters technical issues they should call [phone]    Are changes needed to the allergy or medication list? No, verified at e-check in    Are refills needed on medications prescribed by this physician? Discuss with provider    Rooming Documentation:  Not applicable    Reason for visit: RECHECK (RETURN DIABETES - 6 mo follow up/)    Shruthi TELLOF

## 2024-12-16 NOTE — TELEPHONE ENCOUNTER
----- Message from Lena Bradford sent at 12/16/2024  3:27 PM CST -----  Regarding: pump change    Please contact pt and help with pump change, she is not comfortable doing it herself.  Adjust CR form 11am -4pm CR 17->20    I'm going to avoid virtual visits with her in the future    Thank you    Lena Bradford MD  Staff Physician  Division of Endocrinology  [facility]  Pager #[phone]

## 2024-12-16 NOTE — LETTER
12/16/2024      Maria M Marcus  [place] [address]      Dear Colleague,    Thank you for referring your patient, Maria M Marcus, to the [facility]. Please see a copy of my visit note below.    Outcome for 12/03/24 8:15 AM: Data uploaded on Dexcom and SnapMyAdo  Denise Carter MA  Outcome for 12/12/24 8:00 AM: Data obtained via Dexcom and SnapMyAdo website  Bobbilynn Grossaint, VF                                        Virtual Visit Details    Type of service:  Video Visit   Video Start Time: 2:58 PM  Video End Time:3:12 PM    Originating Location (pt. Location): Home    Distant Location (provider location):  Off-site  Platform used for Video Visit: [facility]                        Endocrinology and Diabetes Clinic         Follow up for T1DM, hypothyroidism, osteoporosis       Assessment:  (E10.649) Type 1 diabetes mellitus with hypoglycemia and without coma (H)  (primary encounter diagnosis)  59 y old woman with longstanding type 1 diabetes, low insulin requirements    Much improved glucose control on Omnipod with DEXCOM and Apidra insulin   Hypoglycemia in the afternoon  Adjust CR form 11am -4pm 17->20    Hypothyroidism: better controlled, TSH at 6, adjust LT4    Dyslipidemia intolerant to Atorvastatin , doing ok on Simvastatin 20 mg daily, cont    Osteoporosis Cont Alendronate, started 3/2022, last DXA 6/24    Follow-up with me in 6 months     The Longitudinal plan of care for diabetes condition was addressed during this visit. Due to added complexity of care, we will continue to support Maria M , and the subsequent management of this condition(s) and with the ongoing continuity of care of this condition.      Lena Bradford MD  Endocrinology and Diabetes  Telephone contact:  [facility] [place] [phone]  [facility] [phone]      Interval history:  6m follow up  Maria M Marcus aged 59 year old years old  woman with type 1 diabetes with retinopathy with comorbidity of vitiligo, hypothyroidism and osteoporosis for follow-up    Insulin  Patient is status OmniPod pump please repeat your insulin  Dexcom download time in range 79% low 2% average glucose 143 mg/dL GMI 6.7% CGM active 99.4% pattern overall excellent blood glucose control, mild postprandial hyperglycemia  Patient uses 19.5 units basal and 11.2 units bolus per day on average, she covers 197 g of carbohydrates time in range 82% high 15% low 1%, pattern postprandial hyperglycemia after supper     Hypoglycemia  Much improved      Complications  1. Retinopathy: yes - hemorrhage 9/2020  2. Nephropathy: no  3. Neuropathy: no - plantar fasciitis  4. Hypoglycemia: yes   5. Macrovascular:  No    Osteoporosis  Bone density from March 2024 improved at hip and spine    Medications:   Current Outpatient Medications   Medication Sig Dispense Refill     alendronate (FOSAMAX) 70 MG tablet Take 1 tablet (70 mg) by mouth once a week 12 tablet 3     blood glucose (ACCU-CHEK GUIDE) test strip USE TO CHECK BLOOD SUGAR 5 TIMES DAILY OR AS DIRECTED 450 strip 3     blood glucose monitoring (SOFTCLIX) lancets USE 1 LANCET TO CHECK GLUCOSE 4-5 TIMES DAILY OR  AS  DIRECTED 600 each 3     Calcium Carb-Cholecalciferol (TGT CALCIUM DIETARY SUPPLEMENT PO)        Continuous Glucose Sensor (DEXCOM G6 SENSOR) MISC Change every 10 days. 3 each 5     Continuous Glucose Sensor (DEXCOM G7 SENSOR) MISC Change every 10 days. 9 each 2     Continuous Glucose Sensor (DEXCOM G7 SENSOR) MISC Change every 10 days. 3 each 5     Continuous Glucose Transmitter (DEXCOM G6 TRANSMITTER) MISC Change every 3 months. 1 each 1     estradiol (ESTRACE) 2 MG tablet Take 1 tablet (2 mg) by mouth daily 90 tablet 3     fluocinonide (LIDEX) 0.05 % external cream Apply topically 2 times daily 60 g 0     glucosamine-chondroitin 500-400 MG CAPS per capsule Take 1 capsule by mouth daily       Insulin Disposable Pump (OMNIPOD  5 G6 POD, GEN 5,) MISC 1 each every 3 days 30 each 3     Insulin Disposable Pump (OMNIPOD 5 G7 PODS, GEN 5,) MISC 1 each every 3 days. 30 each 2     insulin glargine (LANTUS PEN) 100 UNIT/ML pen Inject 6 units twice daily (Patient not taking: Reported on 6/3/2024) 3 mL 0     insulin glulisine (APIDRA VIAL) 100 UNIT/ML injection USE UP TO 35 UNITS PER DAY IN INSULIN PUMP FOR BASAL , BOLUS AND PRIMING OF INSULIN PUMP TUBING 10 mL 0     levothyroxine (SYNTHROID/LEVOTHROID) 100 MCG tablet Take 1 tablet (100 mcg) by mouth daily 90 tablet 3     lisinopril (ZESTRIL) 30 MG tablet Take 1 tablet (30 mg) by mouth daily. 30 tablet 0     omeprazole (PRILOSEC) 20 MG DR capsule Take 1 capsule by mouth once daily 90 capsule 1     simvastatin (ZOCOR) 20 MG tablet Take 1 tablet (20 mg) by mouth at bedtime 90 tablet 3     tiZANidine (ZANAFLEX) 2 MG tablet Take 1-2 tablets (2-4 mg) by mouth 3 times daily as needed for muscle spasms 60 tablet 2     Vitamin D, Cholecalciferol, 25 MCG (1000 UT) TABS          Social History:  Social History     Tobacco Use     Smoking status: Never     Passive exposure: Never     Smokeless tobacco: Never     Tobacco comments:     no exposure   Substance Use Topics     Alcohol use: Yes     Comment: Extremely social         Physical Examination:    Wt Readings from Last 4 Encounters:   06/03/24 72.4 kg (159 lb 9.6 oz)   04/01/24 72.4 kg (159 lb 11.2 oz)   01/16/24 73.3 kg (161 lb 8 oz)   12/20/23 73.3 kg (161 lb 8 oz)       General: Well appearing woman in no distress.  Psych: good eye contact, no pressured speech        Labs and Studies:   Recent Labs   Lab Test 05/31/24  0902 11/02/23  0841 04/21/23  0824 12/16/22  1130 09/06/22  1319 08/10/22  1429 04/13/18  0750 07/28/17  1612   T4  --   --   --  1.28 1.36 1.38   < >  --    TSH 6.76* 4.00 3.83 0.19* 0.27* 0.11*   < >  --    ACTH  --   --   --   --   --   --   --  12   GODWIN  --   --   --   --   --   --   --  7.3    < > = values in this interval not  displayed.       Lab Results   Component Value Date     11/02/2023    CHLORIDE 102 11/02/2023    CO2 26 11/02/2023    GLC 98 05/31/2024    CR 0.99 (H) 05/31/2024    CR 0.93 11/02/2023    CR 0.98 (H) 04/21/2023    CR 0.97 12/16/2022    CR 0.94 09/06/2022    DORIS 9.4 11/02/2023    ALBUMIN 4.1 11/02/2023    ALKPHOS 56 11/02/2023     (H) 05/31/2024    HDL 65 05/31/2024    TRIG 113 05/31/2024     Lab Results   Component Value Date    MICROL <12.0 05/31/2024    MICROL <12.0 11/02/2023    MICROL <12.0 04/21/2023    MICROL 12 02/09/2022    MICROL 8 09/14/2020     Lab Results   Component Value Date    A1C 6.0 (H) 05/31/2024    A1C 6.2 (H) 11/02/2023    A1C 5.8 (H) 04/21/2023    A1C 6.0 (H) 09/06/2022    A1C 6.3 (H) 05/18/2022       Lab Results   Component Value Date    HGB 12.6 05/25/2020     Lab Results   Component Value Date    TSH 6.76 (H) 05/31/2024    TSH 4.00 11/02/2023    TSH 3.83 04/21/2023    TSH 0.19 (L) 12/16/2022    TSH 0.27 (L) 09/06/2022     Lab Results   Component Value Date    DORIS 9.4 11/02/2023    DORIS 8.7 12/16/2022    DORIS 8.5 09/06/2022    DORIS 8.6 08/10/2022    ALBUMIN 4.1 11/02/2023    ALT 9 11/02/2023    PHOS 3.3 11/08/2019    PTHI 43 04/19/2022    AST 17 11/02/2023    BILITOTAL 0.3 11/02/2023    CR 0.99 (H) 05/31/2024    CR 0.93 11/02/2023    CR 0.98 (H) 04/21/2023     11/02/2023    TSH 6.76 (H) 05/31/2024    ALKPHOS 56 11/02/2023    HGB 12.6 05/25/2020       Results for orders placed in visit on 06/03/24    Dexa hip/pelvis/spine    Narrative  HISTORY: Menopause    COMPARISON: 3/9/2022    FINDINGS:  Image quality: Adequate    Lumbar spine T-score in region of L1-L4 (L3) = -0.2  Percent change: 6.1%    HIPS:  Mean total hip T-score: -0.3  Mean total hip percent change: 10.7%    Left femoral neck T-score = -1.6  Right femoral neck T-score = -2.3    FRAX:  10 year probability of major osteoporotic fracture: 18.6%  10 year probability of hip fracture: 1.6%  The 10 year probability of  fracture may be lower than reported if the  patient has received treatment. FRAX data should be disregarded in  patient's taking bisphosphonates.    World Health Organization definition of osteoporosis and osteopenia  for  women:  Normal: T-score at or above -1.0  Low Bone Mass (Osteopenia): T-score between -1.0 and -2.5.  Osteoporosis: T-score at or below -2.5  T-scores are reported for postmenopausal women and men over 50 years  of age.    Impression  IMPRESSION:  Osteopenia. Significant increase in bone mineral density from  3/9/2022.    KATYA MAGALLON MD      SYSTEM ID:  [id number]          Again, thank you for allowing me to participate in the care of your patient.        Sincerely,      Lena Bradford MD

## 2024-12-16 NOTE — TELEPHONE ENCOUNTER
Called Maria M and walked her through adjusting her insulin to carb ratio from 17g all day to:    12 AM - 11 AM - 17g  11 AM - 4 PM - 20g  4 PM - 12 AM - 17g    Maria M confirms that her controller reflects this and she saved this settings.    Sophia Castillo RD, LD [facility]  Diabetes Educator

## 2024-12-16 NOTE — PROGRESS NOTES
"Virtual Visit Details    Type of service:  Video Visit   Video Start Time: {video visit start/end time for provider to select:337181}  Video End Time:{video visit start/end time for provider to select:025232}    Originating Location (pt. Location): {video visit patient location:372633::\"Home\"}  {PROVIDER LOCATION On-site should be selected for visits conducted from your clinic location or adjoining [facility] hospital, academic office, or other nearby [facility] building. Off-site should be selected for all other provider locations, including home:481162}  Distant Location (provider location):  {virtual location provider:615988}  Platform used for Video Visit: {Virtual Visit Platforms:956884::\"Koding\"}    "

## 2024-12-21 ENCOUNTER — HEALTH MAINTENANCE LETTER (OUTPATIENT)
Age: 59
End: 2024-12-21

## 2024-12-23 NOTE — TELEPHONE ENCOUNTER
Fax said portion was missing on application. Form says provider address missing. Application shows address. Called [facility] ([phone]) spoke to Lorrie and she stated they just needed to verify address.   I confirmed address with her and she is putting it back in review

## 2024-12-26 DIAGNOSIS — M62.838 MUSCLE SPASM: ICD-10-CM

## 2024-12-26 DIAGNOSIS — M79.18 MYOFASCIAL PAIN: ICD-10-CM

## 2024-12-26 DIAGNOSIS — K21.9 GASTROESOPHAGEAL REFLUX DISEASE, UNSPECIFIED WHETHER ESOPHAGITIS PRESENT: ICD-10-CM

## 2024-12-26 DIAGNOSIS — M81.6 LOCALIZED OSTEOPOROSIS WITHOUT CURRENT PATHOLOGICAL FRACTURE: ICD-10-CM

## 2024-12-26 RX ORDER — TIZANIDINE 2 MG/1
2-4 TABLET ORAL 3 TIMES DAILY PRN
Qty: 60 TABLET | Refills: 0 | Status: SHIPPED | OUTPATIENT
Start: 2024-12-26

## 2024-12-26 RX ORDER — ALENDRONATE SODIUM 70 MG/1
70 TABLET ORAL WEEKLY
Qty: 12 TABLET | Refills: 0 | Status: SHIPPED | OUTPATIENT
Start: 2024-12-26

## 2024-12-28 DIAGNOSIS — E10.9 TYPE 1 DIABETES MELLITUS WITHOUT COMPLICATION (H): ICD-10-CM

## 2024-12-29 RX ORDER — SIMVASTATIN 20 MG
20 TABLET ORAL AT BEDTIME
Qty: 90 TABLET | Refills: 2 | Status: SHIPPED | OUTPATIENT
Start: 2024-12-29

## 2024-12-29 NOTE — TELEPHONE ENCOUNTER
LVD:  12/16/2024  [facility]AC[facility]  LDL Cholesterol Calculated   Date Value Ref Range Status   05/31/2024 116 (H) <=100 mg/dL Final   09/14/2020 102 (H) <100 mg/dL Final     Comment:     Above desirable:  100-129 mg/dl  Borderline High:  130-159 mg/dL  High:             160-189 mg/dL  Very high:       >189 mg/dl             Refilled per protocol.

## 2025-01-06 NOTE — TELEPHONE ENCOUNTER
Called [employer] ([phone]) spoke to Alvin they have the application in review and are now checking income verification. They will send a determination letter when completed

## 2025-01-13 NOTE — TELEPHONE ENCOUNTER
Called [facility] ([phone]) spoke to Alphonse she stated application is still in process and we should have a determination within 3 days. Will check again if we have not heard back

## 2025-01-21 ENCOUNTER — TRANSFERRED RECORDS (OUTPATIENT)
Dept: HEALTH INFORMATION MANAGEMENT | Facility: CLINIC | Age: 60
End: 2025-01-21
Payer: COMMERCIAL

## 2025-01-23 NOTE — TELEPHONE ENCOUNTER
FREE DRUG APPLICATION APPROVED    Medication:  Apridra  Program Name:  [employer] Patient Connection  Effective Date: 1/13/2025  Expiration Date: 12/31/2025  Pharmacy Filling the Rx:    Patient Notified: Yes  Additional Information:

## 2025-01-25 DIAGNOSIS — I10 HYPERTENSION GOAL BP (BLOOD PRESSURE) < 140/90: ICD-10-CM

## 2025-01-27 RX ORDER — LISINOPRIL 30 MG/1
30 TABLET ORAL DAILY
Qty: 90 TABLET | Refills: 0 | Status: SHIPPED | OUTPATIENT
Start: 2025-01-27

## 2025-01-28 NOTE — TELEPHONE ENCOUNTER
[facility] Call Center    Phone Message    Name of Caller: Maria M    Phone Number: Home number on file [phone] (home)    Best time to return call: Any    May a detailed message be left on voicemail: yes    Relation to patient: Self    Reason for Call: Maria M arthur is wanting to change her insulin pump room training from 12/6 to 12/4.  Requesting a call to discuss.  Thank you.      Action Taken: Message routed to:  Adult Clinics: Endocrinology p [phone]   Felicia

## 2025-03-16 DIAGNOSIS — M62.838 MUSCLE SPASM: ICD-10-CM

## 2025-03-16 DIAGNOSIS — K21.9 GASTROESOPHAGEAL REFLUX DISEASE, UNSPECIFIED WHETHER ESOPHAGITIS PRESENT: ICD-10-CM

## 2025-03-16 DIAGNOSIS — M79.18 MYOFASCIAL PAIN: ICD-10-CM

## 2025-03-17 DIAGNOSIS — M81.6 LOCALIZED OSTEOPOROSIS WITHOUT CURRENT PATHOLOGICAL FRACTURE: ICD-10-CM

## 2025-03-17 RX ORDER — OMEPRAZOLE 20 MG/1
CAPSULE, DELAYED RELEASE ORAL
Qty: 90 CAPSULE | Refills: 0 | Status: SHIPPED | OUTPATIENT
Start: 2025-03-17

## 2025-03-17 RX ORDER — TIZANIDINE 2 MG/1
2-4 TABLET ORAL 3 TIMES DAILY PRN
Qty: 60 TABLET | Refills: 0 | Status: SHIPPED | OUTPATIENT
Start: 2025-03-17

## 2025-03-17 RX ORDER — ALENDRONATE SODIUM 70 MG/1
70 TABLET ORAL WEEKLY
Qty: 12 TABLET | Refills: 0 | Status: SHIPPED | OUTPATIENT
Start: 2025-03-17

## 2025-03-24 ENCOUNTER — LAB (OUTPATIENT)
Dept: LAB | Facility: OTHER | Age: 60
End: 2025-03-24
Payer: COMMERCIAL

## 2025-03-24 DIAGNOSIS — I10 HYPERTENSION GOAL BP (BLOOD PRESSURE) < 140/90: ICD-10-CM

## 2025-03-24 DIAGNOSIS — E06.3 HYPOTHYROIDISM DUE TO HASHIMOTO'S THYROIDITIS: ICD-10-CM

## 2025-03-24 DIAGNOSIS — E10.9 TYPE 1 DIABETES MELLITUS WITHOUT COMPLICATION (H): ICD-10-CM

## 2025-03-24 DIAGNOSIS — E78.5 HYPERLIPIDEMIA LDL GOAL <100: ICD-10-CM

## 2025-03-24 LAB
ALBUMIN SERPL BCG-MCNC: 4.1 G/DL (ref 3.5–5.2)
ALP SERPL-CCNC: 62 U/L (ref 40–150)
ALT SERPL W P-5'-P-CCNC: 9 U/L (ref 0–50)
ANION GAP SERPL CALCULATED.3IONS-SCNC: 8 MMOL/L (ref 7–15)
AST SERPL W P-5'-P-CCNC: 18 U/L (ref 0–45)
BILIRUB SERPL-MCNC: 0.3 MG/DL
BUN SERPL-MCNC: 16.1 MG/DL (ref 8–23)
CALCIUM SERPL-MCNC: 9.3 MG/DL (ref 8.8–10.4)
CHLORIDE SERPL-SCNC: 107 MMOL/L (ref 98–107)
CHOLEST SERPL-MCNC: 188 MG/DL
CREAT SERPL-MCNC: 0.88 MG/DL (ref 0.51–0.95)
EGFRCR SERPLBLD CKD-EPI 2021: 75 ML/MIN/1.73M2
EST. AVERAGE GLUCOSE BLD GHB EST-MCNC: 128 MG/DL
FASTING STATUS PATIENT QL REPORTED: YES
FASTING STATUS PATIENT QL REPORTED: YES
GLUCOSE SERPL-MCNC: 96 MG/DL (ref 70–99)
HBA1C MFR BLD: 6.1 % (ref 0–5.6)
HCO3 SERPL-SCNC: 25 MMOL/L (ref 22–29)
HDLC SERPL-MCNC: 69 MG/DL
LDLC SERPL CALC-MCNC: 102 MG/DL
NONHDLC SERPL-MCNC: 119 MG/DL
POTASSIUM SERPL-SCNC: 4.3 MMOL/L (ref 3.4–5.3)
PROT SERPL-MCNC: 6.5 G/DL (ref 6.4–8.3)
SODIUM SERPL-SCNC: 140 MMOL/L (ref 135–145)
T4 FREE SERPL-MCNC: 1.76 NG/DL (ref 0.9–1.7)
TRIGL SERPL-MCNC: 87 MG/DL
TSH SERPL DL<=0.005 MIU/L-ACNC: 0.08 UIU/ML (ref 0.3–4.2)

## 2025-03-24 PROCEDURE — 84439 ASSAY OF FREE THYROXINE: CPT

## 2025-03-24 PROCEDURE — 80053 COMPREHEN METABOLIC PANEL: CPT

## 2025-03-24 PROCEDURE — 36415 COLL VENOUS BLD VENIPUNCTURE: CPT

## 2025-03-24 PROCEDURE — 83036 HEMOGLOBIN GLYCOSYLATED A1C: CPT

## 2025-03-24 PROCEDURE — 84443 ASSAY THYROID STIM HORMONE: CPT

## 2025-03-24 PROCEDURE — 80061 LIPID PANEL: CPT

## 2025-03-25 ENCOUNTER — MYC MEDICAL ADVICE (OUTPATIENT)
Dept: ENDOCRINOLOGY | Facility: CLINIC | Age: 60
End: 2025-03-25
Payer: COMMERCIAL

## 2025-03-25 DIAGNOSIS — E06.3 HYPOTHYROIDISM DUE TO HASHIMOTO'S THYROIDITIS: ICD-10-CM

## 2025-03-25 DIAGNOSIS — E10.9 TYPE 1 DIABETES MELLITUS WITHOUT COMPLICATION (H): Primary | ICD-10-CM

## 2025-03-25 RX ORDER — LEVOTHYROXINE SODIUM 100 UG/1
TABLET ORAL
Qty: 90 TABLET | Refills: 3 | Status: SHIPPED | OUTPATIENT
Start: 2025-03-25

## 2025-03-26 ENCOUNTER — MYC MEDICAL ADVICE (OUTPATIENT)
Dept: OBGYN | Facility: CLINIC | Age: 60
End: 2025-03-26
Payer: COMMERCIAL

## 2025-03-26 NOTE — TELEPHONE ENCOUNTER
Last seen 4/01/2024 for annual.   OV notes states to Return in about 1 year (around 4/1/2025),     Advised, It is important to be seen yearly by your provider, especially when you are on medication. It is important to make sure your medications are still appropriate for you, review any health changes, discuss preventative care, and to answer any questions or concerns.   Call [phone] to schedule yearly appointment for medication management.     Batsheva ADAMS RN - [facility]

## 2025-03-28 SDOH — HEALTH STABILITY: PHYSICAL HEALTH: ON AVERAGE, HOW MANY DAYS PER WEEK DO YOU ENGAGE IN MODERATE TO STRENUOUS EXERCISE (LIKE A BRISK WALK)?: 3 DAYS

## 2025-03-28 SDOH — HEALTH STABILITY: PHYSICAL HEALTH: ON AVERAGE, HOW MANY MINUTES DO YOU ENGAGE IN EXERCISE AT THIS LEVEL?: 20 MIN

## 2025-03-28 ASSESSMENT — SOCIAL DETERMINANTS OF HEALTH (SDOH): HOW OFTEN DO YOU GET TOGETHER WITH FRIENDS OR RELATIVES?: ONCE A WEEK

## 2025-04-02 ENCOUNTER — OFFICE VISIT (OUTPATIENT)
Dept: FAMILY MEDICINE | Facility: OTHER | Age: 60
End: 2025-04-02
Payer: COMMERCIAL

## 2025-04-02 VITALS
TEMPERATURE: 98.3 F | DIASTOLIC BLOOD PRESSURE: 78 MMHG | OXYGEN SATURATION: 98 % | HEIGHT: 58 IN | HEART RATE: 76 BPM | WEIGHT: 148 LBS | BODY MASS INDEX: 31.07 KG/M2 | SYSTOLIC BLOOD PRESSURE: 138 MMHG | RESPIRATION RATE: 18 BRPM

## 2025-04-02 DIAGNOSIS — Z00.00 MEDICARE ANNUAL WELLNESS VISIT, SUBSEQUENT: Primary | ICD-10-CM

## 2025-04-02 DIAGNOSIS — M62.838 MUSCLE SPASM: ICD-10-CM

## 2025-04-02 DIAGNOSIS — I10 HYPERTENSION GOAL BP (BLOOD PRESSURE) < 140/90: ICD-10-CM

## 2025-04-02 DIAGNOSIS — M81.6 LOCALIZED OSTEOPOROSIS WITHOUT CURRENT PATHOLOGICAL FRACTURE: ICD-10-CM

## 2025-04-02 DIAGNOSIS — M54.12 CERVICAL RADICULOPATHY: ICD-10-CM

## 2025-04-02 DIAGNOSIS — M79.18 MYOFASCIAL PAIN: ICD-10-CM

## 2025-04-02 DIAGNOSIS — K21.9 GASTROESOPHAGEAL REFLUX DISEASE, UNSPECIFIED WHETHER ESOPHAGITIS PRESENT: ICD-10-CM

## 2025-04-02 DIAGNOSIS — E10.9 TYPE 1 DIABETES MELLITUS WITHOUT COMPLICATION (H): Chronic | ICD-10-CM

## 2025-04-02 DIAGNOSIS — E06.3 HYPOTHYROIDISM DUE TO HASHIMOTO'S THYROIDITIS: ICD-10-CM

## 2025-04-02 RX ORDER — OMEPRAZOLE 20 MG/1
CAPSULE, DELAYED RELEASE ORAL
Qty: 90 CAPSULE | Refills: 3 | Status: SHIPPED | OUTPATIENT
Start: 2025-04-02

## 2025-04-02 RX ORDER — ALENDRONATE SODIUM 70 MG/1
70 TABLET ORAL WEEKLY
Qty: 12 TABLET | Refills: 3 | Status: SHIPPED | OUTPATIENT
Start: 2025-04-02

## 2025-04-02 RX ORDER — LISINOPRIL 30 MG/1
30 TABLET ORAL DAILY
Qty: 90 TABLET | Refills: 3 | Status: SHIPPED | OUTPATIENT
Start: 2025-04-02

## 2025-04-02 RX ORDER — TIZANIDINE 2 MG/1
2-4 TABLET ORAL 3 TIMES DAILY PRN
Qty: 60 TABLET | Refills: 5 | Status: SHIPPED | OUTPATIENT
Start: 2025-04-02

## 2025-04-02 ASSESSMENT — ANXIETY QUESTIONNAIRES
6. BECOMING EASILY ANNOYED OR IRRITABLE: NOT AT ALL
8. IF YOU CHECKED OFF ANY PROBLEMS, HOW DIFFICULT HAVE THESE MADE IT FOR YOU TO DO YOUR WORK, TAKE CARE OF THINGS AT HOME, OR GET ALONG WITH OTHER PEOPLE?: NOT DIFFICULT AT ALL
7. FEELING AFRAID AS IF SOMETHING AWFUL MIGHT HAPPEN: NOT AT ALL
GAD7 TOTAL SCORE: 0
3. WORRYING TOO MUCH ABOUT DIFFERENT THINGS: NOT AT ALL
4. TROUBLE RELAXING: NOT AT ALL
IF YOU CHECKED OFF ANY PROBLEMS ON THIS QUESTIONNAIRE, HOW DIFFICULT HAVE THESE PROBLEMS MADE IT FOR YOU TO DO YOUR WORK, TAKE CARE OF THINGS AT HOME, OR GET ALONG WITH OTHER PEOPLE: NOT DIFFICULT AT ALL
1. FEELING NERVOUS, ANXIOUS, OR ON EDGE: NOT AT ALL
2. NOT BEING ABLE TO STOP OR CONTROL WORRYING: NOT AT ALL
GAD7 TOTAL SCORE: 0
5. BEING SO RESTLESS THAT IT IS HARD TO SIT STILL: NOT AT ALL
GAD7 TOTAL SCORE: 0
7. FEELING AFRAID AS IF SOMETHING AWFUL MIGHT HAPPEN: NOT AT ALL

## 2025-04-02 ASSESSMENT — PAIN SCALES - GENERAL: PAINLEVEL_OUTOF10: MODERATE PAIN (4)

## 2025-04-02 NOTE — PROGRESS NOTES
"Preventive Care Visit  [facility]  Marcus Salgado PA-C, Family Medicine  Apr 2, 2025    Assessment & Plan       ICD-10-CM    1. Medicare annual wellness visit, subsequent  Z00.00       2. Localized osteoporosis without current pathological fracture  M81.6 alendronate (FOSAMAX) 70 MG tablet      3. Cervical radiculopathy  M54.12 tiZANidine (ZANAFLEX) 2 MG tablet      4. Type 1 diabetes mellitus without complication (H)  E10.9       5. Hypothyroidism due to Hashimoto's thyroiditis  E06.3       6. Gastroesophageal reflux disease, unspecified whether esophagitis present  K21.9 omeprazole (PRILOSEC) 20 MG DR capsule      7. Hypertension goal BP (blood pressure) < 140/90  I10 lisinopril (ZESTRIL) 30 MG tablet      8. Muscle spasm  M62.838 tiZANidine (ZANAFLEX) 2 MG tablet      9. Myofascial pain  M79.18 tiZANidine (ZANAFLEX) 2 MG tablet          1. Medicare wellness visit. She declines vaccines today.    2. Continue Fosamax as this is going well for improved bone mineral density.    3, 8-9. Chronic neck pain and muscle spasms but as needed tizanidine and home stretching continues to work well.    4-5. She is following closely with endocrinology for management of her diabetes and thyroid and this has been going well.    6. Continue omeprazole.    7. Blood pressure stable on lisinopril.    The longitudinal plan of care for the diagnosis(es)/condition(s) as documented were addressed during this visit. Due to the added complexity in care, I will continue to support Maria M in the subsequent management and with ongoing continuity of care.    Follow-up annually.      Patient has been advised of split billing requirements and indicates understanding: Yes        BMI  Estimated body mass index is 31.15 kg/m  as calculated from the following:    Height as of this encounter: 1.468 m (4' 9.8\").    Weight as of this encounter: 67.1 kg (148 lb).   Weight management plan: Discussed healthy diet and exercise " guidelines    Counseling  Appropriate preventive services were addressed with this patient via screening, questionnaire, or discussion as appropriate for fall prevention, nutrition, physical activity, Tobacco-use cessation, social engagement, weight loss and cognition.  Checklist reviewing preventive services available has been given to the patient.  Reviewed patient's diet, addressing concerns and/or questions.   She is at risk for lack of exercise and has been provided with information to increase physical activity for the benefit of her well-being.     Subjective   Maria M is a 59 year old, presenting for the following:  Physical        4/2/2025    10:48 AM   Additional Questions   Roomed by Maria Teresa JARRELL    She is overall doing well. She had been noticing some left foot pain/plantar fasciitis but has been wearing her 's walking boot at night and the pain is much improved. She also wears shoes throughout the day, even in the house.    Maria M and her  have two foster children, 11 and 9, whom they are taking care of and although this has been stressful, it is very rewarding and they fell it is their calling at this time.     Advance Care Planning  Patient does not have a Health Care Directive: Discussed advance care planning with patient; information given to patient to review.      3/28/2025   General Health   How would you rate your overall physical health? Good   Feel stress (tense, anxious, or unable to sleep) Not at all         3/28/2025   Nutrition   Diet: Low salt    Low fat/cholesterol    Diabetic    Carbohydrate counting       Multiple values from one day are sorted in reverse-chronological order         3/28/2025   Exercise   Days per week of moderate/strenous exercise 3 days   Average minutes spent exercising at this level 20 min         3/28/2025   Social Factors   Frequency of gathering with friends or relatives Once a week   Worry food won't last until get money to buy more No    Food not last or not have enough money for food? No   Do you have housing? (Housing is defined as stable permanent housing and does not include staying ouside in a car, in a tent, in an abandoned building, in an overnight shelter, or couch-surfing.) No   Are you worried about losing your housing? No   Lack of transportation? No   Unable to get utilities (heat,electricity)? No   Want help with housing or utility concern? No   (!) HOUSING CONCERN PRESENT      3/28/2025   Fall Risk   Fallen 2 or more times in the past year? No   Trouble with walking or balance? No          3/28/2025   Activities of Daily Living- Home Safety   Needs help with the following daily activites None of the above   Safety concerns in the home None of the above         3/28/2025   Dental   Dentist two times every year? Yes         3/28/2025   Hearing Screening   Hearing concerns? None of the above         3/28/2025   Driving Risk Screening   Patient/family members have concerns about driving No         3/28/2025   General Alertness/Fatigue Screening   Have you been more tired than usual lately? No         3/28/2025   Urinary Incontinence Screening   Bothered by leaking urine in past 6 months No       Today's PHQ-9 Score:       4/2/2025    10:44 AM   PHQ-9 SCORE   PHQ-9 Total Score MyChart 0   PHQ-9 Total Score 0        Patient-reported         3/28/2025   Substance Use   Alcohol more than 3/day or more than 7/wk No   Do you have a current opioid prescription? No   How severe/bad is pain from 1 to 10? 3/10   Do you use any other substances recreationally? No     Social History     Tobacco Use    Smoking status: Never     Passive exposure: Never    Smokeless tobacco: Never    Tobacco comments:     no exposure   Vaping Use    Vaping status: Never Used   Substance Use Topics    Alcohol use: Yes     Comment: Extremely social    Drug use: No         4/1/2024   LAST FHS-7 RESULTS   1st degree relative breast or ovarian cancer Yes   Any relative  bilateral breast cancer No   Any male have breast cancer No   Any ONE woman have BOTH breast AND ovarian cancer No   Any woman with breast cancer before 50yrs No   2 or more relatives with breast AND/OR ovarian cancer No   2 or more relatives with breast AND/OR bowel cancer No       Mammogram Screening - Mammogram every 1-2 years updated in Health Maintenance based on mutual decision making    History of abnormal Pap smear: Status post hysterectomy with removal of cervix and no history of CIN2 or greater or cervical cancer. Health Maintenance and Surgical History updated.       ASCVD Risk   The 10-year ASCVD risk score (Fernandez BEEBE, et al., 2019) is: 7.1%    Values used to calculate the score:      Age: 59 years      Sex: Female      Is Non- : No      Diabetic: Yes      Tobacco smoker: No      Systolic Blood Pressure: 138 mmHg      Is BP treated: Yes      HDL Cholesterol: 69 mg/dL      Total Cholesterol: 188 mg/dL    Reviewed and updated as needed this visit by Provider   Tobacco  Allergies  Meds  Problems  Med Hx  Surg Hx  Fam Hx            Current providers sharing in care for this patient include:  Patient Care Team:  Marcus Salgado PA-C as PCP - General (Physician Assistant)  Marcus Salgado PA-C as Assigned PCP  Lena Bradford MD as Assigned Endocrinology Provider  Nandini Jenkins RN as Diabetes Educator (Diabetes Education)  Gisel Burns DO as Assigned OBGYN Provider  Alberto Golden MD as MD (Dermatology)  Donovan Cobos DO as Assigned Musculoskeletal Provider  Meaghan Bateman PA-C as Assigned Dermatology Provider    The following health maintenance items are reviewed in Epic and correct as of today:  Health Maintenance   Topic Date Due    URINE DRUG SCREEN  Never done    COVID-19 Vaccine (1) Never done    HEPATITIS B IMMUNIZATION (3 of 3 - 19+ 3-dose series) 04/14/1998    ZOSTER IMMUNIZATION (1 of 2) Never done    DIABETIC FOOT EXAM   "06/03/2025    A1C  09/24/2025    MICROALBUMIN  01/10/2026    EYE EXAM  01/21/2026    BMP  03/24/2026    CMP  03/24/2026    LIPID  03/24/2026    TSH W/FREE T4 REFLEX  03/24/2026    MAMMO SCREENING  04/01/2026    MEDICARE ANNUAL WELLNESS VISIT  04/02/2026    WILLIE ASSESSMENT  04/02/2026    ANNUAL REVIEW OF HM ORDERS  04/02/2026    PHQ-9  04/02/2026    COLORECTAL CANCER SCREENING  09/21/2026    ADVANCE CARE PLANNING  12/20/2028    DTAP/TDAP/TD IMMUNIZATION (3 - Td or Tdap) 06/23/2031    HEPATITIS C SCREENING  Completed    HIV SCREENING  Completed    PHQ-2 (once per calendar year)  Completed    INFLUENZA VACCINE  Completed    Pneumococcal Vaccine: 50+ Years  Completed    HPV IMMUNIZATION  Aged Out    MENINGITIS IMMUNIZATION  Aged Out    PAP  Discontinued     Review of Systems  Constitutional, HEENT, cardiovascular, pulmonary, GI, , musculoskeletal, neuro, skin, endocrine and psych systems are negative, except as otherwise noted.     Objective    Exam  /78   Pulse 76   Temp 98.3  F (36.8  C) (Temporal)   Resp 18   Ht 1.468 m (4' 9.8\")   Wt 67.1 kg (148 lb)   LMP  (LMP Unknown)   SpO2 98%   BMI 31.15 kg/m     Estimated body mass index is 31.15 kg/m  as calculated from the following:    Height as of this encounter: 1.468 m (4' 9.8\").    Weight as of this encounter: 67.1 kg (148 lb).    Physical Exam  GENERAL: alert and no distress  EYES: Eyes grossly normal to inspection, PERRL and conjunctivae and sclerae normal  HENT: ear canals and TM's normal, nose and mouth without ulcers or lesions  NECK: no adenopathy, no asymmetry, masses, or scars  RESP: lungs clear to auscultation - no rales, rhonchi or wheezes  CV: regular rate and rhythm, normal S1 S2, no S3 or S4, no murmur, click or rub, no peripheral edema  ABDOMEN: soft, nontender, no hepatosplenomegaly, no masses and bowel sounds normal  MS: no gross musculoskeletal defects noted, no edema  SKIN: no suspicious lesions or rashes  NEURO: Normal strength and " tone, mentation intact and speech normal. Gait is stable.   PSYCH: mentation appears normal, affect normal/bright        4/2/2025   Mini Cog   Clock Draw Score 0 Abnormal   3 Item Recall 3 objects recalled   Mini Cog Total Score 3        Signed Electronically by: Marcus Salgado PA-C    Answers submitted by the patient for this visit:  Patient Health Questionnaire (Submitted on 4/2/2025)  If you checked off any problems, how difficult have these problems made it for you to do your work, take care of things at home, or get along with other people?: Not difficult at all  PHQ9 TOTAL SCORE: 0  Patient Health Questionnaire (G7) (Submitted on 4/2/2025)  WILLIE 7 TOTAL SCORE: 0

## 2025-04-02 NOTE — PATIENT INSTRUCTIONS
Patient Education   Preventive Care Advice   This is general advice given by our system to help you stay healthy. However, your care team may have specific advice just for you. Please talk to your care team about your preventive care needs.  Nutrition  Eat 5 or more servings of fruits and vegetables each day.  Try wheat bread, brown rice and whole grain pasta (instead of white bread, rice, and pasta).  Get enough calcium and vitamin D. Check the label on foods and aim for 100% of the RDA (recommended daily allowance).  Lifestyle  Exercise at least 150 minutes each week  (30 minutes a day, 5 days a week).  Do muscle strengthening activities 2 days a week. These help control your weight and prevent disease.  No smoking.  Wear sunscreen to prevent skin cancer.  Have a dental exam and cleaning every 6 months.  Yearly exams  See your health care team every year to talk about:  Any changes in your health.  Any medicines your care team has prescribed.  Preventive care, family planning, and ways to prevent chronic diseases.  Shots (vaccines)   HPV shots (up to age 26), if you've never had them before.  Hepatitis B shots (up to age 59), if you've never had them before.  COVID-19 shot: Get this shot when it's due.  Flu shot: Get a flu shot every year.  Tetanus shot: Get a tetanus shot every 10 years.  Pneumococcal, hepatitis A, and RSV shots: Ask your care team if you need these based on your risk.  Shingles shot (for age 50 and up)  General health tests  Diabetes screening:  Starting at age 35, Get screened for diabetes at least every 3 years.  If you are younger than age 35, ask your care team if you should be screened for diabetes.  Cholesterol test: At age 39, start having a cholesterol test every 5 years, or more often if advised.  Bone density scan (DEXA): At age 50, ask your care team if you should have this scan for osteoporosis (brittle bones).  Hepatitis C: Get tested at least once in your life.  STIs (sexually  transmitted infections)  Before age 24: Ask your care team if you should be screened for STIs.  After age 24: Get screened for STIs if you're at risk. You are at risk for STIs (including HIV) if:  You are sexually active with more than one person.  You don't use condoms every time.  You or a partner was diagnosed with a sexually transmitted infection.  If you are at risk for HIV, ask about PrEP medicine to prevent HIV.  Get tested for HIV at least once in your life, whether you are at risk for HIV or not.  Cancer screening tests  Cervical cancer screening: If you have a cervix, begin getting regular cervical cancer screening tests starting at age 21.  Breast cancer scan (mammogram): If you've ever had breasts, begin having regular mammograms starting at age 40. This is a scan to check for breast cancer.  Colon cancer screening: It is important to start screening for colon cancer at age 45.  Have a colonoscopy test every 10 years (or more often if you're at risk) Or, ask your provider about stool tests like a FIT test every year or Cologuard test every 3 years.  To learn more about your testing options, visit:   .  For help making a decision, visit:   https://bit.ly/uk39236.  Prostate cancer screening test: If you have a prostate, ask your care team if a prostate cancer screening test (PSA) at age 55 is right for you.  Lung cancer screening: If you are a current or former smoker ages 50 to 80, ask your care team if ongoing lung cancer screenings are right for you.  For informational purposes only. Not to replace the advice of your health care provider. Copyright   2023 [employer]. All rights reserved. Clinically reviewed by the [facility] Transitions Program. [employer] 407951 - REV 01/24.

## 2025-05-05 NOTE — PATIENT INSTRUCTIONS
If you have labs or imaging done, the results will automatically release in [employer] without an interpretation.  Your health care professional will review those results and send an interpretation with recommendations as soon as possible, but this may be 1-3 business days.    If you have any questions regarding your visit, please contact your care team.     [employer]: [phone]  [facility] CLINIC HOURS TELEPHONE NUMBER   DO. [employer] -Surgery Scheduler  Loren -     AYAKA Morales RN Kylie, RN [place]    Monday 8:30 am-5:00 pm  Wednesday 8:30 am-5:00 pm  Friday 8:30 am-5:00 pm    Typical Surgery day: Tuesday [facility]  [address] [place] [address]  Phone:  [phone]  Fax: [phone]   Appointment [phone]    Imaging Scheduling-All Locations [phone]    [facility]  [address] [place] [address] [place] [address] [phone]   **Surgeries** Our Surgery Schedulers will contact you to schedule. If you do not receive a call within 3 business days, please call [phone].  Urgent Care locations:  [facility] Monday-Friday   10 am - 8 pm  Saturday and    9 am - 5 pm [phone]   If you need a medication refill, please contact your pharmacy. Please allow 3 business days for your refill to be completed.  As always, Thank you for trusting us with your healthcare needs!  see additional instructions from your care team below

## 2025-05-07 ENCOUNTER — OFFICE VISIT (OUTPATIENT)
Dept: OBGYN | Facility: CLINIC | Age: 60
End: 2025-05-07
Payer: COMMERCIAL

## 2025-05-07 VITALS
HEART RATE: 87 BPM | BODY MASS INDEX: 31.99 KG/M2 | SYSTOLIC BLOOD PRESSURE: 149 MMHG | WEIGHT: 152 LBS | DIASTOLIC BLOOD PRESSURE: 75 MMHG | OXYGEN SATURATION: 100 %

## 2025-05-07 DIAGNOSIS — Z00.00 ANNUAL PHYSICAL EXAM: Primary | ICD-10-CM

## 2025-05-07 PROCEDURE — 3077F SYST BP >= 140 MM HG: CPT | Performed by: OBSTETRICS & GYNECOLOGY

## 2025-05-07 PROCEDURE — 3078F DIAST BP <80 MM HG: CPT | Performed by: OBSTETRICS & GYNECOLOGY

## 2025-05-07 PROCEDURE — G0101 CA SCREEN;PELVIC/BREAST EXAM: HCPCS | Performed by: OBSTETRICS & GYNECOLOGY

## 2025-05-07 NOTE — PROGRESS NOTES
MAINOR to bill Maria M is a 60 year old female, , who is here for only the breast and pelvic portions of her annual exam since her primary physician did the remainder.  She no longer needs a pap collected s/p AMARILIS with removal of the cervix in  for benign disease.  She retains both ovaries.  She has a mammogram scheduled for tomorrow and her next DEXA scan is due in 2029.  Her next colonoscopy will be due in 2026.  She prefers to hold off on refilling her Estrace since she no longer feels the need to take this.  However, she will call back if she changes her mind and wants a refill.    ROS: Ten point review of systems was reviewed and negative except the above.    Health Maintenance   Topic Date Due    URINE DRUG SCREEN  Never done    COVID-19 Vaccine (1) Never done    ZOSTER IMMUNIZATION (1 of 2) Never done    RSV VACCINE (1 - Risk 60-74 years 1-dose series) Never done    DIABETIC FOOT EXAM  2025    A1C  2025    MICROALBUMIN  01/10/2026    EYE EXAM  2026    BMP  2026    CMP  2026    LIPID  2026    TSH W/FREE T4 REFLEX  2026    MAMMO SCREENING  2026    MEDICARE ANNUAL WELLNESS VISIT  2026    WILLIE ASSESSMENT  2026    PHQ-9  2026    COLORECTAL CANCER SCREENING  2026    ADVANCE CARE PLANNING  2030    DTAP/TDAP/TD IMMUNIZATION (3 - Td or Tdap) 2031    HEPATITIS C SCREENING  Completed    HIV SCREENING  Completed    PHQ-2 (once per calendar year)  Completed    INFLUENZA VACCINE  Completed    Pneumococcal Vaccine: 50+ Years  Completed    HPV IMMUNIZATION  Aged Out    MENINGITIS IMMUNIZATION  Aged Out    PAP  Discontinued      Last pap: not due  Last Mammogram: scheduled tomorrow  Last Dexa: due 2029  Last Colonoscopy: due 2026  Lab Results   Component Value Date    CHOL 188 2025    CHOL 190 2020     Lab Results   Component Value Date    HDL 69 2025    HDL 74 2020     Lab Results   Component Value Date      2025     2020     Lab Results   Component Value Date    TRIG 87 2025    TRIG 69 2020     Lab Results   Component Value Date    CHOLHDLRATIO 3.6 2015     OBHX:      PSH:   Past Surgical History:   Procedure Laterality Date    BIOPSY  ?    See MyChart    COLONOSCOPY N/A 2016    Procedure: COLONOSCOPY;  Surgeon: Efren Perry MD;  Location:  GI    ENDOSCOPIC SINUS SURGERY Bilateral 2018    Procedure: Bilateral functional endoscopic maxillary antrostomies;  Surgeon: Woo Silva MD;  Location: [facility] OR    ENT SURGERY  ?    See MyChary sinus surgery    HYSTERECTOMY, PAP NO LONGER INDICATED      LAMINECT/DISCECTOMY, CERVICAL  2007    C7-T1 hemilaminectomy, microdiscectomy, foraminotomy.    LASER YAG CAPSULOTOMY Right 10/23/2014    Procedure: LASER YAG CAPSULOTOMY;  Surgeon: Esteban Dorsey MD;  Location: [facility] OR    LASER YAG CAPSULOTOMY Left 2024    Procedure: Laser YAG capsulotomy left;  Surgeon: Laureano Doyle MD;  Location: [facility] OR    PHACOEMULSIFICATION WITH STANDARD INTRAOCULAR LENS IMPLANT Left 2023    Procedure: PHACOEMULSIFICATION WITH A STANDARD INTRAOCULAR LENS IMPLANT LEFT;  Surgeon: Laureano Doyle MD;  Location: [facility] OR    CA SPINE SURGERY PROCEDURE UNLISTED      VITRECTOMY,STRIP EPIRETINAL MEMBRANE  2009    [facility]  DELIVERY ONLY      C-Sections x2    [facility] NONSPECIFIC PROCEDURE      Hysterectomy - total (cervix removed but ovaries remain)    [facility] STOMACH SURGERY PROCEDURE UNLISTED      [facility] TOTAL ABDOM HYSTERECTOMY       PMH: Her past medical, surgical, and obstetric histories were reviewed and are documented in their appropriate chart areas.    ALL/Meds: Her medication and allergy histories were reviewed and are documented in their appropriate chart areas.    SH/FMH: Her social and family history was reviewed and documented in its appropriate chart area.    PE: BP (!) 149/75   Pulse 87   Wt 68.9 kg (152 lb)    LMP  (LMP Unknown)   SpO2 100%   Breastfeeding No   BMI 31.99 kg/m    Body mass index is 31.99 kg/m .    General Appearance:  healthy, alert, active, no distress  Breast: normal breast exam.   Pelvic:       - Ext: Vulva and perineum are normal without lesion, mass or discharge        - Urethra: normal without discharge        - Urethral Meatus: normal appearance       - Bladder: no tenderness, no masses       - Vagina: Normal mucosa, no discharge        - Cervix: Surgically absent       - Uterus: Surgically absent       - Adnexa: Non palpable       - Rectal: deferred    A/P:  Well Woman Exam - Breast and Pelvic Portions Only, Osteopenia     -  I discussed the new pap recommendations regarding screening.  Explained the rationale for increased intervals between paps.  Questions asked and answered.  She does agree to this regiment.  Pap was not collected   -  BC: not applicable s/p hysterectomy   -  She declines a refill of Estrace but will call back if she wants to continue   -  Next DEXA scan due in 6/2029   -  Next MA scheduled for 5/8/2025   -  Encouraged self-breast exam   -  Encouraged low fat diet, regular exercise, and adequate calcium intake.    Gisel Burns, DO  FACOG, FACS

## 2025-05-08 ENCOUNTER — ANCILLARY PROCEDURE (OUTPATIENT)
Dept: MAMMOGRAPHY | Facility: CLINIC | Age: 60
End: 2025-05-08
Attending: PHYSICIAN ASSISTANT
Payer: COMMERCIAL

## 2025-05-08 DIAGNOSIS — Z12.31 VISIT FOR SCREENING MAMMOGRAM: ICD-10-CM

## 2025-05-08 PROCEDURE — 77063 BREAST TOMOSYNTHESIS BI: CPT | Mod: GC

## 2025-05-08 PROCEDURE — 77067 SCR MAMMO BI INCL CAD: CPT | Mod: GC

## 2025-05-13 ENCOUNTER — LAB (OUTPATIENT)
Dept: LAB | Facility: OTHER | Age: 60
End: 2025-05-13
Payer: COMMERCIAL

## 2025-05-13 DIAGNOSIS — E10.9 TYPE 1 DIABETES MELLITUS WITHOUT COMPLICATION (H): ICD-10-CM

## 2025-05-13 LAB — TSH SERPL DL<=0.005 MIU/L-ACNC: 0.61 UIU/ML (ref 0.3–4.2)

## 2025-05-13 PROCEDURE — 36415 COLL VENOUS BLD VENIPUNCTURE: CPT

## 2025-05-13 PROCEDURE — 84443 ASSAY THYROID STIM HORMONE: CPT

## 2025-05-15 ENCOUNTER — RESULTS FOLLOW-UP (OUTPATIENT)
Dept: ENDOCRINOLOGY | Facility: CLINIC | Age: 60
End: 2025-05-15

## 2025-06-03 ENCOUNTER — MYC MEDICAL ADVICE (OUTPATIENT)
Dept: FAMILY MEDICINE | Facility: OTHER | Age: 60
End: 2025-06-03
Payer: COMMERCIAL

## 2025-06-03 ENCOUNTER — TELEPHONE (OUTPATIENT)
Dept: ENDOCRINOLOGY | Facility: CLINIC | Age: 60
End: 2025-06-03
Payer: COMMERCIAL

## 2025-06-03 DIAGNOSIS — L55.1 SUNBURN, SECOND DEGREE: ICD-10-CM

## 2025-06-03 DIAGNOSIS — E10.9 TYPE 1 DIABETES MELLITUS WITHOUT COMPLICATION (H): Primary | ICD-10-CM

## 2025-06-03 DIAGNOSIS — E06.3 HYPOTHYROIDISM DUE TO HASHIMOTO'S THYROIDITIS: ICD-10-CM

## 2025-06-03 RX ORDER — FLUOCINONIDE 0.5 MG/G
CREAM TOPICAL 2 TIMES DAILY
Qty: 60 G | Refills: 0 | Status: SHIPPED | OUTPATIENT
Start: 2025-06-03

## 2025-06-10 ENCOUNTER — TRANSFERRED RECORDS (OUTPATIENT)
Dept: HEALTH INFORMATION MANAGEMENT | Facility: CLINIC | Age: 60
End: 2025-06-10
Payer: COMMERCIAL

## 2025-06-23 ENCOUNTER — LAB (OUTPATIENT)
Dept: LAB | Facility: OTHER | Age: 60
End: 2025-06-23
Payer: COMMERCIAL

## 2025-06-23 DIAGNOSIS — E10.9 TYPE 1 DIABETES MELLITUS WITHOUT COMPLICATION (H): ICD-10-CM

## 2025-06-23 DIAGNOSIS — E06.3 HYPOTHYROIDISM DUE TO HASHIMOTO'S THYROIDITIS: ICD-10-CM

## 2025-06-23 LAB
EST. AVERAGE GLUCOSE BLD GHB EST-MCNC: 123 MG/DL
HBA1C MFR BLD: 5.9 % (ref 0–5.6)
TSH SERPL DL<=0.005 MIU/L-ACNC: 0.53 UIU/ML (ref 0.3–4.2)

## 2025-06-23 PROCEDURE — 36415 COLL VENOUS BLD VENIPUNCTURE: CPT

## 2025-06-23 PROCEDURE — 84443 ASSAY THYROID STIM HORMONE: CPT

## 2025-06-23 PROCEDURE — 83036 HEMOGLOBIN GLYCOSYLATED A1C: CPT

## 2025-06-26 ENCOUNTER — MYC MEDICAL ADVICE (OUTPATIENT)
Dept: FAMILY MEDICINE | Facility: OTHER | Age: 60
End: 2025-06-26
Payer: COMMERCIAL

## 2025-07-03 ENCOUNTER — NURSE TRIAGE (OUTPATIENT)
Dept: NURSING | Facility: CLINIC | Age: 60
End: 2025-07-03
Payer: COMMERCIAL

## 2025-07-03 NOTE — TELEPHONE ENCOUNTER
Spoke to Crystal at 12:40 pm.  She reports her sugars are doing better.  Current glucose sensor is 216 mg/dl.    When her sugars were rising early this morning she took a correction via her pod at 3 am and 5 am.  At 8 am she took 1 unit insulin with an insulin pen and drank a lot of water.  She doesn't recall changing her pod, but her reports indicate she did, and then she thought about it and stated she did change her pod.  Her sugars started coming down 2 hours later.  It sounds like she gave herself 1 unit of insulin with an insulin pen at the time she spoke to triage as well.        No further action required.    Jennifer Doyle RDN, LDN, [facility]  Dietitian and Diabetes  - Endocrinology  [facility] and [facility] [place]

## 2025-07-03 NOTE — TELEPHONE ENCOUNTER
Nurse Triage SBAR    Is this a 2nd Level Triage? YES, LICENSED PRACTITIONER REVIEW IS REQUIRED    Situation:  Blood sugar reading above 300.    Background: Diabetic patient with pump and continuous monitor. Has been above 300 since early this morning.     Assessment:  Maria M has a current reading off 322 and is accompanied by her  at [facility]. Changed tubing prior to shopping. Her reading at 3am was >400 (cut-off limit 400) and coming down slowly. Her emergency dosing is 1U and she will take that when she gets home. Maria M feels a little off currently, though no greatly concerning symptoms. She will remain with her  while troubleshooting and treating.    Protocol Recommended Disposition:   Discuss With PCP And Callback By Nurse Within 1 Hour    Recommendation: Message send to endo team and educator. Patient will follow her emergency plan for levels >300. Patient to call back if glucose changes aren't as expected or she feels worse.    Routed to provider/care team.      Reason for Disposition   Blood glucose > 300 mg/dL (16.7 mmol/L) AND two or more times in a row    Additional Information   Negative: Unconscious or difficult to awaken   Negative: Acting confused (e.g., disoriented, slurred speech)   Negative: Very weak (can't stand)   Negative: Sounds like a life-threatening emergency to the triager   Negative: Vomiting and signs of dehydration (e.g., very dry mouth, lightheaded, dark urine)   Negative: Blood glucose > 240 mg/dL (13.3 mmol/L) and rapid breathing   Negative: Blood glucose > 500 mg/dL (27.8 mmol/L)   Negative: Blood glucose > 240 mg/dL (13.3 mmol/L) AND urine ketones moderate-large (or more than 1+)   Negative: Blood glucose > 240 mg/dL (13.3 mmol/L) and blood ketones > 1.4 mmol/L   Negative: Blood glucose > 240 mg/dL (13.3 mmol/L) AND vomiting AND unable to check for ketones (in blood or urine)   Negative: Vomiting lasting > 4 hours   Negative: Patient sounds very sick or weak to the  triager   Negative: Fever > 100.4 F (38.0 C)   Negative: Caller has URGENT medication or insulin pump question and triager unable to answer question    Protocols used: Diabetes - High Blood Sugar-A-OH

## 2025-07-03 NOTE — TELEPHONE ENCOUNTER
Diabetes Educator Note:    Called Maria M and her VM picked up right away.    Spoke to Ra, her  and reports Maria M is involved in another phone call right now and requests a call back in 30 min.  When asked, he reports Maria M is feeling Okay.  She took 1 unit of insulin and her sugar is starting to come down.  He is perplexed to the high glucose reading.    WCB.    Jennifer Doyle RDN, KYLIEN, [facility]  Dietitian and Diabetes  - Endocrinology  [facility] and Surgery [place]

## 2025-07-07 ENCOUNTER — DOCUMENTATION ONLY (OUTPATIENT)
Dept: ENDOCRINOLOGY | Facility: CLINIC | Age: 60
End: 2025-07-07
Payer: COMMERCIAL

## 2025-07-07 NOTE — PROGRESS NOTES
This patient has an appointment for lab work but does not have any orders. Please place some future orders as needed.    Thank You,  Linda Soriano MLT (ASCP)

## 2025-09-04 DIAGNOSIS — E10.9 TYPE 1 DIABETES MELLITUS WITHOUT COMPLICATION (H): ICD-10-CM

## 2025-09-04 RX ORDER — SIMVASTATIN 20 MG
20 TABLET ORAL AT BEDTIME
Qty: 90 TABLET | Refills: 3 | Status: SHIPPED | OUTPATIENT
Start: 2025-09-04

## (undated) DEVICE — GELFILM 12.5 SQ SM 1X2"

## (undated) DEVICE — WIPE INSTRUMENT MEROCEL

## (undated) DEVICE — TAPE TRANSPORE 1/2"

## (undated) DEVICE — HEMOSTAT ABSORBABLE POWDER ARISTA 1GM SM0005-USA

## (undated) DEVICE — SOL NACL 0.9% INJ 1000ML BAG 07983-09

## (undated) DEVICE — PACK HEAD NECK CUSTOM

## (undated) DEVICE — SPONGE COTTONOID 1/2X3" 80-1407

## (undated) DEVICE — ANTIFOG SOLUTION W/FOAM PAD 31142527

## (undated) DEVICE — SYR 20ML LL W/O NDL

## (undated) DEVICE — TUBING IRR SUCTION SET GYRUS

## (undated) DEVICE — SOL NACL 0.9% IRRIG 1000ML BOTTLE 07138-09

## (undated) DEVICE — SOL WATER IRRIG 1000ML BOTTLE 07139-09

## (undated) DEVICE — GLOVE PROTEXIS W/NEU-THERA 8.0  2D73TE80

## (undated) DEVICE — PACKING NASAL SINU-FOAM RAPID RHINO RR650

## (undated) DEVICE — GLOVE PROTEGRITY 7.5 LATEX

## (undated) DEVICE — BLADE SHAVER OLYMPUS 2MM SMR TYPE A BB2000SA

## (undated) RX ORDER — DEXAMETHASONE SODIUM PHOSPHATE 10 MG/ML
INJECTION INTRAMUSCULAR; INTRAVENOUS
Status: DISPENSED
Start: 2018-12-04

## (undated) RX ORDER — EPINEPHRINE 1 MG/ML
INJECTION, SOLUTION, CONCENTRATE INTRAVENOUS
Status: DISPENSED
Start: 2018-12-04

## (undated) RX ORDER — ONDANSETRON 2 MG/ML
INJECTION INTRAMUSCULAR; INTRAVENOUS
Status: DISPENSED
Start: 2018-12-04

## (undated) RX ORDER — LIDOCAINE HYDROCHLORIDE 20 MG/ML
INJECTION, SOLUTION EPIDURAL; INFILTRATION; INTRACAUDAL; PERINEURAL
Status: DISPENSED
Start: 2018-12-04

## (undated) RX ORDER — FENTANYL CITRATE 50 UG/ML
INJECTION, SOLUTION INTRAMUSCULAR; INTRAVENOUS
Status: DISPENSED
Start: 2023-12-07

## (undated) RX ORDER — FENTANYL CITRATE 50 UG/ML
INJECTION, SOLUTION INTRAMUSCULAR; INTRAVENOUS
Status: DISPENSED
Start: 2018-12-04

## (undated) RX ORDER — PROPOFOL 10 MG/ML
INJECTION, EMULSION INTRAVENOUS
Status: DISPENSED
Start: 2018-12-04

## (undated) RX ORDER — HYDROMORPHONE HYDROCHLORIDE 1 MG/ML
INJECTION, SOLUTION INTRAMUSCULAR; INTRAVENOUS; SUBCUTANEOUS
Status: DISPENSED
Start: 2018-12-04

## (undated) RX ORDER — DIMENHYDRINATE 50 MG/ML
INJECTION, SOLUTION INTRAMUSCULAR; INTRAVENOUS
Status: DISPENSED
Start: 2018-12-04

## (undated) RX ORDER — LIDOCAINE HYDROCHLORIDE AND EPINEPHRINE 10; 10 MG/ML; UG/ML
INJECTION, SOLUTION INFILTRATION; PERINEURAL
Status: DISPENSED
Start: 2018-12-04